# Patient Record
Sex: FEMALE | Race: WHITE | NOT HISPANIC OR LATINO | Employment: OTHER | URBAN - METROPOLITAN AREA
[De-identification: names, ages, dates, MRNs, and addresses within clinical notes are randomized per-mention and may not be internally consistent; named-entity substitution may affect disease eponyms.]

---

## 2017-07-11 ENCOUNTER — HOSPITAL ENCOUNTER (EMERGENCY)
Facility: HOSPITAL | Age: 32
Discharge: HOME | End: 2017-07-11
Attending: EMERGENCY MEDICINE
Payer: COMMERCIAL

## 2017-07-11 VITALS
BODY MASS INDEX: 25.33 KG/M2 | SYSTOLIC BLOOD PRESSURE: 142 MMHG | DIASTOLIC BLOOD PRESSURE: 78 MMHG | OXYGEN SATURATION: 99 % | HEIGHT: 65 IN | RESPIRATION RATE: 16 BRPM | HEART RATE: 85 BPM | WEIGHT: 152 LBS | TEMPERATURE: 97 F

## 2017-07-11 DIAGNOSIS — S61.218A LACERATION OF INDEX FINGER WITHOUT FOREIGN BODY WITHOUT DAMAGE TO NAIL, UNSPECIFIED LATERALITY, INITIAL ENCOUNTER: Primary | ICD-10-CM

## 2017-07-11 PROCEDURE — 12001 RPR S/N/AX/GEN/TRNK 2.5CM/<: CPT

## 2017-07-11 PROCEDURE — 99283 EMERGENCY DEPT VISIT LOW MDM: CPT | Mod: 25

## 2017-07-11 PROCEDURE — 63600175 PHARM REV CODE 636 W HCPCS: Performed by: EMERGENCY MEDICINE

## 2017-07-11 PROCEDURE — 12002 RPR S/N/AX/GEN/TRNK2.6-7.5CM: CPT

## 2017-07-11 PROCEDURE — 90715 TDAP VACCINE 7 YRS/> IM: CPT | Performed by: EMERGENCY MEDICINE

## 2017-07-11 PROCEDURE — 90471 IMMUNIZATION ADMIN: CPT | Performed by: EMERGENCY MEDICINE

## 2017-07-11 RX ADMIN — CLOSTRIDIUM TETANI TOXOID ANTIGEN (FORMALDEHYDE INACTIVATED), CORYNEBACTERIUM DIPHTHERIAE TOXOID ANTIGEN (FORMALDEHYDE INACTIVATED), BORDETELLA PERTUSSIS TOXOID ANTIGEN (GLUTARALDEHYDE INACTIVATED), BORDETELLA PERTUSSIS FILAMENTOUS HEMAGGLUTININ ANTIGEN (FORMALDEHYDE INACTIVATED), BORDETELLA PERTUSSIS PERTACTIN ANTIGEN, AND BORDETELLA PERTUSSIS FIMBRIAE 2/3 ANTIGEN 0.5 ML: 5; 2; 2.5; 5; 3; 5 INJECTION, SUSPENSION INTRAMUSCULAR at 08:07

## 2017-07-12 NOTE — ED PROVIDER NOTES
Encounter Date: 2017       History     Chief Complaint   Patient presents with    Laceration     right 2nd finger on a knife     Small laceration to right 2nd digit.      The history is provided by the patient.   Laceration    The incident occurred just prior to arrival. The laceration is located on the right hand. The laceration mechanism was a a clean knife. The pain is at a severity of 2/10. The pain has been fluctuating since onset. Her tetanus status is unknown.     Review of patient's allergies indicates:   Allergen Reactions    Sulfa (sulfonamide antibiotics) Hives     itching    Adhesive Rash    Percocet [oxycodone-acetaminophen] Other (See Comments)     Do not want to use     Past Medical History:   Diagnosis Date    Asthma     allergy induce    Fracture     small finger left hand     Past Surgical History:   Procedure Laterality Date    COSMETIC SURGERY      breast augmentation    FINGER SURGERY      PELVIC LAPAROSCOPY      Endometriosis ([place])    TONSILLECTOMY       Family History   Problem Relation Age of Onset    Diabetes Maternal Grandmother     Diabetes Mother     Hypertension Mother     Breast cancer Neg Hx     Eclampsia Neg Hx     Miscarriages / Stillbirths Neg Hx     Ovarian cancer Neg Hx      labor Neg Hx     Stroke Neg Hx     Cervical cancer Maternal Aunt     Colon cancer Maternal Aunt     Diabetes Maternal Aunt      Social History   Substance Use Topics    Smoking status: Former Smoker     Packs/day: 1.00     Years: 5.00     Start date: 2005    Smokeless tobacco: Not on file    Alcohol use Yes      Comment: rarely     Review of Systems   Constitutional: Negative for fever.   HENT: Negative for ear discharge, ear pain and facial swelling.    Eyes: Negative for pain, redness and itching.   Respiratory: Negative for cough, shortness of breath, wheezing and stridor.    Cardiovascular: Negative for chest pain, palpitations and leg swelling.    Gastrointestinal: Negative for abdominal pain, nausea and vomiting.   Genitourinary: Negative for enuresis and flank pain.   Musculoskeletal: Negative for back pain, gait problem, neck pain and neck stiffness.   Skin: Positive for wound. Negative for color change, pallor and rash.   Neurological: Negative for tremors, syncope and weakness.       Physical Exam     Initial Vitals [07/11/17 1956]   BP Pulse Resp Temp SpO2   (!) 142/78 85 16 96.9 °F (36.1 °C) 99 %      MAP       99.33         Physical Exam    Nursing note and vitals reviewed.  Constitutional: She appears well-developed and well-nourished. She is not diaphoretic. No distress.   HENT:   Head: Normocephalic and atraumatic.   Mouth/Throat: No oropharyngeal exudate.   Eyes: EOM are normal. Pupils are equal, round, and reactive to light. Right eye exhibits no discharge. Left eye exhibits no discharge.   Neck: Normal range of motion. Neck supple. No JVD present.   Pulmonary/Chest: Breath sounds normal. No stridor. She has no wheezes. She has no rhonchi. She has no rales. She exhibits no tenderness.   Abdominal: Soft. Bowel sounds are normal. She exhibits no distension. There is no tenderness. There is no rebound and no guarding.   Musculoskeletal: Normal range of motion. She exhibits no edema.   Neurological: She is alert and oriented to person, place, and time. No sensory deficit.   Skin:              ED Course   Lac Repair  Date/Time: 7/11/2017 8:41 PM  Performed by: NADIR CABAN  Authorized by: NADIR CABAN   Consent Done: Not Needed  Body area: upper extremity  Location details: right index finger  Laceration length: 0.5 cm  Foreign bodies: no foreign bodies  Tendon involvement: none  Nerve involvement: none  Vascular damage: no  Patient sedated: no  Irrigation solution: saline  Amount of cleaning: standard  Debridement: none  Degree of undermining: none  Skin closure: glue  Approximation: close  Approximation difficulty: simple  Dressing:  adhesive bandage  Patient tolerance: Patient tolerated the procedure well with no immediate complications        Labs Reviewed - No data to display                            ED Course     Clinical Impression:   The encounter diagnosis was Laceration of index finger without foreign body without damage to nail, unspecified laterality, initial encounter.    Disposition:   Disposition: Discharged  Condition: Stable                        Michel Peralta MD  07/11/17 2218

## 2017-11-19 ENCOUNTER — HOSPITAL ENCOUNTER (EMERGENCY)
Facility: HOSPITAL | Age: 32
Discharge: HOME | End: 2017-11-19
Attending: EMERGENCY MEDICINE
Payer: COMMERCIAL

## 2017-11-19 VITALS
OXYGEN SATURATION: 100 % | DIASTOLIC BLOOD PRESSURE: 74 MMHG | TEMPERATURE: 97 F | BODY MASS INDEX: 24.96 KG/M2 | WEIGHT: 150 LBS | HEART RATE: 75 BPM | SYSTOLIC BLOOD PRESSURE: 132 MMHG | RESPIRATION RATE: 16 BRPM

## 2017-11-19 DIAGNOSIS — S60.469A: Primary | ICD-10-CM
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
## 2017-11-19 LAB
ALBUMIN SERPL BCP-MCNC: 3.5 G/DL
ALP SERPL-CCNC: 76 U/L
ALT SERPL W/O P-5'-P-CCNC: 13 U/L
ANION GAP SERPL CALC-SCNC: 10 MMOL/L
AST SERPL-CCNC: 14 U/L
BASOPHILS # BLD AUTO: 0.02 K/UL
BASOPHILS NFR BLD: 0.2 %
BILIRUB SERPL-MCNC: 0.8 MG/DL
BUN SERPL-MCNC: 5 MG/DL
CALCIUM SERPL-MCNC: 8.8 MG/DL
CHLORIDE SERPL-SCNC: 106 MMOL/L
CO2 SERPL-SCNC: 23 MMOL/L
CREAT SERPL-MCNC: 0.7 MG/DL
DIFFERENTIAL METHOD: ABNORMAL
EOSINOPHIL # BLD AUTO: 0 K/UL
EOSINOPHIL NFR BLD: 0.2 %
ERYTHROCYTE [DISTWIDTH] IN BLOOD BY AUTOMATED COUNT: 12.5 %
EST. GFR  (AFRICAN AMERICAN): >60 ML/MIN/1.73 M^2
EST. GFR  (NON AFRICAN AMERICAN): >60 ML/MIN/1.73 M^2
GLUCOSE SERPL-MCNC: 93 MG/DL
HCT VFR BLD AUTO: 37.4 %
HGB BLD-MCNC: 12.1 G/DL
LYMPHOCYTES # BLD AUTO: 1.8 K/UL
LYMPHOCYTES NFR BLD: 14.2 %
MCH RBC QN AUTO: 32.5 PG
MCHC RBC AUTO-ENTMCNC: 32.4 G/DL
MCV RBC AUTO: 101 FL
MONOCYTES # BLD AUTO: 1.3 K/UL
MONOCYTES NFR BLD: 10 %
NEUTROPHILS # BLD AUTO: 9.8 K/UL
NEUTROPHILS NFR BLD: 75.4 %
PLATELET # BLD AUTO: 204 K/UL
PMV BLD AUTO: 10.4 FL
POTASSIUM SERPL-SCNC: 3.4 MMOL/L
PROT SERPL-MCNC: 6.7 G/DL
RBC # BLD AUTO: 3.72 M/UL
SODIUM SERPL-SCNC: 139 MMOL/L
WBC # BLD AUTO: 12.93 K/UL

## 2017-11-19 PROCEDURE — S0077 INJECTION, CLINDAMYCIN PHOSP: HCPCS | Performed by: EMERGENCY MEDICINE

## 2017-11-19 PROCEDURE — 96372 THER/PROPH/DIAG INJ SC/IM: CPT

## 2017-11-19 PROCEDURE — 80053 COMPREHEN METABOLIC PANEL: CPT

## 2017-11-19 PROCEDURE — 87040 BLOOD CULTURE FOR BACTERIA: CPT | Mod: 59

## 2017-11-19 PROCEDURE — 36415 COLL VENOUS BLD VENIPUNCTURE: CPT

## 2017-11-19 PROCEDURE — 85025 COMPLETE CBC W/AUTO DIFF WBC: CPT

## 2017-11-19 PROCEDURE — 25000003 PHARM REV CODE 250: Performed by: EMERGENCY MEDICINE

## 2017-11-19 PROCEDURE — 63600175 PHARM REV CODE 636 W HCPCS: Performed by: EMERGENCY MEDICINE

## 2017-11-19 PROCEDURE — 99284 EMERGENCY DEPT VISIT MOD MDM: CPT | Mod: 25

## 2017-11-19 RX ORDER — ONDANSETRON 4 MG/1
4 TABLET, ORALLY DISINTEGRATING ORAL
Status: COMPLETED | OUTPATIENT
Start: 2017-11-19 | End: 2017-11-19

## 2017-11-19 RX ORDER — MUPIROCIN 20 MG/G
OINTMENT TOPICAL 3 TIMES DAILY
Qty: 15 G | Refills: 0 | Status: SHIPPED | OUTPATIENT
Start: 2017-11-19 | End: 2017-11-29

## 2017-11-19 RX ORDER — HYDROCODONE BITARTRATE AND ACETAMINOPHEN 5; 325 MG/1; MG/1
2 TABLET ORAL
Status: COMPLETED | OUTPATIENT
Start: 2017-11-19 | End: 2017-11-19

## 2017-11-19 RX ORDER — HYDROMORPHONE HYDROCHLORIDE 1 MG/ML
1 INJECTION, SOLUTION INTRAMUSCULAR; INTRAVENOUS; SUBCUTANEOUS
Status: COMPLETED | OUTPATIENT
Start: 2017-11-19 | End: 2017-11-19

## 2017-11-19 RX ORDER — CLINDAMYCIN HYDROCHLORIDE 150 MG/1
300 CAPSULE ORAL 4 TIMES DAILY
Qty: 56 CAPSULE | Refills: 0 | Status: SHIPPED | OUTPATIENT
Start: 2017-11-19 | End: 2017-11-26

## 2017-11-19 RX ORDER — TRAMADOL HYDROCHLORIDE 50 MG/1
50 TABLET ORAL EVERY 6 HOURS PRN
Qty: 15 TABLET | Refills: 0 | Status: SHIPPED | OUTPATIENT
Start: 2017-11-19 | End: 2017-11-29

## 2017-11-19 RX ORDER — CLINDAMYCIN PHOSPHATE 150 MG/ML
300 INJECTION, SOLUTION INTRAVENOUS
Status: COMPLETED | OUTPATIENT
Start: 2017-11-19 | End: 2017-11-19

## 2017-11-19 RX ADMIN — ONDANSETRON 4 MG: 4 TABLET, ORALLY DISINTEGRATING ORAL at 06:11

## 2017-11-19 RX ADMIN — HYDROCODONE BITARTRATE AND ACETAMINOPHEN 2 TABLET: 5; 325 TABLET ORAL at 08:11

## 2017-11-19 RX ADMIN — CLINDAMYCIN PHOSPHATE 300 MG: 150 INJECTION, SOLUTION INTRAMUSCULAR; INTRAVENOUS at 07:11

## 2017-11-19 RX ADMIN — HYDROMORPHONE HYDROCHLORIDE 1 MG: 1 INJECTION, SOLUTION INTRAMUSCULAR; INTRAVENOUS; SUBCUTANEOUS at 06:11

## 2017-11-20 NOTE — ED PROVIDER NOTES
Encounter Date: 2017       History     Chief Complaint   Patient presents with    Animal Bite     Patient states dog bite to finger on 17.   States going to [facility] ED for care after event      The history is provided by the patient.   Animal Bite    The incident occurred several days ago. She came to the ER via by ambulance. There is an injury to the right index finger. The pain is at a severity of 4/10. It is unlikely that a foreign body is present. Pertinent negatives include no chest pain, no altered mental status, no numbness, no visual disturbance, no abdominal pain, no bowel incontinence, no nausea, no vomiting, no bladder incontinence, no headaches, no hearing loss, no inability to bear weight, no neck pain, no pain when bearing weight, no focal weakness, no decreased responsiveness, no light-headedness, no loss of consciousness, no seizures, no tingling, no weakness, no cough, no difficulty breathing and no memory loss. Her tetanus status is UTD. Recently, medical care has been given at another facility. Services received include medications given.     Review of patient's allergies indicates:   Allergen Reactions    Sulfa (sulfonamide antibiotics) Hives     itching    Adhesive Rash    Percocet [oxycodone-acetaminophen] Other (See Comments)     Do not want to use     Past Medical History:   Diagnosis Date    Asthma     allergy induce    Fracture     small finger left hand     Past Surgical History:   Procedure Laterality Date    COSMETIC SURGERY      breast augmentation    FINGER SURGERY      PELVIC LAPAROSCOPY      Endometriosis ([place])    TONSILLECTOMY       Family History   Problem Relation Age of Onset    Diabetes Maternal Grandmother     Diabetes Mother     Hypertension Mother     Breast cancer Neg Hx     Eclampsia Neg Hx     Miscarriages / Stillbirths Neg Hx     Ovarian cancer Neg Hx      labor Neg Hx     Stroke Neg Hx     Cervical cancer Maternal Aunt      Colon cancer Maternal Aunt     Diabetes Maternal Aunt      Social History   Substance Use Topics    Smoking status: Former Smoker     Packs/day: 1.00     Years: 5.00     Start date: 5/11/2005    Smokeless tobacco: Not on file    Alcohol use Yes      Comment: rarely     Review of Systems   Constitutional: Negative for decreased responsiveness and fever.   HENT: Negative for facial swelling and hearing loss.    Eyes: Negative for visual disturbance.   Respiratory: Negative for cough, shortness of breath, wheezing and stridor.    Cardiovascular: Negative for chest pain, palpitations and leg swelling.   Gastrointestinal: Negative for abdominal pain, bowel incontinence, nausea and vomiting.   Genitourinary: Negative for bladder incontinence, enuresis and flank pain.   Musculoskeletal: Negative for neck pain and neck stiffness.   Skin: Positive for wound.   Neurological: Negative for tingling, tremors, focal weakness, seizures, loss of consciousness, weakness, light-headedness, numbness and headaches.   Psychiatric/Behavioral: Negative for memory loss.       Physical Exam     Initial Vitals [11/19/17 1835]   BP Pulse Resp Temp SpO2   130/80 108 15 99.9 °F (37.7 °C) 100 %      MAP       96.67         Physical Exam    Nursing note and vitals reviewed.  Constitutional: She appears well-developed and well-nourished. She is not diaphoretic. No distress.   HENT:   Head: Normocephalic and atraumatic.   Mouth/Throat: No oropharyngeal exudate.   Eyes: EOM are normal. Pupils are equal, round, and reactive to light.   Neck: Normal range of motion. Neck supple. No JVD present.   Pulmonary/Chest: Breath sounds normal. No stridor. No respiratory distress. She has no wheezes. She has no rhonchi. She has no rales.   Abdominal: Soft. Bowel sounds are normal. She exhibits no distension. There is no tenderness. There is no rebound and no guarding.   Musculoskeletal: Normal range of motion. She exhibits edema and tenderness.    Neurological: She is alert and oriented to person, place, and time. No sensory deficit.   Skin: There is erythema.              ED Course   Procedures  Labs Reviewed   CULTURE, BLOOD   CULTURE, BLOOD   COMPREHENSIVE METABOLIC PANEL   CBC W/ AUTO DIFFERENTIAL                               ED Course      Clinical Impression:   The encounter diagnosis was Nonvenomous insect bite of finger, initial encounter.    Disposition:   Disposition: Discharged  Condition: Stable                        Michel Peralta MD  11/19/17 1951

## 2017-11-22 NOTE — PHYSICIAN QUERY
Dog bite from 11-16-17  PT Name: Marianela Shrestha  MR #: [id number]     Physician Query Form - Documentation Clarification      CDS/: Marlena Dior               Contact information: [phone]    This form is a permanent document in the medical record.     Query Date: November 22, 2017    By submitting this query, we are merely seeking further clarification of documentation. Please utilize your independent clinical judgment when addressing the question(s) below.    The Medical record reflects the following:    Supporting Clinical Findings Location in Medical Record         Animal Bite          Patient states dog bite to finger on 11-16-17.   States going to [facility] ED for care after event            Chief complaint       Nonvenomous insect bite of finger, initial encounter.              Clinical Impression                                                                            Dr. Peralta, There is conflicting information, please clarify below.    Provider Use Only        [ ]  Insect Bite      [ ]  Dog Bite      [ ] Undetermined

## 2017-11-25 LAB
BACTERIA BLD CULT: NORMAL
BACTERIA BLD CULT: NORMAL

## 2019-05-22 ENCOUNTER — OFFICE VISIT (OUTPATIENT)
Dept: INFECTIOUS DISEASES | Facility: CLINIC | Age: 34
End: 2019-05-22
Payer: MEDICAID

## 2019-05-22 VITALS
WEIGHT: 179.44 LBS | SYSTOLIC BLOOD PRESSURE: 124 MMHG | DIASTOLIC BLOOD PRESSURE: 89 MMHG | HEIGHT: 65 IN | TEMPERATURE: 98 F | BODY MASS INDEX: 29.9 KG/M2 | HEART RATE: 101 BPM

## 2019-05-22 DIAGNOSIS — Z72.0 TOBACCO ABUSE: ICD-10-CM

## 2019-05-22 DIAGNOSIS — M86.641 OTHER CHRONIC OSTEOMYELITIS OF RIGHT HAND: Primary | ICD-10-CM

## 2019-05-22 PROBLEM — M86.9 OSTEOMYELITIS OF RIGHT HAND: Status: ACTIVE | Noted: 2019-05-22

## 2019-05-22 PROCEDURE — 99999 PR PBB SHADOW E&M-EST. PATIENT-LVL IV: CPT | Mod: PBBFAC,,, | Performed by: INTERNAL MEDICINE

## 2019-05-22 PROCEDURE — 99204 OFFICE O/P NEW MOD 45 MIN: CPT | Mod: S$PBB,,, | Performed by: INTERNAL MEDICINE

## 2019-05-22 PROCEDURE — 99204 PR OFFICE/OUTPT VISIT, NEW, LEVL IV, 45-59 MIN: ICD-10-PCS | Mod: S$PBB,,, | Performed by: INTERNAL MEDICINE

## 2019-05-22 PROCEDURE — 99999 PR PBB SHADOW E&M-EST. PATIENT-LVL IV: ICD-10-PCS | Mod: PBBFAC,,, | Performed by: INTERNAL MEDICINE

## 2019-05-22 PROCEDURE — 99214 OFFICE O/P EST MOD 30 MIN: CPT | Mod: PBBFAC | Performed by: INTERNAL MEDICINE

## 2019-05-22 RX ORDER — GABAPENTIN 600 MG/1
600 TABLET ORAL 4 TIMES DAILY
COMMUNITY
End: 2020-08-05 | Stop reason: SDUPTHER

## 2019-05-22 RX ORDER — VENLAFAXINE HYDROCHLORIDE 150 MG/1
300 CAPSULE, EXTENDED RELEASE ORAL DAILY
COMMUNITY
End: 2020-08-05 | Stop reason: SDUPTHER

## 2019-05-22 RX ORDER — ONDANSETRON 4 MG/1
4 TABLET, FILM COATED ORAL EVERY 12 HOURS PRN
Qty: 60 TABLET | Refills: 1 | Status: SHIPPED | OUTPATIENT
Start: 2019-05-22 | End: 2020-05-21

## 2019-05-22 RX ORDER — DOXEPIN HYDROCHLORIDE 25 MG/1
50 CAPSULE ORAL NIGHTLY
COMMUNITY
End: 2020-08-07 | Stop reason: SDUPTHER

## 2019-05-22 RX ORDER — DAPTOMYCIN 50 MG/ML
800 INJECTION, POWDER, LYOPHILIZED, FOR SOLUTION INTRAVENOUS ONCE
Status: CANCELLED | OUTPATIENT
Start: 2019-05-23

## 2019-05-22 RX ORDER — HYDROCODONE BITARTRATE AND ACETAMINOPHEN 7.5; 325 MG/1; MG/1
1 TABLET ORAL EVERY 4 HOURS PRN
COMMUNITY
End: 2021-10-28

## 2019-05-22 RX ORDER — MUPIROCIN 20 MG/G
OINTMENT TOPICAL
Qty: 22 G | Refills: 1 | Status: SHIPPED | OUTPATIENT
Start: 2019-05-22 | End: 2021-07-23 | Stop reason: SDUPTHER

## 2019-05-22 RX ORDER — HEPARIN SODIUM (PORCINE) LOCK FLUSH IV SOLN 100 UNIT/ML 100 UNIT/ML
100 SOLUTION INTRAVENOUS
Status: CANCELLED | OUTPATIENT
Start: 2019-05-23

## 2019-05-22 RX ORDER — SODIUM CHLORIDE 0.9 % (FLUSH) 0.9 %
10 SYRINGE (ML) INJECTION
Status: CANCELLED | OUTPATIENT
Start: 2019-05-23

## 2019-05-22 RX ORDER — CLONIDINE HYDROCHLORIDE 0.1 MG/1
0.2 TABLET ORAL NIGHTLY
COMMUNITY
End: 2020-08-05

## 2019-05-22 RX ORDER — OXYCODONE AND ACETAMINOPHEN 10; 325 MG/1; MG/1
1 TABLET ORAL EVERY 4 HOURS PRN
COMMUNITY
End: 2020-08-05

## 2019-05-22 RX ORDER — ALPRAZOLAM 1 MG/1
1 TABLET ORAL 3 TIMES DAILY
COMMUNITY
End: 2020-08-05 | Stop reason: SDUPTHER

## 2019-05-22 NOTE — PROGRESS NOTES
Subjective:      Patient ID: Marianela Shrestha is a 34 y.o. female.    Chief Complaint:Wound Infection (infection/mrsa)      History of Present Illness  HPI     Wound Infection      Additional comments: infection/mrsa          Last edited by Aimee Coombs LPN on 5/22/2019 10:24 AM. (History)        Seen in consultation from Dr. Crisostomo.  Patient is being referred for management of osteomyelitis of the right index finger. The patient reports a dog bit her in Nov 2016. She has had multiple surgeries (9) since that time. 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 The most recent issue was following a fall in which she re injured the finger requiring pins to be placed about 8 weeks ago. The pins were removed. Cultures from 5/10/10 were positive for MSSA R to clinda. She reports she has been on multiple antibiotics. Most recently she has been on levaquin. No prolonged IV antibiotics. No boils or abscess. No fever or chills.  She does smoke 4 cigarettes per day. Did quit 5 or 6 years ago but resumed. Occasional alcohol. Denies IVDU.  H/o asthma as a child and occasional flares.   Has panic attacks and followed by Dr. Mikhail Wylie who manages her medications for that.      Is allergic to sulfa - rechallenged with same symptoms of hives, chest pain.    Review of Systems   Constitution: Positive for fever, malaise/fatigue, night sweats and weight gain. Negative for chills, decreased appetite and weight loss.   HENT: Negative for congestion, ear pain, hearing loss, hoarse voice, sore throat and tinnitus.    Eyes: Negative for blurred vision, redness and visual disturbance.   Cardiovascular: Negative for chest pain, leg swelling and palpitations.   Respiratory: Positive for cough. Negative for hemoptysis, shortness of breath and sputum production.    Hematologic/Lymphatic: Negative for adenopathy. Does not bruise/bleed easily.   Skin: Negative for dry skin, itching, rash and  suspicious lesions.   Musculoskeletal: Positive for back pain and joint pain. Negative for myalgias and neck pain.   Gastrointestinal: Positive for nausea. Negative for abdominal pain, constipation, diarrhea, heartburn and vomiting.   Genitourinary: Negative for dysuria, flank pain, frequency, hematuria, hesitancy and urgency.   Neurological: Positive for headaches. Negative for dizziness, numbness, paresthesias and weakness.   Psychiatric/Behavioral: Negative for depression and memory loss. The patient does not have insomnia and is not nervous/anxious.      Objective:   Physical Exam   Constitutional: She is oriented to person, place, and time. She appears well-developed and well-nourished. She is cooperative.  Non-toxic appearance. No distress.   HENT:   Head: Normocephalic and atraumatic.   Right Ear: Hearing and external ear normal.   Left Ear: Hearing and external ear normal.   Nose: Nose normal.   Mouth/Throat: Oropharynx is clear and moist. No oropharyngeal exudate.   Eyes: Pupils are equal, round, and reactive to light. Conjunctivae, EOM and lids are normal. Right eye exhibits no discharge. Left eye exhibits no discharge. Right conjunctiva is not injected. Left conjunctiva is not injected. No scleral icterus.   Neck: Normal range of motion. No tracheal deviation present.   Cardiovascular: Normal rate, regular rhythm and normal heart sounds.   No murmur heard.  Pulmonary/Chest: Effort normal and breath sounds normal. No accessory muscle usage or stridor. No respiratory distress. She has no wheezes.   Abdominal: Normal appearance. She exhibits no distension.   Musculoskeletal:   Right index finger with no mobility of pip, dip joints. Fixed in curved position. Mild edema, erythema. No warmth. Incision well healed.    Neurological: She is alert and oriented to person, place, and time. Coordination normal.   Skin: Skin is warm and dry. She is not diaphoretic.   Psychiatric: She has a normal mood and affect. Her  speech is normal and behavior is normal. Judgment and thought content normal. Cognition and memory are normal.   Nursing note and vitals reviewed.    Assessment:       1. Other chronic osteomyelitis of right hand    2. Tobacco abuse          Plan:       Marianela was seen today for wound infection.    Diagnoses and all orders for this visit:    Other chronic osteomyelitis of right hand  -     CBC auto differential; Standing  -     CK; Future  -     Sedimentation rate; Standing  -     C-reactive protein; Standing  -     Comprehensive metabolic panel; Future  -     Ambulatory consult to Interventional Radiology  -     Amb Consult to [facility] Interventional RAD  -     Cancel: DAPTOmycin injection 800 mg  -     IR PICC Line Placement w/o Port w/ Img > 4 Y/O; Future    Tobacco abuse    Other orders  -     mupirocin (BACTROBAN) 2 % ointment; Apply to nares two times daily for 5 days. Repeat monthly  -     ondansetron (ZOFRAN) 4 MG tablet; Take 1 tablet (4 mg total) by mouth every 12 (twelve) hours as needed for Nausea.  -     Cancel: sodium chloride 0.9% flush 10 mL  -     Cancel: heparin lock flush (porcine) injection 100 Units    Will have PICC placed and begin dapto 800mg iv daily. Will likely need 6 weeks IV therapy. Labs weekly.   She tells me the plan is to eventually undergo joint replacement. Have discussed with her that she is at increased risk for any surgical procedure due to prior infections.  Also discussed how smoking and nicotine negatively impact healing and fighting infections. She was able to quit before-encouraged her to try again.  Will see her back in 2-3 weeks.

## 2019-05-22 NOTE — LETTER
May 23, 2019      Jace Crisostomo III, MD  [address] [facility] [place] [address]           [facility]  [address]  [facility] [address]  Phone: [phone]  Fax: [phone]          Patient: Marianela Shrestha   MR Number: [id number]   YOB: 1985   Date of Visit: 5/22/2019       Dear Dr. Jace Crisostomo III:    Thank you for referring Marianela Shrestha to me for evaluation. Attached you will find relevant portions of my assessment and plan of care.    If you have questions, please do not hesitate to call me. I look forward to following Marianela Shrestha along with you.    Sincerely,    Katherine L. Baumgarten, MD    Enclosure  CC:  No Recipients    If you would like to receive this communication electronically, please contact [email or web address] or [phone] to request more information on [employer] access.    For providers and/or their staff who would like to refer a patient to [facility], please contact us through our one-stop-shop provider referral line, [facility], at [phone].    If you feel you have received this communication in error or would no longer like to receive these types of communications, please e-mail [email or web address]

## 2019-05-23 ENCOUNTER — TELEPHONE (OUTPATIENT)
Dept: INTERVENTIONAL RADIOLOGY/VASCULAR | Facility: HOSPITAL | Age: 34
End: 2019-05-23

## 2019-05-23 ENCOUNTER — INFUSION (OUTPATIENT)
Dept: INFECTIOUS DISEASES | Facility: HOSPITAL | Age: 34
End: 2019-05-23
Attending: INTERNAL MEDICINE
Payer: MEDICAID

## 2019-05-23 ENCOUNTER — TELEPHONE (OUTPATIENT)
Dept: INFECTIOUS DISEASES | Facility: HOSPITAL | Age: 34
End: 2019-05-23

## 2019-05-23 VITALS
HEART RATE: 86 BPM | RESPIRATION RATE: 16 BRPM | DIASTOLIC BLOOD PRESSURE: 72 MMHG | SYSTOLIC BLOOD PRESSURE: 126 MMHG | BODY MASS INDEX: 29.72 KG/M2 | WEIGHT: 178.56 LBS | TEMPERATURE: 98 F

## 2019-05-23 DIAGNOSIS — M86.641 OTHER CHRONIC OSTEOMYELITIS OF RIGHT HAND: Primary | ICD-10-CM

## 2019-05-23 PROBLEM — Z72.0 TOBACCO ABUSE: Status: ACTIVE | Noted: 2019-05-23

## 2019-05-23 PROCEDURE — 63600175 PHARM REV CODE 636 W HCPCS: Mod: JG | Performed by: INTERNAL MEDICINE

## 2019-05-23 PROCEDURE — 96365 THER/PROPH/DIAG IV INF INIT: CPT

## 2019-05-23 PROCEDURE — 25000003 PHARM REV CODE 250: Performed by: INTERNAL MEDICINE

## 2019-05-23 RX ORDER — DAPTOMYCIN 50 MG/ML
800 INJECTION, POWDER, LYOPHILIZED, FOR SOLUTION INTRAVENOUS ONCE
Status: CANCELLED | OUTPATIENT
Start: 2019-05-23

## 2019-05-23 RX ORDER — SODIUM CHLORIDE 0.9 % (FLUSH) 0.9 %
10 SYRINGE (ML) INJECTION
Status: CANCELLED | OUTPATIENT
Start: 2019-05-23

## 2019-05-23 RX ORDER — HEPARIN SODIUM (PORCINE) LOCK FLUSH IV SOLN 100 UNIT/ML 100 UNIT/ML
100 SOLUTION INTRAVENOUS
Status: CANCELLED | OUTPATIENT
Start: 2019-05-23

## 2019-05-23 RX ORDER — HEPARIN 100 UNIT/ML
500 SYRINGE INTRAVENOUS
Status: CANCELLED | OUTPATIENT
Start: 2019-05-23

## 2019-05-23 RX ORDER — SODIUM CHLORIDE 0.9 % (FLUSH) 0.9 %
10 SYRINGE (ML) INJECTION
Status: CANCELLED | OUTPATIENT
Start: 2019-05-30

## 2019-05-23 RX ORDER — HEPARIN 100 UNIT/ML
500 SYRINGE INTRAVENOUS
Status: CANCELLED | OUTPATIENT
Start: 2019-05-30

## 2019-05-23 RX ADMIN — DAPTOMYCIN: 500 INJECTION, POWDER, LYOPHILIZED, FOR SOLUTION INTRAVENOUS at 02:05

## 2019-05-23 NOTE — TELEPHONE ENCOUNTER
Patient not considered home bound need weekly lab draws and dressing changed please sign therapy plan and orders

## 2019-05-24 ENCOUNTER — HOSPITAL ENCOUNTER (OUTPATIENT)
Dept: INTERVENTIONAL RADIOLOGY/VASCULAR | Facility: HOSPITAL | Age: 34
Discharge: HOME | End: 2019-05-24
Attending: INTERNAL MEDICINE
Payer: MEDICAID

## 2019-05-24 ENCOUNTER — TELEPHONE (OUTPATIENT)
Dept: INFECTIOUS DISEASES | Facility: HOSPITAL | Age: 34
End: 2019-05-24

## 2019-05-24 VITALS
DIASTOLIC BLOOD PRESSURE: 90 MMHG | SYSTOLIC BLOOD PRESSURE: 149 MMHG | RESPIRATION RATE: 18 BRPM | HEART RATE: 75 BPM | OXYGEN SATURATION: 98 %

## 2019-05-24 DIAGNOSIS — M86.641 OTHER CHRONIC OSTEOMYELITIS OF RIGHT HAND: ICD-10-CM

## 2019-05-24 LAB
B-HCG UR QL: NEGATIVE
CTP QC/QA: YES

## 2019-05-24 PROCEDURE — C1751 CATH, INF, PER/CENT/MIDLINE: HCPCS

## 2019-05-24 PROCEDURE — 81025 URINE PREGNANCY TEST: CPT | Performed by: RADIOLOGY

## 2019-05-24 PROCEDURE — 36573 IR PICC LINE PLACEMENT W/O PORT, W/IMG > 5 Y/O: ICD-10-PCS | Mod: ,,, | Performed by: RADIOLOGY

## 2019-05-24 PROCEDURE — 36573 INSJ PICC RS&I 5 YR+: CPT | Mod: ,,, | Performed by: RADIOLOGY

## 2019-05-24 RX ORDER — DAPTOMYCIN 50 MG/ML
800 INJECTION, POWDER, LYOPHILIZED, FOR SOLUTION INTRAVENOUS ONCE
Status: CANCELLED | OUTPATIENT
Start: 2019-05-24

## 2019-05-24 RX ORDER — SODIUM CHLORIDE 0.9 % (FLUSH) 0.9 %
10 SYRINGE (ML) INJECTION
Status: CANCELLED | OUTPATIENT
Start: 2019-05-24

## 2019-05-24 RX ORDER — HEPARIN SODIUM (PORCINE) LOCK FLUSH IV SOLN 100 UNIT/ML 100 UNIT/ML
100 SOLUTION INTRAVENOUS
Status: CANCELLED | OUTPATIENT
Start: 2019-05-24

## 2019-05-24 NOTE — PROGRESS NOTES
Pt arrived to Interventional Radiology room 189  for PICC line placement.  Name verified using two identifiers.  Allergies verified.  Will continue to monitor.

## 2019-05-24 NOTE — DISCHARGE INSTRUCTIONS
PICC Line Care  PICC stands for peripherally inserted central catheter. This is a short-term (temporary) tube that is used instead of a regular IV (intravenous) line.   Reasons for using a PICC line  A PICC line may be used because:  · It reduces the discomfort of putting in a new IV every time one is needed.  · Medicine or nutrition needs to be given over a period of weeks or even months.  · A PICC can stay in place longer than an IV, so it reduces needle sticks.  · It reduces damage to small veins, where an IV is normally inserted.  · A PICC may have more than 1 channel, so different fluids or medicines can be given at the same time.  · A PICC line allows for home therapy.  Your PICC will need some care to keep it clean and working. This care includes:  · Changing the bandage (dressing)  · Flushing the catheter with fluids  · Changing the cap on the end of the catheter  A nurse or other healthcare provider will teach you how to do each of these things.    Home care  The following are general care guidelines that will help you care for your PICC line at home:  · You can use your arm. But avoid any activity that causes mild pain.  · Do not pick at it or pull on the tubing.  · Dont lift anything heavier than 10 pounds with the arm on the side of the PICC line.  · When you bathe or shower, tape plastic wrap over the site to keep it dry.  · Do not put the PICC site under water. No swimming or hot tubs. If the dressing gets wet, change it right away.  · Always wash your hands before and after touching any part of your PICC.  · Do not allow the tubing to hang freely. Make sure to keep the tubing covered and secured to your arm to prevent the PICC line from being pulled out by accident.  The following tips will help you with dressing changes:  · Change the dressing over the site as directed. This is usually once a week. Change it sooner if the dressing gets wet or soiled. Check the dressing daily.  · You or a family  member may be able to do the dressing change at home. Or you may be instructed to return to the office or clinic for dressing changes.  · Sterile technique must be used for PICC dressing change. If your dressing is changed at home, be sure you or your family member knows sterile dressing technique. Call your health care provider for instructions if you need them.  Follow-up care  Follow up as advised by your healthcare provider or our staff.  When to seek medical advice  Call your healthcare provider right away if any of these occur:  · Fever of 100.4°F (38°C) or higher  · Drainage from the PICC site  · Swelling or bulging around the PICC site  · Bleeding from the PICC site  · Skin pulls away from the PICC site  · Redness, warmth, or pus at the PICC site  · Tubing breaks, splits, or leaks  · More exposed tubing (tubing seems longer) or the tubing is pulled out completely  · Medicine or fluids do not drain from the bag into your PICC  Date Last Reviewed: 7/1/2016  © 0676-9600 [employer]. [address] [place] [address]. All rights reserved. This information is not intended as a substitute for professional medical care. Always follow your healthcare professional's instructions.

## 2019-05-27 ENCOUNTER — TELEPHONE (OUTPATIENT)
Dept: INFECTIOUS DISEASES | Facility: HOSPITAL | Age: 34
End: 2019-05-27

## 2019-05-27 RX ORDER — PROMETHAZINE HYDROCHLORIDE 12.5 MG/1
12.5 TABLET ORAL 4 TIMES DAILY
Qty: 30 TABLET | Refills: 1 | Status: SHIPPED | OUTPATIENT
Start: 2019-05-27 | End: 2019-06-17

## 2019-05-27 NOTE — TELEPHONE ENCOUNTER
----- Message from Marcia Max sent at 5/27/2019  2:11 PM CDT -----  Contact: self [phone]  .Needs Advice    Reason for call:        Communication Preference:phone     Additional Information:pt states she is experiencing  extremely nausea states she is taking Zofran  States she has a port line states she is on [place] iv please call back to discuss

## 2019-05-31 ENCOUNTER — INFUSION (OUTPATIENT)
Dept: INFECTIOUS DISEASES | Facility: HOSPITAL | Age: 34
End: 2019-05-31
Attending: INTERNAL MEDICINE
Payer: MEDICAID

## 2019-05-31 DIAGNOSIS — M86.641 OTHER CHRONIC OSTEOMYELITIS OF RIGHT HAND: ICD-10-CM

## 2019-05-31 LAB
ALBUMIN SERPL BCP-MCNC: 3.9 G/DL (ref 3.5–5.2)
ALP SERPL-CCNC: 81 U/L (ref 55–135)
ALT SERPL W/O P-5'-P-CCNC: 15 U/L (ref 10–44)
ANION GAP SERPL CALC-SCNC: 10 MMOL/L (ref 8–16)
AST SERPL-CCNC: 17 U/L (ref 10–40)
BASOPHILS # BLD AUTO: 0.05 K/UL (ref 0–0.2)
BASOPHILS NFR BLD: 0.6 % (ref 0–1.9)
BILIRUB SERPL-MCNC: 0.4 MG/DL (ref 0.1–1)
BUN SERPL-MCNC: 9 MG/DL (ref 6–20)
CALCIUM SERPL-MCNC: 9.2 MG/DL (ref 8.7–10.5)
CHLORIDE SERPL-SCNC: 105 MMOL/L (ref 95–110)
CO2 SERPL-SCNC: 24 MMOL/L (ref 23–29)
CREAT SERPL-MCNC: 0.8 MG/DL (ref 0.5–1.4)
CRP SERPL-MCNC: 4.8 MG/L (ref 0–8.2)
DIFFERENTIAL METHOD: ABNORMAL
EOSINOPHIL # BLD AUTO: 0.1 K/UL (ref 0–0.5)
EOSINOPHIL NFR BLD: 1.6 % (ref 0–8)
ERYTHROCYTE [DISTWIDTH] IN BLOOD BY AUTOMATED COUNT: 12.4 % (ref 11.5–14.5)
ERYTHROCYTE [SEDIMENTATION RATE] IN BLOOD BY WESTERGREN METHOD: 22 MM/HR (ref 0–36)
EST. GFR  (AFRICAN AMERICAN): >60 ML/MIN/1.73 M^2
EST. GFR  (NON AFRICAN AMERICAN): >60 ML/MIN/1.73 M^2
GLUCOSE SERPL-MCNC: 81 MG/DL (ref 70–110)
HCT VFR BLD AUTO: 40.2 % (ref 37–48.5)
HGB BLD-MCNC: 13.5 G/DL (ref 12–16)
IMM GRANULOCYTES # BLD AUTO: 0.02 K/UL (ref 0–0.04)
IMM GRANULOCYTES NFR BLD AUTO: 0.2 % (ref 0–0.5)
LYMPHOCYTES # BLD AUTO: 1.9 K/UL (ref 1–4.8)
LYMPHOCYTES NFR BLD: 22.2 % (ref 18–48)
MCH RBC QN AUTO: 33.9 PG (ref 27–31)
MCHC RBC AUTO-ENTMCNC: 33.6 G/DL (ref 32–36)
MCV RBC AUTO: 101 FL (ref 82–98)
MONOCYTES # BLD AUTO: 1 K/UL (ref 0.3–1)
MONOCYTES NFR BLD: 12.1 % (ref 4–15)
NEUTROPHILS # BLD AUTO: 5.4 K/UL (ref 1.8–7.7)
NEUTROPHILS NFR BLD: 63.3 % (ref 38–73)
NRBC BLD-RTO: 0 /100 WBC
PLATELET # BLD AUTO: 268 K/UL (ref 150–350)
PMV BLD AUTO: 11 FL (ref 9.2–12.9)
POTASSIUM SERPL-SCNC: 4.1 MMOL/L (ref 3.5–5.1)
PROT SERPL-MCNC: 7.3 G/DL (ref 6–8.4)
RBC # BLD AUTO: 3.98 M/UL (ref 4–5.4)
SODIUM SERPL-SCNC: 139 MMOL/L (ref 136–145)
WBC # BLD AUTO: 8.59 K/UL (ref 3.9–12.7)

## 2019-05-31 PROCEDURE — 86140 C-REACTIVE PROTEIN: CPT

## 2019-05-31 PROCEDURE — 85025 COMPLETE CBC W/AUTO DIFF WBC: CPT

## 2019-05-31 PROCEDURE — 27200042 HC BIOPATCH

## 2019-05-31 PROCEDURE — 36415 COLL VENOUS BLD VENIPUNCTURE: CPT

## 2019-05-31 PROCEDURE — 80053 COMPREHEN METABOLIC PANEL: CPT

## 2019-05-31 PROCEDURE — 85652 RBC SED RATE AUTOMATED: CPT

## 2019-05-31 NOTE — PROGRESS NOTES
Patient arrived for Picc line dressing change and labs. Labs drawn from red port without difficulty. Dressing changed biopatch applied. Left NAD

## 2019-06-03 ENCOUNTER — TELEPHONE (OUTPATIENT)
Dept: INFECTIOUS DISEASES | Facility: HOSPITAL | Age: 34
End: 2019-06-03

## 2019-06-03 DIAGNOSIS — M86.641 OTHER CHRONIC OSTEOMYELITIS OF RIGHT HAND: Primary | ICD-10-CM

## 2019-06-03 NOTE — TELEPHONE ENCOUNTER
Aubrey from [facility]  Called and he needs cpk to be drawn weekly while on Daptomycin. I added cpk to lab orders

## 2019-06-07 ENCOUNTER — INFUSION (OUTPATIENT)
Dept: INFECTIOUS DISEASES | Facility: HOSPITAL | Age: 34
End: 2019-06-07
Attending: INTERNAL MEDICINE
Payer: MEDICAID

## 2019-06-07 DIAGNOSIS — M86.641 OTHER CHRONIC OSTEOMYELITIS OF RIGHT HAND: ICD-10-CM

## 2019-06-07 LAB
ALBUMIN SERPL BCP-MCNC: 4 G/DL (ref 3.5–5.2)
ALP SERPL-CCNC: 79 U/L (ref 55–135)
ALT SERPL W/O P-5'-P-CCNC: 15 U/L (ref 10–44)
ANION GAP SERPL CALC-SCNC: 9 MMOL/L (ref 8–16)
AST SERPL-CCNC: 15 U/L (ref 10–40)
BASOPHILS # BLD AUTO: 0.05 K/UL (ref 0–0.2)
BASOPHILS NFR BLD: 0.6 % (ref 0–1.9)
BILIRUB SERPL-MCNC: 0.2 MG/DL (ref 0.1–1)
BUN SERPL-MCNC: 12 MG/DL (ref 6–20)
CALCIUM SERPL-MCNC: 9.3 MG/DL (ref 8.7–10.5)
CHLORIDE SERPL-SCNC: 106 MMOL/L (ref 95–110)
CK SERPL-CCNC: 100 U/L (ref 20–180)
CO2 SERPL-SCNC: 24 MMOL/L (ref 23–29)
CREAT SERPL-MCNC: 0.8 MG/DL (ref 0.5–1.4)
CRP SERPL-MCNC: 2.4 MG/L (ref 0–8.2)
DIFFERENTIAL METHOD: ABNORMAL
EOSINOPHIL # BLD AUTO: 0.3 K/UL (ref 0–0.5)
EOSINOPHIL NFR BLD: 3.3 % (ref 0–8)
ERYTHROCYTE [DISTWIDTH] IN BLOOD BY AUTOMATED COUNT: 12.2 % (ref 11.5–14.5)
ERYTHROCYTE [SEDIMENTATION RATE] IN BLOOD BY WESTERGREN METHOD: 21 MM/HR (ref 0–36)
EST. GFR  (AFRICAN AMERICAN): >60 ML/MIN/1.73 M^2
EST. GFR  (NON AFRICAN AMERICAN): >60 ML/MIN/1.73 M^2
GLUCOSE SERPL-MCNC: 86 MG/DL (ref 70–110)
HCT VFR BLD AUTO: 41.4 % (ref 37–48.5)
HGB BLD-MCNC: 14 G/DL (ref 12–16)
IMM GRANULOCYTES # BLD AUTO: 0.04 K/UL (ref 0–0.04)
IMM GRANULOCYTES NFR BLD AUTO: 0.5 % (ref 0–0.5)
LYMPHOCYTES # BLD AUTO: 1.5 K/UL (ref 1–4.8)
LYMPHOCYTES NFR BLD: 16.7 % (ref 18–48)
MCH RBC QN AUTO: 34 PG (ref 27–31)
MCHC RBC AUTO-ENTMCNC: 33.8 G/DL (ref 32–36)
MCV RBC AUTO: 101 FL (ref 82–98)
MONOCYTES # BLD AUTO: 0.8 K/UL (ref 0.3–1)
MONOCYTES NFR BLD: 9.2 % (ref 4–15)
NEUTROPHILS # BLD AUTO: 6.2 K/UL (ref 1.8–7.7)
NEUTROPHILS NFR BLD: 69.7 % (ref 38–73)
NRBC BLD-RTO: 0 /100 WBC
PLATELET # BLD AUTO: 267 K/UL (ref 150–350)
PMV BLD AUTO: 10.6 FL (ref 9.2–12.9)
POTASSIUM SERPL-SCNC: 4.2 MMOL/L (ref 3.5–5.1)
PROT SERPL-MCNC: 7.4 G/DL (ref 6–8.4)
RBC # BLD AUTO: 4.12 M/UL (ref 4–5.4)
SODIUM SERPL-SCNC: 139 MMOL/L (ref 136–145)
WBC # BLD AUTO: 8.87 K/UL (ref 3.9–12.7)

## 2019-06-07 PROCEDURE — 80053 COMPREHEN METABOLIC PANEL: CPT

## 2019-06-07 PROCEDURE — 86140 C-REACTIVE PROTEIN: CPT

## 2019-06-07 PROCEDURE — 36415 COLL VENOUS BLD VENIPUNCTURE: CPT

## 2019-06-07 PROCEDURE — 27200042 HC BIOPATCH

## 2019-06-07 PROCEDURE — 85025 COMPLETE CBC W/AUTO DIFF WBC: CPT

## 2019-06-07 PROCEDURE — 85652 RBC SED RATE AUTOMATED: CPT

## 2019-06-07 PROCEDURE — 82550 ASSAY OF CK (CPK): CPT

## 2019-06-07 PROCEDURE — 36592 COLLECT BLOOD FROM PICC: CPT

## 2019-06-14 ENCOUNTER — INFUSION (OUTPATIENT)
Dept: INFECTIOUS DISEASES | Facility: HOSPITAL | Age: 34
End: 2019-06-14
Attending: INTERNAL MEDICINE
Payer: MEDICAID

## 2019-06-14 DIAGNOSIS — M86.641 OTHER CHRONIC OSTEOMYELITIS OF RIGHT HAND: ICD-10-CM

## 2019-06-14 LAB
ALBUMIN SERPL BCP-MCNC: 3.8 G/DL (ref 3.5–5.2)
ALP SERPL-CCNC: 78 U/L (ref 55–135)
ALT SERPL W/O P-5'-P-CCNC: 30 U/L (ref 10–44)
ANION GAP SERPL CALC-SCNC: 10 MMOL/L (ref 8–16)
AST SERPL-CCNC: 20 U/L (ref 10–40)
BASOPHILS # BLD AUTO: 0.04 K/UL (ref 0–0.2)
BASOPHILS NFR BLD: 0.6 % (ref 0–1.9)
BILIRUB SERPL-MCNC: 0.5 MG/DL (ref 0.1–1)
BUN SERPL-MCNC: 10 MG/DL (ref 6–20)
CALCIUM SERPL-MCNC: 9.4 MG/DL (ref 8.7–10.5)
CHLORIDE SERPL-SCNC: 108 MMOL/L (ref 95–110)
CK SERPL-CCNC: 115 U/L (ref 20–180)
CO2 SERPL-SCNC: 21 MMOL/L (ref 23–29)
CREAT SERPL-MCNC: 0.8 MG/DL (ref 0.5–1.4)
CRP SERPL-MCNC: 2.1 MG/L (ref 0–8.2)
DIFFERENTIAL METHOD: ABNORMAL
EOSINOPHIL # BLD AUTO: 0.2 K/UL (ref 0–0.5)
EOSINOPHIL NFR BLD: 2.5 % (ref 0–8)
ERYTHROCYTE [DISTWIDTH] IN BLOOD BY AUTOMATED COUNT: 12.1 % (ref 11.5–14.5)
ERYTHROCYTE [SEDIMENTATION RATE] IN BLOOD BY WESTERGREN METHOD: 21 MM/HR (ref 0–36)
EST. GFR  (AFRICAN AMERICAN): >60 ML/MIN/1.73 M^2
EST. GFR  (NON AFRICAN AMERICAN): >60 ML/MIN/1.73 M^2
GLUCOSE SERPL-MCNC: 94 MG/DL (ref 70–110)
HCT VFR BLD AUTO: 42.4 % (ref 37–48.5)
HGB BLD-MCNC: 14.3 G/DL (ref 12–16)
IMM GRANULOCYTES # BLD AUTO: 0.01 K/UL (ref 0–0.04)
IMM GRANULOCYTES NFR BLD AUTO: 0.1 % (ref 0–0.5)
LYMPHOCYTES # BLD AUTO: 1.6 K/UL (ref 1–4.8)
LYMPHOCYTES NFR BLD: 23.9 % (ref 18–48)
MCH RBC QN AUTO: 33.6 PG (ref 27–31)
MCHC RBC AUTO-ENTMCNC: 33.7 G/DL (ref 32–36)
MCV RBC AUTO: 100 FL (ref 82–98)
MONOCYTES # BLD AUTO: 0.6 K/UL (ref 0.3–1)
MONOCYTES NFR BLD: 9.2 % (ref 4–15)
NEUTROPHILS # BLD AUTO: 4.3 K/UL (ref 1.8–7.7)
NEUTROPHILS NFR BLD: 63.7 % (ref 38–73)
NRBC BLD-RTO: 0 /100 WBC
PLATELET # BLD AUTO: 245 K/UL (ref 150–350)
PMV BLD AUTO: 10.3 FL (ref 9.2–12.9)
POTASSIUM SERPL-SCNC: 4.2 MMOL/L (ref 3.5–5.1)
PROT SERPL-MCNC: 7.3 G/DL (ref 6–8.4)
RBC # BLD AUTO: 4.25 M/UL (ref 4–5.4)
SODIUM SERPL-SCNC: 139 MMOL/L (ref 136–145)
WBC # BLD AUTO: 6.75 K/UL (ref 3.9–12.7)

## 2019-06-14 PROCEDURE — 36415 COLL VENOUS BLD VENIPUNCTURE: CPT

## 2019-06-14 PROCEDURE — 27200042 HC BIOPATCH

## 2019-06-14 PROCEDURE — 86140 C-REACTIVE PROTEIN: CPT

## 2019-06-14 PROCEDURE — 85652 RBC SED RATE AUTOMATED: CPT

## 2019-06-14 PROCEDURE — 85025 COMPLETE CBC W/AUTO DIFF WBC: CPT

## 2019-06-14 PROCEDURE — 80053 COMPREHEN METABOLIC PANEL: CPT

## 2019-06-14 PROCEDURE — 82550 ASSAY OF CK (CPK): CPT

## 2019-06-14 NOTE — PROGRESS NOTES
Patient arrived for Picc line dressing change and labs. Labs drawn from purple port without difficulty. Dressing changed biopatch applied. Left NAD

## 2019-06-17 ENCOUNTER — OFFICE VISIT (OUTPATIENT)
Dept: INFECTIOUS DISEASES | Facility: CLINIC | Age: 34
End: 2019-06-17
Payer: MEDICAID

## 2019-06-17 ENCOUNTER — INFUSION (OUTPATIENT)
Dept: INFECTIOUS DISEASES | Facility: HOSPITAL | Age: 34
End: 2019-06-17
Attending: INTERNAL MEDICINE
Payer: MEDICAID

## 2019-06-17 VITALS
HEART RATE: 92 BPM | HEIGHT: 65 IN | DIASTOLIC BLOOD PRESSURE: 85 MMHG | TEMPERATURE: 98 F | WEIGHT: 185.44 LBS | SYSTOLIC BLOOD PRESSURE: 136 MMHG | BODY MASS INDEX: 30.89 KG/M2

## 2019-06-17 DIAGNOSIS — Z72.0 TOBACCO ABUSE: ICD-10-CM

## 2019-06-17 DIAGNOSIS — M86.641 OTHER CHRONIC OSTEOMYELITIS OF RIGHT HAND: Primary | ICD-10-CM

## 2019-06-17 PROCEDURE — 99214 OFFICE O/P EST MOD 30 MIN: CPT | Mod: S$PBB,,, | Performed by: INTERNAL MEDICINE

## 2019-06-17 PROCEDURE — 99999 PR PBB SHADOW E&M-EST. PATIENT-LVL III: ICD-10-PCS | Mod: PBBFAC,,, | Performed by: INTERNAL MEDICINE

## 2019-06-17 PROCEDURE — 99214 PR OFFICE/OUTPT VISIT, EST, LEVL IV, 30-39 MIN: ICD-10-PCS | Mod: S$PBB,,, | Performed by: INTERNAL MEDICINE

## 2019-06-17 PROCEDURE — 99213 OFFICE O/P EST LOW 20 MIN: CPT | Mod: PBBFAC | Performed by: INTERNAL MEDICINE

## 2019-06-17 PROCEDURE — 99999 PR PBB SHADOW E&M-EST. PATIENT-LVL III: CPT | Mod: PBBFAC,,, | Performed by: INTERNAL MEDICINE

## 2019-06-17 RX ORDER — HEPARIN SODIUM (PORCINE) LOCK FLUSH IV SOLN 100 UNIT/ML 100 UNIT/ML
100 SOLUTION INTRAVENOUS
Status: CANCELLED | OUTPATIENT
Start: 2019-06-24

## 2019-06-17 RX ORDER — PROMETHAZINE HYDROCHLORIDE 25 MG/1
25 TABLET ORAL EVERY 12 HOURS PRN
Qty: 30 TABLET | Refills: 0 | Status: SHIPPED | OUTPATIENT
Start: 2019-06-17 | End: 2021-10-28

## 2019-06-17 RX ORDER — HEPARIN 100 UNIT/ML
500 SYRINGE INTRAVENOUS
Status: CANCELLED | OUTPATIENT
Start: 2019-06-24

## 2019-06-17 RX ORDER — SODIUM CHLORIDE 0.9 % (FLUSH) 0.9 %
10 SYRINGE (ML) INJECTION
Status: CANCELLED | OUTPATIENT
Start: 2019-06-24

## 2019-06-17 RX ORDER — TRAZODONE HYDROCHLORIDE 150 MG/1
300 TABLET ORAL NIGHTLY
COMMUNITY
End: 2020-08-05

## 2019-06-17 RX ORDER — DAPTOMYCIN 50 MG/ML
800 INJECTION, POWDER, LYOPHILIZED, FOR SOLUTION INTRAVENOUS ONCE
Status: CANCELLED | OUTPATIENT
Start: 2019-06-24

## 2019-06-17 NOTE — PROGRESS NOTES
Pt. came in to infectious disease clinic to see MD.  Pt mentioned to MD that she was having trouble with PICC line infusions since last dressing change.  MD stated it was ok for patient to have dressing change early and will come in Friday for labs only and to reassess dressing. Dressing changed and Biopatch applied. No redness, ecchymosis, edema, swelling, or drainage noted at site.  Pt tolerated procedure well and left in NAD.

## 2019-06-17 NOTE — PROGRESS NOTES
Subjective:      Patient ID: Marianela Shrestha is a 34 y.o. female.    Chief Complaint:Follow-up      History of Present Illness    Here today for follow up of osteomyelitis of right index finger.  She is accompanied by her fiance today.  Still c/o pain of the finger.  Also reports small bumps on the finger. No boils or abscesses. Also c/o occasional hive.   Nausea not relieved by zofran. SHe says she needs 25mg of phenergan to help. 12.5 not enough.  Has f/u with ortho coming up.   Cut back on smoking. Now 1-2 cigarettes per day. Not able to quit yet.  Has been using bactroban as I had asked.    From my initial consult 5/22/19:  Seen in consultation from Dr. Crisostomo.  Patient is being referred for management of osteomyelitis of the right index finger. The patient reports a dog bit her in Nov 2016. She has had multiple surgeries (9) since that time. 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 The most recent issue was following a fall in which she re injured the finger requiring pins to be placed about 8 weeks ago. The pins were removed. Cultures from 5/10/10 were positive for MSSA R to clinda. She reports she has been on multiple antibiotics. Most recently she has been on levaquin. No prolonged IV antibiotics. No boils or abscess. No fever or chills.  She does smoke 4 cigarettes per day. Did quit 5 or 6 years ago but resumed. Occasional alcohol. Denies IVDU.  H/o asthma as a child and occasional flares.   Has panic attacks and followed by Dr. Mikhail Wylie who manages her medications for that.      Is allergic to sulfa - rechallenged with same symptoms of hives, chest pain.    Review of Systems   Constitution: Positive for decreased appetite, malaise/fatigue, night sweats and weight gain. Negative for chills, fever and weight loss.   HENT: Negative for congestion, ear pain, hearing loss, hoarse voice, sore throat and tinnitus.    Eyes: Negative for blurred vision, redness and  visual disturbance.   Cardiovascular: Negative for chest pain, leg swelling and palpitations.   Respiratory: Negative for cough, hemoptysis, shortness of breath and sputum production.    Hematologic/Lymphatic: Negative for adenopathy. Does not bruise/bleed easily.   Skin: Negative for dry skin, itching, rash and suspicious lesions.   Musculoskeletal: Positive for back pain. Negative for joint pain, myalgias and neck pain.   Gastrointestinal: Positive for abdominal pain, constipation and nausea. Negative for diarrhea, heartburn and vomiting.   Genitourinary: Negative for dysuria, flank pain, frequency, hematuria, hesitancy and urgency.   Neurological: Positive for headaches. Negative for dizziness, numbness, paresthesias and weakness.   Psychiatric/Behavioral: Negative for depression and memory loss. The patient has insomnia and is nervous/anxious.      Objective:   Physical Exam   Constitutional: She is oriented to person, place, and time. She appears well-developed and well-nourished. She is cooperative.  Non-toxic appearance. No distress.   HENT:   Head: Normocephalic and atraumatic.   Right Ear: Hearing and external ear normal.   Left Ear: Hearing and external ear normal.   Nose: Nose normal.   Mouth/Throat: Oropharynx is clear and moist.   Eyes: Pupils are equal, round, and reactive to light. Conjunctivae, EOM and lids are normal. Right eye exhibits no discharge. Left eye exhibits no discharge. Right conjunctiva is not injected. Left conjunctiva is not injected. No scleral icterus.   Neck: Normal range of motion. No tracheal deviation present.   Cardiovascular: Normal rate.   Pulmonary/Chest: Effort normal. No accessory muscle usage or stridor. No respiratory distress. She has no wheezes.   Abdominal: Normal appearance. She exhibits no distension.   Musculoskeletal: Normal range of motion. She exhibits no edema.   Right index finger curved, fixed in position.  Some edema. No boil or abscess seen.  PICC in place  left arm. Bandaged with patch in place.   Neurological: She is alert and oriented to person, place, and time. Coordination normal.   Skin: Skin is warm and dry. No rash noted. She is not diaphoretic. No erythema.   Psychiatric: She has a normal mood and affect. Her speech is normal and behavior is normal. Judgment and thought content normal. Cognition and memory are normal.   Nursing note and vitals reviewed.    Assessment:       1. Other chronic osteomyelitis of right hand    2. Tobacco abuse          Plan:       Marianela was seen today for follow-up.    Diagnoses and all orders for this visit:    Other chronic osteomyelitis of right hand    Tobacco abuse    Other orders  -     promethazine (PHENERGAN) 25 MG tablet; Take 1 tablet (25 mg total) by mouth every 12 (twelve) hours as needed for Nausea.    Continue dapto 800mg iv daily. Will complete 6 weeks IV therapy on July 5th. Labs weekly. D/C antibiotics and PICC then if labs remain stable. Have reviewed and CRP is normal.  She tells me the plan is to eventually undergo joint replacement. Have discussed with her that she is at increased risk for any surgical procedure due to prior infections. Would hold off on any additional procedures as long as possible to make sure infection does not recur.  Also discussed how smoking and nicotine negatively impact healing and fighting infections. She was able to quit before-encouraged her to try again.  Will see her back as needed.

## 2019-06-21 ENCOUNTER — INFUSION (OUTPATIENT)
Dept: INFECTIOUS DISEASES | Facility: HOSPITAL | Age: 34
End: 2019-06-21
Attending: INTERNAL MEDICINE
Payer: MEDICAID

## 2019-06-21 DIAGNOSIS — M86.641 OTHER CHRONIC OSTEOMYELITIS OF RIGHT HAND: ICD-10-CM

## 2019-06-21 LAB
ALBUMIN SERPL BCP-MCNC: 4.2 G/DL (ref 3.5–5.2)
ALP SERPL-CCNC: 80 U/L (ref 55–135)
ALT SERPL W/O P-5'-P-CCNC: 19 U/L (ref 10–44)
ANION GAP SERPL CALC-SCNC: 10 MMOL/L (ref 8–16)
AST SERPL-CCNC: 18 U/L (ref 10–40)
BASOPHILS # BLD AUTO: 0.05 K/UL (ref 0–0.2)
BASOPHILS NFR BLD: 1 % (ref 0–1.9)
BILIRUB SERPL-MCNC: 0.7 MG/DL (ref 0.1–1)
BUN SERPL-MCNC: 10 MG/DL (ref 6–20)
CALCIUM SERPL-MCNC: 9.1 MG/DL (ref 8.7–10.5)
CHLORIDE SERPL-SCNC: 107 MMOL/L (ref 95–110)
CK SERPL-CCNC: 114 U/L (ref 20–180)
CO2 SERPL-SCNC: 23 MMOL/L (ref 23–29)
CREAT SERPL-MCNC: 0.9 MG/DL (ref 0.5–1.4)
CRP SERPL-MCNC: 2.6 MG/L (ref 0–8.2)
DIFFERENTIAL METHOD: ABNORMAL
EOSINOPHIL # BLD AUTO: 0.2 K/UL (ref 0–0.5)
EOSINOPHIL NFR BLD: 2.9 % (ref 0–8)
ERYTHROCYTE [DISTWIDTH] IN BLOOD BY AUTOMATED COUNT: 11.9 % (ref 11.5–14.5)
ERYTHROCYTE [SEDIMENTATION RATE] IN BLOOD BY WESTERGREN METHOD: 26 MM/HR (ref 0–36)
EST. GFR  (AFRICAN AMERICAN): >60 ML/MIN/1.73 M^2
EST. GFR  (NON AFRICAN AMERICAN): >60 ML/MIN/1.73 M^2
GLUCOSE SERPL-MCNC: 87 MG/DL (ref 70–110)
HCT VFR BLD AUTO: 41.8 % (ref 37–48.5)
HGB BLD-MCNC: 14 G/DL (ref 12–16)
IMM GRANULOCYTES # BLD AUTO: 0.01 K/UL (ref 0–0.04)
IMM GRANULOCYTES NFR BLD AUTO: 0.2 % (ref 0–0.5)
LYMPHOCYTES # BLD AUTO: 1.4 K/UL (ref 1–4.8)
LYMPHOCYTES NFR BLD: 27 % (ref 18–48)
MCH RBC QN AUTO: 33.4 PG (ref 27–31)
MCHC RBC AUTO-ENTMCNC: 33.5 G/DL (ref 32–36)
MCV RBC AUTO: 100 FL (ref 82–98)
MONOCYTES # BLD AUTO: 0.7 K/UL (ref 0.3–1)
MONOCYTES NFR BLD: 12.7 % (ref 4–15)
NEUTROPHILS # BLD AUTO: 2.9 K/UL (ref 1.8–7.7)
NEUTROPHILS NFR BLD: 56.2 % (ref 38–73)
NRBC BLD-RTO: 0 /100 WBC
PLATELET # BLD AUTO: 280 K/UL (ref 150–350)
PMV BLD AUTO: 10.8 FL (ref 9.2–12.9)
POTASSIUM SERPL-SCNC: 4 MMOL/L (ref 3.5–5.1)
PROT SERPL-MCNC: 7.6 G/DL (ref 6–8.4)
RBC # BLD AUTO: 4.19 M/UL (ref 4–5.4)
SODIUM SERPL-SCNC: 140 MMOL/L (ref 136–145)
WBC # BLD AUTO: 5.19 K/UL (ref 3.9–12.7)

## 2019-06-21 PROCEDURE — 80053 COMPREHEN METABOLIC PANEL: CPT

## 2019-06-21 PROCEDURE — 85652 RBC SED RATE AUTOMATED: CPT

## 2019-06-21 PROCEDURE — 86140 C-REACTIVE PROTEIN: CPT

## 2019-06-21 PROCEDURE — 85025 COMPLETE CBC W/AUTO DIFF WBC: CPT

## 2019-06-21 PROCEDURE — 36415 COLL VENOUS BLD VENIPUNCTURE: CPT

## 2019-06-21 PROCEDURE — 82550 ASSAY OF CK (CPK): CPT

## 2019-06-28 ENCOUNTER — INFUSION (OUTPATIENT)
Dept: INFECTIOUS DISEASES | Facility: HOSPITAL | Age: 34
End: 2019-06-28
Attending: INTERNAL MEDICINE
Payer: MEDICAID

## 2019-06-28 DIAGNOSIS — R19.7 BLOODY DIARRHEA: Primary | ICD-10-CM

## 2019-06-28 DIAGNOSIS — M86.641 OTHER CHRONIC OSTEOMYELITIS OF RIGHT HAND: ICD-10-CM

## 2019-06-28 LAB
ALBUMIN SERPL BCP-MCNC: 3.8 G/DL (ref 3.5–5.2)
ALP SERPL-CCNC: 90 U/L (ref 55–135)
ALT SERPL W/O P-5'-P-CCNC: 26 U/L (ref 10–44)
ANION GAP SERPL CALC-SCNC: 12 MMOL/L (ref 8–16)
AST SERPL-CCNC: 21 U/L (ref 10–40)
BASOPHILS # BLD AUTO: 0.05 K/UL (ref 0–0.2)
BASOPHILS NFR BLD: 0.6 % (ref 0–1.9)
BILIRUB SERPL-MCNC: 0.2 MG/DL (ref 0.1–1)
BUN SERPL-MCNC: 16 MG/DL (ref 6–20)
CALCIUM SERPL-MCNC: 8.5 MG/DL (ref 8.7–10.5)
CHLORIDE SERPL-SCNC: 107 MMOL/L (ref 95–110)
CK SERPL-CCNC: 88 U/L (ref 20–180)
CO2 SERPL-SCNC: 21 MMOL/L (ref 23–29)
CREAT SERPL-MCNC: 0.8 MG/DL (ref 0.5–1.4)
CRP SERPL-MCNC: 2 MG/L (ref 0–8.2)
DIFFERENTIAL METHOD: ABNORMAL
EOSINOPHIL # BLD AUTO: 0.4 K/UL (ref 0–0.5)
EOSINOPHIL NFR BLD: 4.7 % (ref 0–8)
ERYTHROCYTE [DISTWIDTH] IN BLOOD BY AUTOMATED COUNT: 12 % (ref 11.5–14.5)
ERYTHROCYTE [SEDIMENTATION RATE] IN BLOOD BY WESTERGREN METHOD: 13 MM/HR (ref 0–36)
EST. GFR  (AFRICAN AMERICAN): >60 ML/MIN/1.73 M^2
EST. GFR  (NON AFRICAN AMERICAN): >60 ML/MIN/1.73 M^2
GLUCOSE SERPL-MCNC: 89 MG/DL (ref 70–110)
HCT VFR BLD AUTO: 42.1 % (ref 37–48.5)
HGB BLD-MCNC: 13.7 G/DL (ref 12–16)
IMM GRANULOCYTES # BLD AUTO: 0.03 K/UL (ref 0–0.04)
IMM GRANULOCYTES NFR BLD AUTO: 0.4 % (ref 0–0.5)
LYMPHOCYTES # BLD AUTO: 2 K/UL (ref 1–4.8)
LYMPHOCYTES NFR BLD: 25.9 % (ref 18–48)
MCH RBC QN AUTO: 33.1 PG (ref 27–31)
MCHC RBC AUTO-ENTMCNC: 32.5 G/DL (ref 32–36)
MCV RBC AUTO: 102 FL (ref 82–98)
MONOCYTES # BLD AUTO: 1 K/UL (ref 0.3–1)
MONOCYTES NFR BLD: 12.3 % (ref 4–15)
NEUTROPHILS # BLD AUTO: 4.4 K/UL (ref 1.8–7.7)
NEUTROPHILS NFR BLD: 56.1 % (ref 38–73)
NRBC BLD-RTO: 0 /100 WBC
PLATELET # BLD AUTO: 267 K/UL (ref 150–350)
PMV BLD AUTO: 11 FL (ref 9.2–12.9)
POTASSIUM SERPL-SCNC: 4.3 MMOL/L (ref 3.5–5.1)
PROT SERPL-MCNC: 7.2 G/DL (ref 6–8.4)
RBC # BLD AUTO: 4.14 M/UL (ref 4–5.4)
SODIUM SERPL-SCNC: 140 MMOL/L (ref 136–145)
WBC # BLD AUTO: 7.8 K/UL (ref 3.9–12.7)

## 2019-06-28 PROCEDURE — 36415 COLL VENOUS BLD VENIPUNCTURE: CPT

## 2019-06-28 PROCEDURE — 25000003 PHARM REV CODE 250: Performed by: INTERNAL MEDICINE

## 2019-06-28 PROCEDURE — A4216 STERILE WATER/SALINE, 10 ML: HCPCS | Performed by: INTERNAL MEDICINE

## 2019-06-28 PROCEDURE — 85025 COMPLETE CBC W/AUTO DIFF WBC: CPT

## 2019-06-28 PROCEDURE — 27200042 HC BIOPATCH

## 2019-06-28 PROCEDURE — 63600175 PHARM REV CODE 636 W HCPCS: Performed by: INTERNAL MEDICINE

## 2019-06-28 PROCEDURE — 80053 COMPREHEN METABOLIC PANEL: CPT

## 2019-06-28 PROCEDURE — 85652 RBC SED RATE AUTOMATED: CPT

## 2019-06-28 PROCEDURE — 36591 DRAW BLOOD OFF VENOUS DEVICE: CPT

## 2019-06-28 PROCEDURE — 82550 ASSAY OF CK (CPK): CPT

## 2019-06-28 PROCEDURE — 86140 C-REACTIVE PROTEIN: CPT

## 2019-06-28 RX ORDER — SODIUM CHLORIDE 0.9 % (FLUSH) 0.9 %
10 SYRINGE (ML) INJECTION
Status: CANCELLED | OUTPATIENT
Start: 2019-07-01

## 2019-06-28 RX ORDER — SODIUM CHLORIDE 0.9 % (FLUSH) 0.9 %
10 SYRINGE (ML) INJECTION
Status: COMPLETED | OUTPATIENT
Start: 2019-06-28 | End: 2019-06-28

## 2019-06-28 RX ORDER — HEPARIN 100 UNIT/ML
500 SYRINGE INTRAVENOUS
Status: COMPLETED | OUTPATIENT
Start: 2019-06-28 | End: 2019-06-28

## 2019-06-28 RX ORDER — HEPARIN 100 UNIT/ML
500 SYRINGE INTRAVENOUS
Status: CANCELLED | OUTPATIENT
Start: 2019-07-01

## 2019-06-28 RX ORDER — DAPTOMYCIN 50 MG/ML
800 INJECTION, POWDER, LYOPHILIZED, FOR SOLUTION INTRAVENOUS ONCE
Status: CANCELLED | OUTPATIENT
Start: 2019-07-01

## 2019-06-28 RX ORDER — HEPARIN SODIUM (PORCINE) LOCK FLUSH IV SOLN 100 UNIT/ML 100 UNIT/ML
100 SOLUTION INTRAVENOUS
Status: CANCELLED | OUTPATIENT
Start: 2019-07-01

## 2019-06-28 RX ADMIN — Medication 10 ML: at 02:06

## 2019-06-28 RX ADMIN — HEPARIN 500 UNITS: 100 SYRINGE at 02:06

## 2019-07-01 ENCOUNTER — PATIENT MESSAGE (OUTPATIENT)
Dept: INFECTIOUS DISEASES | Facility: CLINIC | Age: 34
End: 2019-07-01

## 2019-07-05 ENCOUNTER — INFUSION (OUTPATIENT)
Dept: INFECTIOUS DISEASES | Facility: HOSPITAL | Age: 34
End: 2019-07-05
Attending: INTERNAL MEDICINE
Payer: MEDICAID

## 2019-07-05 DIAGNOSIS — M86.641 OTHER CHRONIC OSTEOMYELITIS OF RIGHT HAND: Primary | ICD-10-CM

## 2019-07-05 DIAGNOSIS — M86.641 OTHER CHRONIC OSTEOMYELITIS OF RIGHT HAND: ICD-10-CM

## 2019-07-05 LAB
BASOPHILS # BLD AUTO: 0.03 K/UL (ref 0–0.2)
BASOPHILS NFR BLD: 0.4 % (ref 0–1.9)
CRP SERPL-MCNC: 2.6 MG/L (ref 0–8.2)
DIFFERENTIAL METHOD: ABNORMAL
EOSINOPHIL # BLD AUTO: 0.2 K/UL (ref 0–0.5)
EOSINOPHIL NFR BLD: 3.4 % (ref 0–8)
ERYTHROCYTE [DISTWIDTH] IN BLOOD BY AUTOMATED COUNT: 11.9 % (ref 11.5–14.5)
ERYTHROCYTE [SEDIMENTATION RATE] IN BLOOD BY WESTERGREN METHOD: 13 MM/HR (ref 0–36)
HCT VFR BLD AUTO: 40.7 % (ref 37–48.5)
HGB BLD-MCNC: 13.5 G/DL (ref 12–16)
IMM GRANULOCYTES # BLD AUTO: 0.01 K/UL (ref 0–0.04)
IMM GRANULOCYTES NFR BLD AUTO: 0.1 % (ref 0–0.5)
LYMPHOCYTES # BLD AUTO: 1.9 K/UL (ref 1–4.8)
LYMPHOCYTES NFR BLD: 26.2 % (ref 18–48)
MCH RBC QN AUTO: 33.3 PG (ref 27–31)
MCHC RBC AUTO-ENTMCNC: 33.2 G/DL (ref 32–36)
MCV RBC AUTO: 100 FL (ref 82–98)
MONOCYTES # BLD AUTO: 0.8 K/UL (ref 0.3–1)
MONOCYTES NFR BLD: 10.6 % (ref 4–15)
NEUTROPHILS # BLD AUTO: 4.2 K/UL (ref 1.8–7.7)
NEUTROPHILS NFR BLD: 59.3 % (ref 38–73)
NRBC BLD-RTO: 0 /100 WBC
PLATELET # BLD AUTO: 261 K/UL (ref 150–350)
PMV BLD AUTO: 10.4 FL (ref 9.2–12.9)
RBC # BLD AUTO: 4.06 M/UL (ref 4–5.4)
WBC # BLD AUTO: 7.06 K/UL (ref 3.9–12.7)

## 2019-07-05 PROCEDURE — 86140 C-REACTIVE PROTEIN: CPT

## 2019-07-05 PROCEDURE — 85025 COMPLETE CBC W/AUTO DIFF WBC: CPT

## 2019-07-05 PROCEDURE — 85652 RBC SED RATE AUTOMATED: CPT

## 2019-07-05 NOTE — PROGRESS NOTES
Pt here for lab draw from PICC line. HENRY PICC line removed, pt tolerated well. Pt instructed to leave dressing on for 24hrs, verbalized understanding. Pt observed for 30 mins after pulling line. Pt left in NAD.

## 2020-08-05 ENCOUNTER — OFFICE VISIT (OUTPATIENT)
Dept: INTERNAL MEDICINE | Facility: CLINIC | Age: 35
End: 2020-08-05
Payer: MEDICARE

## 2020-08-05 VITALS
BODY MASS INDEX: 28.21 KG/M2 | SYSTOLIC BLOOD PRESSURE: 128 MMHG | HEIGHT: 65 IN | OXYGEN SATURATION: 99 % | WEIGHT: 169.31 LBS | DIASTOLIC BLOOD PRESSURE: 85 MMHG | HEART RATE: 101 BPM

## 2020-08-05 DIAGNOSIS — J45.909 EXTRINSIC ASTHMA WITHOUT COMPLICATION, UNSPECIFIED ASTHMA SEVERITY, UNSPECIFIED WHETHER PERSISTENT: ICD-10-CM

## 2020-08-05 DIAGNOSIS — F43.10 POST TRAUMATIC STRESS DISORDER: ICD-10-CM

## 2020-08-05 DIAGNOSIS — F90.9 ATTENTION DEFICIT HYPERACTIVITY DISORDER (ADHD), UNSPECIFIED ADHD TYPE: ICD-10-CM

## 2020-08-05 DIAGNOSIS — G89.4 CHRONIC PAIN SYNDROME: ICD-10-CM

## 2020-08-05 DIAGNOSIS — M54.31 BILATERAL SCIATICA: ICD-10-CM

## 2020-08-05 DIAGNOSIS — Z76.89 ENCOUNTER TO ESTABLISH CARE: Primary | ICD-10-CM

## 2020-08-05 DIAGNOSIS — F41.1 GENERALIZED ANXIETY DISORDER: ICD-10-CM

## 2020-08-05 DIAGNOSIS — Z13.6 ENCOUNTER FOR SCREENING FOR CARDIOVASCULAR DISORDERS: ICD-10-CM

## 2020-08-05 DIAGNOSIS — M54.32 BILATERAL SCIATICA: ICD-10-CM

## 2020-08-05 DIAGNOSIS — G47.00 INSOMNIA, UNSPECIFIED TYPE: ICD-10-CM

## 2020-08-05 DIAGNOSIS — S63.260S: ICD-10-CM

## 2020-08-05 PROCEDURE — 99999 PR PBB SHADOW E&M-EST. PATIENT-LVL V: CPT | Mod: PBBFAC,GC,, | Performed by: STUDENT IN AN ORGANIZED HEALTH CARE EDUCATION/TRAINING PROGRAM

## 2020-08-05 PROCEDURE — 99203 PR OFFICE/OUTPT VISIT, NEW, LEVL III, 30-44 MIN: ICD-10-PCS | Mod: S$PBB,GC,, | Performed by: STUDENT IN AN ORGANIZED HEALTH CARE EDUCATION/TRAINING PROGRAM

## 2020-08-05 PROCEDURE — 99215 OFFICE O/P EST HI 40 MIN: CPT | Mod: PBBFAC | Performed by: STUDENT IN AN ORGANIZED HEALTH CARE EDUCATION/TRAINING PROGRAM

## 2020-08-05 PROCEDURE — 99203 OFFICE O/P NEW LOW 30 MIN: CPT | Mod: S$PBB,GC,, | Performed by: STUDENT IN AN ORGANIZED HEALTH CARE EDUCATION/TRAINING PROGRAM

## 2020-08-05 PROCEDURE — 99999 PR PBB SHADOW E&M-EST. PATIENT-LVL V: ICD-10-PCS | Mod: PBBFAC,GC,, | Performed by: STUDENT IN AN ORGANIZED HEALTH CARE EDUCATION/TRAINING PROGRAM

## 2020-08-05 RX ORDER — DEXTROAMPHETAMINE SACCHARATE, AMPHETAMINE ASPARTATE MONOHYDRATE, DEXTROAMPHETAMINE SULFATE AND AMPHETAMINE SULFATE 7.5; 7.5; 7.5; 7.5 MG/1; MG/1; MG/1; MG/1
CAPSULE, EXTENDED RELEASE ORAL
COMMUNITY
Start: 2020-05-01 | End: 2021-10-28

## 2020-08-05 RX ORDER — BUDESONIDE AND FORMOTEROL FUMARATE DIHYDRATE 80; 4.5 UG/1; UG/1
AEROSOL RESPIRATORY (INHALATION)
COMMUNITY
Start: 2020-01-01 | End: 2021-10-28

## 2020-08-05 RX ORDER — GABAPENTIN 600 MG/1
600 TABLET ORAL 4 TIMES DAILY
Qty: 120 TABLET | Refills: 2 | Status: SHIPPED | OUTPATIENT
Start: 2020-08-05 | End: 2020-10-24 | Stop reason: SDUPTHER

## 2020-08-05 RX ORDER — ALPRAZOLAM 1 MG/1
1 TABLET ORAL 3 TIMES DAILY
Qty: 90 TABLET | Refills: 2 | Status: SHIPPED | OUTPATIENT
Start: 2020-08-05 | End: 2020-10-24 | Stop reason: SDUPTHER

## 2020-08-05 RX ORDER — ALBUTEROL SULFATE 90 UG/1
2 AEROSOL, METERED RESPIRATORY (INHALATION)
COMMUNITY
Start: 2017-03-30 | End: 2021-08-04 | Stop reason: SDUPTHER

## 2020-08-05 RX ORDER — VENLAFAXINE HYDROCHLORIDE 150 MG/1
300 CAPSULE, EXTENDED RELEASE ORAL DAILY
Qty: 60 CAPSULE | Refills: 11 | Status: SHIPPED | OUTPATIENT
Start: 2020-08-05 | End: 2021-07-23 | Stop reason: SDUPTHER
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
## 2020-08-06 ENCOUNTER — TELEPHONE (OUTPATIENT)
Dept: INTERNAL MEDICINE | Facility: CLINIC | Age: 35
End: 2020-08-06

## 2020-08-06 DIAGNOSIS — F41.1 GENERALIZED ANXIETY DISORDER: Primary | ICD-10-CM

## 2020-08-06 DIAGNOSIS — F43.10 POST TRAUMATIC STRESS DISORDER: ICD-10-CM

## 2020-08-06 DIAGNOSIS — F90.9 ATTENTION DEFICIT HYPERACTIVITY DISORDER (ADHD), UNSPECIFIED ADHD TYPE: ICD-10-CM

## 2020-08-06 NOTE — TELEPHONE ENCOUNTER
----- Message from Hansa Tenorio sent at 8/6/2020 12:56 PM CDT -----  Contact: self [phone]  Pt states she contacted the psyc dept at [facility] and was advised they are not able to schedule a new pt under a resident with Medicare. Please call and advise.

## 2020-08-07 ENCOUNTER — TELEPHONE (OUTPATIENT)
Dept: ADMINISTRATIVE | Facility: OTHER | Age: 35
End: 2020-08-07

## 2020-08-07 DIAGNOSIS — G47.00 INSOMNIA, UNSPECIFIED TYPE: Primary | ICD-10-CM

## 2020-08-07 NOTE — TELEPHONE ENCOUNTER
----- Message from Marilee Dawnjoaquín sent at 8/7/2020  2:16 PM CDT -----  Regarding: Rx  Contact: Patient @  Requesting an RX refill or new RX.  Is this a refill or new RX:  Refill  RX name and strength: doxepin (SINEQUAN) 25 MG capsule  Directions (copy/paste from chart):    Is this a 30 day or 90 day RX:    Local pharmacy or mail order pharmacy:  Local  Pharmacy name and phone #[facility]/pharmacy #[id number] [place], [place] - [address]   Comments:  Patient would like to have 100mg for 2 at bed time.. Taking 200 at night    Requesting an RX refill or new RX.  Is this a refill or new RX:  Refill  RX name and strength:ibuprofen 800 mg  Directions (copy/paste from chart):    Is this a 30 day or 90 day RX:    Local pharmacy or mail order pharmacy:  Local  Pharmacy name and phone # [facility]/pharmacy #[id number] [place], [place] - [address]     Comments:

## 2020-08-07 NOTE — TELEPHONE ENCOUNTER
Patient contacted via [facility] for assistance with scheduling external appointment with psychiatry.     External referral placed.

## 2020-08-10 RX ORDER — DOXEPIN HYDROCHLORIDE 25 MG/1
50 CAPSULE ORAL NIGHTLY
Qty: 180 CAPSULE | Refills: 0 | Status: SHIPPED | OUTPATIENT
Start: 2020-08-10 | End: 2020-10-24 | Stop reason: SDUPTHER

## 2020-08-10 RX ORDER — IBUPROFEN 800 MG/1
800 TABLET ORAL 3 TIMES DAILY
OUTPATIENT
Start: 2020-08-10

## 2020-10-24 DIAGNOSIS — G47.00 INSOMNIA, UNSPECIFIED TYPE: ICD-10-CM

## 2020-10-24 DIAGNOSIS — M54.31 BILATERAL SCIATICA: ICD-10-CM

## 2020-10-24 DIAGNOSIS — G89.4 CHRONIC PAIN SYNDROME: ICD-10-CM

## 2020-10-24 DIAGNOSIS — M54.32 BILATERAL SCIATICA: ICD-10-CM

## 2020-10-24 DIAGNOSIS — F41.1 GENERALIZED ANXIETY DISORDER: ICD-10-CM

## 2020-10-26 RX ORDER — GABAPENTIN 600 MG/1
600 TABLET ORAL 4 TIMES DAILY
Qty: 120 TABLET | Refills: 2 | Status: SHIPPED | OUTPATIENT
Start: 2020-10-26 | End: 2021-04-30 | Stop reason: SDUPTHER

## 2020-10-26 RX ORDER — ALPRAZOLAM 1 MG/1
1 TABLET ORAL 3 TIMES DAILY
Qty: 90 TABLET | Refills: 2 | Status: SHIPPED | OUTPATIENT
Start: 2020-10-26 | End: 2021-02-09 | Stop reason: SDUPTHER

## 2020-10-26 RX ORDER — DOXEPIN HYDROCHLORIDE 25 MG/1
50 CAPSULE ORAL NIGHTLY
Qty: 180 CAPSULE | Refills: 0 | Status: SHIPPED | OUTPATIENT
Start: 2020-10-26 | End: 2020-12-14 | Stop reason: SDUPTHER

## 2020-12-14 DIAGNOSIS — G47.00 INSOMNIA, UNSPECIFIED TYPE: ICD-10-CM

## 2020-12-15 RX ORDER — DOXEPIN HYDROCHLORIDE 25 MG/1
50 CAPSULE ORAL NIGHTLY
Qty: 180 CAPSULE | Refills: 0 | Status: SHIPPED | OUTPATIENT
Start: 2020-12-15 | End: 2021-12-05 | Stop reason: SDUPTHER

## 2021-02-07 DIAGNOSIS — F41.1 GENERALIZED ANXIETY DISORDER: ICD-10-CM

## 2021-02-08 RX ORDER — ALPRAZOLAM 1 MG/1
1 TABLET ORAL 3 TIMES DAILY
Qty: 90 TABLET | Refills: 2 | OUTPATIENT
Start: 2021-02-08 | End: 2021-05-09

## 2021-02-09 DIAGNOSIS — F41.1 GENERALIZED ANXIETY DISORDER: ICD-10-CM

## 2021-02-09 RX ORDER — ALPRAZOLAM 1 MG/1
1 TABLET ORAL 3 TIMES DAILY PRN
Qty: 90 TABLET | Refills: 2 | Status: SHIPPED | OUTPATIENT
Start: 2021-02-09 | End: 2021-04-30 | Stop reason: SDUPTHER

## 2021-02-09 RX ORDER — ALPRAZOLAM 1 MG/1
1 TABLET ORAL 3 TIMES DAILY PRN
Qty: 90 TABLET | Refills: 2 | Status: SHIPPED | OUTPATIENT
Start: 2021-02-09 | End: 2021-02-09

## 2021-02-09 RX ORDER — ALPRAZOLAM 1 MG/1
1 TABLET ORAL 3 TIMES DAILY
Qty: 90 TABLET | Refills: 2 | Status: CANCELLED | OUTPATIENT
Start: 2021-02-09 | End: 2021-05-10

## 2021-04-06 DIAGNOSIS — G89.4 CHRONIC PAIN SYNDROME: ICD-10-CM

## 2021-04-06 DIAGNOSIS — F41.1 GENERALIZED ANXIETY DISORDER: ICD-10-CM

## 2021-04-06 DIAGNOSIS — M54.31 BILATERAL SCIATICA: ICD-10-CM

## 2021-04-06 DIAGNOSIS — M54.32 BILATERAL SCIATICA: ICD-10-CM

## 2021-04-06 DIAGNOSIS — G47.00 INSOMNIA, UNSPECIFIED TYPE: ICD-10-CM

## 2021-04-06 RX ORDER — DOXEPIN HYDROCHLORIDE 25 MG/1
50 CAPSULE ORAL NIGHTLY
Qty: 180 CAPSULE | Refills: 0 | OUTPATIENT
Start: 2021-04-06 | End: 2021-07-05

## 2021-04-06 RX ORDER — GABAPENTIN 600 MG/1
600 TABLET ORAL 4 TIMES DAILY
Qty: 120 TABLET | Refills: 2 | OUTPATIENT
Start: 2021-04-06 | End: 2021-07-05

## 2021-04-06 RX ORDER — ALPRAZOLAM 1 MG/1
1 TABLET ORAL 3 TIMES DAILY PRN
Qty: 90 TABLET | Refills: 2 | OUTPATIENT
Start: 2021-04-06 | End: 2021-07-05

## 2021-04-16 ENCOUNTER — PATIENT MESSAGE (OUTPATIENT)
Dept: RESEARCH | Facility: HOSPITAL | Age: 36
End: 2021-04-16

## 2021-04-30 DIAGNOSIS — G89.4 CHRONIC PAIN SYNDROME: ICD-10-CM

## 2021-04-30 DIAGNOSIS — M54.32 BILATERAL SCIATICA: ICD-10-CM

## 2021-04-30 DIAGNOSIS — F41.1 GENERALIZED ANXIETY DISORDER: ICD-10-CM

## 2021-04-30 DIAGNOSIS — M54.31 BILATERAL SCIATICA: ICD-10-CM

## 2021-05-03 RX ORDER — GABAPENTIN 600 MG/1
600 TABLET ORAL 4 TIMES DAILY
Qty: 120 TABLET | Refills: 2 | Status: SHIPPED | OUTPATIENT
Start: 2021-05-03 | End: 2021-07-23 | Stop reason: SDUPTHER

## 2021-05-03 RX ORDER — ALPRAZOLAM 1 MG/1
1 TABLET ORAL 3 TIMES DAILY PRN
Qty: 90 TABLET | Refills: 2 | Status: SHIPPED | OUTPATIENT
Start: 2021-05-03 | End: 2021-08-11

## 2021-07-23 DIAGNOSIS — M54.32 BILATERAL SCIATICA: ICD-10-CM

## 2021-07-23 DIAGNOSIS — G89.4 CHRONIC PAIN SYNDROME: ICD-10-CM

## 2021-07-23 DIAGNOSIS — F41.1 GENERALIZED ANXIETY DISORDER: ICD-10-CM

## 2021-07-23 DIAGNOSIS — M54.31 BILATERAL SCIATICA: ICD-10-CM

## 2021-07-23 RX ORDER — ALPRAZOLAM 1 MG/1
1 TABLET ORAL 3 TIMES DAILY PRN
Qty: 90 TABLET | Refills: 2 | Status: CANCELLED | OUTPATIENT
Start: 2021-07-23 | End: 2021-10-21

## 2021-07-26 ENCOUNTER — TELEPHONE (OUTPATIENT)
Dept: INTERNAL MEDICINE | Facility: CLINIC | Age: 36
End: 2021-07-26

## 2021-07-26 RX ORDER — VENLAFAXINE HYDROCHLORIDE 150 MG/1
300 CAPSULE, EXTENDED RELEASE ORAL DAILY
Qty: 60 CAPSULE | Refills: 1 | Status: SHIPPED | OUTPATIENT
Start: 2021-07-26 | End: 2021-10-03

## 2021-07-26 RX ORDER — GABAPENTIN 600 MG/1
600 TABLET ORAL 4 TIMES DAILY
Qty: 120 TABLET | Refills: 1 | Status: SHIPPED | OUTPATIENT
Start: 2021-07-26 | End: 2021-09-22

## 2021-07-30 ENCOUNTER — HOSPITAL ENCOUNTER (EMERGENCY)
Facility: HOSPITAL | Age: 36
Discharge: HOME | End: 2021-07-30
Attending: EMERGENCY MEDICINE
Payer: MEDICARE

## 2021-07-30 VITALS
OXYGEN SATURATION: 100 % | SYSTOLIC BLOOD PRESSURE: 116 MMHG | WEIGHT: 185 LBS | HEART RATE: 74 BPM | DIASTOLIC BLOOD PRESSURE: 63 MMHG | BODY MASS INDEX: 30.82 KG/M2 | HEIGHT: 65 IN | RESPIRATION RATE: 24 BRPM

## 2021-07-30 DIAGNOSIS — J45.909 ASTHMA: ICD-10-CM

## 2021-07-30 LAB
ALBUMIN SERPL BCP-MCNC: 3.9 G/DL (ref 3.5–5.2)
ALP SERPL-CCNC: 108 U/L (ref 55–135)
ALT SERPL W/O P-5'-P-CCNC: 23 U/L (ref 10–44)
ANION GAP SERPL CALC-SCNC: 12 MMOL/L (ref 8–16)
AST SERPL-CCNC: 28 U/L (ref 10–40)
BASOPHILS # BLD AUTO: 0.08 K/UL (ref 0–0.2)
BASOPHILS NFR BLD: 0.9 % (ref 0–1.9)
BILIRUB SERPL-MCNC: 1.3 MG/DL (ref 0.1–1)
BNP SERPL-MCNC: 89 PG/ML (ref 0–99)
BUN SERPL-MCNC: 14 MG/DL (ref 6–20)
BUN SERPL-MCNC: 17 MG/DL (ref 6–30)
CALCIUM SERPL-MCNC: 9.2 MG/DL (ref 8.7–10.5)
CHLORIDE SERPL-SCNC: 108 MMOL/L (ref 95–110)
CHLORIDE SERPL-SCNC: 109 MMOL/L (ref 95–110)
CO2 SERPL-SCNC: 18 MMOL/L (ref 23–29)
CREAT SERPL-MCNC: 0.6 MG/DL (ref 0.5–1.4)
CREAT SERPL-MCNC: 0.8 MG/DL (ref 0.5–1.4)
CTP QC/QA: YES
DIFFERENTIAL METHOD: ABNORMAL
EOSINOPHIL # BLD AUTO: 0.7 K/UL (ref 0–0.5)
EOSINOPHIL NFR BLD: 7.4 % (ref 0–8)
ERYTHROCYTE [DISTWIDTH] IN BLOOD BY AUTOMATED COUNT: 13.5 % (ref 11.5–14.5)
EST. GFR  (AFRICAN AMERICAN): >60 ML/MIN/1.73 M^2
EST. GFR  (NON AFRICAN AMERICAN): >60 ML/MIN/1.73 M^2
GLUCOSE SERPL-MCNC: 104 MG/DL (ref 70–110)
GLUCOSE SERPL-MCNC: 109 MG/DL (ref 70–110)
HCT VFR BLD AUTO: 39.5 % (ref 37–48.5)
HCT VFR BLD CALC: 38 %PCV (ref 36–54)
HGB BLD-MCNC: 13.1 G/DL (ref 12–16)
IMM GRANULOCYTES # BLD AUTO: 0.02 K/UL (ref 0–0.04)
IMM GRANULOCYTES NFR BLD AUTO: 0.2 % (ref 0–0.5)
LYMPHOCYTES # BLD AUTO: 2.9 K/UL (ref 1–4.8)
LYMPHOCYTES NFR BLD: 31.2 % (ref 18–48)
MCH RBC QN AUTO: 31.2 PG (ref 27–31)
MCHC RBC AUTO-ENTMCNC: 33.2 G/DL (ref 32–36)
MCV RBC AUTO: 94 FL (ref 82–98)
MONOCYTES # BLD AUTO: 1.3 K/UL (ref 0.3–1)
MONOCYTES NFR BLD: 13.3 % (ref 4–15)
NEUTROPHILS # BLD AUTO: 4.4 K/UL (ref 1.8–7.7)
NEUTROPHILS NFR BLD: 47 % (ref 38–73)
NRBC BLD-RTO: 0 /100 WBC
PLATELET # BLD AUTO: 374 K/UL (ref 150–450)
PMV BLD AUTO: 10 FL (ref 9.2–12.9)
POC IONIZED CALCIUM: 1.06 MMOL/L (ref 1.06–1.42)
POC TCO2 (MEASURED): 21 MMOL/L (ref 23–29)
POTASSIUM BLD-SCNC: 4.5 MMOL/L (ref 3.5–5.1)
POTASSIUM SERPL-SCNC: 3.6 MMOL/L (ref 3.5–5.1)
PROT SERPL-MCNC: 7.6 G/DL (ref 6–8.4)
RBC # BLD AUTO: 4.2 M/UL (ref 4–5.4)
SAMPLE: ABNORMAL
SARS-COV-2 RDRP RESP QL NAA+PROBE: NEGATIVE
SODIUM BLD-SCNC: 143 MMOL/L (ref 136–145)
SODIUM SERPL-SCNC: 139 MMOL/L (ref 136–145)
TROPONIN I SERPL DL<=0.01 NG/ML-MCNC: <0.006 NG/ML (ref 0–0.03)
WBC # BLD AUTO: 9.41 K/UL (ref 3.9–12.7)

## 2021-07-30 PROCEDURE — 93010 ELECTROCARDIOGRAM REPORT: CPT | Mod: ,,, | Performed by: INTERNAL MEDICINE

## 2021-07-30 PROCEDURE — 63600175 PHARM REV CODE 636 W HCPCS: Performed by: EMERGENCY MEDICINE

## 2021-07-30 PROCEDURE — 99285 PR EMERGENCY DEPT VISIT,LEVEL V: ICD-10-PCS | Mod: CS,,, | Performed by: EMERGENCY MEDICINE

## 2021-07-30 PROCEDURE — 94761 N-INVAS EAR/PLS OXIMETRY MLT: CPT

## 2021-07-30 PROCEDURE — 99285 EMERGENCY DEPT VISIT HI MDM: CPT | Mod: CS,,, | Performed by: EMERGENCY MEDICINE

## 2021-07-30 PROCEDURE — 80053 COMPREHEN METABOLIC PANEL: CPT | Performed by: EMERGENCY MEDICINE

## 2021-07-30 PROCEDURE — 93005 ELECTROCARDIOGRAM TRACING: CPT

## 2021-07-30 PROCEDURE — 85025 COMPLETE CBC W/AUTO DIFF WBC: CPT | Performed by: EMERGENCY MEDICINE

## 2021-07-30 PROCEDURE — 25000242 PHARM REV CODE 250 ALT 637 W/ HCPCS: Performed by: EMERGENCY MEDICINE

## 2021-07-30 PROCEDURE — 80047 BASIC METABLC PNL IONIZED CA: CPT | Mod: 59

## 2021-07-30 PROCEDURE — 94640 AIRWAY INHALATION TREATMENT: CPT

## 2021-07-30 PROCEDURE — 96376 TX/PRO/DX INJ SAME DRUG ADON: CPT

## 2021-07-30 PROCEDURE — U0002 COVID-19 LAB TEST NON-CDC: HCPCS | Performed by: EMERGENCY MEDICINE

## 2021-07-30 PROCEDURE — 93010 EKG 12-LEAD: ICD-10-PCS | Mod: ,,, | Performed by: INTERNAL MEDICINE

## 2021-07-30 PROCEDURE — 84484 ASSAY OF TROPONIN QUANT: CPT | Performed by: EMERGENCY MEDICINE

## 2021-07-30 PROCEDURE — 99285 EMERGENCY DEPT VISIT HI MDM: CPT | Mod: 25

## 2021-07-30 PROCEDURE — 96374 THER/PROPH/DIAG INJ IV PUSH: CPT

## 2021-07-30 PROCEDURE — 83880 ASSAY OF NATRIURETIC PEPTIDE: CPT | Performed by: EMERGENCY MEDICINE

## 2021-07-30 PROCEDURE — 99900035 HC TECH TIME PER 15 MIN (STAT)

## 2021-07-30 RX ORDER — ALBUTEROL SULFATE 90 UG/1
2 AEROSOL, METERED RESPIRATORY (INHALATION)
Status: COMPLETED | OUTPATIENT
Start: 2021-07-30 | End: 2021-07-30

## 2021-07-30 RX ORDER — IPRATROPIUM BROMIDE AND ALBUTEROL SULFATE 2.5; .5 MG/3ML; MG/3ML
3 SOLUTION RESPIRATORY (INHALATION)
Status: COMPLETED | OUTPATIENT
Start: 2021-07-30 | End: 2021-07-30

## 2021-07-30 RX ORDER — LORAZEPAM 2 MG/ML
1 INJECTION INTRAMUSCULAR
Status: COMPLETED | OUTPATIENT
Start: 2021-07-30 | End: 2021-07-30

## 2021-07-30 RX ORDER — MAGNESIUM SULFATE HEPTAHYDRATE 40 MG/ML
2 INJECTION, SOLUTION INTRAVENOUS
Status: COMPLETED | OUTPATIENT
Start: 2021-07-30 | End: 2021-07-30

## 2021-07-30 RX ORDER — METHYLPREDNISOLONE SOD SUCC 125 MG
125 VIAL (EA) INJECTION
Status: COMPLETED | OUTPATIENT
Start: 2021-07-30 | End: 2021-07-30

## 2021-07-30 RX ORDER — IPRATROPIUM BROMIDE AND ALBUTEROL SULFATE 2.5; .5 MG/3ML; MG/3ML
3 SOLUTION RESPIRATORY (INHALATION)
Status: DISCONTINUED | OUTPATIENT
Start: 2021-07-30 | End: 2021-07-30

## 2021-07-30 RX ADMIN — METHYLPREDNISOLONE SODIUM SUCCINATE 125 MG: 125 INJECTION, POWDER, FOR SOLUTION INTRAMUSCULAR; INTRAVENOUS at 04:07

## 2021-07-30 RX ADMIN — LORAZEPAM 1 MG: 2 INJECTION INTRAMUSCULAR; INTRAVENOUS at 04:07

## 2021-07-30 RX ADMIN — IPRATROPIUM BROMIDE AND ALBUTEROL SULFATE 3 ML: .5; 2.5 SOLUTION RESPIRATORY (INHALATION) at 06:07

## 2021-07-30 RX ADMIN — IPRATROPIUM BROMIDE AND ALBUTEROL SULFATE 3 ML: .5; 2.5 SOLUTION RESPIRATORY (INHALATION) at 04:07

## 2021-07-30 RX ADMIN — MAGNESIUM SULFATE 2 G: 2 INJECTION INTRAVENOUS at 04:07

## 2021-07-30 RX ADMIN — ALBUTEROL SULFATE 2 PUFF: 108 INHALANT RESPIRATORY (INHALATION) at 04:07

## 2021-08-04 ENCOUNTER — OFFICE VISIT (OUTPATIENT)
Dept: INTERNAL MEDICINE | Facility: CLINIC | Age: 36
End: 2021-08-04
Payer: MEDICARE

## 2021-08-04 VITALS
BODY MASS INDEX: 32.99 KG/M2 | DIASTOLIC BLOOD PRESSURE: 78 MMHG | WEIGHT: 198 LBS | HEART RATE: 84 BPM | SYSTOLIC BLOOD PRESSURE: 120 MMHG | HEIGHT: 65 IN | OXYGEN SATURATION: 97 %

## 2021-08-04 DIAGNOSIS — J45.909 ASTHMA, UNSPECIFIED ASTHMA SEVERITY, UNSPECIFIED WHETHER COMPLICATED, UNSPECIFIED WHETHER PERSISTENT: ICD-10-CM

## 2021-08-04 DIAGNOSIS — J45.909 MILD ASTHMA, UNSPECIFIED WHETHER COMPLICATED, UNSPECIFIED WHETHER PERSISTENT: ICD-10-CM

## 2021-08-04 DIAGNOSIS — J01.00 SUBACUTE MAXILLARY SINUSITIS: ICD-10-CM

## 2021-08-04 DIAGNOSIS — J01.00 ACUTE MAXILLARY SINUSITIS, RECURRENCE NOT SPECIFIED: ICD-10-CM

## 2021-08-04 DIAGNOSIS — F41.9 ANXIETY: ICD-10-CM

## 2021-08-04 DIAGNOSIS — R11.0 NAUSEA: ICD-10-CM

## 2021-08-04 DIAGNOSIS — Z72.0 TOBACCO ABUSE: ICD-10-CM

## 2021-08-04 DIAGNOSIS — R09.81 SINUS CONGESTION: Primary | ICD-10-CM

## 2021-08-04 LAB
B-HCG UR QL: NEGATIVE
CTP QC/QA: YES

## 2021-08-04 PROCEDURE — 99214 PR OFFICE/OUTPT VISIT, EST, LEVL IV, 30-39 MIN: ICD-10-PCS | Mod: S$PBB,GC,, | Performed by: STUDENT IN AN ORGANIZED HEALTH CARE EDUCATION/TRAINING PROGRAM

## 2021-08-04 PROCEDURE — 81025 URINE PREGNANCY TEST: CPT | Mod: PBBFAC | Performed by: STUDENT IN AN ORGANIZED HEALTH CARE EDUCATION/TRAINING PROGRAM

## 2021-08-04 PROCEDURE — 99215 OFFICE O/P EST HI 40 MIN: CPT | Mod: PBBFAC | Performed by: STUDENT IN AN ORGANIZED HEALTH CARE EDUCATION/TRAINING PROGRAM

## 2021-08-04 PROCEDURE — 99214 OFFICE O/P EST MOD 30 MIN: CPT | Mod: S$PBB,GC,, | Performed by: STUDENT IN AN ORGANIZED HEALTH CARE EDUCATION/TRAINING PROGRAM

## 2021-08-04 PROCEDURE — 99999 PR PBB SHADOW E&M-EST. PATIENT-LVL V: ICD-10-PCS | Mod: PBBFAC,GC,, | Performed by: STUDENT IN AN ORGANIZED HEALTH CARE EDUCATION/TRAINING PROGRAM

## 2021-08-04 PROCEDURE — 99999 PR PBB SHADOW E&M-EST. PATIENT-LVL V: CPT | Mod: PBBFAC,GC,, | Performed by: STUDENT IN AN ORGANIZED HEALTH CARE EDUCATION/TRAINING PROGRAM

## 2021-08-04 RX ORDER — ALBUTEROL SULFATE 90 UG/1
2 AEROSOL, METERED RESPIRATORY (INHALATION) EVERY 6 HOURS PRN
Qty: 18 G | Refills: 0 | Status: SHIPPED | OUTPATIENT
Start: 2021-08-04 | End: 2021-12-03 | Stop reason: ALTCHOICE

## 2021-08-04 RX ORDER — AZITHROMYCIN 250 MG/1
TABLET, FILM COATED ORAL
Qty: 6 TABLET | Refills: 0 | Status: SHIPPED | OUTPATIENT
Start: 2021-08-04 | End: 2021-08-09

## 2021-08-04 RX ORDER — PROCHLORPERAZINE MALEATE 5 MG
5 TABLET ORAL 3 TIMES DAILY PRN
Qty: 9 TABLET | Refills: 0 | Status: SHIPPED | OUTPATIENT
Start: 2021-08-04 | End: 2021-08-07

## 2021-08-04 RX ORDER — LORATADINE 10 MG/1
10 TABLET ORAL DAILY
Qty: 30 TABLET | Refills: 0 | Status: SHIPPED | OUTPATIENT
Start: 2021-08-04 | End: 2021-09-13 | Stop reason: SDUPTHER

## 2021-08-04 RX ORDER — BENZONATATE 100 MG/1
100 CAPSULE ORAL 3 TIMES DAILY PRN
Qty: 30 CAPSULE | Refills: 0 | Status: SHIPPED | OUTPATIENT
Start: 2021-08-04 | End: 2021-08-14

## 2021-08-05 DIAGNOSIS — F41.1 GENERALIZED ANXIETY DISORDER: ICD-10-CM

## 2021-08-05 RX ORDER — ALPRAZOLAM 1 MG/1
1 TABLET ORAL 3 TIMES DAILY PRN
Qty: 90 TABLET | Refills: 2 | Status: CANCELLED | OUTPATIENT
Start: 2021-08-05 | End: 2021-11-03

## 2021-08-06 ENCOUNTER — HOSPITAL ENCOUNTER (EMERGENCY)
Facility: HOSPITAL | Age: 36
Discharge: HOME | End: 2021-08-07
Attending: EMERGENCY MEDICINE
Payer: MEDICARE

## 2021-08-06 ENCOUNTER — TELEPHONE (OUTPATIENT)
Dept: INTERNAL MEDICINE | Facility: CLINIC | Age: 36
End: 2021-08-06

## 2021-08-06 ENCOUNTER — PATIENT MESSAGE (OUTPATIENT)
Dept: INTERNAL MEDICINE | Facility: CLINIC | Age: 36
End: 2021-08-06

## 2021-08-06 VITALS
WEIGHT: 200 LBS | TEMPERATURE: 99 F | SYSTOLIC BLOOD PRESSURE: 158 MMHG | BODY MASS INDEX: 33.28 KG/M2 | RESPIRATION RATE: 20 BRPM | OXYGEN SATURATION: 100 % | HEART RATE: 106 BPM | DIASTOLIC BLOOD PRESSURE: 83 MMHG

## 2021-08-06 DIAGNOSIS — S93.402A LEFT ANKLE SPRAIN: Primary | ICD-10-CM

## 2021-08-06 DIAGNOSIS — F41.1 GENERALIZED ANXIETY DISORDER: ICD-10-CM

## 2021-08-06 PROCEDURE — 87389 HIV-1 AG W/HIV-1&-2 AB AG IA: CPT | Performed by: EMERGENCY MEDICINE

## 2021-08-06 PROCEDURE — 99284 EMERGENCY DEPT VISIT MOD MDM: CPT | Mod: ,,, | Performed by: EMERGENCY MEDICINE

## 2021-08-06 PROCEDURE — 99284 PR EMERGENCY DEPT VISIT,LEVEL IV: ICD-10-PCS | Mod: ,,, | Performed by: EMERGENCY MEDICINE

## 2021-08-06 PROCEDURE — 86803 HEPATITIS C AB TEST: CPT | Performed by: EMERGENCY MEDICINE

## 2021-08-06 PROCEDURE — 99283 EMERGENCY DEPT VISIT LOW MDM: CPT

## 2021-08-06 PROCEDURE — 25000003 PHARM REV CODE 250: Performed by: EMERGENCY MEDICINE

## 2021-08-06 RX ORDER — ALPRAZOLAM 1 MG/1
1 TABLET ORAL 3 TIMES DAILY PRN
Qty: 90 TABLET | Refills: 2 | Status: CANCELLED | OUTPATIENT
Start: 2021-08-06 | End: 2021-11-04

## 2021-08-06 RX ORDER — ACETAMINOPHEN 500 MG
1000 TABLET ORAL
Status: COMPLETED | OUTPATIENT
Start: 2021-08-06 | End: 2021-08-06

## 2021-08-06 RX ADMIN — ACETAMINOPHEN 1000 MG: 500 TABLET ORAL at 11:08

## 2021-08-07 PROCEDURE — 25000003 PHARM REV CODE 250: Performed by: EMERGENCY MEDICINE

## 2021-08-07 RX ORDER — LORAZEPAM 1 MG/1
1 TABLET ORAL
Status: COMPLETED | OUTPATIENT
Start: 2021-08-07 | End: 2021-08-07

## 2021-08-07 RX ORDER — NAPROXEN 500 MG/1
500 TABLET ORAL
Status: COMPLETED | OUTPATIENT
Start: 2021-08-07 | End: 2021-08-07

## 2021-08-07 RX ADMIN — LORAZEPAM 1 MG: 1 TABLET ORAL at 01:08

## 2021-08-07 RX ADMIN — NAPROXEN 500 MG: 500 TABLET ORAL at 02:08

## 2021-08-09 ENCOUNTER — PATIENT MESSAGE (OUTPATIENT)
Dept: INTERNAL MEDICINE | Facility: CLINIC | Age: 36
End: 2021-08-09

## 2021-08-09 ENCOUNTER — TELEPHONE (OUTPATIENT)
Dept: INTERNAL MEDICINE | Facility: CLINIC | Age: 36
End: 2021-08-09

## 2021-08-09 LAB
HCV AB SERPL QL IA: NEGATIVE
HIV 1+2 AB+HIV1 P24 AG SERPL QL IA: NEGATIVE

## 2021-08-11 ENCOUNTER — TELEPHONE (OUTPATIENT)
Dept: INTERNAL MEDICINE | Facility: CLINIC | Age: 36
End: 2021-08-11

## 2021-08-11 ENCOUNTER — PATIENT MESSAGE (OUTPATIENT)
Dept: INTERNAL MEDICINE | Facility: CLINIC | Age: 36
End: 2021-08-11

## 2021-08-11 DIAGNOSIS — Z01.818 PRE-OP TESTING: ICD-10-CM

## 2021-08-11 DIAGNOSIS — F41.1 GENERALIZED ANXIETY DISORDER: ICD-10-CM

## 2021-08-11 RX ORDER — ALPRAZOLAM 1 MG/1
1 TABLET ORAL 2 TIMES DAILY PRN
Qty: 60 TABLET | Refills: 0 | Status: SHIPPED | OUTPATIENT
Start: 2021-08-11 | End: 2021-09-13 | Stop reason: SDUPTHER

## 2021-08-12 ENCOUNTER — TELEPHONE (OUTPATIENT)
Dept: INTERNAL MEDICINE | Facility: CLINIC | Age: 36
End: 2021-08-12

## 2021-08-15 ENCOUNTER — LAB VISIT (OUTPATIENT)
Dept: SPORTS MEDICINE | Facility: CLINIC | Age: 36
End: 2021-08-15
Payer: MEDICARE

## 2021-08-15 DIAGNOSIS — Z01.818 PRE-OP TESTING: ICD-10-CM

## 2021-08-15 PROCEDURE — U0005 INFEC AGEN DETEC AMPLI PROBE: HCPCS | Performed by: OTOLARYNGOLOGY

## 2021-08-15 PROCEDURE — U0003 INFECTIOUS AGENT DETECTION BY NUCLEIC ACID (DNA OR RNA); SEVERE ACUTE RESPIRATORY SYNDROME CORONAVIRUS 2 (SARS-COV-2) (CORONAVIRUS DISEASE [COVID-19]), AMPLIFIED PROBE TECHNIQUE, MAKING USE OF HIGH THROUGHPUT TECHNOLOGIES AS DESCRIBED BY CMS-2020-01-R: HCPCS | Performed by: OTOLARYNGOLOGY

## 2021-08-16 LAB
SARS-COV-2 RNA RESP QL NAA+PROBE: NOT DETECTED
SARS-COV-2- CYCLE NUMBER: -1

## 2021-08-18 DIAGNOSIS — Z01.818 PRE-OP TESTING: ICD-10-CM

## 2021-09-09 ENCOUNTER — PATIENT MESSAGE (OUTPATIENT)
Dept: INTERNAL MEDICINE | Facility: CLINIC | Age: 36
End: 2021-09-09

## 2021-09-09 DIAGNOSIS — F41.1 GENERALIZED ANXIETY DISORDER: ICD-10-CM

## 2021-09-13 RX ORDER — LORATADINE 10 MG/1
10 TABLET ORAL DAILY
Qty: 30 TABLET | Refills: 0 | Status: SHIPPED | OUTPATIENT
Start: 2021-09-13 | End: 2021-12-03

## 2021-09-13 RX ORDER — ALPRAZOLAM 1 MG/1
1 TABLET ORAL 2 TIMES DAILY PRN
Qty: 60 TABLET | Refills: 0 | Status: SHIPPED | OUTPATIENT
Start: 2021-09-13 | End: 2021-09-14 | Stop reason: SDUPTHER

## 2021-09-14 DIAGNOSIS — F41.1 GENERALIZED ANXIETY DISORDER: ICD-10-CM

## 2021-09-14 RX ORDER — ALPRAZOLAM 1 MG/1
1 TABLET ORAL 2 TIMES DAILY PRN
Qty: 60 TABLET | Refills: 0 | Status: SHIPPED | OUTPATIENT
Start: 2021-09-14 | End: 2021-09-30 | Stop reason: SDUPTHER

## 2021-09-17 ENCOUNTER — OFFICE VISIT (OUTPATIENT)
Dept: OTOLARYNGOLOGY | Facility: CLINIC | Age: 36
End: 2021-09-17
Payer: MEDICARE

## 2021-09-17 VITALS
HEART RATE: 101 BPM | SYSTOLIC BLOOD PRESSURE: 149 MMHG | DIASTOLIC BLOOD PRESSURE: 97 MMHG | WEIGHT: 197.75 LBS | BODY MASS INDEX: 32.91 KG/M2

## 2021-09-17 DIAGNOSIS — J38.1 POLYP OF VOCAL CORD AND LARYNX: ICD-10-CM

## 2021-09-17 DIAGNOSIS — J45.909 ASTHMA, UNSPECIFIED ASTHMA SEVERITY, UNSPECIFIED WHETHER COMPLICATED, UNSPECIFIED WHETHER PERSISTENT: ICD-10-CM

## 2021-09-17 DIAGNOSIS — J01.00 SUBACUTE MAXILLARY SINUSITIS: Primary | ICD-10-CM

## 2021-09-17 DIAGNOSIS — R09.82 POST-NASAL DRIP: ICD-10-CM

## 2021-09-17 DIAGNOSIS — J34.89 OTHER SPECIFIED DISORDERS OF NOSE AND NASAL SINUSES: ICD-10-CM

## 2021-09-17 PROCEDURE — 31231 PR NASAL ENDOSCOPY, DX: ICD-10-PCS | Mod: S$GLB,,, | Performed by: STUDENT IN AN ORGANIZED HEALTH CARE EDUCATION/TRAINING PROGRAM

## 2021-09-17 PROCEDURE — 99214 OFFICE O/P EST MOD 30 MIN: CPT | Mod: PBBFAC,25 | Performed by: STUDENT IN AN ORGANIZED HEALTH CARE EDUCATION/TRAINING PROGRAM

## 2021-09-17 PROCEDURE — 99999 PR PBB SHADOW E&M-EST. PATIENT-LVL IV: ICD-10-PCS | Mod: PBBFAC,,, | Performed by: STUDENT IN AN ORGANIZED HEALTH CARE EDUCATION/TRAINING PROGRAM

## 2021-09-17 PROCEDURE — 99204 OFFICE O/P NEW MOD 45 MIN: CPT | Mod: 25,S$GLB,, | Performed by: STUDENT IN AN ORGANIZED HEALTH CARE EDUCATION/TRAINING PROGRAM

## 2021-09-17 PROCEDURE — 99204 PR OFFICE/OUTPT VISIT, NEW, LEVL IV, 45-59 MIN: ICD-10-PCS | Mod: 25,S$GLB,, | Performed by: STUDENT IN AN ORGANIZED HEALTH CARE EDUCATION/TRAINING PROGRAM

## 2021-09-17 PROCEDURE — 31231 NASAL ENDOSCOPY DX: CPT | Mod: PBBFAC | Performed by: STUDENT IN AN ORGANIZED HEALTH CARE EDUCATION/TRAINING PROGRAM

## 2021-09-17 PROCEDURE — 31231 NASAL ENDOSCOPY DX: CPT | Mod: S$GLB,,, | Performed by: STUDENT IN AN ORGANIZED HEALTH CARE EDUCATION/TRAINING PROGRAM

## 2021-09-17 PROCEDURE — 99999 PR PBB SHADOW E&M-EST. PATIENT-LVL IV: CPT | Mod: PBBFAC,,, | Performed by: STUDENT IN AN ORGANIZED HEALTH CARE EDUCATION/TRAINING PROGRAM

## 2021-09-17 RX ORDER — PANTOPRAZOLE SODIUM 20 MG/1
40 TABLET, DELAYED RELEASE ORAL DAILY
Qty: 60 TABLET | Refills: 11 | Status: SHIPPED | OUTPATIENT
Start: 2021-09-17 | End: 2021-10-28

## 2021-09-20 DIAGNOSIS — G47.00 INSOMNIA, UNSPECIFIED TYPE: ICD-10-CM

## 2021-09-20 RX ORDER — DOXEPIN HYDROCHLORIDE 25 MG/1
50 CAPSULE ORAL NIGHTLY
Qty: 180 CAPSULE | Refills: 0 | OUTPATIENT
Start: 2021-09-20 | End: 2021-12-19

## 2021-09-21 DIAGNOSIS — G89.4 CHRONIC PAIN SYNDROME: ICD-10-CM

## 2021-09-21 DIAGNOSIS — M54.31 BILATERAL SCIATICA: ICD-10-CM

## 2021-09-21 DIAGNOSIS — M54.32 BILATERAL SCIATICA: ICD-10-CM

## 2021-09-22 RX ORDER — GABAPENTIN 600 MG/1
600 TABLET ORAL 4 TIMES DAILY
Qty: 120 TABLET | Refills: 1 | Status: SHIPPED | OUTPATIENT
Start: 2021-09-22 | End: 2021-11-24

## 2021-09-30 ENCOUNTER — OFFICE VISIT (OUTPATIENT)
Dept: INTERNAL MEDICINE | Facility: CLINIC | Age: 36
End: 2021-09-30
Payer: MEDICARE

## 2021-09-30 VITALS
HEART RATE: 81 BPM | DIASTOLIC BLOOD PRESSURE: 89 MMHG | WEIGHT: 197.56 LBS | SYSTOLIC BLOOD PRESSURE: 135 MMHG | BODY MASS INDEX: 32.91 KG/M2 | OXYGEN SATURATION: 99 % | HEIGHT: 65 IN

## 2021-09-30 DIAGNOSIS — F41.1 GENERALIZED ANXIETY DISORDER: ICD-10-CM

## 2021-09-30 DIAGNOSIS — J39.8: ICD-10-CM

## 2021-09-30 DIAGNOSIS — F41.9 ANXIETY: Primary | ICD-10-CM

## 2021-09-30 DIAGNOSIS — Z00.00 LABORATORY EXAMINATION ORDERED AS PART OF A COMPLETE PHYSICAL EXAMINATION: ICD-10-CM

## 2021-09-30 DIAGNOSIS — J42 CHRONIC BRONCHITIS, UNSPECIFIED CHRONIC BRONCHITIS TYPE: ICD-10-CM

## 2021-09-30 DIAGNOSIS — J45.909 ASTHMA, UNSPECIFIED ASTHMA SEVERITY, UNSPECIFIED WHETHER COMPLICATED, UNSPECIFIED WHETHER PERSISTENT: ICD-10-CM

## 2021-09-30 PROCEDURE — 1159F MED LIST DOCD IN RCRD: CPT | Mod: HCNC,CPTII,S$GLB, | Performed by: INTERNAL MEDICINE

## 2021-09-30 PROCEDURE — 1159F PR MEDICATION LIST DOCUMENTED IN MEDICAL RECORD: ICD-10-PCS | Mod: HCNC,CPTII,S$GLB, | Performed by: INTERNAL MEDICINE

## 2021-09-30 PROCEDURE — 99499 UNLISTED E&M SERVICE: CPT | Mod: HCNC,S$GLB,, | Performed by: INTERNAL MEDICINE

## 2021-09-30 PROCEDURE — 1160F PR REVIEW ALL MEDS BY PRESCRIBER/CLIN PHARMACIST DOCUMENTED: ICD-10-PCS | Mod: HCNC,CPTII,S$GLB, | Performed by: INTERNAL MEDICINE

## 2021-09-30 PROCEDURE — 99999 PR PBB SHADOW E&M-EST. PATIENT-LVL V: CPT | Mod: PBBFAC,HCNC,, | Performed by: INTERNAL MEDICINE

## 2021-09-30 PROCEDURE — 3008F PR BODY MASS INDEX (BMI) DOCUMENTED: ICD-10-PCS | Mod: HCNC,CPTII,S$GLB, | Performed by: INTERNAL MEDICINE

## 2021-09-30 PROCEDURE — 1160F RVW MEDS BY RX/DR IN RCRD: CPT | Mod: HCNC,CPTII,S$GLB, | Performed by: INTERNAL MEDICINE

## 2021-09-30 PROCEDURE — 3008F BODY MASS INDEX DOCD: CPT | Mod: HCNC,CPTII,S$GLB, | Performed by: INTERNAL MEDICINE

## 2021-09-30 PROCEDURE — 3079F PR MOST RECENT DIASTOLIC BLOOD PRESSURE 80-89 MM HG: ICD-10-PCS | Mod: HCNC,CPTII,S$GLB, | Performed by: INTERNAL MEDICINE

## 2021-09-30 PROCEDURE — 3075F PR MOST RECENT SYSTOLIC BLOOD PRESS GE 130-139MM HG: ICD-10-PCS | Mod: HCNC,CPTII,S$GLB, | Performed by: INTERNAL MEDICINE

## 2021-09-30 PROCEDURE — 3079F DIAST BP 80-89 MM HG: CPT | Mod: HCNC,CPTII,S$GLB, | Performed by: INTERNAL MEDICINE

## 2021-09-30 PROCEDURE — 99214 OFFICE O/P EST MOD 30 MIN: CPT | Mod: HCNC,S$GLB,, | Performed by: INTERNAL MEDICINE

## 2021-09-30 PROCEDURE — 99499 RISK ADDL DX/OHS AUDIT: ICD-10-PCS | Mod: HCNC,S$GLB,, | Performed by: INTERNAL MEDICINE

## 2021-09-30 PROCEDURE — 99999 PR PBB SHADOW E&M-EST. PATIENT-LVL V: ICD-10-PCS | Mod: PBBFAC,HCNC,, | Performed by: INTERNAL MEDICINE

## 2021-09-30 PROCEDURE — 3075F SYST BP GE 130 - 139MM HG: CPT | Mod: HCNC,CPTII,S$GLB, | Performed by: INTERNAL MEDICINE

## 2021-09-30 PROCEDURE — 99214 PR OFFICE/OUTPT VISIT, EST, LEVL IV, 30-39 MIN: ICD-10-PCS | Mod: HCNC,S$GLB,, | Performed by: INTERNAL MEDICINE

## 2021-09-30 RX ORDER — ALPRAZOLAM 1 MG/1
1 TABLET ORAL 3 TIMES DAILY PRN
Qty: 90 TABLET | Refills: 0 | Status: SHIPPED | OUTPATIENT
Start: 2021-10-14 | End: 2021-11-21

## 2021-10-05 ENCOUNTER — TELEPHONE (OUTPATIENT)
Dept: INTERNAL MEDICINE | Facility: CLINIC | Age: 36
End: 2021-10-05

## 2021-10-07 ENCOUNTER — TELEPHONE (OUTPATIENT)
Dept: INTERNAL MEDICINE | Facility: CLINIC | Age: 36
End: 2021-10-07

## 2021-10-11 ENCOUNTER — CLINICAL SUPPORT (OUTPATIENT)
Dept: PSYCHIATRY | Facility: CLINIC | Age: 36
End: 2021-10-11
Payer: MEDICARE

## 2021-10-11 ENCOUNTER — HOSPITAL ENCOUNTER (OUTPATIENT)
Dept: RADIOLOGY | Facility: HOSPITAL | Age: 36
Discharge: HOME | End: 2021-10-11
Attending: INTERNAL MEDICINE
Payer: MEDICARE

## 2021-10-11 DIAGNOSIS — J42 CHRONIC BRONCHITIS, UNSPECIFIED CHRONIC BRONCHITIS TYPE: ICD-10-CM

## 2021-10-11 DIAGNOSIS — Z13.39 ADHD (ATTENTION DEFICIT HYPERACTIVITY DISORDER) EVALUATION: Primary | ICD-10-CM

## 2021-10-11 LAB
AMPHET+METHAMPHET UR QL: NEGATIVE
BARBITURATES UR QL SCN>200 NG/ML: NEGATIVE
BENZODIAZ UR QL SCN>200 NG/ML: ABNORMAL
BZE UR QL SCN: NEGATIVE
CANNABINOIDS UR QL SCN: NEGATIVE
CREAT UR-MCNC: 143 MG/DL (ref 15–325)
ETHANOL UR-MCNC: <10 MG/DL
METHADONE UR QL SCN>300 NG/ML: NEGATIVE
OPIATES UR QL SCN: NEGATIVE
PCP UR QL SCN>25 NG/ML: NEGATIVE
TOXICOLOGY INFORMATION: ABNORMAL

## 2021-10-11 PROCEDURE — 71046 XR CHEST PA AND LATERAL: ICD-10-PCS | Mod: 26,HCNC,, | Performed by: RADIOLOGY

## 2021-10-11 PROCEDURE — 71046 X-RAY EXAM CHEST 2 VIEWS: CPT | Mod: TC,HCNC,FY

## 2021-10-11 PROCEDURE — 71046 X-RAY EXAM CHEST 2 VIEWS: CPT | Mod: 26,HCNC,, | Performed by: RADIOLOGY

## 2021-10-11 PROCEDURE — 80307 DRUG TEST PRSMV CHEM ANLYZR: CPT | Mod: HCNC | Performed by: PSYCHIATRY & NEUROLOGY

## 2021-10-14 ENCOUNTER — HOSPITAL ENCOUNTER (OUTPATIENT)
Dept: PULMONOLOGY | Facility: CLINIC | Age: 36
Discharge: HOME | End: 2021-10-14
Payer: MEDICARE

## 2021-10-14 DIAGNOSIS — J45.909 ASTHMA, UNSPECIFIED ASTHMA SEVERITY, UNSPECIFIED WHETHER COMPLICATED, UNSPECIFIED WHETHER PERSISTENT: ICD-10-CM

## 2021-10-14 LAB — SARS-COV-2 RDRP RESP QL NAA+PROBE: NEGATIVE

## 2021-10-14 PROCEDURE — 94727 PR PULM FUNCTION TEST BY GAS: ICD-10-PCS | Mod: HCNC,S$GLB,, | Performed by: INTERNAL MEDICINE

## 2021-10-14 PROCEDURE — 94727 GAS DIL/WSHOT DETER LNG VOL: CPT | Mod: HCNC,S$GLB,, | Performed by: INTERNAL MEDICINE

## 2021-10-14 PROCEDURE — 94060 EVALUATION OF WHEEZING: CPT | Mod: HCNC,S$GLB,, | Performed by: INTERNAL MEDICINE

## 2021-10-14 PROCEDURE — 94729 DIFFUSING CAPACITY: CPT | Mod: HCNC,S$GLB,, | Performed by: INTERNAL MEDICINE

## 2021-10-14 PROCEDURE — 94729 PR C02/MEMBANE DIFFUSE CAPACITY: ICD-10-PCS | Mod: HCNC,S$GLB,, | Performed by: INTERNAL MEDICINE

## 2021-10-14 PROCEDURE — 94060 PR EVAL OF BRONCHOSPASM: ICD-10-PCS | Mod: HCNC,S$GLB,, | Performed by: INTERNAL MEDICINE

## 2021-10-14 PROCEDURE — U0002 COVID-19 LAB TEST NON-CDC: HCPCS | Mod: HCNC | Performed by: INTERNAL MEDICINE

## 2021-10-21 ENCOUNTER — OFFICE VISIT (OUTPATIENT)
Dept: PSYCHIATRY | Facility: CLINIC | Age: 36
End: 2021-10-21
Payer: MEDICARE

## 2021-10-21 DIAGNOSIS — F41.0 PANIC ATTACKS: ICD-10-CM

## 2021-10-21 DIAGNOSIS — Z91.148: Primary | ICD-10-CM

## 2021-10-21 DIAGNOSIS — G47.00 INSOMNIA, UNSPECIFIED TYPE: ICD-10-CM

## 2021-10-21 DIAGNOSIS — F41.1 GAD (GENERALIZED ANXIETY DISORDER): ICD-10-CM

## 2021-10-21 DIAGNOSIS — Z86.59 HISTORY OF POSTTRAUMATIC STRESS DISORDER (PTSD): ICD-10-CM

## 2021-10-21 DIAGNOSIS — F90.2 ADHD (ATTENTION DEFICIT HYPERACTIVITY DISORDER), COMBINED TYPE: ICD-10-CM

## 2021-10-21 PROCEDURE — 99205 PR OFFICE/OUTPT VISIT, NEW, LEVL V, 60-74 MIN: ICD-10-PCS | Mod: HCNC,95,, | Performed by: NURSE PRACTITIONER

## 2021-10-21 PROCEDURE — 99205 OFFICE O/P NEW HI 60 MIN: CPT | Mod: HCNC,95,, | Performed by: NURSE PRACTITIONER
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
## 2021-10-27 PROBLEM — R56.9 SEIZURE: Status: ACTIVE | Noted: 2021-10-27

## 2021-10-27 PROBLEM — J45.909 ASTHMA, CURRENTLY INACTIVE: Status: ACTIVE | Noted: 2021-10-27

## 2021-10-27 PROBLEM — F41.1 GENERALIZED ANXIETY DISORDER: Status: ACTIVE | Noted: 2021-10-27

## 2021-10-28 ENCOUNTER — OFFICE VISIT (OUTPATIENT)
Dept: CARDIOLOGY | Facility: CLINIC | Age: 36
End: 2021-10-28
Payer: MEDICARE

## 2021-10-28 ENCOUNTER — PATIENT OUTREACH (OUTPATIENT)
Dept: ADMINISTRATIVE | Facility: OTHER | Age: 36
End: 2021-10-28
Payer: MEDICAID

## 2021-10-28 ENCOUNTER — OFFICE VISIT (OUTPATIENT)
Dept: INTERNAL MEDICINE | Facility: CLINIC | Age: 36
End: 2021-10-28
Payer: MEDICARE

## 2021-10-28 VITALS
HEIGHT: 64 IN | HEART RATE: 91 BPM | DIASTOLIC BLOOD PRESSURE: 82 MMHG | WEIGHT: 201.25 LBS | SYSTOLIC BLOOD PRESSURE: 160 MMHG | BODY MASS INDEX: 34.36 KG/M2

## 2021-10-28 VITALS
SYSTOLIC BLOOD PRESSURE: 138 MMHG | DIASTOLIC BLOOD PRESSURE: 87 MMHG | WEIGHT: 200.38 LBS | BODY MASS INDEX: 34.21 KG/M2 | HEIGHT: 64 IN | HEART RATE: 93 BPM

## 2021-10-28 DIAGNOSIS — Z76.89 ENCOUNTER TO ESTABLISH CARE WITH NEW DOCTOR: ICD-10-CM

## 2021-10-28 DIAGNOSIS — E78.00 PURE HYPERCHOLESTEROLEMIA: ICD-10-CM

## 2021-10-28 DIAGNOSIS — I45.10 INCOMPLETE RIGHT BUNDLE BRANCH BLOCK (RBBB) DETERMINED BY ELECTROCARDIOGRAPHY: ICD-10-CM

## 2021-10-28 DIAGNOSIS — E66.9 OBESITY (BMI 30.0-34.9): ICD-10-CM

## 2021-10-28 DIAGNOSIS — I45.10 INCOMPLETE RIGHT BUNDLE BRANCH BLOCK (RBBB) DETERMINED BY ELECTROCARDIOGRAPHY: Primary | ICD-10-CM

## 2021-10-28 DIAGNOSIS — Z72.0 TOBACCO ABUSE: ICD-10-CM

## 2021-10-28 DIAGNOSIS — R03.0 BLOOD PRESSURE ELEVATED WITHOUT HISTORY OF HTN: ICD-10-CM

## 2021-10-28 DIAGNOSIS — Z87.898 HISTORY OF PROLONGED Q-T INTERVAL ON ECG: ICD-10-CM

## 2021-10-28 DIAGNOSIS — R21 RASH: ICD-10-CM

## 2021-10-28 DIAGNOSIS — F90.9 ATTENTION DEFICIT HYPERACTIVITY DISORDER (ADHD), UNSPECIFIED ADHD TYPE: Primary | ICD-10-CM

## 2021-10-28 DIAGNOSIS — E66.09 CLASS 1 OBESITY DUE TO EXCESS CALORIES WITHOUT SERIOUS COMORBIDITY WITH BODY MASS INDEX (BMI) OF 34.0 TO 34.9 IN ADULT: ICD-10-CM

## 2021-10-28 PROCEDURE — 99999 PR PBB SHADOW E&M-EST. PATIENT-LVL V: ICD-10-PCS | Mod: PBBFAC,HCNC,, | Performed by: NURSE PRACTITIONER

## 2021-10-28 PROCEDURE — 99214 PR OFFICE/OUTPT VISIT, EST, LEVL IV, 30-39 MIN: ICD-10-PCS | Mod: HCNC,S$GLB,, | Performed by: NURSE PRACTITIONER

## 2021-10-28 PROCEDURE — 3008F PR BODY MASS INDEX (BMI) DOCUMENTED: ICD-10-PCS | Mod: HCNC,CPTII,S$GLB, | Performed by: INTERNAL MEDICINE

## 2021-10-28 PROCEDURE — 3075F PR MOST RECENT SYSTOLIC BLOOD PRESS GE 130-139MM HG: ICD-10-PCS | Mod: HCNC,CPTII,S$GLB, | Performed by: INTERNAL MEDICINE

## 2021-10-28 PROCEDURE — 99204 PR OFFICE/OUTPT VISIT, NEW, LEVL IV, 45-59 MIN: ICD-10-PCS | Mod: HCNC,S$GLB,, | Performed by: INTERNAL MEDICINE

## 2021-10-28 PROCEDURE — 99999 PR PBB SHADOW E&M-EST. PATIENT-LVL V: CPT | Mod: PBBFAC,HCNC,, | Performed by: NURSE PRACTITIONER

## 2021-10-28 PROCEDURE — 1159F MED LIST DOCD IN RCRD: CPT | Mod: HCNC,CPTII,S$GLB, | Performed by: INTERNAL MEDICINE

## 2021-10-28 PROCEDURE — 3079F PR MOST RECENT DIASTOLIC BLOOD PRESSURE 80-89 MM HG: ICD-10-PCS | Mod: HCNC,CPTII,S$GLB, | Performed by: NURSE PRACTITIONER

## 2021-10-28 PROCEDURE — 3077F SYST BP >= 140 MM HG: CPT | Mod: HCNC,CPTII,S$GLB, | Performed by: NURSE PRACTITIONER

## 2021-10-28 PROCEDURE — 3008F BODY MASS INDEX DOCD: CPT | Mod: HCNC,CPTII,S$GLB, | Performed by: NURSE PRACTITIONER

## 2021-10-28 PROCEDURE — 1159F PR MEDICATION LIST DOCUMENTED IN MEDICAL RECORD: ICD-10-PCS | Mod: HCNC,CPTII,S$GLB, | Performed by: NURSE PRACTITIONER

## 2021-10-28 PROCEDURE — 99999 PR PBB SHADOW E&M-EST. PATIENT-LVL V: ICD-10-PCS | Mod: PBBFAC,HCNC,, | Performed by: INTERNAL MEDICINE

## 2021-10-28 PROCEDURE — 3008F BODY MASS INDEX DOCD: CPT | Mod: HCNC,CPTII,S$GLB, | Performed by: INTERNAL MEDICINE

## 2021-10-28 PROCEDURE — 99214 OFFICE O/P EST MOD 30 MIN: CPT | Mod: HCNC,S$GLB,, | Performed by: NURSE PRACTITIONER

## 2021-10-28 PROCEDURE — 93010 ELECTROCARDIOGRAM REPORT: CPT | Mod: HCNC,S$GLB,, | Performed by: INTERNAL MEDICINE

## 2021-10-28 PROCEDURE — 99204 OFFICE O/P NEW MOD 45 MIN: CPT | Mod: HCNC,S$GLB,, | Performed by: INTERNAL MEDICINE

## 2021-10-28 PROCEDURE — 3079F PR MOST RECENT DIASTOLIC BLOOD PRESSURE 80-89 MM HG: ICD-10-PCS | Mod: HCNC,CPTII,S$GLB, | Performed by: INTERNAL MEDICINE

## 2021-10-28 PROCEDURE — 93010 EKG 12-LEAD: ICD-10-PCS | Mod: HCNC,S$GLB,, | Performed by: INTERNAL MEDICINE

## 2021-10-28 PROCEDURE — 3079F DIAST BP 80-89 MM HG: CPT | Mod: HCNC,CPTII,S$GLB, | Performed by: NURSE PRACTITIONER

## 2021-10-28 PROCEDURE — 93005 ELECTROCARDIOGRAM TRACING: CPT | Mod: HCNC,S$GLB,, | Performed by: NURSE PRACTITIONER

## 2021-10-28 PROCEDURE — 1160F PR REVIEW ALL MEDS BY PRESCRIBER/CLIN PHARMACIST DOCUMENTED: ICD-10-PCS | Mod: HCNC,CPTII,S$GLB, | Performed by: NURSE PRACTITIONER

## 2021-10-28 PROCEDURE — 99999 PR PBB SHADOW E&M-EST. PATIENT-LVL V: CPT | Mod: PBBFAC,HCNC,, | Performed by: INTERNAL MEDICINE

## 2021-10-28 PROCEDURE — 3075F SYST BP GE 130 - 139MM HG: CPT | Mod: HCNC,CPTII,S$GLB, | Performed by: INTERNAL MEDICINE

## 2021-10-28 PROCEDURE — 1159F MED LIST DOCD IN RCRD: CPT | Mod: HCNC,CPTII,S$GLB, | Performed by: NURSE PRACTITIONER

## 2021-10-28 PROCEDURE — 3008F PR BODY MASS INDEX (BMI) DOCUMENTED: ICD-10-PCS | Mod: HCNC,CPTII,S$GLB, | Performed by: NURSE PRACTITIONER

## 2021-10-28 PROCEDURE — 1159F PR MEDICATION LIST DOCUMENTED IN MEDICAL RECORD: ICD-10-PCS | Mod: HCNC,CPTII,S$GLB, | Performed by: INTERNAL MEDICINE

## 2021-10-28 PROCEDURE — 1160F RVW MEDS BY RX/DR IN RCRD: CPT | Mod: HCNC,CPTII,S$GLB, | Performed by: NURSE PRACTITIONER

## 2021-10-28 PROCEDURE — 93005 EKG 12-LEAD: ICD-10-PCS | Mod: HCNC,S$GLB,, | Performed by: NURSE PRACTITIONER

## 2021-10-28 PROCEDURE — 3077F PR MOST RECENT SYSTOLIC BLOOD PRESSURE >= 140 MM HG: ICD-10-PCS | Mod: HCNC,CPTII,S$GLB, | Performed by: NURSE PRACTITIONER

## 2021-10-28 PROCEDURE — 3079F DIAST BP 80-89 MM HG: CPT | Mod: HCNC,CPTII,S$GLB, | Performed by: INTERNAL MEDICINE

## 2021-11-01 ENCOUNTER — TELEPHONE (OUTPATIENT)
Dept: GENETICS | Facility: CLINIC | Age: 36
End: 2021-11-01
Payer: MEDICAID

## 2021-11-02 ENCOUNTER — OFFICE VISIT (OUTPATIENT)
Dept: OTOLARYNGOLOGY | Facility: CLINIC | Age: 36
End: 2021-11-02
Payer: MEDICARE

## 2021-11-02 VITALS
BODY MASS INDEX: 33.81 KG/M2 | HEART RATE: 100 BPM | SYSTOLIC BLOOD PRESSURE: 123 MMHG | DIASTOLIC BLOOD PRESSURE: 86 MMHG | WEIGHT: 197 LBS

## 2021-11-02 DIAGNOSIS — J01.00 SUBACUTE MAXILLARY SINUSITIS: Primary | ICD-10-CM

## 2021-11-02 DIAGNOSIS — J34.3 HYPERTROPHY OF BOTH INFERIOR NASAL TURBINATES: ICD-10-CM

## 2021-11-02 DIAGNOSIS — J32.9 CHRONIC SINUSITIS, UNSPECIFIED LOCATION: ICD-10-CM

## 2021-11-02 DIAGNOSIS — J34.2 NASAL SEPTAL DEVIATION: ICD-10-CM

## 2021-11-02 PROCEDURE — 1159F PR MEDICATION LIST DOCUMENTED IN MEDICAL RECORD: ICD-10-PCS | Mod: HCNC,CPTII,S$GLB, | Performed by: STUDENT IN AN ORGANIZED HEALTH CARE EDUCATION/TRAINING PROGRAM

## 2021-11-02 PROCEDURE — 31231 PR NASAL ENDOSCOPY, DX: ICD-10-PCS | Mod: HCNC,S$GLB,, | Performed by: STUDENT IN AN ORGANIZED HEALTH CARE EDUCATION/TRAINING PROGRAM

## 2021-11-02 PROCEDURE — 3079F PR MOST RECENT DIASTOLIC BLOOD PRESSURE 80-89 MM HG: ICD-10-PCS | Mod: HCNC,CPTII,S$GLB, | Performed by: STUDENT IN AN ORGANIZED HEALTH CARE EDUCATION/TRAINING PROGRAM

## 2021-11-02 PROCEDURE — 87075 CULTR BACTERIA EXCEPT BLOOD: CPT | Mod: HCNC | Performed by: STUDENT IN AN ORGANIZED HEALTH CARE EDUCATION/TRAINING PROGRAM

## 2021-11-02 PROCEDURE — 1159F MED LIST DOCD IN RCRD: CPT | Mod: HCNC,CPTII,S$GLB, | Performed by: STUDENT IN AN ORGANIZED HEALTH CARE EDUCATION/TRAINING PROGRAM

## 2021-11-02 PROCEDURE — 99213 OFFICE O/P EST LOW 20 MIN: CPT | Mod: 25,HCNC,S$GLB, | Performed by: STUDENT IN AN ORGANIZED HEALTH CARE EDUCATION/TRAINING PROGRAM

## 2021-11-02 PROCEDURE — 87070 CULTURE OTHR SPECIMN AEROBIC: CPT | Mod: HCNC | Performed by: STUDENT IN AN ORGANIZED HEALTH CARE EDUCATION/TRAINING PROGRAM

## 2021-11-02 PROCEDURE — 3008F PR BODY MASS INDEX (BMI) DOCUMENTED: ICD-10-PCS | Mod: HCNC,CPTII,S$GLB, | Performed by: STUDENT IN AN ORGANIZED HEALTH CARE EDUCATION/TRAINING PROGRAM

## 2021-11-02 PROCEDURE — 3074F PR MOST RECENT SYSTOLIC BLOOD PRESSURE < 130 MM HG: ICD-10-PCS | Mod: HCNC,CPTII,S$GLB, | Performed by: STUDENT IN AN ORGANIZED HEALTH CARE EDUCATION/TRAINING PROGRAM

## 2021-11-02 PROCEDURE — 1160F PR REVIEW ALL MEDS BY PRESCRIBER/CLIN PHARMACIST DOCUMENTED: ICD-10-PCS | Mod: HCNC,CPTII,S$GLB, | Performed by: STUDENT IN AN ORGANIZED HEALTH CARE EDUCATION/TRAINING PROGRAM

## 2021-11-02 PROCEDURE — 3074F SYST BP LT 130 MM HG: CPT | Mod: HCNC,CPTII,S$GLB, | Performed by: STUDENT IN AN ORGANIZED HEALTH CARE EDUCATION/TRAINING PROGRAM

## 2021-11-02 PROCEDURE — 3079F DIAST BP 80-89 MM HG: CPT | Mod: HCNC,CPTII,S$GLB, | Performed by: STUDENT IN AN ORGANIZED HEALTH CARE EDUCATION/TRAINING PROGRAM

## 2021-11-02 PROCEDURE — 99999 PR PBB SHADOW E&M-EST. PATIENT-LVL III: ICD-10-PCS | Mod: PBBFAC,HCNC,, | Performed by: STUDENT IN AN ORGANIZED HEALTH CARE EDUCATION/TRAINING PROGRAM

## 2021-11-02 PROCEDURE — 99213 PR OFFICE/OUTPT VISIT, EST, LEVL III, 20-29 MIN: ICD-10-PCS | Mod: 25,HCNC,S$GLB, | Performed by: STUDENT IN AN ORGANIZED HEALTH CARE EDUCATION/TRAINING PROGRAM

## 2021-11-02 PROCEDURE — 1160F RVW MEDS BY RX/DR IN RCRD: CPT | Mod: HCNC,CPTII,S$GLB, | Performed by: STUDENT IN AN ORGANIZED HEALTH CARE EDUCATION/TRAINING PROGRAM

## 2021-11-02 PROCEDURE — 99999 PR PBB SHADOW E&M-EST. PATIENT-LVL III: CPT | Mod: PBBFAC,HCNC,, | Performed by: STUDENT IN AN ORGANIZED HEALTH CARE EDUCATION/TRAINING PROGRAM

## 2021-11-02 PROCEDURE — 31231 NASAL ENDOSCOPY DX: CPT | Mod: HCNC,S$GLB,, | Performed by: STUDENT IN AN ORGANIZED HEALTH CARE EDUCATION/TRAINING PROGRAM

## 2021-11-02 PROCEDURE — 3008F BODY MASS INDEX DOCD: CPT | Mod: HCNC,CPTII,S$GLB, | Performed by: STUDENT IN AN ORGANIZED HEALTH CARE EDUCATION/TRAINING PROGRAM

## 2021-11-06 LAB — BACTERIA SPEC AEROBE CULT: NORMAL

## 2021-11-08 PROBLEM — J01.00 SUBACUTE MAXILLARY SINUSITIS: Status: RESOLVED | Noted: 2021-08-04 | Resolved: 2021-11-08

## 2021-11-09 LAB — BACTERIA SPEC ANAEROBE CULT: NORMAL

## 2021-12-02 ENCOUNTER — PATIENT OUTREACH (OUTPATIENT)
Dept: ADMINISTRATIVE | Facility: OTHER | Age: 36
End: 2021-12-02
Payer: MEDICAID

## 2021-12-02 DIAGNOSIS — F41.1 GENERALIZED ANXIETY DISORDER: ICD-10-CM

## 2021-12-03 ENCOUNTER — OFFICE VISIT (OUTPATIENT)
Dept: PULMONOLOGY | Facility: CLINIC | Age: 36
End: 2021-12-03
Payer: MEDICARE

## 2021-12-03 VITALS
DIASTOLIC BLOOD PRESSURE: 109 MMHG | HEIGHT: 64 IN | WEIGHT: 196.75 LBS | SYSTOLIC BLOOD PRESSURE: 153 MMHG | TEMPERATURE: 98 F | BODY MASS INDEX: 33.59 KG/M2 | OXYGEN SATURATION: 99 % | HEART RATE: 108 BPM

## 2021-12-03 DIAGNOSIS — R06.02 SHORTNESS OF BREATH: ICD-10-CM

## 2021-12-03 DIAGNOSIS — Z72.0 TOBACCO ABUSE: ICD-10-CM

## 2021-12-03 DIAGNOSIS — J45.998 UNCONTROLLED PERSISTENT ASTHMA: Primary | ICD-10-CM

## 2021-12-03 PROBLEM — J42 CHRONIC BRONCHITIS: Status: ACTIVE | Noted: 2021-12-03

## 2021-12-03 PROCEDURE — 3080F PR MOST RECENT DIASTOLIC BLOOD PRESSURE >= 90 MM HG: ICD-10-PCS | Mod: HCNC,CPTII,S$GLB, | Performed by: NURSE PRACTITIONER

## 2021-12-03 PROCEDURE — 99999 PR PBB SHADOW E&M-EST. PATIENT-LVL V: ICD-10-PCS | Mod: PBBFAC,HCNC,, | Performed by: NURSE PRACTITIONER

## 2021-12-03 PROCEDURE — 3077F SYST BP >= 140 MM HG: CPT | Mod: HCNC,CPTII,S$GLB, | Performed by: NURSE PRACTITIONER

## 2021-12-03 PROCEDURE — 99203 OFFICE O/P NEW LOW 30 MIN: CPT | Mod: HCNC,S$GLB,, | Performed by: NURSE PRACTITIONER

## 2021-12-03 PROCEDURE — 3080F DIAST BP >= 90 MM HG: CPT | Mod: HCNC,CPTII,S$GLB, | Performed by: NURSE PRACTITIONER

## 2021-12-03 PROCEDURE — 99215 OFFICE O/P EST HI 40 MIN: CPT | Mod: PBBFAC | Performed by: NURSE PRACTITIONER

## 2021-12-03 PROCEDURE — 99203 PR OFFICE/OUTPT VISIT, NEW, LEVL III, 30-44 MIN: ICD-10-PCS | Mod: HCNC,S$GLB,, | Performed by: NURSE PRACTITIONER

## 2021-12-03 PROCEDURE — 3077F PR MOST RECENT SYSTOLIC BLOOD PRESSURE >= 140 MM HG: ICD-10-PCS | Mod: HCNC,CPTII,S$GLB, | Performed by: NURSE PRACTITIONER

## 2021-12-03 PROCEDURE — 3008F PR BODY MASS INDEX (BMI) DOCUMENTED: ICD-10-PCS | Mod: HCNC,CPTII,S$GLB, | Performed by: NURSE PRACTITIONER

## 2021-12-03 PROCEDURE — 99999 PR PBB SHADOW E&M-EST. PATIENT-LVL V: CPT | Mod: PBBFAC,HCNC,, | Performed by: NURSE PRACTITIONER

## 2021-12-03 PROCEDURE — 3008F BODY MASS INDEX DOCD: CPT | Mod: HCNC,CPTII,S$GLB, | Performed by: NURSE PRACTITIONER

## 2021-12-03 RX ORDER — OMEPRAZOLE 20 MG/1
20 CAPSULE, DELAYED RELEASE ORAL DAILY
Qty: 30 CAPSULE | Refills: 2 | Status: SHIPPED | OUTPATIENT
Start: 2021-12-03 | End: 2022-02-25

## 2021-12-03 RX ORDER — IPRATROPIUM BROMIDE AND ALBUTEROL SULFATE 2.5; .5 MG/3ML; MG/3ML
3 SOLUTION RESPIRATORY (INHALATION) EVERY 6 HOURS PRN
Qty: 270 ML | Refills: 3 | Status: SHIPPED | OUTPATIENT
Start: 2021-12-03 | End: 2023-03-15

## 2021-12-03 RX ORDER — BUDESONIDE AND FORMOTEROL FUMARATE DIHYDRATE 160; 4.5 UG/1; UG/1
2 AEROSOL RESPIRATORY (INHALATION) EVERY 12 HOURS
Qty: 10.2 G | Refills: 5 | Status: SHIPPED | OUTPATIENT
Start: 2021-12-03 | End: 2022-05-24

## 2021-12-03 RX ORDER — ALBUTEROL SULFATE 90 UG/1
2 AEROSOL, METERED RESPIRATORY (INHALATION) EVERY 6 HOURS PRN
Status: ON HOLD | COMMUNITY
End: 2022-12-15 | Stop reason: SDUPTHER

## 2021-12-05 RX ORDER — VENLAFAXINE HYDROCHLORIDE 150 MG/1
300 CAPSULE, EXTENDED RELEASE ORAL DAILY
Qty: 60 CAPSULE | Refills: 1 | Status: SHIPPED | OUTPATIENT
Start: 2021-12-05 | End: 2022-01-03

## 2021-12-07 ENCOUNTER — HOSPITAL ENCOUNTER (OUTPATIENT)
Dept: RADIOLOGY | Facility: HOSPITAL | Age: 36
Discharge: HOME | End: 2021-12-07
Attending: NURSE PRACTITIONER
Payer: MEDICARE

## 2021-12-07 ENCOUNTER — HOSPITAL ENCOUNTER (OUTPATIENT)
Dept: CARDIOLOGY | Facility: HOSPITAL | Age: 36
Discharge: HOME | End: 2021-12-07
Attending: NURSE PRACTITIONER
Payer: MEDICARE

## 2021-12-07 ENCOUNTER — HOSPITAL ENCOUNTER (OUTPATIENT)
Dept: RADIOLOGY | Facility: HOSPITAL | Age: 36
Discharge: HOME | End: 2021-12-07
Attending: STUDENT IN AN ORGANIZED HEALTH CARE EDUCATION/TRAINING PROGRAM
Payer: MEDICARE

## 2021-12-07 DIAGNOSIS — J42 CHRONIC BRONCHITIS, UNSPECIFIED CHRONIC BRONCHITIS TYPE: ICD-10-CM

## 2021-12-07 DIAGNOSIS — J34.89 OTHER SPECIFIED DISORDERS OF NOSE AND NASAL SINUSES: ICD-10-CM

## 2021-12-07 DIAGNOSIS — R06.02 SHORTNESS OF BREATH: ICD-10-CM

## 2021-12-07 DIAGNOSIS — J38.1 POLYP OF VOCAL CORD AND LARYNX: ICD-10-CM

## 2021-12-07 LAB
AORTIC ROOT ANNULUS: 2.79 CM
AORTIC VALVE CUSP SEPERATION: 1.89 CM
ASCENDING AORTA: 2.9 CM
AV INDEX (PROSTH): 0.93
AV MEAN GRADIENT: 5 MMHG
AV PEAK GRADIENT: 8 MMHG
AV VALVE AREA: 3.27 CM2
AV VELOCITY RATIO: 0.95
CV ECHO LV RWT: 0.47 CM
DOP CALC AO PEAK VEL: 1.39 M/S
DOP CALC AO VTI: 28.36 CM
DOP CALC LVOT AREA: 3.5 CM2
DOP CALC LVOT DIAMETER: 2.11 CM
DOP CALC LVOT PEAK VEL: 1.32 M/S
DOP CALC LVOT STROKE VOLUME: 92.61 CM3
DOP CALCLVOT PEAK VEL VTI: 26.5 CM
E WAVE DECELERATION TIME: 119.48 MSEC
E/A RATIO: 1.13
E/E' RATIO: 7.43 M/S
ECHO LV POSTERIOR WALL: 1.04 CM (ref 0.6–1.1)
EJECTION FRACTION: 65 %
FRACTIONAL SHORTENING: 30 % (ref 28–44)
INTERVENTRICULAR SEPTUM: 0.99 CM (ref 0.6–1.1)
IVRT: 103.81 MSEC
LA MAJOR: 4.21 CM
LA MINOR: 3.97 CM
LA WIDTH: 3.72 CM
LEFT ATRIUM SIZE: 3.69 CM
LEFT ATRIUM VOLUME: 47.68 CM3
LEFT INTERNAL DIMENSION IN SYSTOLE: 3.09 CM (ref 2.1–4)
LEFT VENTRICLE DIASTOLIC VOLUME: 89.06 ML
LEFT VENTRICLE SYSTOLIC VOLUME: 37.5 ML
LEFT VENTRICULAR INTERNAL DIMENSION IN DIASTOLE: 4.43 CM (ref 3.5–6)
LEFT VENTRICULAR MASS: 152.56 G
LV LATERAL E/E' RATIO: 5.57 M/S
LV SEPTAL E/E' RATIO: 11.14 M/S
MV PEAK A VEL: 0.69 M/S
MV PEAK E VEL: 0.78 M/S
MV STENOSIS PRESSURE HALF TIME: 34.65 MS
MV VALVE AREA P 1/2 METHOD: 6.35 CM2
PISA TR MAX VEL: 1.84 M/S
PULM VEIN S/D RATIO: 0.68
PV PEAK D VEL: 0.68 M/S
PV PEAK S VEL: 0.46 M/S
PV PEAK VELOCITY: 1.06 CM/S
RA MAJOR: 4.74 CM
RA PRESSURE: 3 MMHG
RA WIDTH: 3.8 CM
RIGHT VENTRICULAR END-DIASTOLIC DIMENSION: 3.97 CM
RV TISSUE DOPPLER FREE WALL SYSTOLIC VELOCITY 1 (APICAL 4 CHAMBER VIEW): 12.84 CM/S
SINUS: 2.83 CM
STJ: 2.63 CM
TDI LATERAL: 0.14 M/S
TDI SEPTAL: 0.07 M/S
TDI: 0.11 M/S
TR MAX PG: 14 MMHG
TRICUSPID ANNULAR PLANE SYSTOLIC EXCURSION: 2.62 CM
TV REST PULMONARY ARTERY PRESSURE: 17 MMHG

## 2021-12-07 PROCEDURE — 93306 ECHO (CUPID ONLY): ICD-10-PCS | Mod: 26,HCNC,, | Performed by: INTERNAL MEDICINE

## 2021-12-07 PROCEDURE — 71250 CT THORAX DX C-: CPT | Mod: TC,HCNC

## 2021-12-07 PROCEDURE — 70486 CT MAXILLOFACIAL W/O DYE: CPT | Mod: 26,HCNC,, | Performed by: RADIOLOGY

## 2021-12-07 PROCEDURE — 70486 CT MAXILLOFACIAL W/O DYE: CPT | Mod: TC,HCNC

## 2021-12-07 PROCEDURE — 93306 TTE W/DOPPLER COMPLETE: CPT | Mod: 26,HCNC,, | Performed by: INTERNAL MEDICINE

## 2021-12-07 PROCEDURE — 70490 CT SOFT TISSUE NECK W/O DYE: CPT | Mod: TC,HCNC

## 2021-12-07 PROCEDURE — 70490 CT SOFT TISSUE NECK W/O DYE: CPT | Mod: 26,HCNC,, | Performed by: RADIOLOGY

## 2021-12-07 PROCEDURE — 70490 CT SOFT TISSUE NECK WITHOUT CONTRAST: ICD-10-PCS | Mod: 26,HCNC,, | Performed by: RADIOLOGY

## 2021-12-07 PROCEDURE — 93306 TTE W/DOPPLER COMPLETE: CPT | Mod: HCNC

## 2021-12-07 PROCEDURE — 71250 CT CHEST WITHOUT CONTRAST: ICD-10-PCS | Mod: 26,HCNC,, | Performed by: RADIOLOGY

## 2021-12-07 PROCEDURE — 70486 CT MEDTRONIC SINUSES WITHOUT: ICD-10-PCS | Mod: 26,HCNC,, | Performed by: RADIOLOGY

## 2021-12-07 PROCEDURE — 71250 CT THORAX DX C-: CPT | Mod: 26,HCNC,, | Performed by: RADIOLOGY

## 2021-12-08 ENCOUNTER — TELEPHONE (OUTPATIENT)
Dept: GENETICS | Facility: CLINIC | Age: 36
End: 2021-12-08
Payer: MEDICAID

## 2021-12-08 ENCOUNTER — PATIENT MESSAGE (OUTPATIENT)
Dept: OTOLARYNGOLOGY | Facility: CLINIC | Age: 36
End: 2021-12-08
Payer: MEDICAID

## 2021-12-09 ENCOUNTER — OFFICE VISIT (OUTPATIENT)
Dept: GENETICS | Facility: CLINIC | Age: 36
End: 2021-12-09
Payer: MEDICARE

## 2021-12-09 ENCOUNTER — TELEPHONE (OUTPATIENT)
Dept: OTOLARYNGOLOGY | Facility: HOSPITAL | Age: 36
End: 2021-12-09
Payer: MEDICAID

## 2021-12-09 VITALS — HEART RATE: 85 BPM | RESPIRATION RATE: 14 BRPM | BODY MASS INDEX: 32.52 KG/M2 | WEIGHT: 195.19 LBS | HEIGHT: 65 IN

## 2021-12-09 DIAGNOSIS — Z87.898 HISTORY OF PROLONGED Q-T INTERVAL ON ECG: ICD-10-CM

## 2021-12-09 DIAGNOSIS — N96 HISTORY OF RECURRENT MISCARRIAGES: Primary | ICD-10-CM

## 2021-12-09 PROCEDURE — 96040 PR GENETIC COUNSELING, EACH 30 MIN: CPT | Mod: HCNC,S$GLB,, | Performed by: MEDICAL GENETICS

## 2021-12-09 PROCEDURE — 99499 NO LOS: ICD-10-PCS | Mod: HCNC,S$GLB,, | Performed by: MEDICAL GENETICS

## 2021-12-09 PROCEDURE — 99999 PR PBB SHADOW E&M-EST. PATIENT-LVL IV: ICD-10-PCS | Mod: PBBFAC,HCNC,,

## 2021-12-09 PROCEDURE — 99499 UNLISTED E&M SERVICE: CPT | Mod: HCNC,S$GLB,, | Performed by: MEDICAL GENETICS

## 2021-12-09 PROCEDURE — 96040 PR GENETIC COUNSELING, EACH 30 MIN: ICD-10-PCS | Mod: HCNC,S$GLB,, | Performed by: MEDICAL GENETICS

## 2021-12-09 PROCEDURE — 99214 OFFICE O/P EST MOD 30 MIN: CPT | Mod: PBBFAC | Performed by: GENETIC COUNSELOR, MS

## 2021-12-09 PROCEDURE — 99999 PR PBB SHADOW E&M-EST. PATIENT-LVL IV: CPT | Mod: PBBFAC,HCNC,,

## 2021-12-23 ENCOUNTER — LAB VISIT (OUTPATIENT)
Dept: LAB | Facility: HOSPITAL | Age: 36
End: 2021-12-23
Attending: MEDICAL GENETICS
Payer: MEDICAID

## 2021-12-23 DIAGNOSIS — N96 HISTORY OF RECURRENT MISCARRIAGES: ICD-10-CM

## 2021-12-23 DIAGNOSIS — Z87.898 HISTORY OF PROLONGED Q-T INTERVAL ON ECG: ICD-10-CM

## 2021-12-23 PROCEDURE — 88262 CHROMOSOME ANALYSIS 15-20: CPT | Mod: HCNC | Performed by: MEDICAL GENETICS

## 2021-12-23 PROCEDURE — 36415 COLL VENOUS BLD VENIPUNCTURE: CPT | Mod: HCNC | Performed by: MEDICAL GENETICS

## 2021-12-23 PROCEDURE — 88230 TISSUE CULTURE LYMPHOCYTE: CPT | Mod: HCNC | Performed by: MEDICAL GENETICS

## 2021-12-24 PROBLEM — R06.02 SHORTNESS OF BREATH: Status: ACTIVE | Noted: 2021-12-24

## 2021-12-24 PROBLEM — J45.998 UNCONTROLLED PERSISTENT ASTHMA: Status: ACTIVE | Noted: 2021-12-03

## 2022-01-04 LAB
CHROM BANDING METHOD: NORMAL
CHROMOSOME ANALYSIS CONGENITAL ADDITIONAL INFORMATION: NORMAL
CHROMOSOME ANALYSIS CONGENITAL RELEASED BY: NORMAL
CHROMOSOME ANALYSIS CONGENITAL RESULT SUMMARY: NORMAL
CLINICAL CYTOGENETICIST REVIEW: NORMAL
KARYOTYP SPEC: NORMAL
REASON FOR REFERRAL, CHROMOSOME ANALYSIS CONGENITAL: NORMAL
REF LAB TEST METHOD: NORMAL
SPECIMEN SOURCE: NORMAL
SPECIMEN, CHROMOSOME ANALYSIS CONGENITAL: NORMAL

## 2022-01-07 ENCOUNTER — PATIENT MESSAGE (OUTPATIENT)
Dept: GENETICS | Facility: CLINIC | Age: 37
End: 2022-01-07
Payer: MEDICAID

## 2022-01-18 LAB
GENETIC COUNSELING?: YES
GENSO SPECIMEN TYPE: NORMAL
MISCELLANEOUS GENETIC TEST NAME: NORMAL
PARTENTAL OR SIBLING TESTING?: NO
REFERENCE LAB: NORMAL
TEST RESULT: NORMAL

## 2022-01-20 ENCOUNTER — PATIENT MESSAGE (OUTPATIENT)
Dept: GENETICS | Facility: CLINIC | Age: 37
End: 2022-01-20
Payer: MEDICAID

## 2022-01-26 ENCOUNTER — PATIENT MESSAGE (OUTPATIENT)
Dept: ADMINISTRATIVE | Facility: HOSPITAL | Age: 37
End: 2022-01-26
Payer: MEDICAID

## 2022-02-02 DIAGNOSIS — F41.1 GENERALIZED ANXIETY DISORDER: ICD-10-CM

## 2022-02-02 RX ORDER — VENLAFAXINE HYDROCHLORIDE 150 MG/1
CAPSULE, EXTENDED RELEASE ORAL
Qty: 60 CAPSULE | Refills: 1 | Status: SHIPPED | OUTPATIENT
Start: 2022-02-02 | End: 2022-03-02

## 2022-02-02 NOTE — TELEPHONE ENCOUNTER
No new care gaps identified.  Powered by [employer]. Reference number: [id number].   2/02/2022 9:35:52 AM CST

## 2022-03-02 DIAGNOSIS — F41.1 GENERALIZED ANXIETY DISORDER: ICD-10-CM

## 2022-03-02 RX ORDER — VENLAFAXINE HYDROCHLORIDE 150 MG/1
CAPSULE, EXTENDED RELEASE ORAL
Qty: 60 CAPSULE | Refills: 1 | Status: SHIPPED | OUTPATIENT
Start: 2022-03-02 | End: 2023-01-16 | Stop reason: SDUPTHER

## 2022-03-02 NOTE — TELEPHONE ENCOUNTER
No new care gaps identified.  Powered by [employer]. Reference number: [id number].   3/02/2022 10:36:18 AM CST

## 2022-03-16 ENCOUNTER — PATIENT MESSAGE (OUTPATIENT)
Dept: ADMINISTRATIVE | Facility: HOSPITAL | Age: 37
End: 2022-03-16
Payer: MEDICAID

## 2022-04-02 DIAGNOSIS — F41.1 GENERALIZED ANXIETY DISORDER: ICD-10-CM

## 2022-04-02 NOTE — TELEPHONE ENCOUNTER
No new care gaps identified.  Powered by [employer]. Reference number: [id number].   4/02/2022 8:31:59 AM CDT

## 2022-04-04 RX ORDER — VENLAFAXINE HYDROCHLORIDE 150 MG/1
CAPSULE, EXTENDED RELEASE ORAL
Qty: 60 CAPSULE | Refills: 1 | OUTPATIENT
Start: 2022-04-04

## 2022-04-05 NOTE — TELEPHONE ENCOUNTER
Quick DC. Request already responded to by other means (e.g. phone or fax)   Refill Authorization Note   Marianela Shrestha  is requesting a refill authorization.  Brief Assessment and Rationale for Refill:  Quick Discontinue  Medication Therapy Plan:       Medication Reconciliation Completed:  No      Comments:   Pended Medication(s)       Requested Prescriptions     Pending Prescriptions Disp Refills    venlafaxine (EFFEXOR-XR) 150 MG Cp24 [Pharmacy Med Name: VENLAFAXINE HCL  MG CAP] 60 capsule 1     Sig: TAKE 2 CAPSULES BY MOUTH EVERY DAY        Duplicate Pended Encounter(s)/ Last Prescribed Details: (includes pharmacy & prescriber details)   4 weeks ago (3/2/2022)   venlafaxine (EFFEXOR-XR) 150 MG Cp24   TAKE 2 CAPSULES BY MOUTH EVERY DAY   Dispense: 60 capsule     Refills: 1     Start: 3/2/2022       By:  Arnaldo Espino MD      Encounter                Note composed:11:20 PM 04/04/2022

## 2022-04-06 ENCOUNTER — LAB VISIT (OUTPATIENT)
Dept: LAB | Facility: HOSPITAL | Age: 37
End: 2022-04-06
Payer: COMMERCIAL

## 2022-04-06 ENCOUNTER — OFFICE VISIT (OUTPATIENT)
Dept: INTERNAL MEDICINE | Facility: CLINIC | Age: 37
End: 2022-04-06
Payer: COMMERCIAL

## 2022-04-06 VITALS
HEART RATE: 100 BPM | RESPIRATION RATE: 14 BRPM | DIASTOLIC BLOOD PRESSURE: 70 MMHG | OXYGEN SATURATION: 100 % | SYSTOLIC BLOOD PRESSURE: 122 MMHG

## 2022-04-06 DIAGNOSIS — I95.9 HYPOTENSION, UNSPECIFIED HYPOTENSION TYPE: ICD-10-CM

## 2022-04-06 DIAGNOSIS — I95.9 HYPOTENSION, UNSPECIFIED HYPOTENSION TYPE: Primary | ICD-10-CM

## 2022-04-06 DIAGNOSIS — F41.1 GENERALIZED ANXIETY DISORDER: ICD-10-CM

## 2022-04-06 LAB
ALBUMIN SERPL BCP-MCNC: 3.8 G/DL (ref 3.5–5.2)
ALP SERPL-CCNC: 105 U/L (ref 55–135)
ALT SERPL W/O P-5'-P-CCNC: 19 U/L (ref 10–44)
ANION GAP SERPL CALC-SCNC: 10 MMOL/L (ref 8–16)
AST SERPL-CCNC: 15 U/L (ref 10–40)
BASOPHILS # BLD AUTO: 0.04 K/UL (ref 0–0.2)
BASOPHILS NFR BLD: 0.6 % (ref 0–1.9)
BILIRUB SERPL-MCNC: 0.5 MG/DL (ref 0.1–1)
BUN SERPL-MCNC: 12 MG/DL (ref 6–20)
CALCIUM SERPL-MCNC: 9.6 MG/DL (ref 8.7–10.5)
CHLORIDE SERPL-SCNC: 104 MMOL/L (ref 95–110)
CO2 SERPL-SCNC: 25 MMOL/L (ref 23–29)
CREAT SERPL-MCNC: 0.8 MG/DL (ref 0.5–1.4)
DIFFERENTIAL METHOD: ABNORMAL
EOSINOPHIL # BLD AUTO: 0.2 K/UL (ref 0–0.5)
EOSINOPHIL NFR BLD: 3.1 % (ref 0–8)
ERYTHROCYTE [DISTWIDTH] IN BLOOD BY AUTOMATED COUNT: 12.8 % (ref 11.5–14.5)
EST. GFR  (AFRICAN AMERICAN): >60 ML/MIN/1.73 M^2
EST. GFR  (NON AFRICAN AMERICAN): >60 ML/MIN/1.73 M^2
GLUCOSE SERPL-MCNC: 95 MG/DL (ref 70–110)
HCT VFR BLD AUTO: 40.5 % (ref 37–48.5)
HGB BLD-MCNC: 12.9 G/DL (ref 12–16)
IMM GRANULOCYTES # BLD AUTO: 0.02 K/UL (ref 0–0.04)
IMM GRANULOCYTES NFR BLD AUTO: 0.3 % (ref 0–0.5)
LYMPHOCYTES # BLD AUTO: 2.4 K/UL (ref 1–4.8)
LYMPHOCYTES NFR BLD: 37.6 % (ref 18–48)
MCH RBC QN AUTO: 31.8 PG (ref 27–31)
MCHC RBC AUTO-ENTMCNC: 31.9 G/DL (ref 32–36)
MCV RBC AUTO: 100 FL (ref 82–98)
MONOCYTES # BLD AUTO: 0.5 K/UL (ref 0.3–1)
MONOCYTES NFR BLD: 8.2 % (ref 4–15)
NEUTROPHILS # BLD AUTO: 3.2 K/UL (ref 1.8–7.7)
NEUTROPHILS NFR BLD: 50.2 % (ref 38–73)
NRBC BLD-RTO: 0 /100 WBC
PLATELET # BLD AUTO: 331 K/UL (ref 150–450)
PMV BLD AUTO: 10.4 FL (ref 9.2–12.9)
POTASSIUM SERPL-SCNC: 4.4 MMOL/L (ref 3.5–5.1)
PROT SERPL-MCNC: 7.7 G/DL (ref 6–8.4)
RBC # BLD AUTO: 4.06 M/UL (ref 4–5.4)
SODIUM SERPL-SCNC: 139 MMOL/L (ref 136–145)
WBC # BLD AUTO: 6.38 K/UL (ref 3.9–12.7)

## 2022-04-06 PROCEDURE — 85025 COMPLETE CBC W/AUTO DIFF WBC: CPT | Mod: HCNC | Performed by: STUDENT IN AN ORGANIZED HEALTH CARE EDUCATION/TRAINING PROGRAM

## 2022-04-06 PROCEDURE — 36415 COLL VENOUS BLD VENIPUNCTURE: CPT | Performed by: STUDENT IN AN ORGANIZED HEALTH CARE EDUCATION/TRAINING PROGRAM

## 2022-04-06 PROCEDURE — 99213 PR OFFICE/OUTPT VISIT, EST, LEVL III, 20-29 MIN: ICD-10-PCS | Mod: HCNC,S$GLB,, | Performed by: STUDENT IN AN ORGANIZED HEALTH CARE EDUCATION/TRAINING PROGRAM

## 2022-04-06 PROCEDURE — 99999 PR PBB SHADOW E&M-EST. PATIENT-LVL IV: CPT | Mod: PBBFAC,HCNC,, | Performed by: STUDENT IN AN ORGANIZED HEALTH CARE EDUCATION/TRAINING PROGRAM

## 2022-04-06 PROCEDURE — 99213 OFFICE O/P EST LOW 20 MIN: CPT | Mod: HCNC,S$GLB,, | Performed by: STUDENT IN AN ORGANIZED HEALTH CARE EDUCATION/TRAINING PROGRAM

## 2022-04-06 PROCEDURE — 80053 COMPREHEN METABOLIC PANEL: CPT | Mod: HCNC | Performed by: STUDENT IN AN ORGANIZED HEALTH CARE EDUCATION/TRAINING PROGRAM

## 2022-04-06 PROCEDURE — 99214 OFFICE O/P EST MOD 30 MIN: CPT | Mod: PBBFAC | Performed by: STUDENT IN AN ORGANIZED HEALTH CARE EDUCATION/TRAINING PROGRAM

## 2022-04-06 PROCEDURE — 99999 PR PBB SHADOW E&M-EST. PATIENT-LVL IV: ICD-10-PCS | Mod: PBBFAC,HCNC,, | Performed by: STUDENT IN AN ORGANIZED HEALTH CARE EDUCATION/TRAINING PROGRAM

## 2022-04-06 NOTE — PROGRESS NOTES
Internal Medicine Clinic Note  2022    Subjective   Patient Name: Marianela Shrestha  : 1985    Chief Complaint:   Chief Complaint   Patient presents with    Hypotension       History of Present Illness:  Ms. Marianela Shrestha is a 37 y.o. female with anxiety, chronic pain, prolonged QTC syndrome, PTSD, asthma, tobacco use who presents to clinic today with multiple complaints. She is accompanied by her boyfriend and service puppy. She reports episodes of hypotension that have been occurring over the past few days. She has been checking her blood pressure with her home cuff. She reports readings as low as 80s/50s but on repeat was greater than 100 systolic per boyfriend. She does take Xanax 1 mg TID and Effexor 150 mg daily, which she has been taking consistently. She did take two 1 mg of Xanax today prior to her appointment. Of note, she was also taking gabapentin and doxepin but stopped taking these a few months ago. She also reports chest tightness, localized to the left anterior chest that is reproducible and subjective dyspnea. She does have a history of prior drug use but denies anything recent. No recent medication changes. She reports a good appetite but decreased fluid intake. She requested a wheelchair upon arrival to the clinic, but denies any difficulty walking, was able to walk from her home to the car and from the car to the clinic without difficulty. She reports suffering with extreme anxiety and PTSD, closely follows with Psychiatry. Denies any fevers, chills, CP, coughing, vomiting, diarrhea, abdominal pain, urinary changes, focal weakness, altered sensation, syncope, seizures or visual changes.     Review of Systems   Constitutional: Negative for chills, diaphoresis, fever and weight loss.   HENT: Negative for congestion and sore throat.    Respiratory: Positive for shortness of breath. Negative for cough, hemoptysis and wheezing.    Cardiovascular: Negative for chest pain,  palpitations, leg swelling and PND.   Gastrointestinal: Negative for abdominal pain, diarrhea and vomiting.   Genitourinary: Negative for frequency.   Musculoskeletal: Negative for falls.   Skin: Negative for rash.   Neurological: Negative for dizziness, tingling, speech change, focal weakness, seizures, loss of consciousness, weakness and headaches.   Endo/Heme/Allergies: Does not bruise/bleed easily.   Psychiatric/Behavioral: The patient is nervous/anxious.      PAST HISTORY:     Past Medical History:   Diagnosis Date    Anxiety     Chronic osteomyelitis of right hand 2019    Chronic pain syndrome     Depression     Insomnia     Post traumatic stress disorder (PTSD)     Sciatica      Past Surgical History:   Procedure Laterality Date    ABLATION OF MEDIAL BRANCH NERVE OF LUMBAR SPINE FACET JOINT      L4-S1    COSMETIC SURGERY      breast augmentation    FINGER SURGERY      PELVIC LAPAROSCOPY      Endometriosis ([place])    TONSILLECTOMY         Family History   Problem Relation Age of Onset    Diabetes Maternal Grandmother     Diabetes Mother     Hypertension Mother     Cervical cancer Maternal Aunt     Colon cancer Maternal Aunt     Diabetes Maternal Aunt     Breast cancer Neg Hx     Eclampsia Neg Hx     Miscarriages / Stillbirths Neg Hx     Ovarian cancer Neg Hx      labor Neg Hx     Stroke Neg Hx        Social History     Socioeconomic History    Marital status: Single   Tobacco Use    Smoking status: Current Some Day Smoker     Packs/day: 1.00     Years: 15.00     Pack years: 15.00     Types: Cigarettes     Start date: 2017     Last attempt to quit: 2021     Years since quittin.3    Smokeless tobacco: Never Used    Tobacco comment: occasional 3 cig month, started 15 quit 18-19, started age 22    Substance and Sexual Activity    Alcohol use: Yes     Comment: rarely    Sexual activity: Yes     Partners: Male     Birth control/protection: None       MEDICATIONS  & ALLERGIES:     Current Outpatient Medications on File Prior to Visit   Medication Sig    albuterol (PROVENTIL/VENTOLIN HFA) 90 mcg/actuation inhaler Inhale 2 puffs into the lungs every 6 (six) hours as needed. Rescue    albuterol-ipratropium (DUO-NEB) 2.5 mg-0.5 mg/3 mL nebulizer solution Take 3 mLs by nebulization every 6 (six) hours as needed for Wheezing or Shortness of Breath. Rescue    ALPRAZolam (XANAX) 1 MG tablet Take 1 tablet (1 mg total) by mouth 3 (three) times daily as needed for Anxiety.    venlafaxine (EFFEXOR-XR) 150 MG Cp24 TAKE 2 CAPSULES BY MOUTH EVERY DAY    budesonide-formoterol 160-4.5 mcg (SYMBICORT) 160-4.5 mcg/actuation HFAA Inhale 2 puffs into the lungs every 12 (twelve) hours. Controller    doxepin (SINEQUAN) 25 MG capsule Take 2 capsules (50 mg total) by mouth every evening.    gabapentin (NEURONTIN) 600 MG tablet TAKE 1 TABLET (600 MG TOTAL) BY MOUTH 4 (FOUR) TIMES DAILY. (Patient not taking: Reported on 4/6/2022)    ibuprofen 200 mg Cap Take 800 mg by mouth every 6 (six) hours as needed.    mupirocin (BACTROBAN) 2 % ointment APPLY TO NARES TWO TIMES DAILY FOR 5 DAYS. REPEAT MONTHLY    omeprazole (PRILOSEC) 20 MG capsule TAKE 1 CAPSULE BY MOUTH EVERY DAY     No current facility-administered medications on file prior to visit.       Review of patient's allergies indicates:   Allergen Reactions    Coconut Hives and Itching    Sulfa (sulfonamide antibiotics) Hives     itching    Adhesive Rash    Latex, natural rubber Other (See Comments) and Rash       OBJECTIVE:     Vital Signs:  Vitals:    04/06/22 1513   BP: 122/70   Pulse: 100   Resp: 14   SpO2: 100%     There is no height or weight on file to calculate BMI.     Physical Exam  Vitals reviewed.   Constitutional:       Appearance: She is not ill-appearing or diaphoretic.   HENT:      Head: Normocephalic and atraumatic.      Nose: Nose normal.      Mouth/Throat:      Comments: Slightly dry mucous membranes.  Eyes:       General: No scleral icterus.     Extraocular Movements: Extraocular movements intact.      Conjunctiva/sclera: Conjunctivae normal.      Comments: Pupils dilated bilaterally.    Cardiovascular:      Rate and Rhythm: Regular rhythm. Tachycardia present.      Pulses: Normal pulses.      Heart sounds: No murmur heard.  Pulmonary:      Effort: Pulmonary effort is normal. No tachypnea, accessory muscle usage, prolonged expiration, respiratory distress or retractions.      Breath sounds: Normal breath sounds and air entry. No decreased air movement. No decreased breath sounds, wheezing, rhonchi or rales.   Chest:      Chest wall: Tenderness (reproducible, L anterior chest) present.   Abdominal:      General: There is no distension.   Musculoskeletal:         General: Normal range of motion.      Right lower leg: No edema.      Left lower leg: No edema.   Skin:     Findings: No rash.      Comments: Diffuse scars across the upper extremities with scattered excoriated lesions. No evidence of warmth, erythema or fluctuance.    Neurological:      Mental Status: She is alert and oriented to person, place, and time.   Psychiatric:         Attention and Perception: Attention normal. She does not perceive auditory or visual hallucinations.         Mood and Affect: Mood is anxious. Affect is tearful.         Speech: Speech is rapid and pressured.         Behavior: Behavior is not agitated, aggressive, withdrawn or combative. Behavior is cooperative.         Thought Content: Thought content normal. Thought content does not include homicidal or suicidal ideation. Thought content does not include homicidal or suicidal plan.         Judgment: Judgment normal.         Laboratory  Lab Results   Component Value Date    WBC 9.41 07/30/2021    HGB 13.1 07/30/2021    HCT 38 07/30/2021    MCV 94 07/30/2021     07/30/2021     BMP  Lab Results   Component Value Date     07/30/2021    K 3.6 07/30/2021     07/30/2021    CO2 18  (L) 07/30/2021    BUN 14 07/30/2021    CREATININE 0.8 07/30/2021    CALCIUM 9.2 07/30/2021    ANIONGAP 12 07/30/2021    ESTGFRAFRICA >60.0 07/30/2021    EGFRNONAA >60.0 07/30/2021     No results found for: INR, PROTIME  No results found for: HGBA1C  No results for input(s): POCTGLUCOSE in the last 72 hours.    Health Maintenance Due   Topic Date Due    COVID-19 Vaccine (1) Never done    Pneumococcal Vaccines (Age 0-64) (1 of 2 - PPSV23) Never done    Cervical Cancer Screening  07/15/2017    Influenza Vaccine (1) Never done     ASSESSMENT & PLAN:   Ms. Marianela Shrestha is a 37 y.o. female who was seen today in clinic with multiple complaints.     Hypotension  Upon my initial encounter, she was extremely anxious and appeared to be having a panic attack, which could explain her other sxs.With some re-assurance, her breathing and anxiousness improved. Discussed that brief episodes of hypotension are likely multifactorial in nature. Daily use of Xanax can cause hypotension, especially in the setting of decreased fluid intake. Pupils dilated on exam likely 2/2 to recent Xanax use. Vitals were stable and at no point did she have any respiratory distress during clinic visit. Encouraged po hydration, to continue monitoring symptoms and repeat episodes of hypotension. If recurrent, persistent and symptomatic, instructed patient to seek medical care at a local ER. Will need to discuss with Psychiatry about continued use of Xanax, in light of symptomatic hypotensive episodes. She denied active drug use but could be an underlying secondary component. No indication during clinic visit for her to seek emergent hospital care, further imaging or extensive work-up for symptoms. She will need close follow up with both PCP and Psychiatry. Of note, she was over 20 minutes late to her appointment, so not all issues could be addressed.       Discussed with Dr. Kenee  - staff attestation to follow      Christy Pringle  MD  Internal Medicine, PGY-II  [facility]      Marianela was seen today for hypotension and anxiety.    Diagnoses and all orders for this visit:    Hypotension, unspecified hypotension type  -     CBC Auto Differential; Future  -     Comprehensive Metabolic Panel; Future    Generalized anxiety disorder     We discussed suggesting that the pt bring in her BP monitor device to test for accuracy as our reading here are much better. We also discussed that the finding of low BP (possibly erroneously) may be worsening her anxiety.

## 2022-04-20 ENCOUNTER — TELEPHONE (OUTPATIENT)
Dept: OTOLARYNGOLOGY | Facility: CLINIC | Age: 37
End: 2022-04-20
Payer: COMMERCIAL

## 2022-04-20 DIAGNOSIS — H05.413: Primary | ICD-10-CM

## 2022-04-20 DIAGNOSIS — J01.00 SUBACUTE MAXILLARY SINUSITIS: ICD-10-CM

## 2022-04-21 ENCOUNTER — LAB VISIT (OUTPATIENT)
Dept: LAB | Facility: HOSPITAL | Age: 37
End: 2022-04-21
Attending: INTERNAL MEDICINE
Payer: COMMERCIAL

## 2022-04-21 ENCOUNTER — OFFICE VISIT (OUTPATIENT)
Dept: INTERNAL MEDICINE | Facility: CLINIC | Age: 37
End: 2022-04-21
Payer: COMMERCIAL

## 2022-04-21 VITALS
BODY MASS INDEX: 39.3 KG/M2 | HEIGHT: 65 IN | WEIGHT: 235.88 LBS | RESPIRATION RATE: 18 BRPM | SYSTOLIC BLOOD PRESSURE: 108 MMHG | OXYGEN SATURATION: 98 % | HEART RATE: 111 BPM | DIASTOLIC BLOOD PRESSURE: 80 MMHG

## 2022-04-21 DIAGNOSIS — N93.9 ABNORMAL UTERINE BLEEDING (AUB): Primary | ICD-10-CM

## 2022-04-21 DIAGNOSIS — H05.413: ICD-10-CM

## 2022-04-21 DIAGNOSIS — S63.260S: ICD-10-CM

## 2022-04-21 DIAGNOSIS — M54.32 BILATERAL SCIATICA: ICD-10-CM

## 2022-04-21 DIAGNOSIS — J01.00 SUBACUTE MAXILLARY SINUSITIS: ICD-10-CM

## 2022-04-21 DIAGNOSIS — G47.00 INSOMNIA, UNSPECIFIED TYPE: ICD-10-CM

## 2022-04-21 DIAGNOSIS — G89.4 CHRONIC PAIN SYNDROME: ICD-10-CM

## 2022-04-21 DIAGNOSIS — N93.9 ABNORMAL UTERINE BLEEDING (AUB): ICD-10-CM

## 2022-04-21 DIAGNOSIS — M54.31 BILATERAL SCIATICA: ICD-10-CM

## 2022-04-21 LAB
ANION GAP SERPL CALC-SCNC: 14 MMOL/L (ref 8–16)
BASOPHILS # BLD AUTO: 0.05 K/UL (ref 0–0.2)
BASOPHILS NFR BLD: 0.7 % (ref 0–1.9)
BUN SERPL-MCNC: 9 MG/DL (ref 6–20)
CALCIUM SERPL-MCNC: 8.3 MG/DL (ref 8.7–10.5)
CHLORIDE SERPL-SCNC: 103 MMOL/L (ref 95–110)
CO2 SERPL-SCNC: 23 MMOL/L (ref 23–29)
CREAT SERPL-MCNC: 0.7 MG/DL (ref 0.5–1.4)
DIFFERENTIAL METHOD: ABNORMAL
EOSINOPHIL # BLD AUTO: 0.4 K/UL (ref 0–0.5)
EOSINOPHIL NFR BLD: 4.9 % (ref 0–8)
ERYTHROCYTE [DISTWIDTH] IN BLOOD BY AUTOMATED COUNT: 12.8 % (ref 11.5–14.5)
EST. GFR  (AFRICAN AMERICAN): >60 ML/MIN/1.73 M^2
EST. GFR  (NON AFRICAN AMERICAN): >60 ML/MIN/1.73 M^2
GLUCOSE SERPL-MCNC: 103 MG/DL (ref 70–110)
HCG INTACT+B SERPL-ACNC: <2.4 MIU/ML
HCT VFR BLD AUTO: 38.8 % (ref 37–48.5)
HGB BLD-MCNC: 12.5 G/DL (ref 12–16)
IMM GRANULOCYTES # BLD AUTO: 0.04 K/UL (ref 0–0.04)
IMM GRANULOCYTES NFR BLD AUTO: 0.5 % (ref 0–0.5)
LYMPHOCYTES # BLD AUTO: 3 K/UL (ref 1–4.8)
LYMPHOCYTES NFR BLD: 40.9 % (ref 18–48)
MCH RBC QN AUTO: 32.4 PG (ref 27–31)
MCHC RBC AUTO-ENTMCNC: 32.2 G/DL (ref 32–36)
MCV RBC AUTO: 101 FL (ref 82–98)
MONOCYTES # BLD AUTO: 0.6 K/UL (ref 0.3–1)
MONOCYTES NFR BLD: 8.6 % (ref 4–15)
NEUTROPHILS # BLD AUTO: 3.3 K/UL (ref 1.8–7.7)
NEUTROPHILS NFR BLD: 44.4 % (ref 38–73)
NRBC BLD-RTO: 0 /100 WBC
PLATELET # BLD AUTO: 363 K/UL (ref 150–450)
PMV BLD AUTO: 9.9 FL (ref 9.2–12.9)
POTASSIUM SERPL-SCNC: 3.8 MMOL/L (ref 3.5–5.1)
RBC # BLD AUTO: 3.86 M/UL (ref 4–5.4)
SODIUM SERPL-SCNC: 140 MMOL/L (ref 136–145)
WBC # BLD AUTO: 7.36 K/UL (ref 3.9–12.7)

## 2022-04-21 PROCEDURE — 36415 COLL VENOUS BLD VENIPUNCTURE: CPT | Mod: HCNC | Performed by: INTERNAL MEDICINE

## 2022-04-21 PROCEDURE — 1159F MED LIST DOCD IN RCRD: CPT | Mod: HCNC,CPTII,S$GLB, | Performed by: INTERNAL MEDICINE

## 2022-04-21 PROCEDURE — 99214 OFFICE O/P EST MOD 30 MIN: CPT | Mod: HCNC,S$GLB,, | Performed by: INTERNAL MEDICINE

## 2022-04-21 PROCEDURE — 99999 PR PBB SHADOW E&M-EST. PATIENT-LVL V: CPT | Mod: PBBFAC,HCNC,, | Performed by: INTERNAL MEDICINE

## 2022-04-21 PROCEDURE — 84702 CHORIONIC GONADOTROPIN TEST: CPT | Mod: HCNC | Performed by: INTERNAL MEDICINE

## 2022-04-21 PROCEDURE — 80048 BASIC METABOLIC PNL TOTAL CA: CPT | Mod: HCNC | Performed by: STUDENT IN AN ORGANIZED HEALTH CARE EDUCATION/TRAINING PROGRAM

## 2022-04-21 PROCEDURE — 85025 COMPLETE CBC W/AUTO DIFF WBC: CPT | Mod: HCNC | Performed by: STUDENT IN AN ORGANIZED HEALTH CARE EDUCATION/TRAINING PROGRAM

## 2022-04-21 PROCEDURE — 99214 PR OFFICE/OUTPT VISIT, EST, LEVL IV, 30-39 MIN: ICD-10-PCS | Mod: HCNC,S$GLB,, | Performed by: INTERNAL MEDICINE

## 2022-04-21 PROCEDURE — 99999 PR PBB SHADOW E&M-EST. PATIENT-LVL V: ICD-10-PCS | Mod: PBBFAC,HCNC,, | Performed by: INTERNAL MEDICINE

## 2022-04-21 PROCEDURE — 1160F RVW MEDS BY RX/DR IN RCRD: CPT | Mod: HCNC,CPTII,S$GLB, | Performed by: INTERNAL MEDICINE

## 2022-04-21 PROCEDURE — 3079F PR MOST RECENT DIASTOLIC BLOOD PRESSURE 80-89 MM HG: ICD-10-PCS | Mod: HCNC,CPTII,S$GLB, | Performed by: INTERNAL MEDICINE

## 2022-04-21 PROCEDURE — 3074F SYST BP LT 130 MM HG: CPT | Mod: HCNC,CPTII,S$GLB, | Performed by: INTERNAL MEDICINE

## 2022-04-21 PROCEDURE — 3079F DIAST BP 80-89 MM HG: CPT | Mod: HCNC,CPTII,S$GLB, | Performed by: INTERNAL MEDICINE

## 2022-04-21 PROCEDURE — 3074F PR MOST RECENT SYSTOLIC BLOOD PRESSURE < 130 MM HG: ICD-10-PCS | Mod: HCNC,CPTII,S$GLB, | Performed by: INTERNAL MEDICINE

## 2022-04-21 PROCEDURE — 3008F BODY MASS INDEX DOCD: CPT | Mod: HCNC,CPTII,S$GLB, | Performed by: INTERNAL MEDICINE

## 2022-04-21 PROCEDURE — 3008F PR BODY MASS INDEX (BMI) DOCUMENTED: ICD-10-PCS | Mod: HCNC,CPTII,S$GLB, | Performed by: INTERNAL MEDICINE

## 2022-04-21 PROCEDURE — 1159F PR MEDICATION LIST DOCUMENTED IN MEDICAL RECORD: ICD-10-PCS | Mod: HCNC,CPTII,S$GLB, | Performed by: INTERNAL MEDICINE

## 2022-04-21 PROCEDURE — 1160F PR REVIEW ALL MEDS BY PRESCRIBER/CLIN PHARMACIST DOCUMENTED: ICD-10-PCS | Mod: HCNC,CPTII,S$GLB, | Performed by: INTERNAL MEDICINE

## 2022-04-21 RX ORDER — GABAPENTIN 600 MG/1
600 TABLET ORAL 3 TIMES DAILY
Qty: 120 TABLET | Refills: 1 | Status: SHIPPED | OUTPATIENT
Start: 2022-04-21 | End: 2022-07-07

## 2022-04-21 RX ORDER — GABAPENTIN 600 MG/1
600 TABLET ORAL 3 TIMES DAILY
Qty: 90 TABLET | Refills: 0 | Status: CANCELLED | OUTPATIENT
Start: 2022-04-21 | End: 2022-05-21

## 2022-04-21 RX ORDER — DOXEPIN HYDROCHLORIDE 25 MG/1
50 CAPSULE ORAL NIGHTLY
Qty: 180 CAPSULE | Refills: 0 | Status: CANCELLED | OUTPATIENT
Start: 2022-04-21 | End: 2022-07-20

## 2022-04-21 RX ORDER — DOXEPIN HYDROCHLORIDE 25 MG/1
50 CAPSULE ORAL NIGHTLY
Qty: 180 CAPSULE | Refills: 0 | Status: SHIPPED | OUTPATIENT
Start: 2022-04-21 | End: 2022-10-04

## 2022-04-21 NOTE — TELEPHONE ENCOUNTER
No new care gaps identified.  Powered by [employer]. Reference number: [id number].   4/21/2022 9:21:48 AM CDT

## 2022-04-21 NOTE — PROGRESS NOTES
Subjective:       Patient ID: Marianela Shrestha is a 37 y.o. female.    Chief Complaint: No chief complaint on file.    Here for urgent care --  She needs a gynecology referral, since she had a + home preg test. Said she followed up + test with a negative test. She has endometriosis and gets abnormal periods.    Has chronic fatigue, has gained much wt in 5 months.            Review of Systems   Constitutional: Positive for fatigue. Negative for activity change.   Respiratory: Negative for shortness of breath.    Cardiovascular: Negative for chest pain, palpitations and leg swelling.   Gastrointestinal: Positive for abdominal pain. Negative for abdominal distention.   Genitourinary: Positive for menstrual problem and pelvic pain. Negative for decreased urine volume and difficulty urinating.   Skin: Negative for rash and wound.   Neurological: Negative for weakness.   Hematological: Negative for adenopathy. Does not bruise/bleed easily.   Psychiatric/Behavioral: Positive for sleep disturbance. The patient is nervous/anxious.            Objective:      Physical Exam  Vitals reviewed.   Constitutional:       General: She is not in acute distress.     Appearance: Normal appearance. She is well-developed. She is obese. She is not ill-appearing, toxic-appearing or diaphoretic.      Comments: Well appearing, breathing comfortably, speaking full sentences, no coughing during encounter     HENT:      Head: Normocephalic and atraumatic.   Eyes:      General: No scleral icterus.     Pupils: Pupils are equal, round, and reactive to light.   Neck:      Thyroid: No thyromegaly.   Cardiovascular:      Rate and Rhythm: Normal rate and regular rhythm.      Heart sounds: Normal heart sounds. No murmur heard.    No friction rub. No gallop.   Pulmonary:      Effort: Pulmonary effort is normal. No respiratory distress.      Breath sounds: Normal breath sounds. No wheezing or rales.   Abdominal:      General: Bowel sounds are normal.  There is no distension.      Palpations: Abdomen is soft. There is no mass.      Tenderness: There is no abdominal tenderness. There is no guarding or rebound.   Musculoskeletal:         General: No tenderness. Normal range of motion.      Cervical back: Normal range of motion.   Lymphadenopathy:      Cervical: No cervical adenopathy.   Skin:     Findings: No rash.   Neurological:      General: No focal deficit present.      Mental Status: She is alert and oriented to person, place, and time.   Psychiatric:         Mood and Affect: Mood normal.         Speech: Speech normal.         Behavior: Behavior normal.         Thought Content: Thought content normal.         Judgment: Judgment normal.         Assessment:       1. Abnormal uterine bleeding (AUB)    2. Dislocation of metacarpophalangeal joint of right index finger, sequela        Plan:       Diagnoses and all orders for this visit:    Abnormal uterine bleeding (AUB)  -     Ambulatory referral/consult to Gynecology; Future  -     Cancel: Pregnancy Test Screening, Serum; Future  -     Cancel: Pregnancy Test Screening, Serum; Future    Dislocation of metacarpophalangeal joint of right index finger, sequela  -     Ambulatory referral/consult to Orthopedics; Future    She requested chronic psych med refills (doexpin and gabapentin) but I clearly explained that for an urgent care appt I cannot refill her medicines, she needs to get from her usual prescriber      Health Maintenance       Date Due Completion Date    COVID-19 Vaccine (1) Never done ---    Pneumococcal Vaccines (Age 0-64) (1 - PCV) Never done ---    Cervical Cancer Screening 07/15/2017 7/15/2014    Influenza Vaccine (1) Never done ---    TETANUS VACCINE 03/23/2030 3/23/2020          No follow-ups on file.    Future Appointments   Date Time Provider Department Center   4/22/2022 11:10 AM Glenroy Hansen MD [facility] OBGYN [facility]   4/27/2022 10:30 AM Shad Regan MD [facility]   5/5/2022  1:30  PM Beena Keith PA-C [facility]

## 2022-04-22 ENCOUNTER — PATIENT MESSAGE (OUTPATIENT)
Dept: INTERNAL MEDICINE | Facility: CLINIC | Age: 37
End: 2022-04-22
Payer: COMMERCIAL

## 2022-04-22 DIAGNOSIS — G89.29 CHRONIC NECK PAIN: ICD-10-CM

## 2022-04-22 DIAGNOSIS — M54.2 CHRONIC NECK PAIN: ICD-10-CM

## 2022-04-22 DIAGNOSIS — G89.29 CHRONIC LOW BACK PAIN, UNSPECIFIED BACK PAIN LATERALITY, UNSPECIFIED WHETHER SCIATICA PRESENT: Primary | ICD-10-CM

## 2022-04-22 DIAGNOSIS — M54.50 CHRONIC LOW BACK PAIN, UNSPECIFIED BACK PAIN LATERALITY, UNSPECIFIED WHETHER SCIATICA PRESENT: Primary | ICD-10-CM
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
## 2022-04-26 ENCOUNTER — PATIENT OUTREACH (OUTPATIENT)
Dept: ADMINISTRATIVE | Facility: OTHER | Age: 37
End: 2022-04-26
Payer: COMMERCIAL

## 2022-04-26 ENCOUNTER — TELEPHONE (OUTPATIENT)
Dept: ORTHOPEDICS | Facility: CLINIC | Age: 37
End: 2022-04-26
Payer: COMMERCIAL

## 2022-04-26 ENCOUNTER — PATIENT MESSAGE (OUTPATIENT)
Dept: ORTHOPEDICS | Facility: CLINIC | Age: 37
End: 2022-04-26
Payer: COMMERCIAL

## 2022-04-26 DIAGNOSIS — M51.36 DDD (DEGENERATIVE DISC DISEASE), LUMBAR: Primary | ICD-10-CM

## 2022-04-26 DIAGNOSIS — R52 PAIN: Primary | ICD-10-CM

## 2022-04-26 DIAGNOSIS — M50.30 DDD (DEGENERATIVE DISC DISEASE), CERVICAL: ICD-10-CM

## 2022-04-26 NOTE — PROGRESS NOTES
Requested updates within Care Everywhere.  Patient's chart was reviewed for overdue DUDLEY topics.  Health maintenance:updated  Immunizations:reconciled   Legacy:   Media:  Orders placed:  Tasked appts:  Labs Linked:  Upcoming appt:

## 2022-04-27 ENCOUNTER — HOSPITAL ENCOUNTER (OUTPATIENT)
Dept: RADIOLOGY | Facility: HOSPITAL | Age: 37
Discharge: HOME | End: 2022-04-27
Attending: PHYSICIAN ASSISTANT
Payer: COMMERCIAL

## 2022-04-27 ENCOUNTER — HOSPITAL ENCOUNTER (EMERGENCY)
Facility: OTHER | Age: 37
Discharge: HOME | End: 2022-04-27
Attending: EMERGENCY MEDICINE
Payer: COMMERCIAL

## 2022-04-27 ENCOUNTER — OFFICE VISIT (OUTPATIENT)
Dept: CARDIOLOGY | Facility: CLINIC | Age: 37
End: 2022-04-27
Payer: COMMERCIAL

## 2022-04-27 VITALS
BODY MASS INDEX: 38.16 KG/M2 | OXYGEN SATURATION: 96 % | DIASTOLIC BLOOD PRESSURE: 82 MMHG | HEART RATE: 114 BPM | SYSTOLIC BLOOD PRESSURE: 144 MMHG | WEIGHT: 237.44 LBS | HEIGHT: 66 IN

## 2022-04-27 VITALS
BODY MASS INDEX: 41.46 KG/M2 | RESPIRATION RATE: 17 BRPM | HEART RATE: 83 BPM | OXYGEN SATURATION: 96 % | DIASTOLIC BLOOD PRESSURE: 62 MMHG | SYSTOLIC BLOOD PRESSURE: 101 MMHG | WEIGHT: 253 LBS | TEMPERATURE: 98 F

## 2022-04-27 DIAGNOSIS — K59.00 CONSTIPATION, UNSPECIFIED CONSTIPATION TYPE: ICD-10-CM

## 2022-04-27 DIAGNOSIS — Z34.90 PREGNANCY, UNSPECIFIED GESTATIONAL AGE: ICD-10-CM

## 2022-04-27 DIAGNOSIS — M51.36 DDD (DEGENERATIVE DISC DISEASE), LUMBAR: ICD-10-CM

## 2022-04-27 DIAGNOSIS — M50.30 DDD (DEGENERATIVE DISC DISEASE), CERVICAL: ICD-10-CM

## 2022-04-27 DIAGNOSIS — N70.11 HYDROSALPINX: ICD-10-CM

## 2022-04-27 DIAGNOSIS — N80.9 ENDOMETRIOSIS: ICD-10-CM

## 2022-04-27 DIAGNOSIS — E78.2 MIXED HYPERLIPIDEMIA: ICD-10-CM

## 2022-04-27 DIAGNOSIS — E66.01 SEVERE OBESITY (BMI 35.0-39.9) WITH COMORBIDITY: ICD-10-CM

## 2022-04-27 DIAGNOSIS — R14.0 ABDOMINAL BLOATING: Primary | ICD-10-CM

## 2022-04-27 DIAGNOSIS — N83.201 CYST OF RIGHT OVARY: ICD-10-CM

## 2022-04-27 DIAGNOSIS — Z72.0 TOBACCO ABUSE: ICD-10-CM

## 2022-04-27 DIAGNOSIS — F90.9 ATTENTION DEFICIT HYPERACTIVITY DISORDER (ADHD), UNSPECIFIED ADHD TYPE: ICD-10-CM

## 2022-04-27 DIAGNOSIS — R94.31 PROLONGED QT INTERVAL: Primary | ICD-10-CM

## 2022-04-27 LAB
ALBUMIN SERPL BCP-MCNC: 4 G/DL (ref 3.5–5.2)
ALP SERPL-CCNC: 120 U/L (ref 55–135)
ALT SERPL W/O P-5'-P-CCNC: 38 U/L (ref 10–44)
ANION GAP SERPL CALC-SCNC: 14 MMOL/L (ref 8–16)
AST SERPL-CCNC: 27 U/L (ref 10–40)
B-HCG UR QL: NEGATIVE
BACTERIA GENITAL QL WET PREP: ABNORMAL
BASOPHILS # BLD AUTO: 0.06 K/UL (ref 0–0.2)
BASOPHILS NFR BLD: 0.5 % (ref 0–1.9)
BILIRUB SERPL-MCNC: 0.3 MG/DL (ref 0.1–1)
BILIRUB UR QL STRIP: NEGATIVE
BUN SERPL-MCNC: 14 MG/DL (ref 6–20)
CALCIUM SERPL-MCNC: 9.1 MG/DL (ref 8.7–10.5)
CHLORIDE SERPL-SCNC: 105 MMOL/L (ref 95–110)
CLARITY UR: CLEAR
CLUE CELLS VAG QL WET PREP: ABNORMAL
CO2 SERPL-SCNC: 20 MMOL/L (ref 23–29)
COLOR UR: YELLOW
CREAT SERPL-MCNC: 0.9 MG/DL (ref 0.5–1.4)
CTP QC/QA: YES
DIFFERENTIAL METHOD: ABNORMAL
EOSINOPHIL # BLD AUTO: 0.3 K/UL (ref 0–0.5)
EOSINOPHIL NFR BLD: 2.4 % (ref 0–8)
ERYTHROCYTE [DISTWIDTH] IN BLOOD BY AUTOMATED COUNT: 12.8 % (ref 11.5–14.5)
EST. GFR  (AFRICAN AMERICAN): >60 ML/MIN/1.73 M^2
EST. GFR  (NON AFRICAN AMERICAN): >60 ML/MIN/1.73 M^2
FILAMENT FUNGI VAG WET PREP-#/AREA: ABNORMAL
GLUCOSE SERPL-MCNC: 105 MG/DL (ref 70–110)
GLUCOSE UR QL STRIP: NEGATIVE
HCT VFR BLD AUTO: 40 % (ref 37–48.5)
HGB BLD-MCNC: 13.5 G/DL (ref 12–16)
HGB UR QL STRIP: ABNORMAL
IMM GRANULOCYTES # BLD AUTO: 0.08 K/UL (ref 0–0.04)
IMM GRANULOCYTES NFR BLD AUTO: 0.6 % (ref 0–0.5)
KETONES UR QL STRIP: NEGATIVE
LEUKOCYTE ESTERASE UR QL STRIP: NEGATIVE
LIPASE SERPL-CCNC: 54 U/L (ref 4–60)
LYMPHOCYTES # BLD AUTO: 2.7 K/UL (ref 1–4.8)
LYMPHOCYTES NFR BLD: 20.7 % (ref 18–48)
MCH RBC QN AUTO: 33.2 PG (ref 27–31)
MCHC RBC AUTO-ENTMCNC: 33.8 G/DL (ref 32–36)
MCV RBC AUTO: 98 FL (ref 82–98)
MONOCYTES # BLD AUTO: 1 K/UL (ref 0.3–1)
MONOCYTES NFR BLD: 7.9 % (ref 4–15)
NEUTROPHILS # BLD AUTO: 9 K/UL (ref 1.8–7.7)
NEUTROPHILS NFR BLD: 67.9 % (ref 38–73)
NITRITE UR QL STRIP: NEGATIVE
NRBC BLD-RTO: 0 /100 WBC
PH UR STRIP: 6 [PH] (ref 5–8)
PLATELET # BLD AUTO: 345 K/UL (ref 150–450)
PMV BLD AUTO: 9.4 FL (ref 9.2–12.9)
POTASSIUM SERPL-SCNC: 4.2 MMOL/L (ref 3.5–5.1)
PROT SERPL-MCNC: 8.2 G/DL (ref 6–8.4)
PROT UR QL STRIP: NEGATIVE
RBC # BLD AUTO: 4.07 M/UL (ref 4–5.4)
SODIUM SERPL-SCNC: 139 MMOL/L (ref 136–145)
SP GR UR STRIP: 1.01 (ref 1–1.03)
SPECIMEN SOURCE: ABNORMAL
T VAGINALIS GENITAL QL WET PREP: ABNORMAL
URN SPEC COLLECT METH UR: ABNORMAL
UROBILINOGEN UR STRIP-ACNC: NEGATIVE EU/DL
WBC # BLD AUTO: 13.2 K/UL (ref 3.9–12.7)
WBC #/AREA VAG WET PREP: ABNORMAL
YEAST GENITAL QL WET PREP: ABNORMAL

## 2022-04-27 PROCEDURE — 85025 COMPLETE CBC W/AUTO DIFF WBC: CPT | Mod: HCNC | Performed by: EMERGENCY MEDICINE

## 2022-04-27 PROCEDURE — 99285 EMERGENCY DEPT VISIT HI MDM: CPT | Mod: 25,HCNC

## 2022-04-27 PROCEDURE — 81025 URINE PREGNANCY TEST: CPT | Mod: HCNC | Performed by: EMERGENCY MEDICINE

## 2022-04-27 PROCEDURE — 80053 COMPREHEN METABOLIC PANEL: CPT | Mod: HCNC | Performed by: EMERGENCY MEDICINE

## 2022-04-27 PROCEDURE — 63600175 PHARM REV CODE 636 W HCPCS: Mod: HCNC | Performed by: EMERGENCY MEDICINE

## 2022-04-27 PROCEDURE — 3077F PR MOST RECENT SYSTOLIC BLOOD PRESSURE >= 140 MM HG: ICD-10-PCS | Mod: HCNC,CPTII,S$GLB, | Performed by: INTERNAL MEDICINE

## 2022-04-27 PROCEDURE — 87491 CHLMYD TRACH DNA AMP PROBE: CPT | Mod: HCNC | Performed by: EMERGENCY MEDICINE

## 2022-04-27 PROCEDURE — 99999 PR PBB SHADOW E&M-EST. PATIENT-LVL IV: ICD-10-PCS | Mod: PBBFAC,HCNC,, | Performed by: INTERNAL MEDICINE

## 2022-04-27 PROCEDURE — 3079F DIAST BP 80-89 MM HG: CPT | Mod: HCNC,CPTII,S$GLB, | Performed by: INTERNAL MEDICINE

## 2022-04-27 PROCEDURE — 81003 URINALYSIS AUTO W/O SCOPE: CPT | Mod: HCNC | Performed by: EMERGENCY MEDICINE

## 2022-04-27 PROCEDURE — 1160F PR REVIEW ALL MEDS BY PRESCRIBER/CLIN PHARMACIST DOCUMENTED: ICD-10-PCS | Mod: HCNC,CPTII,S$GLB, | Performed by: INTERNAL MEDICINE

## 2022-04-27 PROCEDURE — 1159F PR MEDICATION LIST DOCUMENTED IN MEDICAL RECORD: ICD-10-PCS | Mod: HCNC,CPTII,S$GLB, | Performed by: INTERNAL MEDICINE

## 2022-04-27 PROCEDURE — 99214 OFFICE O/P EST MOD 30 MIN: CPT | Mod: HCNC,S$GLB,, | Performed by: INTERNAL MEDICINE

## 2022-04-27 PROCEDURE — 25500020 PHARM REV CODE 255: Mod: HCNC | Performed by: EMERGENCY MEDICINE

## 2022-04-27 PROCEDURE — 96375 TX/PRO/DX INJ NEW DRUG ADDON: CPT | Mod: HCNC

## 2022-04-27 PROCEDURE — 87591 N.GONORRHOEAE DNA AMP PROB: CPT | Mod: HCNC | Performed by: EMERGENCY MEDICINE

## 2022-04-27 PROCEDURE — 96374 THER/PROPH/DIAG INJ IV PUSH: CPT | Mod: 59,HCNC

## 2022-04-27 PROCEDURE — 1160F RVW MEDS BY RX/DR IN RCRD: CPT | Mod: HCNC,CPTII,S$GLB, | Performed by: INTERNAL MEDICINE

## 2022-04-27 PROCEDURE — 87210 SMEAR WET MOUNT SALINE/INK: CPT | Mod: HCNC | Performed by: EMERGENCY MEDICINE

## 2022-04-27 PROCEDURE — 3079F PR MOST RECENT DIASTOLIC BLOOD PRESSURE 80-89 MM HG: ICD-10-PCS | Mod: HCNC,CPTII,S$GLB, | Performed by: INTERNAL MEDICINE

## 2022-04-27 PROCEDURE — 99999 PR PBB SHADOW E&M-EST. PATIENT-LVL IV: CPT | Mod: PBBFAC,HCNC,, | Performed by: INTERNAL MEDICINE

## 2022-04-27 PROCEDURE — 3008F PR BODY MASS INDEX (BMI) DOCUMENTED: ICD-10-PCS | Mod: HCNC,CPTII,S$GLB, | Performed by: INTERNAL MEDICINE

## 2022-04-27 PROCEDURE — 3008F BODY MASS INDEX DOCD: CPT | Mod: HCNC,CPTII,S$GLB, | Performed by: INTERNAL MEDICINE

## 2022-04-27 PROCEDURE — 83690 ASSAY OF LIPASE: CPT | Mod: HCNC | Performed by: EMERGENCY MEDICINE

## 2022-04-27 PROCEDURE — 99214 PR OFFICE/OUTPT VISIT, EST, LEVL IV, 30-39 MIN: ICD-10-PCS | Mod: HCNC,S$GLB,, | Performed by: INTERNAL MEDICINE

## 2022-04-27 PROCEDURE — 96361 HYDRATE IV INFUSION ADD-ON: CPT | Mod: HCNC

## 2022-04-27 PROCEDURE — 1159F MED LIST DOCD IN RCRD: CPT | Mod: HCNC,CPTII,S$GLB, | Performed by: INTERNAL MEDICINE

## 2022-04-27 PROCEDURE — 3077F SYST BP >= 140 MM HG: CPT | Mod: HCNC,CPTII,S$GLB, | Performed by: INTERNAL MEDICINE

## 2022-04-27 PROCEDURE — 25000003 PHARM REV CODE 250: Mod: HCNC | Performed by: EMERGENCY MEDICINE

## 2022-04-27 RX ORDER — MORPHINE SULFATE 4 MG/ML
4 INJECTION, SOLUTION INTRAMUSCULAR; INTRAVENOUS
Status: COMPLETED | OUTPATIENT
Start: 2022-04-27 | End: 2022-04-27

## 2022-04-27 RX ORDER — NAPROXEN 500 MG/1
500 TABLET ORAL 2 TIMES DAILY WITH MEALS
Qty: 60 TABLET | Refills: 0 | Status: SHIPPED | OUTPATIENT
Start: 2022-04-27 | End: 2022-05-09

## 2022-04-27 RX ORDER — ONDANSETRON 2 MG/ML
4 INJECTION INTRAMUSCULAR; INTRAVENOUS
Status: COMPLETED | OUTPATIENT
Start: 2022-04-27 | End: 2022-04-27

## 2022-04-27 RX ORDER — POLYETHYLENE GLYCOL 3350 17 G/17G
17 POWDER, FOR SOLUTION ORAL 2 TIMES DAILY
Qty: 28 EACH | Refills: 0 | Status: SHIPPED | OUTPATIENT
Start: 2022-04-27 | End: 2022-05-11

## 2022-04-27 RX ADMIN — SODIUM CHLORIDE 1000 ML: 0.9 INJECTION, SOLUTION INTRAVENOUS at 01:04

## 2022-04-27 RX ADMIN — MORPHINE SULFATE 4 MG: 4 INJECTION, SOLUTION INTRAMUSCULAR; INTRAVENOUS at 03:04

## 2022-04-27 RX ADMIN — IOHEXOL 100 ML: 350 INJECTION, SOLUTION INTRAVENOUS at 06:04

## 2022-04-27 RX ADMIN — ONDANSETRON 4 MG: 2 INJECTION INTRAMUSCULAR; INTRAVENOUS at 03:04

## 2022-04-27 RX ADMIN — SODIUM CHLORIDE 1000 ML: 0.9 INJECTION, SOLUTION INTRAVENOUS at 03:04

## 2022-04-27 NOTE — ED TRIAGE NOTES
Pt reports to ED with c/o bloating x 3 months. Pt states she began feeling bloated and nauseas in January, took at home UPT and it was positive, pt took repeat one which was negative. Pt states that she went to PCP who dina labs which were negative for pregnancy. Pt states that she has continued to gain weight and feel boated and nauseas. Pt abdomen distended. Pt denies any vaginal bleeding.

## 2022-04-27 NOTE — ED PROVIDER NOTES
CHIEF COMPLAINT  Chief Complaint   Patient presents with    Bloated     Brown spotting last month, abdominal bloating, nausea and back pain. Positive upt 2 months ago, repeat was negative. Pcp did lab work with negative results. Pt states her sx are persisting and getting worse.       HPI  Marianela Shrestha is a 37 y.o. female who presents with abdominal bloating nausea, back pain, and fatigue over the last few days.  Patient had a positive pregnancy test 2 months ago, started having some bleeding, and took another pregnancy test which was negative.  She went to her primary care doctor who ordered blood work, the had a negative beta quant.  Patient has continued to have abdominal bloating, to the point where she is almost unable to bend over and tie her shoes.  She denies chest pain shortness of breath.  She has early satiety.  No history of ovarian or uterine cancer in her family.    She has had 1 miscarriage previously, she has a history of depression and anxiety.  She is unemployed, on disability, and lives with her fiance    Severity:  moderate  Relieved by:  nothing  Worsened by:  nothing  Associated symptoms: nausea without vomiting     This is the extent of the patient's complaints at this time.       PAST MEDICAL HISTORY  Past Medical History:   Diagnosis Date    Anxiety     Chronic osteomyelitis of right hand 2019    Chronic pain syndrome     Depression     Insomnia     Post traumatic stress disorder (PTSD)     Sciatica        CURRENT MEDICATIONS    No current facility-administered medications for this encounter.    Current Outpatient Medications:     ALPRAZolam (XANAX) 1 MG tablet, Take 1 tablet (1 mg total) by mouth 3 (three) times daily as needed for Anxiety., Disp: 90 tablet, Rfl: 0    doxepin (SINEQUAN) 25 MG capsule, Take 2 capsules (50 mg total) by mouth every evening., Disp: 180 capsule, Rfl: 0    gabapentin (NEURONTIN) 600 MG tablet, Take 1 tablet (600 mg total) by mouth 3 (three)  times daily., Disp: 120 tablet, Rfl: 1    albuterol (PROVENTIL/VENTOLIN HFA) 90 mcg/actuation inhaler, Inhale 2 puffs into the lungs every 6 (six) hours as needed. Rescue, Disp: , Rfl:     albuterol-ipratropium (DUO-NEB) 2.5 mg-0.5 mg/3 mL nebulizer solution, Take 3 mLs by nebulization every 6 (six) hours as needed for Wheezing or Shortness of Breath. Rescue, Disp: 270 mL, Rfl: 3    budesonide-formoterol 160-4.5 mcg (SYMBICORT) 160-4.5 mcg/actuation HFAA, Inhale 2 puffs into the lungs every 12 (twelve) hours. Controller, Disp: 10.2 g, Rfl: 5    ibuprofen 200 mg Cap, Take 800 mg by mouth every 6 (six) hours as needed., Disp: , Rfl:     mupirocin (BACTROBAN) 2 % ointment, APPLY TO NARES TWO TIMES DAILY FOR 5 DAYS. REPEAT MONTHLY, Disp: 22 g, Rfl: 3    naproxen (NAPROSYN) 500 MG tablet, Take 1 tablet (500 mg total) by mouth 2 (two) times daily with meals., Disp: 60 tablet, Rfl: 0    omeprazole (PRILOSEC) 20 MG capsule, TAKE 1 CAPSULE BY MOUTH EVERY DAY, Disp: 90 capsule, Rfl: 0    polyethylene glycol (GLYCOLAX) 17 gram PwPk, Take 17 g by mouth 2 (two) times daily. for 14 days, Disp: 28 each, Rfl: 0    venlafaxine (EFFEXOR-XR) 150 MG Cp24, TAKE 2 CAPSULES BY MOUTH EVERY DAY, Disp: 60 capsule, Rfl: 1    ALLERGIES    Review of patient's allergies indicates:   Allergen Reactions    Coconut Hives and Itching    Sulfa (sulfonamide antibiotics) Hives     itching    Adhesive Rash    Latex, natural rubber Other (See Comments) and Rash       SURGICAL HISTORY    Past Surgical History:   Procedure Laterality Date    ABLATION OF MEDIAL BRANCH NERVE OF LUMBAR SPINE FACET JOINT      L4-S1    COSMETIC SURGERY      breast augmentation    FINGER SURGERY      PELVIC LAPAROSCOPY      Endometriosis ([place])    TONSILLECTOMY         SOCIAL HISTORY    Social History     Socioeconomic History    Marital status: Single   Tobacco Use    Smoking status: Current Some Day Smoker     Packs/day: 1.00     Years: 15.00     Pack  years: 15.00     Types: Cigarettes     Start date: 2017     Last attempt to quit: 2021     Years since quittin.4    Smokeless tobacco: Never Used    Tobacco comment: occasional 3 cig month, started 15 quit 18-19, started age 22    Substance and Sexual Activity    Alcohol use: Yes     Comment: rarely    Sexual activity: Yes     Partners: Male     Birth control/protection: None       FAMILY HISTORY    Family History   Problem Relation Age of Onset    Diabetes Maternal Grandmother     Diabetes Mother     Hypertension Mother     Cervical cancer Maternal Aunt     Colon cancer Maternal Aunt     Diabetes Maternal Aunt     Breast cancer Neg Hx     Eclampsia Neg Hx     Miscarriages / Stillbirths Neg Hx     Ovarian cancer Neg Hx      labor Neg Hx     Stroke Neg Hx        REVIEW OF SYSTEMS    Constitutional:  No fever or weakness.   Eyes:  No redness, pain, or discharge.   HENT:  No ear pain, no sudden onset headache, no throat pain.  Respiratory:  No wheezing, cough, or shortness of breath.   Cardiovascular:  No chest pain or palpitations.  GI:  Abdominal distension, nausea  : No dysuria or discharge.  Musculoskeletal:  No injury; full range of motion.   Skin:  No rash, abscess, or laceration.  Psychiatric: No suicidal ideations, homicidal ideations, auditory or visual hallucinations  Neurologic:  No focal weakness or sensory changes.   All Systems otherwise negative except as noted in the Review of Systems and History of Present Illness.    PHYSICAL EXAM    VITAL SIGNS: /62 (BP Location: Left arm, Patient Position: Lying)   Pulse 83   Temp 98.4 °F (36.9 °C)   Resp 17   Wt 114.8 kg (253 lb)   LMP 01/15/2022 (Approximate)   SpO2 96%   BMI 41.46 kg/m²    Constitutional:  No acute distress.  Well developed, well nourished, alert & oriented x 3, non-toxic appearance.   HENT:  Normocephalic, atraumatic.  Normal ears, nose, and throat.  Eyes:  PERRL, EOMI, conjunctiva  normal.  Neck: Normal range of motion, no tenderness, supple.  Respiratory:  Nonlabored breathing with normal breath sounds; no respiratory distress.  Cardiovascular:  RRR with no pulse deficit.  GI:  Soft, nontender, no rebound or guarding.  Musculoskeletal: Normal ROM, no tenderness, injury, edema.  Integument:  Warm, dry skin without infection or injury.  Neurologic:  Normal motor, sensation with no focal deficit.  Psychiatric:  Affect normal, Judgment normal, Mood normal. No SI, HI and not gravely disabled.    LABS  Pertinent labs reviewed. (See chart for details)   Labs Reviewed   VAGINAL SCREEN - Abnormal; Notable for the following components:       Result Value    Bacteria - Vaginal Screen Few (*)     All other components within normal limits    Narrative:     Release to patient->Immediate   URINALYSIS, REFLEX TO URINE CULTURE - Abnormal; Notable for the following components:    Occult Blood UA Trace (*)     All other components within normal limits    Narrative:     Specimen Source->Urine   COMPREHENSIVE METABOLIC PANEL - Abnormal; Notable for the following components:    CO2 20 (*)     All other components within normal limits   CBC W/ AUTO DIFFERENTIAL - Abnormal; Notable for the following components:    WBC 13.20 (*)     MCH 33.2 (*)     Immature Granulocytes 0.6 (*)     Gran # (ANC) 9.0 (*)     Immature Grans (Abs) 0.08 (*)     All other components within normal limits   C. TRACHOMATIS/N. GONORRHOEAE BY AMP DNA   LIPASE   POCT URINE PREGNANCY         EKG    Results for orders placed or performed in visit on 10/28/21   EKG 12-lead    Collection Time: 10/28/21  9:24 AM    Narrative    Test Reason : Z87.898,    Vent. Rate : 082 BPM     Atrial Rate : 082 BPM     P-R Int : 132 ms          QRS Dur : 102 ms      QT Int : 392 ms       P-R-T Axes : 062 007 059 degrees     QTc Int : 457 ms    Normal sinus rhythm  Possible Left atrial enlargement  Incomplete right bundle branch block  Borderline Abnormal ECG  When  compared with ECG of 30-JUL-2021 17:09,  Nonspecific T wave abnormality no longer evident in Inferior leads  T wave inversion less evident in Anterior leads  Confirmed by Mic CEVALLOS, Alec CARLIN ([id number]) on 10/28/2021 11:15:46 AM    Referred By: KARLEY SANTIAGO           Confirmed By:Alec Haile MD     EKG interpreted by ED MD       RADIOLOGY    CT Abdomen Pelvis With Contrast   Final Result      1. Findings suggesting involuting hemorrhagic follicle within the right ovary.  6 structures within the adnexa could reflect bilateral ovarian cysts although not confirmed.  Ultrasound could be performed for further evaluation as warranted.   2. Large amount of stool throughout the colon, could reflect developing constipation, correlation is advised.   3. Please see above for several additional findings.         Electronically signed by: Warner Pope MD   Date:    04/27/2022   Time:    19:04      US Pelvis Comp with Transvag NON-OB (xpd)   Final Result      Dilated tubular structure in the right adnexa favored to represent hydrosalpinx.      4.2 cm benign-appearing cyst in the left ovary.  If there is continued concern, 8-10 week follow up may be obtained to ensure resolution.      Trace free fluid in the pelvis is likely physiologic in a premenopausal patient.         Electronically signed by: Chris Mcwilliams MD   Date:    04/27/2022   Time:    15:27               PROCEDURES    Procedures    Medications   sodium chloride 0.9% bolus 1,000 mL (0 mLs Intravenous Stopped 4/27/22 1417)   sodium chloride 0.9% bolus 1,000 mL (0 mLs Intravenous Stopped 4/27/22 1800)   ondansetron injection 4 mg (4 mg Intravenous Given 4/27/22 1531)   morphine injection 4 mg (4 mg Intravenous Given 4/27/22 1535)   iohexoL (OMNIPAQUE 350) injection 100 mL (100 mLs Intravenous Given 4/27/22 1809)       ED COURSE & MEDICAL DECISION MAKING      Pertinent & Imaging studies reviewed. (See chart for details)    Differential Diagnosis:  Ovarian mass,  cancer, ovarian cyst, constipation, tubo-ovarian abscess      ED Course as of 04/27/22 1955 Wed Apr 27, 2022   1410 CO2(!): 20 [MG]   1515 Preg Test, Ur: Negative [MG]   1516 Awaiting US [MG]   1715 Bacteria - Vaginal Screen(!): Few [MG]   1720 Bacteria - Vaginal Screen(!): Few [MG]   1816 Awaiting CT [MG]      ED Course User Index  [MG] Pattie Rendon MD         Discharge Medication List as of 4/27/2022  7:26 PM          Discharge Medication List as of 4/27/2022  7:26 PM      START taking these medications    Details   naproxen (NAPROSYN) 500 MG tablet Take 1 tablet (500 mg total) by mouth 2 (two) times daily with meals., Starting Wed 4/27/2022, Print      polyethylene glycol (GLYCOLAX) 17 gram PwPk Take 17 g by mouth 2 (two) times daily. for 14 days, Starting Wed 4/27/2022, Until Wed 5/11/2022, Print               OVERALL IMPRESSION:   This is a 37-year-old female, with a history of endometriosis, ovarian cyst presents emergency department with quite impressive abdominal distension , cramping, as well as abdominal pain.  She had a recent positive pregnancy test, was complaining of a lot of pelvic pain.  Initially concern for possibility of ovarian mass, or cancer given patient's report of massive weight gain and abdominal distension.  Ultrasound shows a small hydrosalpinx without any evidence of cervical motion tenderness or tubo-ovarian abscess.  Pelvic exam was relatively unremarkable.  CT scan, given concern again for abdominal distention and possible ascites or liver failure, with impressive constipation.  And patient reports she is not taking any narcotics , but was taking a prenatal vitamin, which has iron in it.  Have placed patient on MiraLax b.i.d., I think this is the source of her abdominal distension.  I did discuss that she does have endometriosis and ovarian cyst, which might also be contributing to her abdominal pain.  Discussed close follow-up with OB Gyne, and have given her the OB providers  across the street to follow up with.     FINAL DIAGNOSIS  1. Abdominal bloating    2. Cyst of right ovary    3. Constipation, unspecified constipation type    4. Hydrosalpinx    5. Endometriosis        DISPOSITION  Patient discharged in stable condition.     I discussed with patient and/or family/caretaker that this evaluation in the ED does not suggest any emergent or life threatening condition medical condition requiring admission or immediate intervention beyond what was provided in the ED.  Regardless, an unremarkable evaluation in the ED does not preclude the development or presence of a serious or life threatening condition. As such, patient was instructed to return immediately for any worsening or change in current symptoms.       Critical care time spent with this patient (not including billable procedures) was 35 minutes.    Pattie Rendon MD  Emergency Medicine  [facility]  4/27/2022 1:38 PM      Please pardon typos or dictation errors, as this note was transcribed using M*Modal fluency direct dictation software.         Pattie Rendon MD  04/27/22 1955

## 2022-04-28 LAB
C TRACH DNA SPEC QL NAA+PROBE: NOT DETECTED
N GONORRHOEA DNA SPEC QL NAA+PROBE: NOT DETECTED

## 2022-04-30 ENCOUNTER — HOSPITAL ENCOUNTER (EMERGENCY)
Facility: HOSPITAL | Age: 37
Discharge: HOME | End: 2022-04-30
Attending: EMERGENCY MEDICINE
Payer: COMMERCIAL

## 2022-04-30 VITALS
RESPIRATION RATE: 18 BRPM | BODY MASS INDEX: 40.15 KG/M2 | SYSTOLIC BLOOD PRESSURE: 138 MMHG | DIASTOLIC BLOOD PRESSURE: 74 MMHG | TEMPERATURE: 98 F | WEIGHT: 245 LBS | OXYGEN SATURATION: 96 % | HEART RATE: 74 BPM

## 2022-04-30 DIAGNOSIS — M25.579 ANKLE PAIN: ICD-10-CM

## 2022-04-30 DIAGNOSIS — M79.673 FOOT PAIN: ICD-10-CM

## 2022-04-30 DIAGNOSIS — R11.2 NAUSEA AND VOMITING, INTRACTABILITY OF VOMITING NOT SPECIFIED, UNSPECIFIED VOMITING TYPE: ICD-10-CM

## 2022-04-30 DIAGNOSIS — R07.9 CHEST PAIN: ICD-10-CM

## 2022-04-30 DIAGNOSIS — M79.673 FOOT PAIN: Primary | ICD-10-CM

## 2022-04-30 LAB
ABO + RH BLD: NORMAL
ALBUMIN SERPL BCP-MCNC: 4.3 G/DL (ref 3.5–5.2)
ALP SERPL-CCNC: 130 U/L (ref 55–135)
ALT SERPL W/O P-5'-P-CCNC: 32 U/L (ref 10–44)
AMPHET+METHAMPHET UR QL: ABNORMAL
ANION GAP SERPL CALC-SCNC: 16 MMOL/L (ref 8–16)
AST SERPL-CCNC: 36 U/L (ref 10–40)
B-HCG UR QL: NEGATIVE
BACTERIA #/AREA URNS AUTO: ABNORMAL /HPF
BARBITURATES UR QL SCN>200 NG/ML: NEGATIVE
BASOPHILS # BLD AUTO: 0.08 K/UL (ref 0–0.2)
BASOPHILS NFR BLD: 0.5 % (ref 0–1.9)
BENZODIAZ UR QL SCN>200 NG/ML: ABNORMAL
BILIRUB SERPL-MCNC: 0.5 MG/DL (ref 0.1–1)
BILIRUB UR QL STRIP: NEGATIVE
BLD GP AB SCN CELLS X3 SERPL QL: NORMAL
BUN SERPL-MCNC: 11 MG/DL (ref 6–20)
BZE UR QL SCN: NEGATIVE
CALCIUM SERPL-MCNC: 9.4 MG/DL (ref 8.7–10.5)
CANNABINOIDS UR QL SCN: NEGATIVE
CHLORIDE SERPL-SCNC: 104 MMOL/L (ref 95–110)
CK SERPL-CCNC: 354 U/L (ref 20–180)
CLARITY UR REFRACT.AUTO: ABNORMAL
CO2 SERPL-SCNC: 21 MMOL/L (ref 23–29)
COLOR UR AUTO: YELLOW
CREAT SERPL-MCNC: 1 MG/DL (ref 0.5–1.4)
CREAT UR-MCNC: 258 MG/DL (ref 15–325)
CTP QC/QA: YES
CTP QC/QA: YES
DIFFERENTIAL METHOD: ABNORMAL
EOSINOPHIL # BLD AUTO: 0.1 K/UL (ref 0–0.5)
EOSINOPHIL NFR BLD: 0.9 % (ref 0–8)
ERYTHROCYTE [DISTWIDTH] IN BLOOD BY AUTOMATED COUNT: 13 % (ref 11.5–14.5)
EST. GFR  (AFRICAN AMERICAN): >60 ML/MIN/1.73 M^2
EST. GFR  (NON AFRICAN AMERICAN): >60 ML/MIN/1.73 M^2
GLUCOSE SERPL-MCNC: 127 MG/DL (ref 70–110)
GLUCOSE UR QL STRIP: NEGATIVE
HCT VFR BLD AUTO: 40.5 % (ref 37–48.5)
HGB BLD-MCNC: 13.3 G/DL (ref 12–16)
HGB UR QL STRIP: ABNORMAL
HYALINE CASTS UR QL AUTO: 20 /LPF
IMM GRANULOCYTES # BLD AUTO: 0.1 K/UL (ref 0–0.04)
IMM GRANULOCYTES NFR BLD AUTO: 0.6 % (ref 0–0.5)
KETONES UR QL STRIP: NEGATIVE
LEUKOCYTE ESTERASE UR QL STRIP: NEGATIVE
LIPASE SERPL-CCNC: 14 U/L (ref 4–60)
LYMPHOCYTES # BLD AUTO: 2.2 K/UL (ref 1–4.8)
LYMPHOCYTES NFR BLD: 14.2 % (ref 18–48)
MAGNESIUM SERPL-MCNC: 1.9 MG/DL (ref 1.6–2.6)
MCH RBC QN AUTO: 32.4 PG (ref 27–31)
MCHC RBC AUTO-ENTMCNC: 32.8 G/DL (ref 32–36)
MCV RBC AUTO: 99 FL (ref 82–98)
METHADONE UR QL SCN>300 NG/ML: NEGATIVE
MICROSCOPIC COMMENT: ABNORMAL
MONOCYTES # BLD AUTO: 1.2 K/UL (ref 0.3–1)
MONOCYTES NFR BLD: 7.8 % (ref 4–15)
NEUTROPHILS # BLD AUTO: 11.8 K/UL (ref 1.8–7.7)
NEUTROPHILS NFR BLD: 76 % (ref 38–73)
NITRITE UR QL STRIP: NEGATIVE
NRBC BLD-RTO: 0 /100 WBC
OPIATES UR QL SCN: ABNORMAL
PCP UR QL SCN>25 NG/ML: NEGATIVE
PH UR STRIP: 5 [PH] (ref 5–8)
PLATELET # BLD AUTO: 385 K/UL (ref 150–450)
PMV BLD AUTO: 9.8 FL (ref 9.2–12.9)
POCT GLUCOSE: 120 MG/DL (ref 70–110)
POTASSIUM SERPL-SCNC: 4.9 MMOL/L (ref 3.5–5.1)
PROT SERPL-MCNC: 9.2 G/DL (ref 6–8.4)
PROT UR QL STRIP: ABNORMAL
RBC # BLD AUTO: 4.11 M/UL (ref 4–5.4)
RBC #/AREA URNS AUTO: 12 /HPF (ref 0–4)
SARS-COV-2 RDRP RESP QL NAA+PROBE: NEGATIVE
SODIUM SERPL-SCNC: 141 MMOL/L (ref 136–145)
SP GR UR STRIP: 1.02 (ref 1–1.03)
SQUAMOUS #/AREA URNS AUTO: 4 /HPF
TOXICOLOGY INFORMATION: ABNORMAL
TROPONIN I SERPL DL<=0.01 NG/ML-MCNC: <0.006 NG/ML (ref 0–0.03)
TSH SERPL DL<=0.005 MIU/L-ACNC: 3.85 UIU/ML (ref 0.4–4)
URN SPEC COLLECT METH UR: ABNORMAL
WBC # BLD AUTO: 15.54 K/UL (ref 3.9–12.7)
WBC #/AREA URNS AUTO: 2 /HPF (ref 0–5)

## 2022-04-30 PROCEDURE — 99285 PR EMERGENCY DEPT VISIT,LEVEL V: ICD-10-PCS | Mod: HCNC,CS,, | Performed by: EMERGENCY MEDICINE

## 2022-04-30 PROCEDURE — 96376 TX/PRO/DX INJ SAME DRUG ADON: CPT | Mod: HCNC,59

## 2022-04-30 PROCEDURE — 25000003 PHARM REV CODE 250: Mod: HCNC | Performed by: INTERNAL MEDICINE

## 2022-04-30 PROCEDURE — 25500020 PHARM REV CODE 255: Mod: HCNC | Performed by: EMERGENCY MEDICINE

## 2022-04-30 PROCEDURE — 80053 COMPREHEN METABOLIC PANEL: CPT | Mod: HCNC | Performed by: STUDENT IN AN ORGANIZED HEALTH CARE EDUCATION/TRAINING PROGRAM

## 2022-04-30 PROCEDURE — 84443 ASSAY THYROID STIM HORMONE: CPT | Mod: HCNC | Performed by: STUDENT IN AN ORGANIZED HEALTH CARE EDUCATION/TRAINING PROGRAM

## 2022-04-30 PROCEDURE — 93010 ELECTROCARDIOGRAM REPORT: CPT | Mod: HCNC,,, | Performed by: INTERNAL MEDICINE

## 2022-04-30 PROCEDURE — 93010 EKG 12-LEAD: ICD-10-PCS | Mod: HCNC,,, | Performed by: INTERNAL MEDICINE

## 2022-04-30 PROCEDURE — 80307 DRUG TEST PRSMV CHEM ANLYZR: CPT | Mod: HCNC | Performed by: STUDENT IN AN ORGANIZED HEALTH CARE EDUCATION/TRAINING PROGRAM

## 2022-04-30 PROCEDURE — 82550 ASSAY OF CK (CPK): CPT | Mod: HCNC | Performed by: STUDENT IN AN ORGANIZED HEALTH CARE EDUCATION/TRAINING PROGRAM

## 2022-04-30 PROCEDURE — 96375 TX/PRO/DX INJ NEW DRUG ADDON: CPT | Mod: HCNC

## 2022-04-30 PROCEDURE — 25000003 PHARM REV CODE 250: Mod: HCNC | Performed by: STUDENT IN AN ORGANIZED HEALTH CARE EDUCATION/TRAINING PROGRAM

## 2022-04-30 PROCEDURE — 96361 HYDRATE IV INFUSION ADD-ON: CPT | Mod: HCNC

## 2022-04-30 PROCEDURE — 96374 THER/PROPH/DIAG INJ IV PUSH: CPT | Mod: HCNC

## 2022-04-30 PROCEDURE — 81025 URINE PREGNANCY TEST: CPT | Mod: HCNC | Performed by: STUDENT IN AN ORGANIZED HEALTH CARE EDUCATION/TRAINING PROGRAM

## 2022-04-30 PROCEDURE — 63600175 PHARM REV CODE 636 W HCPCS: Mod: HCNC | Performed by: EMERGENCY MEDICINE

## 2022-04-30 PROCEDURE — 94640 AIRWAY INHALATION TREATMENT: CPT | Mod: HCNC

## 2022-04-30 PROCEDURE — 84484 ASSAY OF TROPONIN QUANT: CPT | Mod: HCNC | Performed by: STUDENT IN AN ORGANIZED HEALTH CARE EDUCATION/TRAINING PROGRAM

## 2022-04-30 PROCEDURE — 86901 BLOOD TYPING SEROLOGIC RH(D): CPT | Mod: HCNC | Performed by: STUDENT IN AN ORGANIZED HEALTH CARE EDUCATION/TRAINING PROGRAM

## 2022-04-30 PROCEDURE — 94761 N-INVAS EAR/PLS OXIMETRY MLT: CPT | Mod: HCNC

## 2022-04-30 PROCEDURE — 82962 GLUCOSE BLOOD TEST: CPT | Mod: HCNC

## 2022-04-30 PROCEDURE — 25000242 PHARM REV CODE 250 ALT 637 W/ HCPCS: Mod: HCNC | Performed by: INTERNAL MEDICINE

## 2022-04-30 PROCEDURE — 85025 COMPLETE CBC W/AUTO DIFF WBC: CPT | Mod: HCNC | Performed by: STUDENT IN AN ORGANIZED HEALTH CARE EDUCATION/TRAINING PROGRAM

## 2022-04-30 PROCEDURE — 83690 ASSAY OF LIPASE: CPT | Mod: HCNC | Performed by: STUDENT IN AN ORGANIZED HEALTH CARE EDUCATION/TRAINING PROGRAM

## 2022-04-30 PROCEDURE — 99285 EMERGENCY DEPT VISIT HI MDM: CPT | Mod: HCNC,CS,, | Performed by: EMERGENCY MEDICINE

## 2022-04-30 PROCEDURE — 81001 URINALYSIS AUTO W/SCOPE: CPT | Mod: HCNC,59 | Performed by: STUDENT IN AN ORGANIZED HEALTH CARE EDUCATION/TRAINING PROGRAM

## 2022-04-30 PROCEDURE — 87389 HIV-1 AG W/HIV-1&-2 AB AG IA: CPT | Mod: HCNC | Performed by: EMERGENCY MEDICINE

## 2022-04-30 PROCEDURE — 99285 EMERGENCY DEPT VISIT HI MDM: CPT | Mod: 25,HCNC

## 2022-04-30 PROCEDURE — 93005 ELECTROCARDIOGRAM TRACING: CPT | Mod: HCNC

## 2022-04-30 PROCEDURE — 86803 HEPATITIS C AB TEST: CPT | Mod: HCNC | Performed by: EMERGENCY MEDICINE

## 2022-04-30 PROCEDURE — U0002 COVID-19 LAB TEST NON-CDC: HCPCS | Mod: HCNC | Performed by: INTERNAL MEDICINE

## 2022-04-30 PROCEDURE — 83735 ASSAY OF MAGNESIUM: CPT | Mod: HCNC | Performed by: STUDENT IN AN ORGANIZED HEALTH CARE EDUCATION/TRAINING PROGRAM

## 2022-04-30 RX ORDER — IPRATROPIUM BROMIDE AND ALBUTEROL SULFATE 2.5; .5 MG/3ML; MG/3ML
3 SOLUTION RESPIRATORY (INHALATION)
Status: COMPLETED | OUTPATIENT
Start: 2022-04-30 | End: 2022-04-30

## 2022-04-30 RX ORDER — ONDANSETRON 2 MG/ML
4 INJECTION INTRAMUSCULAR; INTRAVENOUS
Status: DISCONTINUED | OUTPATIENT
Start: 2022-04-30 | End: 2022-04-30

## 2022-04-30 RX ORDER — ONDANSETRON 2 MG/ML
4 INJECTION INTRAMUSCULAR; INTRAVENOUS
Status: COMPLETED | OUTPATIENT
Start: 2022-04-30 | End: 2022-04-30

## 2022-04-30 RX ORDER — ACETAMINOPHEN 500 MG
1000 TABLET ORAL
Status: COMPLETED | OUTPATIENT
Start: 2022-04-30 | End: 2022-04-30

## 2022-04-30 RX ORDER — DROPERIDOL 2.5 MG/ML
1.25 INJECTION, SOLUTION INTRAMUSCULAR; INTRAVENOUS
Status: COMPLETED | OUTPATIENT
Start: 2022-04-30 | End: 2022-04-30

## 2022-04-30 RX ORDER — CEPHALEXIN 500 MG/1
500 CAPSULE ORAL EVERY 12 HOURS
Qty: 10 CAPSULE | Refills: 0 | Status: SHIPPED | OUTPATIENT
Start: 2022-04-30 | End: 2022-04-30

## 2022-04-30 RX ADMIN — DROPERIDOL 1.25 MG: 2.5 INJECTION, SOLUTION INTRAMUSCULAR; INTRAVENOUS at 03:04

## 2022-04-30 RX ADMIN — IPRATROPIUM BROMIDE AND ALBUTEROL SULFATE 3 ML: 2.5; .5 SOLUTION RESPIRATORY (INHALATION) at 06:04

## 2022-04-30 RX ADMIN — IOHEXOL 100 ML: 350 INJECTION, SOLUTION INTRAVENOUS at 12:04

## 2022-04-30 RX ADMIN — ONDANSETRON 4 MG: 2 INJECTION INTRAMUSCULAR; INTRAVENOUS at 02:04

## 2022-04-30 RX ADMIN — ACETAMINOPHEN 1000 MG: 500 TABLET ORAL at 01:04

## 2022-04-30 RX ADMIN — DROPERIDOL 1.25 MG: 2.5 INJECTION, SOLUTION INTRAMUSCULAR; INTRAVENOUS at 06:04

## 2022-04-30 RX ADMIN — SODIUM CHLORIDE 1000 ML: 0.9 INJECTION, SOLUTION INTRAVENOUS at 01:04

## 2022-04-30 NOTE — ED NOTES
Assumed care for this patent, currently resting on stretcher able to voice all needs, no acute distress noted,will continue to monitor
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
## 2022-04-30 NOTE — ED NOTES
Pt has multiple complaints, is not a good historian. Pt is crying loudly, not able to articulate what is wrong with her.

## 2022-04-30 NOTE — DISCHARGE INSTRUCTIONS
Please follow-up with your PCP in 3-5 days for re-evaluation of your symptoms. Please return to the ED if you have worsening chest pain, trouble breathing, or inability to keep fluids down. We thank you for allowing us to assist in your care. You will need to have a repeat urine test done by your primary care doctor in the next 3-4 days.     Diagnosis:   1. Foot pain    2. Chest pain    3. Ankle pain    4. Foot pain    5. Nausea and vomiting, intractability of vomiting not specified, unspecified vomiting type        Tests you had showed:   Labs Reviewed   CBC W/ AUTO DIFFERENTIAL - Abnormal; Notable for the following components:       Result Value    WBC 15.54 (*)     MCV 99 (*)     MCH 32.4 (*)     Immature Granulocytes 0.6 (*)     Gran # (ANC) 11.8 (*)     Immature Grans (Abs) 0.10 (*)     Mono # 1.2 (*)     Gran % 76.0 (*)     Lymph % 14.2 (*)     All other components within normal limits    Narrative:     Release to patient->Immediate   COMPREHENSIVE METABOLIC PANEL - Abnormal; Notable for the following components:    CO2 21 (*)     Glucose 127 (*)     Total Protein 9.2 (*)     All other components within normal limits    Narrative:     add on cpk per dr leonila tee/order#[id number] @01:54 04/30/2022        Release to patient->Immediate   URINALYSIS, REFLEX TO URINE CULTURE - Abnormal; Notable for the following components:    Appearance, UA Hazy (*)     Protein, UA 1+ (*)     Occult Blood UA 2+ (*)     All other components within normal limits    Narrative:     Specimen Source->Urine   DRUG SCREEN PANEL, URINE EMERGENCY - Abnormal; Notable for the following components:    Benzodiazepines Presumptive Positive (*)     Opiate Scrn, Ur Presumptive Positive (*)     Amphetamine Screen, Ur Presumptive Positive (*)     All other components within normal limits    Narrative:     Specimen Source->Urine   CK - Abnormal; Notable for the following components:     (*)     All other components within normal limits     Narrative:     add on cpk per dr leonila tee/order#[id number] @01:54 04/30/2022        Release to patient->Immediate   URINALYSIS MICROSCOPIC - Abnormal; Notable for the following components:    RBC, UA 12 (*)     Bacteria Moderate (*)     Hyaline Casts, UA 20 (*)     All other components within normal limits    Narrative:     Specimen Source->Urine   POCT GLUCOSE - Abnormal; Notable for the following components:    POCT Glucose 120 (*)     All other components within normal limits   LIPASE    Narrative:     add on cpk per dr leonila tee/order#[id number] @01:54 04/30/2022        Release to patient->Immediate   MAGNESIUM    Narrative:     add on cpk per dr leonila tee/order#[id number] @01:54 04/30/2022        Release to patient->Immediate   TROPONIN I    Narrative:     add on cpk per dr leonila tee/order#[id number] @01:54 04/30/2022        Release to patient->Immediate   TSH    Narrative:     add on cpk per dr leonila tee/order#[id number] @01:54 04/30/2022        Release to patient->Immediate   CK   HIV 1 / 2 ANTIBODY   HEPATITIS C ANTIBODY   POCT URINE PREGNANCY   SARS-COV-2 RDRP GENE    Narrative:     This test utilizes isothermal nucleic acid amplification   technology to detect the SARS-CoV-2 RdRp nucleic acid segment.   The analytical sensitivity (limit of detection) is 125 genome   equivalents/mL.   A POSITIVE result implies infection with the SARS-CoV-2 virus;   the patient is presumed to be contagious.     A NEGATIVE result means that SARS-CoV-2 nucleic acids are not   present above the limit of detection. A NEGATIVE result should be   treated as presumptive. It does not rule out the possibility of   COVID-19 and should not be the sole basis for treatment decisions.   If COVID-19 is strongly suspected based on clinical and exposure   history, re-testing using an alternate molecular assay should be   considered.   This test is only for use under the Food and Drug   Administration s Emergency Use  Authorization (EUA).   Commercial kits are provided by [employer].   Performance characteristics of the EUA have been independently   verified by [facility] Department of   Pathology and Laboratory Medicine.   _________________________________________________________________   The authorized Fact Sheet for Healthcare Providers and the authorized Fact   Sheet for Patients of the ID NOW COVID-19 are available on the FDA   website:     https://www.fda.gov/media/654309/download  https://www.fda.gov/media/269726/download          TYPE & SCREEN   POCT GLUCOSE MONITORING CONTINUOUS      CTA Chest Non-Coronary - PE Study   Final Result      1. Bolus timing is suboptimal for evaluation of pulmonary thromboembolism.  Allowing for this, no convincing pulmonary thromboembolism to the level of the distal lobar arteries, distal embolism cannot be excluded on the basis of this exam.  Correlation with D-dimer and/or lower extremity ultrasound as warranted.   2. Bilateral basilar dependent atelectasis, no large focal consolidation.   3. Additional findings above.         Electronically signed by: Warner Pope MD   Date:    04/30/2022   Time:    13:13      X-Ray Ankle Complete Right   Final Result      Small bone spur or less likely age-indeterminate avulsion injury along the anterosuperior aspect of the talus.  Suggest correlation with point tenderness and mechanism of injury.      Otherwise, no acute bony abnormality identified in the ankle or foot.         Electronically signed by: Arsaln Smith MD   Date:    04/30/2022   Time:    03:36      X-Ray Foot Complete Right   Final Result      Small bone spur or less likely age-indeterminate avulsion injury along the anterosuperior aspect of the talus.  Suggest correlation with point tenderness and mechanism of injury.      Otherwise, no acute bony abnormality identified in the ankle or foot.         Electronically signed by: Arslan Smith MD    Date:    04/30/2022   Time:    03:36      CT Head Without Contrast   Final Result      No CT evidence of acute intracranial abnormality.         Electronically signed by: Arslan Smith MD   Date:    04/30/2022   Time:    03:10          Treatments you received were:   Medications   sodium chloride 0.9% bolus 1,000 mL (0 mLs Intravenous Stopped 4/30/22 0413)   ondansetron injection 4 mg (4 mg Intravenous Given 4/30/22 0217)   droperidoL injection 1.25 mg (1.25 mg Intravenous Given 4/30/22 0306)   droperidoL injection 1.25 mg (1.25 mg Intravenous Given 4/30/22 0628)   albuterol-ipratropium 2.5 mg-0.5 mg/3 mL nebulizer solution 3 mL (3 mLs Nebulization Given 4/30/22 0653)   iohexoL (OMNIPAQUE 350) injection 100 mL (100 mLs Intravenous Given 4/30/22 1213)   acetaminophen tablet 1,000 mg (1,000 mg Oral Given 4/30/22 1300)       Home Care Instructions:  - Medications: Continue taking your home medications as prescribed    Follow-Up Plan:  - Follow-up with: Primary care doctor within 1-2  days  - Additional testing and/or evaluation will be directed by your primary doctor    Return to the Emergency Department for symptoms including but not limited to: worsening symptoms, severe back pain, shortness of breath or chest pain, vomiting with inability to hold down fluids, blood in vomit or poop, fevers greater than 100.4°F, passing out/fainting/unconsciousness, or other concerning symptoms.     Future Appointments   Date Time Provider Department Center   5/2/2022 11:00 AM [facility] OIC-XRAY [facility] XRAY [facility]   5/2/2022 11:15 AM [facility] OIC-XRAY [facility] XRAY [facility]   5/3/2022  9:00 AM Afia Chandler PA-C [facility] SPINE [address]   5/5/2022 12:45 PM [facility]   5/5/2022  1:30 PM Beena Keith PA-C [facility] HAND [facility]   5/9/2022  1:45 PM Franck Storey MD [facility] OBGYN [facility]   8/9/2022 11:00 AM Shad Regan MD [facility] CARDIO [facility] [address]

## 2022-05-03 ENCOUNTER — TELEPHONE (OUTPATIENT)
Dept: ORTHOPEDICS | Facility: CLINIC | Age: 37
End: 2022-05-03
Payer: COMMERCIAL

## 2022-05-03 ENCOUNTER — PATIENT OUTREACH (OUTPATIENT)
Dept: EMERGENCY MEDICINE | Facility: HOSPITAL | Age: 37
End: 2022-05-03
Payer: COMMERCIAL

## 2022-05-04 ENCOUNTER — TELEPHONE (OUTPATIENT)
Dept: INTERNAL MEDICINE | Facility: CLINIC | Age: 37
End: 2022-05-04

## 2022-05-04 LAB — HCV AB SERPL QL IA: NEGATIVE

## 2022-05-04 NOTE — TELEPHONE ENCOUNTER
----- Message from Sonja Harley sent at 5/4/2022  3:31 PM CDT -----  Contact: [phone]  Pt needs assistance scheduling ! Please f/u with pt

## 2022-05-05 ENCOUNTER — TELEPHONE (OUTPATIENT)
Dept: ORTHOPEDICS | Facility: CLINIC | Age: 37
End: 2022-05-05
Payer: COMMERCIAL

## 2022-05-05 NOTE — TELEPHONE ENCOUNTER
Spoke to Beena and she states she cannot see the patient. She states the patient should have been scheduled as a second opinion. Beena ask me to call the patient ASAP before she comes to her appointment.. A phone call was made to the patient at 12:10 p.m. and she did not answer. I received her VM and left her a detailed message about her appointment which was scheduled at 1:30 pm.    Patient returned the call at 12:25 pm and left a message for the office. Her phone call was returned by me and she stated she received the message to not come to her appointment. An apology was given to the patient that we could not see her and that she should have been scheduled as a second opinion and that we will need all of her records to be released to us for review. I offered to mail her the release to sign and send back to us by mail and she was okay with that. Once we receive the records and review we will call and schedule her to see Dr. Shah. Patient verbalized understanding of the complete conversation.

## 2022-05-06 ENCOUNTER — TELEPHONE (OUTPATIENT)
Dept: INTERNAL MEDICINE | Facility: CLINIC | Age: 37
End: 2022-05-06
Payer: COMMERCIAL

## 2022-05-06 NOTE — TELEPHONE ENCOUNTER
----- Message from Fatoumata Chappell sent at 5/6/2022  2:40 PM CDT -----  Regarding: Patient requesting earlier PCP appt  Please note the following patient states she has attempted to set an appt with your office but was not able to get one before August or Sept. The patient states she would prefer not to see anyone besides Dr. Espino she states she had a unsatisfactory experience before with others when Dr. Espino was not available.   Patient states she is concerned about her lab work being off, she states her white cell count was in the 360's and her red cell count was really low. She states she is having a surgical procedure on 5/19/22 and is concerned if her medical issues are not addressed prior to her pre-surgical clearance she may not be able to have her procedure. Patient is concerned her lab work shows she is in liver  and kidney failure, etc.       Patient asks if someone from your office can contact her to assist with an earlier appt and to address some of her concerns.          Thank you    Fatoumata Chappell, ED Navigator, [facility]  [phone], ext. [phone]

## 2022-05-09 ENCOUNTER — OFFICE VISIT (OUTPATIENT)
Dept: OBSTETRICS AND GYNECOLOGY | Facility: CLINIC | Age: 37
End: 2022-05-09
Payer: COMMERCIAL

## 2022-05-09 VITALS
BODY MASS INDEX: 39.09 KG/M2 | SYSTOLIC BLOOD PRESSURE: 156 MMHG | DIASTOLIC BLOOD PRESSURE: 116 MMHG | WEIGHT: 238.56 LBS

## 2022-05-09 DIAGNOSIS — R10.2 PELVIC PAIN IN FEMALE: ICD-10-CM

## 2022-05-09 DIAGNOSIS — Z01.419 WELL WOMAN EXAM WITH ROUTINE GYNECOLOGICAL EXAM: Primary | ICD-10-CM

## 2022-05-09 DIAGNOSIS — Z12.4 SCREENING FOR CERVICAL CANCER: ICD-10-CM

## 2022-05-09 PROCEDURE — 1160F PR REVIEW ALL MEDS BY PRESCRIBER/CLIN PHARMACIST DOCUMENTED: ICD-10-PCS | Mod: HCNC,CPTII,S$GLB, | Performed by: OBSTETRICS & GYNECOLOGY

## 2022-05-09 PROCEDURE — 99999 PR PBB SHADOW E&M-EST. PATIENT-LVL III: CPT | Mod: PBBFAC,HCNC,, | Performed by: OBSTETRICS & GYNECOLOGY

## 2022-05-09 PROCEDURE — 99385 PREV VISIT NEW AGE 18-39: CPT | Mod: HCNC,S$GLB,, | Performed by: OBSTETRICS & GYNECOLOGY

## 2022-05-09 PROCEDURE — 99204 OFFICE O/P NEW MOD 45 MIN: CPT | Mod: 25,HCNC,S$GLB, | Performed by: OBSTETRICS & GYNECOLOGY

## 2022-05-09 PROCEDURE — 88175 CYTOPATH C/V AUTO FLUID REDO: CPT | Mod: HCNC | Performed by: OBSTETRICS & GYNECOLOGY

## 2022-05-09 PROCEDURE — 1160F RVW MEDS BY RX/DR IN RCRD: CPT | Mod: HCNC,CPTII,S$GLB, | Performed by: OBSTETRICS & GYNECOLOGY

## 2022-05-09 PROCEDURE — 3080F PR MOST RECENT DIASTOLIC BLOOD PRESSURE >= 90 MM HG: ICD-10-PCS | Mod: HCNC,CPTII,S$GLB, | Performed by: OBSTETRICS & GYNECOLOGY

## 2022-05-09 PROCEDURE — 99385 PR PREVENTIVE VISIT,NEW,18-39: ICD-10-PCS | Mod: HCNC,S$GLB,, | Performed by: OBSTETRICS & GYNECOLOGY

## 2022-05-09 PROCEDURE — 87624 HPV HI-RISK TYP POOLED RSLT: CPT | Mod: HCNC | Performed by: OBSTETRICS & GYNECOLOGY

## 2022-05-09 PROCEDURE — 99999 PR PBB SHADOW E&M-EST. PATIENT-LVL III: ICD-10-PCS | Mod: PBBFAC,HCNC,, | Performed by: OBSTETRICS & GYNECOLOGY

## 2022-05-09 PROCEDURE — 3077F SYST BP >= 140 MM HG: CPT | Mod: HCNC,CPTII,S$GLB, | Performed by: OBSTETRICS & GYNECOLOGY

## 2022-05-09 PROCEDURE — 3080F DIAST BP >= 90 MM HG: CPT | Mod: HCNC,CPTII,S$GLB, | Performed by: OBSTETRICS & GYNECOLOGY

## 2022-05-09 PROCEDURE — 3077F PR MOST RECENT SYSTOLIC BLOOD PRESSURE >= 140 MM HG: ICD-10-PCS | Mod: HCNC,CPTII,S$GLB, | Performed by: OBSTETRICS & GYNECOLOGY

## 2022-05-09 PROCEDURE — 3008F BODY MASS INDEX DOCD: CPT | Mod: HCNC,CPTII,S$GLB, | Performed by: OBSTETRICS & GYNECOLOGY

## 2022-05-09 PROCEDURE — 1159F MED LIST DOCD IN RCRD: CPT | Mod: HCNC,CPTII,S$GLB, | Performed by: OBSTETRICS & GYNECOLOGY

## 2022-05-09 PROCEDURE — 99204 PR OFFICE/OUTPT VISIT, NEW, LEVL IV, 45-59 MIN: ICD-10-PCS | Mod: 25,HCNC,S$GLB, | Performed by: OBSTETRICS & GYNECOLOGY

## 2022-05-09 PROCEDURE — 3008F PR BODY MASS INDEX (BMI) DOCUMENTED: ICD-10-PCS | Mod: HCNC,CPTII,S$GLB, | Performed by: OBSTETRICS & GYNECOLOGY

## 2022-05-09 PROCEDURE — 1159F PR MEDICATION LIST DOCUMENTED IN MEDICAL RECORD: ICD-10-PCS | Mod: HCNC,CPTII,S$GLB, | Performed by: OBSTETRICS & GYNECOLOGY

## 2022-05-09 RX ORDER — MELOXICAM 15 MG/1
15 TABLET ORAL DAILY
Qty: 30 TABLET | Refills: 3 | Status: SHIPPED | OUTPATIENT
Start: 2022-05-09 | End: 2022-08-08

## 2022-05-09 RX ORDER — NORETHINDRONE 5 MG/1
5 TABLET ORAL DAILY
Qty: 30 TABLET | Refills: 11 | Status: ON HOLD | OUTPATIENT
Start: 2022-05-09 | End: 2022-11-18 | Stop reason: HOSPADM

## 2022-05-11 NOTE — PROGRESS NOTES
Subjective:       Patient ID: Marianela Shrestha is a 37 y.o. female.    Chief Complaint:  problem (Pt has found out that she has 12 cyst on her ovaries . Pt is also having irregular cycles  for the last 6 months )      History of Present Illness  Pt is 37 y.o. with Patient's last menstrual period was 03/01/2022 (exact date). Here to discuss pelvic pain she has been experiencing.  History complicated.  But has been suffering with a long history of pain.  Seems to have other medical issues, including renal disease and CV disease.  She states that she was recently imaged with following findings:    FINDINGS:  Uterus:     Size: 7.8 x 3.6 x 5.4 cm     Masses: None     Endometrium: Normal in this pre menopausal patient, measuring 7 mm.     Right ovary:     Size: 3.5 x 3.0 x 3.0 cm     Appearance: Ovary appears grossly normal.  There is a 3.1 x 2.6 x 4.1 dilated tubular structure in the right adnexa.     Vascular flow: Normal.     Left ovary:     Size: 4.7 x 2.7 x 4.1 cm     Appearance: 4.2 cm anechoic cyst.     Vascular Flow: Normal.     Free Fluid:     Small volume     Impression:     Dilated tubular structure in the right adnexa favored to represent hydrosalpinx.     4.2 cm benign-appearing cyst in the left ovary.  If there is continued concern, 8-10 week follow up may be obtained to ensure resolution.     Trace free fluid in the pelvis is likely physiologic in a premenopausal patient.       Pelvic Pain  The patient's primary symptoms include pelvic pain. The patient's pertinent negatives include no vaginal discharge. This is a chronic problem. The current episode started 1 to 4 weeks ago. The problem occurs constantly. The problem has been waxing and waning. The pain is moderate. The problem affects both sides. She is not pregnant. Associated symptoms include abdominal pain, anorexia, back pain, constipation, discolored urine, a fever, flank pain, frequency, headaches, hematuria, nausea, painful intercourse and  vomiting. Pertinent negatives include no chills, diarrhea, dysuria, rash or urgency. The symptoms are aggravated by nothing, activity, bowel movements, heavy lifting, intercourse, tactile pressure, urinating and menstrual cycle. She has tried acetaminophen, heating pad, NSAIDs and warm bath for the symptoms. The treatment provided mild relief. She is sexually active. No, her partner does not have an STD. She uses nothing for contraception. Her menstrual history has been irregular. Her past medical history is significant for an abdominal surgery, endometriosis, menorrhagia, metrorrhagia, miscarriage, ovarian cysts and PID. There is no history of a  section, an ectopic pregnancy, a gynecological surgery, herpes simplex, perineal abscess, an STD, a terminated pregnancy or vaginosis.         GYN & OB History  Patient's last menstrual period was 2022 (exact date).   Date of Last Pap: No result found    OB History    Para Term  AB Living   1 0 0 0 1 0   SAB IAB Ectopic Multiple Live Births   1 0 0 0        # Outcome Date GA Lbr Benjie/2nd Weight Sex Delivery Anes PTL Lv   1 SAB                Review of Systems  Review of Systems   Constitutional: Positive for fever. Negative for activity change, appetite change, chills and fatigue.   HENT: Negative.  Negative for tinnitus.    Eyes: Negative.    Respiratory: Negative for cough and shortness of breath.    Cardiovascular: Negative for chest pain and palpitations.   Gastrointestinal: Positive for abdominal pain, anorexia, constipation, nausea and vomiting. Negative for blood in stool and diarrhea.   Endocrine: Negative.  Negative for hot flashes.   Genitourinary: Positive for flank pain, frequency, hematuria, menorrhagia and pelvic pain. Negative for dysmenorrhea, dyspareunia, dysuria, menstrual problem, urgency, vaginal discharge, urinary incontinence and postcoital bleeding.   Musculoskeletal: Positive for back pain. Negative for joint swelling.    Integumentary:  Negative for rash, breast mass, nipple discharge and breast skin changes.   Neurological: Positive for headaches.   Hematological: Negative.  Does not bruise/bleed easily.   Psychiatric/Behavioral: The patient is not nervous/anxious.    Breast: negative.  Negative for mass, nipple discharge and skin changes          Objective:    Physical Exam:   Constitutional: She is oriented to person, place, and time. She appears well-developed and well-nourished. No distress.    HENT:   Head: Normocephalic and atraumatic.    Eyes: Pupils are equal, round, and reactive to light. Conjunctivae and lids are normal.     Cardiovascular: Normal rate and regular rhythm.  Exam reveals no edema.     Pulmonary/Chest: Effort normal and breath sounds normal. She has no wheezes.        Abdominal: Soft. Bowel sounds are normal. She exhibits no distension. There is no abdominal tenderness. There is no rebound and no guarding.     Genitourinary:    Vagina, uterus, right adnexa and left adnexa normal.      Pelvic exam was performed with patient supine.   The external female genitalia was normal.   Labial bartholins normal.There is no tenderness or lesion on the right labia. There is no tenderness or lesion on the left labia. Cervix is normal. Right adnexum displays no mass and no tenderness. Left adnexum displays no mass and no tenderness. No  no vaginal discharge, rectocele, cystocele or unspecified prolapse of vaginal walls in the vagina. Cervix exhibits no motion tenderness and no discharge. Uterus is not deviated, not fixed and not hosting fibroids. Normal urethral meatus.Urethral Meatus exhibits: urethral lesionUrethra findings: no tendernessBladder findings: no bladder tenderness   Genitourinary Comments: No CMT  No pelvic floor spasm             Musculoskeletal: Normal range of motion and moves all extremeties.       Neurological: She is alert and oriented to person, place, and time. She has normal strength.    Skin:  Skin is warm and dry.    Psychiatric: She has a normal mood and affect. Her speech is normal and behavior is normal. Thought content normal. Her mood appears not anxious. She does not exhibit a depressed mood. She expresses no suicidal plans and no homicidal plans.          Assessment:                1. Pelvic pain in female                Plan:      Marianela was seen today for problem.    Diagnoses and all orders for this visit:    Pelvic pain in female  -     norethindrone (AYGESTIN) 5 mg Tab; Take 1 tablet (5 mg total) by mouth once daily.  -     meloxicam (MOBIC) 15 MG tablet; Take 1 tablet (15 mg total) by mouth once daily.    We had a long discussion today lasting approx 50 min.  We discussed the different etiologies of her pelvic pain.  Given the findings on the ultrasound and the timing of her pain, it is possible that she had a hemorrhagic cyst that ruptured.  We discussed how the multiple follicles on ultrasound are also consistent with polycystic ovarian syndrome.  This is consistent with her abnormal cycles.  We discussed how polycystic ovarian syndrome is best treated with weight loss and exercise.  We also discussed how it is treated with birth control pills primarily.  These are contraindicated given her labile blood pressure.    To help prevent new cyst formation and help treat her abnormal cycles, she would benefit from daily norethindrone.  We will also add daily meloxicam to help with pain.  Patient was encouraged to help get established with a primary care physician to get her other medical conditions under control.  All questions were answered

## 2022-05-11 NOTE — PROGRESS NOTES
SUBJECTIVE:   37 y.o. female   for annual routine Pap and checkup. Patient's last menstrual period was 2022 (exact date)..  She has no unusual complaints.        Past Medical History:   Diagnosis Date    Anxiety     Chronic osteomyelitis of right hand 2019    Chronic pain syndrome     Depression     Insomnia     Post traumatic stress disorder (PTSD)     Sciatica      Past Surgical History:   Procedure Laterality Date    ABLATION OF MEDIAL BRANCH NERVE OF LUMBAR SPINE FACET JOINT      L4-S1    COSMETIC SURGERY      breast augmentation    FINGER SURGERY      PELVIC LAPAROSCOPY      Endometriosis ([place])    TONSILLECTOMY       Social History     Socioeconomic History    Marital status: Single   Tobacco Use    Smoking status: Current Some Day Smoker     Packs/day: 1.00     Years: 15.00     Pack years: 15.00     Types: Cigarettes     Start date: 2017     Last attempt to quit: 2021     Years since quittin.4    Smokeless tobacco: Never Used    Tobacco comment: occasional 3 cig month, started 15 quit 18-19, started age 22    Substance and Sexual Activity    Alcohol use: Yes     Comment: rarely    Sexual activity: Yes     Partners: Male     Birth control/protection: None     Social Determinants of Health     Financial Resource Strain: Low Risk     Difficulty of Paying Living Expenses: Not very hard   Food Insecurity: Unknown    Worried About Running Out of Food in the Last Year: Never true   Transportation Needs: No Transportation Needs    Lack of Transportation (Medical): No    Lack of Transportation (Non-Medical): No   Physical Activity: Inactive    Days of Exercise per Week: 0 days    Minutes of Exercise per Session: 0 min   Stress: No Stress Concern Present    Feeling of Stress : Only a little   Social Connections: Unknown    Frequency of Communication with Friends and Family: More than three times a week    Frequency of Social Gatherings with Friends and  Family: More than three times a week    Attends Islam Services: Never    Active Member of Clubs or Organizations: No    Attends Club or Organization Meetings: Never   Housing Stability: Low Risk     Unable to Pay for Housing in the Last Year: No    Number of Places Lived in the Last Year: 1    Unstable Housing in the Last Year: No     Family History   Problem Relation Age of Onset    Diabetes Maternal Grandmother     Diabetes Mother     Hypertension Mother     Cervical cancer Maternal Aunt     Colon cancer Maternal Aunt     Diabetes Maternal Aunt     Breast cancer Neg Hx     Eclampsia Neg Hx     Miscarriages / Stillbirths Neg Hx     Ovarian cancer Neg Hx      labor Neg Hx     Stroke Neg Hx      OB History    Para Term  AB Living   1 0 0 0 1 0   SAB IAB Ectopic Multiple Live Births   1 0 0 0        # Outcome Date GA Lbr Benjie/2nd Weight Sex Delivery Anes PTL Lv   1 SAB                  Current Outpatient Medications   Medication Sig Dispense Refill    albuterol (PROVENTIL/VENTOLIN HFA) 90 mcg/actuation inhaler Inhale 2 puffs into the lungs every 6 (six) hours as needed. Rescue      albuterol-ipratropium (DUO-NEB) 2.5 mg-0.5 mg/3 mL nebulizer solution Take 3 mLs by nebulization every 6 (six) hours as needed for Wheezing or Shortness of Breath. Rescue 270 mL 3    ALPRAZolam (XANAX) 1 MG tablet Take 1 tablet (1 mg total) by mouth 3 (three) times daily as needed for Anxiety. 90 tablet 0    budesonide-formoterol 160-4.5 mcg (SYMBICORT) 160-4.5 mcg/actuation HFAA Inhale 2 puffs into the lungs every 12 (twelve) hours. Controller 10.2 g 5    doxepin (SINEQUAN) 25 MG capsule Take 2 capsules (50 mg total) by mouth every evening. 180 capsule 0    gabapentin (NEURONTIN) 600 MG tablet Take 1 tablet (600 mg total) by mouth 3 (three) times daily. 120 tablet 1    ibuprofen 200 mg Cap Take 800 mg by mouth every 6 (six) hours as needed.      meloxicam (MOBIC) 15 MG tablet Take 1 tablet  (15 mg total) by mouth once daily. 30 tablet 3    mupirocin (BACTROBAN) 2 % ointment APPLY TO NARES TWO TIMES DAILY FOR 5 DAYS. REPEAT MONTHLY 22 g 3    norethindrone (AYGESTIN) 5 mg Tab Take 1 tablet (5 mg total) by mouth once daily. 30 tablet 11    omeprazole (PRILOSEC) 20 MG capsule TAKE 1 CAPSULE BY MOUTH EVERY DAY 90 capsule 0    polyethylene glycol (GLYCOLAX) 17 gram PwPk Take 17 g by mouth 2 (two) times daily. for 14 days 28 each 0    venlafaxine (EFFEXOR-XR) 150 MG Cp24 TAKE 2 CAPSULES BY MOUTH EVERY DAY 60 capsule 1     No current facility-administered medications for this visit.     Allergies: Coconut; Sulfa (sulfonamide antibiotics); Adhesive; and Latex, natural rubber     ROS:  Constitutional: no weight loss, weight gain, fever, fatigue  Eyes:  No vision changes, glasses/contacts  ENT/Mouth: No ulcers, sinus problems, ears ringing, headache  Cardiovascular: No inability to lie flat, chest pain, exercise intolerance, swelling, heart palpitations  Respiratory: No wheezing, coughing blood, shortness of breath, or cough  Gastrointestinal: No diarrhea, bloody stool, nausea/vomiting, constipation, gas, hemorrhoids  Genitourinary: No blood in urine, painful urination, urgency of urination, frequency of urination, incomplete emptying, incontinence, abnormal bleeding, painful periods, heavy periods, vaginal discharge, vaginal odor, painful intercourse, sexual problems, bleeding after intercourse.  Musculoskeletal: No muscle weakness  Skin/Breast: No painful breasts, nipple discharge, masses, rash, ulcers  Neurological: No passing out, seizures, numbness, headache  Endocrine: No diabetes, hypothyroid, hyperthyroid, hot flashes, hair loss, abnormal hair growth, ance  Psychiatric: No depression, crying  Hematologic: No bruises, bleeding, swollen lymph nodes, anemia.      OBJECTIVE:   The patient appears well, alert, oriented x 3, in no distress.  BP (!) 156/116   Wt 108.2 kg (238 lb 8.6 oz)   LMP 03/01/2022  (Exact Date)   BMI 39.09 kg/m²   NECK: no thyromegaly, trachea midline  SKIN: no acne, striae, hirsutism  BREAST EXAM: breasts appear normal, no suspicious masses, no skin or nipple changes or axillary nodes  ABDOMEN: soft, non-tender; bowel sounds normal; no masses,  no organomegaly and no hernias, masses, or hepatosplenomegaly  GENITALIA: normal external genitalia, no erythema, no discharge  URETHRA: normal appearing urethra with no masses, tenderness or lesions and normal urethra, normal urethral meatus  VAGINA: Normal  CERVIX: no lesions or cervical motion tenderness  UTERUS: normal and normal size, contour, position, consistency, mobility, non-tender  ADNEXA: no mass, fullness, tenderness    \  ASSESSMENT:   Ron was seen today for problem.    Diagnoses and all orders for this visit:    Well woman exam with routine gynecological exam  MMG at 39    Screening for cervical cancer  -     Cancel: HPV High Risk Genotypes, PCR  -     Cancel: Liquid-Based Pap Smear, Screening  Other orders  -     HPV DNA, High Risk with Reflex to Genotype 16,18  -     Pap Smear, Thin Prep

## 2022-05-12 ENCOUNTER — LAB VISIT (OUTPATIENT)
Dept: LAB | Facility: HOSPITAL | Age: 37
End: 2022-05-12
Attending: INTERNAL MEDICINE
Payer: COMMERCIAL

## 2022-05-12 ENCOUNTER — OFFICE VISIT (OUTPATIENT)
Dept: INTERNAL MEDICINE | Facility: CLINIC | Age: 37
End: 2022-05-12
Payer: COMMERCIAL

## 2022-05-12 VITALS
OXYGEN SATURATION: 98 % | BODY MASS INDEX: 39.37 KG/M2 | WEIGHT: 236.31 LBS | DIASTOLIC BLOOD PRESSURE: 84 MMHG | RESPIRATION RATE: 18 BRPM | HEIGHT: 65 IN | SYSTOLIC BLOOD PRESSURE: 128 MMHG | HEART RATE: 100 BPM

## 2022-05-12 DIAGNOSIS — S63.260S: ICD-10-CM

## 2022-05-12 DIAGNOSIS — Z87.898 HISTORY OF PROLONGED Q-T INTERVAL ON ECG: ICD-10-CM

## 2022-05-12 DIAGNOSIS — I45.10 INCOMPLETE RIGHT BUNDLE BRANCH BLOCK (RBBB) DETERMINED BY ELECTROCARDIOGRAPHY: ICD-10-CM

## 2022-05-12 DIAGNOSIS — F90.9 ATTENTION DEFICIT HYPERACTIVITY DISORDER (ADHD), UNSPECIFIED ADHD TYPE: ICD-10-CM

## 2022-05-12 DIAGNOSIS — E66.01 SEVERE OBESITY (BMI 35.0-39.9) WITH COMORBIDITY: ICD-10-CM

## 2022-05-12 DIAGNOSIS — Z01.818 PRE-OP TESTING: Primary | ICD-10-CM

## 2022-05-12 DIAGNOSIS — N93.9 ABNORMAL UTERINE BLEEDING (AUB): ICD-10-CM

## 2022-05-12 DIAGNOSIS — G89.4 CHRONIC PAIN SYNDROME: ICD-10-CM

## 2022-05-12 DIAGNOSIS — M62.830 BACK SPASM: ICD-10-CM

## 2022-05-12 DIAGNOSIS — M86.641 OTHER CHRONIC OSTEOMYELITIS OF RIGHT HAND: ICD-10-CM

## 2022-05-12 DIAGNOSIS — Z01.818 PREOP EXAMINATION: ICD-10-CM

## 2022-05-12 DIAGNOSIS — Z01.818 PREOP EXAMINATION: Primary | ICD-10-CM

## 2022-05-12 DIAGNOSIS — J39.8: ICD-10-CM

## 2022-05-12 DIAGNOSIS — F41.1 GENERALIZED ANXIETY DISORDER: ICD-10-CM

## 2022-05-12 LAB
ALBUMIN SERPL BCP-MCNC: 3.8 G/DL (ref 3.5–5.2)
ALP SERPL-CCNC: 116 U/L (ref 55–135)
ALT SERPL W/O P-5'-P-CCNC: 30 U/L (ref 10–44)
AMORPH CRY UR QL COMP ASSIST: NORMAL
ANION GAP SERPL CALC-SCNC: 11 MMOL/L (ref 8–16)
AST SERPL-CCNC: 22 U/L (ref 10–40)
BASOPHILS # BLD AUTO: 0.06 K/UL (ref 0–0.2)
BASOPHILS NFR BLD: 0.4 % (ref 0–1.9)
BILIRUB SERPL-MCNC: 0.4 MG/DL (ref 0.1–1)
BILIRUB UR QL STRIP: NEGATIVE
BUN SERPL-MCNC: 14 MG/DL (ref 6–20)
CALCIUM SERPL-MCNC: 9.1 MG/DL (ref 8.7–10.5)
CHLORIDE SERPL-SCNC: 107 MMOL/L (ref 95–110)
CLARITY UR REFRACT.AUTO: ABNORMAL
CO2 SERPL-SCNC: 23 MMOL/L (ref 23–29)
COLOR UR AUTO: YELLOW
CREAT SERPL-MCNC: 0.8 MG/DL (ref 0.5–1.4)
DIFFERENTIAL METHOD: ABNORMAL
EOSINOPHIL # BLD AUTO: 0.2 K/UL (ref 0–0.5)
EOSINOPHIL NFR BLD: 1.7 % (ref 0–8)
ERYTHROCYTE [DISTWIDTH] IN BLOOD BY AUTOMATED COUNT: 12.9 % (ref 11.5–14.5)
EST. GFR  (AFRICAN AMERICAN): >60 ML/MIN/1.73 M^2
EST. GFR  (NON AFRICAN AMERICAN): >60 ML/MIN/1.73 M^2
GLUCOSE SERPL-MCNC: 115 MG/DL (ref 70–110)
GLUCOSE UR QL STRIP: NEGATIVE
HCT VFR BLD AUTO: 41.6 % (ref 37–48.5)
HGB BLD-MCNC: 13.4 G/DL (ref 12–16)
HGB UR QL STRIP: NEGATIVE
IMM GRANULOCYTES # BLD AUTO: 0.05 K/UL (ref 0–0.04)
IMM GRANULOCYTES NFR BLD AUTO: 0.4 % (ref 0–0.5)
KETONES UR QL STRIP: NEGATIVE
LEUKOCYTE ESTERASE UR QL STRIP: NEGATIVE
LYMPHOCYTES # BLD AUTO: 2.7 K/UL (ref 1–4.8)
LYMPHOCYTES NFR BLD: 19.7 % (ref 18–48)
MCH RBC QN AUTO: 31.7 PG (ref 27–31)
MCHC RBC AUTO-ENTMCNC: 32.2 G/DL (ref 32–36)
MCV RBC AUTO: 98 FL (ref 82–98)
MICROSCOPIC COMMENT: NORMAL
MONOCYTES # BLD AUTO: 1 K/UL (ref 0.3–1)
MONOCYTES NFR BLD: 7.3 % (ref 4–15)
NEUTROPHILS # BLD AUTO: 9.7 K/UL (ref 1.8–7.7)
NEUTROPHILS NFR BLD: 70.5 % (ref 38–73)
NITRITE UR QL STRIP: NEGATIVE
NRBC BLD-RTO: 0 /100 WBC
PH UR STRIP: 5 [PH] (ref 5–8)
PLATELET # BLD AUTO: 439 K/UL (ref 150–450)
PMV BLD AUTO: 9.9 FL (ref 9.2–12.9)
POTASSIUM SERPL-SCNC: 4.6 MMOL/L (ref 3.5–5.1)
PROT SERPL-MCNC: 8.2 G/DL (ref 6–8.4)
PROT UR QL STRIP: NEGATIVE
RBC # BLD AUTO: 4.23 M/UL (ref 4–5.4)
SODIUM SERPL-SCNC: 141 MMOL/L (ref 136–145)
SP GR UR STRIP: 1.02 (ref 1–1.03)
URN SPEC COLLECT METH UR: ABNORMAL
WBC # BLD AUTO: 13.75 K/UL (ref 3.9–12.7)

## 2022-05-12 PROCEDURE — 99499 RISK ADDL DX/OHS AUDIT: ICD-10-PCS | Mod: S$PBB,,, | Performed by: INTERNAL MEDICINE

## 2022-05-12 PROCEDURE — 1159F MED LIST DOCD IN RCRD: CPT | Mod: HCNC,CPTII,S$GLB, | Performed by: INTERNAL MEDICINE

## 2022-05-12 PROCEDURE — 99214 OFFICE O/P EST MOD 30 MIN: CPT | Mod: HCNC,S$GLB,, | Performed by: INTERNAL MEDICINE

## 2022-05-12 PROCEDURE — 3079F PR MOST RECENT DIASTOLIC BLOOD PRESSURE 80-89 MM HG: ICD-10-PCS | Mod: HCNC,CPTII,S$GLB, | Performed by: INTERNAL MEDICINE

## 2022-05-12 PROCEDURE — 99214 PR OFFICE/OUTPT VISIT, EST, LEVL IV, 30-39 MIN: ICD-10-PCS | Mod: HCNC,S$GLB,, | Performed by: INTERNAL MEDICINE

## 2022-05-12 PROCEDURE — 80053 COMPREHEN METABOLIC PANEL: CPT | Mod: HCNC | Performed by: INTERNAL MEDICINE

## 2022-05-12 PROCEDURE — 3008F BODY MASS INDEX DOCD: CPT | Mod: HCNC,CPTII,S$GLB, | Performed by: INTERNAL MEDICINE

## 2022-05-12 PROCEDURE — 99499 UNLISTED E&M SERVICE: CPT | Mod: S$PBB,,, | Performed by: INTERNAL MEDICINE

## 2022-05-12 PROCEDURE — 3074F PR MOST RECENT SYSTOLIC BLOOD PRESSURE < 130 MM HG: ICD-10-PCS | Mod: HCNC,CPTII,S$GLB, | Performed by: INTERNAL MEDICINE

## 2022-05-12 PROCEDURE — 3008F PR BODY MASS INDEX (BMI) DOCUMENTED: ICD-10-PCS | Mod: HCNC,CPTII,S$GLB, | Performed by: INTERNAL MEDICINE

## 2022-05-12 PROCEDURE — 85025 COMPLETE CBC W/AUTO DIFF WBC: CPT | Mod: HCNC | Performed by: INTERNAL MEDICINE

## 2022-05-12 PROCEDURE — 81001 URINALYSIS AUTO W/SCOPE: CPT | Mod: HCNC | Performed by: INTERNAL MEDICINE

## 2022-05-12 PROCEDURE — 3079F DIAST BP 80-89 MM HG: CPT | Mod: HCNC,CPTII,S$GLB, | Performed by: INTERNAL MEDICINE

## 2022-05-12 PROCEDURE — 99999 PR PBB SHADOW E&M-EST. PATIENT-LVL V: CPT | Mod: PBBFAC,HCNC,, | Performed by: INTERNAL MEDICINE

## 2022-05-12 PROCEDURE — 1159F PR MEDICATION LIST DOCUMENTED IN MEDICAL RECORD: ICD-10-PCS | Mod: HCNC,CPTII,S$GLB, | Performed by: INTERNAL MEDICINE

## 2022-05-12 PROCEDURE — 3074F SYST BP LT 130 MM HG: CPT | Mod: HCNC,CPTII,S$GLB, | Performed by: INTERNAL MEDICINE

## 2022-05-12 PROCEDURE — 36415 COLL VENOUS BLD VENIPUNCTURE: CPT | Mod: HCNC | Performed by: INTERNAL MEDICINE

## 2022-05-12 PROCEDURE — 99999 PR PBB SHADOW E&M-EST. PATIENT-LVL V: ICD-10-PCS | Mod: PBBFAC,HCNC,, | Performed by: INTERNAL MEDICINE
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
## 2022-05-12 NOTE — Clinical Note
Please arrange for patient to have repeat labwork (CBC, ESR, CRP) drawn on 5/13/2022 at [facility] after her X-ray

## 2022-05-12 NOTE — PATIENT INSTRUCTIONS
Post-Operative Progess Note


Surgeon (s)/Assistant (s)


Surgeon


CARLIN LEMOS DO


Assistant:  Sukhdev





Pre-Operative Diagnosis


cholelithiasis/cholecystitis





Post-Operative Diagnosis





same





Procedure & Operative Findings


Date of Procedure


9/4/19


Procedure Performed/Findings


Lap garth with IOC, using robot, firefly and Omnipaque


Anesthesia Type


GET





Estimated Blood Loss


Estimated blood loss (mL):  scant





Specimens/Packing


Specimens Removed


GB and contents











CARLIN LEMOS DO                Sep 4, 2019 12:09 Urine today  Labwork today  CXR today  Schedule EKG and treadmill stress echocardiogram next available    Once results are back, will be able to determine if you are cleared to proceed with your planned surgery. Will send copy of surgical clearance once cleared to your ENT.     Return to clinic in 3 months or sooner if needed.

## 2022-05-13 ENCOUNTER — PATIENT MESSAGE (OUTPATIENT)
Dept: INTERNAL MEDICINE | Facility: CLINIC | Age: 37
End: 2022-05-13
Payer: COMMERCIAL

## 2022-05-13 ENCOUNTER — TELEPHONE (OUTPATIENT)
Dept: INTERNAL MEDICINE | Facility: CLINIC | Age: 37
End: 2022-05-13

## 2022-05-13 NOTE — TELEPHONE ENCOUNTER
Arnaldo, her lab results came to me, but were ordered by you I believe.   I wanted to make sure you see them. CBC and Chem.

## 2022-05-13 NOTE — TELEPHONE ENCOUNTER
----- Message from Arnaldo Espino MD sent at 5/12/2022  6:37 PM CDT -----  Please arrange for patient to have repeat labwork (CBC, ESR, CRP) drawn on 5/13/2022 at [facility] after her X-ray

## 2022-05-16 ENCOUNTER — TELEPHONE (OUTPATIENT)
Dept: INTERNAL MEDICINE | Facility: CLINIC | Age: 37
End: 2022-05-16
Payer: COMMERCIAL

## 2022-05-16 NOTE — TELEPHONE ENCOUNTER
----- Message from Delphine Emery RN sent at 5/16/2022 12:47 PM CDT -----  Dr. Espino,    I am following your note on this patient for pre op clearance for her surgery with Dr. Fu on 5/19. For clarification, will she need to see infectious disease prior to surgery?    Thanks,  TAMMY Akers  [facility] - Anesthesia

## 2022-05-16 NOTE — TELEPHONE ENCOUNTER
Patient needs repeat labwork. If she has an on going chronic osteomyelitis, will need to start treatment before elective procedure.

## 2022-05-17 ENCOUNTER — TELEPHONE (OUTPATIENT)
Dept: OTOLARYNGOLOGY | Facility: CLINIC | Age: 37
End: 2022-05-17
Payer: COMMERCIAL

## 2022-05-17 ENCOUNTER — HOSPITAL ENCOUNTER (OUTPATIENT)
Dept: RADIOLOGY | Facility: HOSPITAL | Age: 37
Discharge: HOME | End: 2022-05-17
Attending: INTERNAL MEDICINE
Payer: COMMERCIAL

## 2022-05-17 ENCOUNTER — PATIENT MESSAGE (OUTPATIENT)
Dept: ORTHOPEDICS | Facility: CLINIC | Age: 37
End: 2022-05-17
Payer: COMMERCIAL

## 2022-05-17 ENCOUNTER — TELEPHONE (OUTPATIENT)
Dept: INFECTIOUS DISEASES | Facility: CLINIC | Age: 37
End: 2022-05-17
Payer: COMMERCIAL

## 2022-05-17 ENCOUNTER — PATIENT MESSAGE (OUTPATIENT)
Dept: INFECTIOUS DISEASES | Facility: CLINIC | Age: 37
End: 2022-05-17
Payer: COMMERCIAL

## 2022-05-17 ENCOUNTER — HOSPITAL ENCOUNTER (OUTPATIENT)
Dept: CARDIOLOGY | Facility: HOSPITAL | Age: 37
Discharge: HOME | End: 2022-05-17
Attending: INTERNAL MEDICINE
Payer: MEDICARE

## 2022-05-17 ENCOUNTER — HOSPITAL ENCOUNTER (OUTPATIENT)
Dept: CARDIOLOGY | Facility: CLINIC | Age: 37
Discharge: HOME | End: 2022-05-17
Payer: COMMERCIAL

## 2022-05-17 ENCOUNTER — TELEPHONE (OUTPATIENT)
Dept: INTERNAL MEDICINE | Facility: CLINIC | Age: 37
End: 2022-05-17

## 2022-05-17 VITALS — WEIGHT: 257 LBS | HEIGHT: 65 IN | BODY MASS INDEX: 42.82 KG/M2

## 2022-05-17 DIAGNOSIS — Z01.818 PREOP EXAMINATION: ICD-10-CM

## 2022-05-17 DIAGNOSIS — M86.641 OTHER CHRONIC OSTEOMYELITIS OF RIGHT HAND: Primary | ICD-10-CM

## 2022-05-17 LAB
ASCENDING AORTA: 2.8 CM
BSA FOR ECHO PROCEDURE: 2.31 M2
CV ECHO LV RWT: 0.29 CM
CV STRESS BASE HR: 92 BPM
DIASTOLIC BLOOD PRESSURE: 70 MMHG
DOP CALC LVOT AREA: 2.8 CM2
DOP CALC LVOT DIAMETER: 1.9 CM
DOP CALC LVOT PEAK VEL: 1.13 M/S
DOP CALC LVOT STROKE VOLUME: 62.85 CM3
DOP CALCLVOT PEAK VEL VTI: 22.18 CM
E WAVE DECELERATION TIME: 176.31 MSEC
E/A RATIO: 1.33
E/E' RATIO: 9.33 M/S
ECHO LV POSTERIOR WALL: 0.67 CM (ref 0.6–1.1)
EJECTION FRACTION: 60 %
FRACTIONAL SHORTENING: 28 % (ref 28–44)
INTERVENTRICULAR SEPTUM: 0.76 CM (ref 0.6–1.1)
IVRT: 88.49 MSEC
LA MAJOR: 4.92 CM
LA MINOR: 4.74 CM
LA WIDTH: 3.02 CM
LEFT ATRIUM SIZE: 3.47 CM
LEFT ATRIUM VOLUME INDEX: 19.5 ML/M2
LEFT ATRIUM VOLUME: 43.01 CM3
LEFT INTERNAL DIMENSION IN SYSTOLE: 3.34 CM (ref 2.1–4)
LEFT VENTRICLE DIASTOLIC VOLUME INDEX: 44.9 ML/M2
LEFT VENTRICLE DIASTOLIC VOLUME: 98.79 ML
LEFT VENTRICLE MASS INDEX: 47 G/M2
LEFT VENTRICLE SYSTOLIC VOLUME INDEX: 20.6 ML/M2
LEFT VENTRICLE SYSTOLIC VOLUME: 45.34 ML
LEFT VENTRICULAR INTERNAL DIMENSION IN DIASTOLE: 4.63 CM (ref 3.5–6)
LEFT VENTRICULAR MASS: 103.18 G
LV LATERAL E/E' RATIO: 8 M/S
LV SEPTAL E/E' RATIO: 11.2 M/S
MV A" WAVE DURATION": 7.42 MSEC
MV PEAK A VEL: 0.84 M/S
MV PEAK E VEL: 1.12 M/S
MV STENOSIS PRESSURE HALF TIME: 51.13 MS
MV VALVE AREA P 1/2 METHOD: 4.3 CM2
OHS CV CPX 1 MINUTE RECOVERY HEART RATE: 131 BPM
OHS CV CPX 85 PERCENT MAX PREDICTED HEART RATE MALE: 147
OHS CV CPX ESTIMATED METS: 9
OHS CV CPX MAX PREDICTED HEART RATE: 173
OHS CV CPX PATIENT IS FEMALE: 1
OHS CV CPX PATIENT IS MALE: 0
OHS CV CPX PEAK DIASTOLIC BLOOD PRESSURE: 87 MMHG
OHS CV CPX PEAK HEAR RATE: 150 BPM
OHS CV CPX PEAK RATE PRESSURE PRODUCT: NORMAL
OHS CV CPX PEAK SYSTOLIC BLOOD PRESSURE: 173 MMHG
OHS CV CPX PERCENT MAX PREDICTED HEART RATE ACHIEVED: 87
OHS CV CPX RATE PRESSURE PRODUCT PRESENTING: NORMAL
PISA TR MAX VEL: 2.45 M/S
PULM VEIN S/D RATIO: 0.82
PV PEAK D VEL: 0.72 M/S
PV PEAK S VEL: 0.59 M/S
RA MAJOR: 4.16 CM
RA PRESSURE: 3 MMHG
RA WIDTH: 3.11 CM
RIGHT VENTRICULAR END-DIASTOLIC DIMENSION: 3.57 CM
RV TISSUE DOPPLER FREE WALL SYSTOLIC VELOCITY 1 (APICAL 4 CHAMBER VIEW): 10.85 CM/S
SINUS: 2.96 CM
STJ: 2.58 CM
STRESS ECHO POST EXERCISE DUR MIN: 5 MINUTES
STRESS ECHO POST EXERCISE DUR SEC: 47 SECONDS
SYSTOLIC BLOOD PRESSURE: 133 MMHG
TDI LATERAL: 0.14 M/S
TDI SEPTAL: 0.1 M/S
TDI: 0.12 M/S
TR MAX PG: 24 MMHG
TRICUSPID ANNULAR PLANE SYSTOLIC EXCURSION: 1.65 CM
TV REST PULMONARY ARTERY PRESSURE: 27 MMHG

## 2022-05-17 PROCEDURE — 93010 ELECTROCARDIOGRAM REPORT: CPT | Mod: HCNC,S$GLB,, | Performed by: INTERNAL MEDICINE

## 2022-05-17 PROCEDURE — 93351 STRESS ECHO (CUPID ONLY): ICD-10-PCS | Mod: 26,HCNC,, | Performed by: INTERNAL MEDICINE

## 2022-05-17 PROCEDURE — 93005 EKG 12-LEAD: ICD-10-PCS | Mod: HCNC,S$GLB,, | Performed by: INTERNAL MEDICINE

## 2022-05-17 PROCEDURE — 93351 STRESS TTE COMPLETE: CPT | Mod: HCNC

## 2022-05-17 PROCEDURE — 71046 X-RAY EXAM CHEST 2 VIEWS: CPT | Mod: 26,HCNC,, | Performed by: STUDENT IN AN ORGANIZED HEALTH CARE EDUCATION/TRAINING PROGRAM

## 2022-05-17 PROCEDURE — 93351 STRESS TTE COMPLETE: CPT | Mod: 26,HCNC,, | Performed by: INTERNAL MEDICINE

## 2022-05-17 PROCEDURE — 71046 X-RAY EXAM CHEST 2 VIEWS: CPT | Mod: TC,HCNC,FY

## 2022-05-17 PROCEDURE — 93005 ELECTROCARDIOGRAM TRACING: CPT | Mod: HCNC,S$GLB,, | Performed by: INTERNAL MEDICINE

## 2022-05-17 PROCEDURE — 71046 XR CHEST PA AND LATERAL: ICD-10-PCS | Mod: 26,HCNC,, | Performed by: STUDENT IN AN ORGANIZED HEALTH CARE EDUCATION/TRAINING PROGRAM

## 2022-05-17 PROCEDURE — 93010 EKG 12-LEAD: ICD-10-PCS | Mod: HCNC,S$GLB,, | Performed by: INTERNAL MEDICINE

## 2022-05-17 NOTE — TELEPHONE ENCOUNTER
Patient had ECHO performed today. Has not yet seen ID per her pre-op workup and request.     Will have to delay surgery until patient has been evaluated by ID.

## 2022-05-17 NOTE — TELEPHONE ENCOUNTER
----- Message from Sussy Dior sent at 5/17/2022  2:56 PM CDT -----  Regarding: pt advice  Contact: pt @ [phone]  Please reach out to patient at your earliest convenience she needs to be cleared through ID for a surgery she needs to have.  States she needs appt as soon as available

## 2022-05-18 RX ORDER — TIZANIDINE 2 MG/1
2 TABLET ORAL EVERY 8 HOURS PRN
Qty: 15 TABLET | Refills: 0 | Status: SHIPPED | OUTPATIENT
Start: 2022-05-18 | End: 2022-07-07

## 2022-05-24 DIAGNOSIS — J45.998 UNCONTROLLED PERSISTENT ASTHMA: ICD-10-CM

## 2022-05-24 RX ORDER — BUDESONIDE AND FORMOTEROL FUMARATE DIHYDRATE 160; 4.5 UG/1; UG/1
2 AEROSOL RESPIRATORY (INHALATION) EVERY 12 HOURS
Qty: 10.2 G | Refills: 0 | Status: ON HOLD | OUTPATIENT
Start: 2022-05-24 | End: 2022-11-18 | Stop reason: HOSPADM

## 2022-05-26 ENCOUNTER — OFFICE VISIT (OUTPATIENT)
Dept: INFECTIOUS DISEASES | Facility: CLINIC | Age: 37
End: 2022-05-26
Payer: COMMERCIAL

## 2022-05-26 ENCOUNTER — LAB VISIT (OUTPATIENT)
Dept: LAB | Facility: HOSPITAL | Age: 37
End: 2022-05-26
Attending: STUDENT IN AN ORGANIZED HEALTH CARE EDUCATION/TRAINING PROGRAM
Payer: MEDICARE

## 2022-05-26 VITALS
SYSTOLIC BLOOD PRESSURE: 164 MMHG | TEMPERATURE: 99 F | WEIGHT: 238.13 LBS | DIASTOLIC BLOOD PRESSURE: 106 MMHG | HEART RATE: 123 BPM | BODY MASS INDEX: 39.67 KG/M2 | HEIGHT: 65 IN

## 2022-05-26 DIAGNOSIS — M86.641 OTHER CHRONIC OSTEOMYELITIS OF RIGHT HAND: Primary | ICD-10-CM

## 2022-05-26 DIAGNOSIS — R50.9 FEVER, UNSPECIFIED FEVER CAUSE: ICD-10-CM

## 2022-05-26 LAB
ALBUMIN SERPL BCP-MCNC: 4.2 G/DL (ref 3.5–5.2)
ALP SERPL-CCNC: 106 U/L (ref 55–135)
ALT SERPL W/O P-5'-P-CCNC: 22 U/L (ref 10–44)
ANION GAP SERPL CALC-SCNC: 11 MMOL/L (ref 8–16)
AST SERPL-CCNC: 18 U/L (ref 10–40)
BACTERIA #/AREA URNS AUTO: ABNORMAL /HPF
BASOPHILS # BLD AUTO: 0.07 K/UL (ref 0–0.2)
BASOPHILS NFR BLD: 0.5 % (ref 0–1.9)
BILIRUB SERPL-MCNC: 0.5 MG/DL (ref 0.1–1)
BILIRUB UR QL STRIP: NEGATIVE
BUN SERPL-MCNC: 15 MG/DL (ref 6–20)
CALCIUM SERPL-MCNC: 9.4 MG/DL (ref 8.7–10.5)
CHLORIDE SERPL-SCNC: 105 MMOL/L (ref 95–110)
CLARITY UR REFRACT.AUTO: ABNORMAL
CO2 SERPL-SCNC: 23 MMOL/L (ref 23–29)
COLOR UR AUTO: YELLOW
CREAT SERPL-MCNC: 0.8 MG/DL (ref 0.5–1.4)
CRP SERPL-MCNC: 15.9 MG/L (ref 0–8.2)
DIFFERENTIAL METHOD: ABNORMAL
EOSINOPHIL # BLD AUTO: 0.2 K/UL (ref 0–0.5)
EOSINOPHIL NFR BLD: 1.2 % (ref 0–8)
ERYTHROCYTE [DISTWIDTH] IN BLOOD BY AUTOMATED COUNT: 12.8 % (ref 11.5–14.5)
EST. GFR  (AFRICAN AMERICAN): >60 ML/MIN/1.73 M^2
EST. GFR  (NON AFRICAN AMERICAN): >60 ML/MIN/1.73 M^2
GLUCOSE SERPL-MCNC: 102 MG/DL (ref 70–110)
GLUCOSE UR QL STRIP: NEGATIVE
HCT VFR BLD AUTO: 43.1 % (ref 37–48.5)
HGB BLD-MCNC: 14.1 G/DL (ref 12–16)
HGB UR QL STRIP: NEGATIVE
HYALINE CASTS UR QL AUTO: 0 /LPF
IMM GRANULOCYTES # BLD AUTO: 0.09 K/UL (ref 0–0.04)
IMM GRANULOCYTES NFR BLD AUTO: 0.6 % (ref 0–0.5)
KETONES UR QL STRIP: NEGATIVE
LEUKOCYTE ESTERASE UR QL STRIP: NEGATIVE
LYMPHOCYTES # BLD AUTO: 2.3 K/UL (ref 1–4.8)
LYMPHOCYTES NFR BLD: 16.5 % (ref 18–48)
MCH RBC QN AUTO: 31.9 PG (ref 27–31)
MCHC RBC AUTO-ENTMCNC: 32.7 G/DL (ref 32–36)
MCV RBC AUTO: 98 FL (ref 82–98)
MICROSCOPIC COMMENT: ABNORMAL
MONOCYTES # BLD AUTO: 0.8 K/UL (ref 0.3–1)
MONOCYTES NFR BLD: 5.9 % (ref 4–15)
NEUTROPHILS # BLD AUTO: 10.5 K/UL (ref 1.8–7.7)
NEUTROPHILS NFR BLD: 75.3 % (ref 38–73)
NITRITE UR QL STRIP: NEGATIVE
NRBC BLD-RTO: 0 /100 WBC
PH UR STRIP: 5 [PH] (ref 5–8)
PLATELET # BLD AUTO: 429 K/UL (ref 150–450)
PMV BLD AUTO: 9.6 FL (ref 9.2–12.9)
POTASSIUM SERPL-SCNC: 3.7 MMOL/L (ref 3.5–5.1)
PROT SERPL-MCNC: 8.7 G/DL (ref 6–8.4)
PROT UR QL STRIP: ABNORMAL
RBC # BLD AUTO: 4.42 M/UL (ref 4–5.4)
RBC #/AREA URNS AUTO: 5 /HPF (ref 0–4)
SODIUM SERPL-SCNC: 139 MMOL/L (ref 136–145)
SP GR UR STRIP: 1.02 (ref 1–1.03)
SQUAMOUS #/AREA URNS AUTO: 7 /HPF
URN SPEC COLLECT METH UR: ABNORMAL
WBC # BLD AUTO: 14.02 K/UL (ref 3.9–12.7)
WBC #/AREA URNS AUTO: 4 /HPF (ref 0–5)

## 2022-05-26 PROCEDURE — 99213 PR OFFICE/OUTPT VISIT, EST, LEVL III, 20-29 MIN: ICD-10-PCS | Mod: HCNC,S$GLB,, | Performed by: STUDENT IN AN ORGANIZED HEALTH CARE EDUCATION/TRAINING PROGRAM

## 2022-05-26 PROCEDURE — 99999 PR PBB SHADOW E&M-EST. PATIENT-LVL IV: ICD-10-PCS | Mod: PBBFAC,HCNC,, | Performed by: STUDENT IN AN ORGANIZED HEALTH CARE EDUCATION/TRAINING PROGRAM

## 2022-05-26 PROCEDURE — 80053 COMPREHEN METABOLIC PANEL: CPT | Mod: HCNC | Performed by: STUDENT IN AN ORGANIZED HEALTH CARE EDUCATION/TRAINING PROGRAM

## 2022-05-26 PROCEDURE — 85025 COMPLETE CBC W/AUTO DIFF WBC: CPT | Mod: HCNC | Performed by: STUDENT IN AN ORGANIZED HEALTH CARE EDUCATION/TRAINING PROGRAM

## 2022-05-26 PROCEDURE — 36415 COLL VENOUS BLD VENIPUNCTURE: CPT | Mod: HCNC | Performed by: STUDENT IN AN ORGANIZED HEALTH CARE EDUCATION/TRAINING PROGRAM

## 2022-05-26 PROCEDURE — 86140 C-REACTIVE PROTEIN: CPT | Mod: HCNC | Performed by: STUDENT IN AN ORGANIZED HEALTH CARE EDUCATION/TRAINING PROGRAM

## 2022-05-26 PROCEDURE — 86592 SYPHILIS TEST NON-TREP QUAL: CPT | Mod: HCNC | Performed by: STUDENT IN AN ORGANIZED HEALTH CARE EDUCATION/TRAINING PROGRAM

## 2022-05-26 PROCEDURE — 81001 URINALYSIS AUTO W/SCOPE: CPT | Mod: HCNC | Performed by: STUDENT IN AN ORGANIZED HEALTH CARE EDUCATION/TRAINING PROGRAM

## 2022-05-26 PROCEDURE — 86780 TREPONEMA PALLIDUM: CPT | Mod: HCNC | Performed by: STUDENT IN AN ORGANIZED HEALTH CARE EDUCATION/TRAINING PROGRAM

## 2022-05-26 PROCEDURE — 87389 HIV-1 AG W/HIV-1&-2 AB AG IA: CPT | Mod: HCNC | Performed by: STUDENT IN AN ORGANIZED HEALTH CARE EDUCATION/TRAINING PROGRAM

## 2022-05-26 PROCEDURE — 99999 PR PBB SHADOW E&M-EST. PATIENT-LVL IV: CPT | Mod: PBBFAC,HCNC,, | Performed by: STUDENT IN AN ORGANIZED HEALTH CARE EDUCATION/TRAINING PROGRAM

## 2022-05-26 PROCEDURE — 99213 OFFICE O/P EST LOW 20 MIN: CPT | Mod: HCNC,S$GLB,, | Performed by: STUDENT IN AN ORGANIZED HEALTH CARE EDUCATION/TRAINING PROGRAM

## 2022-05-26 PROCEDURE — 87040 BLOOD CULTURE FOR BACTERIA: CPT | Mod: 59,HCNC | Performed by: STUDENT IN AN ORGANIZED HEALTH CARE EDUCATION/TRAINING PROGRAM

## 2022-05-26 RX ORDER — CHLORHEXIDINE GLUCONATE ORAL RINSE 1.2 MG/ML
15 SOLUTION DENTAL DAILY
Qty: 473 ML | Refills: 6 | Status: SHIPPED | OUTPATIENT
Start: 2022-05-26 | End: 2022-05-31

## 2022-05-26 RX ORDER — CHLORHEXIDINE GLUCONATE 40 MG/ML
SOLUTION TOPICAL DAILY
Qty: 473 ML | Refills: 6 | Status: SHIPPED | OUTPATIENT
Start: 2022-05-26 | End: 2022-05-31

## 2022-05-26 RX ORDER — MUPIROCIN 20 MG/G
OINTMENT TOPICAL
Qty: 22 G | Refills: 3 | Status: SHIPPED | OUTPATIENT
Start: 2022-05-26 | End: 2022-12-27 | Stop reason: SDUPTHER
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
## 2022-05-27 LAB
HIV 1+2 AB+HIV1 P24 AG SERPL QL IA: NEGATIVE
RPR SER QL: NORMAL
T PALLIDUM AB SER QL IF: NORMAL

## 2022-05-31 LAB
BACTERIA BLD CULT: NORMAL
BACTERIA BLD CULT: NORMAL

## 2022-05-31 NOTE — PROGRESS NOTES
I have reviewed the notes, assessments, and/or procedures performed by Dr. Solitario, I concur with her documentation of Marianela Shrestha.

## 2022-06-06 ENCOUNTER — TELEPHONE (OUTPATIENT)
Dept: INFECTIOUS DISEASES | Facility: CLINIC | Age: 37
End: 2022-06-06
Payer: MEDICAID

## 2022-06-06 NOTE — TELEPHONE ENCOUNTER
----- Message from Allison Solitario MD sent at 6/4/2022 11:31 AM CDT -----  Yes- thanks  ----- Message -----  From: Celina Robb MA  Sent: 5/30/2022   4:03 PM CDT  To: Allison Solitario MD    Ok to follow up with a PA?  ----- Message -----  From: Allison Solitario MD  Sent: 5/30/2022   3:47 PM CDT  To: Tresa Ryan LPN    Needs follow up in 2 weeks

## 2022-06-14 ENCOUNTER — OFFICE VISIT (OUTPATIENT)
Dept: INFECTIOUS DISEASES | Facility: CLINIC | Age: 37
End: 2022-06-14
Payer: COMMERCIAL

## 2022-06-14 VITALS
SYSTOLIC BLOOD PRESSURE: 143 MMHG | HEIGHT: 65 IN | DIASTOLIC BLOOD PRESSURE: 103 MMHG | HEART RATE: 94 BPM | BODY MASS INDEX: 38.86 KG/M2 | TEMPERATURE: 99 F | WEIGHT: 233.25 LBS
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
## 2022-06-14 DIAGNOSIS — L08.9 INFECTION OF CAT SCRATCH WOUND: Primary | ICD-10-CM

## 2022-06-14 DIAGNOSIS — R31.9 HEMATURIA, UNSPECIFIED TYPE: ICD-10-CM

## 2022-06-14 PROCEDURE — 99215 PR OFFICE/OUTPT VISIT, EST, LEVL V, 40-54 MIN: ICD-10-PCS | Mod: HCNC,S$GLB,, | Performed by: PHYSICIAN ASSISTANT

## 2022-06-14 PROCEDURE — 3077F SYST BP >= 140 MM HG: CPT | Mod: HCNC,CPTII,S$GLB, | Performed by: PHYSICIAN ASSISTANT

## 2022-06-14 PROCEDURE — 3080F DIAST BP >= 90 MM HG: CPT | Mod: HCNC,CPTII,S$GLB, | Performed by: PHYSICIAN ASSISTANT

## 2022-06-14 PROCEDURE — 3077F PR MOST RECENT SYSTOLIC BLOOD PRESSURE >= 140 MM HG: ICD-10-PCS | Mod: HCNC,CPTII,S$GLB, | Performed by: PHYSICIAN ASSISTANT

## 2022-06-14 PROCEDURE — 99215 OFFICE O/P EST HI 40 MIN: CPT | Mod: HCNC,S$GLB,, | Performed by: PHYSICIAN ASSISTANT

## 2022-06-14 PROCEDURE — 3008F BODY MASS INDEX DOCD: CPT | Mod: HCNC,CPTII,S$GLB, | Performed by: PHYSICIAN ASSISTANT

## 2022-06-14 PROCEDURE — 3008F PR BODY MASS INDEX (BMI) DOCUMENTED: ICD-10-PCS | Mod: HCNC,CPTII,S$GLB, | Performed by: PHYSICIAN ASSISTANT

## 2022-06-14 PROCEDURE — 99999 PR PBB SHADOW E&M-EST. PATIENT-LVL III: CPT | Mod: PBBFAC,HCNC,, | Performed by: PHYSICIAN ASSISTANT

## 2022-06-14 PROCEDURE — 3080F PR MOST RECENT DIASTOLIC BLOOD PRESSURE >= 90 MM HG: ICD-10-PCS | Mod: HCNC,CPTII,S$GLB, | Performed by: PHYSICIAN ASSISTANT

## 2022-06-14 PROCEDURE — 99999 PR PBB SHADOW E&M-EST. PATIENT-LVL III: ICD-10-PCS | Mod: PBBFAC,HCNC,, | Performed by: PHYSICIAN ASSISTANT

## 2022-06-14 RX ORDER — AMOXICILLIN AND CLAVULANATE POTASSIUM 875; 125 MG/1; MG/1
1 TABLET, FILM COATED ORAL EVERY 12 HOURS
Qty: 28 TABLET | Refills: 0 | Status: SHIPPED | OUTPATIENT
Start: 2022-06-14 | End: 2022-06-28

## 2022-06-16 ENCOUNTER — TELEPHONE (OUTPATIENT)
Dept: OTOLARYNGOLOGY | Facility: CLINIC | Age: 37
End: 2022-06-16
Payer: MEDICAID

## 2022-06-17 DIAGNOSIS — H05.413: Primary | ICD-10-CM

## 2022-06-22 NOTE — PROGRESS NOTES
Subjective:       Patient ID: Marianela Shrestha is a 37 y.o. female.    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 Pt admits to picking her skin. No fever chills or night sweats. No purulent drainage.        Review of Systems   Constitutional: Negative for activity change, appetite change, chills, diaphoresis, fatigue and fever.   Respiratory: Negative for cough and shortness of breath.    Gastrointestinal: Negative for abdominal pain, diarrhea, nausea and vomiting.   Genitourinary: Negative for dysuria.   Musculoskeletal: Negative for arthralgias, back pain and joint swelling.   Integumentary:  Negative for rash.   Neurological: Negative for dizziness and headaches.         Objective:      Physical Exam  Vitals and nursing note reviewed.   Constitutional:       General: She is not in acute distress.     Appearance: She is well-developed. She is not diaphoretic.   HENT:      Head: Normocephalic and atraumatic.   Eyes:      Pupils: Pupils are equal, round, and reactive to light.   Cardiovascular:      Rate and Rhythm: Normal rate and regular rhythm.      Heart sounds: Normal heart sounds. No murmur heard.    No friction rub. No gallop.   Pulmonary:      Effort: Pulmonary effort is normal. No respiratory distress.      Breath sounds: Normal breath sounds. No wheezing or rales.   Chest:      Chest wall: No tenderness.   Abdominal:      General: Bowel sounds are normal. There is no distension.      Palpations: Abdomen is soft. There is no mass.      Tenderness: There is no abdominal tenderness. There is no guarding or rebound.      Hernia: No hernia is present.   Musculoskeletal:         General: No tenderness or deformity. Normal range of motion.      Cervical  back: Normal range of motion and neck supple.   Skin:     General: Skin is warm and dry.      Coloration: Skin is not pale.      Findings: Erythema present.      Comments: Multiple wounds, skin break down, scars on extremities    Neurological:      Mental Status: She is alert and oriented to person, place, and time.      Cranial Nerves: No cranial nerve deficit.      Coordination: Coordination normal.   Psychiatric:         Behavior: Behavior normal.         Thought Content: Thought content normal.         Assessment:       Problem List Items Addressed This Visit    None     Visit Diagnoses     Infection of cat scratch wound    -  Primary    Relevant Medications    amoxicillin-clavulanate 875-125mg (AUGMENTIN) 875-125 mg per tablet    Hematuria, unspecified type        Relevant Orders    Ambulatory referral/consult to Urology          Plan:         1. Augmentin 875 mg po bid x 14 days. Advised pt to not pick scabs.   2. F/u with urology for hematuria  3. All questions answered.  4. Pt with dog at bedside. Encouraged and recommended dog to be service trained by a  before bringing in canine to hospital or community setting.     >65 minutes of total time spent on the encounter, which includes face to face time and non-face to face time preparing to see the patient (eg, review of tests), Obtaining and/or reviewing separately obtained history, Documenting clinical information in the electronic or other health record, Independently interpreting results (not separately reported) and communicating results to the patient/family/caregiver, or Care coordination (not separately reported).

## 2022-06-30 ENCOUNTER — TELEPHONE (OUTPATIENT)
Dept: OTOLARYNGOLOGY | Facility: CLINIC | Age: 37
End: 2022-06-30
Payer: MEDICAID

## 2022-06-30 ENCOUNTER — ANESTHESIA EVENT (OUTPATIENT)
Dept: SURGERY | Facility: HOSPITAL | Age: 37
End: 2022-06-30
Payer: COMMERCIAL

## 2022-06-30 ENCOUNTER — PATIENT MESSAGE (OUTPATIENT)
Dept: OTOLARYNGOLOGY | Facility: CLINIC | Age: 37
End: 2022-06-30
Payer: MEDICAID
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
## 2022-07-01 ENCOUNTER — HOSPITAL ENCOUNTER (OUTPATIENT)
Facility: HOSPITAL | Age: 37
Discharge: HOME | End: 2022-07-01
Attending: STUDENT IN AN ORGANIZED HEALTH CARE EDUCATION/TRAINING PROGRAM | Admitting: STUDENT IN AN ORGANIZED HEALTH CARE EDUCATION/TRAINING PROGRAM
Payer: COMMERCIAL

## 2022-07-01 ENCOUNTER — ANESTHESIA (OUTPATIENT)
Dept: SURGERY | Facility: HOSPITAL | Age: 37
End: 2022-07-01
Payer: COMMERCIAL

## 2022-07-01 VITALS
WEIGHT: 233 LBS | RESPIRATION RATE: 16 BRPM | HEART RATE: 85 BPM | SYSTOLIC BLOOD PRESSURE: 142 MMHG | HEIGHT: 65 IN | TEMPERATURE: 98 F | DIASTOLIC BLOOD PRESSURE: 85 MMHG | OXYGEN SATURATION: 97 % | BODY MASS INDEX: 38.82 KG/M2

## 2022-07-01 DIAGNOSIS — J32.9 SINUSITIS: Primary | ICD-10-CM

## 2022-07-01 DIAGNOSIS — H05.413: ICD-10-CM

## 2022-07-01 LAB
B-HCG UR QL: NEGATIVE
CTP QC/QA: YES
CTP QC/QA: YES
SARS-COV-2 AG RESP QL IA.RAPID: NEGATIVE

## 2022-07-01 PROCEDURE — D9220A PRA ANESTHESIA: Mod: HCNC,,, | Performed by: ANESTHESIOLOGY

## 2022-07-01 PROCEDURE — 31255 PR NASAL/SINUS ENDOSCOPY,REMV TOTL ETHMOID: ICD-10-PCS | Mod: 50,51,HCNC, | Performed by: STUDENT IN AN ORGANIZED HEALTH CARE EDUCATION/TRAINING PROGRAM

## 2022-07-01 PROCEDURE — 25000003 PHARM REV CODE 250: Mod: HCNC

## 2022-07-01 PROCEDURE — 88311 DECALCIFY TISSUE: CPT | Mod: 59,HCNC | Performed by: STUDENT IN AN ORGANIZED HEALTH CARE EDUCATION/TRAINING PROGRAM

## 2022-07-01 PROCEDURE — 61782 PR STEREOTACTIC COMP ASSIST PROC,CRANIAL,EXTRADURAL: ICD-10-PCS | Mod: HCNC,,, | Performed by: STUDENT IN AN ORGANIZED HEALTH CARE EDUCATION/TRAINING PROGRAM

## 2022-07-01 PROCEDURE — 63600175 PHARM REV CODE 636 W HCPCS: Mod: HCNC

## 2022-07-01 PROCEDURE — 31267 PR NASAL/SINUS ENDOSCOPY,RMV TISS MAXILL SINUS: ICD-10-PCS | Mod: 50,51,HCNC, | Performed by: STUDENT IN AN ORGANIZED HEALTH CARE EDUCATION/TRAINING PROGRAM

## 2022-07-01 PROCEDURE — 88305 TISSUE EXAM BY PATHOLOGIST: ICD-10-PCS | Mod: 26,HCNC,, | Performed by: STUDENT IN AN ORGANIZED HEALTH CARE EDUCATION/TRAINING PROGRAM

## 2022-07-01 PROCEDURE — 25000003 PHARM REV CODE 250: Mod: HCNC | Performed by: STUDENT IN AN ORGANIZED HEALTH CARE EDUCATION/TRAINING PROGRAM

## 2022-07-01 PROCEDURE — 71000015 HC POSTOP RECOV 1ST HR: Mod: HCNC | Performed by: STUDENT IN AN ORGANIZED HEALTH CARE EDUCATION/TRAINING PROGRAM

## 2022-07-01 PROCEDURE — 61782 SCAN PROC CRANIAL EXTRA: CPT | Mod: HCNC,,, | Performed by: STUDENT IN AN ORGANIZED HEALTH CARE EDUCATION/TRAINING PROGRAM

## 2022-07-01 PROCEDURE — 30930 PR THERAPUTIC FRACTURE INFER TURBINATE: ICD-10-PCS | Mod: 51,HCNC,, | Performed by: STUDENT IN AN ORGANIZED HEALTH CARE EDUCATION/TRAINING PROGRAM

## 2022-07-01 PROCEDURE — 71000044 HC DOSC ROUTINE RECOVERY FIRST HOUR: Mod: HCNC | Performed by: STUDENT IN AN ORGANIZED HEALTH CARE EDUCATION/TRAINING PROGRAM

## 2022-07-01 PROCEDURE — 27201423 OPTIME MED/SURG SUP & DEVICES STERILE SUPPLY: Mod: HCNC | Performed by: STUDENT IN AN ORGANIZED HEALTH CARE EDUCATION/TRAINING PROGRAM

## 2022-07-01 PROCEDURE — D9220A PRA ANESTHESIA: ICD-10-PCS | Mod: HCNC,,, | Performed by: ANESTHESIOLOGY

## 2022-07-01 PROCEDURE — C1894 INTRO/SHEATH, NON-LASER: HCPCS | Mod: HCNC | Performed by: STUDENT IN AN ORGANIZED HEALTH CARE EDUCATION/TRAINING PROGRAM

## 2022-07-01 PROCEDURE — 37000009 HC ANESTHESIA EA ADD 15 MINS: Mod: HCNC | Performed by: STUDENT IN AN ORGANIZED HEALTH CARE EDUCATION/TRAINING PROGRAM

## 2022-07-01 PROCEDURE — 30520 PR REPAIR, NASAL SEPTUM: ICD-10-PCS | Mod: HCNC,,, | Performed by: STUDENT IN AN ORGANIZED HEALTH CARE EDUCATION/TRAINING PROGRAM

## 2022-07-01 PROCEDURE — 88305 TISSUE EXAM BY PATHOLOGIST: CPT | Mod: 26,HCNC,, | Performed by: STUDENT IN AN ORGANIZED HEALTH CARE EDUCATION/TRAINING PROGRAM

## 2022-07-01 PROCEDURE — 31267 ENDOSCOPY MAXILLARY SINUS: CPT | Mod: 50,51,HCNC, | Performed by: STUDENT IN AN ORGANIZED HEALTH CARE EDUCATION/TRAINING PROGRAM

## 2022-07-01 PROCEDURE — 36000711: Mod: HCNC | Performed by: STUDENT IN AN ORGANIZED HEALTH CARE EDUCATION/TRAINING PROGRAM

## 2022-07-01 PROCEDURE — 88311 DECALCIFY TISSUE: CPT | Mod: 26,HCNC,, | Performed by: STUDENT IN AN ORGANIZED HEALTH CARE EDUCATION/TRAINING PROGRAM

## 2022-07-01 PROCEDURE — 88305 TISSUE EXAM BY PATHOLOGIST: CPT | Mod: 59,HCNC | Performed by: STUDENT IN AN ORGANIZED HEALTH CARE EDUCATION/TRAINING PROGRAM

## 2022-07-01 PROCEDURE — 71000045 HC DOSC ROUTINE RECOVERY EA ADD'L HR: Mod: HCNC | Performed by: STUDENT IN AN ORGANIZED HEALTH CARE EDUCATION/TRAINING PROGRAM

## 2022-07-01 PROCEDURE — 36000710: Mod: HCNC | Performed by: STUDENT IN AN ORGANIZED HEALTH CARE EDUCATION/TRAINING PROGRAM

## 2022-07-01 PROCEDURE — 31255 NSL/SINS NDSC W/TOT ETHMDCT: CPT | Mod: 50,51,HCNC, | Performed by: STUDENT IN AN ORGANIZED HEALTH CARE EDUCATION/TRAINING PROGRAM

## 2022-07-01 PROCEDURE — 30930 THER FX NASAL INF TURBINATE: CPT | Mod: 51,HCNC,, | Performed by: STUDENT IN AN ORGANIZED HEALTH CARE EDUCATION/TRAINING PROGRAM

## 2022-07-01 PROCEDURE — 37000008 HC ANESTHESIA 1ST 15 MINUTES: Mod: HCNC | Performed by: STUDENT IN AN ORGANIZED HEALTH CARE EDUCATION/TRAINING PROGRAM

## 2022-07-01 PROCEDURE — 63600175 PHARM REV CODE 636 W HCPCS: Mod: HCNC | Performed by: STUDENT IN AN ORGANIZED HEALTH CARE EDUCATION/TRAINING PROGRAM

## 2022-07-01 PROCEDURE — 30520 REPAIR OF NASAL SEPTUM: CPT | Mod: HCNC,,, | Performed by: STUDENT IN AN ORGANIZED HEALTH CARE EDUCATION/TRAINING PROGRAM

## 2022-07-01 PROCEDURE — 88311 PR  DECALCIFY TISSUE: ICD-10-PCS | Mod: 26,HCNC,, | Performed by: STUDENT IN AN ORGANIZED HEALTH CARE EDUCATION/TRAINING PROGRAM

## 2022-07-01 PROCEDURE — 81025 URINE PREGNANCY TEST: CPT | Mod: HCNC | Performed by: STUDENT IN AN ORGANIZED HEALTH CARE EDUCATION/TRAINING PROGRAM

## 2022-07-01 RX ORDER — FENTANYL CITRATE 50 UG/ML
INJECTION, SOLUTION INTRAMUSCULAR; INTRAVENOUS
Status: DISCONTINUED | OUTPATIENT
Start: 2022-07-01 | End: 2022-07-01

## 2022-07-01 RX ORDER — DEXAMETHASONE SODIUM PHOSPHATE 4 MG/ML
INJECTION, SOLUTION INTRA-ARTICULAR; INTRALESIONAL; INTRAMUSCULAR; INTRAVENOUS; SOFT TISSUE
Status: DISCONTINUED | OUTPATIENT
Start: 2022-07-01 | End: 2022-07-01

## 2022-07-01 RX ORDER — HYDROMORPHONE HYDROCHLORIDE 1 MG/ML
0.2 INJECTION, SOLUTION INTRAMUSCULAR; INTRAVENOUS; SUBCUTANEOUS EVERY 5 MIN PRN
Status: DISCONTINUED | OUTPATIENT
Start: 2022-07-01 | End: 2022-07-01 | Stop reason: HOSPADM

## 2022-07-01 RX ORDER — ROCURONIUM BROMIDE 10 MG/ML
INJECTION, SOLUTION INTRAVENOUS
Status: DISCONTINUED | OUTPATIENT
Start: 2022-07-01 | End: 2022-07-01

## 2022-07-01 RX ORDER — KETAMINE HCL IN 0.9 % NACL 50 MG/5 ML
SYRINGE (ML) INTRAVENOUS
Status: DISCONTINUED | OUTPATIENT
Start: 2022-07-01 | End: 2022-07-01

## 2022-07-01 RX ORDER — ACETAMINOPHEN 10 MG/ML
INJECTION, SOLUTION INTRAVENOUS
Status: DISCONTINUED | OUTPATIENT
Start: 2022-07-01 | End: 2022-07-01

## 2022-07-01 RX ORDER — EPINEPHRINE 1 MG/ML
INJECTION, SOLUTION INTRACARDIAC; INTRAMUSCULAR; INTRAVENOUS; SUBCUTANEOUS
Status: DISCONTINUED | OUTPATIENT
Start: 2022-07-01 | End: 2022-07-01 | Stop reason: HOSPADM

## 2022-07-01 RX ORDER — OXYMETAZOLINE HCL 0.05 %
SPRAY, NON-AEROSOL (ML) NASAL
Status: DISCONTINUED | OUTPATIENT
Start: 2022-07-01 | End: 2022-07-01 | Stop reason: HOSPADM

## 2022-07-01 RX ORDER — HYDROCODONE BITARTRATE AND ACETAMINOPHEN 5; 325 MG/1; MG/1
1 TABLET ORAL ONCE
Status: COMPLETED | OUTPATIENT
Start: 2022-07-01 | End: 2022-07-01

## 2022-07-01 RX ORDER — PHENYLEPHRINE HCL IN 0.9% NACL 1 MG/10 ML
SYRINGE (ML) INTRAVENOUS
Status: DISCONTINUED | OUTPATIENT
Start: 2022-07-01 | End: 2022-07-01

## 2022-07-01 RX ORDER — LIDOCAINE HYDROCHLORIDE 10 MG/ML
1 INJECTION, SOLUTION EPIDURAL; INFILTRATION; INTRACAUDAL; PERINEURAL ONCE AS NEEDED
Status: COMPLETED | OUTPATIENT
Start: 2022-07-01 | End: 2022-07-01

## 2022-07-01 RX ORDER — SODIUM CHLORIDE 9 MG/ML
INJECTION, SOLUTION INTRAVENOUS CONTINUOUS
Status: DISCONTINUED | OUTPATIENT
Start: 2022-07-01 | End: 2022-07-01 | Stop reason: HOSPADM

## 2022-07-01 RX ORDER — PROPOFOL 10 MG/ML
VIAL (ML) INTRAVENOUS CONTINUOUS PRN
Status: DISCONTINUED | OUTPATIENT
Start: 2022-07-01 | End: 2022-07-01

## 2022-07-01 RX ORDER — ONDANSETRON 4 MG/1
8 TABLET, ORALLY DISINTEGRATING ORAL EVERY 8 HOURS PRN
Qty: 10 TABLET | Refills: 0 | Status: ON HOLD | OUTPATIENT
Start: 2022-07-01 | End: 2022-12-15 | Stop reason: SDUPTHER

## 2022-07-01 RX ORDER — NEOSTIGMINE METHYLSULFATE 0.5 MG/ML
INJECTION, SOLUTION INTRAVENOUS
Status: DISCONTINUED | OUTPATIENT
Start: 2022-07-01 | End: 2022-07-01

## 2022-07-01 RX ORDER — MIDAZOLAM HYDROCHLORIDE 1 MG/ML
INJECTION, SOLUTION INTRAMUSCULAR; INTRAVENOUS
Status: DISCONTINUED | OUTPATIENT
Start: 2022-07-01 | End: 2022-07-01

## 2022-07-01 RX ORDER — HYDROCODONE BITARTRATE AND ACETAMINOPHEN 5; 325 MG/1; MG/1
1 TABLET ORAL EVERY 6 HOURS PRN
Qty: 5 TABLET | Refills: 0 | Status: SHIPPED | OUTPATIENT
Start: 2022-07-01 | End: 2022-07-01 | Stop reason: SDUPTHER

## 2022-07-01 RX ORDER — ONDANSETRON 2 MG/ML
INJECTION INTRAMUSCULAR; INTRAVENOUS
Status: DISCONTINUED | OUTPATIENT
Start: 2022-07-01 | End: 2022-07-01

## 2022-07-01 RX ORDER — PROPOFOL 10 MG/ML
VIAL (ML) INTRAVENOUS
Status: DISCONTINUED | OUTPATIENT
Start: 2022-07-01 | End: 2022-07-01

## 2022-07-01 RX ORDER — LIDOCAINE HYDROCHLORIDE AND EPINEPHRINE 10; 10 MG/ML; UG/ML
INJECTION, SOLUTION INFILTRATION; PERINEURAL
Status: DISCONTINUED | OUTPATIENT
Start: 2022-07-01 | End: 2022-07-01 | Stop reason: HOSPADM

## 2022-07-01 RX ORDER — SODIUM CHLORIDE 0.9 % (FLUSH) 0.9 %
10 SYRINGE (ML) INJECTION
Status: DISCONTINUED | OUTPATIENT
Start: 2022-07-01 | End: 2022-07-01 | Stop reason: HOSPADM

## 2022-07-01 RX ORDER — LIDOCAINE HYDROCHLORIDE 20 MG/ML
INJECTION, SOLUTION EPIDURAL; INFILTRATION; INTRACAUDAL; PERINEURAL
Status: DISCONTINUED | OUTPATIENT
Start: 2022-07-01 | End: 2022-07-01

## 2022-07-01 RX ORDER — HYDROCODONE BITARTRATE AND ACETAMINOPHEN 5; 325 MG/1; MG/1
1 TABLET ORAL EVERY 6 HOURS PRN
Qty: 5 TABLET | Refills: 0 | Status: ON HOLD | OUTPATIENT
Start: 2022-07-01 | End: 2022-11-12 | Stop reason: HOSPADM

## 2022-07-01 RX ORDER — HALOPERIDOL 5 MG/ML
0.5 INJECTION INTRAMUSCULAR EVERY 10 MIN PRN
Status: DISCONTINUED | OUTPATIENT
Start: 2022-07-01 | End: 2022-07-01 | Stop reason: HOSPADM

## 2022-07-01 RX ADMIN — NEOSTIGMINE METHYLSULFATE 3 MG: 0.5 INJECTION INTRAVENOUS at 10:07

## 2022-07-01 RX ADMIN — DEXTROSE 2 G: 50 INJECTION, SOLUTION INTRAVENOUS at 07:07

## 2022-07-01 RX ADMIN — ROCURONIUM BROMIDE 10 MG: 10 INJECTION INTRAVENOUS at 07:07

## 2022-07-01 RX ADMIN — ROCURONIUM BROMIDE 50 MG: 10 INJECTION INTRAVENOUS at 07:07

## 2022-07-01 RX ADMIN — Medication 100 MCG/KG/MIN: at 07:07

## 2022-07-01 RX ADMIN — SODIUM CHLORIDE: 0.9 INJECTION, SOLUTION INTRAVENOUS at 06:07

## 2022-07-01 RX ADMIN — GLYCOPYRROLATE 0.6 MG: 0.2 INJECTION, SOLUTION INTRAMUSCULAR; INTRAVITREAL at 10:07

## 2022-07-01 RX ADMIN — LIDOCAINE HYDROCHLORIDE 100 MG: 20 INJECTION, SOLUTION EPIDURAL; INFILTRATION; INTRACAUDAL at 07:07

## 2022-07-01 RX ADMIN — Medication 100 MCG: at 08:07

## 2022-07-01 RX ADMIN — HYDROMORPHONE HYDROCHLORIDE 0.2 MG: 1 INJECTION, SOLUTION INTRAMUSCULAR; INTRAVENOUS; SUBCUTANEOUS at 11:07

## 2022-07-01 RX ADMIN — SODIUM CHLORIDE, SODIUM GLUCONATE, SODIUM ACETATE, POTASSIUM CHLORIDE, MAGNESIUM CHLORIDE, SODIUM PHOSPHATE, DIBASIC, AND POTASSIUM PHOSPHATE: .53; .5; .37; .037; .03; .012; .00082 INJECTION, SOLUTION INTRAVENOUS at 07:07

## 2022-07-01 RX ADMIN — LIDOCAINE HYDROCHLORIDE 0.2 MG: 10 INJECTION, SOLUTION EPIDURAL; INFILTRATION; INTRACAUDAL at 06:07

## 2022-07-01 RX ADMIN — ROCURONIUM BROMIDE 10 MG: 10 INJECTION INTRAVENOUS at 08:07

## 2022-07-01 RX ADMIN — SODIUM CHLORIDE: 9 INJECTION, SOLUTION INTRAVENOUS at 06:07

## 2022-07-01 RX ADMIN — ONDANSETRON 4 MG: 2 INJECTION INTRAMUSCULAR; INTRAVENOUS at 10:07

## 2022-07-01 RX ADMIN — Medication 10 MG: at 09:07

## 2022-07-01 RX ADMIN — MIDAZOLAM HYDROCHLORIDE 2 MG: 1 INJECTION, SOLUTION INTRAMUSCULAR; INTRAVENOUS at 06:07

## 2022-07-01 RX ADMIN — HYDROCODONE BITARTRATE AND ACETAMINOPHEN 1 TABLET: 5; 325 TABLET ORAL at 12:07

## 2022-07-01 RX ADMIN — ROCURONIUM BROMIDE 10 MG: 10 INJECTION INTRAVENOUS at 09:07

## 2022-07-01 RX ADMIN — PROPOFOL 200 MG: 10 INJECTION, EMULSION INTRAVENOUS at 07:07

## 2022-07-01 RX ADMIN — DEXAMETHASONE SODIUM PHOSPHATE 8 MG: 4 INJECTION INTRA-ARTICULAR; INTRALESIONAL; INTRAMUSCULAR; INTRAVENOUS; SOFT TISSUE at 07:07

## 2022-07-01 RX ADMIN — Medication 20 MG: at 07:07

## 2022-07-01 RX ADMIN — ACETAMINOPHEN 1000 MG: 10 INJECTION INTRAVENOUS at 07:07

## 2022-07-01 RX ADMIN — FENTANYL CITRATE 100 MCG: 50 INJECTION INTRAMUSCULAR; INTRAVENOUS at 07:07

## 2022-07-01 NOTE — H&P
Agree with the H&P below. ID clearance obtained. Proceed with septoplasty/ITR    Subjective:      Marianela is a 36 y.o. female who comes for follow-up of sinusitis.  Her last visit with me was on 9/17/2021.       She has not had a sinus infection since our last visit. She has been using her budesonide rinses. Her main complaint today is inability to cough up her bronchial/pulmonology secretions. She has had PFTs since our last visit, but has not had an evaluation by a pulmonologist. Her PFTs showed some slight abnormalities from communication with the ordering MD (was not able to look at the results personally). She still denies any stridor or difficulty breathing.      His current sinus regime consists of:  Budesonide sinus rinses.     The assessment of quality and severity of symptoms as measured by the SNOT-22 score is 1 and the STOP-BANG score is 66.      The patient's medications, allergies, past medical, surgical, social and family histories were reviewed and updated as appropriate.     A detailed review of systems was obtained with pertinent positives as per the above HPI, and otherwise negative.     Answers for HPI/ROS submitted by the patient on 11/2/2021  Fatigue (Tiredness)?: Yes  fever: Yes  sinus pressure : Yes  Tooth/Dental Problems?: Yes  trouble swallowing: Yes  eye itching: Yes  Light sensitivity / Light hurts the eyes?: Yes  cough: Yes  Irregular heartbeat?: Yes  constipation: Yes  None of these: Yes  Muscle aches / pain?: Yes  back pain: Yes  neck pain: Yes  rash: Yes  Food Allergies?: Yes  Seasonal Allergies?: Yes  Cold all of the time? : Yes  headaches: Yes  Light-headedness: Yes  None of these: Yes  decreased concentration: Yes  Feeling depressed?: Yes  nervous/ anxious: Yes  sleep disturbance: Yes            Physical Examination  Vitals -  weight is 89.7 kg (197 lb 12 oz). Her blood pressure is 149/97 (abnormal) and her pulse is 101.   Constitutional - General appearance: Normal. Ability to  communicate: Normal.  Head & Face - Overall appearance, scars, masses: Normal. Palpation &/or percussion of face: Normal. Salivary glands: Normal. Facial strength: Normal  ENMT - Otoscopic exam: Normal. Assessment of hearing: Normal. External inspection: Normal. Nasal mucosa, septum, turbinates: Abnormal see exam details. Lips, teeth, gums: Normal. Oropharynx: Normal. Pharyngeal walls/pyriform sinus: Normal. Larynx: Normal. Nasopharynx: Normal  Neck - Neck: Normal. Thyroid: Normal  Lymphatic - Palpation of lymph nodes: Normal  Eyes - Ocular mobility: Normal  Respiratory - Inspection of Chest: Normal. Normal work of breathing, no stridor.   Cardiovascular - Peripheral vascular system: Normal  Neurological/Psychiatric - Orientation: Normal  Skin: Multiple healing scars from skin picking all over the patients body.         Objective:      /86 (Patient Position: Sitting)   Pulse 100   Wt 89.4 kg (197 lb)   LMP 10/07/2021   BMI 33.81 kg/m²       Physical Examination     Constitutional - General appearance: Normal. Ability to communicate: Normal.  Head & Face - Overall appearance, scars, masses: Normal. Palpation &/or percussion of face: Normal. Salivary glands: Normal. Facial strength: Normal  ENMT - Otoscopic exam: Normal. Assessment of hearing: Normal. External inspection: Normal. Nasal mucosa, septum, turbinates: Abnormal see exam details. Lips, teeth, gums: Normal. Oropharynx: Normal. Pharyngeal walls/pyriform sinus: Normal. Larynx: Normal. Nasopharynx: Normal  Neck - Neck: Normal. Thyroid: Normal  Lymphatic - Palpation of lymph nodes: Normal  Eyes - Ocular mobility: Normal  Respiratory - Inspection of Chest: Normal. Normal work of breathing, no stridor.   Cardiovascular - Peripheral vascular system: Normal  Neurological/Psychiatric - Orientation: Normal  Skin: Multiple healing scars from skin picking all over the patients body.     Procedure     Nasal endoscopy performed.  See procedure note.     Nasal  Endoscopy:  11/2/2021     The use of diagnostic nasal endoscopy was considered medically necessary for the evaluation and visualization of the nasal anatomy for symptoms suggestive of nasal or sinus origin. Physical examination (including a nasal speculum evaluation) did not provide sufficient clinical information to establish a diagnosis, or symptoms did not improve or worsened following treatment.      The nasal cavity was decongested with topical 1% phenylephrine and anesthetized with 4% lidocaine.  A rigid 0-degree endoscope was introduced into the nasal cavity.     The patient was seated in the examination chair. After discussion of risks and benefits, a nasal endoscope was inserted into the nose the endoscope was passed along the left nasal floor to the nasopharynx. It was then passed between the middle and superior meatus, nasal turbinates, nasal septum, nasopharynx and sphenoethmoid region. The nasal endoscope was withdrawn and there was no complications. An identical procedure was performed on the right side. I was present for the entire procedure.The patient tolerated the above procedure well. The findings of this procedure can be found in the dictated note from 11/2/2021 visit.       Nasal endoscopy reveals hypertropic inferior turbinates, thick clear secretions throughout the nasal cavity bilaterally.      Data Reviewed         WBC (K/uL)   Date Value   07/30/2021 9.41          Eosinophil % (%)   Date Value   07/30/2021 7.4          Eos # (K/uL)   Date Value   07/30/2021 0.7 (H)          Platelets (K/uL)   Date Value   07/30/2021 374          Glucose (mg/dL)   Date Value   07/30/2021 104      No results found for: IGE     No sinus imaging available.        Assessment:      1. Subacute maxillary sinusitis    2. Nasal septal deviation    3. Hypertrophy of both inferior nasal turbinates    4. Chronic sinusitis, unspecified location          Plan:      - Continue budesonide  - Schedule CT sinus/neck  -  Cultures obtained will call with results. Abx as dictated by cultures  - f/u pulm       Follow up in about 3 months (around 2/2/2022).

## 2022-07-01 NOTE — LETTER
July 1, 2022         [address]  [facility] [address]  Phone: [phone]  Fax: [phone]       Patient: Marianela Shrestha   YOB: 1985  Date of Visit: 07/01/2022    To Whom It May Concern:    Vernon Shrestha  was at [facility] on 07/01/2022. The patient may return to work/school on 7/8/22 with no restrictions. She may be unable to travel distances for one week due to her condition. If you have any questions or concerns, or if I can be of further assistance, please do not hesitate to contact me.     Sincerely,    Branden Beth MD

## 2022-07-01 NOTE — OP NOTE
7/1/2022     PREOPERATIVE DIAGNOSIS(ES):    1.   Bilateral silent sinus syndrome  2.   Deviated nasal septum     POSTOPERATIVE DIAGNOSIS(ES):    1.   Same     PROCEDURE:    1.   Bilateral maxillary antrostomy with removal of tissue.  2.   Bilateral ethmoidectomy, anterior.  3.   Septoplasty.  4.   Use of Medtronic neuronavigation.     ANESTHESIA:  General endotracheal.     SURGEON(S):  Segundo Fu MD     ASSISTANT:  Porfirio Beth MD     ESTIMATED BLOOD LOSS:  Minimal.     COMPLICATIONS:  None     FINDINGS:    1.   Bilateral atelectatic uncinate processes  2.  Thick mucoid secretions in the maxillary sinuses     INDICATIONS FOR PROCEDURE:  This is a 37-year-old female with a history of head aches and facial pressure. CT imaging showed hypoplastic maxillary sinuses bilaterally with atelectatic uncinate processes bilatearlly.     The risks, benefits, and alternatives of surgery were discussed at length with the patient and include, but are not limited to bleeding, infection, need for revision surgery, injury to the eye or brain, double vision, loss of vision, cerebrospinal fluid leakage, meningitis, chronic sinusitis, nasal obstruction, nasal septal perforation, numbness to teeth or cheek, or need for time and expense off work.  The patient demonstrates understanding and wishes to proceed.  All of her questions were answered to her satisfaction.     DESCRIPTION OF PROCEDURE:  The patient was taken back to the operating room and after successful induction of general anesthesia, was prepped and draped in a sterile fashion.  The Medtronic neuronavigation system was employed.  The images were uploaded, the instruments were calibrated, and accuracy was confirmed.  Afrin pledgets were used intranasally for decongestion, and approximately 10 cc of 1% lidocaine with epinephrine 1:100,000 was used intranasally.     A pre-operative timeout was taken to confirm the correct patient and procedure being performed.      Attention was  first turned to the left side.  There was a severe left septal deformity, which was preventing access into the paranasal sinuses.  As such, a septoplasty was performed.  A left-sided hemitransfixion incision was made and a mucoperichondrial/mucoperiosteal flap was elevated. The bony cartilaginous junction was divided and the bony deviated septum was removed leading to good straightening of the nasal septum.  Once the septum was straight and both nasal cavities could be accessed, the left-sided hemitransfixion incision was closed using a 4-0 chromic gut suture followed by a 4-0 plain gut suture in a mattress fashion.      Attention was then turned to the right side.  The uncinate process was then identified and taken down in a standard fashion, and the natural maxillary sinus ostium was identified and opened widely using straight and backbiting instruments.  Thick mucoid secretions were suctioned free from the sinus.  An inferior window was then placed in the inferior aspect of the maxillary sinus to assist with debridement.  The free edges of the inferior window were then cauterized using suction monopolar cautery. The inferior turbinate was outfractured.  Attention was then returned to the middle meatus.  Using a Xomed microdebrider, the ethmoid bulla was perforated and dissection was carried posteriorly until the basal lamella was reached.  Dissection then proceeded anteriorly along the skull base and lamina papyracea in order to skeletonize these structures.      Attention was then turned to the left side and a similar procedure was performed.  The uncinate process was identified and taken down in a standard fashion.  The natural maxillary sinus ostium was identified and opened widely using straight and backbiting instruments.  The sinus was filled with mucoid secretions.  An inferior window was then placed in the inferior aspect of the maxillary sinus to assist with debridement.  The inferior window were  cauterized using suction monopolar cautery to ensure hemostasis. Next, attention was returned to the ethmoid.  Using a Xomed microdebrider, the ethmoid bulla was perforated and dissection was carried posteriorly until the basal lamella was reached.  Dissection then was carried anteriorly along the skull base and lamina papyracea in order to skeletonize these structures.       At the conclusion of the case, the nose was copiously irrigated with saline solution to remove all debris and both inferior turbinates were outfractured.  Hemostasis was obtained throughout using suction monopolar cautery as well as dilute epinephrine/saline pledgets until hemostasis was achieved. NasoPore was placed in the middle meatus to ensure appropriate middle turbinate medialization.     The patient was then awakened, extubated, and taken to recovery room in stable condition.      I was scrubbed and personally present during the all portions of this procedure, and I was immediately available throughout the entire procedure.    Segundo Fu MD

## 2022-07-01 NOTE — ANESTHESIA POSTPROCEDURE EVALUATION
Anesthesia Post Evaluation    Patient: Marianela Shrestha    Procedure(s) Performed: Procedure(s) (LRB):  FESS, USING COMPUTER-ASSISTED NAVIGATION (Bilateral)  SEPTOPLASTY, NOSE, WITH NASAL TURBINATE REDUCTION    Final Anesthesia Type: general      Patient location during evaluation: [facility]  Patient participation: Yes- Able to Participate  Level of consciousness: awake and alert  Post-procedure vital signs: reviewed and stable  Pain management: adequate  Airway patency: patent  NEERU mitigation strategies: Extubation while patient is awake  PONV status at discharge: No PONV  Anesthetic complications: no      Cardiovascular status: stable  Respiratory status: unassisted and spontaneous ventilation  Hydration status: euvolemic  Follow-up not needed.          Vitals Value Taken Time   /85 07/01/22 1054   Temp 36.7 °C (98 °F) 07/01/22 1050   Pulse 96 07/01/22 1317   Resp 16 07/01/22 1251   SpO2 96 % 07/01/22 1317   Vitals shown include unvalidated device data.      No case tracking events are documented in the log.      Pain/Melanie Score: Pain Rating Prior to Med Admin: 10 (7/1/2022 12:51 PM)  Melanie Score: 10 (7/1/2022  1:30 PM)

## 2022-07-01 NOTE — TRANSFER OF CARE
"Anesthesia Transfer of Care Note    Patient: Marianela Shrestha    Procedure(s) Performed: Procedure(s) (LRB):  FESS, USING COMPUTER-ASSISTED NAVIGATION (Bilateral)  SEPTOPLASTY, NOSE, WITH NASAL TURBINATE REDUCTION    Patient location: PACU    Anesthesia Type: general    Transport from OR: Transported from OR on 6-10 L/min O2 by face mask with adequate spontaneous ventilation    Post pain: adequate analgesia    Post assessment: no apparent anesthetic complications    Post vital signs: stable    Level of consciousness: awake and alert    Nausea/Vomiting: no nausea/vomiting    Complications: none    Transfer of care protocol was followed      Last vitals:   Visit Vitals  BP (!) 130/94 (BP Location: Right arm, Patient Position: Lying)   Pulse 100   Temp 36.7 °C (98.1 °F) (Temporal)   Resp 18   Ht 5' 5" (1.651 m)   Wt 105.7 kg (233 lb)   LMP 01/07/2022   SpO2 97%   Breastfeeding No   BMI 38.77 kg/m²     "

## 2022-07-01 NOTE — PATIENT INSTRUCTIONS
INSTRUCTIONS TO FOLLOW AFTER SINUS AND NASAL SURGERY  DR. BARTON - [facility]    Office hours:  Weekdays 8:00 am to 5:00 pm.  Please call [phone] and ask to speak with his nurse, Jessie.    After-hours & weekends:  Please call [phone] and ask to speak with the ENT resident doctor.    Your first office visit with Dr. Fu after surgery should have been already scheduled.  If you dont know when it is, call Dr. Fu's nurse Jessie at [phone].    Please call IMMMEDIATELY if you have:  Temperature of 101° F or greater  Any unusual, painful swelling  Any active bleeding that saturates more than a 4x4 gauze  Any thick drainage green or yellow drainage  Changes in vision or swelling around the eye  Pain not relieved by your prescribed pain medication    ACTIVITY:    Sleep on your back with the head of the bead elevated, up on 2-3 pillows, or in a recliner for the first 3 to 5 days. This will help with swelling.     After surgery you may have a lot of nasal drainage. This is normal. You may breathe through your nose if youre able but avoid inhaling forcibly. Let all drainage fall on your mustache dressing and change it as needed.    You may wake up after surgery with thick white stockings on. Wear them until you are walking around more. It is important to walk around often while at home to keep your blood circulating and prevent blood clots.    If you use CPAP or BiPAP to sleep at night, you should wait at least 48 hours before resuming use.  Dr. Fu will advise you when it is safe to do this.    You may shower 24 hours after surgery.    RESTRICTED ACTIVITIES AFTER SURGERY:    Do NOT blow your nose until you see Dr. Fu in clinic. If you have to sneeze or cough, do so with your mouth open.     AVOID all heavy lifting, straining or bending for 2 weeks.     AVOID any sexual activity for 2 weeks after surgery.    AVOID semi-contact sports or vigorous exercising for 3-4 weeks. Dr. Fu will let you know  when you are cleared to resume exercise.    AVOID flying or swimming for 2 weeks.      Do NOT operate a motor vehicle or any type of heavy machinery within 24 hours of taking pain medication.    DO NOT smoke or be around smokers.    AVOID irritating substances that might make you sneeze, such as dust, chalk, harsh chemicals, and allergic triggers.  This might also include spicy foods.    DRESSINGS:    Change the gauze mustache dressing under your nose as needed. (If unsure what this dressing is or how to do this, ask your doctor or nurse before you leave the hospital.) You may have pinkish-red drainage for 2-3 days.    Usually there is no gauze packing placed inside the nose.  If packing is necessary, you will be informed by your surgeon.  Do not touch or pull at the packing. The packing will be removed by your doctor at your first visit after surgery.     You may also have a dissolvable stent or dissolvable sponge placed into the sinuses during surgery.  These usually do not need to be removed unless you are told otherwise by Dr. Fu.  You may notice small fragments of these items come out of your nose in the weeks following surgery.    MEDICATIONS:    After surgery, you will be sent home with prescriptions for pain medication and an anti-nausea medication.  Antibiotics are usually not necessary.    Most people need pain medication for the first few days after surgery, although a narcotic is rarely necessary.  The best pain control comes from a combination of acetaminophen (Tylenol) and ibuprofen (Motrin).  You will be given prescriptions for these at the recommended dose.  These can be alternated so that you are taking something every 2 or 3 hours.    Some people have problems with bowel movements after surgery. If you have NOT had a bowel movement 3-5 days after surgery, go to your local pharmacy and purchase an over the counter stool softener such as COLACE. You can also ask the pharmacist for his or her  recommendation. If you still do not have a bowel movement after starting the softener, please call the office.    You will need these over-the-counter medications after surgery:    SALINE SINUS RINSE (Ron[employer] Med brand):  You will use this to rinse out your nose and sinuses after surgery.  Begin doing this the day after surgery, unless instructed otherwise by Dr. Fu.  You should do this 2 times a day, following the instructions on the box.  AFRIN (regular strength): Only use if you have nasal congestion or bleeding. Use 2 times per day for 3 days, stop for 1 day, continue 2 times per day for 3 days, then stop completely.  NOTE:  You may not need to do this at all.    DIET:    Avoid hot and spicy foods for 1 week after surgery.    Begin with bland foods the evening after surgery and advance to your regular diet as tolerated.  It is not necessary to take only soft food unless you are recovering from tonsil surgery.    Drink plenty of fluids (water is best).     Avoid alcoholic and caffeinated beverages for 1 week after surgery because they can cause you to become dehydrated.

## 2022-07-01 NOTE — BRIEF OP NOTE
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 Bilateral maxillary antrostomy with removal of contents  2. Bilateral ethmoidectomy with removal of contents  3. Endoscopic Septoplasty  4. 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 Right Maxillary Sinus - Perm2. Right Ethmoid Sinus - Perm3. Left Ethmoid Sinus - Perm     References:    Click here for ordering Quick Tip   Question Answer Comment   Procedure Type: ENT    Specimen Class: Known or suspected malignancy    Which provider would you like to cc? SEGUNDO FU    Release to patient Immediate        07/01/22 1018                  Discharge Note    OUTCOME: Patient tolerated treatment/procedure well without complication and is now ready for discharge.    DISPOSITION: Home or Self Care    FINAL DIAGNOSIS: Hypoplastic maxillary sinus  FOLLOWUP: In clinic    DISCHARGE INSTRUCTIONS:    Discharge Procedure Orders   Diet Adult Regular     Other restrictions  (specify):   Order Comments: Light duty, no nose blowing     Notify your health care provider if you experience any of the following:   Order Comments: Nosebleeds > 1 tbsp hr, uncontrolled pain, change in vision     No dressing needed   Order Comments: Mustache dressing prn

## 2022-07-01 NOTE — ANESTHESIA PROCEDURE NOTES
Intubation    Date/Time: 7/1/2022 7:12 AM  Performed by: Belén Miller MD  Authorized by: Yan Pacheco MD     Intubation:     Induction:  Intravenous    Intubated:  Postinduction    Mask Ventilation:  Easy mask    Attempts:  1    Attempted By:  Resident anesthesiologist    Method of Intubation:  Direct    Blade:  Castañeda 2    Laryngeal View Grade: Grade IIA - cords partially seen      Difficult Airway Encountered?: No      Complications:  None    Airway Device:  Oral endotracheal tube    Airway Device Size:  7.0    Style/Cuff Inflation:  Cuffed (inflated to minimal occlusive pressure)    Tube secured:  21    Secured at:  The teeth    Placement Verified By:  Capnometry    Complicating Factors:  None    Findings Post-Intubation:  BS equal bilateral and atraumatic/condition of teeth unchanged

## 2022-07-05 ENCOUNTER — ANESTHESIA (OUTPATIENT)
Dept: ANESTHESIOLOGY | Facility: HOSPITAL | Age: 37
End: 2022-07-05

## 2022-07-05 ENCOUNTER — ANESTHESIA EVENT (OUTPATIENT)
Dept: ANESTHESIOLOGY | Facility: HOSPITAL | Age: 37
End: 2022-07-05

## 2022-07-08 ENCOUNTER — OFFICE VISIT (OUTPATIENT)
Dept: OTOLARYNGOLOGY | Facility: CLINIC | Age: 37
End: 2022-07-08
Payer: COMMERCIAL

## 2022-07-08 ENCOUNTER — TELEPHONE (OUTPATIENT)
Dept: OTOLARYNGOLOGY | Facility: CLINIC | Age: 37
End: 2022-07-08
Payer: MEDICARE

## 2022-07-08 VITALS
TEMPERATURE: 99 F | SYSTOLIC BLOOD PRESSURE: 130 MMHG | DIASTOLIC BLOOD PRESSURE: 88 MMHG | WEIGHT: 243.81 LBS | HEART RATE: 109 BPM | BODY MASS INDEX: 40.58 KG/M2

## 2022-07-08 DIAGNOSIS — H05.413: Primary | ICD-10-CM

## 2022-07-08 PROCEDURE — 3075F SYST BP GE 130 - 139MM HG: CPT | Mod: HCNC,CPTII,S$GLB, | Performed by: STUDENT IN AN ORGANIZED HEALTH CARE EDUCATION/TRAINING PROGRAM

## 2022-07-08 PROCEDURE — 99024 POSTOP FOLLOW-UP VISIT: CPT | Mod: HCNC,S$GLB,, | Performed by: STUDENT IN AN ORGANIZED HEALTH CARE EDUCATION/TRAINING PROGRAM

## 2022-07-08 PROCEDURE — 1159F MED LIST DOCD IN RCRD: CPT | Mod: HCNC,CPTII,S$GLB, | Performed by: STUDENT IN AN ORGANIZED HEALTH CARE EDUCATION/TRAINING PROGRAM

## 2022-07-08 PROCEDURE — 3075F PR MOST RECENT SYSTOLIC BLOOD PRESS GE 130-139MM HG: ICD-10-PCS | Mod: HCNC,CPTII,S$GLB, | Performed by: STUDENT IN AN ORGANIZED HEALTH CARE EDUCATION/TRAINING PROGRAM

## 2022-07-08 PROCEDURE — 99999 PR PBB SHADOW E&M-EST. PATIENT-LVL IV: CPT | Mod: PBBFAC,HCNC,, | Performed by: STUDENT IN AN ORGANIZED HEALTH CARE EDUCATION/TRAINING PROGRAM

## 2022-07-08 PROCEDURE — 99024 PR POST-OP FOLLOW-UP VISIT: ICD-10-PCS | Mod: HCNC,S$GLB,, | Performed by: STUDENT IN AN ORGANIZED HEALTH CARE EDUCATION/TRAINING PROGRAM

## 2022-07-08 PROCEDURE — 31237 PR NASAL/SINUS ENDOSCOPY,BX/RMV POLYP/DEBRID: ICD-10-PCS | Mod: 79,50,HCNC,S$GLB | Performed by: STUDENT IN AN ORGANIZED HEALTH CARE EDUCATION/TRAINING PROGRAM

## 2022-07-08 PROCEDURE — 1159F PR MEDICATION LIST DOCUMENTED IN MEDICAL RECORD: ICD-10-PCS | Mod: HCNC,CPTII,S$GLB, | Performed by: STUDENT IN AN ORGANIZED HEALTH CARE EDUCATION/TRAINING PROGRAM

## 2022-07-08 PROCEDURE — 99999 PR PBB SHADOW E&M-EST. PATIENT-LVL IV: ICD-10-PCS | Mod: PBBFAC,HCNC,, | Performed by: STUDENT IN AN ORGANIZED HEALTH CARE EDUCATION/TRAINING PROGRAM

## 2022-07-08 PROCEDURE — 31237 NSL/SINS NDSC SURG BX POLYPC: CPT | Mod: 79,50,HCNC,S$GLB | Performed by: STUDENT IN AN ORGANIZED HEALTH CARE EDUCATION/TRAINING PROGRAM

## 2022-07-08 PROCEDURE — 3008F BODY MASS INDEX DOCD: CPT | Mod: HCNC,CPTII,S$GLB, | Performed by: STUDENT IN AN ORGANIZED HEALTH CARE EDUCATION/TRAINING PROGRAM

## 2022-07-08 PROCEDURE — 3008F PR BODY MASS INDEX (BMI) DOCUMENTED: ICD-10-PCS | Mod: HCNC,CPTII,S$GLB, | Performed by: STUDENT IN AN ORGANIZED HEALTH CARE EDUCATION/TRAINING PROGRAM

## 2022-07-08 PROCEDURE — 1160F RVW MEDS BY RX/DR IN RCRD: CPT | Mod: HCNC,CPTII,S$GLB, | Performed by: STUDENT IN AN ORGANIZED HEALTH CARE EDUCATION/TRAINING PROGRAM

## 2022-07-08 PROCEDURE — 3079F PR MOST RECENT DIASTOLIC BLOOD PRESSURE 80-89 MM HG: ICD-10-PCS | Mod: HCNC,CPTII,S$GLB, | Performed by: STUDENT IN AN ORGANIZED HEALTH CARE EDUCATION/TRAINING PROGRAM

## 2022-07-08 PROCEDURE — 1160F PR REVIEW ALL MEDS BY PRESCRIBER/CLIN PHARMACIST DOCUMENTED: ICD-10-PCS | Mod: HCNC,CPTII,S$GLB, | Performed by: STUDENT IN AN ORGANIZED HEALTH CARE EDUCATION/TRAINING PROGRAM

## 2022-07-08 PROCEDURE — 3079F DIAST BP 80-89 MM HG: CPT | Mod: HCNC,CPTII,S$GLB, | Performed by: STUDENT IN AN ORGANIZED HEALTH CARE EDUCATION/TRAINING PROGRAM

## 2022-07-08 NOTE — PROGRESS NOTES
7/8/2022     POSTOP STESS 1    7/8/2022     I had the pleasure of seeing Ms. Higgins in follow up today.  She recently underwent STESS for silent sinus syndrome on 7/1/22.  The patient complains of left sided nasal pain.  The patient's SNOT-22 score was 73.    On physical exam, nasal endoscopy was performed and debris was removed from the bilateral sinus cavities.  The underlying sinuses appeared to be healing well, bilateral patient ethmoid cavities.     Impression today is consistent with doing well status post STESS.  I have recommended that she begin on Sinus Rinse twice daily and return to see us in 3 weeks time.    Sincerely,     Segundo Fu MD    Nasal Endoscopy with Debridement    Nasal endoscopy and debridement were performed in accordance with the 0 day global period for endoscopic sinus surgery and are unrelated to any other recently performed procedures.     Topical Agents: Topical 0.25% phenylephrine and 4% lidocaine    A 0 degree rigid nasal endoscope was inserted into the nose to visualize the nasal passageway and paranasal sinuses. Under endoscopic visualization, a combination of instruments including suction and grasping forceps were used to debride crust, debris, inflammatory tissue and nasal polyps from the nasal cavity, paranasal sinuses and sinus drainage pathways. This was performed to aid in healing and optimize the patency and function of the sinus cavities and nasal passageways. This is necessary to avoid scar formation, infection, and mucocele formation. In addition, this facilitates in the optimal instillation of topical therapies, saline irrigations, long-term disease surveillance, and endoscopically-derived cultures. The endoscope was withdrawn without sequelae. The procedure was well tolerated by the patient.      Answers for HPI/ROS submitted by the patient on 7/7/2022  Fatigue (Tiredness)?: Yes  ear discharge: Yes  ear pain: Yes  facial swelling: Yes  sinus pressure : Yes  sore  throat: Yes  trouble swallowing: Yes  Voice Change?: Yes  Light sensitivity / Light hurts the eyes?: Yes  cough: Yes  chest pain: Yes  Irregular heartbeat?: Yes  abdominal pain: Yes  Vomiting?: Yes  None of these: Yes  Muscle aches / pain?: Yes  back pain: Yes  neck pain: Yes  rash: Yes  Food Allergies?: Yes  Seasonal Allergies?: Yes  None of these : Yes  dizziness: Yes  headaches: Yes  Light-headedness: Yes  Bruises or bleeds easily: Yes  decreased concentration: Yes  Feeling depressed?: Yes  nervous/ anxious: Yes  sleep disturbance: Yes

## 2022-07-08 NOTE — TELEPHONE ENCOUNTER
----- Message from Dexter Mayo sent at 7/8/2022 11:43 AM CDT -----  Regarding: Fever of 101.5  Contact: @ [phone]  Patient requesting a return call regarding today's appt she has a fever of 101.5, she has concerns with being seen today or can something to be called into the Pharmacy, pls call or send to:     [facility]/pharmacy #[id number] [place], [place] - [address] [address]  Phone: [phone] Fax: [phone]

## 2022-07-11 ENCOUNTER — PATIENT MESSAGE (OUTPATIENT)
Dept: OTOLARYNGOLOGY | Facility: CLINIC | Age: 37
End: 2022-07-11
Payer: MEDICARE

## 2022-07-12 LAB
FINAL PATHOLOGIC DIAGNOSIS: NORMAL
Lab: NORMAL

## 2022-07-14 ENCOUNTER — PATIENT MESSAGE (OUTPATIENT)
Dept: OTOLARYNGOLOGY | Facility: CLINIC | Age: 37
End: 2022-07-14
Payer: MEDICARE

## 2022-07-14 DIAGNOSIS — J01.00 SUBACUTE MAXILLARY SINUSITIS: Primary | ICD-10-CM

## 2022-07-15 ENCOUNTER — TELEPHONE (OUTPATIENT)
Dept: OTOLARYNGOLOGY | Facility: CLINIC | Age: 37
End: 2022-07-15
Payer: MEDICARE

## 2022-07-15 ENCOUNTER — PATIENT MESSAGE (OUTPATIENT)
Dept: OTOLARYNGOLOGY | Facility: CLINIC | Age: 37
End: 2022-07-15
Payer: MEDICARE

## 2022-07-15 RX ORDER — AMOXICILLIN AND CLAVULANATE POTASSIUM 875; 125 MG/1; MG/1
1 TABLET, FILM COATED ORAL EVERY 12 HOURS
Qty: 28 TABLET | Refills: 0 | Status: SHIPPED | OUTPATIENT
Start: 2022-07-15 | End: 2022-07-29

## 2022-07-29 ENCOUNTER — OFFICE VISIT (OUTPATIENT)
Dept: OTOLARYNGOLOGY | Facility: CLINIC | Age: 37
End: 2022-07-29
Payer: COMMERCIAL

## 2022-07-29 VITALS
SYSTOLIC BLOOD PRESSURE: 148 MMHG | HEART RATE: 84 BPM | DIASTOLIC BLOOD PRESSURE: 86 MMHG | WEIGHT: 232.13 LBS | BODY MASS INDEX: 38.63 KG/M2

## 2022-07-29 DIAGNOSIS — Z98.890 STATUS POST FUNCTIONAL ENDOSCOPIC SINUS SURGERY (FESS): ICD-10-CM

## 2022-07-29 DIAGNOSIS — H05.413: ICD-10-CM

## 2022-07-29 DIAGNOSIS — J32.9 CHRONIC SINUSITIS, UNSPECIFIED LOCATION: Primary | ICD-10-CM

## 2022-07-29 PROCEDURE — 3077F SYST BP >= 140 MM HG: CPT | Mod: HCNC,CPTII,S$GLB, | Performed by: STUDENT IN AN ORGANIZED HEALTH CARE EDUCATION/TRAINING PROGRAM

## 2022-07-29 PROCEDURE — 1159F PR MEDICATION LIST DOCUMENTED IN MEDICAL RECORD: ICD-10-PCS | Mod: HCNC,CPTII,S$GLB, | Performed by: STUDENT IN AN ORGANIZED HEALTH CARE EDUCATION/TRAINING PROGRAM

## 2022-07-29 PROCEDURE — 3008F BODY MASS INDEX DOCD: CPT | Mod: HCNC,CPTII,S$GLB, | Performed by: STUDENT IN AN ORGANIZED HEALTH CARE EDUCATION/TRAINING PROGRAM

## 2022-07-29 PROCEDURE — 31237 NSL/SINS NDSC SURG BX POLYPC: CPT | Mod: 50,79,HCNC,S$GLB | Performed by: STUDENT IN AN ORGANIZED HEALTH CARE EDUCATION/TRAINING PROGRAM

## 2022-07-29 PROCEDURE — 99999 PR PBB SHADOW E&M-EST. PATIENT-LVL IV: ICD-10-PCS | Mod: PBBFAC,HCNC,, | Performed by: STUDENT IN AN ORGANIZED HEALTH CARE EDUCATION/TRAINING PROGRAM

## 2022-07-29 PROCEDURE — 99999 PR PBB SHADOW E&M-EST. PATIENT-LVL IV: CPT | Mod: PBBFAC,HCNC,, | Performed by: STUDENT IN AN ORGANIZED HEALTH CARE EDUCATION/TRAINING PROGRAM

## 2022-07-29 PROCEDURE — 99024 POSTOP FOLLOW-UP VISIT: CPT | Mod: HCNC,S$GLB,, | Performed by: STUDENT IN AN ORGANIZED HEALTH CARE EDUCATION/TRAINING PROGRAM

## 2022-07-29 PROCEDURE — 1160F RVW MEDS BY RX/DR IN RCRD: CPT | Mod: HCNC,CPTII,S$GLB, | Performed by: STUDENT IN AN ORGANIZED HEALTH CARE EDUCATION/TRAINING PROGRAM

## 2022-07-29 PROCEDURE — 3079F PR MOST RECENT DIASTOLIC BLOOD PRESSURE 80-89 MM HG: ICD-10-PCS | Mod: HCNC,CPTII,S$GLB, | Performed by: STUDENT IN AN ORGANIZED HEALTH CARE EDUCATION/TRAINING PROGRAM

## 2022-07-29 PROCEDURE — 3079F DIAST BP 80-89 MM HG: CPT | Mod: HCNC,CPTII,S$GLB, | Performed by: STUDENT IN AN ORGANIZED HEALTH CARE EDUCATION/TRAINING PROGRAM

## 2022-07-29 PROCEDURE — 99024 PR POST-OP FOLLOW-UP VISIT: ICD-10-PCS | Mod: HCNC,S$GLB,, | Performed by: STUDENT IN AN ORGANIZED HEALTH CARE EDUCATION/TRAINING PROGRAM

## 2022-07-29 PROCEDURE — 31237 PR NASAL/SINUS ENDOSCOPY,BX/RMV POLYP/DEBRID: ICD-10-PCS | Mod: 50,79,HCNC,S$GLB | Performed by: STUDENT IN AN ORGANIZED HEALTH CARE EDUCATION/TRAINING PROGRAM

## 2022-07-29 PROCEDURE — 1160F PR REVIEW ALL MEDS BY PRESCRIBER/CLIN PHARMACIST DOCUMENTED: ICD-10-PCS | Mod: HCNC,CPTII,S$GLB, | Performed by: STUDENT IN AN ORGANIZED HEALTH CARE EDUCATION/TRAINING PROGRAM

## 2022-07-29 PROCEDURE — 1159F MED LIST DOCD IN RCRD: CPT | Mod: HCNC,CPTII,S$GLB, | Performed by: STUDENT IN AN ORGANIZED HEALTH CARE EDUCATION/TRAINING PROGRAM

## 2022-07-29 PROCEDURE — 3077F PR MOST RECENT SYSTOLIC BLOOD PRESSURE >= 140 MM HG: ICD-10-PCS | Mod: HCNC,CPTII,S$GLB, | Performed by: STUDENT IN AN ORGANIZED HEALTH CARE EDUCATION/TRAINING PROGRAM

## 2022-07-29 PROCEDURE — 3008F PR BODY MASS INDEX (BMI) DOCUMENTED: ICD-10-PCS | Mod: HCNC,CPTII,S$GLB, | Performed by: STUDENT IN AN ORGANIZED HEALTH CARE EDUCATION/TRAINING PROGRAM

## 2022-07-29 NOTE — PROGRESS NOTES
POSTOP STESS 2    7/29/2022     I had the pleasure of seeing Ms. Higgins in follow up today.  She recently underwent STESS for silent sinus syndrome on 7/1/22.  The patient reports that she had an episode of a swollen and painful uvula about 10 days after the surgery. Reports improvement in her symptoms on today's visit.      On physical exam, nasal endoscopy was performed and debris was removed from the bilateral sinus cavities.  The underlying sinuses appeared to be healing well, bilateral patient ethmoid cavities. Maxillary antrostomies visualized bilaterally. No purulence or scarring. Bilateral maxillary antrosomies patient.     Impression today is consistent with doing well status post STESS.  I have recommended that she continue using her Sinus Rinses daily and return to see us in 3 months time.    Sincerely,     Segundo Fu MD           Nasal Endoscopy with Debridement    Nasal endoscopy and debridement were performed in accordance with the 0 day global period for endoscopic sinus surgery and are unrelated to any other recently performed procedures.     Topical Agents: Topical 0.25% phenylephrine and 4% lidocaine    A 0 degree rigid nasal endoscope was inserted into the nose to visualize the nasal passageway and paranasal sinuses. Under endoscopic visualization, a combination of instruments including suction and grasping forceps were used to debride crust, debris, inflammatory tissue and nasal polyps from the nasal cavity, paranasal sinuses and sinus drainage pathways. This was performed to aid in healing and optimize the patency and function of the sinus cavities and nasal passageways. This is necessary to avoid scar formation, infection, and mucocele formation. In addition, this facilitates in the optimal instillation of topical therapies, saline irrigations, long-term disease surveillance, and endoscopically-derived cultures. The endoscope was withdrawn without sequelae. The procedure was well  tolerated by the patient.    Answers for HPI/ROS submitted by the patient on 7/29/2022  Fatigue (Tiredness)?: Yes  fever: Yes  sinus pressure : Yes  sore throat: Yes  trouble swallowing: Yes  Voice Change?: Yes  Light sensitivity / Light hurts the eyes?: Yes  None of these: Yes  chest pain: Yes  Irregular heartbeat?: Yes  None of these: Yes  Difficulty urinating?: Yes  Muscle aches / pain?: Yes  back pain: Yes  neck pain: Yes  None of these : Yes  Seasonal Allergies?: Yes  Cold all of the time? : Yes  dizziness: Yes  headaches: Yes  Light-headedness: Yes  swollen glands: Yes  decreased concentration: Yes  Feeling depressed?: Yes  nervous/ anxious: Yes  sleep disturbance: Yes

## 2022-07-31 ENCOUNTER — HOSPITAL ENCOUNTER (EMERGENCY)
Facility: HOSPITAL | Age: 37
Discharge: HOME | End: 2022-07-31
Attending: EMERGENCY MEDICINE
Payer: COMMERCIAL

## 2022-07-31 VITALS
TEMPERATURE: 100 F | WEIGHT: 230 LBS | BODY MASS INDEX: 38.32 KG/M2 | SYSTOLIC BLOOD PRESSURE: 139 MMHG | RESPIRATION RATE: 16 BRPM | OXYGEN SATURATION: 99 % | HEIGHT: 65 IN | DIASTOLIC BLOOD PRESSURE: 78 MMHG | HEART RATE: 70 BPM

## 2022-07-31 DIAGNOSIS — R07.9 CHEST PAIN: Primary | ICD-10-CM

## 2022-07-31 DIAGNOSIS — R11.0 NAUSEA: ICD-10-CM

## 2022-07-31 DIAGNOSIS — R51.9 NONINTRACTABLE HEADACHE, UNSPECIFIED CHRONICITY PATTERN, UNSPECIFIED HEADACHE TYPE: ICD-10-CM

## 2022-07-31 LAB
ALBUMIN SERPL BCP-MCNC: 3.8 G/DL (ref 3.5–5.2)
ALP SERPL-CCNC: 96 U/L (ref 55–135)
ALT SERPL W/O P-5'-P-CCNC: 13 U/L (ref 10–44)
AMPHET+METHAMPHET UR QL: NEGATIVE
ANION GAP SERPL CALC-SCNC: 9 MMOL/L (ref 8–16)
AST SERPL-CCNC: 13 U/L (ref 10–40)
B-HCG UR QL: NEGATIVE
BARBITURATES UR QL SCN>200 NG/ML: NEGATIVE
BASOPHILS # BLD AUTO: 0.03 K/UL (ref 0–0.2)
BASOPHILS NFR BLD: 0.5 % (ref 0–1.9)
BENZODIAZ UR QL SCN>200 NG/ML: ABNORMAL
BILIRUB SERPL-MCNC: 0.6 MG/DL (ref 0.1–1)
BNP SERPL-MCNC: 12 PG/ML (ref 0–99)
BUN SERPL-MCNC: 14 MG/DL (ref 6–20)
BZE UR QL SCN: NEGATIVE
CALCIUM SERPL-MCNC: 9.4 MG/DL (ref 8.7–10.5)
CANNABINOIDS UR QL SCN: NEGATIVE
CHLORIDE SERPL-SCNC: 108 MMOL/L (ref 95–110)
CO2 SERPL-SCNC: 24 MMOL/L (ref 23–29)
CREAT SERPL-MCNC: 0.8 MG/DL (ref 0.5–1.4)
CREAT UR-MCNC: 233 MG/DL (ref 15–325)
CTP QC/QA: YES
D DIMER PPP IA.FEU-MCNC: 0.51 MG/L FEU
DIFFERENTIAL METHOD: ABNORMAL
EOSINOPHIL # BLD AUTO: 0.1 K/UL (ref 0–0.5)
EOSINOPHIL NFR BLD: 0.8 % (ref 0–8)
ERYTHROCYTE [DISTWIDTH] IN BLOOD BY AUTOMATED COUNT: 12.7 % (ref 11.5–14.5)
EST. GFR  (AFRICAN AMERICAN): >60 ML/MIN/1.73 M^2
EST. GFR  (NON AFRICAN AMERICAN): >60 ML/MIN/1.73 M^2
GLUCOSE SERPL-MCNC: 98 MG/DL (ref 70–110)
HCT VFR BLD AUTO: 40.1 % (ref 37–48.5)
HGB BLD-MCNC: 13.1 G/DL (ref 12–16)
IMM GRANULOCYTES # BLD AUTO: 0.01 K/UL (ref 0–0.04)
IMM GRANULOCYTES NFR BLD AUTO: 0.2 % (ref 0–0.5)
LIPASE SERPL-CCNC: 23 U/L (ref 4–60)
LYMPHOCYTES # BLD AUTO: 1.7 K/UL (ref 1–4.8)
LYMPHOCYTES NFR BLD: 26.9 % (ref 18–48)
MCH RBC QN AUTO: 31.6 PG (ref 27–31)
MCHC RBC AUTO-ENTMCNC: 32.7 G/DL (ref 32–36)
MCV RBC AUTO: 97 FL (ref 82–98)
METHADONE UR QL SCN>300 NG/ML: NEGATIVE
MONOCYTES # BLD AUTO: 0.4 K/UL (ref 0.3–1)
MONOCYTES NFR BLD: 5.8 % (ref 4–15)
NEUTROPHILS # BLD AUTO: 4.2 K/UL (ref 1.8–7.7)
NEUTROPHILS NFR BLD: 65.8 % (ref 38–73)
NRBC BLD-RTO: 0 /100 WBC
OPIATES UR QL SCN: NEGATIVE
PCP UR QL SCN>25 NG/ML: NEGATIVE
PLATELET # BLD AUTO: 395 K/UL (ref 150–450)
PMV BLD AUTO: 10.2 FL (ref 9.2–12.9)
POTASSIUM SERPL-SCNC: 4.2 MMOL/L (ref 3.5–5.1)
PROT SERPL-MCNC: 7.8 G/DL (ref 6–8.4)
RBC # BLD AUTO: 4.14 M/UL (ref 4–5.4)
SARS-COV-2 RDRP RESP QL NAA+PROBE: NEGATIVE
SODIUM SERPL-SCNC: 141 MMOL/L (ref 136–145)
TOXICOLOGY INFORMATION: ABNORMAL
TROPONIN I SERPL DL<=0.01 NG/ML-MCNC: <0.006 NG/ML (ref 0–0.03)
TROPONIN I SERPL DL<=0.01 NG/ML-MCNC: <0.006 NG/ML (ref 0–0.03)
WBC # BLD AUTO: 6.43 K/UL (ref 3.9–12.7)

## 2022-07-31 PROCEDURE — 96375 TX/PRO/DX INJ NEW DRUG ADDON: CPT | Mod: HCNC

## 2022-07-31 PROCEDURE — 25500020 PHARM REV CODE 255: Mod: HCNC | Performed by: EMERGENCY MEDICINE

## 2022-07-31 PROCEDURE — 85025 COMPLETE CBC W/AUTO DIFF WBC: CPT | Mod: HCNC | Performed by: PHYSICIAN ASSISTANT

## 2022-07-31 PROCEDURE — 80307 DRUG TEST PRSMV CHEM ANLYZR: CPT | Mod: HCNC | Performed by: PHYSICIAN ASSISTANT

## 2022-07-31 PROCEDURE — 63600175 PHARM REV CODE 636 W HCPCS: Mod: HCNC | Performed by: PHYSICIAN ASSISTANT

## 2022-07-31 PROCEDURE — 85379 FIBRIN DEGRADATION QUANT: CPT | Mod: HCNC | Performed by: PHYSICIAN ASSISTANT

## 2022-07-31 PROCEDURE — 80053 COMPREHEN METABOLIC PANEL: CPT | Mod: HCNC | Performed by: PHYSICIAN ASSISTANT

## 2022-07-31 PROCEDURE — 93010 ELECTROCARDIOGRAM REPORT: CPT | Mod: HCNC,,, | Performed by: INTERNAL MEDICINE

## 2022-07-31 PROCEDURE — 93010 EKG 12-LEAD: ICD-10-PCS | Mod: HCNC,,, | Performed by: INTERNAL MEDICINE

## 2022-07-31 PROCEDURE — 96374 THER/PROPH/DIAG INJ IV PUSH: CPT | Mod: HCNC

## 2022-07-31 PROCEDURE — 84484 ASSAY OF TROPONIN QUANT: CPT | Mod: 91,HCNC | Performed by: PHYSICIAN ASSISTANT

## 2022-07-31 PROCEDURE — 25000003 PHARM REV CODE 250: Mod: HCNC | Performed by: PHYSICIAN ASSISTANT

## 2022-07-31 PROCEDURE — 93005 ELECTROCARDIOGRAM TRACING: CPT | Mod: HCNC

## 2022-07-31 PROCEDURE — U0002 COVID-19 LAB TEST NON-CDC: HCPCS | Mod: HCNC | Performed by: PHYSICIAN ASSISTANT

## 2022-07-31 PROCEDURE — 99285 PR EMERGENCY DEPT VISIT,LEVEL V: ICD-10-PCS | Mod: HCNC,CS,, | Performed by: PHYSICIAN ASSISTANT

## 2022-07-31 PROCEDURE — 99285 EMERGENCY DEPT VISIT HI MDM: CPT | Mod: 25,HCNC

## 2022-07-31 PROCEDURE — 83880 ASSAY OF NATRIURETIC PEPTIDE: CPT | Mod: HCNC | Performed by: PHYSICIAN ASSISTANT

## 2022-07-31 PROCEDURE — 81025 URINE PREGNANCY TEST: CPT | Mod: HCNC | Performed by: PHYSICIAN ASSISTANT

## 2022-07-31 PROCEDURE — 83690 ASSAY OF LIPASE: CPT | Mod: HCNC | Performed by: PHYSICIAN ASSISTANT

## 2022-07-31 PROCEDURE — 99285 EMERGENCY DEPT VISIT HI MDM: CPT | Mod: HCNC,CS,, | Performed by: PHYSICIAN ASSISTANT

## 2022-07-31 RX ORDER — PROCHLORPERAZINE EDISYLATE 5 MG/ML
5 INJECTION INTRAMUSCULAR; INTRAVENOUS
Status: COMPLETED | OUTPATIENT
Start: 2022-07-31 | End: 2022-07-31

## 2022-07-31 RX ORDER — ASPIRIN 325 MG
325 TABLET, DELAYED RELEASE (ENTERIC COATED) ORAL
Status: COMPLETED | OUTPATIENT
Start: 2022-07-31 | End: 2022-07-31

## 2022-07-31 RX ORDER — METOCLOPRAMIDE HYDROCHLORIDE 5 MG/ML
5 INJECTION INTRAMUSCULAR; INTRAVENOUS
Status: COMPLETED | OUTPATIENT
Start: 2022-07-31 | End: 2022-07-31

## 2022-07-31 RX ADMIN — IOHEXOL 100 ML: 350 INJECTION, SOLUTION INTRAVENOUS at 07:07

## 2022-07-31 RX ADMIN — ASPIRIN 325 MG: 325 TABLET, COATED ORAL at 05:07

## 2022-07-31 RX ADMIN — PROCHLORPERAZINE EDISYLATE 5 MG: 5 INJECTION INTRAMUSCULAR; INTRAVENOUS at 08:07

## 2022-07-31 RX ADMIN — METOCLOPRAMIDE 5 MG: 5 INJECTION, SOLUTION INTRAMUSCULAR; INTRAVENOUS at 06:07

## 2022-07-31 NOTE — ED NOTES
Pt aware of urine sample needing to be collected. Pt verbalized understanding and provided w/ urine cup for sample collection.

## 2022-07-31 NOTE — ED NOTES
"Pt is AAOx4. Pt presents to ED w/ c/o chest pain. Pt reports chest pain that started approximately one hour ago. Pt reports pain is left mid-clavicular and describes the pain as intermittent tightening. Pt rates the pain 6 out of 10. Pt states lying flat makes it worse. Pt reports taking an 81 mg ASA before arrival. Pt states "she took a xanax because thought it was anxiety." Pt reports shortness of breath at baseline. Pt endorses nausea.   "

## 2022-07-31 NOTE — ED PROVIDER NOTES
"Encounter Date: 7/31/2022       History     Chief Complaint   Patient presents with    Chest Pain     Tearful in triage. Radiating to R arm. Pt states she has a hx of NSTEMI.     The patient is a 37 year old female, who has a past medical history of ovarian cyst, anxiety, depression, PTSD, insomnia, DDD, chronic neck/back pain, sciatica, endometriosis, MRSA, and osteomyelitis. She presents to the ER for an emergent evaluation due to having intermittent left sided chest pain for 3 days. She states that while laying in bed 2 nights ago, she developed a sudden severe left sided chest pain that lasted about 45 seconds, then went away. She denies any radiation of the pain to her neck, jaw, shoulder, back, or abdomen. She did not seek any medical attention. Yesterday she reports feeling well over all without any episodes or discomfort. Today she states that she has had "cold sweats" since she woke up. She states that around 3 pm this afternoon, she developed left sided chest pain that she describes as "squeezing". She states that the pain seems to radiate to her right arm. She denies any palpitations, dizziness, nausea, or SOB. She states that the degree is mild to moderate. She states that the discomfort waxes and wanes. She states that the pain is worse when she lays down and worse when she takes a deep breath. She states that she took a baby aspirin and a Xanax but symptoms are not improved. She is concerned about possible PE because she takes birth control pills and she had sinus surgery earlier this month. She also states that she was told that she had "an old heart attack" showing on her EKG. She had a stress echo done 2 months ago for chest pain complaints that she was told was negative. She states "It might just be anxiety. I have been pretty anxious lately". She denies any additional symptoms or concerns presently.         Review of patient's allergies indicates:   Allergen Reactions    Coconut Hives and " Itching    Sulfa (sulfonamide antibiotics) Hives     itching    Adhesive Rash    Latex, natural rubber Other (See Comments) and Rash     Past Medical History:   Diagnosis Date    Anxiety     Chronic osteomyelitis of right hand 2019    Chronic pain syndrome     Depression     Insomnia     Post traumatic stress disorder (PTSD)     Sciatica      Past Surgical History:   Procedure Laterality Date    ABLATION OF MEDIAL BRANCH NERVE OF LUMBAR SPINE FACET JOINT      L4-S1    COSMETIC SURGERY      breast augmentation    ETHMOIDECTOMY  2022    Procedure: ETHMOIDECTOMY;  Surgeon: Segundo Fu MD;  Location: [facility] OR [address] [place] [address];  Service: ENT;;    FINGER SURGERY      FUNCTIONAL ENDOSCOPIC SINUS SURGERY (FESS) USING COMPUTER-ASSISTED NAVIGATION Bilateral 2022    Procedure: FESS, USING COMPUTER-ASSISTED NAVIGATION;  Surgeon: Segundo Fu MD;  Location: [facility] OR [address] [place] [address];  Service: ENT;  Laterality: Bilateral;    MAXILLARY ANTROSTOMY  2022    Procedure: MAXILLARY ANTROSTOMY;  Surgeon: Segundo uF MD;  [facility];  Service: ENT;;    PELVIC LAPAROSCOPY      Endometriosis ([place])    TONSILLECTOMY       Family History   Problem Relation Age of Onset    Diabetes Maternal Grandmother     Diabetes Mother     Hypertension Mother     Cervical cancer Maternal Aunt     Colon cancer Maternal Aunt     Diabetes Maternal Aunt     Breast cancer Neg Hx     Eclampsia Neg Hx     Miscarriages / Stillbirths Neg Hx     Ovarian cancer Neg Hx      labor Neg Hx     Stroke Neg Hx      Social History     Tobacco Use    Smoking status: Current Some Day Smoker     Packs/day: 0.25     Years: 15.00     Pack years: 3.75     Types: Cigarettes     Start date: 2017     Last attempt to quit: 2021     Years since quittin.6    Smokeless tobacco: Never Used    Tobacco comment: occasional 3 cig month, started 15 quit 18-19, started age 22    Substance Use Topics    Alcohol use:  Not Currently    Drug use: Never     Review of Systems   Constitutional: Positive for chills. Negative for activity change, appetite change and fever.   HENT: Negative for congestion, rhinorrhea and sore throat.    Eyes: Negative for visual disturbance.   Respiratory: Negative for cough, shortness of breath and wheezing.    Cardiovascular: Positive for chest pain. Negative for palpitations and leg swelling.   Gastrointestinal: Negative for abdominal pain, diarrhea, nausea and vomiting.   Genitourinary: Negative for decreased urine volume, difficulty urinating, dysuria, flank pain, menstrual problem and pelvic pain.   Musculoskeletal: Negative for back pain, gait problem, neck pain and neck stiffness.   Skin: Negative for color change and rash.   Allergic/Immunologic: Negative for immunocompromised state.   Neurological: Negative for dizziness, seizures, syncope, speech difficulty, light-headedness, numbness and headaches.   Psychiatric/Behavioral: Negative for confusion. The patient is nervous/anxious.        Physical Exam     Initial Vitals [07/31/22 1547]   BP Pulse Resp Temp SpO2   133/77 80 19 99.5 °F (37.5 °C) 99 %      MAP       --         Physical Exam    Nursing note and vitals reviewed.  Constitutional: She appears well-developed and well-nourished. She is not diaphoretic.   She is alert and interactive. She appears nervous/anxious. She is accompanied by an emotional support dog and her .    HENT:   Head: Normocephalic.   Moist mucous membranes. Unremarkable ENT exam.    Eyes: Conjunctivae are normal.   Neck: Neck supple. No thyromegaly present. No JVD present.   Normal range of motion.  Cardiovascular: Normal rate and intact distal pulses.   Pulmonary/Chest: No respiratory distress. She exhibits no tenderness.   Not coughing or wheezing. No tachypnea or hypoxia. No conversational dyspnea. No increased work of breathing.     Abdominal: Abdomen is soft. She exhibits no distension. There is no  abdominal tenderness. There is no rebound and no guarding.   Musculoskeletal:         General: No tenderness or edema. Normal range of motion.      Cervical back: Normal range of motion and neck supple.      Comments: No lower leg edema. No calf tenderness.      Neurological: She is alert and oriented to person, place, and time. She has normal strength. No sensory deficit.   Skin: Skin is warm and dry. No rash noted.   No rash on visible skin.    Psychiatric:   Appears nervous/anxious.         ED Course   Procedures  Labs Reviewed   CBC W/ AUTO DIFFERENTIAL - Abnormal; Notable for the following components:       Result Value    MCH 31.6 (*)     All other components within normal limits   D DIMER, QUANTITATIVE - Abnormal; Notable for the following components:    D-Dimer 0.51 (*)     All other components within normal limits   DRUG SCREEN PANEL, URINE EMERGENCY - Abnormal; Notable for the following components:    Benzodiazepines Presumptive Positive (*)     All other components within normal limits    Narrative:     Specimen Source->Urine   COMPREHENSIVE METABOLIC PANEL   LIPASE   TROPONIN I   B-TYPE NATRIURETIC PEPTIDE   SARS-COV-2 RNA AMPLIFICATION, QUAL    Narrative:     Is the patient symptomatic?->Yes  Is this needed for pre-procedure or pre-op testing?->No   TROPONIN I   POCT URINE PREGNANCY     Results for orders placed or performed during the hospital encounter of 07/31/22   CBC auto differential   Result Value Ref Range    WBC 6.43 3.90 - 12.70 K/uL    RBC 4.14 4.00 - 5.40 M/uL    Hemoglobin 13.1 12.0 - 16.0 g/dL    Hematocrit 40.1 37.0 - 48.5 %    MCV 97 82 - 98 fL    MCH 31.6 (H) 27.0 - 31.0 pg    MCHC 32.7 32.0 - 36.0 g/dL    RDW 12.7 11.5 - 14.5 %    Platelets 395 150 - 450 K/uL    MPV 10.2 9.2 - 12.9 fL    Immature Granulocytes 0.2 0.0 - 0.5 %    Gran # (ANC) 4.2 1.8 - 7.7 K/uL    Immature Grans (Abs) 0.01 0.00 - 0.04 K/uL    Lymph # 1.7 1.0 - 4.8 K/uL    Mono # 0.4 0.3 - 1.0 K/uL    Eos # 0.1 0.0 - 0.5  K/uL    Baso # 0.03 0.00 - 0.20 K/uL    nRBC 0 0 /100 WBC    Gran % 65.8 38.0 - 73.0 %    Lymph % 26.9 18.0 - 48.0 %    Mono % 5.8 4.0 - 15.0 %    Eosinophil % 0.8 0.0 - 8.0 %    Basophil % 0.5 0.0 - 1.9 %    Differential Method Automated    Comprehensive metabolic panel   Result Value Ref Range    Sodium 141 136 - 145 mmol/L    Potassium 4.2 3.5 - 5.1 mmol/L    Chloride 108 95 - 110 mmol/L    CO2 24 23 - 29 mmol/L    Glucose 98 70 - 110 mg/dL    BUN 14 6 - 20 mg/dL    Creatinine 0.8 0.5 - 1.4 mg/dL    Calcium 9.4 8.7 - 10.5 mg/dL    Total Protein 7.8 6.0 - 8.4 g/dL    Albumin 3.8 3.5 - 5.2 g/dL    Total Bilirubin 0.6 0.1 - 1.0 mg/dL    Alkaline Phosphatase 96 55 - 135 U/L    AST 13 10 - 40 U/L    ALT 13 10 - 44 U/L    Anion Gap 9 8 - 16 mmol/L    eGFR if African American >60.0 >60 mL/min/1.73 m^2    eGFR if non African American >60.0 >60 mL/min/1.73 m^2   Lipase   Result Value Ref Range    Lipase 23 4 - 60 U/L   Troponin I   Result Value Ref Range    Troponin I <0.006 0.000 - 0.026 ng/mL   Brain natriuretic peptide   Result Value Ref Range    BNP 12 0 - 99 pg/mL   D dimer, quantitative   Result Value Ref Range    D-Dimer 0.51 (H) <0.50 mg/L FEU   Drug screen panel, emergency   Result Value Ref Range    Benzodiazepines Presumptive Positive (A) Negative    Methadone metabolites Negative Negative    Cocaine (Metab.) Negative Negative    Opiate Scrn, Ur Negative Negative    Barbiturate Screen, Ur Negative Negative    Amphetamine Screen, Ur Negative Negative    THC Negative Negative    Phencyclidine Negative Negative    Creatinine, Urine 233.0 15.0 - 325.0 mg/dL    Toxicology Information SEE COMMENT    COVID-19 Rapid Screening   Result Value Ref Range    SARS-CoV-2 RNA, Amplification, Qual Negative Negative   Troponin I   Result Value Ref Range    Troponin I <0.006 0.000 - 0.026 ng/mL   POCT urine pregnancy   Result Value Ref Range    POC Preg Test, Ur Negative Negative     Acceptable Yes           ECG  Results          EKG 12-lead (Final result)  Result time 08/01/22 11:11:44    Final result by Interface, Lab In [facility] (08/01/22 11:11:44)                 Narrative:    Test Reason : R07.9,    Vent. Rate : 091 BPM     Atrial Rate : 091 BPM     P-R Int : 134 ms          QRS Dur : 092 ms      QT Int : 366 ms       P-R-T Axes : 063 000 059 degrees     QTc Int : 450 ms    Normal sinus rhythm with sinus arrhythmia  Baseline Artifact  Normal ECG  When compared with ECG of 17-MAY-2022 12:06,  No significant change was found  Confirmed by Patrick Clay MD ([id number]) on 8/1/2022 11:11:33 AM    Referred By: [email or web address]   SELF           Confirmed By:Patrick Clay MD                            Imaging Results          CTA Chest Non-Coronary (PE Study) (Final result)  Result time 07/31/22 21:30:18    Final result by Gte Dowling MD (07/31/22 21:30:18)                 Impression:      No pulmonary embolism to the segmental level.    Electronically signed by resident: Veronica Murdock  Date:    07/31/2022  Time:    20:53    Electronically signed by: Get Dowling MD  Date:    07/31/2022  Time:    21:30             Narrative:    EXAMINATION:  CTA CHEST NON CORONARY    CLINICAL HISTORY:  Pulmonary embolism (PE) suspected, positive D-dimer;c/o acute left sided CP. Positive D-dimer. Recent surgery, smoker, and on BCP.;    TECHNIQUE:  Low dose axial images, sagittal and coronal reformations were obtained from the thoracic inlet to the lung bases following the IV administration of 100 mL of Omnipaque-350.  Scan technique was optimized to evaluate the pulmonary arteries.  MIP images were performed.    COMPARISON:  CTA chest 04/30/2022    FINDINGS:  No pulmonary artery filling defects to the segmental level.  Calcified granuloma in the right upper lobe.  Minimal bandlike opacification in the lingula, consistent with subsegmental atelectasis or scarring.  No suspicious pulmonary nodules.  No focal consolidation.  No pleural effusion or  "pneumothorax.    Heart size is normal.  No pericardial effusion.  The aorta is normal in course and caliber.  The main pulmonary artery is normal in diameter.  No mediastinal, hilar or axillary lymphadenopathy.  The visualized thyroid gland is normal.    Limited views of the upper abdomen are normal.  There are bilateral breast implants.    Visualized bones and soft tissues are normal.                               X-Ray Chest AP Portable (Final result)  Result time 07/31/22 17:49:26    Final result by Andrew Velez MD (07/31/22 17:49:26)                 Impression:      No acute abnormality.      Electronically signed by: Andrew Velez  Date:    07/31/2022  Time:    17:49             Narrative:    EXAMINATION:  XR CHEST AP PORTABLE    CLINICAL HISTORY:  Chest pain, unspecified    TECHNIQUE:  Single frontal view of the chest was performed.    COMPARISON:  05/17/2022    FINDINGS:  The lungs are clear, with normal appearance of pulmonary vasculature and no pleural effusion or pneumothorax.    The cardiac silhouette is normal in size. The hilar and mediastinal contours are unremarkable.    Bones are intact.                              Vitals:    07/31/22 1547 07/31/22 1825   BP: 133/77 139/78   BP Location: Right arm Left arm   Patient Position: Sitting Sitting   Pulse: 80 70   Resp: 19 16   Temp: 99.5 °F (37.5 °C)    TempSrc: Oral    SpO2: 99% 99%   Weight: 104.3 kg (230 lb)    Height: 5' 5" (1.651 m)            Medications   aspirin EC tablet 325 mg (325 mg Oral Given 7/31/22 1750)   metoclopramide HCl injection 5 mg (5 mg Intravenous Given 7/31/22 1800)   iohexoL (OMNIPAQUE 350) injection 100 mL (100 mLs Intravenous Given 7/31/22 1917)   prochlorperazine injection Soln 5 mg (5 mg Intravenous Given 7/31/22 2056)     Medical Decision Making:   History:   Old Medical Records: I decided to obtain old medical records.  Initial Assessment:   36 yo female, hx of anxiety, smoking, oral contraceptive use, and smoking, " and recent sinus surgery presents to the ER for an emergent evaluation due to having intermittent left sided chest pain x 3 days   Differential Diagnosis:   ACS, PE, pericarditis, anxiety, pleurisy, costochondritis, pneumonia, pneumothorax, musculoskeletal pain, dysrhythmia, infection, Covid, etc   Clinical Tests:   Lab Tests: Ordered and Reviewed  Radiological Study: Ordered and Reviewed  Medical Tests: Ordered and Reviewed  ED Management:  Vital signs reviewed - benign   Records reviewed - normal stress echo 5/2022   I discussed the case with the ER attending physician  EKG negative for any acute ischemia   Chest x ray unremarkable    D-dimer is slightly elevated at 0.51, will get CTA for PE evaluation   While in [facility] awaiting results, pt now reporting onset of dull headache and nausea - Compazine ordered  CTA chest completed- negative per radiologist report   Pt reports feeling better after treatment in the ER and is eager for discharge   Pt advised to follow up with her PCP in the next 1-2 days for re-evaluation and further management   Pt advised to return to the ER promptly if unimproved or if worse in any way        Additional MDM:   EKG: I have independently interpreted EKG(s) - see notes.   Smoking Cessation: The patient is a smoker. The patient was counseled on smoking cessation for: 3 minutes. The patient was counseled on tobacco related  health complications. Appropriate patient literature was given to the patient concerning tobacco cessation.   X-Rays: I have independently interpreted X-Ray(s) - see notes.   PERC Rule:   Age is greater than or equal to 50 = 0.0  Heart Rate is greater than or equal to 100 = 0.0  SaO2 on room air < 95% = 0.0  Unilateral leg swelling = 0.0  Hemoptysis = 0.0  Recent surgery or trauma = 1.0  Prior PE or DVT =  0.0  Hormone use = 1.0  PERC Score = 2  Heart Score:    History:          Slightly suspicious.  ECG:             Normal  Age:               Less than 45 years  Risk  factors: 1-2 risk factors  Troponin:       Less than or equal to normal limit  Final Score: 1                       Clinical Impression:   Final diagnoses:  [R07.9] Chest pain (Primary)  [R51.9] Nonintractable headache, unspecified chronicity pattern, unspecified headache type  [R11.0] Nausea          ED Disposition Condition    Discharge Stable        ED Prescriptions     None        Follow-up Information     Follow up With Specialties Details Why Contact Info    Arnaldo Espino MD Internal Medicine Schedule an appointment as soon as possible for a visit in 2 days  [address]  [place] [address]  [phone]      [facility] - Emergency Dept Emergency Medicine  If symptoms worsen [address] [facility] [id number] [phone]           Arian Tang PA-C  08/01/22 1819

## 2022-08-02 ENCOUNTER — PATIENT MESSAGE (OUTPATIENT)
Dept: OBSTETRICS AND GYNECOLOGY | Facility: CLINIC | Age: 37
End: 2022-08-02
Payer: MEDICARE

## 2022-08-02 ENCOUNTER — PATIENT MESSAGE (OUTPATIENT)
Dept: INTERNAL MEDICINE | Facility: CLINIC | Age: 37
End: 2022-08-02
Payer: MEDICARE

## 2022-08-02 DIAGNOSIS — R10.2 PELVIC PAIN IN FEMALE: Primary | ICD-10-CM

## 2022-08-02 RX ORDER — TRAMADOL HYDROCHLORIDE 50 MG/1
50 TABLET ORAL EVERY 6 HOURS PRN
Qty: 20 TABLET | Refills: 0 | Status: SHIPPED | OUTPATIENT
Start: 2022-08-02 | End: 2022-08-05

## 2022-08-05 ENCOUNTER — OFFICE VISIT (OUTPATIENT)
Dept: PAIN MEDICINE | Facility: CLINIC | Age: 37
End: 2022-08-05
Payer: COMMERCIAL

## 2022-08-05 DIAGNOSIS — G89.4 CHRONIC PAIN SYNDROME: ICD-10-CM

## 2022-08-05 DIAGNOSIS — M54.9 DORSALGIA, UNSPECIFIED: ICD-10-CM

## 2022-08-05 DIAGNOSIS — M54.16 LUMBAR RADICULOPATHY: ICD-10-CM

## 2022-08-05 DIAGNOSIS — M54.40 CHRONIC BILATERAL LOW BACK PAIN WITH SCIATICA, SCIATICA LATERALITY UNSPECIFIED: Primary | ICD-10-CM

## 2022-08-05 DIAGNOSIS — G89.29 CHRONIC BILATERAL LOW BACK PAIN WITH SCIATICA, SCIATICA LATERALITY UNSPECIFIED: Primary | ICD-10-CM

## 2022-08-05 DIAGNOSIS — M54.41 ACUTE MIDLINE LOW BACK PAIN WITH RIGHT-SIDED SCIATICA: ICD-10-CM

## 2022-08-05 PROCEDURE — 1160F PR REVIEW ALL MEDS BY PRESCRIBER/CLIN PHARMACIST DOCUMENTED: ICD-10-PCS | Mod: HCNC,CPTII,95, | Performed by: STUDENT IN AN ORGANIZED HEALTH CARE EDUCATION/TRAINING PROGRAM

## 2022-08-05 PROCEDURE — 99205 OFFICE O/P NEW HI 60 MIN: CPT | Mod: HCNC,95,, | Performed by: STUDENT IN AN ORGANIZED HEALTH CARE EDUCATION/TRAINING PROGRAM

## 2022-08-05 PROCEDURE — 1159F MED LIST DOCD IN RCRD: CPT | Mod: HCNC,CPTII,95, | Performed by: STUDENT IN AN ORGANIZED HEALTH CARE EDUCATION/TRAINING PROGRAM

## 2022-08-05 PROCEDURE — 1160F RVW MEDS BY RX/DR IN RCRD: CPT | Mod: HCNC,CPTII,95, | Performed by: STUDENT IN AN ORGANIZED HEALTH CARE EDUCATION/TRAINING PROGRAM

## 2022-08-05 PROCEDURE — 99205 PR OFFICE/OUTPT VISIT, NEW, LEVL V, 60-74 MIN: ICD-10-PCS | Mod: HCNC,95,, | Performed by: STUDENT IN AN ORGANIZED HEALTH CARE EDUCATION/TRAINING PROGRAM

## 2022-08-05 PROCEDURE — 1159F PR MEDICATION LIST DOCUMENTED IN MEDICAL RECORD: ICD-10-PCS | Mod: HCNC,CPTII,95, | Performed by: STUDENT IN AN ORGANIZED HEALTH CARE EDUCATION/TRAINING PROGRAM

## 2022-08-05 RX ORDER — CYCLOBENZAPRINE HCL 10 MG
10 TABLET ORAL 3 TIMES DAILY PRN
Qty: 90 TABLET | Refills: 0 | Status: SHIPPED | OUTPATIENT
Start: 2022-08-05 | End: 2022-09-30

## 2022-08-05 RX ORDER — TRAMADOL HYDROCHLORIDE 50 MG/1
50 TABLET ORAL EVERY 6 HOURS
Qty: 28 TABLET | Refills: 0 | Status: SHIPPED | OUTPATIENT
Start: 2022-08-05 | End: 2022-08-12

## 2022-08-05 RX ORDER — METHYLPREDNISOLONE 4 MG/1
TABLET ORAL
Qty: 21 EACH | Refills: 0 | Status: SHIPPED | OUTPATIENT
Start: 2022-08-05 | End: 2022-08-26
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
## 2022-08-06 DIAGNOSIS — R10.2 PELVIC PAIN IN FEMALE: ICD-10-CM

## 2022-08-08 RX ORDER — MELOXICAM 15 MG/1
TABLET ORAL
Qty: 30 TABLET | Refills: 3 | Status: ON HOLD | OUTPATIENT
Start: 2022-08-08 | End: 2022-11-12 | Stop reason: HOSPADM

## 2022-08-10 ENCOUNTER — PATIENT MESSAGE (OUTPATIENT)
Dept: INTERNAL MEDICINE | Facility: CLINIC | Age: 37
End: 2022-08-10
Payer: MEDICARE

## 2022-08-10 DIAGNOSIS — G89.4 CHRONIC PAIN SYNDROME: ICD-10-CM

## 2022-08-10 DIAGNOSIS — F41.1 GENERALIZED ANXIETY DISORDER: Primary | ICD-10-CM

## 2022-08-10 DIAGNOSIS — M25.60 RANGE OF MOTION DEFICIT: ICD-10-CM

## 2022-08-19 ENCOUNTER — HOSPITAL ENCOUNTER (OUTPATIENT)
Dept: RADIOLOGY | Facility: HOSPITAL | Age: 37
Discharge: HOME | End: 2022-08-19
Attending: STUDENT IN AN ORGANIZED HEALTH CARE EDUCATION/TRAINING PROGRAM
Payer: COMMERCIAL

## 2022-08-19 DIAGNOSIS — G89.29 CHRONIC BILATERAL LOW BACK PAIN WITH SCIATICA, SCIATICA LATERALITY UNSPECIFIED: ICD-10-CM

## 2022-08-19 DIAGNOSIS — M54.40 CHRONIC BILATERAL LOW BACK PAIN WITH SCIATICA, SCIATICA LATERALITY UNSPECIFIED: ICD-10-CM

## 2022-08-19 DIAGNOSIS — M54.16 LUMBAR RADICULOPATHY: ICD-10-CM

## 2022-08-19 PROCEDURE — 72114 XR LUMBAR SPINE 5 VIEW WITH FLEX AND EXT: ICD-10-PCS | Mod: 26,HCNC,, | Performed by: RADIOLOGY

## 2022-08-19 PROCEDURE — 72114 X-RAY EXAM L-S SPINE BENDING: CPT | Mod: 26,HCNC,, | Performed by: RADIOLOGY

## 2022-08-19 PROCEDURE — 72148 MRI LUMBAR SPINE W/O DYE: CPT | Mod: TC,HCNC

## 2022-08-19 PROCEDURE — 72114 X-RAY EXAM L-S SPINE BENDING: CPT | Mod: TC,HCNC,FY

## 2022-08-19 PROCEDURE — 72148 MRI LUMBAR SPINE W/O DYE: CPT | Mod: 26,HCNC,, | Performed by: RADIOLOGY

## 2022-08-19 PROCEDURE — 72148 MRI LUMBAR SPINE WITHOUT CONTRAST: ICD-10-PCS | Mod: 26,HCNC,, | Performed by: RADIOLOGY

## 2022-09-29 ENCOUNTER — PATIENT MESSAGE (OUTPATIENT)
Dept: OBSTETRICS AND GYNECOLOGY | Facility: CLINIC | Age: 37
End: 2022-09-29
Payer: MEDICARE

## 2022-09-29 DIAGNOSIS — R10.2 PELVIC PAIN IN FEMALE: Primary | ICD-10-CM

## 2022-09-30 ENCOUNTER — PATIENT MESSAGE (OUTPATIENT)
Dept: OBSTETRICS AND GYNECOLOGY | Facility: CLINIC | Age: 37
End: 2022-09-30
Payer: MEDICARE

## 2022-09-30 RX ORDER — TRAMADOL HYDROCHLORIDE 50 MG/1
50 TABLET ORAL EVERY 6 HOURS PRN
Qty: 15 TABLET | Refills: 0 | Status: ON HOLD | OUTPATIENT
Start: 2022-09-30 | End: 2022-11-12 | Stop reason: HOSPADM

## 2022-10-20 ENCOUNTER — TELEPHONE (OUTPATIENT)
Dept: OTOLARYNGOLOGY | Facility: CLINIC | Age: 37
End: 2022-10-20
Payer: MEDICARE

## 2022-10-25 ENCOUNTER — TELEPHONE (OUTPATIENT)
Dept: OTOLARYNGOLOGY | Facility: CLINIC | Age: 37
End: 2022-10-25
Payer: MEDICARE

## 2022-11-11 ENCOUNTER — HOSPITAL ENCOUNTER (INPATIENT)
Facility: HOSPITAL | Age: 37
LOS: 7 days | Discharge: NURSING, REHAB OR LONG-TERM CARE | DRG: 519 | End: 2022-11-18
Attending: EMERGENCY MEDICINE | Admitting: ORTHOPAEDIC SURGERY
Payer: MEDICARE

## 2022-11-11 ENCOUNTER — ANESTHESIA (OUTPATIENT)
Dept: SURGERY | Facility: HOSPITAL | Age: 37
DRG: 519 | End: 2022-11-11
Payer: MEDICARE

## 2022-11-11 ENCOUNTER — ANESTHESIA EVENT (OUTPATIENT)
Dept: SURGERY | Facility: HOSPITAL | Age: 37
DRG: 519 | End: 2022-11-11
Payer: MEDICARE

## 2022-11-11 DIAGNOSIS — M25.572 ACUTE LEFT ANKLE PAIN: ICD-10-CM

## 2022-11-11 DIAGNOSIS — G83.4 CAUDA EQUINA SYNDROME: Primary | ICD-10-CM

## 2022-11-11 DIAGNOSIS — G83.4 CAUDA EQUINA COMPRESSION: ICD-10-CM

## 2022-11-11 DIAGNOSIS — G83.4: ICD-10-CM

## 2022-11-11 DIAGNOSIS — Z01.811 PRE-OP CHEST EXAM: ICD-10-CM

## 2022-11-11 DIAGNOSIS — Z74.09 IMPAIRED MOBILITY: ICD-10-CM

## 2022-11-11 LAB
ABO + RH BLD: NORMAL
ALBUMIN SERPL BCP-MCNC: 3 G/DL (ref 3.5–5.2)
ALP SERPL-CCNC: 98 U/L (ref 55–135)
ALT SERPL W/O P-5'-P-CCNC: 23 U/L (ref 10–44)
AMPHET+METHAMPHET UR QL: ABNORMAL
ANION GAP SERPL CALC-SCNC: 9 MMOL/L (ref 8–16)
AST SERPL-CCNC: 18 U/L (ref 10–40)
B-HCG UR QL: NEGATIVE
BARBITURATES UR QL SCN>200 NG/ML: NEGATIVE
BASOPHILS # BLD AUTO: 0.04 K/UL (ref 0–0.2)
BASOPHILS NFR BLD: 0.7 % (ref 0–1.9)
BENZODIAZ UR QL SCN>200 NG/ML: ABNORMAL
BILIRUB SERPL-MCNC: 0.2 MG/DL (ref 0.1–1)
BILIRUB UR QL STRIP: NEGATIVE
BLD GP AB SCN CELLS X3 SERPL QL: NORMAL
BUN SERPL-MCNC: 9 MG/DL (ref 6–20)
BZE UR QL SCN: NEGATIVE
CALCIUM SERPL-MCNC: 8.7 MG/DL (ref 8.7–10.5)
CANNABINOIDS UR QL SCN: NEGATIVE
CHLORIDE SERPL-SCNC: 107 MMOL/L (ref 95–110)
CLARITY UR REFRACT.AUTO: CLEAR
CO2 SERPL-SCNC: 22 MMOL/L (ref 23–29)
COLOR UR AUTO: YELLOW
CREAT SERPL-MCNC: 0.7 MG/DL (ref 0.5–1.4)
CREAT UR-MCNC: 78 MG/DL (ref 15–325)
CRP SERPL-MCNC: 40 MG/L (ref 0–8.2)
CTP QC/QA: YES
DIFFERENTIAL METHOD: ABNORMAL
EOSINOPHIL # BLD AUTO: 0.1 K/UL (ref 0–0.5)
EOSINOPHIL NFR BLD: 1.6 % (ref 0–8)
ERYTHROCYTE [DISTWIDTH] IN BLOOD BY AUTOMATED COUNT: 13 % (ref 11.5–14.5)
ERYTHROCYTE [SEDIMENTATION RATE] IN BLOOD BY PHOTOMETRIC METHOD: 85 MM/HR (ref 0–36)
EST. GFR  (NO RACE VARIABLE): >60 ML/MIN/1.73 M^2
GLUCOSE SERPL-MCNC: 81 MG/DL (ref 70–110)
GLUCOSE UR QL STRIP: NEGATIVE
HCT VFR BLD AUTO: 37.4 % (ref 37–48.5)
HCV AB SERPL QL IA: NORMAL
HGB BLD-MCNC: 12.5 G/DL (ref 12–16)
HGB UR QL STRIP: NEGATIVE
IMM GRANULOCYTES # BLD AUTO: 0.01 K/UL (ref 0–0.04)
IMM GRANULOCYTES NFR BLD AUTO: 0.2 % (ref 0–0.5)
INR PPP: 1 (ref 0.8–1.2)
KETONES UR QL STRIP: NEGATIVE
LEUKOCYTE ESTERASE UR QL STRIP: NEGATIVE
LYMPHOCYTES # BLD AUTO: 1.8 K/UL (ref 1–4.8)
LYMPHOCYTES NFR BLD: 28.6 % (ref 18–48)
MAGNESIUM SERPL-MCNC: 1.7 MG/DL (ref 1.6–2.6)
MCH RBC QN AUTO: 31.4 PG (ref 27–31)
MCHC RBC AUTO-ENTMCNC: 33.4 G/DL (ref 32–36)
MCV RBC AUTO: 94 FL (ref 82–98)
METHADONE UR QL SCN>300 NG/ML: NEGATIVE
MONOCYTES # BLD AUTO: 0.3 K/UL (ref 0.3–1)
MONOCYTES NFR BLD: 5.4 % (ref 4–15)
NEUTROPHILS # BLD AUTO: 3.9 K/UL (ref 1.8–7.7)
NEUTROPHILS NFR BLD: 63.5 % (ref 38–73)
NITRITE UR QL STRIP: NEGATIVE
NRBC BLD-RTO: 0 /100 WBC
OPIATES UR QL SCN: NEGATIVE
PCP UR QL SCN>25 NG/ML: NEGATIVE
PH UR STRIP: 6 [PH] (ref 5–8)
PHOSPHATE SERPL-MCNC: 2.5 MG/DL (ref 2.7–4.5)
PLATELET # BLD AUTO: 395 K/UL (ref 150–450)
PMV BLD AUTO: 9.8 FL (ref 9.2–12.9)
POTASSIUM SERPL-SCNC: 4.5 MMOL/L (ref 3.5–5.1)
PREALB SERPL-MCNC: 22 MG/DL (ref 20–43)
PROT SERPL-MCNC: 7.3 G/DL (ref 6–8.4)
PROT UR QL STRIP: NEGATIVE
PROTHROMBIN TIME: 10.5 SEC (ref 9–12.5)
RBC # BLD AUTO: 3.98 M/UL (ref 4–5.4)
SODIUM SERPL-SCNC: 138 MMOL/L (ref 136–145)
SP GR UR STRIP: 1.01 (ref 1–1.03)
TOXICOLOGY INFORMATION: ABNORMAL
TRANSFERRIN SERPL-MCNC: 194 MG/DL (ref 200–375)
URN SPEC COLLECT METH UR: NORMAL
WBC # BLD AUTO: 6.11 K/UL (ref 3.9–12.7)

## 2022-11-11 PROCEDURE — 63600175 PHARM REV CODE 636 W HCPCS: Mod: HCNC | Performed by: PHYSICIAN ASSISTANT

## 2022-11-11 PROCEDURE — 85025 COMPLETE CBC W/AUTO DIFF WBC: CPT | Mod: HCNC | Performed by: PHYSICIAN ASSISTANT

## 2022-11-11 PROCEDURE — 84100 ASSAY OF PHOSPHORUS: CPT | Mod: HCNC | Performed by: STUDENT IN AN ORGANIZED HEALTH CARE EDUCATION/TRAINING PROGRAM

## 2022-11-11 PROCEDURE — 63030 PR LAMINOTOMY,LUMBAR DISK,1 INTRSP: ICD-10-PCS | Mod: 50,,, | Performed by: ORTHOPAEDIC SURGERY

## 2022-11-11 PROCEDURE — 81025 URINE PREGNANCY TEST: CPT | Mod: HCNC | Performed by: PHYSICIAN ASSISTANT

## 2022-11-11 PROCEDURE — 94761 N-INVAS EAR/PLS OXIMETRY MLT: CPT | Mod: HCNC

## 2022-11-11 PROCEDURE — 99285 PR EMERGENCY DEPT VISIT,LEVEL V: ICD-10-PCS | Mod: HCNC,,, | Performed by: PHYSICIAN ASSISTANT

## 2022-11-11 PROCEDURE — 81003 URINALYSIS AUTO W/O SCOPE: CPT | Mod: HCNC,59 | Performed by: PHYSICIAN ASSISTANT

## 2022-11-11 PROCEDURE — 63035 LAMOT DCMPRN NRV RT EA ADDL: CPT | Mod: LT,,, | Performed by: ORTHOPAEDIC SURGERY

## 2022-11-11 PROCEDURE — 99223 1ST HOSP IP/OBS HIGH 75: CPT | Mod: HCNC,57,AI,ICN | Performed by: ORTHOPAEDIC SURGERY

## 2022-11-11 PROCEDURE — 96361 HYDRATE IV INFUSION ADD-ON: CPT | Mod: HCNC

## 2022-11-11 PROCEDURE — 86850 RBC ANTIBODY SCREEN: CPT | Mod: HCNC | Performed by: STUDENT IN AN ORGANIZED HEALTH CARE EDUCATION/TRAINING PROGRAM

## 2022-11-11 PROCEDURE — D9220A PRA ANESTHESIA: ICD-10-PCS | Mod: HCNC,ANES,, | Performed by: ANESTHESIOLOGY

## 2022-11-11 PROCEDURE — 71000016 HC POSTOP RECOV ADDL HR: Mod: HCNC | Performed by: ORTHOPAEDIC SURGERY

## 2022-11-11 PROCEDURE — 96374 THER/PROPH/DIAG INJ IV PUSH: CPT | Mod: HCNC

## 2022-11-11 PROCEDURE — 36000710: Mod: HCNC | Performed by: ORTHOPAEDIC SURGERY

## 2022-11-11 PROCEDURE — 85652 RBC SED RATE AUTOMATED: CPT | Mod: HCNC | Performed by: PHYSICIAN ASSISTANT

## 2022-11-11 PROCEDURE — 86140 C-REACTIVE PROTEIN: CPT | Mod: HCNC | Performed by: PHYSICIAN ASSISTANT

## 2022-11-11 PROCEDURE — 25000003 PHARM REV CODE 250: Mod: HCNC | Performed by: ORTHOPAEDIC SURGERY

## 2022-11-11 PROCEDURE — 71000015 HC POSTOP RECOV 1ST HR: Mod: HCNC | Performed by: ORTHOPAEDIC SURGERY

## 2022-11-11 PROCEDURE — 84466 ASSAY OF TRANSFERRIN: CPT | Mod: HCNC | Performed by: STUDENT IN AN ORGANIZED HEALTH CARE EDUCATION/TRAINING PROGRAM

## 2022-11-11 PROCEDURE — 63600175 PHARM REV CODE 636 W HCPCS: Mod: HCNC | Performed by: NURSE ANESTHETIST, CERTIFIED REGISTERED

## 2022-11-11 PROCEDURE — 93010 ELECTROCARDIOGRAM REPORT: CPT | Mod: HCNC,,, | Performed by: INTERNAL MEDICINE

## 2022-11-11 PROCEDURE — 63030 LAMOT DCMPRN NRV RT 1 LMBR: CPT | Mod: 50,,, | Performed by: ORTHOPAEDIC SURGERY

## 2022-11-11 PROCEDURE — 37000008 HC ANESTHESIA 1ST 15 MINUTES: Mod: HCNC | Performed by: ORTHOPAEDIC SURGERY

## 2022-11-11 PROCEDURE — 37000009 HC ANESTHESIA EA ADD 15 MINS: Mod: HCNC | Performed by: ORTHOPAEDIC SURGERY

## 2022-11-11 PROCEDURE — 25000003 PHARM REV CODE 250: Mod: HCNC | Performed by: PHYSICIAN ASSISTANT

## 2022-11-11 PROCEDURE — 63600175 PHARM REV CODE 636 W HCPCS: Mod: HCNC | Performed by: ORTHOPAEDIC SURGERY

## 2022-11-11 PROCEDURE — 80307 DRUG TEST PRSMV CHEM ANLYZR: CPT | Mod: HCNC | Performed by: STUDENT IN AN ORGANIZED HEALTH CARE EDUCATION/TRAINING PROGRAM

## 2022-11-11 PROCEDURE — 25000003 PHARM REV CODE 250: Mod: HCNC | Performed by: NURSE ANESTHETIST, CERTIFIED REGISTERED

## 2022-11-11 PROCEDURE — D9220A PRA ANESTHESIA: ICD-10-PCS | Mod: HCNC,CRNA,, | Performed by: NURSE ANESTHETIST, CERTIFIED REGISTERED

## 2022-11-11 PROCEDURE — 36000711: Mod: HCNC | Performed by: ORTHOPAEDIC SURGERY

## 2022-11-11 PROCEDURE — 84134 ASSAY OF PREALBUMIN: CPT | Mod: HCNC | Performed by: STUDENT IN AN ORGANIZED HEALTH CARE EDUCATION/TRAINING PROGRAM

## 2022-11-11 PROCEDURE — 85610 PROTHROMBIN TIME: CPT | Mod: HCNC | Performed by: STUDENT IN AN ORGANIZED HEALTH CARE EDUCATION/TRAINING PROGRAM

## 2022-11-11 PROCEDURE — 93005 ELECTROCARDIOGRAM TRACING: CPT | Mod: HCNC

## 2022-11-11 PROCEDURE — 80053 COMPREHEN METABOLIC PANEL: CPT | Mod: HCNC | Performed by: PHYSICIAN ASSISTANT

## 2022-11-11 PROCEDURE — 99223 PR INITIAL HOSPITAL CARE,LEVL III: ICD-10-PCS | Mod: HCNC,57,AI,ICN | Performed by: ORTHOPAEDIC SURGERY

## 2022-11-11 PROCEDURE — 71000033 HC RECOVERY, INTIAL HOUR: Mod: HCNC | Performed by: ORTHOPAEDIC SURGERY

## 2022-11-11 PROCEDURE — 83735 ASSAY OF MAGNESIUM: CPT | Mod: HCNC | Performed by: STUDENT IN AN ORGANIZED HEALTH CARE EDUCATION/TRAINING PROGRAM

## 2022-11-11 PROCEDURE — 27201423 OPTIME MED/SURG SUP & DEVICES STERILE SUPPLY: Mod: HCNC | Performed by: ORTHOPAEDIC SURGERY

## 2022-11-11 PROCEDURE — D9220A PRA ANESTHESIA: Mod: HCNC,CRNA,, | Performed by: NURSE ANESTHETIST, CERTIFIED REGISTERED

## 2022-11-11 PROCEDURE — 99285 EMERGENCY DEPT VISIT HI MDM: CPT | Mod: 25,HCNC

## 2022-11-11 PROCEDURE — 12000002 HC ACUTE/MED SURGE SEMI-PRIVATE ROOM: Mod: HCNC

## 2022-11-11 PROCEDURE — 99285 EMERGENCY DEPT VISIT HI MDM: CPT | Mod: HCNC,,, | Performed by: PHYSICIAN ASSISTANT

## 2022-11-11 PROCEDURE — 25000003 PHARM REV CODE 250: Mod: HCNC | Performed by: STUDENT IN AN ORGANIZED HEALTH CARE EDUCATION/TRAINING PROGRAM

## 2022-11-11 PROCEDURE — D9220A PRA ANESTHESIA: Mod: HCNC,ANES,, | Performed by: ANESTHESIOLOGY

## 2022-11-11 PROCEDURE — 63035 PR EXCIS EACH ADDNL SPINAL DISK: ICD-10-PCS | Mod: LT,,, | Performed by: ORTHOPAEDIC SURGERY

## 2022-11-11 PROCEDURE — C1729 CATH, DRAINAGE: HCPCS | Mod: HCNC | Performed by: ORTHOPAEDIC SURGERY

## 2022-11-11 PROCEDURE — 86803 HEPATITIS C AB TEST: CPT | Mod: HCNC | Performed by: PHYSICIAN ASSISTANT

## 2022-11-11 PROCEDURE — 93010 EKG 12-LEAD: ICD-10-PCS | Mod: HCNC,,, | Performed by: INTERNAL MEDICINE

## 2022-11-11 PROCEDURE — 51798 US URINE CAPACITY MEASURE: CPT | Mod: HCNC

## 2022-11-11 PROCEDURE — 96375 TX/PRO/DX INJ NEW DRUG ADDON: CPT | Mod: HCNC

## 2022-11-11 RX ORDER — PANTOPRAZOLE SODIUM 40 MG/1
40 TABLET, DELAYED RELEASE ORAL DAILY
Status: DISCONTINUED | OUTPATIENT
Start: 2022-11-12 | End: 2022-11-18 | Stop reason: HOSPADM

## 2022-11-11 RX ORDER — OXYCODONE HYDROCHLORIDE 5 MG/1
5 TABLET ORAL EVERY 4 HOURS PRN
Status: DISCONTINUED | OUTPATIENT
Start: 2022-11-11 | End: 2022-11-18 | Stop reason: HOSPADM

## 2022-11-11 RX ORDER — KETAMINE HCL IN 0.9 % NACL 50 MG/5 ML
SYRINGE (ML) INTRAVENOUS
Status: DISCONTINUED | OUTPATIENT
Start: 2022-11-11 | End: 2022-11-12

## 2022-11-11 RX ORDER — IPRATROPIUM BROMIDE AND ALBUTEROL SULFATE 2.5; .5 MG/3ML; MG/3ML
3 SOLUTION RESPIRATORY (INHALATION) EVERY 6 HOURS PRN
Status: DISCONTINUED | OUTPATIENT
Start: 2022-11-11 | End: 2022-11-18 | Stop reason: HOSPADM

## 2022-11-11 RX ORDER — DOXEPIN HYDROCHLORIDE 50 MG/1
50 CAPSULE ORAL NIGHTLY
Status: DISCONTINUED | OUTPATIENT
Start: 2022-11-11 | End: 2022-11-18 | Stop reason: HOSPADM

## 2022-11-11 RX ORDER — OXYCODONE HYDROCHLORIDE 10 MG/1
10 TABLET ORAL EVERY 4 HOURS PRN
Status: DISCONTINUED | OUTPATIENT
Start: 2022-11-11 | End: 2022-11-18 | Stop reason: HOSPADM

## 2022-11-11 RX ORDER — SUCCINYLCHOLINE CHLORIDE 20 MG/ML
INJECTION INTRAMUSCULAR; INTRAVENOUS
Status: DISCONTINUED | OUTPATIENT
Start: 2022-11-11 | End: 2022-11-12

## 2022-11-11 RX ORDER — KETOROLAC TROMETHAMINE 30 MG/ML
10 INJECTION, SOLUTION INTRAMUSCULAR; INTRAVENOUS
Status: COMPLETED | OUTPATIENT
Start: 2022-11-11 | End: 2022-11-11

## 2022-11-11 RX ORDER — ORPHENADRINE CITRATE 30 MG/ML
30 INJECTION INTRAMUSCULAR; INTRAVENOUS
Status: COMPLETED | OUTPATIENT
Start: 2022-11-11 | End: 2022-11-11

## 2022-11-11 RX ORDER — HYDROMORPHONE HYDROCHLORIDE 1 MG/ML
1 INJECTION, SOLUTION INTRAMUSCULAR; INTRAVENOUS; SUBCUTANEOUS
Status: DISCONTINUED | OUTPATIENT
Start: 2022-11-11 | End: 2022-11-14

## 2022-11-11 RX ORDER — GABAPENTIN 300 MG/1
600 CAPSULE ORAL 3 TIMES DAILY
Status: DISCONTINUED | OUTPATIENT
Start: 2022-11-11 | End: 2022-11-18 | Stop reason: HOSPADM

## 2022-11-11 RX ORDER — ALPRAZOLAM 0.5 MG/1
1 TABLET ORAL 3 TIMES DAILY PRN
Status: DISCONTINUED | OUTPATIENT
Start: 2022-11-11 | End: 2022-11-18 | Stop reason: HOSPADM

## 2022-11-11 RX ORDER — SODIUM CHLORIDE 0.9 % (FLUSH) 0.9 %
10 SYRINGE (ML) INJECTION
Status: DISCONTINUED | OUTPATIENT
Start: 2022-11-11 | End: 2022-11-18 | Stop reason: HOSPADM

## 2022-11-11 RX ORDER — FENTANYL CITRATE 50 UG/ML
INJECTION, SOLUTION INTRAMUSCULAR; INTRAVENOUS
Status: DISCONTINUED | OUTPATIENT
Start: 2022-11-11 | End: 2022-11-12

## 2022-11-11 RX ORDER — VENLAFAXINE HYDROCHLORIDE 75 MG/1
300 CAPSULE, EXTENDED RELEASE ORAL DAILY
Status: DISCONTINUED | OUTPATIENT
Start: 2022-11-12 | End: 2022-11-18 | Stop reason: HOSPADM

## 2022-11-11 RX ORDER — VANCOMYCIN/0.9 % SOD CHLORIDE 1 G/100 ML
PLASTIC BAG, INJECTION (ML) INTRAVENOUS
Status: COMPLETED | OUTPATIENT
Start: 2022-11-11 | End: 2022-11-11

## 2022-11-11 RX ORDER — TALC
6 POWDER (GRAM) TOPICAL NIGHTLY PRN
Status: DISCONTINUED | OUTPATIENT
Start: 2022-11-11 | End: 2022-11-18 | Stop reason: HOSPADM

## 2022-11-11 RX ORDER — LIDOCAINE HYDROCHLORIDE 5 MG/ML
INJECTION, SOLUTION INFILTRATION; INTRAVENOUS
Status: DISCONTINUED | OUTPATIENT
Start: 2022-11-11 | End: 2022-11-12 | Stop reason: HOSPADM

## 2022-11-11 RX ORDER — MAG HYDROX/ALUMINUM HYD/SIMETH 200-200-20
30 SUSPENSION, ORAL (FINAL DOSE FORM) ORAL
Status: DISCONTINUED | OUTPATIENT
Start: 2022-11-11 | End: 2022-11-18 | Stop reason: HOSPADM

## 2022-11-11 RX ORDER — PROPOFOL 10 MG/ML
VIAL (ML) INTRAVENOUS CONTINUOUS PRN
Status: DISCONTINUED | OUTPATIENT
Start: 2022-11-11 | End: 2022-11-12

## 2022-11-11 RX ORDER — LIDOCAINE HYDROCHLORIDE 10 MG/ML
1 INJECTION, SOLUTION EPIDURAL; INFILTRATION; INTRACAUDAL; PERINEURAL ONCE AS NEEDED
Status: DISCONTINUED | OUTPATIENT
Start: 2022-11-11 | End: 2022-11-18 | Stop reason: HOSPADM

## 2022-11-11 RX ORDER — BUDESONIDE AND FORMOTEROL FUMARATE DIHYDRATE 160; 4.5 UG/1; UG/1
2 AEROSOL RESPIRATORY (INHALATION) EVERY 12 HOURS
Status: DISCONTINUED | OUTPATIENT
Start: 2022-11-11 | End: 2022-11-11 | Stop reason: SDUPTHER

## 2022-11-11 RX ORDER — ONDANSETRON 8 MG/1
8 TABLET, ORALLY DISINTEGRATING ORAL EVERY 8 HOURS PRN
Status: DISCONTINUED | OUTPATIENT
Start: 2022-11-11 | End: 2022-11-18 | Stop reason: HOSPADM

## 2022-11-11 RX ORDER — CEFAZOLIN SODIUM/WATER 2 G/20 ML
2 SYRINGE (ML) INTRAVENOUS
Status: CANCELLED | OUTPATIENT
Start: 2022-11-11

## 2022-11-11 RX ORDER — ACETAMINOPHEN 325 MG/1
650 TABLET ORAL
Status: COMPLETED | OUTPATIENT
Start: 2022-11-11 | End: 2022-11-11

## 2022-11-11 RX ORDER — ONDANSETRON 8 MG/1
8 TABLET, ORALLY DISINTEGRATING ORAL EVERY 8 HOURS PRN
Status: DISCONTINUED | OUTPATIENT
Start: 2022-11-11 | End: 2022-11-11 | Stop reason: SDUPTHER

## 2022-11-11 RX ORDER — SUCRALFATE 1 G/10ML
1 SUSPENSION ORAL EVERY 6 HOURS
Status: DISCONTINUED | OUTPATIENT
Start: 2022-11-12 | End: 2022-11-18 | Stop reason: HOSPADM

## 2022-11-11 RX ORDER — MIDAZOLAM HYDROCHLORIDE 1 MG/ML
INJECTION INTRAMUSCULAR; INTRAVENOUS
Status: DISCONTINUED | OUTPATIENT
Start: 2022-11-11 | End: 2022-11-12

## 2022-11-11 RX ORDER — FLUTICASONE FUROATE AND VILANTEROL 100; 25 UG/1; UG/1
1 POWDER RESPIRATORY (INHALATION) DAILY
Status: DISCONTINUED | OUTPATIENT
Start: 2022-11-12 | End: 2022-11-18 | Stop reason: HOSPADM

## 2022-11-11 RX ORDER — LIDOCAINE HCL/PF 100 MG/5ML
SYRINGE (ML) INTRAVENOUS
Status: DISCONTINUED | OUTPATIENT
Start: 2022-11-11 | End: 2022-11-12

## 2022-11-11 RX ORDER — ACETAMINOPHEN 325 MG/1
650 TABLET ORAL EVERY 8 HOURS PRN
Status: DISCONTINUED | OUTPATIENT
Start: 2022-11-11 | End: 2022-11-18 | Stop reason: HOSPADM

## 2022-11-11 RX ORDER — SODIUM CHLORIDE 9 MG/ML
INJECTION, SOLUTION INTRAVENOUS CONTINUOUS
Status: DISCONTINUED | OUTPATIENT
Start: 2022-11-11 | End: 2022-11-18 | Stop reason: HOSPADM

## 2022-11-11 RX ORDER — ROCURONIUM BROMIDE 10 MG/ML
INJECTION, SOLUTION INTRAVENOUS
Status: DISCONTINUED | OUTPATIENT
Start: 2022-11-11 | End: 2022-11-12

## 2022-11-11 RX ORDER — ACETAMINOPHEN 325 MG/1
650 TABLET ORAL EVERY 6 HOURS
Status: DISCONTINUED | OUTPATIENT
Start: 2022-11-12 | End: 2022-11-18 | Stop reason: HOSPADM

## 2022-11-11 RX ORDER — MUPIROCIN 20 MG/G
OINTMENT TOPICAL
Status: CANCELLED | OUTPATIENT
Start: 2022-11-11

## 2022-11-11 RX ORDER — ALBUTEROL SULFATE 90 UG/1
2 AEROSOL, METERED RESPIRATORY (INHALATION) EVERY 6 HOURS PRN
Status: DISCONTINUED | OUTPATIENT
Start: 2022-11-11 | End: 2022-11-18 | Stop reason: HOSPADM

## 2022-11-11 RX ORDER — PROPOFOL 10 MG/ML
VIAL (ML) INTRAVENOUS
Status: DISCONTINUED | OUTPATIENT
Start: 2022-11-11 | End: 2022-11-12

## 2022-11-11 RX ADMIN — SUCCINYLCHOLINE CHLORIDE 80 MG: 20 INJECTION, SOLUTION INTRAMUSCULAR; INTRAVENOUS at 10:11

## 2022-11-11 RX ADMIN — KETOROLAC TROMETHAMINE 10 MG: 30 INJECTION, SOLUTION INTRAMUSCULAR; INTRAVENOUS at 01:11

## 2022-11-11 RX ADMIN — SODIUM CHLORIDE: 0.9 INJECTION, SOLUTION INTRAVENOUS at 10:11

## 2022-11-11 RX ADMIN — ORPHENADRINE CITRATE 30 MG: 30 INJECTION INTRAMUSCULAR; INTRAVENOUS at 01:11

## 2022-11-11 RX ADMIN — PROPOFOL 175 MCG/KG/MIN: 10 INJECTION, EMULSION INTRAVENOUS at 10:11

## 2022-11-11 RX ADMIN — REMIFENTANIL HYDROCHLORIDE 0.2 MCG/KG/MIN: 1 INJECTION, POWDER, LYOPHILIZED, FOR SOLUTION INTRAVENOUS at 10:11

## 2022-11-11 RX ADMIN — SODIUM CHLORIDE: 0.9 INJECTION, SOLUTION INTRAVENOUS at 07:11

## 2022-11-11 RX ADMIN — Medication 20 MG: at 11:11

## 2022-11-11 RX ADMIN — MIDAZOLAM HYDROCHLORIDE 2 MG: 1 INJECTION, SOLUTION INTRAMUSCULAR; INTRAVENOUS at 10:11

## 2022-11-11 RX ADMIN — ROCURONIUM BROMIDE 20 MG: 10 INJECTION INTRAVENOUS at 11:11

## 2022-11-11 RX ADMIN — ACETAMINOPHEN 650 MG: 325 TABLET ORAL at 01:11

## 2022-11-11 RX ADMIN — LIDOCAINE HYDROCHLORIDE 100 MG: 20 INJECTION, SOLUTION INTRAVENOUS at 10:11

## 2022-11-11 RX ADMIN — Medication 2 G: at 11:11

## 2022-11-11 RX ADMIN — FENTANYL CITRATE 100 MCG: 50 INJECTION, SOLUTION INTRAMUSCULAR; INTRAVENOUS at 10:11

## 2022-11-11 RX ADMIN — ROCURONIUM BROMIDE 10 MG: 10 INJECTION INTRAVENOUS at 10:11

## 2022-11-11 RX ADMIN — SODIUM CHLORIDE 1000 ML: 0.9 INJECTION, SOLUTION INTRAVENOUS at 07:11

## 2022-11-11 RX ADMIN — PROPOFOL 200 MG: 10 INJECTION, EMULSION INTRAVENOUS at 10:11

## 2022-11-11 RX ADMIN — SODIUM CHLORIDE, SODIUM GLUCONATE, SODIUM ACETATE, POTASSIUM CHLORIDE, MAGNESIUM CHLORIDE, SODIUM PHOSPHATE, DIBASIC, AND POTASSIUM PHOSPHATE: .53; .5; .37; .037; .03; .012; .00082 INJECTION, SOLUTION INTRAVENOUS at 10:11

## 2022-11-11 RX ADMIN — SUCCINYLCHOLINE CHLORIDE 120 MG: 20 INJECTION, SOLUTION INTRAMUSCULAR; INTRAVENOUS at 10:11

## 2022-11-11 NOTE — ED TRIAGE NOTES
Pt present to ED via EMS for L leg pain, numbness and tingling. Pt reports mechanical fall approx 3 days ago, fell on L side. Pt denies hitting head during fall. States pain 10/10.

## 2022-11-11 NOTE — Clinical Note
Diagnosis: Acute left ankle pain [[id number]]   Future Attending Provider: PAOLO ELLIS [[id number]]   Admitting Provider:: PAOLO ELLIS [[id number]]

## 2022-11-11 NOTE — ED PROVIDER NOTES
Encounter Date: 11/11/2022       History     Chief Complaint   Patient presents with    Fall     Mechanical trip and fall a few days ago. No head trauma. Reporting left ankle pain.     37-year-old female with past medical history of anxiety, osteomyelitis of the right hand, chronic pain syndrome, depression presenting with chief complaint of left leg and left ankle pain. She had a mechanical trip and fall 3 days ago. She reports tripping over a toy, causing her to fall forward, but was able to catch herself on the bed. Denies head or back trauma. She was initially able to ambulate, but the pain has progressively worsened since then. She reports pain that is radiating down the left leg to the foot with associated numbness and tingling. She felt the need to urinate this morning, but was unable to get to the toilet. A friend was able to help her to the bathroom. She had a bowel movement at the time but did not feel the need to defecate beforehand. She endorses saddle anesthesia and was unable to feel the toilet paper while wiping. She denies fever or IV drug use. Denies cp, sob, abd pain, NVD, dysuria. Denies symptoms of this nature in the past. Denies worsening or alleviating factors.     Review of patient's allergies indicates:   Allergen Reactions    Coconut Hives and Itching    Sulfa (sulfonamide antibiotics) Hives     itching    Adhesive Rash    Latex, natural rubber Other (See Comments) and Rash     Past Medical History:   Diagnosis Date    Anxiety     Chronic osteomyelitis of right hand 2019    Chronic pain syndrome     Depression     Insomnia     Post traumatic stress disorder (PTSD)     Sciatica      Past Surgical History:   Procedure Laterality Date    ABLATION OF MEDIAL BRANCH NERVE OF LUMBAR SPINE FACET JOINT      L4-S1    COSMETIC SURGERY      breast augmentation    ETHMOIDECTOMY  7/1/2022    Procedure: ETHMOIDECTOMY;  Surgeon: Segundo Fu MD;  Location: [facility] OR [address] [place] [address];  Service: ENT;;    FINGER  SURGERY      FUNCTIONAL ENDOSCOPIC SINUS SURGERY (FESS) USING COMPUTER-ASSISTED NAVIGATION Bilateral 2022    Procedure: FESS, USING COMPUTER-ASSISTED NAVIGATION;  Surgeon: Segundo Fu MD;  Location: [facility] OR [address] [place] [address];  Service: ENT;  Laterality: Bilateral;    MAXILLARY ANTROSTOMY  2022    Procedure: MAXILLARY ANTROSTOMY;  Surgeon: Segundo Fu MD;  Location: [facility] OR [address] [place] [address];  Service: ENT;;    PELVIC LAPAROSCOPY      Endometriosis ([place])    TONSILLECTOMY       Family History   Problem Relation Age of Onset    Diabetes Maternal Grandmother     Diabetes Mother     Hypertension Mother     Cervical cancer Maternal Aunt     Colon cancer Maternal Aunt     Diabetes Maternal Aunt     Breast cancer Neg Hx     Eclampsia Neg Hx     Miscarriages / Stillbirths Neg Hx     Ovarian cancer Neg Hx      labor Neg Hx     Stroke Neg Hx      Social History     Tobacco Use    Smoking status: Some Days     Packs/day: 0.25     Years: 15.00     Pack years: 3.75     Types: Cigarettes     Start date: 2017     Last attempt to quit: 2021     Years since quittin.9    Smokeless tobacco: Never    Tobacco comments:     occasional 3 cig month, started 15 quit 18-19, started age 22    Substance Use Topics    Alcohol use: Not Currently    Drug use: Never     Review of Systems   Constitutional:  Negative for fever.   HENT:  Negative for sore throat.    Respiratory:  Negative for shortness of breath.    Cardiovascular:  Negative for chest pain.   Gastrointestinal:  Negative for nausea.   Genitourinary:  Negative for dysuria.   Musculoskeletal:  Positive for arthralgias, back pain and myalgias.   Skin:  Negative for rash.   Neurological:  Positive for numbness. Negative for weakness.        Saddle anesthesia    Hematological:  Does not bruise/bleed easily.     Physical Exam     Initial Vitals [22 1153]   BP Pulse Resp Temp SpO2   (!) 146/98 90 16 98.5 °F (36.9 °C) 98 %      MAP       --         Physical  Exam    Nursing note and vitals reviewed.  Constitutional: She appears well-developed and well-nourished. She is not diaphoretic. No distress.   HENT:   Head: Normocephalic and atraumatic.   Mouth/Throat: Oropharynx is clear and moist.   Eyes: Conjunctivae and EOM are normal. Pupils are equal, round, and reactive to light.   Neck: Neck supple.   Normal range of motion.  Cardiovascular:  Normal rate, regular rhythm, normal heart sounds and intact distal pulses.     Exam reveals no gallop and no friction rub.       No murmur heard.  Pulmonary/Chest: Breath sounds normal. She has no wheezes. She has no rhonchi. She has no rales.   Abdominal: Abdomen is soft. Bowel sounds are normal. There is no abdominal tenderness.   Musculoskeletal:         General: Normal range of motion.      Cervical back: Normal range of motion and neck supple.     Neurological: She is alert and oriented to person, place, and time. No cranial nerve deficit or sensory deficit. GCS score is 15. GCS eye subscore is 4. GCS verbal subscore is 5. GCS motor subscore is 6.   Strength 3/5 left hip flexion, knee extension   Strength 5/5 RLE   Decreased sensation to light touch to the lateral aspect of the left leg   2+ distal pulses   TTP along the lateral lateral malleolus   No significant swelling or abnormality    Skin: Skin is warm and dry. Capillary refill takes less than 2 seconds.   Psychiatric: She has a normal mood and affect. Her behavior is normal. Judgment and thought content normal.       ED Course   Procedures  Labs Reviewed   CBC W/ AUTO DIFFERENTIAL - Abnormal; Notable for the following components:       Result Value    RBC 3.98 (*)     MCH 31.4 (*)     All other components within normal limits   COMPREHENSIVE METABOLIC PANEL - Abnormal; Notable for the following components:    CO2 22 (*)     Albumin 3.0 (*)     All other components within normal limits   SEDIMENTATION RATE - Abnormal; Notable for the following components:    Sed Rate 85  (*)     All other components within normal limits   C-REACTIVE PROTEIN - Abnormal; Notable for the following components:    CRP 40.0 (*)     All other components within normal limits   TRANSFERRIN - Abnormal; Notable for the following components:    Transferrin 194 (*)     All other components within normal limits   PHOSPHORUS - Abnormal; Notable for the following components:    Phosphorus 2.5 (*)     All other components within normal limits   HEPATITIS C ANTIBODY    Narrative:     Release to patient->Immediate   URINALYSIS, REFLEX TO URINE CULTURE    Narrative:     Specimen Source->Urine   PREALBUMIN   MAGNESIUM   PROTIME-INR   DRUG SCREEN PANEL, URINE EMERGENCY   POCT URINE PREGNANCY   TYPE & SCREEN          Imaging Results              X-Ray Chest AP Portable (Final result)  Result time 11/11/22 18:43:38      Final result by Warner Pope MD (11/11/22 18:43:38)                   Impression:      As above      Electronically signed by: Warner Pope MD  Date:    11/11/2022  Time:    18:43               Narrative:    EXAMINATION:  XR CHEST AP PORTABLE    CLINICAL HISTORY:  Encounter for preprocedural respiratory examination    TECHNIQUE:  Single frontal view of the chest was performed.    COMPARISON:  07/31/2022    FINDINGS:  The cardiomediastinal silhouette is not enlarged.  There is no pleural effusion.  The trachea is midline.  The lungs are symmetrically expanded bilaterally without evidence of acute parenchymal process. No large focal consolidation seen.  There is no pneumothorax.  The osseous structures are remarkable for degenerative changes..                                        MRI Lumbar Spine Without Contrast (Final result)  Result time 11/11/22 19:19:36      Final result by Francisco Calvo MD (11/11/22 19:19:36)                   Impression:      Significant progression of lumbar spondylosis, most severe at the L5-S1 level with large disc extrusion and suspected sequestered disc material  seen contributing to severe spinal canal stenosis.    This report was flagged in Epic as abnormal.    Electronically signed by resident: Margie Patel  Date:    11/11/2022  Time:    18:22    Electronically signed by: Francisco Calvo MD  Date:    11/11/2022  Time:    19:19               Narrative:    EXAMINATION:  MRI LUMBAR SPINE WITHOUT CONTRAST    CLINICAL HISTORY:  Spinal stenosis, lumbar;    TECHNIQUE:  Multiplanar, multisequence MR images were acquired from the thoracolumbar junction to the sacrum without contrast.    COMPARISON:  MRI lumbar spine 08/19/2022    CT lumbar spine 11/11/2022    FINDINGS:  Motion degraded study.    Alignment: Normal.    Vertebrae: Vertebral body heights are relatively maintained.  Normal marrow signal. No fracture.    Discs: L4 through S1 disc height loss and desiccation.    Cord: Normal.  Conus terminates at L1.    Degenerative findings:    T12-L1 through L3-4: No significant spinal canal stenosis or neural foraminal narrowing.    L4-L5: Diffuse disc bulge with superimposed left paracentral disc protrusion results in moderate spinal canal stenosis and mild bilateral neuroforaminal narrowing.    L5-S1: Diffuse disc bulge with large central/left paracentral disc extrusion and suspected sequestered disc material seen extending approximately 2.5 cm above the L5-S1 disc space.  This results in severe spinal canal stenosis.  There is mild bilateral neuroforaminal narrowing.    Paraspinal muscles & soft tissues: Unremarkable.                                       X-Ray Lumbar Spine Ap Lateral w/Flex Ext (Final result)  Result time 11/11/22 17:36:52      Final result by Warner Pope MD (11/11/22 17:36:52)                   Impression:      As above      Electronically signed by: Warner Pope MD  Date:    11/11/2022  Time:    17:36               Narrative:    EXAMINATION:  XR LUMBAR SPINE AP AND LAT WITH FLEX/EXT    CLINICAL HISTORY:  SHAZIA;    TECHNIQUE:  AP and lateral views as  well as lateral flexion and extension images are performed through the lumbar spine.    COMPARISON:  Lumbar spine radiograph 08/19/2022, CT 11/11/2022, MRI 08/19/2022    FINDINGS:  Five views lumbar spine.    Neutral imaging demonstrates adequate alignment of the lumbar spine noting disc space height loss at L5-S1.  The sacral segments are aligned.  On flexion and extension views, no significant listhesis or facet malalignment.  AP spinal alignment is remarkable for mild levo scoliotic curvature.  No fracture.                                       CT Lumbar Spine Without Contrast (Final result)  Result time 11/11/22 15:07:42   Procedure changed from CT Lumbar Spine W Wo Contrast     Final result by Warner Pope MD (11/11/22 15:07:42)                   Impression:      1. Multilevel degenerative changes most significantly involving L4-L5 and L5-S1. Given modality difference, this may have progressed since previous MRI however correlation is advised. MRI could be performed for further evaluation as warranted.  2. No acute displaced fracture or dislocation of the lumbar spine.  Please see above for additional findings..      Electronically signed by: Warner Pope MD  Date:    11/11/2022  Time:    15:07               Narrative:    EXAMINATION:  CT LUMBAR SPINE WITHOUT CONTRAST    CLINICAL HISTORY:  Low back pain, cauda equina syndrome suspected;    TECHNIQUE:  Low-dose axial, sagittal and coronal reformations are obtained through the lumbar spine.  Contrast was not administered.    COMPARISON:  MRI lumbar spine 08/19/2022    FINDINGS:  Sagittal reformatted imaging demonstrates adequate alignment of the lumbar spine without significant vertebral body height loss.  There is disc space height loss primarily involving L5-S1.  The facet joints are aligned noting multilevel facet arthropathy.  No acute displaced fracture or dislocation of the lumbar spine.    L1-L2: There is a broad-based disc bulge without  significant spinal canal stenosis or neuroforaminal narrowing.    L2-L3: There is a broad-based disc bulge without significant spinal canal stenosis or neuroforaminal narrowing.    L3-L4: There is a broad-based disc bulge, slightly asymmetric to the left without significant spinal canal stenosis.  There is mild left neuroforaminal narrowing.    L4-L5: There is a broad-based disc bulge with associated central/paracentral protrusion.  This in combination with ligamentum flavum hypertrophy results in moderate to severe spinal canal stenosis and severe left neuroforaminal narrowing.    L5-S1: There is a broad-based disc bulge with superimposed central/left paracentral protrusion resulting in moderate spinal canal stenosis and severe left neuroforaminal narrowing.    The visualized intra-abdominal and intrapelvic content is grossly unremarkable noting moderate to large amount of stool throughout the colon.                                       X-Ray Foot Complete Left (Final result)  Result time 11/11/22 14:03:54      Final result by Vickey Olson MD (11/11/22 14:03:54)                   Impression:      See above      Electronically signed by: Vickey Olson MD  Date:    11/11/2022  Time:    14:03               Narrative:    EXAMINATION:  XR FOOT COMPLETE 3 VIEW LEFT    CLINICAL HISTORY:  .  Pain in left ankle and joints of left foot    TECHNIQUE:  AP, lateral and oblique views of the left foot were performed.    COMPARISON:  No 08/07/2021 ne    FINDINGS:  No fracture or dislocation.  No bone destruction identified.  Mild hallux valgus.                                       X-Ray Ankle Complete Left (Final result)  Result time 11/11/22 14:08:39      Final result by Vickey Olson MD (11/11/22 14:08:39)                   Impression:      See above      Electronically signed by: Vickey Olson MD  Date:    11/11/2022  Time:    14:08               Narrative:    EXAMINATION:  XR ANKLE COMPLETE 3 VIEW LEFT    CLINICAL  HISTORY:  Pain in left ankle and joints of left foot    TECHNIQUE:  AP, lateral and oblique views of the left ankle were performed.    COMPARISON:  08/07/2021    FINDINGS:  No fracture or dislocation.  No bone destruction identified                                       Medications   0.9%  NaCl infusion ( Intravenous New Bag 11/11/22 1916)   LIDOcaine (PF) 10 mg/ml (1%) injection 10 mg (has no administration in time range)   sodium chloride 0.9% flush 10 mL (has no administration in time range)   ondansetron disintegrating tablet 8 mg (has no administration in time range)   melatonin tablet 6 mg (has no administration in time range)   acetaminophen tablet 650 mg (has no administration in time range)   acetaminophen tablet 650 mg (has no administration in time range)   oxyCODONE immediate release tablet 5 mg (has no administration in time range)   oxyCODONE immediate release tablet 10 mg (has no administration in time range)   acetaminophen tablet 650 mg (650 mg Oral Given 11/11/22 1321)   ketorolac injection 9.999 mg (9.999 mg Intravenous Given 11/11/22 1321)   orphenadrine injection 30 mg (30 mg Intravenous Given 11/11/22 1339)   sodium chloride 0.9% bolus 1,000 mL (0 mLs Intravenous Stopped 11/11/22 2047)     Medical Decision Making:   History:   Old Medical Records: I decided to obtain old medical records.  Initial Assessment:   Emergent evaluation of a 37 y.o. female presenting to the emergency department complaining of left ankle pain after a mechanical trip and fall that occurred three days ago. Since then, she endorses pain, weakness, n/t to the left leg with associated saddle anesthesia. Patient is afebrile, hemodynamically stable, and non toxic appearing. Will order labs, imaging, analgesia.   Differential Diagnosis:   Ddx includes but is not limited to cauda equina syndrome, epidural abscess, epidural hematoma, lumbar radiculopathy, ankle sprain.   Clinical Tests:   Lab Tests: Ordered and  Reviewed  Radiological Study: Ordered and Reviewed  ED Management:  UA without infection.  No severe metabolic or hematologic derangements.  Inflammatory markers elevated.  Will proceed with x-ray of the left ankle and left foot.  I would like to obtain an MRI to evaluate for cauda equina syndrome.  However, patient is currently on house arrest and has an ankle bracelet on that contains metal.  We will attempt to contact her  in order to remove it prior to obtaining MRI.  Will start with CT lumbar spine with and without contrast.  Unfortunately, the radiology resident changed my CT order without discussing it with me. It was changed to CT lumbar spine without contrast, despite my explanation for wanting to obtain with and without.   CT reveals multilevel degenerative changes.  Will discussed the case with Orthopedic surgery.    Orthopedic surgery was successful in contacting [employer] and removed the ankle bracelet. Patient was able to receive MRI.   MRI:    Significant progression of lumbar spondylosis, most severe at the L5-S1 level with large disc extrusion and suspected sequestered disc material seen contributing to severe spinal canal stenosis.     Orthopedic surgery will admit the patient for cauda equina syndrome.  She will be taken to the OR.  The patient's history, physical exam, and plan of care was discussed with and agreed upon with my supervising physician.           Attending Attestation:     Physician Attestation Statement for NP/PA:   I discussed this assessment and plan of this patient with the NP/PA, but I did not personally examine the patient. The face to face encounter was performed by the NP/PA.            ED Course as of 11/11/22 2054 Fri Nov 11, 2022   1315 WBC: 6.11 [JM]   1315 Hemoglobin: 12.5 [JM]   1315 Hematocrit: 37.4 [JM]   1315 Platelets: 395 [JM]   1349 BUN: 9 [JM]   1349 Creatinine: 0.7 [JM]   1349 Sed Rate(!): 85 [JM]   1349 CRP(!): 40.0 [JM]      ED Course User  Index  [JM] Vannesa Drake PA-C                 Clinical Impression:   Final diagnoses:  [M25.572] Acute left ankle pain  [G83.4] Cauda equina syndrome (Primary)        ED Disposition Condition    Admit                 Vannesa Drake PA-C  11/11/22 2054       Veronica Garsia MD  11/12/22 3442

## 2022-11-12 LAB
BASOPHILS # BLD AUTO: 0.02 K/UL (ref 0–0.2)
BASOPHILS NFR BLD: 0.3 % (ref 0–1.9)
DIFFERENTIAL METHOD: ABNORMAL
EOSINOPHIL # BLD AUTO: 0 K/UL (ref 0–0.5)
EOSINOPHIL NFR BLD: 0 % (ref 0–8)
ERYTHROCYTE [DISTWIDTH] IN BLOOD BY AUTOMATED COUNT: 12.9 % (ref 11.5–14.5)
HCT VFR BLD AUTO: 38.1 % (ref 37–48.5)
HGB BLD-MCNC: 12.4 G/DL (ref 12–16)
IMM GRANULOCYTES # BLD AUTO: 0.03 K/UL (ref 0–0.04)
IMM GRANULOCYTES NFR BLD AUTO: 0.4 % (ref 0–0.5)
LYMPHOCYTES # BLD AUTO: 0.8 K/UL (ref 1–4.8)
LYMPHOCYTES NFR BLD: 10.8 % (ref 18–48)
MCH RBC QN AUTO: 30.5 PG (ref 27–31)
MCHC RBC AUTO-ENTMCNC: 32.5 G/DL (ref 32–36)
MCV RBC AUTO: 94 FL (ref 82–98)
MONOCYTES # BLD AUTO: 0.1 K/UL (ref 0.3–1)
MONOCYTES NFR BLD: 1.2 % (ref 4–15)
NEUTROPHILS # BLD AUTO: 6.7 K/UL (ref 1.8–7.7)
NEUTROPHILS NFR BLD: 87.3 % (ref 38–73)
NRBC BLD-RTO: 0 /100 WBC
PLATELET # BLD AUTO: 418 K/UL (ref 150–450)
PMV BLD AUTO: 10.2 FL (ref 9.2–12.9)
RBC # BLD AUTO: 4.07 M/UL (ref 4–5.4)
WBC # BLD AUTO: 7.69 K/UL (ref 3.9–12.7)

## 2022-11-12 PROCEDURE — 97166 OT EVAL MOD COMPLEX 45 MIN: CPT | Mod: HCNC

## 2022-11-12 PROCEDURE — 63600175 PHARM REV CODE 636 W HCPCS: Mod: HCNC | Performed by: NURSE ANESTHETIST, CERTIFIED REGISTERED

## 2022-11-12 PROCEDURE — 25000003 PHARM REV CODE 250: Mod: HCNC | Performed by: STUDENT IN AN ORGANIZED HEALTH CARE EDUCATION/TRAINING PROGRAM

## 2022-11-12 PROCEDURE — 97116 GAIT TRAINING THERAPY: CPT | Mod: HCNC

## 2022-11-12 PROCEDURE — 63600175 PHARM REV CODE 636 W HCPCS: Mod: HCNC | Performed by: ANESTHESIOLOGY

## 2022-11-12 PROCEDURE — 97112 NEUROMUSCULAR REEDUCATION: CPT | Mod: HCNC

## 2022-11-12 PROCEDURE — 97535 SELF CARE MNGMENT TRAINING: CPT | Mod: HCNC

## 2022-11-12 PROCEDURE — 97161 PT EVAL LOW COMPLEX 20 MIN: CPT | Mod: HCNC

## 2022-11-12 PROCEDURE — 11000001 HC ACUTE MED/SURG PRIVATE ROOM: Mod: HCNC

## 2022-11-12 PROCEDURE — 63600175 PHARM REV CODE 636 W HCPCS: Mod: HCNC | Performed by: STUDENT IN AN ORGANIZED HEALTH CARE EDUCATION/TRAINING PROGRAM

## 2022-11-12 PROCEDURE — 36415 COLL VENOUS BLD VENIPUNCTURE: CPT | Mod: HCNC | Performed by: STUDENT IN AN ORGANIZED HEALTH CARE EDUCATION/TRAINING PROGRAM

## 2022-11-12 PROCEDURE — 25000003 PHARM REV CODE 250: Mod: HCNC | Performed by: NURSE ANESTHETIST, CERTIFIED REGISTERED

## 2022-11-12 PROCEDURE — 25000003 PHARM REV CODE 250: Mod: HCNC | Performed by: ORTHOPAEDIC SURGERY

## 2022-11-12 PROCEDURE — 85025 COMPLETE CBC W/AUTO DIFF WBC: CPT | Mod: HCNC | Performed by: STUDENT IN AN ORGANIZED HEALTH CARE EDUCATION/TRAINING PROGRAM

## 2022-11-12 RX ORDER — HEPARIN SODIUM 5000 [USP'U]/ML
5000 INJECTION, SOLUTION INTRAVENOUS; SUBCUTANEOUS EVERY 8 HOURS
Status: DISCONTINUED | OUTPATIENT
Start: 2022-11-12 | End: 2022-11-18 | Stop reason: HOSPADM

## 2022-11-12 RX ORDER — HYDROMORPHONE HYDROCHLORIDE 1 MG/ML
0.2 INJECTION, SOLUTION INTRAMUSCULAR; INTRAVENOUS; SUBCUTANEOUS EVERY 5 MIN PRN
Status: DISCONTINUED | OUTPATIENT
Start: 2022-11-12 | End: 2022-11-12 | Stop reason: HOSPADM

## 2022-11-12 RX ORDER — DEXTROMETHORPHAN HYDROBROMIDE, GUAIFENESIN 5; 100 MG/5ML; MG/5ML
650 LIQUID ORAL EVERY 6 HOURS
Qty: 56 TABLET | Refills: 0 | Status: SHIPPED | OUTPATIENT
Start: 2022-11-12 | End: 2023-04-10 | Stop reason: HOSPADM

## 2022-11-12 RX ORDER — CEFAZOLIN SODIUM/D5W 2 G/100 ML
2 PLASTIC BAG, INJECTION (ML) INTRAVENOUS
Status: COMPLETED | OUTPATIENT
Start: 2022-11-12 | End: 2022-11-12

## 2022-11-12 RX ORDER — METHOCARBAMOL 750 MG/1
750 TABLET, FILM COATED ORAL 4 TIMES DAILY
Status: DISCONTINUED | OUTPATIENT
Start: 2022-11-12 | End: 2022-11-18 | Stop reason: HOSPADM

## 2022-11-12 RX ORDER — OXYCODONE HYDROCHLORIDE 5 MG/1
5 TABLET ORAL EVERY 4 HOURS PRN
Qty: 20 TABLET | Refills: 0 | Status: ON HOLD | OUTPATIENT
Start: 2022-11-12 | End: 2022-12-15 | Stop reason: SDUPTHER

## 2022-11-12 RX ORDER — CELECOXIB 200 MG/1
200 CAPSULE ORAL DAILY
Qty: 12 CAPSULE | Refills: 0 | Status: ON HOLD | OUTPATIENT
Start: 2022-11-12 | End: 2022-12-15 | Stop reason: SDUPTHER

## 2022-11-12 RX ORDER — ONDANSETRON 2 MG/ML
4 INJECTION INTRAMUSCULAR; INTRAVENOUS ONCE AS NEEDED
Status: DISCONTINUED | OUTPATIENT
Start: 2022-11-12 | End: 2022-11-12 | Stop reason: HOSPADM

## 2022-11-12 RX ORDER — ONDANSETRON 2 MG/ML
INJECTION INTRAMUSCULAR; INTRAVENOUS
Status: DISCONTINUED | OUTPATIENT
Start: 2022-11-12 | End: 2022-11-12

## 2022-11-12 RX ORDER — ACETAMINOPHEN 10 MG/ML
INJECTION, SOLUTION INTRAVENOUS
Status: DISCONTINUED | OUTPATIENT
Start: 2022-11-12 | End: 2022-11-12

## 2022-11-12 RX ORDER — HYDROMORPHONE HYDROCHLORIDE 2 MG/ML
INJECTION, SOLUTION INTRAMUSCULAR; INTRAVENOUS; SUBCUTANEOUS
Status: DISCONTINUED | OUTPATIENT
Start: 2022-11-12 | End: 2022-11-12

## 2022-11-12 RX ORDER — METHOCARBAMOL 500 MG/1
1000 TABLET, FILM COATED ORAL 3 TIMES DAILY
Qty: 84 TABLET | Refills: 0 | Status: ON HOLD | OUTPATIENT
Start: 2022-11-12 | End: 2022-12-15

## 2022-11-12 RX ORDER — CELECOXIB 200 MG/1
200 CAPSULE ORAL DAILY
Status: DISCONTINUED | OUTPATIENT
Start: 2022-11-12 | End: 2022-11-18 | Stop reason: HOSPADM

## 2022-11-12 RX ORDER — DROPERIDOL 2.5 MG/ML
0.62 INJECTION, SOLUTION INTRAMUSCULAR; INTRAVENOUS ONCE AS NEEDED
Status: DISCONTINUED | OUTPATIENT
Start: 2022-11-12 | End: 2022-11-12 | Stop reason: HOSPADM

## 2022-11-12 RX ORDER — FENTANYL CITRATE 50 UG/ML
INJECTION, SOLUTION INTRAMUSCULAR; INTRAVENOUS
Status: COMPLETED
Start: 2022-11-12 | End: 2022-11-12

## 2022-11-12 RX ADMIN — Medication 2 G: at 06:11

## 2022-11-12 RX ADMIN — METHOCARBAMOL 750 MG: 750 TABLET ORAL at 04:11

## 2022-11-12 RX ADMIN — OXYCODONE HYDROCHLORIDE 10 MG: 10 TABLET ORAL at 06:11

## 2022-11-12 RX ADMIN — METHOCARBAMOL 750 MG: 750 TABLET ORAL at 12:11

## 2022-11-12 RX ADMIN — Medication 10 MG: at 12:11

## 2022-11-12 RX ADMIN — ALUMINUM HYDROXIDE, MAGNESIUM HYDROXIDE, AND DIMETHICONE 30 ML: 200; 20; 200 SUSPENSION ORAL at 06:11

## 2022-11-12 RX ADMIN — ALPRAZOLAM 1 MG: 0.5 TABLET ORAL at 06:11

## 2022-11-12 RX ADMIN — ALPRAZOLAM 1 MG: 0.5 TABLET ORAL at 01:11

## 2022-11-12 RX ADMIN — ONDANSETRON 8 MG: 8 TABLET, ORALLY DISINTEGRATING ORAL at 12:11

## 2022-11-12 RX ADMIN — CELECOXIB 200 MG: 200 CAPSULE ORAL at 08:11

## 2022-11-12 RX ADMIN — OXYCODONE HYDROCHLORIDE 10 MG: 10 TABLET ORAL at 03:11

## 2022-11-12 RX ADMIN — OXYCODONE HYDROCHLORIDE 10 MG: 10 TABLET ORAL at 10:11

## 2022-11-12 RX ADMIN — HEPARIN SODIUM 5000 UNITS: 5000 INJECTION INTRAVENOUS; SUBCUTANEOUS at 04:11

## 2022-11-12 RX ADMIN — ALUMINUM HYDROXIDE, MAGNESIUM HYDROXIDE, AND DIMETHICONE 30 ML: 200; 20; 200 SUSPENSION ORAL at 04:11

## 2022-11-12 RX ADMIN — HYDROMORPHONE HYDROCHLORIDE 1 MG: 1 INJECTION, SOLUTION INTRAMUSCULAR; INTRAVENOUS; SUBCUTANEOUS at 04:11

## 2022-11-12 RX ADMIN — GABAPENTIN 600 MG: 300 CAPSULE ORAL at 08:11

## 2022-11-12 RX ADMIN — PANTOPRAZOLE SODIUM 40 MG: 40 TABLET, DELAYED RELEASE ORAL at 08:11

## 2022-11-12 RX ADMIN — ACETAMINOPHEN 1000 MG: 10 INJECTION INTRAVENOUS at 12:11

## 2022-11-12 RX ADMIN — HYDROMORPHONE HYDROCHLORIDE 0.2 MG: 1 INJECTION, SOLUTION INTRAMUSCULAR; INTRAVENOUS; SUBCUTANEOUS at 01:11

## 2022-11-12 RX ADMIN — HYDROMORPHONE HYDROCHLORIDE 1 MG: 1 INJECTION, SOLUTION INTRAMUSCULAR; INTRAVENOUS; SUBCUTANEOUS at 08:11

## 2022-11-12 RX ADMIN — FENTANYL CITRATE 50 MCG: 50 INJECTION, SOLUTION INTRAMUSCULAR; INTRAVENOUS at 01:11

## 2022-11-12 RX ADMIN — DOXEPIN HYDROCHLORIDE 50 MG: 50 CAPSULE ORAL at 08:11

## 2022-11-12 RX ADMIN — HYDROMORPHONE HYDROCHLORIDE 0.5 MG: 2 INJECTION INTRAMUSCULAR; INTRAVENOUS; SUBCUTANEOUS at 12:11

## 2022-11-12 RX ADMIN — SUCRALFATE 1 G: 1 SUSPENSION ORAL at 12:11

## 2022-11-12 RX ADMIN — OXYCODONE HYDROCHLORIDE 10 MG: 10 TABLET ORAL at 01:11

## 2022-11-12 RX ADMIN — HEPARIN SODIUM 5000 UNITS: 5000 INJECTION INTRAVENOUS; SUBCUTANEOUS at 10:11

## 2022-11-12 RX ADMIN — ONDANSETRON 4 MG: 2 INJECTION INTRAMUSCULAR; INTRAVENOUS at 12:11

## 2022-11-12 RX ADMIN — ALPRAZOLAM 1 MG: 0.5 TABLET ORAL at 04:11

## 2022-11-12 RX ADMIN — Medication 2 G: at 03:11

## 2022-11-12 RX ADMIN — HYDROMORPHONE HYDROCHLORIDE 0.2 MG: 1 INJECTION, SOLUTION INTRAMUSCULAR; INTRAVENOUS; SUBCUTANEOUS at 02:11

## 2022-11-12 RX ADMIN — HYDROMORPHONE HYDROCHLORIDE 1 MG: 1 INJECTION, SOLUTION INTRAMUSCULAR; INTRAVENOUS; SUBCUTANEOUS at 03:11

## 2022-11-12 RX ADMIN — METHOCARBAMOL 750 MG: 750 TABLET ORAL at 08:11

## 2022-11-12 RX ADMIN — SUCRALFATE 1 G: 1 SUSPENSION ORAL at 07:11

## 2022-11-12 RX ADMIN — SODIUM CHLORIDE: 0.9 INJECTION, SOLUTION INTRAVENOUS at 03:11

## 2022-11-12 RX ADMIN — ALUMINUM HYDROXIDE, MAGNESIUM HYDROXIDE, AND DIMETHICONE 30 ML: 200; 20; 200 SUSPENSION ORAL at 10:11

## 2022-11-12 RX ADMIN — VENLAFAXINE HYDROCHLORIDE 300 MG: 75 CAPSULE, EXTENDED RELEASE ORAL at 08:11

## 2022-11-12 RX ADMIN — ACETAMINOPHEN 650 MG: 325 TABLET ORAL at 05:11

## 2022-11-12 RX ADMIN — Medication 2 G: at 10:11

## 2022-11-12 RX ADMIN — HYDROMORPHONE HYDROCHLORIDE 1 MG: 1 INJECTION, SOLUTION INTRAMUSCULAR; INTRAVENOUS; SUBCUTANEOUS at 12:11

## 2022-11-12 RX ADMIN — OXYCODONE 5 MG: 5 TABLET ORAL at 08:11

## 2022-11-12 RX ADMIN — GABAPENTIN 600 MG: 300 CAPSULE ORAL at 03:11

## 2022-11-12 RX ADMIN — HYDROMORPHONE HYDROCHLORIDE 1 MG: 1 INJECTION, SOLUTION INTRAMUSCULAR; INTRAVENOUS; SUBCUTANEOUS at 09:11

## 2022-11-12 RX ADMIN — ACETAMINOPHEN 650 MG: 325 TABLET ORAL at 06:11

## 2022-11-12 RX ADMIN — ACETAMINOPHEN 650 MG: 325 TABLET ORAL at 12:11

## 2022-11-12 RX ADMIN — HYDROMORPHONE HYDROCHLORIDE 0.5 MG: 2 INJECTION INTRAMUSCULAR; INTRAVENOUS; SUBCUTANEOUS at 01:11

## 2022-11-12 NOTE — PLAN OF CARE
Plan of care reviewed with pt on arrival to the unit. Pt medicated for pain and repositioned for comfort. Pt has multiple open or scabbed areas to face and arms; pt states that she has hyper sensitive reaction to bug bites and with her anxiety, she picks at areas. VSS and afebrile. Pt able to do gross movement with left foot. [place] places as pt states she does not always know when she needs to void. Will continue to monitor pt

## 2022-11-12 NOTE — PROGRESS NOTES
[facility] - Surgery  Orthopedics  Progress Note    Patient Name: Marianela Shrestha  MRN: [id number]  Admission Date: 11/11/2022  Hospital Length of Stay: 1 days  Attending Provider: Sundar Harry MD  Primary Care Provider: Arnaldo Espino MD  Follow-up For: Procedure(s) (LRB):  LAMINECTOMY, SPINE, LUMBAR (N/A)    Post-Operative Day: 1 Day Post-Op  Subjective:     Principal Problem:SHAZIA (cauda equina syndrome)    Principal Orthopedic Problem: same    Interval History: Patient seen and examined at bedside.  No acute events overnight.  Pain controlled.  Patient reports improvement in LLE sensation and improvement in hip strength. Drain with 20 cc out.       Review of patient's allergies indicates:   Allergen Reactions    Coconut Hives and Itching    Sulfa (sulfonamide antibiotics) Hives     itching    Adhesive Rash    Latex, natural rubber Other (See Comments) and Rash       Current Facility-Administered Medications   Medication    (Magic mouthwash) 1:1:1 diphenhydrAMINE(Benadryl) 12.5mg/5ml liq, aluminum & magnesium hydroxide-simethicone (Maalox), LIDOcaine viscous 2%    0.9%  NaCl infusion    acetaminophen tablet 650 mg    acetaminophen tablet 650 mg    albuterol inhaler 2 puff    albuterol-ipratropium 2.5 mg-0.5 mg/3 mL nebulizer solution 3 mL    ALPRAZolam tablet 1 mg    aluminum-magnesium hydroxide-simethicone 200-200-20 mg/5 mL suspension 30 mL    ceFAZolin 2 gram in dextrose 5% 100 mL IVPB (premix)    celecoxib capsule 200 mg    doxepin capsule 50 mg    fluticasone furoate-vilanteroL 100-25 mcg/dose diskus inhaler 1 puff    gabapentin capsule 600 mg    heparin (porcine) injection 5,000 Units    HYDROmorphone injection 1 mg    LIDOcaine (PF) 10 mg/ml (1%) injection 10 mg    melatonin tablet 6 mg    methocarbamoL tablet 750 mg    ondansetron disintegrating tablet 8 mg    oxyCODONE immediate release tablet 5 mg    oxyCODONE immediate release tablet Tab 10 mg    pantoprazole EC tablet 40 mg     "sodium chloride 0.9% flush 10 mL    sucralfate 100 mg/mL suspension 1 g    venlafaxine 24 hr capsule 300 mg     Objective:     Vital Signs (Most Recent):  Temp: 97.9 °F (36.6 °C) (11/12/22 0552)  Pulse: 68 (11/12/22 0552)  Resp: 18 (11/12/22 0610)  BP: 125/80 (11/12/22 0552)  SpO2: (!) 92 % (11/12/22 0552)   Vital Signs (24h Range):  Temp:  [97.3 °F (36.3 °C)-98.5 °F (36.9 °C)] 97.9 °F (36.6 °C)  Pulse:  [67-90] 68  Resp:  [10-24] 18  SpO2:  [92 %-99 %] 92 %  BP: (123-146)/(73-98) 125/80     Weight: 104.3 kg (229 lb 15 oz)  Height: 5' 5" (165.1 cm)  Body mass index is 38.26 kg/m².      Intake/Output Summary (Last 24 hours) at 11/12/2022 0645  Last data filed at 11/12/2022 0615  Gross per 24 hour   Intake 2401.32 ml   Output 20 ml   Net 2381.32 ml       Ortho/SPM Exam  AAOx4  NAD  Reg rate  No increased WOB  Dressing c/d/i  Drains x 2 in place    Iliopsoas (L2,3)                           3/5  Quadriceps  (L3,4)                                         4/5   Tibialis Anterior (L4.5)                2/5                  Extensor Halucis Longus (L5)                                                                       0/5       Gastrocnemius/Soleus (S1)                                            2/5                  Flexor Hallucis Longus(S2)                                      3/5                       Sensation (a=absent, i=impaired, n=normal)  L2-S2 remains diminished, but improved from prior to OR       Significant Labs: CBC:   Recent Labs   Lab 11/11/22  1259   WBC 6.11   HGB 12.5   HCT 37.4        CMP:   Recent Labs   Lab 11/11/22  1259      K 4.5      CO2 22*   GLU 81   BUN 9   CREATININE 0.7   CALCIUM 8.7   PROT 7.3   ALBUMIN 3.0*   BILITOT 0.2   ALKPHOS 98   AST 18   ALT 23   ANIONGAP 9     All pertinent labs within the past 24 hours have been reviewed.    Significant Imaging: None    Assessment/Plan:     * SHAZIA (cauda equina syndrome)  Marianela Shrestha is a 37 y.o. female with SHAZIA SP " L4-S1 Laminectomy 11.11.22    -Admitted to orthopedic service  -Aggressive therapy  -will order PODAS boot and AFO for LLE  -Marked, booked, and consented for surgery  -PT/OT: WBAT spine precautions  -DVT PPx: Heparin TID  -Abx: Ancef  -Labs: None  -[place]ison: None                  Paul Hernandez MD  Orthopedics  [facility] - Surgery

## 2022-11-12 NOTE — DISCHARGE INSTRUCTIONS
DR. PAOLO ELLIS'S POSTOPERATIVE INSTRUCTIONS -   LUMBAR DISKECTOMY       Antibiotics: You do not need additional antibiotics at home.    Wound Care: You may remove your dressing and shower 5 days after surgery. Until then please keep your wound clean and dry. Sponge baths are acceptable. Do not go in a pool or hot tub until seen in clinic. Please leave the small steri-strips covering your wound in place until they fall off naturally (2 weeks). You may notice clear suture ends hanging from the sides of your incision after the steri-strips are removed, it is ok to clip these with scissors.    Brace: You do not need a brace.    Pain: We will use a multimodal approach for pain management after your surgery.  You will be given a prescription for pain medicine when you are discharged from the hospital.  You will also be given prescriptions for Robaxin (a muscle relaxer), Gabapentin, Celebrex and Tylenol.  Please note: you will only be given ONE prescription for narcotics when you are discharged from the hospital.  This medication is for breakthrough pain only. This medication will not be refilled.  The other medications given to you may be refilled if needed.      Infection: Signs of infection include increasing wound drainage and redness around the wound, as well as a temperature over 101.5 degrees. It is unnecessary to take your temperature on a routine basis. Please call the above number if you are concerned about an infection.    Driving and Work: It is ok to return to driving and work as long as you are not taking narcotic pain medications. Please do not lift over 10 pounds or participate in exercise or sports until cleared by Dr. Ellis.    Deep Venous Thrombosis (Blood Clots): Symptoms include swelling in the legs and shortness of breath. Please call the office or proceed to the nearest emergency room if you have any of these symptoms.    Physical Therapy: The best physical therapy after surgery is walking. Please try  to walk as much as possible.    Follow-up: You will be scheduled for a follow-up appointment in 4 weeks with either Dr. Harry or his physician assistant, Antonia Clifton PA-C.    Questions: During business hours please call [phone] for routine questions. For after hours questions please call [phone] and ask to speak with the Orthopaedic resident on call.    Disability: If you submitted short term disability paperwork for us to complete and would like to check the status, please call the Disability Department at [phone].  You may fax any necessary paperwork to [phone].

## 2022-11-12 NOTE — SUBJECTIVE & OBJECTIVE
Past Medical History:   Diagnosis Date    Anxiety     Chronic osteomyelitis of right hand 2019    Chronic pain syndrome     Depression     Insomnia     Post traumatic stress disorder (PTSD)     Sciatica        Past Surgical History:   Procedure Laterality Date    ABLATION OF MEDIAL BRANCH NERVE OF LUMBAR SPINE FACET JOINT      L4-S1    COSMETIC SURGERY      breast augmentation    ETHMOIDECTOMY  7/1/2022    Procedure: ETHMOIDECTOMY;  Surgeon: Segundo Fu MD;  Location: [facility] OR [address] [place] [address];  Service: ENT;;    FINGER SURGERY      FUNCTIONAL ENDOSCOPIC SINUS SURGERY (FESS) USING COMPUTER-ASSISTED NAVIGATION Bilateral 7/1/2022    Procedure: FESS, USING COMPUTER-ASSISTED NAVIGATION;  Surgeon: Segundo Fu MD;  Location: [facility] OR [address] [place] [address];  Service: ENT;  Laterality: Bilateral;    MAXILLARY ANTROSTOMY  7/1/2022    Procedure: MAXILLARY ANTROSTOMY;  Surgeon: Segundo Fu MD;  Location: [facility] OR [address] [place] [address];  Service: ENT;;    PELVIC LAPAROSCOPY      Endometriosis ([place])    TONSILLECTOMY         Review of patient's allergies indicates:   Allergen Reactions    Coconut Hives and Itching    Sulfa (sulfonamide antibiotics) Hives     itching    Adhesive Rash    Latex, natural rubber Other (See Comments) and Rash       Current Facility-Administered Medications   Medication    0.9%  NaCl infusion    acetaminophen tablet 650 mg    [START ON 11/12/2022] acetaminophen tablet 650 mg    LIDOcaine (PF) 10 mg/ml (1%) injection 10 mg    melatonin tablet 6 mg    ondansetron disintegrating tablet 8 mg    oxyCODONE immediate release tablet 10 mg    oxyCODONE immediate release tablet 5 mg    sodium chloride 0.9% flush 10 mL     Current Outpatient Medications   Medication Sig    (Magic mouthwash) 1:1:1 diphenhydramine(Benadryl) 12.5mg/5ml liq, aluminum & magnesium hydroxide-simethicone (Maalox), LIDOcaine viscous 2% Swish and spit 15 mLs every 4 (four) hours as needed (pain). for mouth sores    albuterol (PROVENTIL/VENTOLIN HFA) 90  mcg/actuation inhaler Inhale 2 puffs into the lungs every 6 (six) hours as needed. Rescue    albuterol-ipratropium (DUO-NEB) 2.5 mg-0.5 mg/3 mL nebulizer solution Take 3 mLs by nebulization every 6 (six) hours as needed for Wheezing or Shortness of Breath. Rescue    ALPRAZolam (XANAX) 1 MG tablet Take 1 tablet (1 mg total) by mouth 3 (three) times daily as needed for Anxiety.    budesonide-formoterol 160-4.5 mcg (SYMBICORT) 160-4.5 mcg/actuation HFAA INHALE 2 PUFFS INTO THE LUNGS EVERY 12 (TWELVE) HOURS. CONTROLLER    cyclobenzaprine (FLEXERIL) 10 MG tablet TAKE 1 TABLET BY MOUTH THREE TIMES A DAY AS NEEDED FOR MUSCLE SPASMS    doxepin (SINEQUAN) 25 MG capsule TAKE 2 CAPSULES (50 MG TOTAL) BY MOUTH EVERY EVENING.    gabapentin (NEURONTIN) 600 MG tablet TAKE 1 TABLET BY MOUTH THREE TIMES A DAY    HYDROcodone-acetaminophen (NORCO) 5-325 mg per tablet Take 1 tablet by mouth every 6 (six) hours as needed for Pain.    ibuprofen 200 mg Cap Take 800 mg by mouth every 6 (six) hours as needed.    meloxicam (MOBIC) 15 MG tablet TAKE 1 TABLET BY MOUTH EVERY DAY    mupirocin (BACTROBAN) 2 % ointment Twice daily for five days twice per month for six months    norethindrone (AYGESTIN) 5 mg Tab Take 1 tablet (5 mg total) by mouth once daily. (Patient taking differently: Take 5 mg by mouth every morning.)    omeprazole (PRILOSEC) 20 MG capsule TAKE 1 CAPSULE BY MOUTH EVERY DAY (Patient taking differently: Take by mouth every morning.)    ondansetron (ZOFRAN-ODT) 4 MG TbDL Dissolve 2 tablets (8 mg total) by mouth every 8 (eight) hours as needed (nausea).    traMADoL (ULTRAM) 50 mg tablet Take 1 tablet (50 mg total) by mouth every 6 (six) hours as needed for Pain.    venlafaxine (EFFEXOR-XR) 150 MG Cp24 TAKE 2 CAPSULES BY MOUTH EVERY DAY (Patient taking differently: Take by mouth every morning.)     Family History       Problem Relation (Age of Onset)    Cervical cancer Maternal Aunt    Colon cancer Maternal Aunt    Diabetes Maternal  Grandmother, Mother, Maternal Aunt    Hypertension Mother          Tobacco Use    Smoking status: Some Days     Packs/day: 0.25     Years: 15.00     Pack years: 3.75     Types: Cigarettes     Start date: 2017     Last attempt to quit: 2021     Years since quittin.9    Smokeless tobacco: Never    Tobacco comments:     occasional 3 cig month, started 15 quit 18-19, started age 22    Substance and Sexual Activity    Alcohol use: Not Currently    Drug use: Never    Sexual activity: Yes     Partners: Male     Birth control/protection: None     ROS  Constitutional: negative for fevers  Eyes: negative visual changes  ENT: negative for hearing loss  Respiratory: negative for dyspnea  Cardiovascular: negative for chest pain  Gastrointestinal: negative for abdominal pain  Genitourinary: positive for urinary changes    Objective:     Vital Signs (Most Recent):  Temp: 98.5 °F (36.9 °C) (22)  Pulse: 67 (22)  Resp: 16 (22)  BP: 135/84 (22)  SpO2: 99 % (22) Vital Signs (24h Range):  Temp:  [97.9 °F (36.6 °C)-98.5 °F (36.9 °C)] 98.5 °F (36.9 °C)  Pulse:  [67-90] 67  Resp:  [16] 16  SpO2:  [98 %-99 %] 99 %  BP: (135-146)/(84-98) 135/84     Weight: 104.3 kg (230 lb)     Body mass index is 38.27 kg/m².    No intake or output data in the 24 hours ending 22    Ortho/SPM Exam  Vitals: Afebrile.  Vital signs stable.  General: No acute distress.  Cardio: Regular rate.  Chest: No increased work of breathing.      Motor        LEFT      Iliopsoas (L2,3)       3/5  Quadriceps (L3,4)        4/5   Tibialis Anterior (L4.5)     2/5       Extensor Halucis Longus (L5)    0/5       Gastrocnemius/Soleus (S1)     2/5    Flexor Hallucis Longus(S2)     3/5        Sensation (a=absent, i=impaired, n=normal)     LEFT  L2 dermatome       Diminished L3 dermatome       Diminished       L4 dermatome       Diminished L5 dermatome       Diminished       S1 dermatome        Diminished       S2 dermatome       Diminished   Rectal exam with intact tone.         Reflexes          LEFT  Patellar       2+     2+  Achilles       2+     2+  Babinski      neg      Clonus         neg            Significant Labs: CBC:   Recent Labs   Lab 11/11/22  1259   WBC 6.11   HGB 12.5   HCT 37.4        CMP:   Recent Labs   Lab 11/11/22  1259      K 4.5      CO2 22*   GLU 81   BUN 9   CREATININE 0.7   CALCIUM 8.7   PROT 7.3   ALBUMIN 3.0*   BILITOT 0.2   ALKPHOS 98   AST 18   ALT 23   ANIONGAP 9     All pertinent labs within the past 24 hours have been reviewed.    Significant Imaging: I have reviewed and interpreted all pertinent imaging results/findings.  MRI lumbar spine: Severe paracentral stenosis with sequestered disc L4-S1  Xray lumbar spine: Overall maintained alinement of lumbar spine

## 2022-11-12 NOTE — PT/OT/SLP EVAL
"Occupational Therapy   Co-Evaluation  Co-treat with PT to accommodate pt activity tolerance and need for skilled hands for safe intervention.    Name: Marianela Shrestha  MRN: [id number]  Admitting Diagnosis:  SHAZIA (cauda equina syndrome)  Recent Surgery: Procedure(s) (LRB):  LAMINECTOMY, SPINE, LUMBAR (N/A) 1 Day Post-Op    Recommendations:     Discharge Recommendations: rehabilitation facility  Discharge Equipment Recommendations:  walker, rolling  Barriers to discharge:  Other (Comment) (increased A required)    Assessment:     Marianela Shrestha is a 37 y.o. female with a medical diagnosis of SHAZIA (cauda equina syndrome).  She presents with SHAZIA. Performance deficits affecting function: weakness, impaired endurance, impaired sensation, impaired self care skills, impaired functional mobility, gait instability, impaired balance, decreased coordination, decreased upper extremity function, decreased lower extremity function, pain, decreased ROM, orthopedic precautions. Pt reported decreased sensation around anus during pericare, L posterior heel, L lateral foot, and L lateral dorsum of foot this date.    Rehab Prognosis: Good; patient would benefit from acute skilled OT services to address these deficits and reach maximum level of function.       Plan:     Patient to be seen 4 x/week to address the above listed problems via self-care/home management, therapeutic activities, therapeutic exercises, neuromuscular re-education  Plan of Care Expires: 12/12/22  Plan of Care Reviewed with: patient    Subjective     Chief Complaint: SHAZIA  Patient/Family Comments/goals: "I can feel that I need to urinate now!"    Occupational Profile:  Living Environment: Pt lives with mother, brother, and fiancee. SSH, 1 CODI, tub shower  Previous level of function: Pt was independent prior to admission, driving, not working  Roles and Routines: Pt enjoys spending time with her family and dog  Equipment Used at Home:  grab bar, raised toilet, " bath bench  Assistance upon Discharge: IPR recommended upon d/c. Pt is a good candidate for IP rehab because of independent PLOF, level of motivation, ability to follow commands, ability to tolerate three hours of therapy per day, and of need for multidisciplinary approach to address complex pt needs.       Pain/Comfort:  Pain Rating 1: 6/10  Location - Side 1: Bilateral  Location - Orientation 1: lower  Location 1: back  Pain Addressed 1: Reposition, Distraction  Pain Rating Post-Intervention 1: 6/10    Patients cultural, spiritual, L[employer]-day conflicts given the current situation: no    Objective:     Communicated with: RN prior to session.  Patient found HOB elevated with peripheral IV, wound vac, PureWick, SCD upon OT entry to room.    General Precautions: Standard, fall   Orthopedic Precautions:spinal precautions (WBAT w/ RW)   Braces: N/A  Respiratory Status: Room air    Occupational Performance:    Bed Mobility:    Patient completed Rolling/Turning to Left with  moderate assistance  Patient completed Rolling/Turning to Right with moderate assistance  Patient completed Supine to Sit with minimum assistance  Patient completed Sit to Supine with minimum assistance    Functional Mobility/Transfers:  Patient completed Sit <> Stand Transfer with minimum assistance  with  rolling walker   Patient completed Bed <> Chair Transfer using Step Transfer technique with minimum assistance with rolling walker  Patient completed Toilet Transfer Step Transfer technique with moderate assistance with  rolling walker  Functional Mobility: Pt completed functional mobility in preparation for ADLs with CGA for balance and safety, 1 episode of LOB required Min A to recover    Activities of Daily Living:  Grooming: stand by assistance and contact guard assistance standing at sink  Lower Body Dressing: maximal assistance for donning brief  Toileting: moderate assistance 2* decreased sensation in david area    Cognitive/Visual  Perceptual:  Cognitive/Psychosocial Skills:     -       Oriented to: Person, Place, Time, and Situation     Physical Exam:  Upper Extremity Range of Motion:     -       Right Upper Extremity: WFL  -       Left Upper Extremity: WFL  Upper Extremity Strength:    -       Right Upper Extremity: WFL  -       Left Upper Extremity: WFL   Strength:    -       Right Upper Extremity: WFL  -       Left Upper Extremity: WFL  Fine Motor Coordination:    -       Intact    AMPAC 6 Click ADL:  AMPAC Total Score: 18    Treatment & Education:  Pt educated on spinal pxns, required Min v/c throughout session for adherence to spinal pxns  Pt educated on OT POC  Pt educated on using call bell to request assistance for fxnl ambulation  All questions within OT scope of practice answered to satisfaction  Pt left with all lines intact and all needs met         Patient left up in chair with all lines intact, call button in reach, and RN notified    GOALS:   Multidisciplinary Problems       Occupational Therapy Goals          Problem: Occupational Therapy    Goal Priority Disciplines Outcome Interventions   Occupational Therapy Goal     OT, PT/OT Ongoing, Progressing    Description: Goals to be met by: 11/26/22     Patient will increase functional independence with ADLs by performing:    UE Dressing with [place].  LE Dressing with Modified [place].  Grooming while standing at sink with Modified [place].  Toileting from toilet with Modified [place] for hygiene and clothing management.   Supine to sit with Modified [place].  Toilet transfer to toilet with Modified [place].                         History:     Past Medical History:   Diagnosis Date    Anxiety     Chronic osteomyelitis of right hand 2019    Chronic pain syndrome     Depression     Insomnia     Post traumatic stress disorder (PTSD)     Sciatica          Past Surgical History:   Procedure Laterality Date    ABLATION OF MEDIAL BRANCH NERVE OF LUMBAR  SPINE FACET JOINT      L4-S1    COSMETIC SURGERY      breast augmentation    ETHMOIDECTOMY  7/1/2022    Procedure: ETHMOIDECTOMY;  Surgeon: Segundo Fu MD;  Location: [address];  Service: ENT;;    FINGER SURGERY      FUNCTIONAL ENDOSCOPIC SINUS SURGERY (FESS) USING COMPUTER-ASSISTED NAVIGATION Bilateral 7/1/2022    Procedure: FESS, USING COMPUTER-ASSISTED NAVIGATION;  Surgeon: Segundo Fu MD;  Location: [address];  Service: ENT;  Laterality: Bilateral;    MAXILLARY ANTROSTOMY  7/1/2022    Procedure: MAXILLARY ANTROSTOMY;  Surgeon: Segundo Fu MD;  Location: [address];  Service: ENT;;    PELVIC LAPAROSCOPY      Endometriosis ([place])    TONSILLECTOMY         Time Tracking:     OT Date of Treatment: 11/12/22  OT Start Time: 1049  OT Stop Time: 1135  OT Total Time (min): 46 min  PT present throughout session    Billable Minutes:Evaluation 8  Self Care/Home Management 30  Neuromuscular Re-education 8    11/12/2022

## 2022-11-12 NOTE — OP NOTE
DATE OF PROCEDURE: 11/11/2022.     SURGEON: Sundar Harry M.D.     FIRST ASSISTANT: Vasquez Hernandez MD, PGY-5 Orthopedics     PREOPERATIVE DIAGNOSIS:   L4-5 and L5-S1 Lumbar Disc Herniation  Cauda equina syndrome     POSTOPERATIVE DIAGNOSIS: Same     PROCEDURES PERFORMED:  1. Lumbar laminotomy, foraminotomy and disectomy L4-5 and L5-S1     ANESTHESIA: General endotracheal anesthesia.     ESTIMATED BLOOD LOSS: 50 mL.     SPECIMENS: None.     FINDINGS: Large HNP fragments that have migrated cranially from L5-S1 level.     DRAINS: 1 deep and 1 superficial HV drains     COMPLICATIONS: None.     SPONGE AND NEEDLE COUNT: Correct x2.     NEUROLOGICAL MONITORING: Somatosensory evoked  potential, free-running EMG.  There were no changes to SSEP baselines (she did not have LLE SSEP at baseline).  There no significant EMG activity.     REASON FOR OPERATION AND BRIEF HISTORY AND PHYSICAL: Marianela Shrestha is a 37 y.o. female w/ acute worsening of LBP and LLE radiculopathy. She had 3 days history of progressive LLE weakness and inability to feel her bladder getting full. 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     DESCRIPTION OF INFORMED CONSENT: Please see my last inpatientnote for a full description of the informed consent I had with the patient.     DESCRIPTION OF PROCEDURE: The patient was met in the preoperative area where her low back was marked in the midline by me personally. Subsequently, she was brought to the Operating Room where sequential compression devices were placed on the patient's bilateral calves and run throughout the case. The patient was then intubated via general endotracheal anesthesia. They were then flipped prone onto a [place] table with Junito frame. The head was secured on a facial pillow. The eyes were personally checked by rajni found to be acceptable. The arms were held in a 90-90 position. All bony prominences were padded paying  special attention to the axilla, elbows, hands, knees, and feet. Being satisfied with positioning and confirming this to be adequate with Anesthesia, the patient's lower back was prepped and draped in normal sterile fashion.     A full timeout was then called identifying the patient, the procedural site and level, the availability of all instruments and no specific nursing, surgical, anesthetic or neurological monitoring concerns. Finding that it was safe to proceed with surgery, the patient was given a weight-appropriate dose of antibiotics by the Anesthesia Service.     I made a midline lumbar incision and carried dissection down through the skin and subcutaneous tissues using combination of sharp dissection and electrocautery where necessary. The dorsal fascia of the lumbar spine was identified and incised in the midline and I performed a preliminary subperiosteal exposure of what I took to be the L4 lamina as well as what I took to be the L4-5 facet joint. At the conclusion of my preliminary dissection, I placed a Kocher clamp on L4 spinous process and a [place] 4 elevator under what I took to be the trailing edge of the L4 lamina, took lateral radiograph and thus confirmed my level.     At this point, I finalized my exposure. I then used a high-speed drill to thin the trailing one-third of the bilateral L5 lamina to reveal the origin of the ligamentum flavum. I then released the ligamentum flavum from the trailing edge of the bilateral L5 lamina using a forward-angle curette. I gently worked the ligamentum flavum distally. This allowed me to visualize the underlying epidural fat and subsequently blue-white dura. The dura was pushed posteriorly by the HNP fragments.    I then used a high-speed drill to thin the trailing one-third of the bilateral L4 lamina to reveal the origin of the ligamentum flavum. I then released the ligamentum flavum from the trailing edge of the bilateral L4 lamina using a forward-angle  curette. I gently worked the ligamentum flavum distally. This allowed me to visualize the underlying epidural fat and subsequently blue-white dura. I then completed the laminectomy of L5.    At this point, I was able to mobiIize the dura to reveal the large extruded fragments from L5-S1 disc space that have migrated cranially. Multiple soft fragments were removed with a pituitary. At the conclusion of my diskectomy, I could easily pass a [place] elevator along the S1 nerve root ventrally as well as across the thecal sac.     For the L4-5 level, I used the Penfield 4 elevator to move the thecal sac towards the midline and brought in a nerve root retractor. An obvious disk bulge was then seen in this area and I entered it with a disc knife via a vertical annulotomy. Multiple soft fragments were removed with a pituitary. At the conclusion of my diskectomy, I could easily pass a [place] elevator along the L5 nerve root ventrally as well as across the thecal sac.     The wound was then thoroughly irrigated, including irrigation of the disk space, which revealed no residual free fragments. I then finalized hemostasis with FloSeal and patties. Valsalva maneuver to 40 mmHg demonstrated no cerebrospinal fluid leak. Next, 500 mg of vancomycin powder was placed in the deep space and deep fascia was closed with #1 Vicryl in a figure-of-eight fashion. The superficial layer was then irrigated and closed with 2-0 Vicryl, 3-0 Stratafix, Dermabond and Steri-Strips. A soft dressing was placed. The patient was then flipped supine, extubated and transferred to the Recovery Room in stable condition.    Sundar Harry MD  Orthopaedic Spine Surgeon  Department of Orthopaedic Surgery  [phone]
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
## 2022-11-12 NOTE — PLAN OF CARE
PT evaluation complete - see note for details. POC and goals established.    Problem: Physical Therapy  Goal: Physical Therapy Goal  Description: Goals to be met by: 22     Patient will increase functional independence with mobility by performin. Sit to stand transfer with Contact Guard Assistance  2. Bed to chair transfer with Supervision using Rolling Walker  3. Gait  x 150 feet with Supervision using Rolling Walker.   4. Ascend/Descend 6 inch curb step with Contact Guard Assistance using Rolling Walker.    Outcome: Ongoing, Progressing     2022

## 2022-11-12 NOTE — SUBJECTIVE & OBJECTIVE
Principal Problem:SHAZIA (cauda equina syndrome)    Principal Orthopedic Problem: same    Interval History: Patient seen and examined at bedside.  No acute events overnight.  Pain controlled.  Patient reports improvement in LLE sensation and improvement in hip strength. Drain with 20 cc out.       Review of patient's allergies indicates:   Allergen Reactions    Coconut Hives and Itching    Sulfa (sulfonamide antibiotics) Hives     itching    Adhesive Rash    Latex, natural rubber Other (See Comments) and Rash       Current Facility-Administered Medications   Medication    (Magic mouthwash) 1:1:1 diphenhydrAMINE(Benadryl) 12.5mg/5ml liq, aluminum & magnesium hydroxide-simethicone (Maalox), LIDOcaine viscous 2%    0.9%  NaCl infusion    acetaminophen tablet 650 mg    acetaminophen tablet 650 mg    albuterol inhaler 2 puff    albuterol-ipratropium 2.5 mg-0.5 mg/3 mL nebulizer solution 3 mL    ALPRAZolam tablet 1 mg    aluminum-magnesium hydroxide-simethicone 200-200-20 mg/5 mL suspension 30 mL    ceFAZolin 2 gram in dextrose 5% 100 mL IVPB (premix)    celecoxib capsule 200 mg    doxepin capsule 50 mg    fluticasone furoate-vilanteroL 100-25 mcg/dose diskus inhaler 1 puff    gabapentin capsule 600 mg    heparin (porcine) injection 5,000 Units    HYDROmorphone injection 1 mg    LIDOcaine (PF) 10 mg/ml (1%) injection 10 mg    melatonin tablet 6 mg    methocarbamoL tablet 750 mg    ondansetron disintegrating tablet 8 mg    oxyCODONE immediate release tablet 5 mg    oxyCODONE immediate release tablet Tab 10 mg    pantoprazole EC tablet 40 mg    sodium chloride 0.9% flush 10 mL    sucralfate 100 mg/mL suspension 1 g    venlafaxine 24 hr capsule 300 mg     Objective:     Vital Signs (Most Recent):  Temp: 97.9 °F (36.6 °C) (11/12/22 0552)  Pulse: 68 (11/12/22 0552)  Resp: 18 (11/12/22 0610)  BP: 125/80 (11/12/22 0552)  SpO2: (!) 92 % (11/12/22 0552)   Vital Signs (24h Range):  Temp:  [97.3 °F (36.3 °C)-98.5 °F (36.9 °C)] 97.9 °F  "(36.6 °C)  Pulse:  [67-90] 68  Resp:  [10-24] 18  SpO2:  [92 %-99 %] 92 %  BP: (123-146)/(73-98) 125/80     Weight: 104.3 kg (229 lb 15 oz)  Height: 5' 5" (165.1 cm)  Body mass index is 38.26 kg/m².      Intake/Output Summary (Last 24 hours) at 11/12/2022 0645  Last data filed at 11/12/2022 0615  Gross per 24 hour   Intake 2401.32 ml   Output 20 ml   Net 2381.32 ml       Ortho/SPM Exam  AAOx4  NAD  Reg rate  No increased WOB  Dressing c/d/i  Drains x 2 in place    Iliopsoas (L2,3)                           3/5  Quadriceps  (L3,4)                                         4/5   Tibialis Anterior (L4.5)                2/5                  Extensor Halucis Longus (L5)                                                                       0/5       Gastrocnemius/Soleus (S1)                                            2/5                  Flexor Hallucis Longus(S2)                                      3/5                       Sensation (a=absent, i=impaired, n=normal)  L2-S2 remains diminished, but improved from prior to OR       Significant Labs: CBC:   Recent Labs   Lab 11/11/22  1259   WBC 6.11   HGB 12.5   HCT 37.4        CMP:   Recent Labs   Lab 11/11/22  1259      K 4.5      CO2 22*   GLU 81   BUN 9   CREATININE 0.7   CALCIUM 8.7   PROT 7.3   ALBUMIN 3.0*   BILITOT 0.2   ALKPHOS 98   AST 18   ALT 23   ANIONGAP 9     All pertinent labs within the past 24 hours have been reviewed.    Significant Imaging: None  "

## 2022-11-12 NOTE — TRANSFER OF CARE
Anesthesia Transfer of Care Note    Patient: Marianela hSrestha    Procedure(s) Performed: Procedure(s) (LRB):  LAMINECTOMY, SPINE, LUMBAR (N/A)    Patient location: PACU    Anesthesia Type: general    Transport from OR: Transported from OR on 6-10 L/min O2 by face mask with adequate spontaneous ventilation    Post pain: adequate analgesia    Post assessment: no apparent anesthetic complications    Post vital signs: stable    Level of consciousness: awake and alert    Nausea/Vomiting: no nausea/vomiting    Complications: none    Transfer of care protocol was followed      Last vitals:   Visit Vitals  BP (!) 142/88 (BP Location: Left arm, Patient Position: Lying)   Pulse 82   Temp 36.8 °C (98.2 °F) (Temporal)   Resp 10   Wt 104.3 kg (230 lb)   SpO2 99%   BMI 38.27 kg/m²

## 2022-11-12 NOTE — PLAN OF CARE
Problem: Occupational Therapy  Goal: Occupational Therapy Goal  Description: Goals to be met by: 11/26/22     Patient will increase functional independence with ADLs by performing:    UE Dressing with [place].  LE Dressing with Modified [place].  Grooming while standing at sink with Modified [place].  Toileting from toilet with Modified [place] for hygiene and clothing management.   Supine to sit with Modified [place].  Toilet transfer to toilet with Modified [place].    Outcome: Ongoing, Progressing

## 2022-11-12 NOTE — H&P
[facility] - Emergency Dept  Orthopedics  H&P    Patient Name: Marianela Shrestha  MRN: [id number]  Admission Date: 11/11/2022  Primary Care Provider: Arnaldo Espino MD      Subjective:     Principal Problem:SHAZIA (cauda equina syndrome)    Chief Complaint:   Chief Complaint   Patient presents with    Fall     Mechanical trip and fall a few days ago. No head trauma. Reporting left ankle pain.        HPI: Marianela Shrestha is a 37 y.o. female with a history of drug abuse, low back pain and LLE radiculopathy presents for evaluation of 3 day history LLE weakness and changes in bowel and bladder acutely worse in last 24 hours. The patient reports chronic back pain. However, she fell 3 days ago, twisting her ankle and low back. Since then, she thought she'd sustained a sprained ankle, however, she had progressivley worsening low back pain and numbness and weakness LLE. She also began noticing worsening of her baseline bladder issues, as well as one bowel incontinence. She says she has diminished urge to urinate, however has not voided on herself unknowinglny. Patient is on parole right now, wears a LLE bracelet. She denies tobacco use or alcohol. Reports previous drug use. Also has history of right hand osteomyelitis treated with surgery. Currently denies drug use of any variety. Had positive uda in July for amphetamines, opioids, and barbiturates. Denies prior surgeries. PVR 10 cc at 2015      Past Medical History:   Diagnosis Date    Anxiety     Chronic osteomyelitis of right hand 2019    Chronic pain syndrome     Depression     Insomnia     Post traumatic stress disorder (PTSD)     Sciatica        Past Surgical History:   Procedure Laterality Date    ABLATION OF MEDIAL BRANCH NERVE OF LUMBAR SPINE FACET JOINT      L4-S1    COSMETIC SURGERY      breast augmentation    ETHMOIDECTOMY  7/1/2022    Procedure: ETHMOIDECTOMY;  Surgeon: Segundo Fu MD;  Location: [facility] OR [address] [place] [address];  Service: ENT;;    FINGER  SURGERY      FUNCTIONAL ENDOSCOPIC SINUS SURGERY (FESS) USING COMPUTER-ASSISTED NAVIGATION Bilateral 7/1/2022    Procedure: FESS, USING COMPUTER-ASSISTED NAVIGATION;  Surgeon: Segundo Fu MD;  [facility];  Service: ENT;  Laterality: Bilateral;    MAXILLARY ANTROSTOMY  7/1/2022    Procedure: MAXILLARY ANTROSTOMY;  Surgeon: Segundo Fu MD;  Location: [facility] OR 2ND FLR;  Service: ENT;;    PELVIC LAPAROSCOPY      Endometriosis ([place])    TONSILLECTOMY         Review of patient's allergies indicates:   Allergen Reactions    Coconut Hives and Itching    Sulfa (sulfonamide antibiotics) Hives     itching    Adhesive Rash    Latex, natural rubber Other (See Comments) and Rash       Current Facility-Administered Medications   Medication    0.9%  NaCl infusion    acetaminophen tablet 650 mg    [START ON 11/12/2022] acetaminophen tablet 650 mg    LIDOcaine (PF) 10 mg/ml (1%) injection 10 mg    melatonin tablet 6 mg    ondansetron disintegrating tablet 8 mg    oxyCODONE immediate release tablet 10 mg    oxyCODONE immediate release tablet 5 mg    sodium chloride 0.9% flush 10 mL     Current Outpatient Medications   Medication Sig    (Magic mouthwash) 1:1:1 diphenhydramine(Benadryl) 12.5mg/5ml liq, aluminum & magnesium hydroxide-simethicone (Maalox), LIDOcaine viscous 2% Swish and spit 15 mLs every 4 (four) hours as needed (pain). for mouth sores    albuterol (PROVENTIL/VENTOLIN HFA) 90 mcg/actuation inhaler Inhale 2 puffs into the lungs every 6 (six) hours as needed. Rescue    albuterol-ipratropium (DUO-NEB) 2.5 mg-0.5 mg/3 mL nebulizer solution Take 3 mLs by nebulization every 6 (six) hours as needed for Wheezing or Shortness of Breath. Rescue    ALPRAZolam (XANAX) 1 MG tablet Take 1 tablet (1 mg total) by mouth 3 (three) times daily as needed for Anxiety.    budesonide-formoterol 160-4.5 mcg (SYMBICORT) 160-4.5 mcg/actuation HFAA INHALE 2 PUFFS INTO THE LUNGS EVERY 12 (TWELVE)  HOURS. CONTROLLER    cyclobenzaprine (FLEXERIL) 10 MG tablet TAKE 1 TABLET BY MOUTH THREE TIMES A DAY AS NEEDED FOR MUSCLE SPASMS    doxepin (SINEQUAN) 25 MG capsule TAKE 2 CAPSULES (50 MG TOTAL) BY MOUTH EVERY EVENING.    gabapentin (NEURONTIN) 600 MG tablet TAKE 1 TABLET BY MOUTH THREE TIMES A DAY    HYDROcodone-acetaminophen (NORCO) 5-325 mg per tablet Take 1 tablet by mouth every 6 (six) hours as needed for Pain.    ibuprofen 200 mg Cap Take 800 mg by mouth every 6 (six) hours as needed.    meloxicam (MOBIC) 15 MG tablet TAKE 1 TABLET BY MOUTH EVERY DAY    mupirocin (BACTROBAN) 2 % ointment Twice daily for five days twice per month for six months    norethindrone (AYGESTIN) 5 mg Tab Take 1 tablet (5 mg total) by mouth once daily. (Patient taking differently: Take 5 mg by mouth every morning.)    omeprazole (PRILOSEC) 20 MG capsule TAKE 1 CAPSULE BY MOUTH EVERY DAY (Patient taking differently: Take by mouth every morning.)    ondansetron (ZOFRAN-ODT) 4 MG TbDL Dissolve 2 tablets (8 mg total) by mouth every 8 (eight) hours as needed (nausea).    traMADoL (ULTRAM) 50 mg tablet Take 1 tablet (50 mg total) by mouth every 6 (six) hours as needed for Pain.    venlafaxine (EFFEXOR-XR) 150 MG Cp24 TAKE 2 CAPSULES BY MOUTH EVERY DAY (Patient taking differently: Take by mouth every morning.)     Family History       Problem Relation (Age of Onset)    Cervical cancer Maternal Aunt    Colon cancer Maternal Aunt    Diabetes Maternal Grandmother, Mother, Maternal Aunt    Hypertension Mother          Tobacco Use    Smoking status: Some Days     Packs/day: 0.25     Years: 15.00     Pack years: 3.75     Types: Cigarettes     Start date: 2017     Last attempt to quit: 2021     Years since quittin.9    Smokeless tobacco: Never    Tobacco comments:     occasional 3 cig month, started 15 quit 18-19, started age 22    Substance and Sexual Activity    Alcohol use: Not Currently    Drug use: Never     Sexual activity: Yes     Partners: Male     Birth control/protection: None     ROS  Constitutional: negative for fevers  Eyes: negative visual changes  ENT: negative for hearing loss  Respiratory: negative for dyspnea  Cardiovascular: negative for chest pain  Gastrointestinal: negative for abdominal pain  Genitourinary: positive for urinary changes    Objective:     Vital Signs (Most Recent):  Temp: 98.5 °F (36.9 °C) (11/11/22 1530)  Pulse: 67 (11/11/22 1530)  Resp: 16 (11/11/22 1530)  BP: 135/84 (11/11/22 1530)  SpO2: 99 % (11/11/22 1530) Vital Signs (24h Range):  Temp:  [97.9 °F (36.6 °C)-98.5 °F (36.9 °C)] 98.5 °F (36.9 °C)  Pulse:  [67-90] 67  Resp:  [16] 16  SpO2:  [98 %-99 %] 99 %  BP: (135-146)/(84-98) 135/84     Weight: 104.3 kg (230 lb)     Body mass index is 38.27 kg/m².    No intake or output data in the 24 hours ending 11/11/22 2042    Ortho/SPM Exam  Vitals: Afebrile.  Vital signs stable.  General: No acute distress.  Cardio: Regular rate.  Chest: No increased work of breathing.      Motor        LEFT      Iliopsoas (L2,3)       3/5  Quadriceps (L3,4)        4/5   Tibialis Anterior (L4.5)     2/5       Extensor Halucis Longus (L5)    0/5       Gastrocnemius/Soleus (S1)     2/5    Flexor Hallucis Longus(S2)     3/5        Sensation (a=absent, i=impaired, n=normal)     LEFT  L2 dermatome       Diminished L3 dermatome       Diminished       L4 dermatome       Diminished L5 dermatome       Diminished       S1 dermatome       Diminished       S2 dermatome       Diminished   Rectal exam with intact tone.         Reflexes          LEFT  Patellar       2+     2+  Achilles       2+     2+  Babinski      neg      Clonus         neg            Significant Labs: CBC:   Recent Labs   Lab 11/11/22  1259   WBC 6.11   HGB 12.5   HCT 37.4        CMP:   Recent Labs   Lab 11/11/22  1259      K 4.5      CO2 22*   GLU 81   BUN 9   CREATININE 0.7   CALCIUM 8.7   PROT 7.3   ALBUMIN 3.0*   BILITOT 0.2    ALKPHOS 98   AST 18   ALT 23   ANIONGAP 9     All pertinent labs within the past 24 hours have been reviewed.    Significant Imaging: I have reviewed and interpreted all pertinent imaging results/findings.  MRI lumbar spine: Severe paracentral stenosis with sequestered disc L4-S1  Xray lumbar spine: Overall maintained alinement of lumbar spine    Assessment/Plan:     * SHAZIA (cauda equina syndrome)  Marianela Shrestha is a 37 y.o. female with developing SHAZIA with sypmtoms ongoing x 72 hours.     -Admitted to orthopedic service for L4-S1 laminectomy 11.11.22  -NPO since noon  -Pain control   -Marked, booked, and consented for surgery  -PT/OT: Bed rest  -DVT PPx: Hold anticoagulation  -Abx: Preop abx ordered  -Labs: Hgb 12, platelets 395  -Addison: None                Paul Hernandez MD  Orthopedics  [facility] - Emergency Dept

## 2022-11-12 NOTE — ASSESSMENT & PLAN NOTE
Marianela Shrestha is a 37 y.o. female with SHAZIA SP L4-S1 Laminectomy 11.11.22    -Admitted to orthopedic service  -Aggressive therapy  -will order PODAS boot and AFO for LLE  -Marked, booked, and consented for surgery  -PT/OT: WBAT spine precautions  -DVT PPx: Heparin TID  -Abx: Ancef  -Labs: None  -Addison: None

## 2022-11-12 NOTE — HPI
Marianela Shrestha is a 37 y.o. female with a history of drug abuse, low back pain and LLE radiculopathy presents for evaluation of 3 day history LLE weakness and changes in bowel and bladder acutely worse in last 24 hours. The patient reports chronic back pain. However, she fell 3 days ago, twisting her ankle and low back. Since then, she thought she'd sustained a sprained ankle, however, she had progressivley worsening low back pain and numbness and weakness LLE. She also began noticing worsening of her baseline bladder issues, as well as one bowel incontinence. She says she has diminished urge to urinate, however has not voided on herself unknowinglny. Patient is on parole right now, wears a LLE bracelet. She denies tobacco use or alcohol. Reports previous drug use. Also has history of right hand osteomyelitis treated with surgery. Currently denies drug use of any variety. Had positive uda in July for amphetamines, opioids, and barbiturates. Denies prior surgeries.

## 2022-11-12 NOTE — NURSING TRANSFER
Nursing Transfer Note      11/12/2022     Reason patient is being transferred: post surgery    Transfer To: 506 from PACU    Transfer via bed    Transfer with [employer] [facility] from ED with personal belongings    Transported by Transport    Medicines sent: na    Any special needs or follow-up needed: per MD orders    Chart send with patient: Yes    Notified: Nurse    Patient reassessed at:11/12/22 02:20

## 2022-11-12 NOTE — NURSING
38 yo female admitted to room 506 post surgery to decompress L4 to S1. Pt is drowsy but alert and orientated. Dressing to back is dry and intact. Bloody draining to bilateral hemovacs.

## 2022-11-12 NOTE — PT/OT/SLP EVAL
Physical Therapy Co-Evaluation with OT and Treatment     Patient Name:  Marianela Shrestha  MRN: [id number]    Admit Date: 11/11/2022  Admitting Diagnosis:  SHAZIA (cauda equina syndrome)  Length of Stay: 1 days  Recent Surgery: Procedure(s) (LRB):  LAMINECTOMY, SPINE, LUMBAR (N/A) 1 Day Post-Op    Recommendations:     Discharge Recommendations: Inpatient Rehabilitation Facility (may progress)  Equipment recommendations: rolling walker  Barriers to discharge: Increased level of skilled assistance required and Fall risk     Assessment:     Marianela Shrestha is a 37 y.o. female admitted to [facility] on 11/11/2022 with medical diagnosis of SHAZIA (cauda equina syndrome). Pt presents with weakness, impaired endurance, impaired sensation, impaired self care skills, impaired functional mobility, gait instability, impaired balance, decreased coordination, decreased lower extremity function, pain, orthopedic precautions, impaired skin. These deficits effect their roles and responsibilities in which they were able to complete prior to admit.   PTA, pt states she was (I) with ADLs and ambulation. During session when therapist was (A) with pericare, pt declined being able to feel being wiped. No sensation on L posterior heel, L lateral foot, and L lateral dorsum of foot. This contributes to foot drop observed during gait. Pt is highly motivated to return to OF and would benefit from skilled therapy at d/c.  Marianela Shrestha would benefit from acute PT intervention to improve quality of life, focus on recovery of impairments, provide patient/caregiver education, reduce fall risk, and maximize (I) and safety with functional mobility. Once medically stable, recommending pt discharge to Inpatient Rehabilitation Facility (may progress)    Rehab Prognosis: Good    Plan:     During this hospitalization, patient to be seen 4 x/week to address the identified rehab impairments via gait training, therapeutic activities, therapeutic  "exercises, neuromuscular re-education and progress towards stated goals.     Plan of Care Expires:  12/12/22  Plan of Care reviewed with: patient    This plan of care has been discussed with the patient/caregiver, who was included in its development and is in agreement with the identified goals and treatment plan.     Subjective     Communicated with RN prior to session.  Patient found supine upon PT entry to room, agreeable to evaluation. Pt alone during session.    Chief Complaint: low back pain    Patient/Family Comments/goals: "I just want to get back to doing what I did before surgery"    Pain/Comfort:  Pain Rating 1: 6/10  Location - Side 1: Bilateral  Location - Orientation 1: lower  Location 1: back  Pain Addressed 1: Cessation of Activity, Nurse notified, Distraction, Reposition  Pain Rating Post-Intervention 1:  (pt did not rate)    Patients cultural, spiritual, Adventist conflicts given the current situation: None identified     Patient History: information obtained from pt     Living Environment: Pt lives with mom, brother, and fiance in single level home  with 1 CODI. Bathroom set-up: tub/shower combo with grab bar  Prior Level of Function: independent with mobility and ADLs; drives; on disability/not working  DME owned: transfer tub bench, grab bar, raised toilet  Support Available/Caregiver Assistance:  mom is home during day; fiance works nights    Objective:   OT present for coeval due to pt's multiple medical comorbidities and functional/cognition deficits requiring two skilled therapists to appropriately progress pt's musculoskeletal strength, neuromuscular control, and endurance while taking into consideration medical acuity and pt safety.    Patient found with: peripheral IV, SCD, hemovac    Recent Surgery: Procedure(s) (LRB):  LAMINECTOMY, SPINE, LUMBAR (N/A) 1 Day Post-Op  General Precautions: Standard, fall   Orthopedic Precautions:spinal precautions   Braces: N/A   Oxygen Device: room " air      Exams:    Cognition:  Alert  Command following: Follows multistep verbal commands  Communication: clear/fluent    Sensation:   Light touch sensation: reports lack of sensation around anus; unable to determine light touch on L foot in follow areas: lateral border, posterior heel, lateral dorsum    Gross Motor Coordination: No deficits noted during functional mobility tasks     Edema/Skin Integrity: None noted;  open wounds on camelia LE, abdomen, face (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)    Postural examination/scapula alignment:  no observable deviations    Lower Extremity Range of Motion:  Right Lower Extremity: WFL  Left Lower Extremity: Deficits: AROM DF/PF    Lower Extremity Strength:  Right Lower Extremity:  grossly 4/5  Left Lower Extremity: Deficits: DF: 1/5, PF:1/5    Functional Mobility:    Bed Mobility:  Rolling to right & left with moderate assistance  Supine > Sit with minimum assistance with legs    Transfers:   Sit <> Stand Transfer:   Minimal Assistance x 1 trials from eob with RW AD   Moderate Assistance x 1 trials from toilet with RW AD              Gait:  Distance: 70 ft  Assistance level: Contact Guard Assistance  Assistive Device: rolling walker  Gait Assessment: decreased gait speed; initially ambulating with step-to pattern but able to progress to step-through; decreased L foot clearance due to muscle weakness (pt is supposed to be given AFO to address foot drop)    Balance:  Dynamic Sitting: FAIR+: Maintains balance through MINIMAL excursions of active trunk motion  Standing:  Static: FAIR+: Takes MINIMAL challenges from all directions   Dynamic: FAIR: Needs CONTACT GUARD during gait  Able to perform standing ADLs with OT    Outcome Measure: AM-PAC 6 CLICK MOBILITY  Total Score:16     Patient/Caregiver Education:     Therapist educated pt/caregiver regarding:   PT POC and goals for therapy   Safety with mobility and fall risk   Instruction on use of call button and importance  of calling nursing staff for assistance with mobility   Time provided for therapeutic counseling and discussion of current health disposition. All questions answered to satisfaction, within scope of PT practice     Patient/caregiver able to verbalize understanding and expressed no further questions this visit; will follow-up with pt/caregiver during current admit for additional questions/concerns within scope of practice.     White board updated.     Patient left up in chair with all lines intact, call button in reach, and RN notified.    GOALS:   Multidisciplinary Problems       Physical Therapy Goals          Problem: Physical Therapy    Goal Priority Disciplines Outcome Goal Variances Interventions   Physical Therapy Goal     PT, PT/OT Ongoing, Progressing     Description: Goals to be met by: 22     Patient will increase functional independence with mobility by performin. Sit to stand transfer with Contact Guard Assistance  2. Bed to chair transfer with Supervision using Rolling Walker  3. Gait  x 150 feet with Supervision using Rolling Walker.   4. Ascend/Descend 6 inch curb step with Contact Guard Assistance using Rolling Walker.                           History:     Past Medical History:   Diagnosis Date    Anxiety     Chronic osteomyelitis of right hand 2019    Chronic pain syndrome     Depression     Insomnia     Post traumatic stress disorder (PTSD)     Sciatica        Past Surgical History:   Procedure Laterality Date    ABLATION OF MEDIAL BRANCH NERVE OF LUMBAR SPINE FACET JOINT      L4-S1    COSMETIC SURGERY      breast augmentation    ETHMOIDECTOMY  2022    Procedure: ETHMOIDECTOMY;  Surgeon: Segundo Fu MD;  Location: [address];  Service: ENT;;    FINGER SURGERY      FUNCTIONAL ENDOSCOPIC SINUS SURGERY (FESS) USING COMPUTER-ASSISTED NAVIGATION Bilateral 2022    Procedure: FESS, USING COMPUTER-ASSISTED NAVIGATION;  Surgeon: Segundo Fu MD;  Location: [address]  FLR;  Service: ENT;  Laterality: Bilateral;    MAXILLARY ANTROSTOMY  7/1/2022    Procedure: MAXILLARY ANTROSTOMY;  Surgeon: Segundo Fu MD;  Location: [facility] OR [address] [place] [address];  Service: ENT;;    PELVIC LAPAROSCOPY      Endometriosis ([place])    TONSILLECTOMY         Time Tracking:     PT Received On: 11/12/22  PT Start Time: 1048     PT Stop Time: 1135  PT Total Time (min): 47 min     Billable Minutes: Evaluation 8, Gait Training 23, and Neuromuscular Re-education 16    11/12/2022

## 2022-11-12 NOTE — ASSESSMENT & PLAN NOTE
Marianela Shrestha is a 37 y.o. female with development of SHAZIA with sypmtoms ongoing x 72 hours.     -Admitted to orthopedic service  -NPO since noon  -Pain control   -Marked, booked, and consented for surgery  -PT/OT: Bed rest  -DVT PPx: Hold anticoagulation  -Abx: Preop abx ordered  -Labs: Hgb  -Addison: None

## 2022-11-13 PROCEDURE — 99900031 HC PATIENT EDUCATION (STAT): Mod: HCNC

## 2022-11-13 PROCEDURE — 25000242 PHARM REV CODE 250 ALT 637 W/ HCPCS: Mod: HCNC | Performed by: STUDENT IN AN ORGANIZED HEALTH CARE EDUCATION/TRAINING PROGRAM

## 2022-11-13 PROCEDURE — 11000001 HC ACUTE MED/SURG PRIVATE ROOM: Mod: HCNC

## 2022-11-13 PROCEDURE — 25000003 PHARM REV CODE 250: Mod: HCNC | Performed by: STUDENT IN AN ORGANIZED HEALTH CARE EDUCATION/TRAINING PROGRAM

## 2022-11-13 PROCEDURE — 94640 AIRWAY INHALATION TREATMENT: CPT | Mod: HCNC

## 2022-11-13 PROCEDURE — 63600175 PHARM REV CODE 636 W HCPCS: Mod: HCNC | Performed by: STUDENT IN AN ORGANIZED HEALTH CARE EDUCATION/TRAINING PROGRAM

## 2022-11-13 PROCEDURE — 94761 N-INVAS EAR/PLS OXIMETRY MLT: CPT | Mod: HCNC

## 2022-11-13 RX ADMIN — METHOCARBAMOL 750 MG: 750 TABLET ORAL at 02:11

## 2022-11-13 RX ADMIN — ALPRAZOLAM 1 MG: 0.5 TABLET ORAL at 02:11

## 2022-11-13 RX ADMIN — ALUMINUM HYDROXIDE, MAGNESIUM HYDROXIDE, AND DIMETHICONE 30 ML: 200; 20; 200 SUSPENSION ORAL at 11:11

## 2022-11-13 RX ADMIN — METHOCARBAMOL 750 MG: 750 TABLET ORAL at 09:11

## 2022-11-13 RX ADMIN — HEPARIN SODIUM 5000 UNITS: 5000 INJECTION INTRAVENOUS; SUBCUTANEOUS at 02:11

## 2022-11-13 RX ADMIN — GABAPENTIN 600 MG: 300 CAPSULE ORAL at 07:11

## 2022-11-13 RX ADMIN — HYDROMORPHONE HYDROCHLORIDE 1 MG: 1 INJECTION, SOLUTION INTRAMUSCULAR; INTRAVENOUS; SUBCUTANEOUS at 07:11

## 2022-11-13 RX ADMIN — CELECOXIB 200 MG: 200 CAPSULE ORAL at 07:11

## 2022-11-13 RX ADMIN — METHOCARBAMOL 750 MG: 750 TABLET ORAL at 07:11

## 2022-11-13 RX ADMIN — HYDROMORPHONE HYDROCHLORIDE 1 MG: 1 INJECTION, SOLUTION INTRAMUSCULAR; INTRAVENOUS; SUBCUTANEOUS at 05:11

## 2022-11-13 RX ADMIN — METHOCARBAMOL 750 MG: 750 TABLET ORAL at 04:11

## 2022-11-13 RX ADMIN — OXYCODONE HYDROCHLORIDE 10 MG: 10 TABLET ORAL at 05:11

## 2022-11-13 RX ADMIN — OXYCODONE HYDROCHLORIDE 10 MG: 10 TABLET ORAL at 11:11

## 2022-11-13 RX ADMIN — ACETAMINOPHEN 650 MG: 325 TABLET ORAL at 05:11

## 2022-11-13 RX ADMIN — OXYCODONE HYDROCHLORIDE 10 MG: 10 TABLET ORAL at 03:11

## 2022-11-13 RX ADMIN — HEPARIN SODIUM 5000 UNITS: 5000 INJECTION INTRAVENOUS; SUBCUTANEOUS at 05:11

## 2022-11-13 RX ADMIN — GABAPENTIN 600 MG: 300 CAPSULE ORAL at 02:11

## 2022-11-13 RX ADMIN — SUCRALFATE 1 G: 1 SUSPENSION ORAL at 05:11

## 2022-11-13 RX ADMIN — FLUTICASONE FUROATE AND VILANTEROL TRIFENATATE 1 PUFF: 100; 25 POWDER RESPIRATORY (INHALATION) at 08:11

## 2022-11-13 RX ADMIN — ALUMINUM HYDROXIDE, MAGNESIUM HYDROXIDE, AND DIMETHICONE 30 ML: 200; 20; 200 SUSPENSION ORAL at 04:11

## 2022-11-13 RX ADMIN — PANTOPRAZOLE SODIUM 40 MG: 40 TABLET, DELAYED RELEASE ORAL at 07:11

## 2022-11-13 RX ADMIN — OXYCODONE HYDROCHLORIDE 10 MG: 10 TABLET ORAL at 09:11

## 2022-11-13 RX ADMIN — ALUMINUM HYDROXIDE, MAGNESIUM HYDROXIDE, AND DIMETHICONE 30 ML: 200; 20; 200 SUSPENSION ORAL at 07:11

## 2022-11-13 RX ADMIN — HEPARIN SODIUM 5000 UNITS: 5000 INJECTION INTRAVENOUS; SUBCUTANEOUS at 11:11

## 2022-11-13 RX ADMIN — OXYCODONE HYDROCHLORIDE 10 MG: 10 TABLET ORAL at 12:11

## 2022-11-13 RX ADMIN — SUCRALFATE 1 G: 1 SUSPENSION ORAL at 11:11

## 2022-11-13 RX ADMIN — ALPRAZOLAM 1 MG: 0.5 TABLET ORAL at 05:11

## 2022-11-13 RX ADMIN — GABAPENTIN 600 MG: 300 CAPSULE ORAL at 09:11

## 2022-11-13 RX ADMIN — SODIUM CHLORIDE: 0.9 INJECTION, SOLUTION INTRAVENOUS at 12:11

## 2022-11-13 RX ADMIN — DOXEPIN HYDROCHLORIDE 50 MG: 50 CAPSULE ORAL at 09:11

## 2022-11-13 RX ADMIN — SODIUM CHLORIDE: 0.9 INJECTION, SOLUTION INTRAVENOUS at 10:11

## 2022-11-13 RX ADMIN — VENLAFAXINE HYDROCHLORIDE 300 MG: 75 CAPSULE, EXTENDED RELEASE ORAL at 07:11

## 2022-11-13 RX ADMIN — ACETAMINOPHEN 650 MG: 325 TABLET ORAL at 11:11

## 2022-11-13 RX ADMIN — HYDROMORPHONE HYDROCHLORIDE 1 MG: 1 INJECTION, SOLUTION INTRAMUSCULAR; INTRAVENOUS; SUBCUTANEOUS at 11:11

## 2022-11-13 RX ADMIN — ALPRAZOLAM 1 MG: 0.5 TABLET ORAL at 08:11

## 2022-11-13 RX ADMIN — ALUMINUM HYDROXIDE, MAGNESIUM HYDROXIDE, AND DIMETHICONE 30 ML: 200; 20; 200 SUSPENSION ORAL at 09:11

## 2022-11-13 RX ADMIN — HYDROMORPHONE HYDROCHLORIDE 1 MG: 1 INJECTION, SOLUTION INTRAMUSCULAR; INTRAVENOUS; SUBCUTANEOUS at 02:11

## 2022-11-13 NOTE — PLAN OF CARE
POC reviewed patient. Patient was treated with PRN pain medication as needed. Bed in low lock position with call light in  reach. Vitals stable will continue to monitor.

## 2022-11-13 NOTE — ANESTHESIA POSTPROCEDURE EVALUATION
Anesthesia Post Evaluation    Patient: Marianela Shrestha    Procedure(s) Performed: Procedure(s) (LRB):  LAMINECTOMY, SPINE, LUMBAR (N/A)    Final Anesthesia Type: general      Patient location during evaluation: PACU  Patient participation: Yes- Able to Participate  Level of consciousness: awake  Post-procedure vital signs: reviewed and stable  Pain management: adequate  Airway patency: patent    PONV status at discharge: No PONV  Anesthetic complications: no      Cardiovascular status: blood pressure returned to baseline  Respiratory status: unassisted  Hydration status: euvolemic  Follow-up not needed.          Vitals Value Taken Time   /73 11/12/22 0231   Temp 36.8 °C (98.2 °F) 11/12/22 0217   Pulse 76 11/12/22 0247   Resp 13 11/12/22 0247   SpO2 92 % 11/12/22 0247         Event Time   Out of Recovery 11/12/2022 01:15:00         Pain/Melanie Score: Pain Rating Prior to Med Admin: 8 (11/13/2022 11:24 AM)  Pain Rating Post Med Admin: 0 (11/12/2022  9:34 PM)  Melanie Score: 9 (11/12/2022  1:12 AM)

## 2022-11-13 NOTE — ASSESSMENT & PLAN NOTE
Marianela Shrestha is a 37 y.o. female with SHAZIA SP L4-S1 Laminectomy 11.11.22    -Admitted to orthopedic service  -Aggressive therapy  -PODAS boot in place  -PT/OT: WBAT spine precautions  -DVT PPx: Heparin TID  -Abx: None  -Labs: None  -Addison: None    DIspo: Therapy recs Inpatient rehab. Will continue to follow her progress.

## 2022-11-13 NOTE — SUBJECTIVE & OBJECTIVE
Principal Problem:SHAZIA (cauda equina syndrome)    Principal Orthopedic Problem: same    Interval History: Patient seen and examined at bedside.  No acute events overnight.  Pain controlled.  Patient reports continued improvement in LLE sensation and in the perineal region. Drain with 85 cc out. 70 ft with therapy        Review of patient's allergies indicates:   Allergen Reactions    Coconut Hives and Itching    Sulfa (sulfonamide antibiotics) Hives     itching    Adhesive Rash    Latex, natural rubber Other (See Comments) and Rash       Current Facility-Administered Medications   Medication    (Magic mouthwash) 1:1:1 diphenhydrAMINE(Benadryl) 12.5mg/5ml liq, aluminum & magnesium hydroxide-simethicone (Maalox), LIDOcaine viscous 2%    0.9%  NaCl infusion    acetaminophen tablet 650 mg    acetaminophen tablet 650 mg    albuterol inhaler 2 puff    albuterol-ipratropium 2.5 mg-0.5 mg/3 mL nebulizer solution 3 mL    ALPRAZolam tablet 1 mg    aluminum-magnesium hydroxide-simethicone 200-200-20 mg/5 mL suspension 30 mL    celecoxib capsule 200 mg    doxepin capsule 50 mg    fluticasone furoate-vilanteroL 100-25 mcg/dose diskus inhaler 1 puff    gabapentin capsule 600 mg    heparin (porcine) injection 5,000 Units    HYDROmorphone injection 1 mg    LIDOcaine (PF) 10 mg/ml (1%) injection 10 mg    melatonin tablet 6 mg    methocarbamoL tablet 750 mg    ondansetron disintegrating tablet 8 mg    oxyCODONE immediate release tablet 5 mg    oxyCODONE immediate release tablet Tab 10 mg    pantoprazole EC tablet 40 mg    sodium chloride 0.9% flush 10 mL    sucralfate 100 mg/mL suspension 1 g    venlafaxine 24 hr capsule 300 mg     Objective:     Vital Signs (Most Recent):  Temp: 97.6 °F (36.4 °C) (11/13/22 0504)  Pulse: 80 (11/13/22 0504)  Resp: 16 (11/13/22 0526)  BP: (!) 94/53 (11/13/22 0504)  SpO2: 95 % (11/13/22 0504)   Vital Signs (24h Range):  Temp:  [96.3 °F (35.7 °C)-97.6 °F (36.4 °C)] 97.6 °F (36.4 °C)  Pulse:  [68-83]  "80  Resp:  [16-20] 16  SpO2:  [94 %-99 %] 95 %  BP: ()/(53-86) 94/53     Weight: 104.3 kg (229 lb 15 oz)  Height: 5' 5" (165.1 cm)  Body mass index is 38.26 kg/m².      Intake/Output Summary (Last 24 hours) at 11/13/2022 0636  Last data filed at 11/12/2022 1738  Gross per 24 hour   Intake 480 ml   Output 585 ml   Net -105 ml         Ortho/SPM Exam  AAOx4  NAD  Reg rate  No increased WOB  Dressing c/d/i  Drains x 2 in place    Iliopsoas (L2,3)                           3/5  Quadriceps  (L3,4)                                         4/5   Tibialis Anterior (L4.5)                2/5                  Extensor Halucis Longus (L5)                                                                       0/5       Gastrocnemius/Soleus (S1)                                            2/5                  Flexor Hallucis Longus(S2)                                      3/5                       Sensation (a=absent, i=impaired, n=normal)  L2-S2 remains diminished, but improved from prior to OR    PODAS boot in place     Significant Labs: CBC:   Recent Labs   Lab 11/11/22  1259 11/12/22  0644   WBC 6.11 7.69   HGB 12.5 12.4   HCT 37.4 38.1    418       CMP:   Recent Labs   Lab 11/11/22  1259      K 4.5      CO2 22*   GLU 81   BUN 9   CREATININE 0.7   CALCIUM 8.7   PROT 7.3   ALBUMIN 3.0*   BILITOT 0.2   ALKPHOS 98   AST 18   ALT 23   ANIONGAP 9       All pertinent labs within the past 24 hours have been reviewed.    Significant Imaging: None  "

## 2022-11-13 NOTE — PROGRESS NOTES
[facility] - [facility]  Orthopedics  Progress Note    Patient Name: Marianela Shrestha  MRN: [id number]  Admission Date: 11/11/2022  Hospital Length of Stay: 2 days  Attending Provider: Sundar Harry MD  Primary Care Provider: Arnaldo Espino MD  Follow-up For: Procedure(s) (LRB):  LAMINECTOMY, SPINE, LUMBAR (N/A)    Post-Operative Day: 2 Days Post-Op  Subjective:     Principal Problem:SHAZIA (cauda equina syndrome)    Principal Orthopedic Problem: same    Interval History: Patient seen and examined at bedside.  No acute events overnight.  Pain controlled.  Patient reports continued improvement in LLE sensation and in the perineal region. Drain with 85 cc out. 70 ft with therapy        Review of patient's allergies indicates:   Allergen Reactions    Coconut Hives and Itching    Sulfa (sulfonamide antibiotics) Hives     itching    Adhesive Rash    Latex, natural rubber Other (See Comments) and Rash       Current Facility-Administered Medications   Medication    (Magic mouthwash) 1:1:1 diphenhydrAMINE(Benadryl) 12.5mg/5ml liq, aluminum & magnesium hydroxide-simethicone (Maalox), LIDOcaine viscous 2%    0.9%  NaCl infusion    acetaminophen tablet 650 mg    acetaminophen tablet 650 mg    albuterol inhaler 2 puff    albuterol-ipratropium 2.5 mg-0.5 mg/3 mL nebulizer solution 3 mL    ALPRAZolam tablet 1 mg    aluminum-magnesium hydroxide-simethicone 200-200-20 mg/5 mL suspension 30 mL    celecoxib capsule 200 mg    doxepin capsule 50 mg    fluticasone furoate-vilanteroL 100-25 mcg/dose diskus inhaler 1 puff    gabapentin capsule 600 mg    heparin (porcine) injection 5,000 Units    HYDROmorphone injection 1 mg    LIDOcaine (PF) 10 mg/ml (1%) injection 10 mg    melatonin tablet 6 mg    methocarbamoL tablet 750 mg    ondansetron disintegrating tablet 8 mg    oxyCODONE immediate release tablet 5 mg    oxyCODONE immediate release tablet Tab 10 mg    pantoprazole EC tablet 40 mg    sodium chloride 0.9% flush 10  "mL    sucralfate 100 mg/mL suspension 1 g    venlafaxine 24 hr capsule 300 mg     Objective:     Vital Signs (Most Recent):  Temp: 97.6 °F (36.4 °C) (11/13/22 0504)  Pulse: 80 (11/13/22 0504)  Resp: 16 (11/13/22 0526)  BP: (!) 94/53 (11/13/22 0504)  SpO2: 95 % (11/13/22 0504)   Vital Signs (24h Range):  Temp:  [96.3 °F (35.7 °C)-97.6 °F (36.4 °C)] 97.6 °F (36.4 °C)  Pulse:  [68-83] 80  Resp:  [16-20] 16  SpO2:  [94 %-99 %] 95 %  BP: ()/(53-86) 94/53     Weight: 104.3 kg (229 lb 15 oz)  Height: 5' 5" (165.1 cm)  Body mass index is 38.26 kg/m².      Intake/Output Summary (Last 24 hours) at 11/13/2022 0636  Last data filed at 11/12/2022 1738  Gross per 24 hour   Intake 480 ml   Output 585 ml   Net -105 ml         Ortho/SPM Exam  AAOx4  NAD  Reg rate  No increased WOB  Dressing c/d/i  Drains x 2 in place    Iliopsoas (L2,3)                           3/5  Quadriceps  (L3,4)                                         4/5   Tibialis Anterior (L4.5)                2/5                  Extensor Halucis Longus (L5)                                                                       0/5       Gastrocnemius/Soleus (S1)                                            2/5                  Flexor Hallucis Longus(S2)                                      3/5                       Sensation (a=absent, i=impaired, n=normal)  L2-S2 remains diminished, but improved from prior to OR    PODAS boot in place     Significant Labs: CBC:   Recent Labs   Lab 11/11/22  1259 11/12/22  0644   WBC 6.11 7.69   HGB 12.5 12.4   HCT 37.4 38.1    418       CMP:   Recent Labs   Lab 11/11/22  1259      K 4.5      CO2 22*   GLU 81   BUN 9   CREATININE 0.7   CALCIUM 8.7   PROT 7.3   ALBUMIN 3.0*   BILITOT 0.2   ALKPHOS 98   AST 18   ALT 23   ANIONGAP 9       All pertinent labs within the past 24 hours have been reviewed.    Significant Imaging: None    Assessment/Plan:     * SHAZIA (cauda equina syndrome)  Marianela Shrestha is a 37 " y.o. female with SHAZIA SP L4-S1 Laminectomy 11.11.22    -Admitted to orthopedic service  -Aggressive therapy  -PODAS boot in place  -PT/OT: WBAT spine precautions  -DVT PPx: Heparin TID  -Abx: None  -Labs: None  -Addison: None    DIspo: Therapy recs Inpatient rehab. Will continue to follow her progress.                   Paul Hernandez MD  Orthopedics  [facility] - Surgery   Peritoneal carcinomatosis

## 2022-11-14 ENCOUNTER — TELEPHONE (OUTPATIENT)
Dept: ORTHOPEDICS | Facility: CLINIC | Age: 37
End: 2022-11-14
Payer: MEDICARE

## 2022-11-14 PROBLEM — Z74.09 IMPAIRED MOBILITY: Status: ACTIVE | Noted: 2022-11-14

## 2022-11-14 PROCEDURE — 63600175 PHARM REV CODE 636 W HCPCS: Mod: HCNC | Performed by: STUDENT IN AN ORGANIZED HEALTH CARE EDUCATION/TRAINING PROGRAM

## 2022-11-14 PROCEDURE — 97535 SELF CARE MNGMENT TRAINING: CPT | Mod: HCNC,CO

## 2022-11-14 PROCEDURE — 97116 GAIT TRAINING THERAPY: CPT | Mod: HCNC,CQ

## 2022-11-14 PROCEDURE — 99222 1ST HOSP IP/OBS MODERATE 55: CPT | Mod: HCNC,,, | Performed by: NURSE PRACTITIONER

## 2022-11-14 PROCEDURE — 99222 PR INITIAL HOSPITAL CARE,LEVL II: ICD-10-PCS | Mod: HCNC,,, | Performed by: NURSE PRACTITIONER

## 2022-11-14 PROCEDURE — 11000001 HC ACUTE MED/SURG PRIVATE ROOM: Mod: HCNC

## 2022-11-14 PROCEDURE — 25000003 PHARM REV CODE 250: Mod: HCNC | Performed by: STUDENT IN AN ORGANIZED HEALTH CARE EDUCATION/TRAINING PROGRAM

## 2022-11-14 PROCEDURE — 97530 THERAPEUTIC ACTIVITIES: CPT | Mod: HCNC,CO

## 2022-11-14 PROCEDURE — 97530 THERAPEUTIC ACTIVITIES: CPT | Mod: HCNC,CQ

## 2022-11-14 RX ADMIN — HEPARIN SODIUM 5000 UNITS: 5000 INJECTION INTRAVENOUS; SUBCUTANEOUS at 05:11

## 2022-11-14 RX ADMIN — ALUMINUM HYDROXIDE, MAGNESIUM HYDROXIDE, AND DIMETHICONE 30 ML: 200; 20; 200 SUSPENSION ORAL at 11:11

## 2022-11-14 RX ADMIN — ALPRAZOLAM 1 MG: 0.5 TABLET ORAL at 03:11

## 2022-11-14 RX ADMIN — VENLAFAXINE HYDROCHLORIDE 300 MG: 75 CAPSULE, EXTENDED RELEASE ORAL at 08:11

## 2022-11-14 RX ADMIN — HEPARIN SODIUM 5000 UNITS: 5000 INJECTION INTRAVENOUS; SUBCUTANEOUS at 02:11

## 2022-11-14 RX ADMIN — METHOCARBAMOL 750 MG: 750 TABLET ORAL at 11:11

## 2022-11-14 RX ADMIN — CELECOXIB 200 MG: 200 CAPSULE ORAL at 08:11

## 2022-11-14 RX ADMIN — HYDROMORPHONE HYDROCHLORIDE 1 MG: 1 INJECTION, SOLUTION INTRAMUSCULAR; INTRAVENOUS; SUBCUTANEOUS at 03:11

## 2022-11-14 RX ADMIN — OXYCODONE HYDROCHLORIDE 10 MG: 10 TABLET ORAL at 11:11

## 2022-11-14 RX ADMIN — GABAPENTIN 600 MG: 300 CAPSULE ORAL at 02:11

## 2022-11-14 RX ADMIN — DOXEPIN HYDROCHLORIDE 50 MG: 50 CAPSULE ORAL at 11:11

## 2022-11-14 RX ADMIN — METHOCARBAMOL 750 MG: 750 TABLET ORAL at 08:11

## 2022-11-14 RX ADMIN — HYDROMORPHONE HYDROCHLORIDE 1 MG: 1 INJECTION, SOLUTION INTRAMUSCULAR; INTRAVENOUS; SUBCUTANEOUS at 08:11

## 2022-11-14 RX ADMIN — SUCRALFATE 1 G: 1 SUSPENSION ORAL at 05:11

## 2022-11-14 RX ADMIN — OXYCODONE HYDROCHLORIDE 10 MG: 10 TABLET ORAL at 05:11

## 2022-11-14 RX ADMIN — PANTOPRAZOLE SODIUM 40 MG: 40 TABLET, DELAYED RELEASE ORAL at 08:11

## 2022-11-14 RX ADMIN — METHOCARBAMOL 750 MG: 750 TABLET ORAL at 02:11

## 2022-11-14 RX ADMIN — ALPRAZOLAM 1 MG: 0.5 TABLET ORAL at 05:11

## 2022-11-14 RX ADMIN — METHOCARBAMOL 750 MG: 750 TABLET ORAL at 05:11

## 2022-11-14 RX ADMIN — FLUTICASONE FUROATE AND VILANTEROL TRIFENATATE 1 PUFF: 100; 25 POWDER RESPIRATORY (INHALATION) at 10:11

## 2022-11-14 RX ADMIN — GABAPENTIN 600 MG: 300 CAPSULE ORAL at 08:11

## 2022-11-14 RX ADMIN — ACETAMINOPHEN 650 MG: 325 TABLET ORAL at 08:11

## 2022-11-14 RX ADMIN — GABAPENTIN 600 MG: 300 CAPSULE ORAL at 11:11

## 2022-11-14 RX ADMIN — ALUMINUM HYDROXIDE, MAGNESIUM HYDROXIDE, AND DIMETHICONE 30 ML: 200; 20; 200 SUSPENSION ORAL at 06:11

## 2022-11-14 RX ADMIN — ALUMINUM HYDROXIDE, MAGNESIUM HYDROXIDE, AND DIMETHICONE 30 ML: 200; 20; 200 SUSPENSION ORAL at 03:11

## 2022-11-14 RX ADMIN — ACETAMINOPHEN 650 MG: 325 TABLET ORAL at 05:11

## 2022-11-14 RX ADMIN — HEPARIN SODIUM 5000 UNITS: 5000 INJECTION INTRAVENOUS; SUBCUTANEOUS at 11:11

## 2022-11-14 RX ADMIN — SUCRALFATE 1 G: 1 SUSPENSION ORAL at 11:11

## 2022-11-14 NOTE — ASSESSMENT & PLAN NOTE
Marianela Shrestha is a 37 y.o. female with SHAZIA SP L4-S1 Laminectomy 11.11.22    -Admitted to orthopedic service  -PODAS boot for LLE foot drop, AFO LLE when ambulating  -PT/OT: WBAT spine precautions  -DVT PPx: Heparin TID  -Abx: None  -Labs: None  -Addison: None  -Drains: x2 dc'ed this AM    DIspo: FU XR L spine. Will FU PT recs today- recced [facility] when they last saw her two days ago.

## 2022-11-14 NOTE — PLAN OF CARE
[facility]  Initial Discharge Assessment       Primary Care Provider: Arnaldo Espino MD    Admission Diagnosis: Cauda equina syndrome [G83.4]  Pre-op chest exam [Z01.811]  Cauda equina compression [G83.4]  Acute left ankle pain [M25.572]    Admission Date: 11/11/2022  Expected Discharge Date: 11/14/2022         Payor: HUMANA / Plan: HUMANA GENERIC / Product Type: Commercial /     Extended Emergency Contact Information  Primary Emergency Contact: Paul Rod  Address: [address] [place], [place] [address] [facility]  Home Phone: [phone]  Work Phone: [phone]  Mobile Phone: [phone]  Relation: Significant other  Secondary Emergency Contact: Lidia Thomas  Address: [address] [place] [address]  Home Phone: [phone]  Mobile Phone: [phone]  Relation: Mother    Discharge Plan A: Rehab  Discharge Plan B: Skilled Nursing Facility      [id number] [place] [address] [address] [place] [address]  Phone: [phone] Fax: [phone]    [facility] [address] [address]  Phone: [phone] Fax: [phone]      Initial Assessment (most recent)       Adult Discharge Assessment - 11/14/22 1114          Discharge Assessment    Assessment Type Discharge Planning Assessment     Confirmed/corrected address, phone number and insurance Yes     Confirmed Demographics Correct on Facesheet     Source of Information patient;family     Does patient/caregiver understand observation status Yes     Communicated CARLITA with patient/caregiver Yes     Lives With sibling(s);parent(s);significant other     Do you expect to return to your current living situation? Yes     Do you have help at home or someone to help you manage your care at home? Yes     Who are your caregiver(s) and their phone number(s)? Paul Rod (Significant other) [phone]     Prior to hospitilization  cognitive status: Alert/Oriented     Current cognitive status: Alert/Oriented     Walking or Climbing Stairs Difficulty none     Dressing/Bathing Difficulty none     Home Layout Able to live on 1st floor     Equipment Currently Used at Home none     Readmission within 30 days? No     Patient currently being followed by outpatient case management? No     Do you currently have service(s) that help you manage your care at home? No     Do you take prescription medications? Yes     Do you have prescription coverage? Yes     Do you have any problems affording any of your prescribed medications? No     Is the patient taking medications as prescribed? yes     Who is going to help you get home at discharge? Inpatient rehab recommended     How do you get to doctors appointments? family or friend will provide     Are you on dialysis? No     Do you take coumadin? No     Discharge Plan A Rehab     Discharge Plan B Skilled Nursing Facility                   Spoke with patient and significant other at bedside to complete d/c planning assessment. Patient lives with her mother, brother and Fiance' in a single story home with no steps to enter. Patient's service dog in room. Independent prior to admission. NO DME in home. Verified PCP, Pharmacy and health insurance. Inpatient Rehab recommended per PT/OT. Referrals sent to [facility], [facility], [facility] and [facility]. Patient medically stable for d/c today per team. First choice is [facility]. Will continue to follow for needs.

## 2022-11-14 NOTE — PLAN OF CARE
11/14/22 1105   Post-Acute Status   Post-Acute Authorization Placement   Post-Acute Placement Status Referrals Sent   Discharge Plan   Discharge Plan A Rehab   Discharge Plan B Rehab     Dr. Villanueva reports patient is medically stable for d/c today. PT/OT recommending Inpatient rehab. Referrals sent to [facility], [facility], [facility] and [facility]. Will continue to follow.        1115AM- Patient accepted to [facility] and team to submit for auth at this time. Will continue to follow.

## 2022-11-14 NOTE — PROGRESS NOTES
[facility]  Orthopedics  Progress Note    Patient Name: Marianela Shrestha  MRN: [id number]  Admission Date: 11/11/2022  Hospital Length of Stay: 3 days  Attending Provider: Sundar Harry MD  Primary Care Provider: Arnaldo Espino MD  Follow-up For: Procedure(s) (LRB):  LAMINECTOMY, SPINE, LUMBAR (N/A)    Post-Operative Day: 3 Days Post-Op  Subjective:     Principal Problem:SHAZIA (cauda equina syndrome)    Principal Orthopedic Problem: sp L4, L5, S1 laminectomy 11/11/22    Interval History: Patient seen and examined at bedside.  NAEON. VS WNL. No new labs. Drains 30 and 10cc light ss outpu overnight. Pain well controllled. Sensation improving LLE. Anxious about still having left foot drop. No PT/OT yesterday      Review of patient's allergies indicates:   Allergen Reactions    Coconut Hives and Itching    Sulfa (sulfonamide antibiotics) Hives     itching    Adhesive Rash    Latex, natural rubber Other (See Comments) and Rash       Current Facility-Administered Medications   Medication    (Magic mouthwash) 1:1:1 diphenhydrAMINE(Benadryl) 12.5mg/5ml liq, aluminum & magnesium hydroxide-simethicone (Maalox), LIDOcaine viscous 2%    0.9%  NaCl infusion    acetaminophen tablet 650 mg    acetaminophen tablet 650 mg    albuterol inhaler 2 puff    albuterol-ipratropium 2.5 mg-0.5 mg/3 mL nebulizer solution 3 mL    ALPRAZolam tablet 1 mg    aluminum-magnesium hydroxide-simethicone 200-200-20 mg/5 mL suspension 30 mL    celecoxib capsule 200 mg    doxepin capsule 50 mg    fluticasone furoate-vilanteroL 100-25 mcg/dose diskus inhaler 1 puff    gabapentin capsule 600 mg    heparin (porcine) injection 5,000 Units    HYDROmorphone injection 1 mg    LIDOcaine (PF) 10 mg/ml (1%) injection 10 mg    melatonin tablet 6 mg    methocarbamoL tablet 750 mg    ondansetron disintegrating tablet 8 mg    oxyCODONE immediate release tablet 5 mg    oxyCODONE immediate release tablet Tab 10 mg    pantoprazole EC tablet 40 mg    sodium chloride  "0.9% flush 10 mL    sucralfate 100 mg/mL suspension 1 g    venlafaxine 24 hr capsule 300 mg     Objective:     Vital Signs (Most Recent):  Temp: 96.7 °F (35.9 °C) (11/13/22 2345)  Pulse: 90 (11/13/22 2345)  Resp: 18 (11/14/22 0523)  BP: 136/84 (11/13/22 2345)  SpO2: 95 % (11/13/22 2345)   Vital Signs (24h Range):  Temp:  [96.7 °F (35.9 °C)-98.9 °F (37.2 °C)] 96.7 °F (35.9 °C)  Pulse:  [70-90] 90  Resp:  [16-20] 18  SpO2:  [95 %-99 %] 95 %  BP: (120-138)/(70-99) 136/84     Weight: 104.3 kg (229 lb 15 oz)  Height: 5' 5" (165.1 cm)  Body mass index is 38.26 kg/m².      Intake/Output Summary (Last 24 hours) at 11/14/2022 0712  Last data filed at 11/14/2022 0600  Gross per 24 hour   Intake --   Output 1315 ml   Net -1315 ml         Ortho/SPM Exam  AAOx4  NAD  Reg rate  No increased WOB  Dressing c/d/i  Drains x 2 in place    Iliopsoas (L2,3)                           5/5  Quadriceps  (L3,4)                  5/5   Tibialis Anterior (L4.5)                3/5                  Extensor Halucis Longus (L5)    0/5       Gastrocnemius/Soleus (S1)       2/5                  Flexor Hallucis Longus(S2)        0/5                       Sensation (a=absent, i=impaired, n=normal)  L2-S2 remains diminished, but improved from prior to OR    PODAS boot is bedside but not in place     Significant Labs: CBC:   No results for input(s): WBC, HGB, HCT, PLT in the last 48 hours.    CMP:   No results for input(s): NA, K, CL, CO2, GLU, BUN, CREATININE, CALCIUM, PROT, ALBUMIN, BILITOT, ALKPHOS, AST, ALT, ANIONGAP, EGFRNONAA in the last 48 hours.    Invalid input(s): ESTGFAFRICA    All pertinent labs within the past 24 hours have been reviewed.    Significant Imaging: None    Assessment/Plan:     * SHAZIA (cauda equina syndrome)  Marianela Shrestha is a 37 y.o. female with SHAZIA SP L4-S1 Laminectomy 11.11.22    -Admitted to orthopedic service  -PODAS [facility] for LLE foot drop, AFO LLE when ambulating  -PT/OT: WBAT spine precautions  -DVT PPx: Heparin " TID  -Abx: None  -Labs: None  -Addison: None  -Drains: x2 dc'ed this AM    DIspo: Will FU PT recs today- recced [facility] when they last saw her two days ago.                   Radha Villanueva MD  Orthopedics  [facility] - Surgery

## 2022-11-14 NOTE — TELEPHONE ENCOUNTER
----- Message from Paul Hernandez MD sent at 11/12/2022  1:19 AM CST -----  Please schedule follow up in 3 weeks sp L4-S1 Laminecotmy with Harry  Thanks!

## 2022-11-14 NOTE — PT/OT/SLP PROGRESS
"Physical Therapy Treatment    Patient Name:  Marianela Shrestha   MRN:  [id number]    Recommendations:     Discharge Recommendations:  rehabilitation facility   Discharge Equipment Recommendations: walker, rolling   Barriers to discharge:  Increased level of assistance required and fall risk    Assessment:     Marianela Shrestha is a 37 y.o. female admitted with a medical diagnosis of SHAZIA (cauda equina syndrome).  She presents with the following impairments/functional limitations:  weakness, impaired endurance, impaired self care skills, decreased lower extremity function, decreased ROM, orthopedic precautions, gait instability, impaired functional mobility, impaired balance, pain, decreased safety awareness. Pt tolerated session fairly well despite reporting increased back pain secondary to having drains removed this morning. Pt reported increased sensation in groin and improved movement in LLE during bed mobility and ambulation.      Rehab Prognosis: Good; patient would benefit from acute skilled PT services to address these deficits and reach maximum level of function.    Recent Surgery: Procedure(s) (LRB):  LAMINECTOMY, SPINE, LUMBAR (N/A) 3 Days Post-Op    Plan:     During this hospitalization, patient to be seen 4 x/week to address the identified rehab impairments via gait training, therapeutic activities, therapeutic exercises, neuromuscular re-education and progress toward the following goals:    Plan of Care Expires:  12/12/22    Subjective     Chief Complaint: Low back pain and decreased sensation in LLE  Patient/Family Comments/goals: "I don't think I am ready to go home yet."  Pain/Comfort:  Pain Rating 1: 7/10  Location - Side 1: Bilateral  Location - Orientation 1: generalized  Location 1: lumbar spine  Pain Addressed 1: Pre-medicate for activity, Reposition, Distraction  Pain Rating Post-Intervention 1: 7/10      Objective:     Communicated with RN prior to session.  Patient found HOB elevated with " John upon PTA entry to room.     General Precautions: Standard, fall   Orthopedic Precautions:spinal precautions   Braces:  (PRAFO in room, not donned for session)  Respiratory Status: Room air     Functional Mobility:  Bed Mobility:     Scooting: stand by assistance  Supine to Sit: minimum assistance for LE management  Sit to Supine: minimum assistance for LE management  Transfers:     Sit to Stand: contact guard assistance from elevated bed and minimum assistance from low toilet both with rolling walker  Toilet Transfer: minimum assistance with rolling walker and grab bars using Step Transfer during bout of ambulation  Gait: pt ambulated ~100 feet in RW with CGA  Pt demonstrated reduced step length and gait velocity, but no instability or LOB noted; She continues to have drop foot on LLE, but is able to achieve foot clearance during swing phase with compensation at hip  Balance: pt maintained standing balance in RW with BUE assist while donning new brief ~ 2 minutes with no instability      AM-PAC 6 CLICK MOBILITY  Turning over in bed (including adjusting bedclothes, sheets and blankets)?: 3  Sitting down on and standing up from a chair with arms (e.g., wheelchair, bedside commode, etc.): 3  Moving to and from a bed to a chair (including a wheelchair)?: 3  Need to walk in hospital room?: 3  Climbing 3-5 steps with a railing?: 2       Treatment & Education:  Pt able to recall and maintain spinal precautions during session with minimal verbal cuing    Demonstrated supine therex to be performed 2-3 times per day x 10 reps each including: SLR, heel slide, glute sets, APs    Doffed soiled gown and brief and donned fresh, assisted with self care, and performed repositioning of bed and belongings for pt comfort    Patient left HOB elevated with all lines intact, call button in reach, and fiance present..    GOALS:   Multidisciplinary Problems       Physical Therapy Goals          Problem: Physical Therapy    Goal  Priority Disciplines Outcome Goal Variances Interventions   Physical Therapy Goal     PT, PT/OT Ongoing, Progressing     Description: Goals to be met by: 22     Patient will increase functional independence with mobility by performin. Sit to stand transfer with Contact Guard Assistance  2. Bed to chair transfer with Supervision using Rolling Walker  3. Gait  x 150 feet with Supervision using Rolling Walker.   4. Ascend/Descend 6 inch curb step with Contact Guard Assistance using Rolling Walker.                         Time Tracking:     PT Received On: 22  PT Start Time: 845     PT Stop Time: 914  PT Total Time (min): 29 min     Billable Minutes: Gait Training 15 and Therapeutic Activity 14    Treatment Type: Treatment  PT/PTA: PTA     PTA Visit Number: 1     2022

## 2022-11-14 NOTE — CONSULTS
Inpatient consult to Physical Medicine Rehab  Consult performed by: Serena Nelson NP  Consult ordered by: Sundar Harry MD  Reason for consult: Assess rehab needs    Reviewed patient history and current admission.  Rehab team following.  Full consult to follow.    KAROLYN Anguiano, FNP-C  Physical Medicine & Rehabilitation   11/14/2022

## 2022-11-14 NOTE — PT/OT/SLP PROGRESS
"Occupational Therapy   Treatment    Name: Marianela Shrestha  MRN: [id number]  Admitting Diagnosis:  SHAZIA (cauda equina syndrome)  3 Days Post-Op  Procedure(s):  LAMINECTOMY, SPINE, LUMBAR   Recommendations:     Discharge Recommendations: rehabilitation facility  Discharge Equipment Recommendations:  walker, rolling  Barriers to discharge:   (increased A required)    Assessment:     Marianela Shrestha is a 37 y.o. female with a medical diagnosis of SHAZIA (cauda equina syndrome).  She presents with the following performance deficits affecting function are weakness, impaired endurance, impaired self care skills, decreased lower extremity function, decreased ROM, orthopedic precautions, gait instability, impaired functional mobility, impaired balance, pain, decreased safety awareness. Patient agreeable to OT session and tolerated well. Patient participated in ADL re-training as well as functional transfer training with focus on maintaining spinal precautions for pain management. Patient is motivated to reach OT goals and increase occupational performance. Patient would benefit from continued OT services to address deficits and progress towards goals. Continue OT POC.    Rehab Prognosis:  Good; patient would benefit from acute skilled OT services to address these deficits and reach maximum level of function.       Plan:     Patient to be seen 4 x/week to address the above listed problems via self-care/home management, therapeutic activities, therapeutic exercises, neuromuscular re-education  Plan of Care Expires: 12/12/22  Plan of Care Reviewed with: patient    Subjective   "This is almost as good as a shower!"    Pain/Comfort:  Pain Rating 1: 6/10  Location - Orientation 1: medial  Location 1: lumbar spine  Pain Addressed 1: Pre-medicate for activity, Reposition, Distraction  Pain Rating Post-Intervention 1: 6/10    Objective:     Communicated with: RN and OTR prior to session.  Patient found HOB elevated with PureWick " upon OT entry to room.  A client care conference was completed by the OTR and the BAEZA prior to treatment by the BAEZA to discuss the patient's POC and current status.    General Precautions: Standard, fall   Orthopedic Precautions:spinal precautions   Braces: AFO ((L))  Respiratory Status: Room air     Occupational Performance:     Bed Mobility:    Patient completed Supine to Sit with contact guard assistance, with side rail, and HOB elevated with log roll technique      Functional Mobility/Transfers:  Patient completed Sit <> Stand Transfer with stand by assistance  with  rolling walker   Patient completed Bed > Bedside couch Transfer using Step Transfer technique with contact guard assistance with rolling walker  Patient completed Toilet Transfer Step Transfer technique with stand by assistance with  rolling walker and grab bars  Functional Mobility: to/from bathroom (~12' x2) using RW with SBA intermittent CGA for transitional movements; x1 LOB noted requiring CGA to self right    Activities of Daily Living:  Grooming: stand by assistance for safety and decreased endurance and weakness to complete oral, hand, and facial hygiene standing sink side   Bathing: supervision for safety and technique for decreased trunk ROM to complete abbreviated UB/LB sponge bath seated  Upper Body Dressing: stand by assistance for decreased dynamic standing balance to don clean gown x2 standing  Toileting: stand by assistance for technique and weight shifting to maximize functional reach to perform perirectal hygiene seated      AMPAC 6 Click ADL: 21    Treatment & Education:  Education on OT POC, goals, and current progress  Patient recalled 3/3 spinal precautions on 1st attempt, but did require verbal instruction and demonstration for application during ADLs. Patient demo'd good carry over.   ADL re-training as indicated above  Functional transfer training as indicated above  Addressed all patient questions/concerns within MIGUEL A  scope of practice.     Patient left  seated on bedside couch with feet elevated  with call button in reach and patient's significant other present    GOALS:   Multidisciplinary Problems       Occupational Therapy Goals          Problem: Occupational Therapy    Goal Priority Disciplines Outcome Interventions   Occupational Therapy Goal     OT, PT/OT Ongoing, Progressing    Description: Goals to be met by: 11/26/22     Patient will increase functional independence with ADLs by performing:    UE Dressing with [place].  LE Dressing with Modified [place].  Grooming while standing at sink with Modified [place].  Toileting from toilet with Modified [place] for hygiene and clothing management.   Supine to sit with Modified [place].  Toilet transfer to toilet with Modified [place].                         Time Tracking:     OT Date of Treatment: 11/14/22  OT Start Time: 1119  OT Stop Time: 1200  OT Total Time (min): 41 min    Billable Minutes:Self Care/Home Management 25  Therapeutic Activity 16    OT/MIGUEL A: MIGUEL A GRADY Visit Number: 1    11/14/2022

## 2022-11-14 NOTE — SUBJECTIVE & OBJECTIVE
Principal Problem:SHAZIA (cauda equina syndrome)    Principal Orthopedic Problem: sp L4, L5, S1 laminectomy 11/11/22    Interval History: Patient seen and examined at bedside.  NAEON. VS WNL. No new labs. Drains 30 and 10cc light ss outpu overnight. Pain well controllled. Sensation improving LLE. Anxious about still having left foot drop. No PT/OT yesterday      Review of patient's allergies indicates:   Allergen Reactions    Coconut Hives and Itching    Sulfa (sulfonamide antibiotics) Hives     itching    Adhesive Rash    Latex, natural rubber Other (See Comments) and Rash       Current Facility-Administered Medications   Medication    (Magic mouthwash) 1:1:1 diphenhydrAMINE(Benadryl) 12.5mg/5ml liq, aluminum & magnesium hydroxide-simethicone (Maalox), LIDOcaine viscous 2%    0.9%  NaCl infusion    acetaminophen tablet 650 mg    acetaminophen tablet 650 mg    albuterol inhaler 2 puff    albuterol-ipratropium 2.5 mg-0.5 mg/3 mL nebulizer solution 3 mL    ALPRAZolam tablet 1 mg    aluminum-magnesium hydroxide-simethicone 200-200-20 mg/5 mL suspension 30 mL    celecoxib capsule 200 mg    doxepin capsule 50 mg    fluticasone furoate-vilanteroL 100-25 mcg/dose diskus inhaler 1 puff    gabapentin capsule 600 mg    heparin (porcine) injection 5,000 Units    HYDROmorphone injection 1 mg    LIDOcaine (PF) 10 mg/ml (1%) injection 10 mg    melatonin tablet 6 mg    methocarbamoL tablet 750 mg    ondansetron disintegrating tablet 8 mg    oxyCODONE immediate release tablet 5 mg    oxyCODONE immediate release tablet Tab 10 mg    pantoprazole EC tablet 40 mg    sodium chloride 0.9% flush 10 mL    sucralfate 100 mg/mL suspension 1 g    venlafaxine 24 hr capsule 300 mg     Objective:     Vital Signs (Most Recent):  Temp: 96.7 °F (35.9 °C) (11/13/22 2345)  Pulse: 90 (11/13/22 2345)  Resp: 18 (11/14/22 0523)  BP: 136/84 (11/13/22 2345)  SpO2: 95 % (11/13/22 2345)   Vital Signs (24h Range):  Temp:  [96.7 °F (35.9 °C)-98.9 °F (37.2 °C)]  "96.7 °F (35.9 °C)  Pulse:  [70-90] 90  Resp:  [16-20] 18  SpO2:  [95 %-99 %] 95 %  BP: (120-138)/(70-99) 136/84     Weight: 104.3 kg (229 lb 15 oz)  Height: 5' 5" (165.1 cm)  Body mass index is 38.26 kg/m².      Intake/Output Summary (Last 24 hours) at 11/14/2022 0712  Last data filed at 11/14/2022 0600  Gross per 24 hour   Intake --   Output 1315 ml   Net -1315 ml         Ortho/SPM Exam  AAOx4  NAD  Reg rate  No increased WOB  Dressing c/d/i  Drains x 2 in place    Iliopsoas (L2,3)                           5/5  Quadriceps  (L3,4)                  5/5   Tibialis Anterior (L4.5)                3/5                  Extensor Halucis Longus (L5)    0/5       Gastrocnemius/Soleus (S1)       2/5                  Flexor Hallucis Longus(S2)        0/5                       Sensation (a=absent, i=impaired, n=normal)  L2-S2 remains diminished, but improved from prior to OR    PODAS boot is bedside but not in place     Significant Labs: CBC:   No results for input(s): WBC, HGB, HCT, PLT in the last 48 hours.    CMP:   No results for input(s): NA, K, CL, CO2, GLU, BUN, CREATININE, CALCIUM, PROT, ALBUMIN, BILITOT, ALKPHOS, AST, ALT, ANIONGAP, EGFRNONAA in the last 48 hours.    Invalid input(s): ESTGF[place]    All pertinent labs within the past 24 hours have been reviewed.    Significant Imaging: None  "

## 2022-11-15 PROCEDURE — 94761 N-INVAS EAR/PLS OXIMETRY MLT: CPT | Mod: HCNC

## 2022-11-15 PROCEDURE — 97530 THERAPEUTIC ACTIVITIES: CPT | Mod: HCNC,CQ

## 2022-11-15 PROCEDURE — 25000003 PHARM REV CODE 250: Mod: HCNC | Performed by: STUDENT IN AN ORGANIZED HEALTH CARE EDUCATION/TRAINING PROGRAM

## 2022-11-15 PROCEDURE — 25000242 PHARM REV CODE 250 ALT 637 W/ HCPCS: Mod: HCNC | Performed by: STUDENT IN AN ORGANIZED HEALTH CARE EDUCATION/TRAINING PROGRAM

## 2022-11-15 PROCEDURE — 94640 AIRWAY INHALATION TREATMENT: CPT | Mod: HCNC

## 2022-11-15 PROCEDURE — 63600175 PHARM REV CODE 636 W HCPCS: Mod: HCNC | Performed by: STUDENT IN AN ORGANIZED HEALTH CARE EDUCATION/TRAINING PROGRAM

## 2022-11-15 PROCEDURE — 97116 GAIT TRAINING THERAPY: CPT | Mod: HCNC,CQ

## 2022-11-15 PROCEDURE — 11000001 HC ACUTE MED/SURG PRIVATE ROOM

## 2022-11-15 RX ADMIN — METHOCARBAMOL 750 MG: 750 TABLET ORAL at 12:11

## 2022-11-15 RX ADMIN — GABAPENTIN 600 MG: 300 CAPSULE ORAL at 09:11

## 2022-11-15 RX ADMIN — OXYCODONE HYDROCHLORIDE 10 MG: 10 TABLET ORAL at 05:11

## 2022-11-15 RX ADMIN — METHOCARBAMOL 750 MG: 750 TABLET ORAL at 09:11

## 2022-11-15 RX ADMIN — HEPARIN SODIUM 5000 UNITS: 5000 INJECTION INTRAVENOUS; SUBCUTANEOUS at 06:11

## 2022-11-15 RX ADMIN — OXYCODONE HYDROCHLORIDE 10 MG: 10 TABLET ORAL at 09:11

## 2022-11-15 RX ADMIN — DOXEPIN HYDROCHLORIDE 50 MG: 50 CAPSULE ORAL at 09:11

## 2022-11-15 RX ADMIN — METHOCARBAMOL 750 MG: 750 TABLET ORAL at 05:11

## 2022-11-15 RX ADMIN — ACETAMINOPHEN 650 MG: 325 TABLET ORAL at 12:11

## 2022-11-15 RX ADMIN — PANTOPRAZOLE SODIUM 40 MG: 40 TABLET, DELAYED RELEASE ORAL at 09:11

## 2022-11-15 RX ADMIN — ALUMINUM HYDROXIDE, MAGNESIUM HYDROXIDE, AND DIMETHICONE 30 ML: 200; 20; 200 SUSPENSION ORAL at 09:11

## 2022-11-15 RX ADMIN — ACETAMINOPHEN 650 MG: 325 TABLET ORAL at 05:11

## 2022-11-15 RX ADMIN — HEPARIN SODIUM 5000 UNITS: 5000 INJECTION INTRAVENOUS; SUBCUTANEOUS at 03:11

## 2022-11-15 RX ADMIN — ALPRAZOLAM 1 MG: 0.5 TABLET ORAL at 09:11

## 2022-11-15 RX ADMIN — VENLAFAXINE HYDROCHLORIDE 300 MG: 75 CAPSULE, EXTENDED RELEASE ORAL at 09:11

## 2022-11-15 RX ADMIN — HEPARIN SODIUM 5000 UNITS: 5000 INJECTION INTRAVENOUS; SUBCUTANEOUS at 09:11

## 2022-11-15 RX ADMIN — CELECOXIB 200 MG: 200 CAPSULE ORAL at 09:11

## 2022-11-15 RX ADMIN — GABAPENTIN 600 MG: 300 CAPSULE ORAL at 03:11

## 2022-11-15 RX ADMIN — FLUTICASONE FUROATE AND VILANTEROL TRIFENATATE 1 PUFF: 100; 25 POWDER RESPIRATORY (INHALATION) at 08:11

## 2022-11-15 RX ADMIN — ALPRAZOLAM 1 MG: 0.5 TABLET ORAL at 01:11

## 2022-11-15 RX ADMIN — OXYCODONE HYDROCHLORIDE 10 MG: 10 TABLET ORAL at 01:11

## 2022-11-15 RX ADMIN — ALUMINUM HYDROXIDE, MAGNESIUM HYDROXIDE, AND DIMETHICONE 30 ML: 200; 20; 200 SUSPENSION ORAL at 05:11

## 2022-11-15 NOTE — PLAN OF CARE
[facility] accepted to [facility] for [facility]. [employer] auth submitted on 11/14/22 @ noon. Auth escalated to  leadership at this time. Will continue to follow for needs.

## 2022-11-15 NOTE — PLAN OF CARE
[facility] - Surgery  Facility Transfer Orders        Admit to: inpatient rehab    Diagnoses:   Active Hospital Problems    Diagnosis  POA    *SAHZIA (cauda equina syndrome) [G83.4]  Yes    Impaired mobility [Z74.09]  Unknown      Resolved Hospital Problems   No resolved problems to display.     Allergies:   Review of patient's allergies indicates:   Allergen Reactions    Coconut Hives and Itching    Sulfa (sulfonamide antibiotics) Hives     itching    Adhesive Rash    Latex, natural rubber Other (See Comments) and Rash       Code Status: full code    Vitals: Routine       Diet: regular diet    Activity: WBAT. Use PODUS boot left foot/ankle while in bed due to foot drop. Use left foot/ankle AFO when ambulating due to foot drop    Nursing Precautions: Fall    Bed/Surface: normal    Consults: PT to evaluate and treat- 7 times a week, OT to evaluate and treat- 7 times a week, and ST to evaluate and treat- 7 times a week    Oxygen: room air    Dialysis: Patient is not on dialysis.     Labs: none  Pending Diagnostic Studies:       None          Imaging: none    Miscellaneous Care:   none    IV Access: Peripheral     Medications: Discontinue all previous medication orders, if any. See new list below.  Current Discharge Medication List        START taking these medications    Details   acetaminophen (TYLENOL) 650 MG TbSR Take 1 tablet (650 mg total) by mouth every 6 (six) hours.  Qty: 56 tablet, Refills: 0      celecoxib (CELEBREX) 200 MG capsule Take 1 capsule (200 mg total) by mouth once daily.  Qty: 12 capsule, Refills: 0      methocarbamoL (ROBAXIN) 500 MG Tab Take 2 tablets (1,000 mg total) by mouth 3 (three) times daily. for 14 days  Qty: 84 tablet, Refills: 0      oxyCODONE (ROXICODONE) 5 MG immediate release tablet Take 1 tablet (5 mg total) by mouth every 4 (four) hours as needed for Pain.  Qty: 20 tablet, Refills: 0           CONTINUE these medications which have NOT CHANGED    Details   albuterol (PROVENTIL/VENTOLIN  HFA) 90 mcg/actuation inhaler Inhale 2 puffs into the lungs every 6 (six) hours as needed. Rescue      albuterol-ipratropium (DUO-NEB) 2.5 mg-0.5 mg/3 mL nebulizer solution Take 3 mLs by nebulization every 6 (six) hours as needed for Wheezing or Shortness of Breath. Rescue  Qty: 270 mL, Refills: 3    Associated Diagnoses: Uncontrolled persistent asthma      ALPRAZolam (XANAX) 1 MG tablet Take 1 tablet (1 mg total) by mouth 3 (three) times daily as needed for Anxiety.  Qty: 90 tablet, Refills: 0    Comments: Not to exceed 5 additional fills before 03/29/2022  Associated Diagnoses: Generalized anxiety disorder      doxepin (SINEQUAN) 25 MG capsule TAKE 2 CAPSULES (50 MG TOTAL) BY MOUTH EVERY EVENING.  Qty: 180 capsule, Refills: 0    Associated Diagnoses: Insomnia, unspecified type      gabapentin (NEURONTIN) 600 MG tablet TAKE 1 TABLET BY MOUTH THREE TIMES A DAY  Qty: 120 tablet, Refills: 1    Associated Diagnoses: Bilateral sciatica; Chronic pain syndrome      mupirocin (BACTROBAN) 2 % ointment Twice daily for five days twice per month for six months  Qty: 22 g, Refills: 3    Comments: Daily for five days twice per month for six months      venlafaxine (EFFEXOR-XR) 150 MG Cp24 TAKE 2 CAPSULES BY MOUTH EVERY DAY  Qty: 60 capsule, Refills: 1    Associated Diagnoses: Generalized anxiety disorder      (Magic mouthwash) 1:1:1 diphenhydramine(Benadryl) 12.5mg/5ml liq, aluminum & magnesium hydroxide-simethicone (Maalox), LIDOcaine viscous 2% Swish and spit 15 mLs every 4 (four) hours as needed (pain). for mouth sores  Qty: 450 mL, Refills: 0    Associated Diagnoses: Chronic sinusitis, unspecified location      budesonide-formoterol 160-4.5 mcg (SYMBICORT) 160-4.5 mcg/actuation HFAA INHALE 2 PUFFS INTO THE LUNGS EVERY 12 (TWELVE) HOURS. CONTROLLER  Qty: 10.2 g, Refills: 0    Associated Diagnoses: Uncontrolled persistent asthma      norethindrone (AYGESTIN) 5 mg Tab Take 1 tablet (5 mg total) by mouth once daily.  Qty: 30  tablet, Refills: 11    Associated Diagnoses: Pelvic pain in female      omeprazole (PRILOSEC) 20 MG capsule TAKE 1 CAPSULE BY MOUTH EVERY DAY  Qty: 90 capsule, Refills: 0    Associated Diagnoses: Uncontrolled persistent asthma      ondansetron (ZOFRAN-ODT) 4 MG TbDL Dissolve 2 tablets (8 mg total) by mouth every 8 (eight) hours as needed (nausea).  Qty: 10 tablet, Refills: 0           STOP taking these medications       cyclobenzaprine (FLEXERIL) 10 MG tablet Comments:   Reason for Stopping:         HYDROcodone-acetaminophen (NORCO) 5-325 mg per tablet Comments:   Reason for Stopping:         ibuprofen 200 mg Cap Comments:   Reason for Stopping:         meloxicam (MOBIC) 15 MG tablet Comments:   Reason for Stopping:         traMADoL (ULTRAM) 50 mg tablet Comments:   Reason for Stopping:             Follow up:       Immunizations Administered as of 11/15/2022       No immunizations on file.            Unknown covid vaccination status    Some patients may experience side effects after vaccination.  These may include fever, headache, muscle or joint aches.  Most symptoms resolve with 24-48 hours and do not require urgent medical evaluation unless they persist for more than 72 hours or symptoms are concerning for an unrelated medical condition.          Radha Villanueva MD

## 2022-11-15 NOTE — SUBJECTIVE & OBJECTIVE
Principal Problem:SHAZIA (cauda equina syndrome)    Principal Orthopedic Problem: sp L4, L5, S1 laminectomy 11/11/22    Interval History: Patient seen and examined at bedside.  NAEON. VS wnl. No new labs. Pain control improving. Intermittently wearing PODUS boot. Pending IPR placement    Review of patient's allergies indicates:   Allergen Reactions    Coconut Hives and Itching    Sulfa (sulfonamide antibiotics) Hives     itching    Adhesive Rash    Latex, natural rubber Other (See Comments) and Rash       Current Facility-Administered Medications   Medication    (Magic mouthwash) 1:1:1 diphenhydrAMINE(Benadryl) 12.5mg/5ml liq, aluminum & magnesium hydroxide-simethicone (Maalox), LIDOcaine viscous 2%    0.9%  NaCl infusion    acetaminophen tablet 650 mg    acetaminophen tablet 650 mg    albuterol inhaler 2 puff    albuterol-ipratropium 2.5 mg-0.5 mg/3 mL nebulizer solution 3 mL    ALPRAZolam tablet 1 mg    aluminum-magnesium hydroxide-simethicone 200-200-20 mg/5 mL suspension 30 mL    celecoxib capsule 200 mg    doxepin capsule 50 mg    fluticasone furoate-vilanteroL 100-25 mcg/dose diskus inhaler 1 puff    gabapentin capsule 600 mg    heparin (porcine) injection 5,000 Units    LIDOcaine (PF) 10 mg/ml (1%) injection 10 mg    melatonin tablet 6 mg    methocarbamoL tablet 750 mg    ondansetron disintegrating tablet 8 mg    oxyCODONE immediate release tablet 5 mg    oxyCODONE immediate release tablet Tab 10 mg    pantoprazole EC tablet 40 mg    sodium chloride 0.9% flush 10 mL    sucralfate 100 mg/mL suspension 1 g    venlafaxine 24 hr capsule 300 mg     Objective:     Vital Signs (Most Recent):  Temp: 98.8 °F (37.1 °C) (11/15/22 0016)  Pulse: 81 (11/15/22 0016)  Resp: 16 (11/15/22 0500)  BP: 134/72 (11/15/22 0016)  SpO2: 95 % (11/15/22 0016)   Vital Signs (24h Range):  Temp:  [96.6 °F (35.9 °C)-98.8 °F (37.1 °C)] 98.8 °F (37.1 °C)  Pulse:  [78-85] 81  Resp:  [12-18] 16  SpO2:  [95 %-100 %] 95 %  BP: (113-135)/(58-80)  "134/72     Weight: 104.3 kg (229 lb 15 oz)  Height: 5' 5" (165.1 cm)  Body mass index is 38.26 kg/m².    No intake or output data in the 24 hours ending 11/15/22 0716      Ortho/SPM Exam  AAOx4  NAD  Reg rate  No increased WOB    SPINE  Dressing c/d/i    Iliopsoas (L2,3)                           5/5  Quadriceps  (L3,4)                  5/5   Tibialis Anterior (L4.5)                3/5                  Extensor Halucis Longus (L5)    0/5       Gastrocnemius/Soleus (S1)       2/5                  Flexor Hallucis Longus(S2)        0/5                       Sensation (a=absent, i=impaired, n=normal)  L2-S2 remains diminished, but improved from prior to OR    PODAS boot is bedside but not in place     Significant Labs: CBC:   No results for input(s): WBC, HGB, HCT, PLT in the last 48 hours.    CMP:   No results for input(s): NA, K, CL, CO2, GLU, BUN, CREATININE, CALCIUM, PROT, ALBUMIN, BILITOT, ALKPHOS, AST, ALT, ANIONGAP, EGFRNONAA in the last 48 hours.    Invalid input(s): ESTGF[place]    All pertinent labs within the past 24 hours have been reviewed.    Significant Imaging: None  "

## 2022-11-15 NOTE — SUBJECTIVE & OBJECTIVE
Past Medical History:   Diagnosis Date    Anxiety     Chronic osteomyelitis of right hand 2019    Chronic pain syndrome     Depression     Insomnia     Post traumatic stress disorder (PTSD)     Sciatica      Past Surgical History:   Procedure Laterality Date    ABLATION OF MEDIAL BRANCH NERVE OF LUMBAR SPINE FACET JOINT      L4-S1    COSMETIC SURGERY      breast augmentation    ETHMOIDECTOMY  7/1/2022    Procedure: ETHMOIDECTOMY;  Surgeon: Segundo Fu MD;  Location: [facility] OR [address] [place] [address];  Service: ENT;;    FINGER SURGERY      FUNCTIONAL ENDOSCOPIC SINUS SURGERY (FESS) USING COMPUTER-ASSISTED NAVIGATION Bilateral 7/1/2022    Procedure: FESS, USING COMPUTER-ASSISTED NAVIGATION;  Surgeon: Segundo Fu MD;  Location: [facility] OR [address] [place] [address];  Service: ENT;  Laterality: Bilateral;    LUMBAR LAMINECTOMY N/A 11/11/2022    Procedure: LAMINECTOMY, SPINE, LUMBAR;  Surgeon: Sundar Harry MD;  Location: [facility] OR [address] [place] [address];  Service: Orthopedics;  Laterality: N/A;  L4-S1 laminectomy, SNS: SSEP/MOtors    MAXILLARY ANTROSTOMY  7/1/2022    Procedure: MAXILLARY ANTROSTOMY;  Surgeon: Segundo Fu MD;  Location: [facility] OR [address] [place] [address];  Service: ENT;;    PELVIC LAPAROSCOPY      Endometriosis ([place])    TONSILLECTOMY       Review of patient's allergies indicates:   Allergen Reactions    Coconut Hives and Itching    Sulfa (sulfonamide antibiotics) Hives     itching    Adhesive Rash    Latex, natural rubber Other (See Comments) and Rash       Scheduled Medications:    acetaminophen  650 mg Oral Q6H    aluminum-magnesium hydroxide-simethicone  30 mL Oral QID (AC & HS)    celecoxib  200 mg Oral Daily    doxepin  50 mg Oral QHS    fluticasone furoate-vilanteroL  1 puff Inhalation Daily    gabapentin  600 mg Oral TID    heparin (porcine)  5,000 Units Subcutaneous Q8H    methocarbamoL  750 mg Oral QID    pantoprazole  40 mg Oral Daily    sucralfate  1 g Oral Q6H    venlafaxine  300 mg Oral Daily       PRN Medications: (Magic mouthwash) 1:1:1  diphenhydrAMINE(Benadryl) 12.5mg/5ml liq, aluminum & magnesium hydroxide-simethicone (Maalox), LIDOcaine viscous 2%, acetaminophen, albuterol, albuterol-ipratropium, ALPRAZolam, HYDROmorphone, LIDOcaine (PF) 10 mg/ml (1%), melatonin, ondansetron, oxyCODONE, oxyCODONE, sodium chloride 0.9%    Family History       Problem Relation (Age of Onset)    Cervical cancer Maternal Aunt    Colon cancer Maternal Aunt    Diabetes Maternal Grandmother, Mother, Maternal Aunt    Hypertension Mother          Tobacco Use    Smoking status: Some Days     Packs/day: 0.25     Years: 15.00     Pack years: 3.75     Types: Cigarettes     Start date: 2017     Last attempt to quit: 2021     Years since quittin.9    Smokeless tobacco: Never    Tobacco comments:     occasional 3 cig month, started 15 quit 17-19, started age 22    Substance and Sexual Activity    Alcohol use: Not Currently    Drug use: Never    Sexual activity: Yes     Partners: Male     Birth control/protection: None     Review of Systems   Constitutional:  Positive for activity change. Negative for fatigue and fever.   HENT:  Negative for sore throat and trouble swallowing.    Eyes:  Negative for visual disturbance.   Respiratory:  Negative for cough and shortness of breath.    Cardiovascular:  Negative for chest pain and leg swelling.   Gastrointestinal:  Negative for abdominal distention and abdominal pain.   Genitourinary:  Negative for difficulty urinating.   Musculoskeletal:  Positive for gait problem. Negative for back pain.   Skin:  Negative for color change.   Neurological:  Positive for weakness. Negative for dizziness, light-headedness and headaches.   Psychiatric/Behavioral:  Negative for agitation and confusion.    Objective:     Vital Signs (Most Recent):  Temp: 98.5 °F (36.9 °C) (22 1113)  Pulse: 82 (22 1113)  Resp: 12 (22 1744)  BP: 135/80 (22 1113)  SpO2: 98 % (22 1113)    Vital Signs (24h Range):  Temp:  [96.7 °F  (35.9 °C)-98.5 °F (36.9 °C)] 98.5 °F (36.9 °C)  Pulse:  [78-90] 82  Resp:  [12-18] 12  SpO2:  [95 %-100 %] 98 %  BP: (129-136)/(73-86) 135/80     Body mass index is 38.26 kg/m².    Physical Exam  Vitals and nursing note reviewed.   Constitutional:       Appearance: Normal appearance. She is well-developed.   HENT:      Head: Normocephalic.      Nose: Nose normal.   Eyes:      Pupils: Pupils are equal, round, and reactive to light.   Pulmonary:      Effort: Pulmonary effort is normal. No respiratory distress.   Abdominal:      General: Bowel sounds are normal.      Palpations: Abdomen is soft.   Musculoskeletal:      Cervical back: Normal range of motion.      Comments: Foot drop present to LLE   Skin:     General: Skin is warm and dry.   Neurological:      Mental Status: She is alert and oriented to person, place, and time. Mental status is at baseline.      Motor: Weakness present.      Gait: Gait abnormal.   Psychiatric:         Mood and Affect: Mood normal.     NEUROLOGICAL EXAMINATION:     MENTAL STATUS   Oriented to person, place, and time.     CRANIAL NERVES     CN III, IV, VI   Pupils are equal, round, and reactive to light.    Diagnostic Results: Labs: Reviewed  ECG: Reviewed  CT: Reviewed

## 2022-11-15 NOTE — PROGRESS NOTES
[facility]  Orthopedics  Progress Note    Patient Name: Marianela Shrestha  MRN: [id number]  Admission Date: 11/11/2022  Hospital Length of Stay: 4 days  Attending Provider: Sundar Harry MD  Primary Care Provider: Arnaldo Espino MD  Follow-up For: Procedure(s) (LRB):  LAMINECTOMY, SPINE, LUMBAR (N/A)    Post-Operative Day: 4 Days Post-Op  Subjective:     Principal Problem:SHAZIA (cauda equina syndrome)    Principal Orthopedic Problem: sp L4, L5, S1 laminectomy 11/11/22    Interval History: Patient seen and examined at bedside.  NAEON. VS wnl. No new labs. Pain control improving. Intermittently wearing PODUS boot. Pending IPR placement    Review of patient's allergies indicates:   Allergen Reactions    Coconut Hives and Itching    Sulfa (sulfonamide antibiotics) Hives     itching    Adhesive Rash    Latex, natural rubber Other (See Comments) and Rash       Current Facility-Administered Medications   Medication    (Magic mouthwash) 1:1:1 diphenhydrAMINE(Benadryl) 12.5mg/5ml liq, aluminum & magnesium hydroxide-simethicone (Maalox), LIDOcaine viscous 2%    0.9%  NaCl infusion    acetaminophen tablet 650 mg    acetaminophen tablet 650 mg    albuterol inhaler 2 puff    albuterol-ipratropium 2.5 mg-0.5 mg/3 mL nebulizer solution 3 mL    ALPRAZolam tablet 1 mg    aluminum-magnesium hydroxide-simethicone 200-200-20 mg/5 mL suspension 30 mL    celecoxib capsule 200 mg    doxepin capsule 50 mg    fluticasone furoate-vilanteroL 100-25 mcg/dose diskus inhaler 1 puff    gabapentin capsule 600 mg    heparin (porcine) injection 5,000 Units    LIDOcaine (PF) 10 mg/ml (1%) injection 10 mg    melatonin tablet 6 mg    methocarbamoL tablet 750 mg    ondansetron disintegrating tablet 8 mg    oxyCODONE immediate release tablet 5 mg    oxyCODONE immediate release tablet Tab 10 mg    pantoprazole EC tablet 40 mg    sodium chloride 0.9% flush 10 mL    sucralfate 100 mg/mL suspension 1 g    venlafaxine 24 hr  "capsule 300 mg     Objective:     Vital Signs (Most Recent):  Temp: 98.8 °F (37.1 °C) (11/15/22 0016)  Pulse: 81 (11/15/22 0016)  Resp: 16 (11/15/22 0500)  BP: 134/72 (11/15/22 0016)  SpO2: 95 % (11/15/22 0016)   Vital Signs (24h Range):  Temp:  [96.6 °F (35.9 °C)-98.8 °F (37.1 °C)] 98.8 °F (37.1 °C)  Pulse:  [78-85] 81  Resp:  [12-18] 16  SpO2:  [95 %-100 %] 95 %  BP: (113-135)/(58-80) 134/72     Weight: 104.3 kg (229 lb 15 oz)  Height: 5' 5" (165.1 cm)  Body mass index is 38.26 kg/m².    No intake or output data in the 24 hours ending 11/15/22 0716      Ortho/SPM Exam  AAOx4  NAD  Reg rate  No increased WOB    SPINE  Dressing c/d/i    Iliopsoas (L2,3)                           5/5  Quadriceps  (L3,4)                  5/5   Tibialis Anterior (L4.5)                3/5                  Extensor Halucis Longus (L5)    0/5       Gastrocnemius/Soleus (S1)       2/5                  Flexor Hallucis Longus(S2)        0/5                       Sensation (a=absent, i=impaired, n=normal)  L2-S2 remains diminished, but improved from prior to OR    PODAS boot is bedside but not in place     Significant Labs: CBC:   No results for input(s): WBC, HGB, HCT, PLT in the last 48 hours.    CMP:   No results for input(s): NA, K, CL, CO2, GLU, BUN, CREATININE, CALCIUM, PROT, ALBUMIN, BILITOT, ALKPHOS, AST, ALT, ANIONGAP, EGFRNONAA in the last 48 hours.    Invalid input(s): ESTGFAFRICA    All pertinent labs within the past 24 hours have been reviewed.    Significant Imaging: None    Assessment/Plan:     * SHAZIA (cauda equina syndrome)  Marianela Shrestha is a 37 y.o. female with SHAZIA SP L4-S1 Laminectomy 11.11.22    -Admitted to orthopedic service  -PODAS boot for LLE foot drop, AFO LLE when ambulating  -PT/OT: WBAT spine precautions  -DVT PPx: Heparin TID  -Abx: None  -Labs: None  -Addison: None  -Drains: dc'ed POD3    DIspo: pending IPR placement, medically ready                  Radha Villanueva MD  Orthopedics  [facility] - Surgery  "

## 2022-11-15 NOTE — ASSESSMENT & PLAN NOTE
- S/p L4-S1 Lami  11/11/22. Ortho recommending PODAS boot for LLE foot drop and AFO LLE when ambulating. WBAT spine precautions.

## 2022-11-15 NOTE — HOSPITAL COURSE
11/12/22: Participated w/ PT. Ambulated 70 ft CGA w/ RW. decreased gait speed; initially ambulating with step-to pattern but able to progress to step-through; decreased L foot clearance due to muscle weakness (pt is supposed to be given AFO to address foot drop)  [facility] 18 w/ OT

## 2022-11-15 NOTE — PT/OT/SLP PROGRESS
Physical Therapy Treatment    Patient Name:  Marianela Shrestha   MRN:  [id number]    Recommendations:     Discharge Recommendations:  rehabilitation facility   Discharge Equipment Recommendations: walker, rolling   Barriers to discharge:    Increased level of assistance required and fall risk       Assessment:     Marianela Shrestha is a 37 y.o. female admitted with a medical diagnosis of SHAZIA (cauda equina syndrome).  She presents with the following impairments/functional limitations:   (weakness; impaired endurance; impaired self care skills; decreased lower extremity function; decreased ROM; orthopedic precautions; gait instability; impaired functional mobility; impaired balance; pain; decreased safety awareness) .Pt Progressing with PT Intervention. Pt Progressing with improving gait distance. Pt would continue to benefit from skilled PT to address overall functional mobility, goals , and to return to functional baseline.  Goals remain appropriate    Rehab Prognosis: Good; patient would benefit from acute skilled PT services to address these deficits and reach maximum level of function.    Recent Surgery: Procedure(s) (LRB):  LAMINECTOMY, SPINE, LUMBAR (N/A) 4 Days Post-Op    Plan:     During this hospitalization, patient to be seen 4 x/week to address the identified rehab impairments via gait training, therapeutic activities, therapeutic exercises, neuromuscular re-education and progress toward the following goals:    Plan of Care Expires:  12/12/22    Subjective     Patient/Family Comments/goals: I need to use the bathroom  Pain/Comfort:  Pain Rating 1: 5/10  Location - Side 1: Bilateral  Location - Orientation 1: generalized  Location 1: lumbar spine  Pain Addressed 1: Pre-medicate for activity, Reposition, Distraction      Objective:     Communicated with RN prior to session.  Patient found HOB elevated with PureWick upon PT entry to room.     General Precautions: Standard, fall   Orthopedic  Precautions:spinal precautions   Braces:  (PRAFO. AFO in room, pt without shoes present)  Respiratory Status: Room air     Functional Mobility:  Bed Mobility:     Scooting: stand by assistance  Supine to Sit: CGA  Transfers:     Sit to Stand: contact guard assistance from elevated bed and minimum assistance from low toilet both with rolling walker  Toilet Transfer: minimum assistance with rolling walker and grab bars using Step Transfer during bout of ambulation  Gait: pt ambulated 120 feet in RW with CGA  Pt demonstrated reduced step length and gait velocity, but no instability or LOB noted; She continues to have drop foot on LLE, but is able to achieve foot clearance during swing phase with compensation at hip      AM-PAC 6 CLICK MOBILITY  Turning over in bed (including adjusting bedclothes, sheets and blankets)?: 3  Sitting down on and standing up from a chair with arms (e.g., wheelchair, bedside commode, etc.): 3  Moving from lying on back to sitting on the side of the bed?: 3  Moving to and from a bed to a chair (including a wheelchair)?: 3  Need to walk in hospital room?: 3  Climbing 3-5 steps with a railing?: 2  Basic Mobility Total Score: 17       Treatment & Education:  Therapist provided instruction and educated of  patient on progress, safety,d/c,PT POC,   proper body mechanics, energy conservation, and fall prevention strategies during tasks listed above, on the effects of prolonged immobility and the importance of performing OOB activity and exercises to promote healing and reduce recovery time      Patient  facilitated therex  seated in bedside chair  AP, LAQ, Hip Flexion, Hip Abd/Add. Patient required skilled PTA for instruction of exercises and appropriate cues to perform exercises safely, sequencing and appropriately.   Exercises performed to develop and maintain pt's strength, endurance and flexibility.  Updated white board with appropriate PT mobility information for medical team notification      Donned an extra gown     Call nursing/pct to transfer to chair/use bathroom. Pt stated understanding      Bedside table in front of patient and area set up for function, convenience, and safety. RN aware of patient's mobility needs and status. Questions/concerns addressed within PTA scope of practice; patient  with no further questions. Time was provided for active listening, discussion of health disposition, and discussion of safe discharge. Pt?verbalized?agreement .    Patient left up in chair with all lines intact, call button in reach, and nsg notified..    GOALS:   Multidisciplinary Problems       Physical Therapy Goals          Problem: Physical Therapy    Goal Priority Disciplines Outcome Goal Variances Interventions   Physical Therapy Goal     PT, PT/OT Ongoing, Progressing     Description: Goals to be met by: 22     Patient will increase functional independence with mobility by performin. Sit to stand transfer with Contact Guard Assistance  2. Bed to chair transfer with Supervision using Rolling Walker  3. Gait  x 150 feet with Supervision using Rolling Walker.   4. Ascend/Descend 6 inch curb step with Contact Guard Assistance using Rolling Walker.                         Time Tracking:     PT Received On: 11/15/22  PT Start Time: 1351     PT Stop Time: 1415  PT Total Time (min): 24 min     Billable Minutes: Gait Training 14 and Therapeutic Activity 10    Treatment Type: Treatment  PT/PTA: PTA     PTA Visit Number: 2     11/15/2022

## 2022-11-15 NOTE — HPI
Marianela Shrestha is a 37-year-old female with PMHx of Drug abuse (no further use), PTSD, Anxiety. Patient presented to [facility] on 11/11/22 for SHAZIA. days history of progressive LLE weakness and inability to feel her bladder getting full. 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 S/p L4-S1 Lami  11/11/22. Ortho recommending PODAS boot for LLE foot drop and AFO LLE when ambulating. WBAT spine precautions.    Functional History: Patient lives with mom, brother, and fiance in a single story home with 1 step to enter.  Prior to admission, I. DME: none.

## 2022-11-15 NOTE — ASSESSMENT & PLAN NOTE
Marianela Shrestha is a 37 y.o. female with SHAZIA SP L4-S1 Laminectomy 11.11.22    -Admitted to orthopedic service  -PODAS boot for LLE foot drop, AFO LLE when ambulating  -PT/OT: WBAT spine precautions  -DVT PPx: Heparin TID  -Abx: None  -Labs: None  -Addison: None  -Drains: dc'ed POD3    DIspo: pending IPR placement, medically ready

## 2022-11-15 NOTE — CONSULTS
[facility]  Physical Medicine & Rehab  Consult Note    Patient Name: Marianela Shrestha  MRN: [id number]  Admission Date: 11/11/2022  Hospital Length of Stay: 3 days  Attending Physician: Sundar Harry MD    Inpatient consult to Physical Medicine & Rehabilitation  Consult performed by: Serena Nelson NP  Consult requested by:  Sundar Harry MD    Collaborating Physician: Janny Ellis MD  Reason for Consult:  Assess rehabilitation needs     Consults  Subjective:     Principal Problem: SHAZIA (cauda equina syndrome)    HPI: Marianela Shrestha is a 37-year-old female with PMHx of Drug abuse (no further use), PTSD, Anxiety. Patient presented to [facility] on 11/11/22 for SHAZIA. days history of progressive LLE weakness and inability to feel her bladder getting full. 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 S/p L4-S1 Lami  11/11/22. Ortho recommending PODAS boot for LLE foot drop and AFO LLE when ambulating. WBAT spine precautions.    Functional History: Patient lives with mom, brother, and fiance in a single story home with 1 step to enter.  Prior to admission, I. DME: none.       Hospital Course: 11/12/22: Participated w/ PT. Ambulated 70 ft CGA w/ RW. decreased gait speed; initially ambulating with step-to pattern but able to progress to step-through; decreased L foot clearance due to muscle weakness (pt is supposed to be given AFO to address foot drop)         Past Medical History:   Diagnosis Date    Anxiety     Chronic osteomyelitis of right hand 2019    Chronic pain syndrome     Depression     Insomnia     Post traumatic stress disorder (PTSD)     Sciatica      Past Surgical History:   Procedure Laterality Date    ABLATION OF MEDIAL BRANCH NERVE OF LUMBAR SPINE FACET JOINT      L4-S1    COSMETIC SURGERY      breast augmentation    ETHMOIDECTOMY  7/1/2022    Procedure: ETHMOIDECTOMY;  Surgeon: Segundo Fu MD;  Location: [facility] OR [address] [place] [address];   Service: ENT;;    FINGER SURGERY      FUNCTIONAL ENDOSCOPIC SINUS SURGERY (FESS) USING COMPUTER-ASSISTED NAVIGATION Bilateral 7/1/2022    Procedure: FESS, USING COMPUTER-ASSISTED NAVIGATION;  Surgeon: Segundo Fu MD;  [facility];  Service: ENT;  Laterality: Bilateral;    LUMBAR LAMINECTOMY N/A 11/11/2022    Procedure: LAMINECTOMY, SPINE, LUMBAR;  Surgeon: Sundar Harry MD;  Location: [facility] OR [facility] FLR;  Service: Orthopedics;  Laterality: N/A;  L4-S1 laminectomy, SNS: SSEP/MOtors    MAXILLARY ANTROSTOMY  7/1/2022    Procedure: MAXILLARY ANTROSTOMY;  Surgeon: Segundo Fu MD;  Location: [facility] OR [facility] FLR;  Service: ENT;;    PELVIC LAPAROSCOPY      Endometriosis ([place])    TONSILLECTOMY       Review of patient's allergies indicates:   Allergen Reactions    Coconut Hives and Itching    Sulfa (sulfonamide antibiotics) Hives     itching    Adhesive Rash    Latex, natural rubber Other (See Comments) and Rash       Scheduled Medications:    acetaminophen  650 mg Oral Q6H    aluminum-magnesium hydroxide-simethicone  30 mL Oral QID (AC & HS)    celecoxib  200 mg Oral Daily    doxepin  50 mg Oral QHS    fluticasone furoate-vilanteroL  1 puff Inhalation Daily    gabapentin  600 mg Oral TID    heparin (porcine)  5,000 Units Subcutaneous Q8H    methocarbamoL  750 mg Oral QID    pantoprazole  40 mg Oral Daily    sucralfate  1 g Oral Q6H    venlafaxine  300 mg Oral Daily       PRN Medications: (Magic mouthwash) 1:1:1 diphenhydrAMINE(Benadryl) 12.5mg/5ml liq, aluminum & magnesium hydroxide-simethicone (Maalox), LIDOcaine viscous 2%, acetaminophen, albuterol, albuterol-ipratropium, ALPRAZolam, HYDROmorphone, LIDOcaine (PF) 10 mg/ml (1%), melatonin, ondansetron, oxyCODONE, oxyCODONE, sodium chloride 0.9%    Family History       Problem Relation (Age of Onset)    Cervical cancer Maternal Aunt    Colon cancer Maternal Aunt    Diabetes Maternal Grandmother, Mother, Maternal Aunt     Hypertension Mother          Tobacco Use    Smoking status: Some Days     Packs/day: 0.25     Years: 15.00     Pack years: 3.75     Types: Cigarettes     Start date: 2017     Last attempt to quit: 2021     Years since quittin.9    Smokeless tobacco: Never    Tobacco comments:     occasional 3 cig month, started 15 quit 18-19, started age 22    Substance and Sexual Activity    Alcohol use: Not Currently    Drug use: Never    Sexual activity: Yes     Partners: Male     Birth control/protection: None     Review of Systems   Constitutional:  Positive for activity change. Negative for fatigue and fever.   HENT:  Negative for sore throat and trouble swallowing.    Eyes:  Negative for visual disturbance.   Respiratory:  Negative for cough and shortness of breath.    Cardiovascular:  Negative for chest pain and leg swelling.   Gastrointestinal:  Negative for abdominal distention and abdominal pain.   Genitourinary:  Negative for difficulty urinating.   Musculoskeletal:  Positive for gait problem. Negative for back pain.   Skin:  Negative for color change.   Neurological:  Positive for weakness. Negative for dizziness, light-headedness and headaches.   Psychiatric/Behavioral:  Negative for agitation and confusion.    Objective:     Vital Signs (Most Recent):  Temp: 98.5 °F (36.9 °C) (22 1113)  Pulse: 82 (22 1113)  Resp: 12 (22 1744)  BP: 135/80 (22 1113)  SpO2: 98 % (22 1113)    Vital Signs (24h Range):  Temp:  [96.7 °F (35.9 °C)-98.5 °F (36.9 °C)] 98.5 °F (36.9 °C)  Pulse:  [78-90] 82  Resp:  [12-18] 12  SpO2:  [95 %-100 %] 98 %  BP: (129-136)/(73-86) 135/80     Body mass index is 38.26 kg/m².    Physical Exam  Vitals and nursing note reviewed.   Constitutional:       Appearance: Normal appearance. She is well-developed.   HENT:      Head: Normocephalic.      Nose: Nose normal.   Eyes:      Pupils: Pupils are equal, round, and reactive to light.   Pulmonary:      Effort:  Pulmonary effort is normal. No respiratory distress.   Abdominal:      General: Bowel sounds are normal.      Palpations: Abdomen is soft.   Musculoskeletal:      Cervical back: Normal range of motion.      Comments: Foot drop present to LLE   Skin:     General: Skin is warm and dry.   Neurological:      Mental Status: She is alert and oriented to person, place, and time. Mental status is at baseline.      Motor: Weakness present.      Gait: Gait abnormal.   Psychiatric:         Mood and Affect: Mood normal.     Diagnostic Results:   Labs: Reviewed  ECG: Reviewed  CT: Reviewed    Assessment/Plan:     * SHAZIA (cauda equina syndrome)  - S/p L4-S1 Lami  11/11/22. Ortho recommending PODAS boot for LLE foot drop and AFO LLE when ambulating. WBAT spine precautions.    Impaired mobility  - Related to prolonged/acute hospital course.     Recommendations  -  Encourage mobility, OOB in chair at least 3 hours per day, and early ambulation as appropriate  -  PT/OT evaluate and treat  -  Pain management  -  Monitor for and prevent skin breakdown and pressure ulcers  · Early mobility, repositioning/weight shifting every 20-30 minutes when sitting, turn patient every 2 hours, proper mattress/overlay and chair cushioning, pressure relief/heel protector boots  -  DVT prophylaxis    -  Reviewed discharge options (IP rehab, SNF, HH therapy, and OP therapy)    PM&R Recommendation:     At this time, the PM&R team has reviewed this patient's ongoing medical case including inpatient diagnosis, medical history, clinical examination, labs, vitals, current social and functional history to provide the post-acute recommendation as follows:     RECOMMENDATIONS: inpatient rehabilitation due to good motivation/participation with therapies, has been determined to tolerate 3 hours of therapy and good potential for recovery.     The patient will be admitted for comprehensive interdisciplinary inpatient rehabilitation to address the impairments due to  medical diagnosis of SHAZIA. S/p L4-S1 Lami 11/11/22. The patient will benefit from an inpatient rehabilitation program to promote functional recovery, implement compensatory strategies and will undergo assessment for needs for durable medical equipment for safe discharge to the community. This patient will benefit from a coordinated interdisciplinary rehabilitation program services that require close monitoring and treatment with 24-hour rehabilitative nursing and physical/occupational therapies for 3 hours/day for 5 days/week.This interdisciplinary program will be performed under the direction of a physiatrist.    MEDICAL STABILITY:     At this time, barriers for post-acute rehab admission include pain control on PO pain medication.    We will continue to follow.    Thank you for your consult.     Serena Nelson NP  Department of Physical Medicine & Rehab  [facility] - Surgery

## 2022-11-16 ENCOUNTER — TELEPHONE (OUTPATIENT)
Dept: ORTHOPEDICS | Facility: CLINIC | Age: 37
End: 2022-11-16
Payer: MEDICARE

## 2022-11-16 PROCEDURE — 97116 GAIT TRAINING THERAPY: CPT | Mod: CQ

## 2022-11-16 PROCEDURE — 11000001 HC ACUTE MED/SURG PRIVATE ROOM

## 2022-11-16 PROCEDURE — 63600175 PHARM REV CODE 636 W HCPCS: Performed by: STUDENT IN AN ORGANIZED HEALTH CARE EDUCATION/TRAINING PROGRAM

## 2022-11-16 PROCEDURE — 94761 N-INVAS EAR/PLS OXIMETRY MLT: CPT

## 2022-11-16 PROCEDURE — 25000003 PHARM REV CODE 250: Performed by: STUDENT IN AN ORGANIZED HEALTH CARE EDUCATION/TRAINING PROGRAM

## 2022-11-16 PROCEDURE — 94640 AIRWAY INHALATION TREATMENT: CPT

## 2022-11-16 PROCEDURE — 97530 THERAPEUTIC ACTIVITIES: CPT | Mod: CQ

## 2022-11-16 PROCEDURE — 25000003 PHARM REV CODE 250

## 2022-11-16 RX ORDER — POLYETHYLENE GLYCOL 3350 17 G/17G
17 POWDER, FOR SOLUTION ORAL 2 TIMES DAILY PRN
Status: DISCONTINUED | OUTPATIENT
Start: 2022-11-16 | End: 2022-11-18 | Stop reason: HOSPADM

## 2022-11-16 RX ORDER — DOCUSATE SODIUM 100 MG/1
100 CAPSULE, LIQUID FILLED ORAL 2 TIMES DAILY
Status: DISCONTINUED | OUTPATIENT
Start: 2022-11-16 | End: 2022-11-18 | Stop reason: HOSPADM

## 2022-11-16 RX ADMIN — SUCRALFATE 1 G: 1 SUSPENSION ORAL at 05:11

## 2022-11-16 RX ADMIN — FLUTICASONE FUROATE AND VILANTEROL TRIFENATATE 1 PUFF: 100; 25 POWDER RESPIRATORY (INHALATION) at 10:11

## 2022-11-16 RX ADMIN — SUCRALFATE 1 G: 1 SUSPENSION ORAL at 11:11

## 2022-11-16 RX ADMIN — ALUMINUM HYDROXIDE, MAGNESIUM HYDROXIDE, AND DIMETHICONE 30 ML: 200; 20; 200 SUSPENSION ORAL at 05:11

## 2022-11-16 RX ADMIN — GABAPENTIN 600 MG: 300 CAPSULE ORAL at 03:11

## 2022-11-16 RX ADMIN — ALPRAZOLAM 1 MG: 0.5 TABLET ORAL at 09:11

## 2022-11-16 RX ADMIN — HEPARIN SODIUM 5000 UNITS: 5000 INJECTION INTRAVENOUS; SUBCUTANEOUS at 01:11

## 2022-11-16 RX ADMIN — HEPARIN SODIUM 5000 UNITS: 5000 INJECTION INTRAVENOUS; SUBCUTANEOUS at 09:11

## 2022-11-16 RX ADMIN — PANTOPRAZOLE SODIUM 40 MG: 40 TABLET, DELAYED RELEASE ORAL at 09:11

## 2022-11-16 RX ADMIN — SUCRALFATE 1 G: 1 SUSPENSION ORAL at 12:11

## 2022-11-16 RX ADMIN — ACETAMINOPHEN 650 MG: 325 TABLET ORAL at 06:11

## 2022-11-16 RX ADMIN — ACETAMINOPHEN 650 MG: 325 TABLET ORAL at 05:11

## 2022-11-16 RX ADMIN — METHOCARBAMOL 750 MG: 750 TABLET ORAL at 01:11

## 2022-11-16 RX ADMIN — DOXEPIN HYDROCHLORIDE 50 MG: 50 CAPSULE ORAL at 09:11

## 2022-11-16 RX ADMIN — GABAPENTIN 600 MG: 300 CAPSULE ORAL at 09:11

## 2022-11-16 RX ADMIN — METHOCARBAMOL 750 MG: 750 TABLET ORAL at 05:11

## 2022-11-16 RX ADMIN — ACETAMINOPHEN 650 MG: 325 TABLET ORAL at 12:11

## 2022-11-16 RX ADMIN — SUCRALFATE 1 G: 1 SUSPENSION ORAL at 06:11

## 2022-11-16 RX ADMIN — METHOCARBAMOL 750 MG: 750 TABLET ORAL at 09:11

## 2022-11-16 RX ADMIN — ALUMINUM HYDROXIDE, MAGNESIUM HYDROXIDE, AND DIMETHICONE 30 ML: 200; 20; 200 SUSPENSION ORAL at 11:11

## 2022-11-16 RX ADMIN — ALUMINUM HYDROXIDE, MAGNESIUM HYDROXIDE, AND DIMETHICONE 30 ML: 200; 20; 200 SUSPENSION ORAL at 06:11

## 2022-11-16 RX ADMIN — CELECOXIB 200 MG: 200 CAPSULE ORAL at 09:11

## 2022-11-16 RX ADMIN — ACETAMINOPHEN 650 MG: 325 TABLET ORAL at 01:11

## 2022-11-16 RX ADMIN — OXYCODONE HYDROCHLORIDE 10 MG: 10 TABLET ORAL at 06:11

## 2022-11-16 RX ADMIN — DOCUSATE SODIUM 100 MG: 100 CAPSULE ORAL at 09:11

## 2022-11-16 RX ADMIN — HEPARIN SODIUM 5000 UNITS: 5000 INJECTION INTRAVENOUS; SUBCUTANEOUS at 06:11

## 2022-11-16 RX ADMIN — OXYCODONE HYDROCHLORIDE 10 MG: 10 TABLET ORAL at 09:11

## 2022-11-16 RX ADMIN — VENLAFAXINE HYDROCHLORIDE 300 MG: 75 CAPSULE, EXTENDED RELEASE ORAL at 09:11

## 2022-11-16 RX ADMIN — OXYCODONE HYDROCHLORIDE 10 MG: 10 TABLET ORAL at 11:11

## 2022-11-16 RX ADMIN — ALUMINUM HYDROXIDE, MAGNESIUM HYDROXIDE, AND DIMETHICONE 30 ML: 200; 20; 200 SUSPENSION ORAL at 09:11

## 2022-11-16 RX ADMIN — OXYCODONE HYDROCHLORIDE 10 MG: 10 TABLET ORAL at 03:11

## 2022-11-16 RX ADMIN — OXYCODONE HYDROCHLORIDE 10 MG: 10 TABLET ORAL at 01:11

## 2022-11-16 NOTE — ASSESSMENT & PLAN NOTE
Marianela Shrestha is a 37 y.o. female with SHAZIA SP L4-S1 Laminectomy 11.11.22    -Admitted to orthopedic service  -PODAS boot for LLE foot drop, AFO LLE when ambulating  -PT/OT: WBAT spine precautions  -DVT PPx: Heparin TID  -Abx: None  -Labs: None  -Addison: None  -Drains: dc'ed POD3    DIspo: pending IPR (pending authorization), medically ready

## 2022-11-16 NOTE — SUBJECTIVE & OBJECTIVE
Principal Problem:SHAZIA (cauda equina syndrome)    Principal Orthopedic Problem: sp L4, L5, S1 laminectomy 11/11/22    Interval History: Patient seen and examined at bedside.  NAEON. VS wnl. No new labs. Pain control improving. Intermittently wearing PODUS boot. 120ft with PT yesterday. Pending DC to IPR, pending auth from insurance    Review of patient's allergies indicates:   Allergen Reactions    Coconut Hives and Itching    Sulfa (sulfonamide antibiotics) Hives     itching    Adhesive Rash    Latex, natural rubber Other (See Comments) and Rash       Current Facility-Administered Medications   Medication    (Magic mouthwash) 1:1:1 diphenhydrAMINE(Benadryl) 12.5mg/5ml liq, aluminum & magnesium hydroxide-simethicone (Maalox), LIDOcaine viscous 2%    0.9%  NaCl infusion    acetaminophen tablet 650 mg    acetaminophen tablet 650 mg    albuterol inhaler 2 puff    albuterol-ipratropium 2.5 mg-0.5 mg/3 mL nebulizer solution 3 mL    ALPRAZolam tablet 1 mg    aluminum-magnesium hydroxide-simethicone 200-200-20 mg/5 mL suspension 30 mL    celecoxib capsule 200 mg    doxepin capsule 50 mg    fluticasone furoate-vilanteroL 100-25 mcg/dose diskus inhaler 1 puff    gabapentin capsule 600 mg    heparin (porcine) injection 5,000 Units    LIDOcaine (PF) 10 mg/ml (1%) injection 10 mg    melatonin tablet 6 mg    methocarbamoL tablet 750 mg    ondansetron disintegrating tablet 8 mg    oxyCODONE immediate release tablet 5 mg    oxyCODONE immediate release tablet Tab 10 mg    pantoprazole EC tablet 40 mg    sodium chloride 0.9% flush 10 mL    sucralfate 100 mg/mL suspension 1 g    venlafaxine 24 hr capsule 300 mg     Objective:     Vital Signs (Most Recent):  Temp: 98.2 °F (36.8 °C) (11/16/22 0830)  Pulse: 89 (11/16/22 0830)  Resp: 18 (11/16/22 0830)  BP: 120/73 (11/16/22 0830)  SpO2: 98 % (11/16/22 0830)   Vital Signs (24h Range):  Temp:  [96.2 °F (35.7 °C)-98.2 °F (36.8 °C)] 98.2 °F (36.8 °C)  Pulse:  [79-96] 89  Resp:  [14-18]  "18  SpO2:  [93 %-100 %] 98 %  BP: (107-136)/(56-87) 120/73     Weight: 104.3 kg (229 lb 15 oz)  Height: 5' 5" (165.1 cm)  Body mass index is 38.26 kg/m².      Intake/Output Summary (Last 24 hours) at 11/16/2022 0858  Last data filed at 11/15/2022 1612  Gross per 24 hour   Intake 960 ml   Output 800 ml   Net 160 ml         Ortho/SPM Exam  AAOx4  NAD  Reg rate  No increased WOB    SPINE  Dressing c/d/i    Iliopsoas (L2,3)                           5/5  Quadriceps  (L3,4)                  5/5   Tibialis Anterior (L4.5)                3/5                  Extensor Halucis Longus (L5)    0/5       Gastrocnemius/Soleus (S1)       2/5                  Flexor Hallucis Longus(S2)        0/5                       Sensation (a=absent, i=impaired, n=normal)  L2-S2 remains diminished, but improved from prior to OR    PODAS boot is bedside but not in place     Significant Labs: CBC:   No results for input(s): WBC, HGB, HCT, PLT in the last 48 hours.    CMP:   No results for input(s): NA, K, CL, CO2, GLU, BUN, CREATININE, CALCIUM, PROT, ALBUMIN, BILITOT, ALKPHOS, AST, ALT, ANIONGAP, EGFRNONAA in the last 48 hours.    Invalid input(s): ESTGFAFR[place]    All pertinent labs within the past 24 hours have been reviewed.    Significant Imaging: None  "

## 2022-11-16 NOTE — TELEPHONE ENCOUNTER
----- Message from Sundar Harry MD sent at 11/15/2022  4:04 PM CST -----  Regardinwk PO w/o XR  4 wk (instead of 3wk) PO with buck w/o XR

## 2022-11-16 NOTE — PT/OT/SLP PROGRESS
Physical Therapy Treatment    Patient Name:  Marianela Shrestha   MRN:  [id number]    Recommendations:     Discharge Recommendations:  rehabilitation facility   Discharge Equipment Recommendations: walker, rolling   Barriers to discharge:  Increased level of assistance required and fall risk    Assessment:     Marianela Shrestha is a 37 y.o. female admitted with a medical diagnosis of SHAZIA (cauda equina syndrome).  She presents with the following impairments/functional limitations:   (weakness; impaired endurance; impaired self care skills; decreased lower extremity function; decreased ROM; orthopedic precautions; gait instability; impaired functional mobility; impaired balance; pain; decreased safety awareness) Pt Progressing with PT Intervention. Pt motivated and cooperative with treatment session. Pt would continue to benefit from skilled PT to address overall functional mobility, goals , and to return to functional baseline.  Goals remain appropriate    Rehab Prognosis: Good; patient would benefit from acute skilled PT services to address these deficits and reach maximum level of function.    Recent Surgery: Procedure(s) (LRB):  LAMINECTOMY, SPINE, LUMBAR (N/A) 5 Days Post-Op    Plan:     During this hospitalization, patient to be seen 4 x/week to address the identified rehab impairments via gait training, therapeutic activities, therapeutic exercises, neuromuscular re-education and progress toward the following goals:    Plan of Care Expires:  12/12/22    Subjective       Patient/Family Comments/goals: I have my shoes, I can use the brace for my foot now  Pain/Comfort:  Pain Rating 1: 5/10  Location - Side 1: Bilateral  Location - Orientation 1: generalized  Location 1: lumbar spine  Pain Addressed 1: Pre-medicate for activity, Reposition, Distraction      Objective:     Communicated with RN prior to session.  Patient found HOB elevated with PureWick upon PT entry to room.     General Precautions: Standard,  fall   Orthopedic Precautions:spinal precautions   Braces: AFO  Respiratory Status: Room air     Functional Mobility:  Bed Mobility:     Scooting: stand by assistance  Supine to Sit: CGA  Transfers:     Sit to Stand: contact guard assistance with rolling walker  Toilet Transfer: CGA with rolling walker and grab bars using Step Transfer during bout of ambulation  Gait: pt ambulated 120 feet in RW with CGA  Pt demonstrated reduced step length and gait velocity, but no instability or LOB noted.    AM-PAC 6 CLICK MOBILITY  Turning over in bed (including adjusting bedclothes, sheets and blankets)?: 3  Sitting down on and standing up from a chair with arms (e.g., wheelchair, bedside commode, etc.): 3  Moving from lying on back to sitting on the side of the bed?: 3  Moving to and from a bed to a chair (including a wheelchair)?: 3  Need to walk in hospital room?: 3  Climbing 3-5 steps with a railing?: 2  Basic Mobility Total Score: 17       Treatment & Education:  Therapist provided instruction and educated of  patient on progress, safety,d/c,PT POC,   proper body mechanics, energy conservation, and fall prevention strategies during tasks listed above, on the effects of prolonged immobility and the importance of performing OOB activity and exercises to promote healing and reduce recovery time        Patient  facilitated therex  seated in bedside chair  AP, LAQ, Hip Flexion, Hip Abd/Add. Patient required skilled PTA for instruction of exercises and appropriate cues to perform exercises safely, sequencing and appropriately.   Exercises performed to develop and maintain pt's strength, endurance and flexibility.  Updated white board with appropriate PT mobility information for medical team notification      Donned an extra gown , shoes and AFO     Call nursing/pct to transfer to chair/use bathroom. Pt stated understanding        Bedside table in front of patient and area set up for function, convenience, and safety. RN aware of  patient's mobility needs and status. Questions/concerns addressed within PTA scope of practice; patient  with no further questions. Time was provided for active listening, discussion of health disposition, and discussion of safe discharge. Pt?verbalized?agreement .     Patient left up in chair with all lines intact, call button in reach, and nsg notified    GOALS:   Multidisciplinary Problems       Physical Therapy Goals          Problem: Physical Therapy    Goal Priority Disciplines Outcome Goal Variances Interventions   Physical Therapy Goal     PT, PT/OT Ongoing, Progressing     Description: Goals to be met by: 22     Patient will increase functional independence with mobility by performin. Sit to stand transfer with Contact Guard Assistance  2. Bed to chair transfer with Supervision using Rolling Walker  3. Gait  x 150 feet with Supervision using Rolling Walker.   4. Ascend/Descend 6 inch curb step with Contact Guard Assistance using Rolling Walker.                         Time Tracking:     PT Received On: 22  PT Start Time: 831     PT Stop Time: 858  PT Total Time (min): 27 min     Billable Minutes: Gait Training 15 and Therapeutic Activity 12    Treatment Type: Treatment  PT/PTA: PTA     PTA Visit Number: 3     2022

## 2022-11-16 NOTE — PLAN OF CARE
Spoke with Niki from -SNF. Liaison reports facility no longer able to accept patient as they are unable to meet her needs. Blast referrals sent to area SNF's for acceptance. Will continue to follow.

## 2022-11-16 NOTE — PLAN OF CARE
SSC met with patient/family at bedside. Patient experience rounding completed and reviewed the following.     Do you know your discharge plan? Yes     If yes, what is the plan? SNF    Have you discussed your needs and preferences with your SW/CM? Yes     Assigned SW/CM notified of any patient/family needs or concerns.

## 2022-11-16 NOTE — PLAN OF CARE
11/16/22 0931   Post-Acute Status   Discharge Delays (!) Payor Issues   Discharge Plan   Discharge Plan A Skilled Nursing Facility   Discharge Plan B Skilled Nursing Facility         [facility] auth submitted on 11/14 @ noon. Jose denial received this am. [facility] to submit for auth this am to facilitate d/c today. Will continue to follow.

## 2022-11-16 NOTE — PLAN OF CARE
Received a call from Ana María at [facility] offering a Peer-Peer @ [phone] till Monday at 9am. Medical director does not believe patient meets criteria for Inpatient rehab. Spoke with Dr. Villanueva who reports team will not complete a peer to peer and CM to pursue SNF placement for patient. [facility] has accepted patient and has an available bed for d/c today. Liaison submitting for auth at this time. Call placed to [facility] Nurse reviewer January @ [phone] Message left to discuss d/c plan. Will continue to follow for needs.

## 2022-11-16 NOTE — PROGRESS NOTES
[facility] - [facility]  Orthopedics  Progress Note    Patient Name: Marianela Shrestha  MRN: [id number]  Admission Date: 11/11/2022  Hospital Length of Stay: 5 days  Attending Provider: Sundar Harry MD  Primary Care Provider: Arnaldo Espino MD  Follow-up For: Procedure(s) (LRB):  LAMINECTOMY, SPINE, LUMBAR (N/A)    Post-Operative Day: 5 Days Post-Op  Subjective:     Principal Problem:SHAZIA (cauda equina syndrome)    Principal Orthopedic Problem: sp L4, L5, S1 laminectomy 11/11/22    Interval History: Patient seen and examined at bedside.  NAEON. VS wnl. No new labs. Pain control improving. Intermittently wearing PODUS boot. 120ft with PT yesterday. Pending DC to IPR, pending auth from insurance    Review of patient's allergies indicates:   Allergen Reactions    Coconut Hives and Itching    Sulfa (sulfonamide antibiotics) Hives     itching    Adhesive Rash    Latex, natural rubber Other (See Comments) and Rash       Current Facility-Administered Medications   Medication    (Magic mouthwash) 1:1:1 diphenhydrAMINE(Benadryl) 12.5mg/5ml liq, aluminum & magnesium hydroxide-simethicone (Maalox), LIDOcaine viscous 2%    0.9%  NaCl infusion    acetaminophen tablet 650 mg    acetaminophen tablet 650 mg    albuterol inhaler 2 puff    albuterol-ipratropium 2.5 mg-0.5 mg/3 mL nebulizer solution 3 mL    ALPRAZolam tablet 1 mg    aluminum-magnesium hydroxide-simethicone 200-200-20 mg/5 mL suspension 30 mL    celecoxib capsule 200 mg    doxepin capsule 50 mg    fluticasone furoate-vilanteroL 100-25 mcg/dose diskus inhaler 1 puff    gabapentin capsule 600 mg    heparin (porcine) injection 5,000 Units    LIDOcaine (PF) 10 mg/ml (1%) injection 10 mg    melatonin tablet 6 mg    methocarbamoL tablet 750 mg    ondansetron disintegrating tablet 8 mg    oxyCODONE immediate release tablet 5 mg    oxyCODONE immediate release tablet Tab 10 mg    pantoprazole EC tablet 40 mg    sodium chloride 0.9% flush 10 mL    sucralfate  "100 mg/mL suspension 1 g    venlafaxine 24 hr capsule 300 mg     Objective:     Vital Signs (Most Recent):  Temp: 98.2 °F (36.8 °C) (11/16/22 0830)  Pulse: 89 (11/16/22 0830)  Resp: 18 (11/16/22 0830)  BP: 120/73 (11/16/22 0830)  SpO2: 98 % (11/16/22 0830)   Vital Signs (24h Range):  Temp:  [96.2 °F (35.7 °C)-98.2 °F (36.8 °C)] 98.2 °F (36.8 °C)  Pulse:  [79-96] 89  Resp:  [14-18] 18  SpO2:  [93 %-100 %] 98 %  BP: (107-136)/(56-87) 120/73     Weight: 104.3 kg (229 lb 15 oz)  Height: 5' 5" (165.1 cm)  Body mass index is 38.26 kg/m².      Intake/Output Summary (Last 24 hours) at 11/16/2022 0858  Last data filed at 11/15/2022 1612  Gross per 24 hour   Intake 960 ml   Output 800 ml   Net 160 ml         Ortho/SPM Exam  AAOx4  NAD  Reg rate  No increased WOB    SPINE  Dressing c/d/i    Iliopsoas (L2,3)                           5/5  Quadriceps  (L3,4)                  5/5   Tibialis Anterior (L4.5)                3/5                  Extensor Halucis Longus (L5)    0/5       Gastrocnemius/Soleus (S1)       2/5                  Flexor Hallucis Longus(S2)        0/5                       Sensation (a=absent, i=impaired, n=normal)  L2-S2 remains diminished, but improved from prior to OR    PODAS boot is bedside but not in place     Significant Labs: CBC:   No results for input(s): WBC, HGB, HCT, PLT in the last 48 hours.    CMP:   No results for input(s): NA, K, CL, CO2, GLU, BUN, CREATININE, CALCIUM, PROT, ALBUMIN, BILITOT, ALKPHOS, AST, ALT, ANIONGAP, EGFRNONAA in the last 48 hours.    Invalid input(s): ESTGF[place]FR[place]    All pertinent labs within the past 24 hours have been reviewed.    Significant Imaging: None    Assessment/Plan:     * SHAZIA (cauda equina syndrome)  Marianela Shrestha is a 37 y.o. female with SHAZIA SP L4-S1 Laminectomy 11.11.22    -Admitted to orthopedic service  -PODAS boot for LLE foot drop, AFO LLE when ambulating  -PT/OT: WBAT spine precautions  -DVT PPx: Heparin TID  -Abx: None  -Labs: None  -Addison: " None  -Drains: dc'ed POD3    DIspo: pending IPR (pending authorization), medically ready                  Radha Villanueva MD  Orthopedics  [facility] - Surgery

## 2022-11-17 PROCEDURE — 63600175 PHARM REV CODE 636 W HCPCS: Performed by: STUDENT IN AN ORGANIZED HEALTH CARE EDUCATION/TRAINING PROGRAM

## 2022-11-17 PROCEDURE — 25000003 PHARM REV CODE 250: Performed by: STUDENT IN AN ORGANIZED HEALTH CARE EDUCATION/TRAINING PROGRAM

## 2022-11-17 PROCEDURE — 97535 SELF CARE MNGMENT TRAINING: CPT | Mod: CO

## 2022-11-17 PROCEDURE — 11000001 HC ACUTE MED/SURG PRIVATE ROOM

## 2022-11-17 PROCEDURE — 97530 THERAPEUTIC ACTIVITIES: CPT | Mod: CO

## 2022-11-17 PROCEDURE — 25000003 PHARM REV CODE 250

## 2022-11-17 RX ADMIN — OXYCODONE HYDROCHLORIDE 10 MG: 10 TABLET ORAL at 02:11

## 2022-11-17 RX ADMIN — ALUMINUM HYDROXIDE, MAGNESIUM HYDROXIDE, AND DIMETHICONE 30 ML: 200; 20; 200 SUSPENSION ORAL at 10:11

## 2022-11-17 RX ADMIN — HEPARIN SODIUM 5000 UNITS: 5000 INJECTION INTRAVENOUS; SUBCUTANEOUS at 03:11

## 2022-11-17 RX ADMIN — METHOCARBAMOL 750 MG: 750 TABLET ORAL at 08:11

## 2022-11-17 RX ADMIN — ACETAMINOPHEN 650 MG: 325 TABLET ORAL at 11:11

## 2022-11-17 RX ADMIN — ALUMINUM HYDROXIDE, MAGNESIUM HYDROXIDE, AND DIMETHICONE 30 ML: 200; 20; 200 SUSPENSION ORAL at 04:11

## 2022-11-17 RX ADMIN — METHOCARBAMOL 750 MG: 750 TABLET ORAL at 05:11

## 2022-11-17 RX ADMIN — SUCRALFATE 1 G: 1 SUSPENSION ORAL at 12:11

## 2022-11-17 RX ADMIN — DOCUSATE SODIUM 100 MG: 100 CAPSULE ORAL at 08:11

## 2022-11-17 RX ADMIN — METHOCARBAMOL 750 MG: 750 TABLET ORAL at 01:11

## 2022-11-17 RX ADMIN — ALPRAZOLAM 1 MG: 0.5 TABLET ORAL at 11:11

## 2022-11-17 RX ADMIN — ONDANSETRON 8 MG: 8 TABLET, ORALLY DISINTEGRATING ORAL at 02:11

## 2022-11-17 RX ADMIN — VENLAFAXINE HYDROCHLORIDE 300 MG: 75 CAPSULE, EXTENDED RELEASE ORAL at 08:11

## 2022-11-17 RX ADMIN — ALPRAZOLAM 1 MG: 0.5 TABLET ORAL at 10:11

## 2022-11-17 RX ADMIN — GABAPENTIN 600 MG: 300 CAPSULE ORAL at 08:11

## 2022-11-17 RX ADMIN — FLUTICASONE FUROATE AND VILANTEROL TRIFENATATE 1 PUFF: 100; 25 POWDER RESPIRATORY (INHALATION) at 10:11

## 2022-11-17 RX ADMIN — OXYCODONE HYDROCHLORIDE 10 MG: 10 TABLET ORAL at 03:11

## 2022-11-17 RX ADMIN — ALUMINUM HYDROXIDE, MAGNESIUM HYDROXIDE, AND DIMETHICONE 30 ML: 200; 20; 200 SUSPENSION ORAL at 05:11

## 2022-11-17 RX ADMIN — ALUMINUM HYDROXIDE, MAGNESIUM HYDROXIDE, AND DIMETHICONE 30 ML: 200; 20; 200 SUSPENSION ORAL at 08:11

## 2022-11-17 RX ADMIN — SUCRALFATE 1 G: 1 SUSPENSION ORAL at 05:11

## 2022-11-17 RX ADMIN — SUCRALFATE 1 G: 1 SUSPENSION ORAL at 11:11

## 2022-11-17 RX ADMIN — ACETAMINOPHEN 650 MG: 325 TABLET ORAL at 10:11

## 2022-11-17 RX ADMIN — GABAPENTIN 600 MG: 300 CAPSULE ORAL at 03:11

## 2022-11-17 RX ADMIN — DOXEPIN HYDROCHLORIDE 50 MG: 50 CAPSULE ORAL at 08:11

## 2022-11-17 RX ADMIN — HEPARIN SODIUM 5000 UNITS: 5000 INJECTION INTRAVENOUS; SUBCUTANEOUS at 08:11

## 2022-11-17 RX ADMIN — CELECOXIB 200 MG: 200 CAPSULE ORAL at 08:11

## 2022-11-17 RX ADMIN — PANTOPRAZOLE SODIUM 40 MG: 40 TABLET, DELAYED RELEASE ORAL at 08:11

## 2022-11-17 RX ADMIN — ACETAMINOPHEN 650 MG: 325 TABLET ORAL at 05:11

## 2022-11-17 RX ADMIN — OXYCODONE HYDROCHLORIDE 10 MG: 10 TABLET ORAL at 08:11

## 2022-11-17 RX ADMIN — ALPRAZOLAM 1 MG: 0.5 TABLET ORAL at 05:11

## 2022-11-17 RX ADMIN — OXYCODONE HYDROCHLORIDE 10 MG: 10 TABLET ORAL at 06:11

## 2022-11-17 RX ADMIN — HEPARIN SODIUM 5000 UNITS: 5000 INJECTION INTRAVENOUS; SUBCUTANEOUS at 05:11

## 2022-11-17 RX ADMIN — ACETAMINOPHEN 650 MG: 325 TABLET ORAL at 12:11

## 2022-11-17 RX ADMIN — OXYCODONE HYDROCHLORIDE 10 MG: 10 TABLET ORAL at 10:11

## 2022-11-17 NOTE — PROGRESS NOTES
[facility] - [facility]  Orthopedics  Progress Note    Patient Name: Marianela Shrestha  MRN: [id number]  Admission Date: 11/11/2022  Hospital Length of Stay: 6 days  Attending Provider: Sundar Harry MD  Primary Care Provider: Arnaldo Espino MD  Follow-up For: Procedure(s) (LRB):  LAMINECTOMY, SPINE, LUMBAR (N/A)    Post-Operative Day: 6 Days Post-Op  Subjective:     Principal Problem:SHAZIA (cauda equina syndrome)    Principal Orthopedic Problem: sp L4, L5, S1 laminectomy 11/11/22    Interval History: Patient seen and examined at bedside.  NAEON. VS wnl. No new labs. Pain well controlled. Says sensation in left foot improving. Continues to ambulate w/PT with AFO L ankle.     Review of patient's allergies indicates:   Allergen Reactions    Coconut Hives and Itching    Sulfa (sulfonamide antibiotics) Hives     itching    Adhesive Rash    Latex, natural rubber Other (See Comments) and Rash       Current Facility-Administered Medications   Medication    (Magic mouthwash) 1:1:1 diphenhydrAMINE(Benadryl) 12.5mg/5ml liq, aluminum & magnesium hydroxide-simethicone (Maalox), LIDOcaine viscous 2%    0.9%  NaCl infusion    acetaminophen tablet 650 mg    acetaminophen tablet 650 mg    albuterol inhaler 2 puff    albuterol-ipratropium 2.5 mg-0.5 mg/3 mL nebulizer solution 3 mL    ALPRAZolam tablet 1 mg    aluminum-magnesium hydroxide-simethicone 200-200-20 mg/5 mL suspension 30 mL    celecoxib capsule 200 mg    docusate sodium capsule 100 mg    doxepin capsule 50 mg    fluticasone furoate-vilanteroL 100-25 mcg/dose diskus inhaler 1 puff    gabapentin capsule 600 mg    heparin (porcine) injection 5,000 Units    LIDOcaine (PF) 10 mg/ml (1%) injection 10 mg    melatonin tablet 6 mg    methocarbamoL tablet 750 mg    ondansetron disintegrating tablet 8 mg    oxyCODONE immediate release tablet 5 mg    oxyCODONE immediate release tablet Tab 10 mg    pantoprazole EC tablet 40 mg    polyethylene glycol packet 17 g     "sodium chloride 0.9% flush 10 mL    sucralfate 100 mg/mL suspension 1 g    venlafaxine 24 hr capsule 300 mg     Objective:     Vital Signs (Most Recent):  Temp: 97.3 °F (36.3 °C) (11/17/22 0804)  Pulse: 76 (11/17/22 0804)  Resp: 18 (11/17/22 0804)  BP: 117/73 (11/17/22 0804)  SpO2: 96 % (11/17/22 0804)   Vital Signs (24h Range):  Temp:  [97.2 °F (36.2 °C)-98.3 °F (36.8 °C)] 97.3 °F (36.3 °C)  Pulse:  [76-93] 76  Resp:  [16-18] 18  SpO2:  [95 %-99 %] 96 %  BP: (104-117)/(62-73) 117/73     Weight: 104.3 kg (229 lb 15 oz)  Height: 5' 5" (165.1 cm)  Body mass index is 38.26 kg/m².    No intake or output data in the 24 hours ending 11/17/22 0955      Ortho/SPM Exam  AAOx4  NAD  Reg rate  No increased WOB    SPINE  Dressing c/d/i    Iliopsoas (L2,3)                           5/5  Quadriceps  (L3,4)                  5/5   Tibialis Anterior (L4.5)                4/5                  Extensor Halucis Longus (L5)    0/5       Gastrocnemius/Soleus (S1)       3/5                  Flexor Hallucis Longus(S2)        0/5                       Sensation (a=absent, i=impaired, n=normal)  L2-S2 sensation improving    PODAS boot is bedside but not in place     Significant Labs: CBC:   No results for input(s): WBC, HGB, HCT, PLT in the last 48 hours.    CMP:   No results for input(s): NA, K, CL, CO2, GLU, BUN, CREATININE, CALCIUM, PROT, ALBUMIN, BILITOT, ALKPHOS, AST, ALT, ANIONGAP, EGFRNONAA in the last 48 hours.    Invalid input(s): ESTGFAFR[place]    All pertinent labs within the past 24 hours have been reviewed.    Significant Imaging: None    Assessment/Plan:     * SHAZIA (cauda equina syndrome)  Marianela Shrestha is a 37 y.o. female with SHAZIA SP L4-S1 Laminectomy 11.11.22    -Admitted to orthopedic service  -PODAS boot for LLE foot drop, AFO LLE when ambulating  -PT/OT: WBAT spine precautions  -DVT PPx: Heparin TID  -Abx: None  -Labs: None  -Addison: None  -Drains: dc'ed POD3    DIspo: pending placement, medically " ready                  Radha Villanueva MD  Orthopedics  [facility] - Surgery

## 2022-11-17 NOTE — ASSESSMENT & PLAN NOTE
Marianela Shrestha is a 37 y.o. female with SHAZIA SP L4-S1 Laminectomy 11.11.22    -Admitted to orthopedic service  -PODAS boot for LLE foot drop, AFO LLE when ambulating  -PT/OT: WBAT spine precautions  -DVT PPx: Heparin TID  -Abx: None  -Labs: None  -Addison: None  -Drains: dc'ed POD3    DIspo: pending placement, medically ready

## 2022-11-17 NOTE — SUBJECTIVE & OBJECTIVE
Principal Problem:SHAZIA (cauda equina syndrome)    Principal Orthopedic Problem: sp L4, L5, S1 laminectomy 11/11/22    Interval History: Patient seen and examined at bedside.  NAEON. VS wnl. No new labs. Pain well controlled. Says sensation in left foot improving. Continues to ambulate w/PT with AFO L ankle.     Review of patient's allergies indicates:   Allergen Reactions    Coconut Hives and Itching    Sulfa (sulfonamide antibiotics) Hives     itching    Adhesive Rash    Latex, natural rubber Other (See Comments) and Rash       Current Facility-Administered Medications   Medication    (Magic mouthwash) 1:1:1 diphenhydrAMINE(Benadryl) 12.5mg/5ml liq, aluminum & magnesium hydroxide-simethicone (Maalox), LIDOcaine viscous 2%    0.9%  NaCl infusion    acetaminophen tablet 650 mg    acetaminophen tablet 650 mg    albuterol inhaler 2 puff    albuterol-ipratropium 2.5 mg-0.5 mg/3 mL nebulizer solution 3 mL    ALPRAZolam tablet 1 mg    aluminum-magnesium hydroxide-simethicone 200-200-20 mg/5 mL suspension 30 mL    celecoxib capsule 200 mg    docusate sodium capsule 100 mg    doxepin capsule 50 mg    fluticasone furoate-vilanteroL 100-25 mcg/dose diskus inhaler 1 puff    gabapentin capsule 600 mg    heparin (porcine) injection 5,000 Units    LIDOcaine (PF) 10 mg/ml (1%) injection 10 mg    melatonin tablet 6 mg    methocarbamoL tablet 750 mg    ondansetron disintegrating tablet 8 mg    oxyCODONE immediate release tablet 5 mg    oxyCODONE immediate release tablet Tab 10 mg    pantoprazole EC tablet 40 mg    polyethylene glycol packet 17 g    sodium chloride 0.9% flush 10 mL    sucralfate 100 mg/mL suspension 1 g    venlafaxine 24 hr capsule 300 mg     Objective:     Vital Signs (Most Recent):  Temp: 97.3 °F (36.3 °C) (11/17/22 0804)  Pulse: 76 (11/17/22 0804)  Resp: 18 (11/17/22 0804)  BP: 117/73 (11/17/22 0804)  SpO2: 96 % (11/17/22 0804)   Vital Signs (24h Range):  Temp:  [97.2 °F (36.2 °C)-98.3 °F (36.8 °C)] 97.3 °F (36.3  "°C)  Pulse:  [76-93] 76  Resp:  [16-18] 18  SpO2:  [95 %-99 %] 96 %  BP: (104-117)/(62-73) 117/73     Weight: 104.3 kg (229 lb 15 oz)  Height: 5' 5" (165.1 cm)  Body mass index is 38.26 kg/m².    No intake or output data in the 24 hours ending 11/17/22 0955      Ortho/SPM Exam  AAOx4  NAD  Reg rate  No increased WOB    SPINE  Dressing c/d/i    Iliopsoas (L2,3)                           5/5  Quadriceps  (L3,4)                  5/5   Tibialis Anterior (L4.5)                4/5                  Extensor Halucis Longus (L5)    0/5       Gastrocnemius/Soleus (S1)       3/5                  Flexor Hallucis Longus(S2)        0/5                       Sensation (a=absent, i=impaired, n=normal)  L2-S2 sensation improving    PODAS boot is bedside but not in place     Significant Labs: CBC:   No results for input(s): WBC, HGB, HCT, PLT in the last 48 hours.    CMP:   No results for input(s): NA, K, CL, CO2, GLU, BUN, CREATININE, CALCIUM, PROT, ALBUMIN, BILITOT, ALKPHOS, AST, ALT, ANIONGAP, EGFRNONAA in the last 48 hours.    Invalid input(s): ESTGFAFR[place]    All pertinent labs within the past 24 hours have been reviewed.    Significant Imaging: None  "

## 2022-11-17 NOTE — PT/OT/SLP PROGRESS
"Occupational Therapy   Treatment    Name: Marianela Shrestha  MRN: [id number]  Admitting Diagnosis:  SHAZIA (cauda equina syndrome)  6 Days Post-Op  Procedure(s):  LAMINECTOMY, SPINE, LUMBAR   Recommendations:     Discharge Recommendations: rehabilitation facility  Discharge Equipment Recommendations:  walker, rolling  Barriers to discharge:   (increased A required)    Assessment:     Marianela Shrestha is a 37 y.o. female with a medical diagnosis of SHAZIA (cauda equina syndrome).  She presents with the following performance deficits affecting function are weakness, impaired endurance, impaired self care skills, impaired functional mobility, gait instability, impaired balance, decreased lower extremity function, pain, decreased safety awareness, decreased ROM, orthopedic precautions. Patient agreeable to OT session and tolerated well. Patient progressing with OT intervention. Patient would benefit from continued OT services to address deficits and progress towards goals. Continue OT POC.    Rehab Prognosis:  Good; patient would benefit from acute skilled OT services to address these deficits and reach maximum level of function.       Plan:     Patient to be seen 4 x/week to address the above listed problems via self-care/home management, therapeutic activities, therapeutic exercises, neuromuscular re-education  Plan of Care Expires: 12/12/22  Plan of Care Reviewed with: patient    Subjective   "This feels so good." (Re: grooming/hygiene tasks)  Pain/Comfort:  Pain Rating 1: 5/10  Location - Orientation 1: generalized  Location 1: lumbar spine  Pain Addressed 1: Nurse notified  Pain Rating Post-Intervention 1: 5/10    Objective:     Communicated with: RN prior to session.  Patient found HOB elevated with  (no active lines) upon OT entry to room.    General Precautions: Standard, fall   Orthopedic Precautions:spinal precautions   Braces: AFO ((L)LE)  Respiratory Status: Room air     Occupational Performance:     Bed " Mobility:    Patient completed Supine to Sit with modified independence     Functional Mobility/Transfers:  Patient completed Sit <> Stand Transfer with stand by assistance  with  rolling walker   Patient completed Bed > Chair Transfer using Step Transfer technique with contact guard assistance with rolling walker  Patient completed Toilet Transfer Step Transfer technique with contact guard assistance with  rolling walker  Functional Mobility: to/from bathroom (~12' x2) and community distances in hallways (~50' x2) using RW with CGA/Min(A)    Activities of Daily Living:  Grooming: contact guard assistance standing sink side  Lower Body Dressing: stand by assistance for donning AFO, B shoes, and maternity brief  Toileting: contact guard assistance for clothing management      [facility] 6 Click ADL: 21    Treatment & Education:  Education on OT POC, goals, and current progress  ADL re-training as indicated above  Functional transfer training as indicated above  Addressed all patient questions/concerns within MIGUEL A scope of practice.     Patient left up in chair with all lines intact, call button in reach, and RN present    GOALS:   Multidisciplinary Problems       Occupational Therapy Goals          Problem: Occupational Therapy    Goal Priority Disciplines Outcome Interventions   Occupational Therapy Goal     OT, PT/OT Ongoing, Progressing    Description: Goals to be met by: 11/26/22     Patient will increase functional independence with ADLs by performing:    UE Dressing with [place].  LE Dressing with Modified [place].  Grooming while standing at sink with Modified [place].  Toileting from toilet with Modified [place] for hygiene and clothing management.   Supine to sit with Modified [place].  Toilet transfer to toilet with Modified [place].                         Time Tracking:     OT Date of Treatment: 11/17/22  OT Start Time: 1007  OT Stop Time: 1047  OT Total Time (min): 40  min    Billable Minutes:Self Care/Home Management 25  Therapeutic Activity 15    OT/MIGUEL A: MIGUEL A     MIGUEL A Visit Number: 2    11/17/2022

## 2022-11-17 NOTE — PLAN OF CARE
Patient remains medically stable for d/c with no available SNF. [facility] has approved SNF auth once patient has an accepting facility. Will continue to follow for needs.

## 2022-11-18 ENCOUNTER — HOSPITAL ENCOUNTER (INPATIENT)
Facility: HOSPITAL | Age: 37
LOS: 28 days | Discharge: HOME HEALTH | DRG: 074 | End: 2022-12-16
Attending: HOSPITALIST | Admitting: HOSPITALIST
Payer: MEDICARE

## 2022-11-18 VITALS
BODY MASS INDEX: 38.31 KG/M2 | SYSTOLIC BLOOD PRESSURE: 101 MMHG | OXYGEN SATURATION: 98 % | TEMPERATURE: 98 F | RESPIRATION RATE: 18 BRPM | WEIGHT: 229.94 LBS | HEART RATE: 76 BPM | DIASTOLIC BLOOD PRESSURE: 57 MMHG | HEIGHT: 65 IN

## 2022-11-18 DIAGNOSIS — M48.061 LUMBAR STENOSIS: ICD-10-CM

## 2022-11-18 DIAGNOSIS — F41.1 GENERALIZED ANXIETY DISORDER: ICD-10-CM

## 2022-11-18 DIAGNOSIS — M54.32 BILATERAL SCIATICA: ICD-10-CM

## 2022-11-18 DIAGNOSIS — G89.4 CHRONIC PAIN SYNDROME: ICD-10-CM

## 2022-11-18 DIAGNOSIS — M54.31 BILATERAL SCIATICA: ICD-10-CM

## 2022-11-18 DIAGNOSIS — G83.4: Primary | ICD-10-CM

## 2022-11-18 PROCEDURE — 97530 THERAPEUTIC ACTIVITIES: CPT | Mod: CQ

## 2022-11-18 PROCEDURE — 11000004 HC SNF PRIVATE

## 2022-11-18 PROCEDURE — 25000003 PHARM REV CODE 250

## 2022-11-18 PROCEDURE — 94761 N-INVAS EAR/PLS OXIMETRY MLT: CPT

## 2022-11-18 PROCEDURE — 25000003 PHARM REV CODE 250: Performed by: STUDENT IN AN ORGANIZED HEALTH CARE EDUCATION/TRAINING PROGRAM

## 2022-11-18 PROCEDURE — 63600175 PHARM REV CODE 636 W HCPCS: Performed by: STUDENT IN AN ORGANIZED HEALTH CARE EDUCATION/TRAINING PROGRAM

## 2022-11-18 PROCEDURE — 25000003 PHARM REV CODE 250: Performed by: HOSPITALIST

## 2022-11-18 PROCEDURE — 97116 GAIT TRAINING THERAPY: CPT | Mod: CQ

## 2022-11-18 RX ORDER — ACETAMINOPHEN 325 MG/1
650 TABLET ORAL EVERY 6 HOURS PRN
Status: DISCONTINUED | OUTPATIENT
Start: 2022-11-18 | End: 2022-11-23

## 2022-11-18 RX ORDER — DOXEPIN HYDROCHLORIDE 50 MG/1
50 CAPSULE ORAL NIGHTLY
Status: DISCONTINUED | OUTPATIENT
Start: 2022-11-18 | End: 2022-12-16 | Stop reason: HOSPADM

## 2022-11-18 RX ORDER — CALCIUM CARBONATE 200(500)MG
500 TABLET,CHEWABLE ORAL 2 TIMES DAILY PRN
Status: DISCONTINUED | OUTPATIENT
Start: 2022-11-18 | End: 2022-12-16 | Stop reason: HOSPADM

## 2022-11-18 RX ORDER — OXYCODONE HYDROCHLORIDE 5 MG/1
5 TABLET ORAL EVERY 4 HOURS PRN
Status: DISCONTINUED | OUTPATIENT
Start: 2022-11-18 | End: 2022-12-16 | Stop reason: HOSPADM

## 2022-11-18 RX ORDER — AMOXICILLIN 250 MG
1 CAPSULE ORAL 2 TIMES DAILY
Status: DISCONTINUED | OUTPATIENT
Start: 2022-11-18 | End: 2022-12-03

## 2022-11-18 RX ORDER — TALC
6 POWDER (GRAM) TOPICAL NIGHTLY PRN
Status: DISCONTINUED | OUTPATIENT
Start: 2022-11-18 | End: 2022-12-16 | Stop reason: HOSPADM

## 2022-11-18 RX ADMIN — ACETAMINOPHEN 650 MG: 325 TABLET ORAL at 05:11

## 2022-11-18 RX ADMIN — ALUMINUM HYDROXIDE, MAGNESIUM HYDROXIDE, AND DIMETHICONE 30 ML: 200; 20; 200 SUSPENSION ORAL at 10:11

## 2022-11-18 RX ADMIN — DOCUSATE SODIUM 100 MG: 100 CAPSULE ORAL at 08:11

## 2022-11-18 RX ADMIN — ACETAMINOPHEN 650 MG: 325 TABLET ORAL at 09:11

## 2022-11-18 RX ADMIN — DOXEPIN HYDROCHLORIDE 50 MG: 50 CAPSULE ORAL at 10:11

## 2022-11-18 RX ADMIN — OXYCODONE HYDROCHLORIDE 10 MG: 10 TABLET ORAL at 02:11

## 2022-11-18 RX ADMIN — SUCRALFATE 1 G: 1 SUSPENSION ORAL at 06:11

## 2022-11-18 RX ADMIN — VENLAFAXINE HYDROCHLORIDE 300 MG: 75 CAPSULE, EXTENDED RELEASE ORAL at 08:11

## 2022-11-18 RX ADMIN — ALUMINUM HYDROXIDE, MAGNESIUM HYDROXIDE, AND DIMETHICONE 30 ML: 200; 20; 200 SUSPENSION ORAL at 05:11

## 2022-11-18 RX ADMIN — FLUTICASONE FUROATE AND VILANTEROL TRIFENATATE 1 PUFF: 100; 25 POWDER RESPIRATORY (INHALATION) at 08:11

## 2022-11-18 RX ADMIN — HEPARIN SODIUM 5000 UNITS: 5000 INJECTION INTRAVENOUS; SUBCUTANEOUS at 06:11

## 2022-11-18 RX ADMIN — CELECOXIB 200 MG: 200 CAPSULE ORAL at 08:11

## 2022-11-18 RX ADMIN — GABAPENTIN 600 MG: 300 CAPSULE ORAL at 08:11

## 2022-11-18 RX ADMIN — ACETAMINOPHEN 650 MG: 325 TABLET ORAL at 06:11

## 2022-11-18 RX ADMIN — ALUMINUM HYDROXIDE, MAGNESIUM HYDROXIDE, AND DIMETHICONE 30 ML: 200; 20; 200 SUSPENSION ORAL at 06:11

## 2022-11-18 RX ADMIN — ALPRAZOLAM 1 MG: 0.5 TABLET ORAL at 08:11

## 2022-11-18 RX ADMIN — OXYCODONE 5 MG: 5 TABLET ORAL at 10:11

## 2022-11-18 RX ADMIN — SUCRALFATE 1 G: 1 SUSPENSION ORAL at 11:11

## 2022-11-18 RX ADMIN — HEPARIN SODIUM 5000 UNITS: 5000 INJECTION INTRAVENOUS; SUBCUTANEOUS at 02:11

## 2022-11-18 RX ADMIN — OXYCODONE HYDROCHLORIDE 10 MG: 10 TABLET ORAL at 06:11

## 2022-11-18 RX ADMIN — SUCRALFATE 1 G: 1 SUSPENSION ORAL at 05:11

## 2022-11-18 RX ADMIN — OXYCODONE HYDROCHLORIDE 10 MG: 10 TABLET ORAL at 01:11

## 2022-11-18 RX ADMIN — OXYCODONE HYDROCHLORIDE 10 MG: 10 TABLET ORAL at 10:11

## 2022-11-18 RX ADMIN — SENNOSIDES AND DOCUSATE SODIUM 1 TABLET: 50; 8.6 TABLET ORAL at 09:11

## 2022-11-18 RX ADMIN — METHOCARBAMOL 750 MG: 750 TABLET ORAL at 05:11

## 2022-11-18 RX ADMIN — PANTOPRAZOLE SODIUM 40 MG: 40 TABLET, DELAYED RELEASE ORAL at 08:11

## 2022-11-18 RX ADMIN — METHOCARBAMOL 750 MG: 750 TABLET ORAL at 02:11

## 2022-11-18 RX ADMIN — ALPRAZOLAM 1 MG: 0.5 TABLET ORAL at 03:11

## 2022-11-18 RX ADMIN — ACETAMINOPHEN 650 MG: 325 TABLET ORAL at 11:11

## 2022-11-18 RX ADMIN — METHOCARBAMOL 750 MG: 750 TABLET ORAL at 08:11

## 2022-11-18 RX ADMIN — Medication 6 MG: at 01:11

## 2022-11-18 RX ADMIN — OXYCODONE HYDROCHLORIDE 10 MG: 10 TABLET ORAL at 05:11

## 2022-11-18 RX ADMIN — GABAPENTIN 600 MG: 300 CAPSULE ORAL at 03:11

## 2022-11-18 NOTE — PROGRESS NOTES
[facility]  Orthopedics  Progress Note    Patient Name: Marianela Shrestha  MRN: [id number]  Admission Date: 11/11/2022  Hospital Length of Stay: 7 days  Attending Provider: Sundar Harry MD  Primary Care Provider: Arnaldo Espino MD  Follow-up For: Procedure(s) (LRB):  LAMINECTOMY, SPINE, LUMBAR (N/A)    Post-Operative Day: 7 Days Post-Op  Subjective:     Principal Problem:SHAZIA (cauda equina syndrome)    Principal Orthopedic Problem: sp L4, L5, S1 laminectomy 11/11/22    Interval History: Patient seen and examined at bedside.  NAEON. VS wnl. No new labs. Pain well controlled. Says sensation in left foot improving, still cant PF/DF great toe. Notes stable altered sensation sensing bladder/bowels but does have control of bowel/bladder. Continues to ambulate w/ AFO L ankle.     Review of patient's allergies indicates:   Allergen Reactions    Coconut Hives and Itching    Sulfa (sulfonamide antibiotics) Hives     itching    Adhesive Rash    Latex, natural rubber Other (See Comments) and Rash       Current Facility-Administered Medications   Medication    (Magic mouthwash) 1:1:1 diphenhydrAMINE(Benadryl) 12.5mg/5ml liq, aluminum & magnesium hydroxide-simethicone (Maalox), LIDOcaine viscous 2%    0.9%  NaCl infusion    acetaminophen tablet 650 mg    acetaminophen tablet 650 mg    albuterol inhaler 2 puff    albuterol-ipratropium 2.5 mg-0.5 mg/3 mL nebulizer solution 3 mL    ALPRAZolam tablet 1 mg    aluminum-magnesium hydroxide-simethicone 200-200-20 mg/5 mL suspension 30 mL    celecoxib capsule 200 mg    docusate sodium capsule 100 mg    doxepin capsule 50 mg    fluticasone furoate-vilanteroL 100-25 mcg/dose diskus inhaler 1 puff    gabapentin capsule 600 mg    heparin (porcine) injection 5,000 Units    LIDOcaine (PF) 10 mg/ml (1%) injection 10 mg    melatonin tablet 6 mg    methocarbamoL tablet 750 mg    ondansetron disintegrating tablet 8 mg    oxyCODONE immediate release tablet 5 mg     "oxyCODONE immediate release tablet Tab 10 mg    pantoprazole EC tablet 40 mg    polyethylene glycol packet 17 g    sodium chloride 0.9% flush 10 mL    sucralfate 100 mg/mL suspension 1 g    venlafaxine 24 hr capsule 300 mg     Objective:     Vital Signs (Most Recent):  Temp: 96.7 °F (35.9 °C) (11/18/22 1204)  Pulse: 82 (11/18/22 1204)  Resp: 16 (11/18/22 1417)  BP: 125/62 (11/18/22 1204)  SpO2: 100 % (11/18/22 1204)   Vital Signs (24h Range):  Temp:  [96.7 °F (35.9 °C)-98.6 °F (37 °C)] 96.7 °F (35.9 °C)  Pulse:  [76-88] 82  Resp:  [16-18] 16  SpO2:  [96 %-100 %] 100 %  BP: (106-125)/(57-69) 125/62     Weight: 104.3 kg (229 lb 15 oz)  Height: 5' 5" (165.1 cm)  Body mass index is 38.26 kg/m².      Intake/Output Summary (Last 24 hours) at 11/18/2022 1625  Last data filed at 11/17/2022 1840  Gross per 24 hour   Intake 250 ml   Output --   Net 250 ml         Ortho/SPM Exam  AAOx4  NAD  Reg rate  No increased WOB    SPINE  Dressing c/d/i    Iliopsoas (L2,3)                           5/5  Quadriceps  (L3,4)                  5/5   Tibialis Anterior (L4.5)                4/5                  Extensor Halucis Longus (L5)    0/5       Gastrocnemius/Soleus (S1)       3/5                  Flexor Hallucis Longus(S2)        0/5                       Sensation (a=absent, i=impaired, n=normal)  L2-S2 sensation improving    PODAS boot is bedside but not in place     Significant Labs: CBC:   No results for input(s): WBC, HGB, HCT, PLT in the last 48 hours.    CMP:   No results for input(s): NA, K, CL, CO2, GLU, BUN, CREATININE, CALCIUM, PROT, ALBUMIN, BILITOT, ALKPHOS, AST, ALT, ANIONGAP, EGFRNONAA in the last 48 hours.    Invalid input(s): ESTGF[place]FR[place]    All pertinent labs within the past 24 hours have been reviewed.    Significant Imaging: None    Assessment/Plan:     * SHAZIA (cauda equina syndrome)  Marianela Shrestha is a 37 y.o. female with SHAZIA SP L4-S1 Laminectomy 11.11.22    -Admitted to orthopedic service  -PODAS boot " for LLE foot drop, AFO LLE when ambulating  -PT/OT: WBAT spine precautions  -DVT PPx: Heparin TID  -Abx: None  -Labs: None  -Addison: None  -Drains: dc'ed POD3    DIspo: pending placement, medically ready                  Radha Villanueva MD  Orthopedics  [facility] - Surgery

## 2022-11-18 NOTE — PLAN OF CARE
[facility]  Facility Transfer Orders        Admit to: SNF    Diagnoses:   Active Hospital Problems    Diagnosis  POA    *SHAZIA (cauda equina syndrome) [G83.4]  Yes    Impaired mobility [Z74.09]  Unknown      Resolved Hospital Problems   No resolved problems to display.     Allergies:   Review of patient's allergies indicates:   Allergen Reactions    Coconut Hives and Itching    Sulfa (sulfonamide antibiotics) Hives     itching    Adhesive Rash    Latex, natural rubber Other (See Comments) and Rash       Code Status: full code    Vitals: Routine       Diet: regular diet    Activity: WBAT. Use PODUS boot left foot/ankle while in bed due to foot drop. Use left foot/ankle AFO when ambulating due to foot drop    Nursing Precautions: Fall    Bed/Surface: normal    Consults: PT to evaluate and treat- 7 times a week, OT to evaluate and treat- 7 times a week, and ST to evaluate and treat- 7 times a week    Oxygen: room air    Dialysis: Patient is not on dialysis.     Labs: none  Pending Diagnostic Studies:       None          Imaging: none    Miscellaneous Care:   none    IV Access: Peripheral     Medications: Discontinue all previous medication orders, if any. See new list below.  Current Discharge Medication List        START taking these medications    Details   acetaminophen (TYLENOL) 650 MG TbSR Take 1 tablet (650 mg total) by mouth every 6 (six) hours.  Qty: 56 tablet, Refills: 0      celecoxib (CELEBREX) 200 MG capsule Take 1 capsule (200 mg total) by mouth once daily.  Qty: 12 capsule, Refills: 0      methocarbamoL (ROBAXIN) 500 MG Tab Take 2 tablets (1,000 mg total) by mouth 3 (three) times daily. for 14 days  Qty: 84 tablet, Refills: 0      oxyCODONE (ROXICODONE) 5 MG immediate release tablet Take 1 tablet (5 mg total) by mouth every 4 (four) hours as needed for Pain.  Qty: 20 tablet, Refills: 0           CONTINUE these medications which have NOT CHANGED    Details   albuterol (PROVENTIL/VENTOLIN HFA) 90  mcg/actuation inhaler Inhale 2 puffs into the lungs every 6 (six) hours as needed. Rescue      albuterol-ipratropium (DUO-NEB) 2.5 mg-0.5 mg/3 mL nebulizer solution Take 3 mLs by nebulization every 6 (six) hours as needed for Wheezing or Shortness of Breath. Rescue  Qty: 270 mL, Refills: 3    Associated Diagnoses: Uncontrolled persistent asthma      ALPRAZolam (XANAX) 1 MG tablet Take 1 tablet (1 mg total) by mouth 3 (three) times daily as needed for Anxiety.  Qty: 90 tablet, Refills: 0    Comments: Not to exceed 5 additional fills before 03/29/2022  Associated Diagnoses: Generalized anxiety disorder      doxepin (SINEQUAN) 25 MG capsule TAKE 2 CAPSULES (50 MG TOTAL) BY MOUTH EVERY EVENING.  Qty: 180 capsule, Refills: 0    Associated Diagnoses: Insomnia, unspecified type      gabapentin (NEURONTIN) 600 MG tablet TAKE 1 TABLET BY MOUTH THREE TIMES A DAY  Qty: 120 tablet, Refills: 1    Associated Diagnoses: Bilateral sciatica; Chronic pain syndrome      mupirocin (BACTROBAN) 2 % ointment Twice daily for five days twice per month for six months  Qty: 22 g, Refills: 3    Comments: Daily for five days twice per month for six months      venlafaxine (EFFEXOR-XR) 150 MG Cp24 TAKE 2 CAPSULES BY MOUTH EVERY DAY  Qty: 60 capsule, Refills: 1    Associated Diagnoses: Generalized anxiety disorder      (Magic mouthwash) 1:1:1 diphenhydramine(Benadryl) 12.5mg/5ml liq, aluminum & magnesium hydroxide-simethicone (Maalox), LIDOcaine viscous 2% Swish and spit 15 mLs every 4 (four) hours as needed (pain). for mouth sores  Qty: 450 mL, Refills: 0    Associated Diagnoses: Chronic sinusitis, unspecified location      ondansetron (ZOFRAN-ODT) 4 MG TbDL Dissolve 2 tablets (8 mg total) by mouth every 8 (eight) hours as needed (nausea).  Qty: 10 tablet, Refills: 0           STOP taking these medications       budesonide-formoterol 160-4.5 mcg (SYMBICORT) 160-4.5 mcg/actuation HFAA Comments:   Reason for Stopping:         cyclobenzaprine  (FLEXERIL) 10 MG tablet Comments:   Reason for Stopping:         HYDROcodone-acetaminophen (NORCO) 5-325 mg per tablet Comments:   Reason for Stopping:         ibuprofen 200 mg Cap Comments:   Reason for Stopping:         meloxicam (MOBIC) 15 MG tablet Comments:   Reason for Stopping:         norethindrone (AYGESTIN) 5 mg Tab Comments:   Reason for Stopping:         omeprazole (PRILOSEC) 20 MG capsule Comments:   Reason for Stopping:         traMADoL (ULTRAM) 50 mg tablet Comments:   Reason for Stopping:               Current Facility-Administered Medications:     (Magic mouthwash) 1:1:1 diphenhydrAMINE(Benadryl) 12.5mg/5ml liq, aluminum & magnesium hydroxide-simethicone (Maalox), LIDOcaine viscous 2%, 15 mL, Swish & Spit, Q4H PRN, Paul Hernandez MD    0.9%  NaCl infusion, , Intravenous, Continuous, Paul Hernandez MD, Last Rate: 100 mL/hr at 11/13/22 2231, New Bag at 11/13/22 2231    acetaminophen tablet 650 mg, 650 mg, Oral, Q8H PRN, Paul Hernandez MD, 650 mg at 11/18/22 0615    acetaminophen tablet 650 mg, 650 mg, Oral, Q6H, Paul Hernandez MD, 650 mg at 11/18/22 1140    albuterol inhaler 2 puff, 2 puff, Inhalation, Q6H PRN, Paul Hernandez MD    albuterol-ipratropium 2.5 mg-0.5 mg/3 mL nebulizer solution 3 mL, 3 mL, Nebulization, Q6H PRN, Paul Hernandez MD    ALPRAZolam tablet 1 mg, 1 mg, Oral, TID PRN, Paul Hernandez MD, 1 mg at 11/18/22 1516    aluminum-magnesium hydroxide-simethicone 200-200-20 mg/5 mL suspension 30 mL, 30 mL, Oral, QID (AC & HS), Paul Hernandez MD, 30 mL at 11/18/22 1020    celecoxib capsule 200 mg, 200 mg, Oral, Daily, Paul Hernandez MD, 200 mg at 11/18/22 0837    docusate sodium capsule 100 mg, 100 mg, Oral, BID, Wil Mcdowell MD, 100 mg at 11/18/22 0837    doxepin capsule 50 mg, 50 mg, Oral, QHS, Paul Hernandez MD, 50 mg at 11/17/22 2031    fluticasone furoate-vilanteroL 100-25 mcg/dose diskus inhaler 1 puff, 1 puff,  Inhalation, Daily, Paul Hernandez MD, 1 puff at 11/18/22 0844    gabapentin capsule 600 mg, 600 mg, Oral, TID, Paul Hernandez MD, 600 mg at 11/18/22 1517    heparin (porcine) injection 5,000 Units, 5,000 Units, Subcutaneous, Q8H, Paul Hernandez MD, 5,000 Units at 11/18/22 1417    LIDOcaine (PF) 10 mg/ml (1%) injection 10 mg, 1 mL, Intradermal, Once PRN, Paul Hernandez MD    melatonin tablet 6 mg, 6 mg, Oral, Nightly PRN, Paul Hernandez MD, 6 mg at 11/18/22 0135    methocarbamoL tablet 750 mg, 750 mg, Oral, QID, Paul Hernandez MD, 750 mg at 11/18/22 1417    ondansetron disintegrating tablet 8 mg, 8 mg, Oral, Q8H PRN, Paul Hernandez MD, 8 mg at 11/17/22 0243    oxyCODONE immediate release tablet 5 mg, 5 mg, Oral, Q4H PRN, Paul Hernandez MD, 5 mg at 11/12/22 2034    oxyCODONE immediate release tablet Tab 10 mg, 10 mg, Oral, Q4H PRN, Paul Hernandez MD, 10 mg at 11/18/22 1417    pantoprazole EC tablet 40 mg, 40 mg, Oral, Daily, Paul Hernandez MD, 40 mg at 11/18/22 0837    polyethylene glycol packet 17 g, 17 g, Oral, BID PRN, Wil Mcdowell MD    sodium chloride 0.9% flush 10 mL, 10 mL, Intravenous, PRN, Paul Hernandez MD    sucralfate 100 mg/mL suspension 1 g, 1 g, Oral, Q6H, Paul Hernandez MD, 1 g at 11/18/22 1140    venlafaxine 24 hr capsule 300 mg, 300 mg, Oral, Daily, Paul Hernandez MD, 300 mg at 11/18/22 0837       Follow up:       Immunizations Administered as of 11/18/2022       No immunizations on file.            Unknown covid vaccination status    Some patients may experience side effects after vaccination.  These may include fever, headache, muscle or joint aches.  Most symptoms resolve with 24-48 hours and do not require urgent medical evaluation unless they persist for more than 72 hours or symptoms are concerning for an unrelated medical condition.          Radha Villanueva MD

## 2022-11-18 NOTE — SUBJECTIVE & OBJECTIVE
Principal Problem:SHAZIA (cauda equina syndrome)    Principal Orthopedic Problem: sp L4, L5, S1 laminectomy 11/11/22    Interval History: Patient seen and examined at bedside.  NAEON. VS wnl. No new labs. Pain well controlled. Says sensation in left foot improving, still cant PF/DF great toe. Notes stable altered sensation sensing bladder/bowels but does have control of bowel/bladder. Continues to ambulate w/ AFO L ankle.     Review of patient's allergies indicates:   Allergen Reactions    Coconut Hives and Itching    Sulfa (sulfonamide antibiotics) Hives     itching    Adhesive Rash    Latex, natural rubber Other (See Comments) and Rash       Current Facility-Administered Medications   Medication    (Magic mouthwash) 1:1:1 diphenhydrAMINE(Benadryl) 12.5mg/5ml liq, aluminum & magnesium hydroxide-simethicone (Maalox), LIDOcaine viscous 2%    0.9%  NaCl infusion    acetaminophen tablet 650 mg    acetaminophen tablet 650 mg    albuterol inhaler 2 puff    albuterol-ipratropium 2.5 mg-0.5 mg/3 mL nebulizer solution 3 mL    ALPRAZolam tablet 1 mg    aluminum-magnesium hydroxide-simethicone 200-200-20 mg/5 mL suspension 30 mL    celecoxib capsule 200 mg    docusate sodium capsule 100 mg    doxepin capsule 50 mg    fluticasone furoate-vilanteroL 100-25 mcg/dose diskus inhaler 1 puff    gabapentin capsule 600 mg    heparin (porcine) injection 5,000 Units    LIDOcaine (PF) 10 mg/ml (1%) injection 10 mg    melatonin tablet 6 mg    methocarbamoL tablet 750 mg    ondansetron disintegrating tablet 8 mg    oxyCODONE immediate release tablet 5 mg    oxyCODONE immediate release tablet Tab 10 mg    pantoprazole EC tablet 40 mg    polyethylene glycol packet 17 g    sodium chloride 0.9% flush 10 mL    sucralfate 100 mg/mL suspension 1 g    venlafaxine 24 hr capsule 300 mg     Objective:     Vital Signs (Most Recent):  Temp: 96.7 °F (35.9 °C) (11/18/22 1204)  Pulse: 82 (11/18/22 1204)  Resp: 16 (11/18/22 1417)  BP: 125/62 (11/18/22  "1204)  SpO2: 100 % (11/18/22 1204)   Vital Signs (24h Range):  Temp:  [96.7 °F (35.9 °C)-98.6 °F (37 °C)] 96.7 °F (35.9 °C)  Pulse:  [76-88] 82  Resp:  [16-18] 16  SpO2:  [96 %-100 %] 100 %  BP: (106-125)/(57-69) 125/62     Weight: 104.3 kg (229 lb 15 oz)  Height: 5' 5" (165.1 cm)  Body mass index is 38.26 kg/m².      Intake/Output Summary (Last 24 hours) at 11/18/2022 1625  Last data filed at 11/17/2022 1840  Gross per 24 hour   Intake 250 ml   Output --   Net 250 ml         Ortho/SPM Exam  AAOx4  NAD  Reg rate  No increased WOB    SPINE  Dressing c/d/i    Iliopsoas (L2,3)                           5/5  Quadriceps  (L3,4)                  5/5   Tibialis Anterior (L4.5)                4/5                  Extensor Halucis Longus (L5)    0/5       Gastrocnemius/Soleus (S1)       3/5                  Flexor Hallucis Longus(S2)        0/5                       Sensation (a=absent, i=impaired, n=normal)  L2-S2 sensation improving    PODAS boot is bedside but not in place     Significant Labs: CBC:   No results for input(s): WBC, HGB, HCT, PLT in the last 48 hours.    CMP:   No results for input(s): NA, K, CL, CO2, GLU, BUN, CREATININE, CALCIUM, PROT, ALBUMIN, BILITOT, ALKPHOS, AST, ALT, ANIONGAP, EGFRNONAA in the last 48 hours.    Invalid input(s): ESTGFAFRICA    All pertinent labs within the past 24 hours have been reviewed.    Significant Imaging: None  "

## 2022-11-18 NOTE — PLAN OF CARE
11/18/22 0703   Post-Acute Status   Discharge Delays (!) Post-Acute Set-up   Discharge Plan   Discharge Plan A Skilled Nursing Facility   Discharge Plan B Skilled Nursing Facility       [employer] denied Inpatient rehab level of care on 11/16/22. [facility] and auth approved per [employer]. Then [facility] reported they were unable to accept patient. [facility] referrals sent with multiple denials. Patient has no accepting facility at this time, will continue to follow.

## 2022-11-18 NOTE — PLAN OF CARE
SW completed PASRR and called in [employer].  [facility] faxed document and awaiting 142 to be sent.  Will continue to follow.     Alisha Del Castillo LMSW  [facility]  [phone]

## 2022-11-18 NOTE — PT/OT/SLP PROGRESS
Physical Therapy Treatment    Patient Name:  Marianela Shrestha   MRN:  [id number]    Recommendations:     Discharge Recommendations:  rehabilitation facility   Discharge Equipment Recommendations: walker, rolling   Barriers to discharge: Increased level of assistance required and fall risk    Assessment:     Marianela Shrestha is a 37 y.o. female admitted with a medical diagnosis of SHAZIA (cauda equina syndrome).  She presents with the following impairments/functional limitations:   (weakness; impaired endurance; impaired self care skills; impaired functional mobility; gait instability; impaired balance; decreased lower extremity function; pain; decreased safety awareness; decreased ROM; orthopedic precautions) patient still presenting with decreased strength  in extremities, limiting ability to stand, ambulate, scoot, or perform bed mobility. Pt is an excellent candidate for inpatient rehabilitation, as pt  displays good activity tolerance, has experienced decline from PLOF, has good family support, and is motivated to participate in therapy. . Inpatient rehab will provide to total interdisciplinary treatment approach needed. Pt is at high risk of unplanned readmission due to fall risk. The lower level of care cannot provide total interdisciplinary approach needed.  Pt would continue to benefit from further therapy to maximize gains in functional mobility. Goals remain appropriate.?      Rehab Prognosis: Good; patient would benefit from acute skilled PT services to address these deficits and reach maximum level of function.    Recent Surgery: Procedure(s) (LRB):  LAMINECTOMY, SPINE, LUMBAR (N/A) 7 Days Post-Op    Plan:     During this hospitalization, patient to be seen 4 x/week to address the identified rehab impairments via gait training, therapeutic activities, therapeutic exercises, neuromuscular re-education and progress toward the following goals:    Plan of Care Expires:  12/12/22    Subjective   I want to  try another pair of shoes for the brace    Pain/Comfort:  Pain Rating 1: 5/10  Location - Side 1: Bilateral  Location - Orientation 1: generalized  Location 1: lumbar spine  Pain Addressed 1: Nurse notified      Objective:     Communicated with RN prior to session.  Patient found HOB elevated with PureWick upon PT entry to room.     General Precautions: Standard, fall   Orthopedic Precautions:spinal precautions   Braces:  (AFO   (L)LE)  Respiratory Status: Room air     Functional Mobility:  Bed Mobility:     Supine to Sit: (S)  Transfers:     Sit to Stand: SBA/contact guard assistance with rolling walker  Toilet Transfer: CGA with rolling walker and grab bars using Step Transfer during bout of ambulation  Gait: pt ambulated 65 ft x 2 with RW with CGA/Edgar with 1 slight episode of post LOB.  Pt demonstrated reduced step length and gait velocity  AM-PAC 6 CLICK MOBILITY  Turning over in bed (including adjusting bedclothes, sheets and blankets)?: 3  Sitting down on and standing up from a chair with arms (e.g., wheelchair, bedside commode, etc.): 3  Moving from lying on back to sitting on the side of the bed?: 3  Moving to and from a bed to a chair (including a wheelchair)?: 3  Need to walk in hospital room?: 3  Climbing 3-5 steps with a railing?: 2  Basic Mobility Total Score: 17       Treatment & Education:    Therapist provided instruction and educated of  patient on progress, safety,d/c,PT POC,   proper body mechanics, energy conservation, and fall prevention strategies during tasks listed above, on the effects of prolonged immobility and the importance of performing OOB activity and exercises to promote healing and reduce recovery time        Patient  facilitated therex  seated in bedside chair  AP, LAQ, Hip Flexion, Hip Abd/Add. Patient required skilled PTA for instruction of exercises and appropriate cues to perform exercises safely, sequencing and appropriately.   Exercises performed to develop and maintain pt's  strength, endurance and flexibility.  Updated white board with appropriate PT mobility information for medical team notification      Donned an extra gown , shoes and AFO     Call nursing/pct to transfer to chair/use bathroom. Pt stated understanding        Bedside table in front of patient and area set up for function, convenience, and safety. RN aware of patient's mobility needs and status. Questions/concerns addressed within PTA scope of practice; patient  with no further questions. Time was provided for active listening, discussion of health disposition, and discussion of safe discharge. Pt?verbalized?agreement .     Patient left up in chair with all lines intact, call button in reach, and nsg notified    GOALS:   Multidisciplinary Problems       Physical Therapy Goals          Problem: Physical Therapy    Goal Priority Disciplines Outcome Goal Variances Interventions   Physical Therapy Goal     PT, PT/OT Ongoing, Progressing     Description: Goals to be met by: 22     Patient will increase functional independence with mobility by performin. Sit to stand transfer with Contact Guard Assistance  2. Bed to chair transfer with Supervision using Rolling Walker  3. Gait  x 150 feet with Supervision using Rolling Walker.   4. Ascend/Descend 6 inch curb step with Contact Guard Assistance using Rolling Walker.                         Time Tracking:     PT Received On: 22  PT Start Time: 853     PT Stop Time: 920  PT Total Time (min): 27 min     Billable Minutes: Gait Training 15 and Therapeutic Activity 12    Treatment Type: Treatment  PT/PTA: PTA     PTA Visit Number: 3     2022

## 2022-11-19 PROCEDURE — 11000004 HC SNF PRIVATE

## 2022-11-19 PROCEDURE — 97110 THERAPEUTIC EXERCISES: CPT

## 2022-11-19 PROCEDURE — 25000003 PHARM REV CODE 250: Performed by: HOSPITALIST

## 2022-11-19 PROCEDURE — 97163 PT EVAL HIGH COMPLEX 45 MIN: CPT

## 2022-11-19 PROCEDURE — 97165 OT EVAL LOW COMPLEX 30 MIN: CPT

## 2022-11-19 PROCEDURE — 97535 SELF CARE MNGMENT TRAINING: CPT

## 2022-11-19 RX ORDER — MUPIROCIN 20 MG/G
OINTMENT TOPICAL 2 TIMES DAILY
Status: DISPENSED | OUTPATIENT
Start: 2022-11-19 | End: 2022-11-24

## 2022-11-19 RX ORDER — CELECOXIB 200 MG/1
200 CAPSULE ORAL DAILY
Status: DISCONTINUED | OUTPATIENT
Start: 2022-11-19 | End: 2022-12-16 | Stop reason: HOSPADM

## 2022-11-19 RX ORDER — ALBUTEROL SULFATE 90 UG/1
2 AEROSOL, METERED RESPIRATORY (INHALATION) EVERY 6 HOURS PRN
Status: DISCONTINUED | OUTPATIENT
Start: 2022-11-19 | End: 2022-12-16 | Stop reason: HOSPADM

## 2022-11-19 RX ORDER — OXYCODONE HYDROCHLORIDE 10 MG/1
10 TABLET ORAL EVERY 4 HOURS PRN
Status: DISCONTINUED | OUTPATIENT
Start: 2022-11-19 | End: 2022-12-06

## 2022-11-19 RX ORDER — ALPRAZOLAM 0.5 MG/1
1 TABLET ORAL 3 TIMES DAILY PRN
Status: DISCONTINUED | OUTPATIENT
Start: 2022-11-19 | End: 2022-12-16 | Stop reason: HOSPADM

## 2022-11-19 RX ORDER — IPRATROPIUM BROMIDE AND ALBUTEROL SULFATE 2.5; .5 MG/3ML; MG/3ML
3 SOLUTION RESPIRATORY (INHALATION) EVERY 6 HOURS PRN
Status: DISCONTINUED | OUTPATIENT
Start: 2022-11-19 | End: 2022-12-16 | Stop reason: HOSPADM

## 2022-11-19 RX ORDER — METHOCARBAMOL 500 MG/1
1000 TABLET, FILM COATED ORAL 3 TIMES DAILY
Status: DISPENSED | OUTPATIENT
Start: 2022-11-19 | End: 2022-12-03

## 2022-11-19 RX ORDER — OXYCODONE HYDROCHLORIDE 5 MG/1
5 TABLET ORAL EVERY 4 HOURS PRN
Status: DISCONTINUED | OUTPATIENT
Start: 2022-11-19 | End: 2022-11-23

## 2022-11-19 RX ORDER — ACETAMINOPHEN 325 MG/1
650 TABLET ORAL EVERY 6 HOURS PRN
Status: DISCONTINUED | OUTPATIENT
Start: 2022-11-19 | End: 2022-11-23

## 2022-11-19 RX ORDER — VENLAFAXINE HYDROCHLORIDE 75 MG/1
300 CAPSULE, EXTENDED RELEASE ORAL DAILY
Status: DISCONTINUED | OUTPATIENT
Start: 2022-11-19 | End: 2022-12-16 | Stop reason: HOSPADM

## 2022-11-19 RX ORDER — ONDANSETRON 8 MG/1
8 TABLET, ORALLY DISINTEGRATING ORAL EVERY 8 HOURS PRN
Status: DISCONTINUED | OUTPATIENT
Start: 2022-11-19 | End: 2022-12-16 | Stop reason: HOSPADM

## 2022-11-19 RX ORDER — MUPIROCIN 20 MG/G
OINTMENT TOPICAL
Status: DISCONTINUED | OUTPATIENT
Start: 2022-11-19 | End: 2022-12-16 | Stop reason: HOSPADM

## 2022-11-19 RX ORDER — GABAPENTIN 300 MG/1
600 CAPSULE ORAL 3 TIMES DAILY
Status: DISCONTINUED | OUTPATIENT
Start: 2022-11-19 | End: 2022-12-16 | Stop reason: HOSPADM

## 2022-11-19 RX ADMIN — GABAPENTIN 600 MG: 300 CAPSULE ORAL at 03:11

## 2022-11-19 RX ADMIN — DOXEPIN HYDROCHLORIDE 50 MG: 50 CAPSULE ORAL at 08:11

## 2022-11-19 RX ADMIN — OXYCODONE HYDROCHLORIDE 10 MG: 10 TABLET ORAL at 08:11

## 2022-11-19 RX ADMIN — GABAPENTIN 600 MG: 300 CAPSULE ORAL at 08:11

## 2022-11-19 RX ADMIN — OXYCODONE HYDROCHLORIDE 10 MG: 10 TABLET ORAL at 01:11

## 2022-11-19 RX ADMIN — MUPIROCIN: 20 OINTMENT TOPICAL at 08:11

## 2022-11-19 RX ADMIN — OXYCODONE 5 MG: 5 TABLET ORAL at 03:11

## 2022-11-19 RX ADMIN — ALPRAZOLAM 1 MG: 0.5 TABLET ORAL at 08:11

## 2022-11-19 RX ADMIN — METHOCARBAMOL 1000 MG: 500 TABLET ORAL at 08:11

## 2022-11-19 RX ADMIN — VENLAFAXINE HYDROCHLORIDE 300 MG: 75 CAPSULE, EXTENDED RELEASE ORAL at 12:11

## 2022-11-19 RX ADMIN — SENNOSIDES AND DOCUSATE SODIUM 1 TABLET: 50; 8.6 TABLET ORAL at 08:11

## 2022-11-19 RX ADMIN — METHOCARBAMOL 1000 MG: 500 TABLET ORAL at 03:11

## 2022-11-19 RX ADMIN — OXYCODONE 5 MG: 5 TABLET ORAL at 08:11

## 2022-11-19 RX ADMIN — CELECOXIB 200 MG: 200 CAPSULE ORAL at 12:11

## 2022-11-19 RX ADMIN — MUPIROCIN: 20 OINTMENT TOPICAL at 12:11

## 2022-11-19 NOTE — DISCHARGE SUMMARY
[facility] - [facility]  Orthopedics  Discharge Summary      Patient Name: Marianela Shrestha  MRN: [id number]  Admission Date: 11/11/2022  Hospital Length of Stay: 7 days  Discharge Date and Time:  11/18/2022 8:10 PM  Attending Physician: Sylwia att. providers found   Discharging Provider: Radha Villanueva MD  Primary Care Provider: Arnaldo Espino MD    HPI:   Marianela Shrestha is a 37 y.o. female with a history of drug abuse, low back pain and LLE radiculopathy presents for evaluation of 3 day history LLE weakness and changes in bowel and bladder acutely worse in last 24 hours. The patient reports chronic back pain. However, she fell 3 days ago, twisting her ankle and low back. Since then, she thought she'd sustained a sprained ankle, however, she had progressivley worsening low back pain and numbness and weakness LLE. She also began noticing worsening of her baseline bladder issues, as well as one bowel incontinence. She says she has diminished urge to urinate, however has not voided on herself unknowinglny. Patient is on parole right now, wears a LLE bracelet. She denies tobacco use or alcohol. Reports previous drug use. Also has history of right hand osteomyelitis treated with surgery. Currently denies drug use of any variety. Had positive uda in July for amphetamines, opioids, and barbiturates. Denies prior surgeries.       Procedure(s) (LRB):  LAMINECTOMY, SPINE, LUMBAR (N/A)      Hospital Course:  37 y.o. female w/ acute worsening of LBP and LLE radiculopathy. She had 3 days history of progressive LLE weakness/decreased sensation and inability to feel her bladder getting full. 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 Underwent lumbar laminotomy, foraminotomy and disectomy L4-5 and L5-S1 on 11/11/22 by Dr. Harry. Post operatively admitted to hospital. Drain OH'ed when output decreased. Her LLE sensation improved as did her weakness but does still have foot drop  for which she is now using PODUS boot at rest to prevent equinus contracture and AFO to ambulate. Post operatively she maintains decreased but able to feel bladder getting full and control her voiding. She maintains decreased rectal sensation but able to control her defecation. Dc'ed to inpatient rehab.            Goals of Care Treatment Preferences:  Code Status: Full Code      Consults (From admission, onward)          Status Ordering Provider     Inpatient consult to Physical Medicine Rehab  Once        Provider:  (Not yet assigned)    Completed PAOLO ELLIS     Inpatient consult to Orthopedic Surgery  Once        Provider:  (Not yet assigned)    Completed PATRICK RITTER            Significant Diagnostic Studies:     Pending Diagnostic Studies:       None          Final Active Diagnoses:    Diagnosis Date Noted POA    PRINCIPAL PROBLEM:  SHAZIA (cauda equina syndrome) [G83.4] 11/11/2022 Yes    Impaired mobility [Z74.09] 11/14/2022 Unknown      Problems Resolved During this Admission:      Discharged Condition: good    Disposition: inpatient rehab    Follow Up: 4 weeks post op for wound check    Medications:  Reconciled Home Medications:      Medication List        START taking these medications      acetaminophen 650 MG Tbsr  Commonly known as: TYLENOL  Take 1 tablet (650 mg total) by mouth every 6 (six) hours.     celecoxib 200 MG capsule  Commonly known as: CeleBREX  Take 1 capsule (200 mg total) by mouth once daily.     methocarbamoL 500 MG Tab  Commonly known as: ROBAXIN  Take 2 tablets (1,000 mg total) by mouth 3 (three) times daily. for 14 days     oxyCODONE 5 MG immediate release tablet  Commonly known as: ROXICODONE  Take 1 tablet (5 mg total) by mouth every 4 (four) hours as needed for Pain.            CHANGE how you take these medications      venlafaxine 150 MG Cp24  Commonly known as: EFFEXOR-XR  TAKE 2 CAPSULES BY MOUTH EVERY DAY  What changed:   how much to take  when to take this             CONTINUE taking these medications      (MAGIC MOUTHWASH) 1:1:1 BENADRYL 12.5MG/5ML LIQ, ALUMINUM & MAGNESIUM  Swish and spit 15 mLs every 4 (four) hours as needed (pain). for mouth sores     albuterol 90 mcg/actuation inhaler  Commonly known as: PROVENTIL/VENTOLIN HFA  Inhale 2 puffs into the lungs every 6 (six) hours as needed. Rescue     albuterol-ipratropium 2.5 mg-0.5 mg/3 mL nebulizer solution  Commonly known as: DUO-NEB  Take 3 mLs by nebulization every 6 (six) hours as needed for Wheezing or Shortness of Breath. Rescue     ALPRAZolam 1 MG tablet  Commonly known as: XANAX  Take 1 tablet (1 mg total) by mouth 3 (three) times daily as needed for Anxiety.     doxepin 25 MG capsule  Commonly known as: SINEQUAN  TAKE 2 CAPSULES (50 MG TOTAL) BY MOUTH EVERY EVENING.     gabapentin 600 MG tablet  Commonly known as: NEURONTIN  TAKE 1 TABLET BY MOUTH THREE TIMES A DAY     mupirocin 2 % ointment  Commonly known as: BACTROBAN  Twice daily for five days twice per month for six months     ondansetron 4 MG Tbdl  Commonly known as: ZOFRAN-ODT  Dissolve 2 tablets (8 mg total) by mouth every 8 (eight) hours as needed (nausea).            STOP taking these medications      budesonide-formoterol 160-4.5 mcg 160-4.5 mcg/actuation Hfaa  Commonly known as: SYMBICORT     cyclobenzaprine 10 MG tablet  Commonly known as: FLEXERIL     HYDROcodone-acetaminophen 5-325 mg per tablet  Commonly known as: NORCO     ibuprofen 200 mg Cap     meloxicam 15 MG tablet  Commonly known as: MOBIC     norethindrone 5 mg Tab  Commonly known as: AYGESTIN     omeprazole 20 MG capsule  Commonly known as: PRILOSEC     traMADoL 50 mg tablet  Commonly known as: GIAN Villanueva MD  Orthopedics  [facility] - Surgery

## 2022-11-19 NOTE — NURSING
Wheel chair picked patient up,with all belongs to depart to [facility]. Patient A&OX 4, dressing intact. IV removed. DC papers given.

## 2022-11-19 NOTE — PT/OT/SLP EVAL
Physical Therapy Evaluation    Patient Name:  Marianela Shrestha   MRN:  [id number]  Admit Date: 11/18/2022  Recent Surgeries:LUMBAR LAMINECTOMY   Admitting Diagnosis: Lumbar Stenosis  General Precautions: Standard, fall   Orthopedic Precautions:spinal precautions   Braces:  (WBAT. Use PODUS boot left foot/ankle while in bed due to foot drop. Use left foot/ankle AFO when ambulating due to foot drop)     Recommendations:     Discharge Recommendations:  home health PT   Level of Assistance Recommended: Intermittent assistance   Discharge Equipment Recommendations: bedside commode, walker, rolling, wheelchair, bath bench   Barriers to discharge: Decreased caregiver support    Assessment:     Marianela Shrestha is a 37 y.o. female admitted with a medical diagnosis of <principal problem not specified> Lumbar Stenosis, s/p Lumbar Laminectomy.  Performance deficits affecting function  weakness, impaired endurance, impaired self care skills, impaired functional mobility, gait instability, impaired balance, decreased lower extremity function, impaired sensation, impaired skin, impaired cardiopulmonary response to activity, orthopedic precautions .pt was (I) with all functional activity PTA and now requires Jeevan with the use of a L AFO d.t weakness and decrease sensation.pt was also taking care of her mom PTA and she demonstrated emotional outbursts of crying today in the gym when her fiance' came to visit her. Pt will benefit from continued SNF rehab to help improve her overall functional mobility, safety, endurance and general strength.    Rehab Potential is good     Activity Tolerance: Good    Plan:     Patient to be seen 6 x/week to address the above listed problems via gait training, therapeutic activities, therapeutic exercises, neuromuscular re-education     Plan of Care Expires: 12/19/22  Plan of Care Reviewed with: patient    Subjective     Chief Complaint: weakness and pain  Patient/Family Comments/goals: be more  (I)  Pain/Comfort:  Pain Rating 1: 6/10  Location - Side 1: Left  Location - Orientation 1: generalized  Location 1: leg  Pain Addressed 1: Pre-medicate for activity, Reposition, Distraction, Nurse notified  Pain Rating Post-Intervention 1: 6/10    Living Environment:(pt. Confirmed information on OT eval to be correct)  Pt lives with mother, brother, and geovanna. Single story home, 1 CODI, tub shower and Comfort akash toilet.   Previous level of function: Pt was independent prior to admission, driving but not working. Pt was assisting with caring for her disabled mother   Roles and Routines: Pt enjoys spending time with her family and dog  Equipment Used at Home:  grab bar, raised toilet, bath bench  Assistance upon Discharge: Family to assist as much as possible.  Pt reporting that her geovanna works during the day.     Patients cultural, spiritual, Tenriism conflicts given the current situation: no    Objective:     Communicated with pt's nurse prior to session.  Patient found up in chair with    upon PT entry to room.    Exams:  Cognitive Exam:  Patient is oriented to Person, Place, Time, and Situation  Gross Motor Coordination:  WFL  Sensation:    -       Impaired  light/touch B L/E's from groin/david-area all down to her feet  Skin Integrity/Edema:      -       Skin integrity: Visible skin intact  RLE ROM: WFL  RLE Strength: WFL  LLE ROM: WFL  LLE Strength: WFL except L ankle grossly 2-/5    Functional Mobility:     11/19/22 1003   Prior Functioning: Everyday Activities   Self Care Independent   Indoor Mobility (Ambulation) Independent   Stairs Unknown   Functional Cognition Independent   Prior Device Use None of the given options   Roll Left and Right   Assistance Needed Independent   Comment per OT eval   CARE Score - Roll Left and Right 6   Sit to Lying   Assistance Needed Independent   Comment per OT eval   CARE Score - Sit to Lying 6   Lying to Sitting on Side of Bed   Assistance Needed Independent    Comment per OT eval   CARE Score - Lying to Sitting on Side of Bed 6   Sit to Stand   Physical Assistance Level 25% or less   Comment with RW   CARE Score - Sit to Stand 3   Chair/Bed-to-Chair Transfer   Physical Assistance Level 25% or less   Comment with RW, SPT   CARE Score - Chair/Bed-to-Chair Transfer 3   Chair/Bed-to-Chair Transfer Discharge Goal   Discharge Goal 4   Car Transfer   Reason if not Attempted Environmental limitations   CARE Score - Car Transfer 10   Walk 10 Feet   Physical Assistance Level 25% or less   Comment with RW, pt with FFT, decrease step length and decrease xavier. pt needed frequent reminders to keep walker closer to her to decrease her risk of falls.pt became fatigued at 69ft.   CARE Score - Walk 10 Feet 3   Walk 50 Feet with Two Turns   Physical Assistance Level 25% or less   Comment with RW, pt with FFT, decrease step length and decrease xavier. pt needed frequent reminders to keep walker closer to her to decrease her risk of falls.pt became fatigued at 69ft.   CARE Score - Walk 50 Feet with Two Turns 3   Walk 150 Feet   Reason if not Attempted Safety concerns   CARE Score - Walk 150 Feet 88   Walking 10 Feet on Uneven Surfaces   Physical Assistance Level 25% or less   Comment with RW, using blue foam mat   CARE Score - Walking 10 Feet on Uneven Surfaces 3   1 Step (Curb)   Physical Assistance Level 25% or less   Comment with RW   CARE Score - 1 Step (Curb) 3   4 Steps   Physical Assistance Level 25% or less   Comment with B handrails   CARE Score - 4 Steps 3   12 Steps   Reason if not Attempted Safety concerns   CARE Score - 12 Steps 88   Picking Up Object   Reason if not Attempted Safety concerns   CARE Score - Picking Up Object 88   Uses a Wheelchair/Scooter?   Uses a Wheelchair/Scooter? Yes   Wheel 50 Feet with Two Turns   Assistance Needed Independent   CARE Score - Wheel 50 Feet with Two Turns 6   Type of Wheelchair/Scooter Manual   Wheel 150 Feet   Assistance Needed  Independent   CARE Score - Wheel 150 Feet 6   Type of Wheelchair/Scooter Manual       Therapeutic Activities and Exercises: Nustep x 10mins with resistance load @ 5, to help improve pt's overall MMT and endurance.  Education:  Patient provided with daily orientation and goals of this PT session.  Patient educated to transfer to bedside chair/bedside commode/bathroom with RN/PCT present.   Patient educated on Fall risk and plan of care by explanation.   Patient Verbalized Understanding.  Time provided for therapeutic counseling and discussion of current health disposition. All questions answered to satisfaction, within scope of PT practice; voiced no other concerns. White board updated in patient's room, RN notified of session.     AM-PAC 6 CLICK MOBILITY  Total Score:20     Patient left up in chair with call button in reach.    GOALS:   Multidisciplinary Problems       Physical Therapy Goals          Problem: Physical Therapy    Goal Priority Disciplines Outcome Goal Variances Interventions   Physical Therapy Goal     PT, PT/OT Ongoing, Progressing     Description: Goals to be met by:12/19/22    Patient will increase functional independence with mobility by performing:    . Sit to stand transfer with Supervision  . Bed to chair transfer with Supervision using Rolling Walker  . Gait  x 150 feet with Stand-by Assistance using Rolling Walker.   . Ascend/descend 4 stair with bilateral Handrails Stand-by Assistance using No Assistive Device.   . Ascend/Descend 4 inch curb step with Stand-by Assistance using Rolling Walker.                         History:     Past Medical History:   Diagnosis Date    Anxiety     Chronic osteomyelitis of right hand 2019    Chronic pain syndrome     Depression     Insomnia     Post traumatic stress disorder (PTSD)     Sciatica        Past Surgical History:   Procedure Laterality Date    ABLATION OF MEDIAL BRANCH NERVE OF LUMBAR SPINE FACET JOINT      L4-S1    COSMETIC SURGERY      breast  augmentation    ETHMOIDECTOMY  7/1/2022    Procedure: ETHMOIDECTOMY;  Surgeon: Segundo Fu MD;  Location: [facility] OR [address] [place] [address];  Service: ENT;;    FINGER SURGERY      FUNCTIONAL ENDOSCOPIC SINUS SURGERY (FESS) USING COMPUTER-ASSISTED NAVIGATION Bilateral 7/1/2022    Procedure: FESS, USING COMPUTER-ASSISTED NAVIGATION;  Surgeon: Segundo Fu MD;  Location: [facility] OR [address] [place] [address];  Service: ENT;  Laterality: Bilateral;    LUMBAR LAMINECTOMY N/A 11/11/2022    Procedure: LAMINECTOMY, SPINE, LUMBAR;  Surgeon: Sundar Harry MD;  Location: [facility] OR [address] [place] [address];  Service: Orthopedics;  Laterality: N/A;  L4-S1 laminectomy, SNS: SSEP/MOtors    MAXILLARY ANTROSTOMY  7/1/2022    Procedure: MAXILLARY ANTROSTOMY;  Surgeon: Segundo Fu MD;  Location: [facility] OR [address] [place] [address];  Service: ENT;;    PELVIC LAPAROSCOPY      Endometriosis ([place])    TONSILLECTOMY         Time Tracking:     PT Received On: 11/19/22  PT Start Time: 0900     PT Stop Time: 0940  PT Total Time (min): 40 min     Billable Minutes: Evaluation 25mins and Therapeutic Exercise 15mins      11/19/2022

## 2022-11-19 NOTE — PLAN OF CARE
Problem: Occupational Therapy  Goal: Occupational Therapy Goal  Description: Goals to be met by: 3 weeks     Patient will increase functional independence with ADLs by performing:    UE Dressing with Modified [place].  LE Dressing with Stand-by Assistance.  Grooming while seated with Modified [place].  Toileting from toilet or BSC with SBA for hygiene and clothing management. With A/D to stand.  Bathing from sitting at sink with Stand-by Assistance.  Supine to sit with Modified [place].  Step transfer with Stand-by Assistance with A/D as needed.  Upper extremity exercise with assistance as needed.  Caregiver will be educated on level of assist required to safely perform self care tasks and functional transfers..     Outcome: Ongoing, Progressing

## 2022-11-19 NOTE — PT/OT/SLP EVAL
Occupational Therapy   Evaluation / Treatment    Name: Marianela Shrestha  MRN: [id number]  Admit Date: 11/18/2022  Recent Surgeries: LUMBAR LAMINECTOMY  Admitting Diagnosis:  Lumbar Stenosis  General Precautions: Standard, fall   Orthopedic Precautions:spinal precautions   Braces: AFO ((L) foot.)     Recommendations:     Discharge Recommendations: home health OT  Level of Assistance Recommended: Intermittent assistance   Discharge Equipment Recommendations:   (TBD)  Barriers to discharge:  Decreased caregiver support    Assessment:     Marianela Shrestha is a 37 y.o. female with a medical diagnosis of Lumbar Stenosis .  She remains limited in performance of self-care , functional mobility and ADLs and currently not performing tasks at PLOF . Currently presenting with performance deficits including   weakness, impaired endurance, impaired self care skills, impaired functional mobility, impaired sensation, gait instability, impaired balance, decreased coordination, decreased upper extremity function, decreased lower extremity function, decreased safety awareness, pain, decreased ROM, orthopedic precautions.    Pt tolerated Tx without incident but requires assist to perform self care tasks, functional mobility and functional transfers .  She would benefit from OT intervention to further her functional (I)ce and safety.     Rehab Potential is good    Activity Tolerance: Good    Plan:     Patient to be seen 6 x/week to address the above listed problems via self-care/home management, therapeutic activities, therapeutic exercises  Plan of Care Expires: 12/19/22  Plan of Care Reviewed with: patient    Subjective     Chief Complaint: Pt reporting pain in lower back and (L) leg  Patient/Family Comments/goals: Pt wants to return to PLOF    Occupational Profile:  Living Environment: Pt lives with mother, brother, and fiancee. Single story home, 1 CDOI, tub shower and Comfort akash toilet.   Previous level of function: Pt was  independent prior to admission, driving but not working. Pt was assisting with caring for her disabled mother   Roles and Routines: Pt enjoys spending time with her family and dog  Equipment Used at Home:  grab bar, raised toilet, bath bench  Assistance upon Discharge: Family to assist as much as possible.  Pt reporting that her fiancee works during the day.       Pain/Comfort:  Pain Rating 1: 5/10  Location - Side 1: Bilateral  Location - Orientation 1: generalized  Location 1: lumbar spine (and (L) leg)  Pain Addressed 1: Nurse notified  Pain Rating Post-Intervention 1: 5/10    Patients cultural, spiritual, Religion conflicts given the current situation: no    Objective:     Communicated with: nurse prior to session.  Patient found supine with  (no active lines.) upon OT entry to room.    Occupational Performance:     Eating   Assistance Needed Set-up / clean-up   Physical Assistance Level No physical assistance   CARE Score - Eating 5          Grooming   Assistance Needed Set-up / clean-up   Physical Assistance Level No physical assistance  (seated sinkside.)   CARE Score - Oral Hygiene 5          Toileting Hygiene   Assistance Needed Physical assistance   Physical Assistance Level 26%-50%  (with (A) to steady when standing and to pull clothing up in back.)   CARE Score - Toileting Hygiene 3          Shower/Bathe Self   Assistance Needed Physical assistance   Physical Assistance Level 25% or less  (with Pt sponge bathing and needing (A) to steady when standing to perform david care.)   CARE Score - Shower/Bathe Self 3          Upper Body Dressing   Assistance Needed Verbal cues   Physical Assistance Level No physical assistance  (with Pt doffing/donning pullover shirt.)   CARE Score - Upper Body Dressing 4      Discharge Goal    Lower Body Dressing   Assistance Needed Physical assistance   Physical Assistance Level 26%-50%  (with (A) to steady when standing , to pull clothing up in back and to don (L) shoe with  AFO in place.)   CARE Score - Lower Body Dressing 3          Putting On/Taking Off Footwear   Assistance Needed Physical assistance   Physical Assistance Level 25% or less  (with (A) to don (L) shoe..)   CARE Score - Putting On/Taking Off Footwear 3          Roll Left and Right   Assistance Needed Independent   Physical Assistance Level No physical assistance  (with HOB flat and Pt using bed rails.)   CARE Score - Roll Left and Right 6   Sit to Lying   Assistance Needed Independent   Physical Assistance Level No physical assistance  (with HOB flat and Pt using bed rails.)   CARE Score - Sit to Lying 6   Lying to Sitting on Side of Bed   Assistance Needed Independent   Physical Assistance Level No physical assistance   CARE Score - Lying to Sitting on Side of Bed 6   Sit to Stand   Assistance Needed Incidental touching   Physical Assistance Level 25% or less  (Min (A) to transition from sitting to standing with A/D required.)   CARE Score - Sit to Stand 3   Chair/Bed-to-Chair Transfer   Assistance Needed Incidental touching   Physical Assistance Level 25% or less  (Min (A) to transition from sitting to standing with A/D required.)   CARE Score - Chair/Bed-to-Chair Transfer 3   Toilet Transfer   Assistance Needed Incidental touching   Physical Assistance Level 25% or less  (Min (A) to transition from sitting to standing with A/D required.)   CARE Score - Toilet Transfer 3     Cognitive/Visual Perceptual:  Cognitive/Psychosocial Skills:     -       Oriented to: Person, Place, Time, and Situation   -       Follows Commands/attention:Follows multistep  commands  -       Communication: clear/fluent  -       Memory: No Deficits noted  -       Safety awareness/insight to disability: intact   -       Mood/Affect/Coping skills/emotional control: Tearful  Visual/Perceptual:      -Intact      Physical Exam:  Balance: -  Pt demonstrated  good functional sitting balance as noted when performing self care tasks.  Fair [place]  functional standing with RW and  Min to CGA required for safety.        Postural examination/scapula alignment:    -       Rounded shoulders  Skin integrity: Visible skin intact  Edema:  None noted  Sensation:    -       Impaired  with numbness reported   Motor Planning: -       WFLS  Dominant hand: -       Right  Upper Extremity Range of Motion:     -       Right Upper Extremity: WFL  -       Left Upper Extremity: WFL  Upper Extremity Strength:    -       Right Upper Extremity: Grossly 4/5 throughout  -       Left Upper Extremity: Grossly 4/5 throughout   Strength:    -       Right Upper Extremity: WFL  -       Left Upper Extremity: WFL  Fine Motor Coordination:    -       Intact    AMPAC 6 Click ADL:  AMPAC Total Score: 20    Treatment & Education:  Pt edu on role of OT, POC, safety when performing self care tasks , benefit of performing OOB activity, and safety when performing functional transfers and mobility.  - White board updated  - Self care tasks completed-- as noted above      Patient left up in chair with call button in reach    GOALS:   Multidisciplinary Problems       Occupational Therapy Goals          Problem: Occupational Therapy    Goal Priority Disciplines Outcome Interventions   Occupational Therapy Goal     OT, PT/OT Ongoing, Progressing    Description: Goals to be met by: 3 weeks     Patient will increase functional independence with ADLs by performing:    UE Dressing with Modified [place].  LE Dressing with Stand-by Assistance.  Grooming while seated with Modified [place].  Toileting from toilet or BSC with SBA for hygiene and clothing management. With A/D to stand.  Bathing from sitting at sink with Stand-by Assistance.  Supine to sit with Modified [place].  Step transfer with Stand-by Assistance with A/D as needed.  Upper extremity exercise with assistance as needed.  Caregiver will be educated on level of assist required to safely perform self care tasks and functional  transfers..                          History:     Past Medical History:   Diagnosis Date    Anxiety     Chronic osteomyelitis of right hand 2019    Chronic pain syndrome     Depression     Insomnia     Post traumatic stress disorder (PTSD)     Sciatica          Past Surgical History:   Procedure Laterality Date    ABLATION OF MEDIAL BRANCH NERVE OF LUMBAR SPINE FACET JOINT      L4-S1    COSMETIC SURGERY      breast augmentation    ETHMOIDECTOMY  7/1/2022    Procedure: ETHMOIDECTOMY;  Surgeon: Segundo Fu MD;  Location: [facility] OR [address] [place] [address];  Service: ENT;;    FINGER SURGERY      FUNCTIONAL ENDOSCOPIC SINUS SURGERY (FESS) USING COMPUTER-ASSISTED NAVIGATION Bilateral 7/1/2022    Procedure: FESS, USING COMPUTER-ASSISTED NAVIGATION;  Surgeon: Segundo Fu MD;  Location: [facility] OR [address] [place] [address];  Service: ENT;  Laterality: Bilateral;    LUMBAR LAMINECTOMY N/A 11/11/2022    Procedure: LAMINECTOMY, SPINE, LUMBAR;  Surgeon: Sundar Harry MD;  Location: [facility] OR [address] [place] [address];  Service: Orthopedics;  Laterality: N/A;  L4-S1 laminectomy, SNS: SSEP/MOtors    MAXILLARY ANTROSTOMY  7/1/2022    Procedure: MAXILLARY ANTROSTOMY;  Surgeon: Segundo Fu MD;  Location: [facility] OR [address] [place] [address];  Service: ENT;;    PELVIC LAPAROSCOPY      Endometriosis ([place])    TONSILLECTOMY         Time Tracking:     OT Date of Treatment: 11/19/22  OT Start Time: 0845  OT Stop Time: 0945  OT Total Time (min): 60 min    Billable Minutes:Evaluation 20  Self Care/Home Management 40    11/19/2022

## 2022-11-19 NOTE — PLAN OF CARE
[facility] wheelchair van requested with [facility] (ext [phone]) for 6:30 pm pickup time to [facility].   Report may be called to [phone].

## 2022-11-19 NOTE — PLAN OF CARE
Problem: Physical Therapy  Goal: Physical Therapy Goal  Description: Goals to be met by:12/19/22    Patient will increase functional independence with mobility by performing:    . Sit to stand transfer with Supervision  . Bed to chair transfer with Supervision using Rolling Walker  . Gait  x 150 feet with Stand-by Assistance using Rolling Walker.   . Ascend/descend 4 stair with bilateral Handrails Stand-by Assistance using No Assistive Device.   . Ascend/Descend 4 inch curb step with Stand-by Assistance using Rolling Walker.    Outcome: Ongoing, Progressing

## 2022-11-19 NOTE — HOSPITAL COURSE
37 y.o. female w/ acute worsening of LBP and LLE radiculopathy. She had 3 days history of progressive LLE weakness/decreased sensation and inability to feel her bladder getting full. 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 Underwent lumbar laminotomy, foraminotomy and disectomy L4-5 and L5-S1 on 11/11/22 by Dr. Harry. Post operatively admitted to hospital. Drain dc'ed when output decreased. Her LLE sensation improved as did her weakness but does still have foot drop for which she is now using PODUS boot at rest to prevent equinus contracture and AFO to ambulate. Post operatively she maintains decreased but able to feel bladder getting full and control her voiding. She maintains decreased rectal sensation but able to control her defecation. Dc'ed to inpatient rehab.

## 2022-11-19 NOTE — NURSING
Patient arrived to [facility] unit via wheelchair. Patient assisted in the bed with belongings placed at bedside. Patient AAOX's 4, able to make needs known. Vital signs stable. [place] dressing and 2 gauze dressings clean dry and intact noted to back.  Dry scabs noted to right anterior lower leg. Bed is in lowest position, wheels locked, call bell within reach, no complaints at this time. Will continue to monitor.

## 2022-11-20 PROCEDURE — 97530 THERAPEUTIC ACTIVITIES: CPT | Mod: CO

## 2022-11-20 PROCEDURE — 25000003 PHARM REV CODE 250: Performed by: HOSPITALIST

## 2022-11-20 PROCEDURE — 11000004 HC SNF PRIVATE

## 2022-11-20 RX ADMIN — ALPRAZOLAM 1 MG: 0.5 TABLET ORAL at 07:11

## 2022-11-20 RX ADMIN — GABAPENTIN 600 MG: 300 CAPSULE ORAL at 08:11

## 2022-11-20 RX ADMIN — VENLAFAXINE HYDROCHLORIDE 300 MG: 75 CAPSULE, EXTENDED RELEASE ORAL at 09:11

## 2022-11-20 RX ADMIN — METHOCARBAMOL 1000 MG: 500 TABLET ORAL at 09:11

## 2022-11-20 RX ADMIN — OXYCODONE HYDROCHLORIDE 10 MG: 10 TABLET ORAL at 07:11

## 2022-11-20 RX ADMIN — OXYCODONE HYDROCHLORIDE 10 MG: 10 TABLET ORAL at 12:11

## 2022-11-20 RX ADMIN — DOXEPIN HYDROCHLORIDE 50 MG: 50 CAPSULE ORAL at 08:11

## 2022-11-20 RX ADMIN — GABAPENTIN 600 MG: 300 CAPSULE ORAL at 02:11

## 2022-11-20 RX ADMIN — OXYCODONE HYDROCHLORIDE 10 MG: 10 TABLET ORAL at 04:11

## 2022-11-20 RX ADMIN — GABAPENTIN 600 MG: 300 CAPSULE ORAL at 09:11

## 2022-11-20 RX ADMIN — CELECOXIB 200 MG: 200 CAPSULE ORAL at 09:11

## 2022-11-20 RX ADMIN — MUPIROCIN: 20 OINTMENT TOPICAL at 09:11

## 2022-11-20 RX ADMIN — MUPIROCIN: 20 OINTMENT TOPICAL at 08:11

## 2022-11-20 RX ADMIN — METHOCARBAMOL 1000 MG: 500 TABLET ORAL at 02:11

## 2022-11-20 RX ADMIN — OXYCODONE HYDROCHLORIDE 10 MG: 10 TABLET ORAL at 06:11

## 2022-11-20 RX ADMIN — METHOCARBAMOL 1000 MG: 500 TABLET ORAL at 08:11

## 2022-11-20 RX ADMIN — ALPRAZOLAM 1 MG: 0.5 TABLET ORAL at 09:11

## 2022-11-20 NOTE — PT/OT/SLP PROGRESS
Occupational Therapy   Treatment    Name: Marianela Shrestha  MRN: [id number]  Admit Date: 11/18/2022  Admitting Diagnosis:  LUMBAR LAMINECTOMY    General Precautions: Standard, fall   Orthopedic Precautions:spinal precautions   Braces:       Recommendations:     Discharge Recommendations: home health OT  Level of Assistance Recommended at Discharge: Intermittent assistance for ADL's and homemaking tasks  Discharge Equipment Recommendations:   (TBD)  Barriers to discharge:  Decreased caregiver support    Assessment:     Marianela Shrestha is a 37 y.o. female with a medical diagnosis of LUMBAR LAMINECTOMY  She presents with Performance deficits affecting function are   weakness, impaired endurance, impaired self care skills, impaired functional mobility, impaired sensation, gait instability, impaired balance, decreased coordination, decreased upper extremity function, decreased lower extremity function, decreased safety awareness, pain, decreased ROM, orthopedic precautions    Pt. Was cooperative and participated well with session on this day. Pt. With bouts of tearfulness at time and emotional though our session with talking about general parts of her life and with  recent surgeries  Pt  continues to demonstrate levels of physical deficits with  functional indep with daily management activities tasks, selfcare skills with balance,  functional mobility, UB strength and endurance. Pt. Will continue to benefit from continued OT to progress towards goals    Rehab Potential is fair    Activity tolerance:  Fair    Plan:     Patient to be seen 6 x/week to address the above listed problems via self-care/home management, therapeutic activities, therapeutic exercises    Plan of Care Expires: 12/19/22  Plan of Care Reviewed with: patient    Subjective     Communicated with: nsg and Pt. prior to session My dog helps me out a lot through the good and bad    Pain/Comfort:  Pain Rating 1: 6/10  Location - Side 1:  Bilateral  Location - Orientation 1: generalized  Location 1: lumbar spine  Pain Addressed 1: Pre-medicate for activity, Reposition, Distraction  Pain Rating Post-Intervention 1: 6/10    Patient's cultural, spiritual, Anabaptism conflicts given the current situation:  no    Objective:     Patient found HOB elevated    upon OT entry to room.    Bed Mobility:    Patient completed Scooting/Bridging with supervision  Patient completed Supine to Sit with supervision  Patient completed Sit to Supine with supervision     Functional Mobility/Transfers:  Patient completed Sit <> Stand Transfer with stand by assistance  with  no assistive device   Patient completed Bed <> Chair Transfer using Stand Pivot technique with stand by assistance with no assistive device      Activities of Daily Living:  Upper Body Dressing: stand by assistance to doff gown and snow top  Lower Body Dressing: stand by assistance to snow pants bed level and mange over hips instance.  Pt. Able to adjust sock with fig 4 tech EOB level Pt. Reminded and educated on spinal precautions  with LB dressing     AMPA 6 Click ADL: 20    OT Exercises: UE Ergometer 10 min    Treatment & Education:  Pt. With standing act on this day with task. Pt. With CGA/SBA for balance aspects with task with  AD at raised counter Pt with visual perception task with discrimination of various shapes and sizes x 5:35 min/sec with standing bal and min cues through out with weight shifting and use of BUE's incorporated and crossing mid line and facilitation with posture in prep for home management .     Pt. With therex performed to increase ROM, endurance selfcare task and fxl mobility for independence     Pt edu on role of OT, POC, safety when performing self care tasks , benefit of performing OOB activity, and safety when performing functional transfers and mobility management for preparation with goals to progress towards next level of care     Patient left supine with all lines  intact, call button in reach, and spouse and service dog  present    GOALS:   Multidisciplinary Problems       Occupational Therapy Goals          Problem: Occupational Therapy    Goal Priority Disciplines Outcome Interventions   Occupational Therapy Goal     OT, PT/OT Ongoing, Progressing    Description: Goals to be met by: 3 weeks     Patient will increase functional independence with ADLs by performing:    UE Dressing with Modified [place].  LE Dressing with Stand-by Assistance.  Grooming while seated with Modified [place].  Toileting from toilet or BSC with SBA for hygiene and clothing management. With A/D to stand.  Bathing from sitting at sink with Stand-by Assistance.  Supine to sit with Modified [place].  Step transfer with Stand-by Assistance with A/D as needed.  Upper extremity exercise with assistance as needed.  Caregiver will be educated on level of assist required to safely perform self care tasks and functional transfers..                          Time Tracking:     OT Date of Treatment: 11/20/22  OT Start Time: 0915    OT Stop Time: 1009  OT Total Time (min): 54 min    Billable Minutes:Therapeutic Activity 54    11/20/2022  A client care conference was performed between the CASSANDRA and ARYAN, prior to treatment to discuss the patient's status, treatment plan and established goals.

## 2022-11-20 NOTE — PLAN OF CARE
Problem: Occupational Therapy  Goal: Occupational Therapy Goal  Description: Goals to be met by: 3 weeks     Patient will increase functional independence with ADLs by performing:    UE Dressing with Modified [place].  LE Dressing with Stand-by Assistance.  Grooming while seated with Modified [place].  Toileting from toilet or BSC with SBA for hygiene and clothing management. With A/D to stand.  Bathing from sitting at sink with Stand-by Assistance.  Supine to sit with Modified [place].  Step transfer with Stand-by Assistance with A/D as needed.  Upper extremity exercise with assistance as needed.  Caregiver will be educated on level of assist required to safely perform self care tasks and functional transfers..     Outcome: Ongoing, Progressing

## 2022-11-21 LAB
ANION GAP SERPL CALC-SCNC: 10 MMOL/L (ref 8–16)
BASOPHILS # BLD AUTO: 0.04 K/UL (ref 0–0.2)
BASOPHILS NFR BLD: 0.5 % (ref 0–1.9)
BUN SERPL-MCNC: 18 MG/DL (ref 6–20)
CALCIUM SERPL-MCNC: 9.2 MG/DL (ref 8.7–10.5)
CHLORIDE SERPL-SCNC: 104 MMOL/L (ref 95–110)
CO2 SERPL-SCNC: 23 MMOL/L (ref 23–29)
CREAT SERPL-MCNC: 0.8 MG/DL (ref 0.5–1.4)
DIFFERENTIAL METHOD: ABNORMAL
EOSINOPHIL # BLD AUTO: 0.3 K/UL (ref 0–0.5)
EOSINOPHIL NFR BLD: 3.1 % (ref 0–8)
ERYTHROCYTE [DISTWIDTH] IN BLOOD BY AUTOMATED COUNT: 14.1 % (ref 11.5–14.5)
EST. GFR  (NO RACE VARIABLE): >60 ML/MIN/1.73 M^2
GLUCOSE SERPL-MCNC: 91 MG/DL (ref 70–110)
HCT VFR BLD AUTO: 37.3 % (ref 37–48.5)
HGB BLD-MCNC: 12 G/DL (ref 12–16)
IMM GRANULOCYTES # BLD AUTO: 0.06 K/UL (ref 0–0.04)
IMM GRANULOCYTES NFR BLD AUTO: 0.7 % (ref 0–0.5)
LYMPHOCYTES # BLD AUTO: 2.5 K/UL (ref 1–4.8)
LYMPHOCYTES NFR BLD: 28.8 % (ref 18–48)
MAGNESIUM SERPL-MCNC: 2.1 MG/DL (ref 1.6–2.6)
MCH RBC QN AUTO: 31.2 PG (ref 27–31)
MCHC RBC AUTO-ENTMCNC: 32.2 G/DL (ref 32–36)
MCV RBC AUTO: 97 FL (ref 82–98)
MONOCYTES # BLD AUTO: 0.7 K/UL (ref 0.3–1)
MONOCYTES NFR BLD: 7.8 % (ref 4–15)
NEUTROPHILS # BLD AUTO: 5.2 K/UL (ref 1.8–7.7)
NEUTROPHILS NFR BLD: 59.1 % (ref 38–73)
NRBC BLD-RTO: 0 /100 WBC
PHOSPHATE SERPL-MCNC: 3.6 MG/DL (ref 2.7–4.5)
PLATELET # BLD AUTO: 440 K/UL (ref 150–450)
PMV BLD AUTO: 9.4 FL (ref 9.2–12.9)
POTASSIUM SERPL-SCNC: 4.3 MMOL/L (ref 3.5–5.1)
RBC # BLD AUTO: 3.85 M/UL (ref 4–5.4)
SODIUM SERPL-SCNC: 137 MMOL/L (ref 136–145)
WBC # BLD AUTO: 8.79 K/UL (ref 3.9–12.7)

## 2022-11-21 PROCEDURE — 97110 THERAPEUTIC EXERCISES: CPT | Mod: CO

## 2022-11-21 PROCEDURE — 84100 ASSAY OF PHOSPHORUS: CPT | Performed by: HOSPITALIST

## 2022-11-21 PROCEDURE — 80048 BASIC METABOLIC PNL TOTAL CA: CPT | Performed by: HOSPITALIST

## 2022-11-21 PROCEDURE — 97110 THERAPEUTIC EXERCISES: CPT | Mod: CQ

## 2022-11-21 PROCEDURE — 25000242 PHARM REV CODE 250 ALT 637 W/ HCPCS: Performed by: NURSE PRACTITIONER

## 2022-11-21 PROCEDURE — 83735 ASSAY OF MAGNESIUM: CPT | Performed by: HOSPITALIST

## 2022-11-21 PROCEDURE — 97116 GAIT TRAINING THERAPY: CPT | Mod: CQ

## 2022-11-21 PROCEDURE — 25000003 PHARM REV CODE 250: Performed by: NURSE PRACTITIONER

## 2022-11-21 PROCEDURE — 97530 THERAPEUTIC ACTIVITIES: CPT | Mod: CO

## 2022-11-21 PROCEDURE — 85025 COMPLETE CBC W/AUTO DIFF WBC: CPT | Performed by: HOSPITALIST

## 2022-11-21 PROCEDURE — 25000003 PHARM REV CODE 250: Performed by: HOSPITALIST

## 2022-11-21 PROCEDURE — 11000004 HC SNF PRIVATE

## 2022-11-21 PROCEDURE — 36415 COLL VENOUS BLD VENIPUNCTURE: CPT | Performed by: HOSPITALIST

## 2022-11-21 PROCEDURE — 63600175 PHARM REV CODE 636 W HCPCS: Performed by: NURSE PRACTITIONER

## 2022-11-21 RX ORDER — NORETHINDRONE 5 MG/1
5 TABLET ORAL DAILY
Status: DISCONTINUED | OUTPATIENT
Start: 2022-11-21 | End: 2022-12-16 | Stop reason: HOSPADM

## 2022-11-21 RX ORDER — HEPARIN SODIUM 5000 [USP'U]/ML
5000 INJECTION, SOLUTION INTRAVENOUS; SUBCUTANEOUS EVERY 8 HOURS
Status: DISCONTINUED | OUTPATIENT
Start: 2022-11-21 | End: 2022-12-16 | Stop reason: HOSPADM

## 2022-11-21 RX ORDER — PANTOPRAZOLE SODIUM 40 MG/1
40 TABLET, DELAYED RELEASE ORAL DAILY
Status: DISCONTINUED | OUTPATIENT
Start: 2022-11-21 | End: 2022-12-16 | Stop reason: HOSPADM

## 2022-11-21 RX ORDER — FLUTICASONE FUROATE AND VILANTEROL 100; 25 UG/1; UG/1
1 POWDER RESPIRATORY (INHALATION) DAILY
Status: DISCONTINUED | OUTPATIENT
Start: 2022-11-21 | End: 2022-12-16 | Stop reason: HOSPADM

## 2022-11-21 RX ADMIN — METHOCARBAMOL 1000 MG: 500 TABLET ORAL at 09:11

## 2022-11-21 RX ADMIN — GABAPENTIN 600 MG: 300 CAPSULE ORAL at 03:11

## 2022-11-21 RX ADMIN — ACETAMINOPHEN 650 MG: 325 TABLET ORAL at 02:11

## 2022-11-21 RX ADMIN — METHOCARBAMOL 1000 MG: 500 TABLET ORAL at 03:11

## 2022-11-21 RX ADMIN — OXYCODONE HYDROCHLORIDE 10 MG: 10 TABLET ORAL at 12:11

## 2022-11-21 RX ADMIN — DOXEPIN HYDROCHLORIDE 50 MG: 50 CAPSULE ORAL at 09:11

## 2022-11-21 RX ADMIN — OXYCODONE HYDROCHLORIDE 10 MG: 10 TABLET ORAL at 09:11

## 2022-11-21 RX ADMIN — HEPARIN SODIUM 5000 UNITS: 5000 INJECTION INTRAVENOUS; SUBCUTANEOUS at 09:11

## 2022-11-21 RX ADMIN — GABAPENTIN 600 MG: 300 CAPSULE ORAL at 09:11

## 2022-11-21 RX ADMIN — OXYCODONE HYDROCHLORIDE 10 MG: 10 TABLET ORAL at 05:11

## 2022-11-21 RX ADMIN — VENLAFAXINE HYDROCHLORIDE 300 MG: 75 CAPSULE, EXTENDED RELEASE ORAL at 09:11

## 2022-11-21 RX ADMIN — MUPIROCIN: 20 OINTMENT TOPICAL at 09:11

## 2022-11-21 RX ADMIN — CELECOXIB 200 MG: 200 CAPSULE ORAL at 09:11

## 2022-11-21 RX ADMIN — HEPARIN SODIUM 5000 UNITS: 5000 INJECTION INTRAVENOUS; SUBCUTANEOUS at 01:11

## 2022-11-21 RX ADMIN — FLUTICASONE FUROATE AND VILANTEROL TRIFENATATE 1 PUFF: 100; 25 POWDER RESPIRATORY (INHALATION) at 12:11

## 2022-11-21 RX ADMIN — ALPRAZOLAM 1 MG: 0.5 TABLET ORAL at 05:11

## 2022-11-21 RX ADMIN — OXYCODONE HYDROCHLORIDE 10 MG: 10 TABLET ORAL at 01:11

## 2022-11-21 RX ADMIN — ALPRAZOLAM 1 MG: 0.5 TABLET ORAL at 10:11

## 2022-11-21 RX ADMIN — ALPRAZOLAM 1 MG: 0.5 TABLET ORAL at 09:11

## 2022-11-21 RX ADMIN — NORETHINDRONE ACETATE 5 MG: 5 TABLET ORAL at 11:11

## 2022-11-21 RX ADMIN — PANTOPRAZOLE SODIUM 40 MG: 40 TABLET, DELAYED RELEASE ORAL at 12:11

## 2022-11-21 NOTE — PT/OT/SLP PROGRESS
"Physical Therapy Treatment    Patient Name:  Marianela Shrestha   MRN:  [id number]  Admit Date: 11/18/2022  Admitting Diagnosis: Lumbar Stenosis    General Precautions: Standard, fall   Orthopedic Precautions:spinal precautions   Braces: AFO     Recommendations:     Discharge Recommendations:  home health PT   Level of Assistance Recommended at Discharge: Intermittent assistance   Discharge Equipment Recommendations: bedside commode, walker, rolling, wheelchair, bath bench   Barriers to discharge: Decreased caregiver support    Assessment:     Marianela Shrestha is a 37 y.o. female admitted with a medical diagnosis of Lumbar Stenosis. Pt tolerated session well with no adverse effects noted. She improved on distance ambulated and TAMEKA necessary on this date, and became emotional multiple times during session due to the progress. Pt will continue to benefit from skilled therapy services to improve LE strength and endurance to continue to improve functional mobility and reach highest levels of independence.      Performance deficits affecting function:  weakness, impaired endurance, impaired self care skills, impaired functional mobility, gait instability, impaired balance, decreased lower extremity function, impaired sensation, impaired skin, impaired cardiopulmonary response to activity, orthopedic precautions .    Rehab Potential is good    Activity Tolerance: Good    Plan:     Patient to be seen 6 x/week to address the above listed problems via gait training, therapeutic activities, therapeutic exercises, neuromuscular re-education    Plan of Care Expires: 12/19/22  Plan of Care Reviewed with: patient    Subjective     "I am ready to do it".     Pain/Comfort:  Pain Rating 1:  (not rated)  Location - Side 1: Bilateral  Location - Orientation 1: generalized  Location 1: lumbar spine  Pain Addressed 1: Pre-medicate for activity, Reposition, Distraction  Pain Rating Post-Intervention 1:  (not rated)    Patient's " cultural, spiritual, Judaism conflicts given the current situation:  no    Objective:     Patient found up in chair with  (no active lines) in PT gym following OT session.    Therapeutic Activities and Exercises:   Recumbent cross  lvl 1 x 10 minutes     Functional Mobility:  Transfers:     Sit to Stand: contact guard assistance with rolling walker  Chair to mat: contact guard assistance with  no AD using Stand Pivot  Gait: pt ambulated 106' in RW with CGA  Pt demonstrated slowed gait velocity, reduced foot clearance with LLE, and narrowed LIZET; no LOB or instability noted  Stairs: Pt ascended/descended 4 stair(s) with No Assistive Device with bilateral handrails with Contact Guard Assistance.   Wheelchair Propulsion: Pt propelled Standard wheelchair x ~200 feet on Level tile with  Bilateral upper extremity with Supervision or Set-up Assistance.     AM-PAC 6 CLICK MOBILITY  20    Patient left HOB elevated with all lines intact and call button in reach.    GOALS:   Multidisciplinary Problems       Physical Therapy Goals          Problem: Physical Therapy    Goal Priority Disciplines Outcome Goal Variances Interventions   Physical Therapy Goal     PT, PT/OT Ongoing, Progressing     Description: Goals to be met by:12/19/22    Patient will increase functional independence with mobility by performing:    . Sit to stand transfer with Supervision  . Bed to chair transfer with Supervision using Rolling Walker  . Gait  x 150 feet with Stand-by Assistance using Rolling Walker.   . Ascend/descend 4 stair with bilateral Handrails Stand-by Assistance using No Assistive Device.   . Ascend/Descend 4 inch curb step with Stand-by Assistance using Rolling Walker.                         Time Tracking:     PT Received On: 11/21/22  PT Start Time: 1049  PT Stop Time: 1123  PT Total Time (min): 34 min    Billable Minutes: Gait Training 15 and Therapeutic Exercise 19    Treatment Type: Treatment  PT/PTA: PTA     PTA Visit Number:  4     11/21/2022

## 2022-11-21 NOTE — PROGRESS NOTES
[facility]                                  Skilled Nursing Facility                   Progress Note     Patient Name: Marianela Shrestha  YOB: 1985  MRN: [id number]  Room: [place] [address] [address] A     Admit Date: 11/18/2022   CARLITA:      Principal Problem: SHAZIA (cauda equina syndrome)    HPI obtained from patient interview and chart review     Chief Complaint:   Establish Care/ Initial Visit        HPI:   Marianela Shrestha is a 37 y.o. female with PMH of drug abuse, low back pain,  LLE radiculopathy and right hand osteomyelitis treated with surgery presents for evaluation of 3 day history LLE weakness and changes in bowel and bladder acutely worse in last 24 hours prior to arrival to ER. The patient reports chronic back pain. However, she fell , twisting her ankle and low back.  S/P L4, L5, S1 laminectomy 11/11/22 by Dr. Harry. Post operatively she maintains decreased but able to feel bladder getting full and control her voiding. She maintains decreased rectal sensation but able to control her defecation.    Patient will be treated at [facility] with PT and OT to improve functional status and ability to perform ADLs.     Interval History  All of today's labs reviewed and are listed below.  24 hr vital sign ranges listed below.  Patient denies shortness of breath, abdominal discomfort, nausea, or vomiting.  Patient reports an adequate appetite.  Patient denies dysuria.  Patient reports having regular bowel movements.  Patient progessing with PT/OT. Continuing to follow and treat all acute and chronic conditions.    Past Medical History: Patient has a past medical history of Anxiety, Chronic osteomyelitis of right hand (2019), Chronic pain syndrome, Depression, Insomnia, Post traumatic stress disorder (PTSD), and Sciatica.    Past Surgical History: Patient has a past surgical history that includes Tonsillectomy; Cosmetic surgery;  Pelvic laparoscopy; Finger surgery; Ablation of medial branch nerve of lumbar spine facet joint; Functional endoscopic sinus surgery (FESS) using computer-assisted navigation (Bilateral, 7/1/2022); Maxillary antrostomy (7/1/2022); Ethmoidectomy (7/1/2022); and Lumbar laminectomy (N/A, 11/11/2022).    Social History: Patient reports that she has been smoking cigarettes. She started smoking about 5 years ago. She has a 3.75 pack-year smoking history. She has never used smokeless tobacco. She reports that she does not currently use alcohol. She reports that she does not use drugs.    Family History:  family history includes Cervical cancer in her maternal aunt; Colon cancer in her maternal aunt; Diabetes in her maternal aunt, maternal grandmother, and mother; Hypertension in her mother.    Allergies: Patient is allergic to coconut; sulfa (sulfonamide antibiotics); adhesive; and latex, natural rubber.        Review of Systems   Constitutional:  Positive for malaise/fatigue. Negative for chills and fever.   HENT:  Negative for congestion, hearing loss and sore throat.    Eyes:  Negative for blurred vision and double vision.   Respiratory:  Negative for cough, sputum production, shortness of breath and wheezing.    Cardiovascular:  Negative for chest pain, palpitations and leg swelling.   Gastrointestinal:  Negative for abdominal pain, constipation, diarrhea, heartburn, nausea and vomiting.   Genitourinary:  Negative for dysuria and urgency.   Musculoskeletal:  Negative for back pain.   Skin:  Negative for rash.        + wound   Neurological:  Positive for weakness. Negative for dizziness and headaches.   Endo/Heme/Allergies:  Negative for polydipsia. Does not bruise/bleed easily.   Psychiatric/Behavioral:  Negative for depression and hallucinations. The patient is not nervous/anxious.        24 hour Vital Sign Range   Temp:  [97.6 °F (36.4 °C)-98.6 °F (37 °C)]   Pulse:  [88-96]   Resp:  [16-18]   BP: (111-133)/(70-91)    SpO2:  [96 %-100 %]       Physical Exam  Vitals and nursing note reviewed.   Constitutional:       General: She is not in acute distress.     Appearance: Normal appearance. She is not ill-appearing.   HENT:      Head: Normocephalic and atraumatic.      Nose: Nose normal. No congestion.      Mouth/Throat:      Mouth: Mucous membranes are moist.      Pharynx: Oropharynx is clear.   Eyes:      Extraocular Movements: Extraocular movements intact.      Conjunctiva/sclera: Conjunctivae normal.      Pupils: Pupils are equal, round, and reactive to light.   Cardiovascular:      Rate and Rhythm: Normal rate and regular rhythm.      Pulses: Normal pulses.      Heart sounds: Normal heart sounds. No murmur heard.  Pulmonary:      Effort: Pulmonary effort is normal. No respiratory distress.      Breath sounds: Normal breath sounds. No wheezing or rhonchi.   Abdominal:      General: Bowel sounds are normal. There is no distension.      Palpations: Abdomen is soft. There is no mass.      Tenderness: There is no abdominal tenderness.   Musculoskeletal:         General: No swelling or tenderness.      Cervical back: Normal range of motion and neck supple. No tenderness.      Right lower leg: No edema.      Left lower leg: No edema.   Skin:     General: Skin is warm and dry.      Capillary Refill: Capillary refill takes less than 2 seconds.      Findings: No erythema or rash.   Neurological:      Mental Status: She is alert and oriented to person, place, and time. Mental status is at baseline.      Motor: Weakness present.   Psychiatric:         Mood and Affect: Mood normal.         Behavior: Behavior normal.         Thought Content: Thought content normal.         Judgment: Judgment normal.     Full skin assessment completed by this NP           Incision/Site 11/11/22 2342 Back (Active)   11/11/22 2342   Present Prior to Hospital Arrival?:    Side:    Location: Back   Orientation:    Incision Type:    Closure Method:    Additional  Comments:    Removal Indication and Assessment:    Wound Outcome:    Removal Indications:    Incision WDL ex 11/20/22 0800   Dressing Appearance Dry;Intact 11/20/22 2053   Drainage Amount None 11/20/22 2053   Drainage Characteristics/Odor No odor 11/20/22 2053   Appearance Sutures intact 11/20/22 2053   Periwound Area Dry 11/20/22 0800   Wound Edges Approximated 11/20/22 0800   Dressing Removed;Changed;Applied;Island/border 11/20/22 0800   Dressing Change Due 11/21/22 11/20/22 0800   Number of days: 10          Labs:  Recent Labs   Lab 11/21/22  0513   WBC 8.79   HGB 12.0   HCT 37.3        Recent Labs   Lab 11/21/22  0513      K 4.3      CO2 23   BUN 18   CREATININE 0.8   GLU 91   CALCIUM 9.2   MG 2.1   PHOS 3.6     No results for input(s): ALKPHOS, ALT, AST, ALBUMIN, PROT, BILITOT, INR in the last 168 hours.  No results for input(s): POCTGLUCOSE in the last 72 hours.    Meds Scheduled:   celecoxib  200 mg Oral Daily    doxepin  50 mg Oral QHS    gabapentin  600 mg Oral TID    methocarbamoL  1,000 mg Oral TID    mupirocin   Topical (Top) BID    mupirocin   Topical (Top) Q14 Days    senna-docusate 8.6-50 mg  1 tablet Oral BID    venlafaxine  300 mg Oral Daily       PRN:   (Magic mouthwash) 1:1:1 diphenhydrAMINE(Benadryl) 12.5mg/5ml liq, aluminum & magnesium hydroxide-simethicone (Maalox), LIDOcaine viscous 2%, acetaminophen, acetaminophen, albuterol, albuterol-ipratropium, ALPRAZolam, calcium carbonate, melatonin, ondansetron, oxyCODONE, oxyCODONE, oxyCODONE      Assessment and Plan:    SHAZIA (cauda equina syndrome)  SP L4-S1 Laminectomy 11.11.22  -PODAS boot for LLE foot drop, AFO LLE when ambulating  -PT/OT: WBAT spine precautions  -DVT PPx: Heparin TID      -Pain management, Celebrex, Robaxin, Oxycodone, Gabapentin    Debility   - Continue with PT/OT for gait training and strengthening and restoration of ADL's   - Encourage mobility, OOB in chair, and early ambulation as appropriate  - Fall  precautions   - Monitor for bowel and bladder dysfunction  - Monitor for and prevent skin breakdown and pressure ulcers  - Continue DVT prophylaxis with heparin     Anticipate disposition:    Home with home health      Follow-up needed during SNF admission:       Follow-up needed after discharge from SNF:   - PCP within 1-2 weeks  - See appt scheduled below     Future Appointments   Date Time Provider Department Center   12/12/2022 11:30 AM Antonia Clifton PA-C [facility]         I certify that SNF services are required to be given on an inpatient basis because Marianela Shrestha needs for skilled nursing care and/or skilled rehabilitation are required on a daily basis and such services can only practically be provided in a skilled nursing facility setting and are for an ongoing condition for which she received inpatient care in the hospital.       Extended Visit:   Total time spent: 118 minutes [id number]  Non Face to Face Time: 80 minutes   Description of Time: counseling provided on clinical condition, therapies provided, plan of care, emotional support, coordinating patient care with other care team members, reviewing and interpreting labs and imaging, collaboration with physician, initiating new orders, chart review, and documentation. See interval hx.         Wanda Jacobs NP  Department of Hospital Medicine   [facility]     DOS: 11/21/2022       Patient note was created using MModal Dictation.  Any errors in syntax or even information may not have been identified and edited on initial review prior to signing this note.
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
## 2022-11-21 NOTE — PT/OT/SLP PROGRESS
"Occupational Therapy   Treatment    Name: Marianela Shrestha  MRN: [id number]  Admit Date: 11/18/2022  Admitting Diagnosis:  LUMBAR LAMINECTOMY    General Precautions: Standard, fall   Orthopedic Precautions:spinal precautions   Braces: AFO     Recommendations:     Discharge Recommendations: home health OT  Level of Assistance Recommended at Discharge: Intermittent assistance for ADL's and homemaking tasks  Discharge Equipment Recommendations:   (TBD)  Barriers to discharge:  Decreased caregiver support    Assessment:     Marianela Shrestha is a 37 y.o. female with a medical diagnosis of LUMBAR LAMINECTOMY.  She presents with . Performance deficits affecting function are  weakness, impaired endurance, impaired self care skills, impaired functional mobility, impaired sensation, gait instability, impaired balance, decreased coordination, decreased upper extremity function, decreased lower extremity function, decreased safety awareness, pain, decreased ROM, orthopedic precautions.   Pt participated well during today's session. Pt emotional during session due to circumstances, encouragement provided. Pt continues to demonstrate deficits with self care skills, balance, functional mobility, UB strength and endurance. Pt will benefit from continued OT services to progress towards goals.     Rehab Potential is good    Activity tolerance:  Good    Plan:     Patient to be seen 6 x/week to address the above listed problems via self-care/home management, therapeutic activities, therapeutic exercises    Plan of Care Expires: 12/19/22  Plan of Care Reviewed with: patient    Subjective     Communicated with: Lydia prior to session. "I'm ready to go to the gym" .    Pain/Comfort:  Pain Rating 1: 9/10  Location - Side 1: Left  Location - Orientation 1: generalized  Location 1: ankle  Pain Addressed 1: Pre-medicate for activity, Reposition, Distraction  Pain Rating Post-Intervention 1: 8/10  Pain Rating 2: 7/10  Location - Side 2: " Bilateral  Location - Orientation 2: generalized  Location 2: lumbar spine  Pain Addressed 2: Pre-medicate for activity, Distraction, Reposition  Pain Rating Post-Intervention 2: 7/10    Patient's cultural, spiritual, Taoist conflicts given the current situation:  no    Objective:     Patient found supine upon OT entry to room.    Bed Mobility:    Patient completed Rolling/Turning to Right with supervision  Patient completed Scooting/Bridging with supervision  Patient completed Supine to Sit with supervision     Functional Mobility/Transfers:  Patient completed Sit <> Stand Transfer with stand by assistance  with  rolling walker   Patient completed Bed <> Chair Transfer using Stand Pivot technique with stand by assistance with no assistive device    Activities of Daily Living:  Lower Body Dressing: stand by assistance to don AFO and B shoes seated EOB.     [facility] 6 Click ADL: 20    OT Exercises: AROM x 2 sets of 20 with #3 dowel perform bicep curls and forward flexion to improve UB strengthening. .   UE Ergometer 10 min with moderate resistance to improve endurance and ROM.     Treatment & Education:  Pt with standing activity today with SBA and RW. Pt at raised counter perform fine motor and visual discrimination task with focus on standing tolerance, functional reaching, dynamic standing bal, crossing midline, and to promote independence with homemaking and self care tasks. Pt able to maintain stance for ~ 6:39 min before becoming fatigued.   Pt educated on role of OT, safety while performing functional transfers, safety while performing self care tasks, spinal precautions, importance to adhering to precautions and progress towards OT goals   Pt able to recall 1/3 spinal precautions.     Patient left up in chair with  PTA present in rehab gym.     GOALS:   Multidisciplinary Problems       Occupational Therapy Goals          Problem: Occupational Therapy    Goal Priority Disciplines Outcome Interventions    Occupational Therapy Goal     OT, PT/OT Ongoing, Progressing    Description: Goals to be met by: 3 weeks     Patient will increase functional independence with ADLs by performing:    UE Dressing with Modified [place].  LE Dressing with Stand-by Assistance.  Grooming while seated with Modified [place].  Toileting from toilet or BSC with SBA for hygiene and clothing management. With A/D to stand.  Bathing from sitting at sink with Stand-by Assistance.  Supine to sit with Modified [place].  Step transfer with Stand-by Assistance with A/D as needed.  Upper extremity exercise with assistance as needed.  Caregiver will be educated on level of assist required to safely perform self care tasks and functional transfers..                          Time Tracking:     OT Date of Treatment: 11/21/22  OT Start Time: 1000    OT Stop Time: 1042  OT Total Time (min): 42 min    Billable Minutes:Therapeutic Activity 25  Therapeutic Exercise 17    11/21/2022  A client care conference was performed between the CASSANDRA and ARYAN, prior to treatment by BAEZA, to discuss the patient's status, treatment plan and established goals.

## 2022-11-21 NOTE — PLAN OF CARE
Problem: Occupational Therapy  Goal: Occupational Therapy Goal  Description: Goals to be met by: 3 weeks     Patient will increase functional independence with ADLs by performing:    UE Dressing with Modified [place].  LE Dressing with Stand-by Assistance.  Grooming while seated with Modified [place].  Toileting from toilet or BSC with SBA for hygiene and clothing management. With A/D to stand.  Bathing from sitting at sink with Stand-by Assistance.  Supine to sit with Modified [place].  Step transfer with Stand-by Assistance with A/D as needed.  Upper extremity exercise with assistance as needed.  Caregiver will be educated on level of assist required to safely perform self care tasks and functional transfers..     Outcome: Ongoing, Progressing

## 2022-11-21 NOTE — PLAN OF CARE
[facility]  Discharge Final Note    Primary Care Provider: Arnaldo Espino MD    Expected Discharge Date: 11/18/2022    Final Discharge Note (most recent)       Final Note - 11/21/22 0722          Final Note    Assessment Type Final Discharge Note     Anticipated Discharge Disposition Skilled Nursing Facility     What phone number can be called within the next 1-3 days to see how you are doing after discharge? --   [phone]    Hospital Resources/Appts/Education Provided Appointments scheduled and added to AVS        Post-Acute Status    Post-Acute Authorization Placement     Post-Acute Placement Status Set-up Complete/Auth obtained                     Important Message from Medicare  Important Message from Medicare regarding Discharge Appeal Rights: Given to patient/caregiver, Explained to patient/caregiver, Signed/date by patient/caregiver     Date IMM was signed: 11/17/22  Time IMM was signed: 0945      Future Appointments   Date Time Provider Department Center   12/12/2022 11:30 AM Antonia Clifton PA-C [facility]     Patient discharged to [facility] on 11/18/22.

## 2022-11-21 NOTE — PLAN OF CARE
Problem: Adult Inpatient Plan of Care  Goal: Plan of Care Review  11/21/2022 1536 by Mayi Hall, LPN  Outcome: Ongoing, Progressing  11/21/2022 1536 by Mayi Hall, LPN  Outcome: Ongoing, Progressing  Goal: Patient-Specific Goal (Individualized)  11/21/2022 1536 by Mayi Hall, LPN  Outcome: Ongoing, Progressing  11/21/2022 1536 by Mayi Hall, LPN  Outcome: Ongoing, Progressing  Goal: Absence of Hospital-Acquired Illness or Injury  11/21/2022 1536 by Mayi Hall, LPN  Outcome: Ongoing, Progressing  11/21/2022 1536 by Mayi Hall, LPN  Outcome: Ongoing, Progressing  Goal: Optimal Comfort and Wellbeing  11/21/2022 1536 by aMyi Hall, LPN  Outcome: Ongoing, Progressing  11/21/2022 1536 by Mayi Hall, LPN  Outcome: Ongoing, Progressing  Goal: Readiness for Transition of Care  11/21/2022 1536 by Mayi Hall, LPN  Outcome: Ongoing, Progressing  11/21/2022 1536 by Mayi Hall, LPN  Outcome: Ongoing, Progressing     Problem: Adult Inpatient Plan of Care  Goal: Plan of Care Review  11/21/2022 1536 by Mayi Hall, LPN  Outcome: Ongoing, Progressing  11/21/2022 1536 by Mayi Hall, LPN  Outcome: Ongoing, Progressing  Goal: Patient-Specific Goal (Individualized)  11/21/2022 1536 by Mayi Hall, LPN  Outcome: Ongoing, Progressing  11/21/2022 1536 by Mayi Hall, LPN  Outcome: Ongoing, Progressing  Goal: Absence of Hospital-Acquired Illness or Injury  11/21/2022 1536 by Mayi Hall, LPN  Outcome: Ongoing, Progressing  11/21/2022 1536 by Mayi Hall, LPN  Outcome: Ongoing, Progressing  Goal: Optimal Comfort and Wellbeing  11/21/2022 1536 by Mayi Hall, LPN  Outcome: Ongoing, Progressing  11/21/2022 1536 by Mayi Hall, LPN  Outcome: Ongoing, Progressing  Goal: Readiness for Transition of Care  11/21/2022 1536 by Mayi Hall, LPN  Outcome: Ongoing, Progressing  11/21/2022 1536 by Mayi Hall, LPN  Outcome: Ongoing, Progressing     Problem: Adult  Inpatient Plan of Care  Goal: Plan of Care Review  11/21/2022 1536 by Mayi Hall LPN  Outcome: Ongoing, Progressing  11/21/2022 1536 by Mayi Hall, LPN  Outcome: Ongoing, Progressing  Goal: Patient-Specific Goal (Individualized)  11/21/2022 1536 by Mayi Hall, LPN  Outcome: Ongoing, Progressing  11/21/2022 1536 by Mayi Hall, LPN  Outcome: Ongoing, Progressing  Goal: Absence of Hospital-Acquired Illness or Injury  11/21/2022 1536 by Mayi Hall LPN  Outcome: Ongoing, Progressing  11/21/2022 1536 by Mayi Hall, LPN  Outcome: Ongoing, Progressing  Goal: Optimal Comfort and Wellbeing  11/21/2022 1536 by Mayi Hall LPN  Outcome: Ongoing, Progressing  11/21/2022 1536 by Mayi Hall, LPN  Outcome: Ongoing, Progressing  Goal: Readiness for Transition of Care  11/21/2022 1536 by Mayi Hall, LPN  Outcome: Ongoing, Progressing  11/21/2022 1536 by Mayi Hall, LPN  Outcome: Ongoing, Progressing

## 2022-11-22 PROCEDURE — 97530 THERAPEUTIC ACTIVITIES: CPT | Mod: CQ

## 2022-11-22 PROCEDURE — 97110 THERAPEUTIC EXERCISES: CPT | Mod: CQ

## 2022-11-22 PROCEDURE — 11000004 HC SNF PRIVATE

## 2022-11-22 PROCEDURE — 97530 THERAPEUTIC ACTIVITIES: CPT | Mod: CO

## 2022-11-22 PROCEDURE — 25000003 PHARM REV CODE 250: Performed by: NURSE PRACTITIONER

## 2022-11-22 PROCEDURE — 63600175 PHARM REV CODE 636 W HCPCS: Performed by: NURSE PRACTITIONER

## 2022-11-22 PROCEDURE — 25000003 PHARM REV CODE 250: Performed by: HOSPITALIST

## 2022-11-22 PROCEDURE — 97110 THERAPEUTIC EXERCISES: CPT | Mod: CO

## 2022-11-22 PROCEDURE — 97116 GAIT TRAINING THERAPY: CPT | Mod: CQ

## 2022-11-22 RX ADMIN — CELECOXIB 200 MG: 200 CAPSULE ORAL at 09:11

## 2022-11-22 RX ADMIN — OXYCODONE HYDROCHLORIDE 10 MG: 10 TABLET ORAL at 09:11

## 2022-11-22 RX ADMIN — NORETHINDRONE ACETATE 5 MG: 5 TABLET ORAL at 09:11

## 2022-11-22 RX ADMIN — DOXEPIN HYDROCHLORIDE 50 MG: 50 CAPSULE ORAL at 09:11

## 2022-11-22 RX ADMIN — GABAPENTIN 600 MG: 300 CAPSULE ORAL at 03:11

## 2022-11-22 RX ADMIN — OXYCODONE HYDROCHLORIDE 10 MG: 10 TABLET ORAL at 05:11

## 2022-11-22 RX ADMIN — FLUTICASONE FUROATE AND VILANTEROL TRIFENATATE 1 PUFF: 100; 25 POWDER RESPIRATORY (INHALATION) at 09:11

## 2022-11-22 RX ADMIN — MUPIROCIN: 20 OINTMENT TOPICAL at 09:11

## 2022-11-22 RX ADMIN — VENLAFAXINE HYDROCHLORIDE 300 MG: 75 CAPSULE, EXTENDED RELEASE ORAL at 09:11

## 2022-11-22 RX ADMIN — HEPARIN SODIUM 5000 UNITS: 5000 INJECTION INTRAVENOUS; SUBCUTANEOUS at 01:11

## 2022-11-22 RX ADMIN — OXYCODONE HYDROCHLORIDE 10 MG: 10 TABLET ORAL at 02:11

## 2022-11-22 RX ADMIN — ALPRAZOLAM 1 MG: 0.5 TABLET ORAL at 09:11

## 2022-11-22 RX ADMIN — GABAPENTIN 600 MG: 300 CAPSULE ORAL at 09:11

## 2022-11-22 RX ADMIN — METHOCARBAMOL 1000 MG: 500 TABLET ORAL at 09:11

## 2022-11-22 RX ADMIN — HEPARIN SODIUM 5000 UNITS: 5000 INJECTION INTRAVENOUS; SUBCUTANEOUS at 05:11

## 2022-11-22 RX ADMIN — ALPRAZOLAM 1 MG: 0.5 TABLET ORAL at 04:11

## 2022-11-22 RX ADMIN — METHOCARBAMOL 1000 MG: 500 TABLET ORAL at 03:11

## 2022-11-22 RX ADMIN — PANTOPRAZOLE SODIUM 40 MG: 40 TABLET, DELAYED RELEASE ORAL at 09:11

## 2022-11-22 RX ADMIN — HEPARIN SODIUM 5000 UNITS: 5000 INJECTION INTRAVENOUS; SUBCUTANEOUS at 09:11

## 2022-11-22 NOTE — PT/OT/SLP PROGRESS
"Occupational Therapy   Treatment    Name: Marianela Shrestha  MRN: [id number]  Admit Date: 11/18/2022  Admitting Diagnosis:  LUMBAR LAMINECTOMY    General Precautions: Standard, fall   Orthopedic Precautions:spinal precautions   Braces: AFO     Recommendations:     Discharge Recommendations: home health OT  Level of Assistance Recommended at Discharge: Intermittent assistance for ADL's and homemaking tasks  Discharge Equipment Recommendations:   (TBD)  Barriers to discharge:  Decreased caregiver support    Assessment:     Marianela Shrestha is a 37 y.o. female with a medical diagnosis of LUMBAR LAMINECTOMY She presents with performance deficits affecting function are weakness, impaired endurance, impaired self care skills, impaired functional mobility, impaired sensation, gait instability, impaired balance, decreased coordination, decreased upper extremity function, decreased lower extremity function, decreased safety awareness, pain, decreased ROM, orthopedic precautions.   Pt participated well during today's session. Pt continues to demonstrate deficits with self care skills, balance, functional mobility, UB strength and endurance. Pt will benefit from continued OT services to progress towards goals.     Rehab Potential is good    Activity tolerance:  Good    Plan:     Patient to be seen 6 x/week to address the above listed problems via self-care/home management, therapeutic activities, therapeutic exercises    Plan of Care Expires: 12/19/22  Plan of Care Reviewed with: patient    Subjective     Communicated with: Lydia prior to session. "I'm finally ready for you" .    Pain/Comfort:  Pain Rating 1: 7/10  Location - Side 1: Bilateral  Location - Orientation 1: generalized  Location 1: lumbar spine  Pain Addressed 1: Pre-medicate for activity, Reposition, Distraction  Pain Rating Post-Intervention 1: 7/10  Pain Rating 2: 7/10  Location - Side 2: Left  Location - Orientation 2: generalized  Location 2: hip  Pain " Addressed 2: Pre-medicate for activity, Reposition, Distraction  Pain Rating Post-Intervention 2: 7/10    Patient's cultural, spiritual, Jewish conflicts given the current situation:  no    Objective:     Patient found sitting edge of bed with  present upon OT entry to room.    Bed Mobility:    Not performed.     Functional Mobility/Transfers:  Patient completed Sit <> Stand Transfer with stand by assistance  with  rolling walker   Patient completed Bed <> Chair Transfer using Stand Pivot technique with stand by assistance with hand-held assist    Activities of Daily Living:  Upper Body Dressing: modified independence to doff/don pullover shirt.   Lower Body Dressing: contact guard assistance to doff/ don B shoes and AFO using figure 4 technique seated in wheelchair. Pt in standing position to manage pants over hips, with (A) provided and using raised table for stabilization and safety.     [facility] 6 Click ADL: 20    OT Exercises: AROM x 2 sets of 20 with #3 dowel perform bicep curls and forward flexion to improve UB strengthening, endurance and ROM required for w/c propulsion, ADL's, functional transfers and homemaking tasks.     Treatment & Education:  Pt with standing activity today with SBA and RW. Pt at raised counter to retrieve grocery items from overhead cabinet with focus on standing tolerance, functional reaching, dynamic standing bal, crossing midline, and to promote independence with homemaking and self care tasks. Pt able to maintain stance for ~ 5 min min before becoming fatigued.   Pt educated on role of OT, safety while performing functional transfers, safety while performing self care tasks, adaptive equipment, spinal precautions, importance to adhering to precautions and progress towards OT goals   Pt able to recall 3/3 spinal precautions    Patient left up in chair with  PTA present in rehab gym.     GOALS:   Multidisciplinary Problems       Occupational Therapy Goals           Problem: Occupational Therapy    Goal Priority Disciplines Outcome Interventions   Occupational Therapy Goal     OT, PT/OT Ongoing, Progressing    Description: Goals to be met by: 3 weeks     Patient will increase functional independence with ADLs by performing:    UE Dressing with Modified [place].  LE Dressing with Stand-by Assistance.  Grooming while seated with Modified [place].  Toileting from toilet or BSC with SBA for hygiene and clothing management. With A/D to stand.  Bathing from sitting at sink with Stand-by Assistance.  Supine to sit with Modified [place].  Step transfer with Stand-by Assistance with A/D as needed.  Upper extremity exercise with assistance as needed.  Caregiver will be educated on level of assist required to safely perform self care tasks and functional transfers..                          Time Tracking:     OT Date of Treatment: 11/22/22  OT Start Time: 1035    OT Stop Time: 1108  OT Total Time (min): 33 min    Billable Minutes:Therapeutic Activity 17  Therapeutic Exercise 16    11/22/2022

## 2022-11-22 NOTE — CONSULTS
"  [facility] - Skilled Nursing  Adult Nutrition  Consult Note    SUMMARY   Recommendations  Recommendation/Intervention: Continue regular diet, RD following  Goals: PO to meet 85% of EEN/EPN by next RD fu  Nutrition Goal Status: new    Assessment and Plan  No nutrition diagnosis at this time.    Malnutrition Assessment   22     Skin (Micronutrient): wounds unhealed           Orbital Region (Subcutaneous Fat Loss): well nourished  Upper Arm Region (Subcutaneous Fat Loss): well nourished  Thoracic and Lumbar Region: well nourished   Clavicle Bone Region (Muscle Loss): well nourished  Clavicle and Acromion Bone Region (Muscle Loss): well nourished  Dorsal Hand (Muscle Loss): well nourished  Patellar Region (Muscle Loss): well nourished  Anterior Thigh Region (Muscle Loss): well nourished  Posterior Calf Region (Muscle Loss): well nourished                 Reason for Assessment    Reason For Assessment: consult  Diagnosis:  (Lumbar stenosis, s/p laminectomy)  Relevant Medical History: PTSD, Osteomyelitis, chronic pain, SHAZIA, obesity  Interdisciplinary Rounds: attended  General Information Comments: Patient is in good spirits, %, 3% weight change not significant,no chewing or swallowing problems, allergy to coconut.NFPE WNL  Nutrition Discharge Planning: DC on regular diet    Nutrition/Diet History    Patient Reported Diet/Restrictions/Preferences: general  Spiritual, Cultural Beliefs, Sikh Practices, Values that Affect Care: no  Food Allergies: other (see comments) (coconut)  Factors Affecting Nutritional Intake: None identified at this time    Anthropometrics    Temp: 97.6 °F (36.4 °C)  Height: 5' 5" (165.1 cm)  Height (inches): 65 in  Weight: 104.3 kg (229 lb 15 oz)  Weight (lb): 229.94 lb  Ideal Body Weight (IBW), Female: 125 lb  % Ideal Body Weight, Female (lb): 183.95 %  BMI (Calculated): 38.3  BMI Grade: 35 - 39.9 - obesity - grade II  Usual Body Weight (UBW), k kg  % Usual Body Weight: " 96.78  % Weight Change From Usual Weight: -3.43 %       Lab/Procedures/Meds    Pertinent Labs Reviewed: reviewed  Pertinent Labs Comments: WNL  Pertinent Medications Reviewed: reviewed  Pertinent Medications Comments: Abx, senna-docusate, pantoprazole, gabapentin  Estimated/Assessed Needs    Weight Used For Calorie Calculations: 104.3 kg (229 lb 15 oz)  Energy Calorie Requirements (kcal): 1723  Energy Need Method: [place]-St Jeor (x 1.0(PAL) 2/2 obesity)  Protein Requirements: 68g  Weight Used For Protein Calculations: 56.8 kg (125 lb 3.5 oz) (IBW kg 2/2 obesity, x 1.2g /kg)  Fluid Requirements (mL): 1732 or per MD  Estimated Fluid Requirement Method: RDA Method  RDA Method (mL): 1723  CHO Requirement: -      Nutrition Prescription Ordered    Current Diet Order: Regular  Oral Nutrition Supplement: -    Evaluation of Received Nutrient/Fluid Intake    I/O: no data  Energy Calories Required: meeting needs  Protein Required: meeting needs  Fluid Required: meeting needs  Comments: LBM 11/20  Tolerance: tolerating  % Intake of Estimated Energy Needs: 75 - 100 %  % Meal Intake: 75 - 100 %    Nutrition Risk    Level of Risk/Frequency of Follow-up: low (one time per week)       Monitor and Evaluation    Food and Nutrient Intake: food and beverage intake  Food and Nutrient Adminstration: diet order  Anthropometric Measurements: weight change  Biochemical Data, Medical Tests and Procedures: electrolyte and renal panel  Nutrition-Focused Physical Findings: skin       Nutrition Follow-Up    RD Follow-up?: Yes

## 2022-11-22 NOTE — PT/OT/SLP PROGRESS
Physical Therapy Treatment    Patient Name:  Marianela Shrestha   MRN:  [id number]  Admit Date: 11/18/2022  Admitting Diagnosis: Lumbar Stenosis    General Precautions: Standard, fall   Orthopedic Precautions:spinal precautions   Braces: AFO     Recommendations:     Discharge Recommendations:  home health PT   Level of Assistance Recommended at Discharge: Intermittent assistance   Discharge Equipment Recommendations: bedside commode, walker, rolling, wheelchair, bath bench   Barriers to discharge: Decreased caregiver support    Assessment:     Marianela Shrestha is a 37 y.o. female admitted with a medical diagnosis of Lumbar Stenosis. Pt tolerated session well with consistent pain level throughout and continued progression of functional mobility. Pt will continue to benefit from skilled therapy services to improve LE strength and endurance to continue to improve functional mobility and reach highest levels of independence.     Performance deficits affecting function:  weakness, impaired endurance, impaired self care skills, impaired functional mobility, gait instability, impaired balance, decreased lower extremity function, impaired sensation, impaired skin, impaired cardiopulmonary response to activity, orthopedic precautions .    Rehab Potential is good    Activity Tolerance: Good    Plan:     Patient to be seen 6 x/week to address the above listed problems via gait training, therapeutic activities, therapeutic exercises, neuromuscular re-education    Plan of Care Expires: 12/19/22  Plan of Care Reviewed with: patient    Subjective     Pt agreeable to therapy.     Pain/Comfort:  Pain Rating 1: 7/10  Location - Side 1: Bilateral  Location - Orientation 1: generalized  Location 1: lumbar spine  Pain Addressed 1: Pre-medicate for activity, Reposition, Distraction  Pain Rating Post-Intervention 1: 7/10    Patient's cultural, spiritual, Restorationist conflicts given the current situation:  no    Objective:     Patient  found up in chair with  (no active lines) upon PTA initial encounter in PT gym.    Therapeutic Activities and Exercises:   Recumbent cross  lvl 2 x 10 minutes    Functional Mobility:  Transfers:     Sit to Stand:  contact guard assistance with rolling walker  Chair to bed: stand by assistance with no AD  using  Squat Pivot vc for precaution adherence  Chair <> Nustep: contact guard assistance with  no AD  using  Stand Pivot vc for precation adherence  Gait: Pt ambulated 92' x 2 trials in RW with CGA  Pt continues to demonstrate reduced foot clearance with LLE and narrow LIZET; not LOB or instability requiring correction this session  Stairs:  Pt ascended/descended 4 stair(s) with No Assistive Device with bilateral handrails with Contact Guard Assistance.     AM-PAC 6 CLICK MOBILITY  20    Patient left sitting edge of bed with all lines intact and call button in reach.    GOALS:   Multidisciplinary Problems       Physical Therapy Goals          Problem: Physical Therapy    Goal Priority Disciplines Outcome Goal Variances Interventions   Physical Therapy Goal     PT, PT/OT Ongoing, Progressing     Description: Goals to be met by:12/19/22    Patient will increase functional independence with mobility by performing:    . Sit to stand transfer with Supervision  . Bed to chair transfer with Supervision using Rolling Walker  . Gait  x 150 feet with Stand-by Assistance using Rolling Walker.   . Ascend/descend 4 stair with bilateral Handrails Stand-by Assistance using No Assistive Device.   . Ascend/Descend 4 inch curb step with Stand-by Assistance using Rolling Walker.                         Time Tracking:     PT Received On: 11/22/22  PT Start Time: 1109  PT Stop Time: 1149  PT Total Time (min): 40 min    Billable Minutes: Gait Training 15, Therapeutic Activity 13, and Therapeutic Exercise 12    Treatment Type: Treatment  PT/PTA: PTA     PTA Visit Number: 2     11/22/2022

## 2022-11-22 NOTE — PLAN OF CARE
Problem: Adult Inpatient Plan of Care  Goal: Plan of Care Review  Outcome: Ongoing, Progressing  Goal: Patient-Specific Goal (Individualized)  Outcome: Ongoing, Progressing  Goal: Absence of Hospital-Acquired Illness or Injury  Outcome: Ongoing, Progressing  Goal: Optimal Comfort and Wellbeing  Outcome: Ongoing, Progressing  Goal: Readiness for Transition of Care  Outcome: Ongoing, Progressing     Problem: Adult Inpatient Plan of Care  Goal: Plan of Care Review  Outcome: Ongoing, Progressing  Goal: Patient-Specific Goal (Individualized)  Outcome: Ongoing, Progressing  Goal: Absence of Hospital-Acquired Illness or Injury  Outcome: Ongoing, Progressing  Goal: Optimal Comfort and Wellbeing  Outcome: Ongoing, Progressing  Goal: Readiness for Transition of Care  Outcome: Ongoing, Progressing     Problem: Adult Inpatient Plan of Care  Goal: Plan of Care Review  Outcome: Ongoing, Progressing  Goal: Patient-Specific Goal (Individualized)  Outcome: Ongoing, Progressing  Goal: Absence of Hospital-Acquired Illness or Injury  Outcome: Ongoing, Progressing  Goal: Optimal Comfort and Wellbeing  Outcome: Ongoing, Progressing  Goal: Readiness for Transition of Care  Outcome: Ongoing, Progressing

## 2022-11-23 PROCEDURE — 97116 GAIT TRAINING THERAPY: CPT | Mod: CQ

## 2022-11-23 PROCEDURE — 25000003 PHARM REV CODE 250: Performed by: NURSE PRACTITIONER

## 2022-11-23 PROCEDURE — 25000003 PHARM REV CODE 250: Performed by: HOSPITALIST

## 2022-11-23 PROCEDURE — 11000004 HC SNF PRIVATE

## 2022-11-23 PROCEDURE — 99306 1ST NF CARE HIGH MDM 50: CPT | Mod: AI,,, | Performed by: HOSPITALIST

## 2022-11-23 PROCEDURE — 97530 THERAPEUTIC ACTIVITIES: CPT | Mod: CQ

## 2022-11-23 PROCEDURE — 97530 THERAPEUTIC ACTIVITIES: CPT | Mod: CO

## 2022-11-23 PROCEDURE — 63600175 PHARM REV CODE 636 W HCPCS: Performed by: NURSE PRACTITIONER

## 2022-11-23 PROCEDURE — 99306 PR NURSING FACILITY CARE, INIT, HIGH SEVERITY: ICD-10-PCS | Mod: AI,,, | Performed by: HOSPITALIST

## 2022-11-23 RX ORDER — ACETAMINOPHEN 325 MG/1
650 TABLET ORAL EVERY 6 HOURS PRN
Status: DISCONTINUED | OUTPATIENT
Start: 2022-11-23 | End: 2022-12-16 | Stop reason: HOSPADM

## 2022-11-23 RX ADMIN — OXYCODONE HYDROCHLORIDE 10 MG: 10 TABLET ORAL at 09:11

## 2022-11-23 RX ADMIN — METHOCARBAMOL 1000 MG: 500 TABLET ORAL at 08:11

## 2022-11-23 RX ADMIN — NORETHINDRONE ACETATE 5 MG: 5 TABLET ORAL at 08:11

## 2022-11-23 RX ADMIN — DOXEPIN HYDROCHLORIDE 50 MG: 50 CAPSULE ORAL at 09:11

## 2022-11-23 RX ADMIN — OXYCODONE HYDROCHLORIDE 10 MG: 10 TABLET ORAL at 08:11

## 2022-11-23 RX ADMIN — CELECOXIB 200 MG: 200 CAPSULE ORAL at 08:11

## 2022-11-23 RX ADMIN — METHOCARBAMOL 1000 MG: 500 TABLET ORAL at 09:11

## 2022-11-23 RX ADMIN — MUPIROCIN: 20 OINTMENT TOPICAL at 08:11

## 2022-11-23 RX ADMIN — HEPARIN SODIUM 5000 UNITS: 5000 INJECTION INTRAVENOUS; SUBCUTANEOUS at 09:11

## 2022-11-23 RX ADMIN — FLUTICASONE FUROATE AND VILANTEROL TRIFENATATE 1 PUFF: 100; 25 POWDER RESPIRATORY (INHALATION) at 08:11

## 2022-11-23 RX ADMIN — OXYCODONE HYDROCHLORIDE 10 MG: 10 TABLET ORAL at 05:11

## 2022-11-23 RX ADMIN — GABAPENTIN 600 MG: 300 CAPSULE ORAL at 08:11

## 2022-11-23 RX ADMIN — GABAPENTIN 600 MG: 300 CAPSULE ORAL at 03:11

## 2022-11-23 RX ADMIN — HEPARIN SODIUM 5000 UNITS: 5000 INJECTION INTRAVENOUS; SUBCUTANEOUS at 01:11

## 2022-11-23 RX ADMIN — GABAPENTIN 600 MG: 300 CAPSULE ORAL at 09:11

## 2022-11-23 RX ADMIN — VENLAFAXINE HYDROCHLORIDE 300 MG: 75 CAPSULE, EXTENDED RELEASE ORAL at 08:11

## 2022-11-23 RX ADMIN — ALPRAZOLAM 1 MG: 0.5 TABLET ORAL at 09:11

## 2022-11-23 RX ADMIN — MUPIROCIN: 20 OINTMENT TOPICAL at 09:11

## 2022-11-23 RX ADMIN — METHOCARBAMOL 1000 MG: 500 TABLET ORAL at 03:11

## 2022-11-23 RX ADMIN — PANTOPRAZOLE SODIUM 40 MG: 40 TABLET, DELAYED RELEASE ORAL at 08:11

## 2022-11-23 RX ADMIN — ALPRAZOLAM 1 MG: 0.5 TABLET ORAL at 01:11

## 2022-11-23 RX ADMIN — HEPARIN SODIUM 5000 UNITS: 5000 INJECTION INTRAVENOUS; SUBCUTANEOUS at 05:11

## 2022-11-23 NOTE — PLAN OF CARE
Problem: Adult Inpatient Plan of Care  Goal: Plan of Care Review  Outcome: Ongoing, Progressing  Goal: Patient-Specific Goal (Individualized)  Outcome: Ongoing, Progressing  Goal: Absence of Hospital-Acquired Illness or Injury  Outcome: Ongoing, Progressing  Goal: Optimal Comfort and Wellbeing  Outcome: Ongoing, Progressing  Goal: Readiness for Transition of Care  Outcome: Ongoing, Progressing     Problem: Adult Inpatient Plan of Care  Goal: Plan of Care Review  Outcome: Ongoing, Progressing  Goal: Patient-Specific Goal (Individualized)  Outcome: Ongoing, Progressing  Goal: Absence of Hospital-Acquired Illness or Injury  Outcome: Ongoing, Progressing  Goal: Optimal Comfort and Wellbeing  Outcome: Ongoing, Progressing  Goal: Readiness for Transition of Care  Outcome: Ongoing, Progressing     Problem: Adult Inpatient Plan of Care  Goal: Plan of Care Review  Outcome: Ongoing, Progressing  Goal: Patient-Specific Goal (Individualized)  Outcome: Ongoing, Progressing  Goal: Absence of Hospital-Acquired Illness or Injury  Outcome: Ongoing, Progressing  Goal: Optimal Comfort and Wellbeing  Outcome: Ongoing, Progressing  Goal: Readiness for Transition of Care  Outcome: Ongoing, Progressing     Problem: Adult Inpatient Plan of Care  Goal: Plan of Care Review  Outcome: Ongoing, Progressing  Goal: Patient-Specific Goal (Individualized)  Outcome: Ongoing, Progressing  Goal: Absence of Hospital-Acquired Illness or Injury  Outcome: Ongoing, Progressing  Goal: Optimal Comfort and Wellbeing  Outcome: Ongoing, Progressing  Goal: Readiness for Transition of Care  Outcome: Ongoing, Progressing     Problem: Adult Inpatient Plan of Care  Goal: Plan of Care Review  Outcome: Ongoing, Progressing  Goal: Patient-Specific Goal (Individualized)  Outcome: Ongoing, Progressing  Goal: Absence of Hospital-Acquired Illness or Injury  Outcome: Ongoing, Progressing  Goal: Optimal Comfort and Wellbeing  Outcome: Ongoing, Progressing  Goal: Readiness  for Transition of Care  Outcome: Ongoing, Progressing     Problem: Adult Inpatient Plan of Care  Goal: Plan of Care Review  Outcome: Ongoing, Progressing  Goal: Patient-Specific Goal (Individualized)  Outcome: Ongoing, Progressing  Goal: Absence of Hospital-Acquired Illness or Injury  Outcome: Ongoing, Progressing  Goal: Optimal Comfort and Wellbeing  Outcome: Ongoing, Progressing  Goal: Readiness for Transition of Care  Outcome: Ongoing, Progressing     Problem: Adult Inpatient Plan of Care  Goal: Plan of Care Review  Outcome: Ongoing, Progressing  Goal: Patient-Specific Goal (Individualized)  Outcome: Ongoing, Progressing  Goal: Absence of Hospital-Acquired Illness or Injury  Outcome: Ongoing, Progressing  Goal: Optimal Comfort and Wellbeing  Outcome: Ongoing, Progressing  Goal: Readiness for Transition of Care  Outcome: Ongoing, Progressing     Problem: Adult Inpatient Plan of Care  Goal: Plan of Care Review  Outcome: Ongoing, Progressing  Goal: Patient-Specific Goal (Individualized)  Outcome: Ongoing, Progressing  Goal: Absence of Hospital-Acquired Illness or Injury  Outcome: Ongoing, Progressing  Goal: Optimal Comfort and Wellbeing  Outcome: Ongoing, Progressing  Goal: Readiness for Transition of Care  Outcome: Ongoing, Progressing     Problem: Adult Inpatient Plan of Care  Goal: Plan of Care Review  Outcome: Ongoing, Progressing  Goal: Patient-Specific Goal (Individualized)  Outcome: Ongoing, Progressing  Goal: Absence of Hospital-Acquired Illness or Injury  Outcome: Ongoing, Progressing  Goal: Optimal Comfort and Wellbeing  Outcome: Ongoing, Progressing  Goal: Readiness for Transition of Care  Outcome: Ongoing, Progressing     Problem: Adult Inpatient Plan of Care  Goal: Plan of Care Review  Outcome: Ongoing, Progressing  Goal: Patient-Specific Goal (Individualized)  Outcome: Ongoing, Progressing  Goal: Absence of Hospital-Acquired Illness or Injury  Outcome: Ongoing, Progressing  Goal: Optimal Comfort and  Wellbeing  Outcome: Ongoing, Progressing  Goal: Readiness for Transition of Care  Outcome: Ongoing, Progressing     Problem: Adult Inpatient Plan of Care  Goal: Plan of Care Review  Outcome: Ongoing, Progressing  Goal: Patient-Specific Goal (Individualized)  Outcome: Ongoing, Progressing  Goal: Absence of Hospital-Acquired Illness or Injury  Outcome: Ongoing, Progressing  Goal: Optimal Comfort and Wellbeing  Outcome: Ongoing, Progressing  Goal: Readiness for Transition of Care  Outcome: Ongoing, Progressing     Problem: Adult Inpatient Plan of Care  Goal: Plan of Care Review  Outcome: Ongoing, Progressing  Goal: Patient-Specific Goal (Individualized)  Outcome: Ongoing, Progressing  Goal: Absence of Hospital-Acquired Illness or Injury  Outcome: Ongoing, Progressing  Goal: Optimal Comfort and Wellbeing  Outcome: Ongoing, Progressing  Goal: Readiness for Transition of Care  Outcome: Ongoing, Progressing     Problem: Adult Inpatient Plan of Care  Goal: Plan of Care Review  Outcome: Ongoing, Progressing  Goal: Patient-Specific Goal (Individualized)  Outcome: Ongoing, Progressing  Goal: Absence of Hospital-Acquired Illness or Injury  Outcome: Ongoing, Progressing  Goal: Optimal Comfort and Wellbeing  Outcome: Ongoing, Progressing  Goal: Readiness for Transition of Care  Outcome: Ongoing, Progressing     Problem: Adult Inpatient Plan of Care  Goal: Plan of Care Review  Outcome: Ongoing, Progressing  Goal: Patient-Specific Goal (Individualized)  Outcome: Ongoing, Progressing  Goal: Absence of Hospital-Acquired Illness or Injury  Outcome: Ongoing, Progressing  Goal: Optimal Comfort and Wellbeing  Outcome: Ongoing, Progressing  Goal: Readiness for Transition of Care  Outcome: Ongoing, Progressing     Problem: Adult Inpatient Plan of Care  Goal: Plan of Care Review  Outcome: Ongoing, Progressing  Goal: Patient-Specific Goal (Individualized)  Outcome: Ongoing, Progressing  Goal: Absence of Hospital-Acquired Illness or  Injury  Outcome: Ongoing, Progressing  Goal: Optimal Comfort and Wellbeing  Outcome: Ongoing, Progressing  Goal: Readiness for Transition of Care  Outcome: Ongoing, Progressing     Problem: Adult Inpatient Plan of Care  Goal: Plan of Care Review  Outcome: Ongoing, Progressing  Goal: Patient-Specific Goal (Individualized)  Outcome: Ongoing, Progressing  Goal: Absence of Hospital-Acquired Illness or Injury  Outcome: Ongoing, Progressing  Goal: Optimal Comfort and Wellbeing  Outcome: Ongoing, Progressing  Goal: Readiness for Transition of Care  Outcome: Ongoing, Progressing

## 2022-11-23 NOTE — PT/OT/SLP PROGRESS
Physical Therapy Treatment    Patient Name:  Marianela Shrestha   MRN:  [id number]  Admit Date: 11/18/2022  Admitting Diagnosis: Lumbar Stenosis      General Precautions: Standard, fall   Orthopedic Precautions:spinal precautions   Braces: AFO (LLE)     Recommendations:     Discharge Recommendations:  home health PT   Level of Assistance Recommended at Discharge: Intermittent assistance   Discharge Equipment Recommendations: bedside commode, walker, rolling, wheelchair, bath bench   Barriers to discharge: Decreased caregiver support    Assessment:     Marianela Shrestha is a 37 y.o. female admitted with a medical diagnosis of Lumbar Stenosis. Pt tolerated session well with no adverse effects noted. Pt performance slightly decreased on this date compared to previous two sessions with pt noting reduced sensation in LLE as well as noted reduced motor control of LLE during functional mobility. Encouraged pt to avoid creating expectations of linear progress and attempted to redirect focus to long term progress. Educated pt about symptom peritonealization and encouraged her to keep therapy and nursing informed if it continues. Pt will continue to benefit from skilled therapy services to improve LE strength and endurance to continue to improve functional mobility and reach highest levels of independence. Pt will continue to benefit from skilled therapy services to improve LE strength and endurance to continue to improve functional mobility and reach highest levels of independence.     Performance deficits affecting function:  weakness, impaired endurance, impaired self care skills, impaired functional mobility, gait instability, impaired balance, decreased lower extremity function, impaired sensation, impaired skin, impaired cardiopulmonary response to activity, orthopedic precautions .    Rehab Potential is good    Activity Tolerance: Good    Plan:     Patient to be seen 6 x/week to address the above listed problems  "via gait training, therapeutic activities, therapeutic exercises, neuromuscular re-education    Plan of Care Expires: 12/19/22  Plan of Care Reviewed with: patient    Subjective     "Let's do it".     Pain/Comfort:  Pain Rating 1: 6/10  Location - Side 1: Bilateral  Location - Orientation 1: generalized  Location 1: lumbar spine  Pain Addressed 1: Pre-medicate for activity, Reposition, Distraction  Pain Rating Post-Intervention 1: 7/10    Patient's cultural, spiritual, Taoist conflicts given the current situation:  no    Objective:     Patient found HOB elevated with  (no active lines) upon PT entry to room.     Functional Mobility:  Bed Mobility:     Rolling Left:  supervision  Rolling Right: supervision  Supine to Sit: supervision  Sit to Supine: supervision  Transfers:     Sit to Stand:  stand by assistance with rolling walker  Bed to Chair: contact guard assistance with  no AD  using  Stand Pivot  Gait: pt ambulated 76' in RW with CGA  Pt continues to demonstrate flat foot touch down and reduced stance time on LLE secondary to reduced sensation in L foot and LBP; slight increase in instability noted on this date, but no LOB requiring correction  Stairs: Pt ascended/descended 4 stair(s) with bilateral handrails and 4" curb step with Rolling Walker with Minimal Assistance for both   Wheelchair Propulsion:  Pt propelled Standard wheelchair x 150 feet on Level tile with  Bilateral upper extremity with Independent.     AM-PAC 6 CLICK MOBILITY  20    Patient left up in chair with all lines intact and call button in reach.    GOALS:   Multidisciplinary Problems       Physical Therapy Goals          Problem: Physical Therapy    Goal Priority Disciplines Outcome Goal Variances Interventions   Physical Therapy Goal     PT, PT/OT Ongoing, Progressing     Description: Goals to be met by:12/19/22    Patient will increase functional independence with mobility by performing:    . Sit to stand transfer with Supervision  . " Bed to chair transfer with Supervision using Rolling Walker  . Gait  x 150 feet with Stand-by Assistance using Rolling Walker.   . Ascend/descend 4 stair with bilateral Handrails Stand-by Assistance using No Assistive Device.   . Ascend/Descend 4 inch curb step with Stand-by Assistance using Rolling Walker.                         Time Tracking:     PT Received On: 11/23/22  PT Start Time: 0926  PT Stop Time: 1004  PT Total Time (min): 38 min    Billable Minutes: Gait Training 12 and Therapeutic Activity 26    Treatment Type: Treatment  PT/PTA: PTA     PTA Visit Number: 3     11/23/2022

## 2022-11-23 NOTE — H&P
"Hospital Medicine  Skilled Nursing Facility   History and Physical Exam      Date of Service: 11/23/2022      Patient Name: Marianela Shrestha  MRN: [id number]  Admission Date: 11/18/2022  Attending Physician: Obdulio Parkinson MD  Primary Care Provider: Arnaldo Espino MD  Code Status: Full Code      Principal problem:  SHAZIA (cauda equina syndrome)      Chief Complaint:   Chief Complaint   Patient presents with    Back Pain     Admitted to [facility] for rehabilitation following hospital stay for lumbar laminotomy/foraminotomy/diskectomy from L4-S1 due to cauda equina syndrome.           HPI:   "Marianela Shrestha is a 37 y.o. female with PMH of drug abuse, low back pain,  LLE radiculopathy and right hand osteomyelitis treated with surgery presents for evaluation of 3 day history LLE weakness and changes in bowel and bladder acutely worse in last 24 hours prior to arrival to ER. The patient reports chronic back pain. However, she fell , twisting her ankle and low back.  S/P L4, L5, S1 laminectomy 11/11/22 by Dr. Harry. Post operatively she maintains decreased but able to feel bladder getting full and control her voiding. She maintains decreased rectal sensation but able to control her defecation.     Patient will be treated at [facility] with PT and OT to improve functional status and ability to perform ADLs.      Interval History  All of today's labs reviewed and are listed below.  24 hr vital sign ranges listed below.  Patient denies shortness of breath, abdominal discomfort, nausea, or vomiting.  Patient reports an adequate appetite.  Patient denies dysuria.  Patient reports having regular bowel movements.  Patient progessing with PT/OT. Continuing to follow and treat all acute and chronic conditions." Per Wanda Gonzalez on 11/21/22.    She is doing okay today. She reports left foot numbness today that is similar to before surgery, but had improved slightly after surgery. She also reports rolling onto her dog's ball " in bed today with it pushing into her left buttock and causing increased pain. She says that her back pain is not well controlled, but she has only taken her PRN oxycodone 1 time today thus far. She walked 76 feet with RW and CGA and ascended/descended 4 stairs with bilateral handrails with PT today.     Patient admitted with skilled services with PT and OT to improve functional status and ability to perform ADLs.       Past Medical History:   Past Medical History:   Diagnosis Date    Anxiety     Chronic osteomyelitis of right hand 2019    Chronic pain syndrome     Depression     Insomnia     Post traumatic stress disorder (PTSD)     Sciatica        Past Surgical History:   Past Surgical History:   Procedure Laterality Date    ABLATION OF MEDIAL BRANCH NERVE OF LUMBAR SPINE FACET JOINT      L4-S1    COSMETIC SURGERY      breast augmentation    ETHMOIDECTOMY  2022    Procedure: ETHMOIDECTOMY;  Surgeon: Segundo Fu MD;  Location: [facility] OR [address] [place] [address];  Service: ENT;;    FINGER SURGERY      FUNCTIONAL ENDOSCOPIC SINUS SURGERY (FESS) USING COMPUTER-ASSISTED NAVIGATION Bilateral 2022    Procedure: FESS, USING COMPUTER-ASSISTED NAVIGATION;  Surgeon: Segundo Fu MD;  Location: [facility] OR [address] [place] [address];  Service: ENT;  Laterality: Bilateral;    LUMBAR LAMINECTOMY N/A 2022    Procedure: LAMINECTOMY, SPINE, LUMBAR;  Surgeon: Sundar Harry MD;  Location: [facility] OR [address] [place] [address];  Service: Orthopedics;  Laterality: N/A;  L4-S1 laminectomy, SNS: SSEP/MOtors    MAXILLARY ANTROSTOMY  2022    Procedure: MAXILLARY ANTROSTOMY;  Surgeon: Segundo Fu MD;  Location: [facility] OR [address] [place] [address];  Service: ENT;;    PELVIC LAPAROSCOPY      Endometriosis ([place])    TONSILLECTOMY         Social History:   Tobacco Use    Smoking status: Some Days     Packs/day: 0.25     Years: 15.00     Pack years: 3.75     Types: Cigarettes     Start date: 2017     Last attempt to quit: 2021     Years since quittin.0    Smokeless tobacco:  Never    Tobacco comments:     occasional 3 cig month, started 15 quit 18-19, started age 22    Substance and Sexual Activity    Alcohol use: Not Currently    Drug use: Never    Sexual activity: Yes     Partners: Male     Birth control/protection: None       Family History:   Family History       Problem Relation (Age of Onset)    Cervical cancer Maternal Aunt    Colon cancer Maternal Aunt    Diabetes Maternal Grandmother, Mother, Maternal Aunt    Hypertension Mother            No current facility-administered medications on file prior to encounter.     Current Outpatient Medications on File Prior to Encounter   Medication Sig    (Magic mouthwash) 1:1:1 diphenhydramine(Benadryl) 12.5mg/5ml liq, aluminum & magnesium hydroxide-simethicone (Maalox), LIDOcaine viscous 2% Swish and spit 15 mLs every 4 (four) hours as needed (pain). for mouth sores    acetaminophen (TYLENOL) 650 MG TbSR Take 1 tablet (650 mg total) by mouth every 6 (six) hours.    albuterol (PROVENTIL/VENTOLIN HFA) 90 mcg/actuation inhaler Inhale 2 puffs into the lungs every 6 (six) hours as needed. Rescue    albuterol-ipratropium (DUO-NEB) 2.5 mg-0.5 mg/3 mL nebulizer solution Take 3 mLs by nebulization every 6 (six) hours as needed for Wheezing or Shortness of Breath. Rescue    ALPRAZolam (XANAX) 1 MG tablet Take 1 tablet (1 mg total) by mouth 3 (three) times daily as needed for Anxiety.    celecoxib (CELEBREX) 200 MG capsule Take 1 capsule (200 mg total) by mouth once daily.    doxepin (SINEQUAN) 25 MG capsule TAKE 2 CAPSULES (50 MG TOTAL) BY MOUTH EVERY EVENING.    gabapentin (NEURONTIN) 600 MG tablet TAKE 1 TABLET BY MOUTH THREE TIMES A DAY    methocarbamoL (ROBAXIN) 500 MG Tab Take 2 tablets (1,000 mg total) by mouth 3 (three) times daily. for 14 days    mupirocin (BACTROBAN) 2 % ointment Twice daily for five days twice per month for six months    ondansetron (ZOFRAN-ODT) 4 MG TbDL Dissolve 2 tablets (8 mg total) by mouth every 8 (eight) hours as  needed (nausea).    oxyCODONE (ROXICODONE) 5 MG immediate release tablet Take 1 tablet (5 mg total) by mouth every 4 (four) hours as needed for Pain.    venlafaxine (EFFEXOR-XR) 150 MG Cp24 TAKE 2 CAPSULES BY MOUTH EVERY DAY (Patient taking differently: Take by mouth every morning.)       Allergies:   Review of patient's allergies indicates:   Allergen Reactions    Coconut Hives and Itching    Sulfa (sulfonamide antibiotics) Hives     itching    Adhesive Rash    Latex, natural rubber Other (See Comments) and Rash       ROS:  Review of Systems   Constitutional:  Negative for appetite change, chills and fever.   HENT:  Negative for congestion and sore throat.    Eyes:  Negative for redness and visual disturbance.   Respiratory:  Negative for cough and shortness of breath.    Cardiovascular:  Negative for chest pain and palpitations.   Gastrointestinal:  Negative for abdominal pain, nausea and vomiting.   Genitourinary:  Negative for difficulty urinating and dysuria.   Musculoskeletal:  Positive for back pain and gait problem. Negative for arthralgias.   Skin:  Negative for pallor and rash.   Allergic/Immunologic: Negative for environmental allergies and food allergies.   Neurological:  Positive for weakness and numbness (left foot). Negative for dizziness and light-headedness.   Hematological:  Does not bruise/bleed easily.   Psychiatric/Behavioral:  Negative for sleep disturbance. The patient is not nervous/anxious.        Objective:  Temp:  [97.8 °F (36.6 °C)-98.6 °F (37 °C)]   Pulse:  [83-86]   Resp:  [16-18]   BP: (120-121)/(70-72)   SpO2:  [97 %-98 %]     Body mass index is 38.26 kg/m².      Physical Exam  Vitals and nursing note reviewed.   Constitutional:       General: She is not in acute distress.     Appearance: She is well-developed.   HENT:      Head: Normocephalic and atraumatic.      Right Ear: External ear normal.      Left Ear: External ear normal.      Nose: No nasal deformity or mucosal edema.       Mouth/Throat:      Mouth: Mucous membranes are moist.      Pharynx: Uvula midline. No oropharyngeal exudate.   Eyes:      General: No scleral icterus.     Conjunctiva/sclera: Conjunctivae normal.      Pupils: Pupils are equal, round, and reactive to light.   Neck:      Trachea: Phonation normal.   Cardiovascular:      Rate and Rhythm: Normal rate and regular rhythm.      Heart sounds: S1 normal and S2 normal. No murmur heard.  Pulmonary:      Effort: Pulmonary effort is normal. No respiratory distress.      Breath sounds: Normal breath sounds. No wheezing or rales.   Abdominal:      General: Bowel sounds are normal. There is no distension.      Palpations: Abdomen is soft.      Tenderness: There is no abdominal tenderness. There is no guarding or rebound.   Musculoskeletal:      Right hand: Deformity (first finger) present.      Cervical back: Neck supple. No muscular tenderness.      Lumbar back: Tenderness present.      Right lower leg: No edema.      Left lower leg: No edema.      Left ankle: Decreased range of motion (left foot drop).      Comments: Island dressing in place. TTP along incision.    Lymphadenopathy:      Cervical:      Right cervical: No superficial cervical adenopathy.     Left cervical: No superficial cervical adenopathy.      Upper Body:      Right upper body: No supraclavicular adenopathy.      Left upper body: No supraclavicular adenopathy.   Skin:     General: Skin is warm and dry.      Findings: Bruising present. No rash.   Neurological:      Mental Status: She is alert and oriented to person, place, and time.      Motor: Weakness present. No tremor or seizure activity.   Psychiatric:         Mood and Affect: Mood normal.         Behavior: Behavior normal. Behavior is cooperative.         Thought Content: Thought content normal.       Significant Labs:   CBC:  Recent Labs   Lab 11/21/22 0513   WBC 8.79   HGB 12.0   HCT 37.3      MCV 97     BMP:  Recent Labs   Lab 11/21/22 0513    GLU 91      K 4.3      CO2 23   BUN 18   CREATININE 0.8   CALCIUM 9.2   MG 2.1   PHOS 3.6       Significant Imaging: I have reviewed all pertinent imaging results/findings completed during prior hospitalization.      Assessment and Plan:  Active Diagnoses:    Diagnosis Date Noted POA    PRINCIPAL PROBLEM:  SHAZIA (cauda equina syndrome) [G83.4] 11/11/2022 Yes    Impaired mobility [Z74.09] 11/14/2022 Yes    Uncontrolled persistent asthma [J45.998] 12/03/2021 Yes    Generalized anxiety disorder [F41.1] 10/27/2021 Yes      Problems Resolved During this Admission:       Cauda Equina Syndrome  - S/P L4-S1 Laminectomy, foraminotomy, and disectomy on 11/11/22 with Dr. Harry.   - Continue AFO LLE when ambulating  - PODUS boot to left foot when in bed  - PT/OT  - WBAT with spine precautions  - DVT PPx: Heparin TID  - Continue multimodal pain management with celecoxib 200 mg daily, gabapentin 600 mg TID, methocarbamol 1000 mg TID, and oxycodone 5 to 10 mg q4 hrs PRN.   - Continue mupriocin ointment intranasal every 2 weeks for 6 months to reduce MRSA risk.     Generalized anxiety disorder  - Continue venlafaxine 300 mg daily, alprazolam 1 mg TID PRN, and doxepin 50 mg qHS.     Uncontrolled persistent asthma  Continue fluticasone/vilanterol daily and albuterol MDI PRN.      Debility   - Continue with PT/OT for gait training and strengthening and restoration of ADL's   - Encourage mobility, OOB in chair, and early ambulation as appropriate  - Fall precautions   - Monitor for bowel and bladder dysfunction  - Monitor for and prevent skin breakdown and pressure ulcers  - Continue DVT prophylaxis with heparin       Anticipated Disposition:  Home with home health      Future Appointments   Date Time Provider Department Center   12/12/2022 11:30 AM Antonia Clifton PA-C [facility]       I certify that SNF services are required to be given on an inpatient basis because Marianela Shrestha needs for skilled  nursing care and/or skilled rehabilitation are required on a daily basis and such services can only practically be provided in a skilled nursing facility setting and are for an ongoing condition for which she received inpatient care in the hospital.       Obdulio Parkinson MD  Department of [facility] Medicine   [facility] - Skilled Nursing

## 2022-11-23 NOTE — PT/OT/SLP PROGRESS
"Occupational Therapy   Treatment    Name: Marianela Shrestha  MRN: [id number]  Admitting Diagnosis:   Lumbar Stenosis    Recommendations:     Discharge Recommendations: home health OT  Level of Assistance Recommended at Discharge: Intermittent assistance for ADL's and homemaking tasks  Discharge Equipment Recommendations:   (TBD)  Barriers to discharge:   Decreased caregiver support    Assessment:     Marianela Shrestha is a 37 y.o. female with a medical diagnosis of Lumbar Stenosis.  She presents with . Performance deficits affecting function are  are weakness, impaired endurance, impaired self care skills, impaired functional mobility, impaired sensation, gait instability, impaired balance, decreased coordination, decreased upper extremity function, decreased lower extremity function, decreased safety awareness, pain, decreased ROM, orthopedic precautions.     Pt participated well during today's session. Pt with overall needing SBA for t/fs for today's session. Pt continues to demonstrate deficits with self care skills, balance, functional mobility, UB strength and endurance. Pt will benefit from continued OT services to progress towards goals. .     Rehab Prognosis:  Fair; patient would benefit from acute skilled OT services to address these deficits and reach maximum level of function.       Plan:     Patient to be seen 6 x/week to address the above listed problems via self-care/home management, therapeutic activities, therapeutic exercises  Plan of Care Expires: 12/19/22  Plan of Care Reviewed with: patient    Subjective     "I did a lot in PT this morning so I am feeling worn out."    Pain/Comfort:  Pain Rating 1: 7/10  Location - Side 1: Bilateral  Location - Orientation 1: generalized  Location 1: lumbar spine  Pain Addressed 1: Pre-medicate for activity, Reposition, Distraction  Pain Rating Post-Intervention 1: 7/10    Objective:     Communicated with: Lydia prior to session.  Patient found supine with   " upon OT entry to room.    General Precautions: Standard, fall   Orthopedic Precautions:spinal precautions   Braces:    Respiratory Status: Room air     Occupational Performance:     Bed Mobility:    Patient completed Rolling/Turning to Right with stand by assistance  Patient completed Scooting/Bridging with stand by assistance  Patient completed Supine to Sit with stand by assistance     Functional Mobility/Transfers:  Patient completed Sit <> Stand Transfer with stand by assistance  with  no assistive device   Patient completed Bed <> Chair Transfer using Squat Pivot technique with stand by assistance with no assistive device  Functional Mobility: Pt with self propelling to gym <> room aprox 40 ft    Activities of Daily Living:  Lower Body Dressing: stand by assistance with pt doffing/donning B shoes and AFO while seated EOB with figure 4 technique       [facility] 6 Click ADL: 20    OT exercise: UE Ergometer 10 min    Treatment & Education:  Pt with standing activity for today's session with task. Pt with SBA for balance aspect with task with no AD at raised counter. Pt with visual perception task to find and sort various shapes and sizes of puzzle pieces with standing balance x4:53 min/sec with min cues throughout task with weight shifting and use of weight bearing into BUE to increase proprioception and facilitation into BUE crossing midline and facilitation with posture in prep for home management.     Pt able to recall 3/3 spinal precautions     Pt with therex performed to increase ROM, endurance for self care task and fxl mobility for independence.     Pt edu on role of OT, POC, safety when performing self care tasks , benefit of performing OOB activity, and safety when performing functional transfers and mobility management for preparation with goals to progress towards next level of care      Patient left supine with all lines intact and call button in reach    GOALS:   Multidisciplinary Problems        Occupational Therapy Goals          Problem: Occupational Therapy    Goal Priority Disciplines Outcome Interventions   Occupational Therapy Goal     OT, PT/OT Ongoing, Progressing    Description: Goals to be met by: 3 weeks     Patient will increase functional independence with ADLs by performing:    UE Dressing with Modified [place].  LE Dressing with Stand-by Assistance.  Grooming while seated with Modified [place].  Toileting from toilet or BSC with SBA for hygiene and clothing management. With A/D to stand.  Bathing from sitting at sink with Stand-by Assistance.  Supine to sit with Modified [place].  Step transfer with Stand-by Assistance with A/D as needed.  Upper extremity exercise with assistance as needed.  Caregiver will be educated on level of assist required to safely perform self care tasks and functional transfers..                          Time Tracking:     OT Date of Treatment: 11/23/22  OT Start Time: 1100  OT Stop Time: 1134  OT Total Time (min): 34 min    Billable Minutes:Therapeutic Activity 34 min    OT/MIGUEL A: MIGUEL A GRADY Visit Number: 4    11/23/2022

## 2022-11-23 NOTE — PLAN OF CARE
Problem: Occupational Therapy  Goal: Occupational Therapy Goal  Description: Goals to be met by: 3 weeks     Patient will increase functional independence with ADLs by performing:    UE Dressing with Modified [place].  LE Dressing with Stand-by Assistance.  Grooming while seated with Modified [place].  Toileting from toilet or BSC with SBA for hygiene and clothing management. With A/D to stand.  Bathing from sitting at sink with Stand-by Assistance.  Supine to sit with Modified [place].  Step transfer with Stand-by Assistance with A/D as needed.  Upper extremity exercise with assistance as needed.  Caregiver will be educated on level of assist required to safely perform self care tasks and functional transfers..     Outcome: Ongoing, Progressing

## 2022-11-24 PROCEDURE — 25000003 PHARM REV CODE 250: Performed by: NURSE PRACTITIONER

## 2022-11-24 PROCEDURE — 63600175 PHARM REV CODE 636 W HCPCS: Performed by: NURSE PRACTITIONER

## 2022-11-24 PROCEDURE — 11000004 HC SNF PRIVATE

## 2022-11-24 PROCEDURE — 25000003 PHARM REV CODE 250: Performed by: HOSPITALIST

## 2022-11-24 RX ADMIN — OXYCODONE HYDROCHLORIDE 10 MG: 10 TABLET ORAL at 09:11

## 2022-11-24 RX ADMIN — METHOCARBAMOL 1000 MG: 500 TABLET ORAL at 09:11

## 2022-11-24 RX ADMIN — OXYCODONE HYDROCHLORIDE 10 MG: 10 TABLET ORAL at 03:11

## 2022-11-24 RX ADMIN — VENLAFAXINE HYDROCHLORIDE 300 MG: 75 CAPSULE, EXTENDED RELEASE ORAL at 08:11

## 2022-11-24 RX ADMIN — PANTOPRAZOLE SODIUM 40 MG: 40 TABLET, DELAYED RELEASE ORAL at 08:11

## 2022-11-24 RX ADMIN — DOXEPIN HYDROCHLORIDE 50 MG: 50 CAPSULE ORAL at 08:11

## 2022-11-24 RX ADMIN — FLUTICASONE FUROATE AND VILANTEROL TRIFENATATE 1 PUFF: 100; 25 POWDER RESPIRATORY (INHALATION) at 09:11

## 2022-11-24 RX ADMIN — GABAPENTIN 600 MG: 300 CAPSULE ORAL at 02:11

## 2022-11-24 RX ADMIN — CELECOXIB 200 MG: 200 CAPSULE ORAL at 08:11

## 2022-11-24 RX ADMIN — ALPRAZOLAM 1 MG: 0.5 TABLET ORAL at 10:11

## 2022-11-24 RX ADMIN — METHOCARBAMOL 1000 MG: 500 TABLET ORAL at 08:11

## 2022-11-24 RX ADMIN — GABAPENTIN 600 MG: 300 CAPSULE ORAL at 08:11

## 2022-11-24 RX ADMIN — HEPARIN SODIUM 5000 UNITS: 5000 INJECTION INTRAVENOUS; SUBCUTANEOUS at 05:11

## 2022-11-24 RX ADMIN — OXYCODONE HYDROCHLORIDE 10 MG: 10 TABLET ORAL at 11:11

## 2022-11-24 RX ADMIN — GABAPENTIN 600 MG: 300 CAPSULE ORAL at 09:11

## 2022-11-24 RX ADMIN — ALPRAZOLAM 1 MG: 0.5 TABLET ORAL at 09:11

## 2022-11-24 RX ADMIN — OXYCODONE HYDROCHLORIDE 10 MG: 10 TABLET ORAL at 05:11

## 2022-11-24 RX ADMIN — ALPRAZOLAM 1 MG: 0.5 TABLET ORAL at 05:11

## 2022-11-24 RX ADMIN — SENNOSIDES AND DOCUSATE SODIUM 1 TABLET: 50; 8.6 TABLET ORAL at 08:11

## 2022-11-24 RX ADMIN — SENNOSIDES AND DOCUSATE SODIUM 1 TABLET: 50; 8.6 TABLET ORAL at 09:11

## 2022-11-24 RX ADMIN — NORETHINDRONE ACETATE 5 MG: 5 TABLET ORAL at 08:11

## 2022-11-24 RX ADMIN — METHOCARBAMOL 1000 MG: 500 TABLET ORAL at 02:11

## 2022-11-24 RX ADMIN — HEPARIN SODIUM 5000 UNITS: 5000 INJECTION INTRAVENOUS; SUBCUTANEOUS at 02:11

## 2022-11-24 RX ADMIN — HEPARIN SODIUM 5000 UNITS: 5000 INJECTION INTRAVENOUS; SUBCUTANEOUS at 10:11

## 2022-11-24 NOTE — PLAN OF CARE
Problem: Adult Inpatient Plan of Care  Goal: Plan of Care Review  Outcome: Ongoing, Progressing  Flowsheets (Taken 11/24/2022 1601)  Plan of Care Reviewed With: patient     Problem: Fall Injury Risk  Goal: Absence of Fall and Fall-Related Injury  Outcome: Ongoing, Progressing

## 2022-11-24 NOTE — PLAN OF CARE
Problem: Adult Inpatient Plan of Care  Goal: Plan of Care Review  Outcome: Ongoing, Progressing     Problem: Adult Inpatient Plan of Care  Goal: Patient-Specific Goal (Individualized)  Outcome: Ongoing, Progressing     Problem: Adult Inpatient Plan of Care  Goal: Absence of Hospital-Acquired Illness or Injury  Outcome: Ongoing, Progressing     Problem: Adult Inpatient Plan of Care  Goal: Optimal Comfort and Wellbeing  Outcome: Ongoing, Progressing      Alert and oriented to person, place and time

## 2022-11-25 PROCEDURE — 25000003 PHARM REV CODE 250: Performed by: HOSPITALIST

## 2022-11-25 PROCEDURE — 97110 THERAPEUTIC EXERCISES: CPT | Mod: CO

## 2022-11-25 PROCEDURE — 97116 GAIT TRAINING THERAPY: CPT | Mod: CQ

## 2022-11-25 PROCEDURE — 97530 THERAPEUTIC ACTIVITIES: CPT | Mod: CQ

## 2022-11-25 PROCEDURE — 97530 THERAPEUTIC ACTIVITIES: CPT | Mod: CO

## 2022-11-25 PROCEDURE — 63600175 PHARM REV CODE 636 W HCPCS: Performed by: NURSE PRACTITIONER

## 2022-11-25 PROCEDURE — 25000003 PHARM REV CODE 250: Performed by: NURSE PRACTITIONER

## 2022-11-25 PROCEDURE — 97110 THERAPEUTIC EXERCISES: CPT | Mod: CQ

## 2022-11-25 PROCEDURE — 11000004 HC SNF PRIVATE

## 2022-11-25 RX ADMIN — FLUTICASONE FUROATE AND VILANTEROL TRIFENATATE 1 PUFF: 100; 25 POWDER RESPIRATORY (INHALATION) at 08:11

## 2022-11-25 RX ADMIN — HEPARIN SODIUM 5000 UNITS: 5000 INJECTION INTRAVENOUS; SUBCUTANEOUS at 01:11

## 2022-11-25 RX ADMIN — GABAPENTIN 600 MG: 300 CAPSULE ORAL at 08:11

## 2022-11-25 RX ADMIN — OXYCODONE HYDROCHLORIDE 10 MG: 10 TABLET ORAL at 05:11

## 2022-11-25 RX ADMIN — OXYCODONE HYDROCHLORIDE 10 MG: 10 TABLET ORAL at 03:11

## 2022-11-25 RX ADMIN — HEPARIN SODIUM 5000 UNITS: 5000 INJECTION INTRAVENOUS; SUBCUTANEOUS at 10:11

## 2022-11-25 RX ADMIN — PANTOPRAZOLE SODIUM 40 MG: 40 TABLET, DELAYED RELEASE ORAL at 08:11

## 2022-11-25 RX ADMIN — ALPRAZOLAM 1 MG: 0.5 TABLET ORAL at 05:11

## 2022-11-25 RX ADMIN — METHOCARBAMOL 1000 MG: 500 TABLET ORAL at 08:11

## 2022-11-25 RX ADMIN — ALPRAZOLAM 1 MG: 0.5 TABLET ORAL at 01:11

## 2022-11-25 RX ADMIN — ALPRAZOLAM 1 MG: 0.5 TABLET ORAL at 08:11

## 2022-11-25 RX ADMIN — CELECOXIB 200 MG: 200 CAPSULE ORAL at 08:11

## 2022-11-25 RX ADMIN — VENLAFAXINE HYDROCHLORIDE 300 MG: 75 CAPSULE, EXTENDED RELEASE ORAL at 08:11

## 2022-11-25 RX ADMIN — HEPARIN SODIUM 5000 UNITS: 5000 INJECTION INTRAVENOUS; SUBCUTANEOUS at 05:11

## 2022-11-25 RX ADMIN — NORETHINDRONE ACETATE 5 MG: 5 TABLET ORAL at 08:11

## 2022-11-25 RX ADMIN — OXYCODONE HYDROCHLORIDE 10 MG: 10 TABLET ORAL at 08:11

## 2022-11-25 RX ADMIN — DOXEPIN HYDROCHLORIDE 50 MG: 50 CAPSULE ORAL at 08:11

## 2022-11-25 RX ADMIN — GABAPENTIN 600 MG: 300 CAPSULE ORAL at 03:11

## 2022-11-25 RX ADMIN — METHOCARBAMOL 1000 MG: 500 TABLET ORAL at 03:11

## 2022-11-25 RX ADMIN — OXYCODONE HYDROCHLORIDE 10 MG: 10 TABLET ORAL at 09:11

## 2022-11-25 NOTE — PLAN OF CARE
Problem: Adult Inpatient Plan of Care  Goal: Plan of Care Review  Outcome: Ongoing, Progressing  Flowsheets (Taken 11/25/2022 1971)  Plan of Care Reviewed With: patient     Problem: Fall Injury Risk  Goal: Absence of Fall and Fall-Related Injury  Outcome: Ongoing, Progressing

## 2022-11-25 NOTE — PT/OT/SLP PROGRESS
"Occupational Therapy   Treatment    Name: Marianela Shrestha  MRN: [id number]  Admit Date: 11/18/2022  Admitting Diagnosis:  SHAZIA (cauda equina syndrome)    General Precautions: Standard, fall   Orthopedic Precautions:spinal precautions   Braces: AFO     Recommendations:     Discharge Recommendations: home health OT  Level of Assistance Recommended at Discharge: Intermittent assistance for ADL's and homemaking tasks  Discharge Equipment Recommendations:   (TBD)  Barriers to discharge:  Decreased caregiver support    Assessment:     Marianela Shrestha is a 37 y.o. female with a medical diagnosis of SHAZIA (cauda equina syndrome) .  She presents with performance deficits affecting function are weakness, impaired endurance, impaired self care skills, impaired functional mobility, impaired sensation, gait instability, impaired balance, decreased coordination, decreased upper extremity function, decreased lower extremity function, decreased safety awareness, pain, decreased ROM, orthopedic precautions.   Pt participated well during today's session. Pt continues to demonstrate deficits with self care skills, balance, functional mobility, UB strength and endurance. Pt will benefit from continued OT services to progress towards goals. .     Rehab Potential is good    Activity tolerance:  Good    Plan:     Patient to be seen 6 x/week to address the above listed problems via self-care/home management, therapeutic activities, therapeutic exercises    Plan of Care Expires: 12/19/22  Plan of Care Reviewed with: patient    Subjective     Communicated with: Lydia prior to session. "I'm in some pain today" .    Pain/Comfort:  Pain Rating 1: 6/10  Location - Side 1: Left  Location - Orientation 1: generalized  Location 1: lumbar spine  Pain Addressed 1: Pre-medicate for activity, Reposition, Distraction  Pain Rating Post-Intervention 1: 6/10    Patient's cultural, spiritual, Jehovah's witness conflicts given the current " situation:  no    Objective:     Patient found up in chair upon OT entry to room.    Bed Mobility:    Not performed.     Functional Mobility/Transfers:  Patient completed Sit <> Stand Transfer with stand by assistance  with  rolling walker   Functional Mobility: Functional w/c mobility performed. Pt  propel w/c approx 50 ft with supervision.     Activities of Daily Living:  Not performed, already completed.     [facility] 6 Click ADL: 20    OT Exercises: AROM x 2 sets of 20 with #3 dowel perform bicep curls and forward flexion to improve UB strengthening, endurance and ROM required for w/c propulsion, ADL's, functional transfers and homemaking tasks    Treatment & Education:  Pt with standing activity today with SBA and RW. Pt at raised counter to perform matching card activity with focus on standing tolerance, functional reaching, dynamic standing bal, crossing midline, fine motor, and to promote independence with homemaking and self care tasks. Pt able to maintain stance for ~ 6:22 min min before becoming fatigued.   Pt educated on role of OT, safety while performing functional transfers, safety while performing self care tasks, adaptive equipment, spinal precautions, importance to adhering to precautions and progress towards OT goals   Pt able to recall 3/3 spinal precautions    Patient left up in chair with call button in reach    GOALS:   Multidisciplinary Problems       Occupational Therapy Goals          Problem: Occupational Therapy    Goal Priority Disciplines Outcome Interventions   Occupational Therapy Goal     OT, PT/OT Ongoing, Progressing    Description: Goals to be met by: 3 weeks     Patient will increase functional independence with ADLs by performing:    UE Dressing with Modified [place].  LE Dressing with Stand-by Assistance.  Grooming while seated with Modified [place].  Toileting from toilet or BSC with SBA for hygiene and clothing management. With A/D to stand.  Bathing from sitting at  sink with Stand-by Assistance.  Supine to sit with Modified [place].  Step transfer with Stand-by Assistance with A/D as needed.  Upper extremity exercise with assistance as needed.  Caregiver will be educated on level of assist required to safely perform self care tasks and functional transfers..                          Time Tracking:     OT Date of Treatment: 11/25/22  OT Start Time: 1053    OT Stop Time: 1127  OT Total Time (min): 34 min    Billable Minutes:Therapeutic Activity 18  Therapeutic Exercise 16    11/25/2022

## 2022-11-25 NOTE — PT/OT/SLP PROGRESS
"Physical Therapy Treatment    Patient Name:  Marianela Shrestha   MRN:  [id number]  Admit Date: 11/18/2022  Admitting Diagnosis: SHAZIA (cauda equina syndrome)  Recent Surgeries:     General Precautions: Standard, fall   Orthopedic Precautions:spinal precautions   Braces: AFO     Recommendations:     Discharge Recommendations:  home health PT   Level of Assistance Recommended at Discharge: Intermittent assistance   Discharge Equipment Recommendations: bedside commode, walker, rolling, wheelchair, bath bench   Barriers to discharge: Decreased caregiver support    Assessment:     Marianela Shrestha is a 37 y.o. female admitted with a medical diagnosis of SHAZIA (cauda equina syndrome) . Pt tolerated well, pt would continue to benefit from skilled PT services to improve overall functional mobility, strength and endurance.  .      Performance deficits affecting function:  weakness, impaired endurance, impaired self care skills, impaired functional mobility, gait instability, impaired balance, decreased lower extremity function, impaired sensation, impaired skin, impaired cardiopulmonary response to activity, orthopedic precautions .    Rehab Potential good    Activity Tolerance: Fair    Plan:     Patient to be seen 6 x/week to address the above listed problems via gait training, therapeutic activities, therapeutic exercises, neuromuscular re-education    Plan of Care Expires: 12/19/22  Plan of Care Reviewed with: patient    Subjective     "Once my door is open I'm ready".     Pain/Comfort:  Pain Rating 1: 7/10  Location - Side 1: Left  Location - Orientation 1: generalized  Location 1: lumbar spine (L hip and foot)  Pain Addressed 1: Pre-medicate for activity, Reposition, Distraction, Cessation of Activity, Nurse notified  Pain Rating Post-Intervention 1: 7/10 (no further mentioned)    Patient's cultural, spiritual, Yarsani conflicts given the current situation:  no    Objective:       Patient found with  (in wc L AFO) " "upon PT entry to room.     Therapeutic Activities and Exercises: recumbent cross  x 12 min L-2 arms at 9 and seat at 8  Pt able to recall 3/3 spinal prec    Functional Mobility:  Transfers:     Sit to Stand:  stand by assistance with rolling walker  Gait: amb with RW CGA ~ 128 ft no LOB wc follow  Stairs:  asc/[place] 4 steps with BHR CGA  Curb asc/[place] 4" curb with RW CGA vcs for safety/tech/taking her time  Wheelchair Propulsion:  ~ 150 ft from room to gym I    AM-PAC 6 CLICK MOBILITY  18    Patient left up in chair with  with OT .    GOALS:   Multidisciplinary Problems       Physical Therapy Goals          Problem: Physical Therapy    Goal Priority Disciplines Outcome Goal Variances Interventions   Physical Therapy Goal     PT, PT/OT Ongoing, Progressing     Description: Goals to be met by:12/19/22    Patient will increase functional independence with mobility by performing:    . Sit to stand transfer with Supervision  . Bed to chair transfer with Supervision using Rolling Walker  . Gait  x 150 feet with Stand-by Assistance using Rolling Walker.   . Ascend/descend 4 stair with bilateral Handrails Stand-by Assistance using No Assistive Device.   . Ascend/Descend 4 inch curb step with Stand-by Assistance using Rolling Walker.                         Time Tracking:     PT Received On: 11/25/22  PT Start Time: 1013  PT Stop Time: 1051  PT Total Time (min): 38 min    Billable Minutes: Gait Training 13, Therapeutic Activity 13, and Therapeutic Exercise 12    Treatment Type: Treatment  PT/PTA: PTA     PTA Visit Number: 4     11/25/2022  "

## 2022-11-26 PROCEDURE — 63600175 PHARM REV CODE 636 W HCPCS: Performed by: NURSE PRACTITIONER

## 2022-11-26 PROCEDURE — 25000003 PHARM REV CODE 250: Performed by: NURSE PRACTITIONER

## 2022-11-26 PROCEDURE — 97110 THERAPEUTIC EXERCISES: CPT

## 2022-11-26 PROCEDURE — 97116 GAIT TRAINING THERAPY: CPT

## 2022-11-26 PROCEDURE — 97530 THERAPEUTIC ACTIVITIES: CPT

## 2022-11-26 PROCEDURE — 25000003 PHARM REV CODE 250: Performed by: HOSPITALIST

## 2022-11-26 PROCEDURE — 11000004 HC SNF PRIVATE

## 2022-11-26 RX ADMIN — OXYCODONE HYDROCHLORIDE 10 MG: 10 TABLET ORAL at 08:11

## 2022-11-26 RX ADMIN — VENLAFAXINE HYDROCHLORIDE 300 MG: 75 CAPSULE, EXTENDED RELEASE ORAL at 09:11

## 2022-11-26 RX ADMIN — SENNOSIDES AND DOCUSATE SODIUM 1 TABLET: 50; 8.6 TABLET ORAL at 08:11

## 2022-11-26 RX ADMIN — HEPARIN SODIUM 5000 UNITS: 5000 INJECTION INTRAVENOUS; SUBCUTANEOUS at 09:11

## 2022-11-26 RX ADMIN — GABAPENTIN 600 MG: 300 CAPSULE ORAL at 09:11

## 2022-11-26 RX ADMIN — OXYCODONE HYDROCHLORIDE 10 MG: 10 TABLET ORAL at 09:11

## 2022-11-26 RX ADMIN — PANTOPRAZOLE SODIUM 40 MG: 40 TABLET, DELAYED RELEASE ORAL at 09:11

## 2022-11-26 RX ADMIN — ALPRAZOLAM 1 MG: 0.5 TABLET ORAL at 02:11

## 2022-11-26 RX ADMIN — ALPRAZOLAM 1 MG: 0.5 TABLET ORAL at 06:11

## 2022-11-26 RX ADMIN — GABAPENTIN 600 MG: 300 CAPSULE ORAL at 02:11

## 2022-11-26 RX ADMIN — METHOCARBAMOL 1000 MG: 500 TABLET ORAL at 09:11

## 2022-11-26 RX ADMIN — OXYCODONE HYDROCHLORIDE 10 MG: 10 TABLET ORAL at 03:11

## 2022-11-26 RX ADMIN — OXYCODONE HYDROCHLORIDE 10 MG: 10 TABLET ORAL at 02:11

## 2022-11-26 RX ADMIN — FLUTICASONE FUROATE AND VILANTEROL TRIFENATATE 1 PUFF: 100; 25 POWDER RESPIRATORY (INHALATION) at 09:11

## 2022-11-26 RX ADMIN — METHOCARBAMOL 1000 MG: 500 TABLET ORAL at 08:11

## 2022-11-26 RX ADMIN — GABAPENTIN 600 MG: 300 CAPSULE ORAL at 08:11

## 2022-11-26 RX ADMIN — HEPARIN SODIUM 5000 UNITS: 5000 INJECTION INTRAVENOUS; SUBCUTANEOUS at 02:11

## 2022-11-26 RX ADMIN — HEPARIN SODIUM 5000 UNITS: 5000 INJECTION INTRAVENOUS; SUBCUTANEOUS at 05:11

## 2022-11-26 RX ADMIN — NORETHINDRONE ACETATE 5 MG: 5 TABLET ORAL at 09:11

## 2022-11-26 RX ADMIN — METHOCARBAMOL 1000 MG: 500 TABLET ORAL at 02:11

## 2022-11-26 RX ADMIN — DOXEPIN HYDROCHLORIDE 50 MG: 50 CAPSULE ORAL at 08:11

## 2022-11-26 RX ADMIN — CELECOXIB 200 MG: 200 CAPSULE ORAL at 09:11

## 2022-11-26 NOTE — PT/OT/SLP PROGRESS
"Occupational Therapy   Treatment    Name: Marianela Shrestha  MRN: [id number]  Admit Date: 11/18/2022  Admitting Diagnosis:  SHAZIA (cauda equina syndrome)    General Precautions: Standard, fall   Orthopedic Precautions:spinal precautions   Braces: AFO     Recommendations:     Discharge Recommendations: home health OT  Level of Assistance Recommended at Discharge: Intermittent assistance for ADL's and homemaking tasks  Discharge Equipment Recommendations:  other (see comments) (TBD)  Barriers to discharge:  Decreased caregiver support    Assessment:     Marianela Shrestha is a 37 y.o. female with a medical diagnosis of SHAZIA (cauda equina syndrome).   Pt tolerated session well and without incident, but she continues to require assistance to perform self-care tasks and mobility.  She would continue to benefit from skilled OT services at SNF to maximize her gains in functional independence.  She presents with the following.  Performance deficits affecting function are weakness, impaired endurance, gait instability, impaired sensation, impaired functional mobility, impaired self care skills, impaired balance, decreased lower extremity function, pain, orthopedic precautions, decreased ROM, decreased coordination.     Rehab Potential is good    Activity tolerance:  Good    Plan:     Patient to be seen 6 x/week to address the above listed problems via self-care/home management, therapeutic activities, therapeutic exercises    Plan of Care Expires: 12/19/22  Plan of Care Reviewed with: patient    Subjective   "I just can't really move my left foot."  Communicated with: nursing prior to session.    Pain/Comfort:  Pain Rating 1: 7/10  Location - Side 1: Left  Location - Orientation 1: generalized  Location 1: lumbar spine (radiates to L hip and L foot)  Pain Addressed 1: Pre-medicate for activity, Reposition, Distraction  Pain Rating Post-Intervention 1: 7/10    Patient's cultural, spiritual, Mu-ism conflicts given the " current situation:  no    Objective:     Patient found up in chair in the rehab gym having completed PT session with Other (comments) (L AFO) upon OT entry to room.    Bed Mobility:    Patient completed Sit to Supine from L side of bed with supervision    Functional Mobility/Transfers:  Patient completed Sit <> Stand Transfer from w/c x 2 trials with stand by assistance with no assistive device   Patient completed Chair <> Bed Transfer using Stand Pivot technique with stand by assistance with no assistive device  Functional Mobility: Pt took ~ 4 steps from the w/c toward the HOB while stepping EOB holding onto the bed for support.  She had also self-propelled the w/c ~35 ft in the rehab gym and ~178 ft back to her room with supervision.     Activities of Daily Living:  Feeding:  setup assistance of tray table to eat her breakfast while HOB elevated   Lower Body Dressing: SBA to doff her AFO and her tennis shoes while seated EOB    AMP[facility] 6 Click ADL: 20    OT Exercises: UE exercises performed to increase functional endurance and strength in order increase independence when performing self care tasks, functional ambulation, W/C propulsion, and functional standing activities.  With 3 lb dowel she performed 1 set of 20 reps and 1 set of 15 reps of rows, 1 set of 20 reps and 1 set of 15 reps of biceps curls, and 1 set of 15 reps and 1 set of 10 reps of shoulder flex/ext.  UE Ergometer for 10 min on moderate resistance.    Treatment & Education:  - Pt performed functional fine motor activity in standing at the rehab gym counter to address her standing endurance that's required for daily activities.  She stood for 6 min and 56 sec with SBA-CGA.     Pt edu on role of OT, POC, safety when performing self care tasks, her spinal precautions, benefit of performing OOB activity, and safety when performing functional transfers and mobility.    - Self care tasks completed-- as noted above      Patient left HOB elevated with call  button in reach    GOALS:   Multidisciplinary Problems       Occupational Therapy Goals          Problem: Occupational Therapy    Goal Priority Disciplines Outcome Interventions   Occupational Therapy Goal     OT, PT/OT Ongoing, Progressing    Description: Goals to be met by: 3 weeks     Patient will increase functional independence with ADLs by performing:    UE Dressing with Modified [place].  LE Dressing with Stand-by Assistance.  Grooming while seated with Modified [place].  Toileting from toilet or BSC with SBA for hygiene and clothing management. With A/D to stand.  Bathing from sitting at sink with Stand-by Assistance.  Supine to sit with Modified [place].  Step transfer with Stand-by Assistance with A/D as needed.  Upper extremity exercise with assistance as needed.  Caregiver will be educated on level of assist required to safely perform self care tasks and functional transfers..                          Time Tracking:     OT Date of Treatment: 11/26/22  OT Start Time: 0846    OT Stop Time: 0926  OT Total Time (min): 40 min    Billable Minutes:Therapeutic Activity 23 min  Therapeutic Exercise 17 min    11/26/2022

## 2022-11-26 NOTE — PT/OT/SLP PROGRESS
Physical Therapy Treatment    Patient Name:  Marianela Shrestha   MRN:  [id number]  Admit Date: 11/18/2022  Admitting Diagnosis: SHAZIA (cauda equina syndrome)  Recent Surgeries:  Procedure(s) (LRB): LAMINECTOMY, SPINE, LUMBAR (N/A)    General Precautions: Standard, fall   Orthopedic Precautions:spinal precautions   Braces: AFO     Recommendations:     Discharge Recommendations:  home health PT   Level of Assistance Recommended at Discharge: Intermittent assistance   Discharge Equipment Recommendations: bedside commode, walker, rolling, wheelchair, bath bench   Barriers to discharge: Decreased caregiver support (increased assistance needed)    Assessment:     Marianela Shrestha is a 37 y.o. female admitted with a medical diagnosis of SHAZIA (cauda equina syndrome). Today's treatment included strengthening, range of motion, balance training, gait training, and endurance interventions. She remains limited in lower extremity strength/sensation, transfers, STS, gait training without an AD, and pain. Progressed exercises by continuing to increase gait distance each session. During curb step, she required Jeevan on first attempt with standing LOB when lifting RW on step, needing ModA for safety. Able to complete 2 trials with Jeevan and RW with no other LOB noted. Patient requires skilled physical therapy services to address deficits and return patient to OF with no limitations.    Performance deficits affecting function:  impaired endurance, weakness, impaired sensation, impaired self care skills, impaired functional mobility, gait instability, impaired balance, decreased coordination, decreased lower extremity function, pain, abnormal tone, decreased ROM, impaired coordination, impaired fine motor, impaired skin, impaired cardiopulmonary response to activity, orthopedic precautions.     Rehab Potential is good    Activity Tolerance: Good    Plan:     Patient to be seen 6 x/week to address the above listed problems via gait  "training, therapeutic activities, therapeutic exercises, neuromuscular re-education, wheelchair management/training    Plan of Care Expires: 12/19/22  Plan of Care Reviewed with: patient    Subjective     Patient reports low back pain being 7/10 but is agreeable to participate in therapy today.     Pain/Comfort:  Pain Rating 1: 7/10  Location - Side 1: Bilateral  Location - Orientation 1: generalized  Location 1: back  Pain Addressed 1: Reposition, Distraction, Cessation of Activity  Pain Rating Post-Intervention 1: 7/10    Patient's cultural, spiritual, [employer]aritan conflicts given the current situation:  no    Objective:     Communicated with nursing staff prior to session. Patient found supine with no active lines upon PT entry to room.     Functional Mobility:  Bed Mobility:  Rolling Left: independence  Rolling Right: independence  Scooting: independence  Bridging: independence  Supine to Sit: independence    Transfers:  Sit to Stand x multiple trials: stand by assistance with rolling walker.  Bed to WC: stand by assistance with rolling walker using Stand Pivot.  Toilet Transfer: stand by assistance with rolling walker using Stand Pivot.    Gait: Patient ambulated 147 feet with RW and CGA. She demonstrated decreased xavier, decreased step length, decreased stride length, decreased toe-to-floor clearance, and reciprocal gait pattern . Performed 4 side steps from bed to wc with RW and SBA.    Balance:  Sitting: performed sitting EOB donning clothes with supervision and set up assistance, with no LOB noted.  Standing: performed with RW and SBA, with no LOB noted.    Stairs: Pt ascended/descended 4 stair(s) with bilateral handrails with Contact Guard Assistance.    Curb Step: Pt ascended/descended 2 trials x 4" curb step with Rolling Walker with Minimal Assistance. She required verbal cues for RW position on curb step. 1 LOB noted when lifting RW on step needing ModA for safety. No other LOB noted during 2 trials. "     Wheelchair Propulsion: Pt propelled Standard wheelchair x 147 feet on Level tile with Bilateral upper extremity with  supervision .    Therapeutic Activities and Exercises:   Nu step: x10 minutes with seat on 8, load on 3, and arms on 9.   Donned bra/shirt/pants/L AFO/both shoes with set up assistance.  Performed oral care and face washing with set up assistance in wc.   Performed toileting with assistance for transfer and SBA when standing for cleaning.  She required Jeevan for donning pants after toileting.     Patient Education:   Patient was educated to call nurse if she needs anything. She verbalized understanding.  Patient was educated on benefits of physical therapy on continued improvement in patient's quality of life.  Patient was provided with daily orientation and goals of this PT session.  All questions answered to satisfaction, within scope of PT practice.   Patient voiced no other concerns.  RN notified of PT therapy session.     AM-PAC 6 CLICK MOBILITY  20    Patient left up in chair with  FLORENCIO Dennis present in therapy gym for OT session.    GOALS:   Multidisciplinary Problems       Physical Therapy Goals          Problem: Physical Therapy    Goal Priority Disciplines Outcome Goal Variances Interventions   Physical Therapy Goal     PT, PT/OT Ongoing, Progressing     Description: Goals to be met by:12/19/22    Patient will increase functional independence with mobility by performing:    Sit to stand transfer with Supervision.  Bed to chair transfer with Supervision using Rolling Walker.  Gait  x 150 feet with Stand-by Assistance using Rolling Walker.   Ascend/descend 4 stair with bilateral Handrails Stand-by Assistance using No Assistive Device.   Ascend/Descend 4 inch curb step with Stand-by Assistance using Rolling Walker.                         Time Tracking:     PT Received On: 11/26/22  PT Start Time: 0759  PT Stop Time: 0841  PT Total Time (min): 42 min    Billable Minutes: Gait Training 17,  Therapeutic Activity 15, and Therapeutic Exercise 10    Treatment Type: Treatment  PT/PTA: PT     PTA Visit Number: 0     11/26/2022

## 2022-11-26 NOTE — PLAN OF CARE
Problem: Physical Therapy  Goal: Physical Therapy Goal  Description: Goals to be met by:12/19/22    Patient will increase functional independence with mobility by performing:    Sit to stand transfer with Supervision.  Bed to chair transfer with Supervision using Rolling Walker.  Gait  x 150 feet with Stand-by Assistance using Rolling Walker.   Ascend/descend 4 stair with bilateral Handrails Stand-by Assistance using No Assistive Device.   Ascend/Descend 4 inch curb step with Stand-by Assistance using Rolling Walker.    11/26/2022 0957 by Kristi Cobb PT  Outcome: Ongoing, Progressing

## 2022-11-27 PROCEDURE — 11000004 HC SNF PRIVATE

## 2022-11-27 PROCEDURE — 25000003 PHARM REV CODE 250: Performed by: HOSPITALIST

## 2022-11-27 PROCEDURE — 63600175 PHARM REV CODE 636 W HCPCS: Performed by: NURSE PRACTITIONER

## 2022-11-27 PROCEDURE — 25000003 PHARM REV CODE 250: Performed by: NURSE PRACTITIONER

## 2022-11-27 RX ADMIN — OXYCODONE HYDROCHLORIDE 10 MG: 10 TABLET ORAL at 10:11

## 2022-11-27 RX ADMIN — ALPRAZOLAM 1 MG: 0.5 TABLET ORAL at 12:11

## 2022-11-27 RX ADMIN — GABAPENTIN 600 MG: 300 CAPSULE ORAL at 08:11

## 2022-11-27 RX ADMIN — METHOCARBAMOL 1000 MG: 500 TABLET ORAL at 09:11

## 2022-11-27 RX ADMIN — GABAPENTIN 600 MG: 300 CAPSULE ORAL at 09:11

## 2022-11-27 RX ADMIN — OXYCODONE HYDROCHLORIDE 10 MG: 10 TABLET ORAL at 03:11

## 2022-11-27 RX ADMIN — HEPARIN SODIUM 5000 UNITS: 5000 INJECTION INTRAVENOUS; SUBCUTANEOUS at 02:11

## 2022-11-27 RX ADMIN — OXYCODONE HYDROCHLORIDE 10 MG: 10 TABLET ORAL at 09:11

## 2022-11-27 RX ADMIN — ALPRAZOLAM 1 MG: 0.5 TABLET ORAL at 08:11

## 2022-11-27 RX ADMIN — PANTOPRAZOLE SODIUM 40 MG: 40 TABLET, DELAYED RELEASE ORAL at 08:11

## 2022-11-27 RX ADMIN — DOXEPIN HYDROCHLORIDE 50 MG: 50 CAPSULE ORAL at 09:11

## 2022-11-27 RX ADMIN — VENLAFAXINE HYDROCHLORIDE 300 MG: 75 CAPSULE, EXTENDED RELEASE ORAL at 08:11

## 2022-11-27 RX ADMIN — FLUTICASONE FUROATE AND VILANTEROL TRIFENATATE 1 PUFF: 100; 25 POWDER RESPIRATORY (INHALATION) at 08:11

## 2022-11-27 RX ADMIN — ALPRAZOLAM 1 MG: 0.5 TABLET ORAL at 09:11

## 2022-11-27 RX ADMIN — GABAPENTIN 600 MG: 300 CAPSULE ORAL at 02:11

## 2022-11-27 RX ADMIN — OXYCODONE HYDROCHLORIDE 10 MG: 10 TABLET ORAL at 12:11

## 2022-11-27 RX ADMIN — HEPARIN SODIUM 5000 UNITS: 5000 INJECTION INTRAVENOUS; SUBCUTANEOUS at 10:11

## 2022-11-27 RX ADMIN — METHOCARBAMOL 1000 MG: 500 TABLET ORAL at 02:11

## 2022-11-27 RX ADMIN — SENNOSIDES AND DOCUSATE SODIUM 1 TABLET: 50; 8.6 TABLET ORAL at 09:11

## 2022-11-27 RX ADMIN — METHOCARBAMOL 1000 MG: 500 TABLET ORAL at 08:11

## 2022-11-27 RX ADMIN — CELECOXIB 200 MG: 200 CAPSULE ORAL at 08:11

## 2022-11-27 RX ADMIN — HEPARIN SODIUM 5000 UNITS: 5000 INJECTION INTRAVENOUS; SUBCUTANEOUS at 06:11

## 2022-11-27 RX ADMIN — NORETHINDRONE ACETATE 5 MG: 5 TABLET ORAL at 08:11

## 2022-11-27 RX ADMIN — OXYCODONE HYDROCHLORIDE 10 MG: 10 TABLET ORAL at 04:11

## 2022-11-27 NOTE — PLAN OF CARE
Problem: Adult Inpatient Plan of Care  Goal: Plan of Care Review  Outcome: Ongoing, Progressing  Flowsheets (Taken 11/27/2022 1416)  Plan of Care Reviewed With: patient     Problem: Fall Injury Risk  Goal: Absence of Fall and Fall-Related Injury  Outcome: Ongoing, Progressing

## 2022-11-28 PROCEDURE — 97110 THERAPEUTIC EXERCISES: CPT | Mod: CO

## 2022-11-28 PROCEDURE — 25000003 PHARM REV CODE 250: Performed by: NURSE PRACTITIONER

## 2022-11-28 PROCEDURE — 63600175 PHARM REV CODE 636 W HCPCS: Performed by: NURSE PRACTITIONER

## 2022-11-28 PROCEDURE — 25000003 PHARM REV CODE 250: Performed by: HOSPITALIST

## 2022-11-28 PROCEDURE — 97535 SELF CARE MNGMENT TRAINING: CPT | Mod: CO

## 2022-11-28 PROCEDURE — 97530 THERAPEUTIC ACTIVITIES: CPT | Mod: CQ

## 2022-11-28 PROCEDURE — 11000004 HC SNF PRIVATE

## 2022-11-28 PROCEDURE — 97116 GAIT TRAINING THERAPY: CPT | Mod: CQ

## 2022-11-28 PROCEDURE — 97110 THERAPEUTIC EXERCISES: CPT | Mod: CQ

## 2022-11-28 PROCEDURE — 97530 THERAPEUTIC ACTIVITIES: CPT | Mod: CO

## 2022-11-28 RX ADMIN — OXYCODONE 5 MG: 5 TABLET ORAL at 03:11

## 2022-11-28 RX ADMIN — VENLAFAXINE HYDROCHLORIDE 300 MG: 75 CAPSULE, EXTENDED RELEASE ORAL at 09:11

## 2022-11-28 RX ADMIN — METHOCARBAMOL 1000 MG: 500 TABLET ORAL at 03:11

## 2022-11-28 RX ADMIN — OXYCODONE HYDROCHLORIDE 10 MG: 10 TABLET ORAL at 11:11

## 2022-11-28 RX ADMIN — METHOCARBAMOL 1000 MG: 500 TABLET ORAL at 08:11

## 2022-11-28 RX ADMIN — OXYCODONE HYDROCHLORIDE 10 MG: 10 TABLET ORAL at 05:11

## 2022-11-28 RX ADMIN — CELECOXIB 200 MG: 200 CAPSULE ORAL at 09:11

## 2022-11-28 RX ADMIN — PANTOPRAZOLE SODIUM 40 MG: 40 TABLET, DELAYED RELEASE ORAL at 09:11

## 2022-11-28 RX ADMIN — HEPARIN SODIUM 5000 UNITS: 5000 INJECTION INTRAVENOUS; SUBCUTANEOUS at 10:11

## 2022-11-28 RX ADMIN — NORETHINDRONE ACETATE 5 MG: 5 TABLET ORAL at 09:11

## 2022-11-28 RX ADMIN — DOXEPIN HYDROCHLORIDE 50 MG: 50 CAPSULE ORAL at 08:11

## 2022-11-28 RX ADMIN — GABAPENTIN 600 MG: 300 CAPSULE ORAL at 08:11

## 2022-11-28 RX ADMIN — SENNOSIDES AND DOCUSATE SODIUM 1 TABLET: 50; 8.6 TABLET ORAL at 09:11

## 2022-11-28 RX ADMIN — GABAPENTIN 600 MG: 300 CAPSULE ORAL at 09:11

## 2022-11-28 RX ADMIN — GABAPENTIN 600 MG: 300 CAPSULE ORAL at 03:11

## 2022-11-28 RX ADMIN — OXYCODONE 5 MG: 5 TABLET ORAL at 09:11

## 2022-11-28 RX ADMIN — METHOCARBAMOL 1000 MG: 500 TABLET ORAL at 09:11

## 2022-11-28 RX ADMIN — HEPARIN SODIUM 5000 UNITS: 5000 INJECTION INTRAVENOUS; SUBCUTANEOUS at 03:11

## 2022-11-28 RX ADMIN — FLUTICASONE FUROATE AND VILANTEROL TRIFENATATE 1 PUFF: 100; 25 POWDER RESPIRATORY (INHALATION) at 09:11

## 2022-11-28 RX ADMIN — HEPARIN SODIUM 5000 UNITS: 5000 INJECTION INTRAVENOUS; SUBCUTANEOUS at 05:11

## 2022-11-28 RX ADMIN — ALPRAZOLAM 1 MG: 0.5 TABLET ORAL at 08:11

## 2022-11-28 RX ADMIN — SENNOSIDES AND DOCUSATE SODIUM 1 TABLET: 50; 8.6 TABLET ORAL at 08:11

## 2022-11-28 RX ADMIN — OXYCODONE HYDROCHLORIDE 10 MG: 10 TABLET ORAL at 10:11

## 2022-11-28 NOTE — PLAN OF CARE
Problem: Adult Inpatient Plan of Care  Goal: Patient-Specific Goal (Individualized)  Outcome: Ongoing, Progressing  Goal: Optimal Comfort and Wellbeing  Outcome: Ongoing, Progressing     Problem: Fall Injury Risk  Goal: Absence of Fall and Fall-Related Injury  Outcome: Ongoing, Progressing     Problem: Skin Injury Risk Increased  Goal: Skin Health and Integrity  Outcome: Ongoing, Progressing

## 2022-11-28 NOTE — PROGRESS NOTES
"[facility] - Skilled Nursing  Adult Nutrition  Progress Note    SUMMARY   Recommendations  Continue regular diet, add nutrition supplement if weight loss verified.Patient feels that she could have lost this weight in ten days and is okay with it. She has been sharing her food with her spouse.  Goals: PO to meet 85% of EEN/EPN by next RD fu  Nutrition Goal Status: progressing towards goal    Assessment and Plan   No nutrition diagnosis at this time    Malnutrition Assessment 11/21/22     Skin (Micronutrient): wounds unhealed           Orbital Region (Subcutaneous Fat Loss): well nourished  Upper Arm Region (Subcutaneous Fat Loss): well nourished  Thoracic and Lumbar Region: well nourished   Clavicle Bone Region (Muscle Loss): well nourished  Clavicle and Acromion Bone Region (Muscle Loss): well nourished  Dorsal Hand (Muscle Loss): well nourished  Patellar Region (Muscle Loss): well nourished  Anterior Thigh Region (Muscle Loss): well nourished  Posterior Calf Region (Muscle Loss): well nourished                 Reason for Assessment    Reason For Assessment: RD follow-up  Diagnosis:  (Lumbar stenosis, s/p laminectomy)  Relevant Medical History: PTSD, Osteomyelitis, chronic pain, SHAZIA, obesity  Interdisciplinary Rounds: attended  General Information Comments: %, weight data shows weight loss from 229 pounds to 209 pounds in four days, question weights  Nutrition Discharge Planning: DC on regular diet    Nutrition/Diet History    Patient Reported Diet/Restrictions/Preferences: general  Spiritual, Cultural Beliefs, Christian Practices, Values that Affect Care: no  Food Allergies: other (see comments) (coconut)  Factors Affecting Nutritional Intake: None identified at this time    Anthropometrics    Temp: 98.6 °F (37 °C)  Height: 5' 5" (165.1 cm)  Height (inches): 65 in  Weight Method: Standard Scale  Weight: 94.9 kg (209 lb 3.5 oz)  Weight (lb): 209.22 lb  Ideal Body Weight (IBW), Female: 125 lb  % Ideal Body " Weight, Female (lb): 183.95 %  BMI (Calculated): 34.8  BMI Grade: 35 - 39.9 - obesity - grade II  Usual Body Weight (UBW), k kg  % Usual Body Weight: 96.78  % Weight Change From Usual Weight: -3.43 %       Lab/Procedures/Meds    Pertinent Labs Reviewed: reviewed  Pertinent Labs Comments: WNL  Pertinent Medications Reviewed: reviewed  Pertinent Medications Comments: pantoprazole    Estimated/Assessed Needs    Weight Used For Calorie Calculations: 104.3 kg (229 lb 15 oz)  Energy Calorie Requirements (kcal): 1723  Energy Need Method: [place]-St Jeor (x 1.0(PAL) 2/2 obesity)  Protein Requirements: 68g  Weight Used For Protein Calculations: 56.8 kg (125 lb 3.5 oz) (IBW kg 2/2 obesity, x 1.2g /kg)  Fluid Requirements (mL): 1732 or per MD  Estimated Fluid Requirement Method: RDA Method  RDA Method (mL): 1723  CHO Requirement: -      Nutrition Prescription Ordered    Current Diet Order: Regular  Oral Nutrition Supplement: -    Evaluation of Received Nutrient/Fluid Intake    I/O: no data  Energy Calories Required: meeting needs  Protein Required: meeting needs  Fluid Required: meeting needs  Comments: LBM   Tolerance: tolerating  % Intake of Estimated Energy Needs: 75 - 100 %  % Meal Intake: 75 - 100 %    Nutrition Risk    Level of Risk/Frequency of Follow-up: low (one time per week)     Monitor and Evaluation    Food and Nutrient Intake: food and beverage intake  Food and Nutrient Adminstration: diet order  Anthropometric Measurements: weight change  Biochemical Data, Medical Tests and Procedures: electrolyte and renal panel  Nutrition-Focused Physical Findings: skin     Nutrition Follow-Up    RD Follow-up?: Yes

## 2022-11-28 NOTE — PT/OT/SLP PROGRESS
"Occupational Therapy   Treatment    Name: Marianela Shrestha  MRN: [id number]  Admit Date: 11/18/2022  Admitting Diagnosis:  SHAZIA (cauda equina syndrome)    General Precautions: Standard, fall   Orthopedic Precautions:spinal precautions   Braces: AFO     Recommendations:     Discharge Recommendations: home health OT  Level of Assistance Recommended at Discharge: Intermittent assistance for ADL's and homemaking tasks  Discharge Equipment Recommendations:  other (see comments) (TBD)  Barriers to discharge:  Decreased caregiver support    Assessment:     Marianela Shrestha is a 37 y.o. female with a medical diagnosis of SHAZIA (cauda equina syndrome).  She presents with performance deficits affecting function are weakness, impaired endurance, impaired self care skills, impaired functional mobility, impaired sensation, gait instability, impaired balance, decreased coordination, decreased upper extremity function, decreased lower extremity function, decreased safety awareness, pain, decreased ROM, orthopedic precautions.   Pt participated well during today's session. Pt continues to demonstrate deficits with self care skills, balance, functional mobility, UB strength and endurance. Pt will benefit from continued OT services to progress towards goals.     Rehab Potential is good    Activity tolerance:  Good    Plan:     Patient to be seen 6 x/week to address the above listed problems via self-care/home management, therapeutic activities, therapeutic exercises    Plan of Care Expires: 12/19/22  Plan of Care Reviewed with: patient, significant other    Subjective     Communicated with: Lydia prior to session. "I'm ready to get up" .    Pain/Comfort:  Pain Rating 1: 7/10  Location - Side 1: Bilateral  Location - Orientation 1: generalized  Location 1: lumbar spine  Pain Addressed 1: Reposition, Pre-medicate for activity, Distraction  Pain Rating Post-Intervention 1: 7/10    Patient's cultural, spiritual, Spiritism conflicts " given the current situation:  no    Objective:     Patient found right sidelying with  (Left AFO) with spouse present upon OT entry to room.    Bed Mobility:    Patient completed Scooting/Bridging with supervision  Patient completed Supine to Sit with supervision  Patient completed Sit to Supine with supervision     Functional Mobility/Transfers:  Patient completed Sit <> Stand Transfer with stand by assistance  with  grab bars(s)   Patient completed Bed <> Chair Transfer using Stand Pivot technique with stand by assistance with no assistive device  Patient completed Toilet Transfer Stand Pivot technique with stand by assistance with  grab bars  Functional Mobility: Functional wheelchair mobility. Pt propel w/c to and from gym with supervision.     Activities of Daily Living:  Toileting: stand by assistance to perform david care seated on raised commode. Pt in stance to manage pants over hips with use of grab bars for support and safety.     [facility] 6 Click ADL: 20    OT Exercises: AROM x 2 sets of 20 with #3 dowel perform bicep curls and forward flexion to improve UB strengthening, endurance and ROM required for w/c propulsion, ADL's, functional transfers and homemaking tasks.    Treatment & Education:  Pt with standing activity today with SBA. Pt at raised counter perform vertical ring tree. Pt place washers onto dowel sticks with focus on standing tolerance, dynamic standing bal, functional reaching, weight shifting, crossing midline, and to promote independence with homemaking and self care tasks. Pt able to maintain standing position for ~ 7:30 before becoming fatigued.   Pt educated on role of OT, safety while performing functional transfers, safety while performing self care tasks, spinal precautions, importance to adhering to precautions and progress towards OT goals   Pt able to recall 3/3 spinal precautions..     Patient left with bed in chair position with call button in reach and spouse present.    GOALS:    Multidisciplinary Problems       Occupational Therapy Goals          Problem: Occupational Therapy    Goal Priority Disciplines Outcome Interventions   Occupational Therapy Goal     OT, PT/OT Ongoing, Progressing    Description: Goals to be met by: 3 weeks     Patient will increase functional independence with ADLs by performing:    UE Dressing with Modified [place].  LE Dressing with Stand-by Assistance.  Grooming while seated with Modified [place].  Toileting from toilet or BSC with SBA for hygiene and clothing management. With A/D to stand.  Bathing from sitting at sink with Stand-by Assistance.  Supine to sit with Modified [place].  Step transfer with Stand-by Assistance with A/D as needed.  Upper extremity exercise with assistance as needed.  Caregiver will be educated on level of assist required to safely perform self care tasks and functional transfers..                          Time Tracking:     OT Date of Treatment: 11/28/22  OT Start Time: 1021    OT Stop Time: 1106  OT Total Time (min): 45 min    Billable Minutes:Self Care/Home Management 15  Therapeutic Activity 15  Therapeutic Exercise 15    11/28/2022

## 2022-11-29 LAB
ANION GAP SERPL CALC-SCNC: 9 MMOL/L (ref 8–16)
BASOPHILS # BLD AUTO: 0.07 K/UL (ref 0–0.2)
BASOPHILS NFR BLD: 0.9 % (ref 0–1.9)
BUN SERPL-MCNC: 15 MG/DL (ref 6–20)
CALCIUM SERPL-MCNC: 9 MG/DL (ref 8.7–10.5)
CHLORIDE SERPL-SCNC: 103 MMOL/L (ref 95–110)
CO2 SERPL-SCNC: 25 MMOL/L (ref 23–29)
CREAT SERPL-MCNC: 0.9 MG/DL (ref 0.5–1.4)
DIFFERENTIAL METHOD: ABNORMAL
EOSINOPHIL # BLD AUTO: 0.4 K/UL (ref 0–0.5)
EOSINOPHIL NFR BLD: 5.8 % (ref 0–8)
ERYTHROCYTE [DISTWIDTH] IN BLOOD BY AUTOMATED COUNT: 14.3 % (ref 11.5–14.5)
EST. GFR  (NO RACE VARIABLE): >60 ML/MIN/1.73 M^2
GLUCOSE SERPL-MCNC: 90 MG/DL (ref 70–110)
HCT VFR BLD AUTO: 38.5 % (ref 37–48.5)
HGB BLD-MCNC: 12.1 G/DL (ref 12–16)
IMM GRANULOCYTES # BLD AUTO: 0.09 K/UL (ref 0–0.04)
IMM GRANULOCYTES NFR BLD AUTO: 1.2 % (ref 0–0.5)
LYMPHOCYTES # BLD AUTO: 3.2 K/UL (ref 1–4.8)
LYMPHOCYTES NFR BLD: 42 % (ref 18–48)
MAGNESIUM SERPL-MCNC: 2 MG/DL (ref 1.6–2.6)
MCH RBC QN AUTO: 30.6 PG (ref 27–31)
MCHC RBC AUTO-ENTMCNC: 31.4 G/DL (ref 32–36)
MCV RBC AUTO: 98 FL (ref 82–98)
MONOCYTES # BLD AUTO: 0.6 K/UL (ref 0.3–1)
MONOCYTES NFR BLD: 8.1 % (ref 4–15)
NEUTROPHILS # BLD AUTO: 3.2 K/UL (ref 1.8–7.7)
NEUTROPHILS NFR BLD: 42 % (ref 38–73)
NRBC BLD-RTO: 0 /100 WBC
PHOSPHATE SERPL-MCNC: 3.9 MG/DL (ref 2.7–4.5)
PLATELET # BLD AUTO: 448 K/UL (ref 150–450)
PMV BLD AUTO: 9.9 FL (ref 9.2–12.9)
POTASSIUM SERPL-SCNC: 3.8 MMOL/L (ref 3.5–5.1)
RBC # BLD AUTO: 3.95 M/UL (ref 4–5.4)
SODIUM SERPL-SCNC: 137 MMOL/L (ref 136–145)
WBC # BLD AUTO: 7.62 K/UL (ref 3.9–12.7)

## 2022-11-29 PROCEDURE — 85025 COMPLETE CBC W/AUTO DIFF WBC: CPT | Performed by: NURSE PRACTITIONER

## 2022-11-29 PROCEDURE — 97530 THERAPEUTIC ACTIVITIES: CPT | Mod: CQ

## 2022-11-29 PROCEDURE — 84100 ASSAY OF PHOSPHORUS: CPT | Performed by: NURSE PRACTITIONER

## 2022-11-29 PROCEDURE — 97530 THERAPEUTIC ACTIVITIES: CPT | Mod: CO

## 2022-11-29 PROCEDURE — 97110 THERAPEUTIC EXERCISES: CPT | Mod: CO

## 2022-11-29 PROCEDURE — 83735 ASSAY OF MAGNESIUM: CPT | Performed by: NURSE PRACTITIONER

## 2022-11-29 PROCEDURE — 97116 GAIT TRAINING THERAPY: CPT | Mod: CQ

## 2022-11-29 PROCEDURE — 63600175 PHARM REV CODE 636 W HCPCS: Performed by: NURSE PRACTITIONER

## 2022-11-29 PROCEDURE — 11000004 HC SNF PRIVATE

## 2022-11-29 PROCEDURE — 36415 COLL VENOUS BLD VENIPUNCTURE: CPT | Performed by: NURSE PRACTITIONER

## 2022-11-29 PROCEDURE — 80048 BASIC METABOLIC PNL TOTAL CA: CPT | Performed by: NURSE PRACTITIONER

## 2022-11-29 PROCEDURE — 25000003 PHARM REV CODE 250: Performed by: NURSE PRACTITIONER

## 2022-11-29 PROCEDURE — 25000003 PHARM REV CODE 250: Performed by: HOSPITALIST

## 2022-11-29 PROCEDURE — 97110 THERAPEUTIC EXERCISES: CPT | Mod: CQ

## 2022-11-29 RX ADMIN — OXYCODONE HYDROCHLORIDE 10 MG: 10 TABLET ORAL at 04:11

## 2022-11-29 RX ADMIN — CELECOXIB 200 MG: 200 CAPSULE ORAL at 08:11

## 2022-11-29 RX ADMIN — GABAPENTIN 600 MG: 300 CAPSULE ORAL at 08:11

## 2022-11-29 RX ADMIN — ALPRAZOLAM 1 MG: 0.5 TABLET ORAL at 01:11

## 2022-11-29 RX ADMIN — OXYCODONE HYDROCHLORIDE 10 MG: 10 TABLET ORAL at 08:11

## 2022-11-29 RX ADMIN — METHOCARBAMOL 1000 MG: 500 TABLET ORAL at 02:11

## 2022-11-29 RX ADMIN — SENNOSIDES AND DOCUSATE SODIUM 1 TABLET: 50; 8.6 TABLET ORAL at 08:11

## 2022-11-29 RX ADMIN — HEPARIN SODIUM 5000 UNITS: 5000 INJECTION INTRAVENOUS; SUBCUTANEOUS at 01:11

## 2022-11-29 RX ADMIN — GABAPENTIN 600 MG: 300 CAPSULE ORAL at 02:11

## 2022-11-29 RX ADMIN — HEPARIN SODIUM 5000 UNITS: 5000 INJECTION INTRAVENOUS; SUBCUTANEOUS at 10:11

## 2022-11-29 RX ADMIN — OXYCODONE HYDROCHLORIDE 10 MG: 10 TABLET ORAL at 12:11

## 2022-11-29 RX ADMIN — OXYCODONE HYDROCHLORIDE 10 MG: 10 TABLET ORAL at 03:11

## 2022-11-29 RX ADMIN — ALPRAZOLAM 1 MG: 0.5 TABLET ORAL at 10:11

## 2022-11-29 RX ADMIN — METHOCARBAMOL 1000 MG: 500 TABLET ORAL at 08:11

## 2022-11-29 RX ADMIN — NORETHINDRONE ACETATE 5 MG: 5 TABLET ORAL at 08:11

## 2022-11-29 RX ADMIN — DOXEPIN HYDROCHLORIDE 50 MG: 50 CAPSULE ORAL at 08:11

## 2022-11-29 RX ADMIN — PANTOPRAZOLE SODIUM 40 MG: 40 TABLET, DELAYED RELEASE ORAL at 08:11

## 2022-11-29 RX ADMIN — VENLAFAXINE HYDROCHLORIDE 300 MG: 75 CAPSULE, EXTENDED RELEASE ORAL at 08:11

## 2022-11-29 RX ADMIN — FLUTICASONE FUROATE AND VILANTEROL TRIFENATATE 1 PUFF: 100; 25 POWDER RESPIRATORY (INHALATION) at 08:11

## 2022-11-29 RX ADMIN — HEPARIN SODIUM 5000 UNITS: 5000 INJECTION INTRAVENOUS; SUBCUTANEOUS at 05:11

## 2022-11-29 RX ADMIN — ALPRAZOLAM 1 MG: 0.5 TABLET ORAL at 05:11

## 2022-11-29 NOTE — PT/OT/SLP PROGRESS
"Physical Therapy Treatment    Patient Name:  Marianela Shrestha   MRN:  [id number]  Admit Date: 11/18/2022  Admitting Diagnosis: SHAZIA (cauda equina syndrome)  Recent Surgeries:     General Precautions: Standard, fall   Orthopedic Precautions:spinal precautions   Braces:       Recommendations:     Discharge Recommendations:  home health PT   Level of Assistance Recommended at Discharge: Intermittent assistance   Discharge Equipment Recommendations: bedside commode, walker, rolling, wheelchair, bath bench   Barriers to discharge: Decreased caregiver support (increased assistance needed)    Assessment:     Marianela Shrestha is a 37 y.o. female admitted with a medical diagnosis of SHAZIA (cauda equina syndrome) . Pt tolerated well, pt would continue to benefit from skilled PT services to improve overall functional mobility, strength and endurance.  .      Performance deficits affecting function:  impaired endurance, weakness, impaired sensation, impaired self care skills, impaired functional mobility, gait instability, impaired balance, decreased coordination, decreased lower extremity function, pain, abnormal tone, decreased ROM, impaired coordination, impaired fine motor, impaired skin, impaired cardiopulmonary response to activity, orthopedic precautions .    Rehab Potential is good    Activity Tolerance: Fair    Plan:     Patient to be seen 6 x/week to address the above listed problems via gait training, therapeutic activities, therapeutic exercises, neuromuscular re-education, wheelchair management/training    Plan of Care Expires: 12/19/22  Plan of Care Reviewed with: patient    Subjective     "Im good".     Pain/Comfort:  Pain Rating 1:  (did not rate) (ebony horse after steps, L upper leg, subsided with rest  Location - Orientation 1: lower  Location 1: lumbar spine  Pain Addressed 1: Reposition, Distraction, Cessation of Activity (did not request meds)  Pain Rating Post-Intervention 1:  (inc with mobility/dec " "with rest)    Patient's cultural, spiritual, L[employer]-day conflicts given the current situation:  no    Objective:      Patient found with  (in wc L AFO) upon PT entry to room. A to snow B shoes/with L AFO    Therapeutic Activities and Exercises: recumbent cross  x 11 min L-2    Functional Mobility:  Transfers:     Sit to Stand:  stand by assistance and contact guard assistance with rolling walker  Gait: amb with RW CGA ~ 172 ft no LOB occ dec control LLE noted  Stairs: asc/[place] 4 steps with BHR CGA x 2 trials  (8 steps)  Asc/[place] 4"curb with RW CGA  Wheelchair Propulsion: ~150 ft with BUE S      AM-PAC 6 CLICK MOBILITY  20    Patient left up in chair with  with OT .    GOALS:   Multidisciplinary Problems       Physical Therapy Goals          Problem: Physical Therapy    Goal Priority Disciplines Outcome Goal Variances Interventions   Physical Therapy Goal     PT, PT/OT Ongoing, Progressing     Description: Goals to be met by:12/19/22    Patient will increase functional independence with mobility by performing:    Sit to stand transfer with Supervision.  Bed to chair transfer with Supervision using Rolling Walker.  Gait  x 150 feet with Stand-by Assistance using Rolling Walker.   Ascend/descend 4 stair with bilateral Handrails Stand-by Assistance using No Assistive Device.   Ascend/Descend 4 inch curb step with Stand-by Assistance using Rolling Walker.                         Time Tracking:     PT Received On: 11/29/22  PT Start Time: 0849  PT Stop Time: 0927  PT Total Time (min): 38 min    Billable Minutes: Gait Training 13, Therapeutic Activity 15, and Therapeutic Exercise 11    Treatment Type: Treatment  PT/PTA: PTA     PTA Visit Number: 2     11/29/2022  "

## 2022-11-29 NOTE — PT/OT/SLP PROGRESS
"Occupational Therapy   Treatment    Name: Marianela Shrestha  MRN: [id number]  Admit Date: 11/18/2022  Admitting Diagnosis:  SHAZIA (cauda equina syndrome)    General Precautions: Standard, fall   Orthopedic Precautions:spinal precautions   Braces: AFO     Recommendations:     Discharge Recommendations: home health OT  Level of Assistance Recommended at Discharge: Intermittent assistance for ADL's and homemaking tasks  Discharge Equipment Recommendations:  other (see comments) (TBD)  Barriers to discharge:  Decreased caregiver support    Assessment:     Marianela Shrestha is a 37 y.o. female with a medical diagnosis of SHAZIA (cauda equina syndrome) .  She presents with . performance deficits affecting function are weakness, impaired endurance, impaired self care skills, impaired functional mobility, impaired sensation, gait instability, impaired balance, decreased coordination, decreased upper extremity function, decreased lower extremity function, decreased safety awareness, pain, decreased ROM, orthopedic precautions.   Pt participated well during today's session. Pt displayed improved standing tolerance during today's session. Pt continues to demonstrate deficits with self care skills, balance, functional mobility, UB strength and endurance. Pt will benefit from continued OT services to progress towards goals.     Rehab Potential is good    Activity tolerance:  Good    Plan:     Patient to be seen 6 x/week to address the above listed problems via self-care/home management, therapeutic activities, therapeutic exercises    Plan of Care Expires: 12/19/22  Plan of Care Reviewed with: patient, significant other    Subjective     Communicated with: Lydia prior to session. "I'm in a little pain right now" .    Pain/Comfort:  Pain Rating 1: 7/10  Location - Side 1: Bilateral  Location - Orientation 1: generalized  Location 1: lumbar spine  Pain Addressed 1: Reposition, Distraction  Pain Rating Post-Intervention 1: " 7/10    Patient's cultural, spiritual, Pentecostalism conflicts given the current situation:  no    Objective:     Patient found up in chair with  (Left AFO) with PTA in rehab gym.     Bed Mobility:    Patient completed Rolling/Turning to Right with supervision  Patient completed Scooting/Bridging with supervision  Patient completed Supine to Sit with supervision  Patient completed Sit to Supine with supervision     Functional Mobility/Transfers:  Patient completed Sit <> Stand Transfer with stand by assistance  with  rolling walker   Patient completed Bed <> Chair Transfer using Step Transfer technique with contact guard assistance with rolling walker  Functional Mobility: Functional wheelchair mobility. Pt propel w/c to and from gym with supervision.     Activities of Daily Living:  Not performed, already completed.     [facility] 6 Click ADL: 20    OT Exercises: UE Ergometer 12 min with moderate resistance to improve overall endurance, ROM and UB strengthening required for wheelchair propulsion, ADL's and transfers.     Treatment & Education:  Pt with standing activity today with SBA. Pt at raised counter engaged in Jenga with focus on standing tolerance, dynamic standing bal, functional reaching, weight shifting, crossing midline, and to promote independence with homemaking and self care tasks. Pt able to maintain standing position for ~ 9:52 min before becoming fatigued.   Pt educated on role of OT, safety while performing functional transfers, safety while performing self care tasks, spinal precautions, importance to adhering to precautions and progress towards OT goals   Pt able to recall 3/3 spinal precautions..     Patient left up in chair with call button in reach and significant other present.     GOALS:   Multidisciplinary Problems       Occupational Therapy Goals          Problem: Occupational Therapy    Goal Priority Disciplines Outcome Interventions   Occupational Therapy Goal     OT, PT/OT Ongoing, Progressing     Description: Goals to be met by: 3 weeks     Patient will increase functional independence with ADLs by performing:    UE Dressing with Modified [place].  LE Dressing with Stand-by Assistance.  Grooming while seated with Modified [place].  Toileting from toilet or BSC with SBA for hygiene and clothing management. With A/D to stand.  Bathing from sitting at sink with Stand-by Assistance.  Supine to sit with Modified [place].  Step transfer with Stand-by Assistance with A/D as needed.  Upper extremity exercise with assistance as needed.  Caregiver will be educated on level of assist required to safely perform self care tasks and functional transfers..                          Time Tracking:     OT Date of Treatment: 11/29/22  OT Start Time: 0927    OT Stop Time: 1012  OT Total Time (min): 45 min    Billable Minutes:Therapeutic Activity 30  Therapeutic Exercise 15    11/29/2022

## 2022-11-29 NOTE — TREATMENT PLAN
"Rehab Services' DME recommendations    Marianela Shrestha  MRN: [id number]     [x] Walker Adult (5'4"-6"6")    Accessories N/A    Wheels Yes      [x] Wheelchair  Number of hours up in a wheelchair per day 6+       Style Light weight        Justification for light weight w/c: physical activity/exercise, chronic complications, and home barriers    Seat Width 18 (Standard adult)    Seat Depth 18    Back Height Standard    Leg Support Standard, Heel Loops, and Swing Away    Arm Height Full and Swing Away    Lap Belt Buckle    Accessories Front Brakes, Anti-tippers, and Safety belt    Cushion Basic    Justification for Cushion prevent pressure ulcers    Justification for wheelchair order: (Please select all that apply) Caregiver is capable and willing to operate wheelchair safely, Patient's upper body strength is sufficient for propulsion, The patient requires the use of a wheelchair for ADLs within the home, and Patient mobility limitations cannot be sufficiently resolved by the use of other ambulatory therapies      [x] 3 in 1 commode Standard and Drop arm    [x] Shower Chair With back    [x] Hip Kit Standard/Short    [x] Home health PT, OT, and Aide        Katelyn Bernal, MIGUEL A 11/29/2022      "

## 2022-11-29 NOTE — PROGRESS NOTES
[facility]                                  Skilled Nursing Facility                   Progress Note     Patient Name: Marianela Shrestha  YOB: 1985  MRN: [id number]  Room: [place] [address]/[place] [address] A     Admit Date: 11/18/2022   CARLITA: 12/9/2022     Principal Problem: SHAZIA (cauda equina syndrome)    HPI obtained from patient interview and chart review     Chief Complaint:    Re-evaluation of medical treatment and therapy status: Lab review        HPI:   Marianela Shrestha is a 37 y.o. female with PMH of drug abuse, low back pain,  LLE radiculopathy and right hand osteomyelitis treated with surgery presents for evaluation of 3 day history LLE weakness and changes in bowel and bladder acutely worse in last 24 hours prior to arrival to ER. The patient reports chronic back pain. However, she fell , twisting her ankle and low back.  S/P L4, L5, S1 laminectomy 11/11/22 by Dr. Harry. Post operatively she maintains decreased but able to feel bladder getting full and control her voiding. She maintains decreased rectal sensation but able to control her defecation.    Patient will be treated at [facility] with PT and OT to improve functional status and ability to perform ADLs.     Interval History  All of today's labs reviewed and are listed below. No critical values noted. Discussed results with pt. 24 hr vital sign ranges listed below.  Patient denies shortness of breath, abdominal discomfort, nausea, or vomiting.  Patient reports an adequate appetite.  Patient denies dysuria.  Patient reports having regular bowel movements.  Patient progessing with PT/OT. Continuing to follow and treat all acute and chronic conditions.    Past Medical History: Patient has a past medical history of Anxiety, Chronic osteomyelitis of right hand (2019), Chronic pain syndrome, Depression, Insomnia, Post traumatic stress disorder (PTSD), and Sciatica.    Past  Surgical History: Patient has a past surgical history that includes Tonsillectomy; Cosmetic surgery; Pelvic laparoscopy; Finger surgery; Ablation of medial branch nerve of lumbar spine facet joint; Functional endoscopic sinus surgery (FESS) using computer-assisted navigation (Bilateral, 7/1/2022); Maxillary antrostomy (7/1/2022); Ethmoidectomy (7/1/2022); and Lumbar laminectomy (N/A, 11/11/2022).    Social History: Patient reports that she has been smoking cigarettes. She started smoking about 5 years ago. She has a 3.75 pack-year smoking history. She has never used smokeless tobacco. She reports that she does not currently use alcohol. She reports that she does not use drugs.    Family History:  family history includes Cervical cancer in her maternal aunt; Colon cancer in her maternal aunt; Diabetes in her maternal aunt, maternal grandmother, and mother; Hypertension in her mother.    Allergies: Patient is allergic to coconut; sulfa (sulfonamide antibiotics); adhesive; and latex, natural rubber.        Review of Systems   Constitutional:  Positive for malaise/fatigue. Negative for chills and fever.   HENT:  Negative for congestion, hearing loss and sore throat.    Eyes:  Negative for blurred vision and double vision.   Respiratory:  Negative for cough, sputum production, shortness of breath and wheezing.    Cardiovascular:  Negative for chest pain, palpitations and leg swelling.   Gastrointestinal:  Negative for abdominal pain, constipation, diarrhea, heartburn, nausea and vomiting.   Genitourinary:  Negative for dysuria and urgency.   Musculoskeletal:  Negative for back pain.   Skin:  Negative for rash.        + wound   Neurological:  Positive for weakness. Negative for dizziness and headaches.   Endo/Heme/Allergies:  Negative for polydipsia. Does not bruise/bleed easily.   Psychiatric/Behavioral:  Negative for depression and hallucinations. The patient is not nervous/anxious.        24 hour Vital Sign Range    Temp:  [97.7 °F (36.5 °C)-98 °F (36.7 °C)]   Pulse:  [84-99]   Resp:  [16-18]   BP: (112-135)/(75-78)   SpO2:  [98 %-99 %]       Physical Exam  Vitals and nursing note reviewed.   Constitutional:       General: She is not in acute distress.     Appearance: Normal appearance. She is not ill-appearing.   HENT:      Head: Normocephalic and atraumatic.      Nose: Nose normal. No congestion.      Mouth/Throat:      Mouth: Mucous membranes are moist.      Pharynx: Oropharynx is clear.   Eyes:      Extraocular Movements: Extraocular movements intact.      Conjunctiva/sclera: Conjunctivae normal.      Pupils: Pupils are equal, round, and reactive to light.   Cardiovascular:      Rate and Rhythm: Normal rate and regular rhythm.      Pulses: Normal pulses.      Heart sounds: Normal heart sounds. No murmur heard.  Pulmonary:      Effort: Pulmonary effort is normal. No respiratory distress.      Breath sounds: Normal breath sounds. No wheezing or rhonchi.   Abdominal:      General: Bowel sounds are normal. There is no distension.      Palpations: Abdomen is soft. There is no mass.      Tenderness: There is no abdominal tenderness.   Musculoskeletal:         General: No swelling or tenderness.      Cervical back: Normal range of motion and neck supple. No tenderness.      Right lower leg: No edema.      Left lower leg: No edema.   Skin:     General: Skin is warm and dry.      Capillary Refill: Capillary refill takes less than 2 seconds.      Findings: No erythema or rash.   Neurological:      Mental Status: She is alert and oriented to person, place, and time. Mental status is at baseline.      Motor: Weakness present.   Psychiatric:         Mood and Affect: Mood normal.         Behavior: Behavior normal.         Thought Content: Thought content normal.         Judgment: Judgment normal.     Full skin assessment completed by this NP           Incision/Site 11/11/22 2342 Back (Active)   11/11/22 2342   Present Prior to Hospital  Arrival?:    Side:    Location: Back   Orientation:    Incision Type:    Closure Method:    Additional Comments:    Removal Indication and Assessment:    Wound Outcome:    Removal Indications:    Incision WDL ex 11/20/22 0800   Dressing Appearance Dry;Intact 11/20/22 2053   Drainage Amount None 11/20/22 2053   Drainage Characteristics/Odor No odor 11/20/22 2053   Appearance Sutures intact 11/20/22 2053   Periwound Area Dry 11/20/22 0800   Wound Edges Approximated 11/20/22 0800   Dressing Removed;Changed;Applied;Island/border 11/20/22 0800   Dressing Change Due 11/21/22 11/20/22 0800   Number of days: 10          Labs:  Recent Labs   Lab 11/29/22 0417   WBC 7.62   HGB 12.1   HCT 38.5          Recent Labs   Lab 11/29/22 0417      K 3.8      CO2 25   BUN 15   CREATININE 0.9   GLU 90   CALCIUM 9.0   MG 2.0   PHOS 3.9       No results for input(s): ALKPHOS, ALT, AST, ALBUMIN, PROT, BILITOT, INR in the last 168 hours.  No results for input(s): POCTGLUCOSE in the last 72 hours.    Meds Scheduled:   celecoxib  200 mg Oral Daily    doxepin  50 mg Oral QHS    fluticasone furoate-vilanteroL  1 puff Inhalation Daily    gabapentin  600 mg Oral TID    heparin (porcine)  5,000 Units Subcutaneous Q8H    methocarbamoL  1,000 mg Oral TID    mupirocin   Topical (Top) Q14 Days    norethindrone  5 mg Oral Daily    pantoprazole  40 mg Oral Daily    senna-docusate 8.6-50 mg  1 tablet Oral BID    venlafaxine  300 mg Oral Daily       PRN:   (Magic mouthwash) 1:1:1 diphenhydrAMINE(Benadryl) 12.5mg/5ml liq, aluminum & magnesium hydroxide-simethicone (Maalox), LIDOcaine viscous 2%, acetaminophen, albuterol, albuterol-ipratropium, ALPRAZolam, calcium carbonate, melatonin, ondansetron, oxyCODONE, oxyCODONE      Assessment and Plan:    SHAZIA (cauda equina syndrome)  SP L4-S1 Laminectomy 11.11.22  -PODAS boot for LLE foot drop, AFO LLE when ambulating  -PT/OT: WBAT spine precautions  -DVT PPx: Heparin TID      -Pain management,  Celebrex, Robaxin, Oxycodone, Gabapentin    Debility   - Continue with PT/OT for gait training and strengthening and restoration of ADL's   - Encourage mobility, OOB in chair, and early ambulation as appropriate  - Fall precautions   - Monitor for bowel and bladder dysfunction  - Monitor for and prevent skin breakdown and pressure ulcers  - Continue DVT prophylaxis with heparin     Anticipate disposition:    Home with home health      Follow-up needed during SNF admission:       Follow-up needed after discharge from SNF:   - PCP within 1-2 weeks  - See appt scheduled below     Future Appointments   Date Time Provider Department Center   12/12/2022 11:30 AM Antonia Clifton PA-C [facility]         I certify that SNF services are required to be given on an inpatient basis because Marianela Shrestha needs for skilled nursing care and/or skilled rehabilitation are required on a daily basis and such services can only practically be provided in a skilled nursing facility setting and are for an ongoing condition for which she received inpatient care in the hospital.        Wanda Jacobs NP  Department of Hospital Medicine   [facility]     DOS: 11/29/2022       Patient note was created using MModal Dictation.  Any errors in syntax or even information may not have been identified and edited on initial review prior to signing this note.

## 2022-11-29 NOTE — PLAN OF CARE
Problem: Adult Inpatient Plan of Care  Goal: Plan of Care Review  Outcome: Ongoing, Progressing  Goal: Patient-Specific Goal (Individualized)  Outcome: Ongoing, Progressing     Problem: Fall Injury Risk  Goal: Absence of Fall and Fall-Related Injury  Outcome: Ongoing, Progressing     Problem: Skin Injury Risk Increased  Goal: Skin Health and Integrity  Outcome: Ongoing, Progressing

## 2022-11-29 NOTE — PLAN OF CARE
Problem: Adult Inpatient Plan of Care  Goal: Plan of Care Review  Outcome: Ongoing, Progressing  Flowsheets (Taken 11/29/2022 1513)  Plan of Care Reviewed With: patient     Problem: Fall Injury Risk  Goal: Absence of Fall and Fall-Related Injury  Outcome: Ongoing, Progressing     Problem: Skin Injury Risk Increased  Goal: Skin Health and Integrity  Outcome: Ongoing, Progressing

## 2022-11-29 NOTE — PLAN OF CARE
Family Training     Patient Name:  Marianela Shrestha   MRN:  [id number]  Admit Date: 11/18/2022      Rehab technician called to schedule family training this date. Pt's family/caregiver agreeable to attend training Wednesday, 12/7/2022 at 10AM with fiance.     11/29/2022

## 2022-11-30 PROCEDURE — 25000003 PHARM REV CODE 250: Performed by: HOSPITALIST

## 2022-11-30 PROCEDURE — 97535 SELF CARE MNGMENT TRAINING: CPT | Mod: CO

## 2022-11-30 PROCEDURE — 25000003 PHARM REV CODE 250: Performed by: NURSE PRACTITIONER

## 2022-11-30 PROCEDURE — 11000004 HC SNF PRIVATE

## 2022-11-30 PROCEDURE — 63600175 PHARM REV CODE 636 W HCPCS: Performed by: NURSE PRACTITIONER

## 2022-11-30 RX ADMIN — ALPRAZOLAM 1 MG: 0.5 TABLET ORAL at 09:11

## 2022-11-30 RX ADMIN — NORETHINDRONE ACETATE 5 MG: 5 TABLET ORAL at 08:11

## 2022-11-30 RX ADMIN — ALPRAZOLAM 1 MG: 0.5 TABLET ORAL at 02:11

## 2022-11-30 RX ADMIN — METHOCARBAMOL 1000 MG: 500 TABLET ORAL at 08:11

## 2022-11-30 RX ADMIN — ALPRAZOLAM 1 MG: 0.5 TABLET ORAL at 06:11

## 2022-11-30 RX ADMIN — PANTOPRAZOLE SODIUM 40 MG: 40 TABLET, DELAYED RELEASE ORAL at 08:11

## 2022-11-30 RX ADMIN — HEPARIN SODIUM 5000 UNITS: 5000 INJECTION INTRAVENOUS; SUBCUTANEOUS at 06:11

## 2022-11-30 RX ADMIN — CELECOXIB 200 MG: 200 CAPSULE ORAL at 08:11

## 2022-11-30 RX ADMIN — DOXEPIN HYDROCHLORIDE 50 MG: 50 CAPSULE ORAL at 09:11

## 2022-11-30 RX ADMIN — OXYCODONE HYDROCHLORIDE 10 MG: 10 TABLET ORAL at 01:11

## 2022-11-30 RX ADMIN — OXYCODONE HYDROCHLORIDE 10 MG: 10 TABLET ORAL at 03:11

## 2022-11-30 RX ADMIN — OXYCODONE HYDROCHLORIDE 10 MG: 10 TABLET ORAL at 09:11

## 2022-11-30 RX ADMIN — METHOCARBAMOL 1000 MG: 500 TABLET ORAL at 09:11

## 2022-11-30 RX ADMIN — HEPARIN SODIUM 5000 UNITS: 5000 INJECTION INTRAVENOUS; SUBCUTANEOUS at 09:11

## 2022-11-30 RX ADMIN — GABAPENTIN 600 MG: 300 CAPSULE ORAL at 02:11

## 2022-11-30 RX ADMIN — HEPARIN SODIUM 5000 UNITS: 5000 INJECTION INTRAVENOUS; SUBCUTANEOUS at 01:11

## 2022-11-30 RX ADMIN — FLUTICASONE FUROATE AND VILANTEROL TRIFENATATE 1 PUFF: 100; 25 POWDER RESPIRATORY (INHALATION) at 08:11

## 2022-11-30 RX ADMIN — GABAPENTIN 600 MG: 300 CAPSULE ORAL at 09:11

## 2022-11-30 RX ADMIN — GABAPENTIN 600 MG: 300 CAPSULE ORAL at 08:11

## 2022-11-30 RX ADMIN — SENNOSIDES AND DOCUSATE SODIUM 1 TABLET: 50; 8.6 TABLET ORAL at 09:11

## 2022-11-30 RX ADMIN — VENLAFAXINE HYDROCHLORIDE 300 MG: 75 CAPSULE, EXTENDED RELEASE ORAL at 08:11

## 2022-11-30 RX ADMIN — METHOCARBAMOL 1000 MG: 500 TABLET ORAL at 02:11

## 2022-11-30 NOTE — NURSING
"Notified by pt of unwitnessed fall out of wheelchair onto floor in pt room. Pt lying in bed and  stated " I was in my wheelchair and I reached for a pad, then slipped out of the chair on my buttocks but I hit my left upper leg. Pt states "I  assist self off floor".Pt was assessed and no visible  injuries noted. Pt c/o pain of Left upper leg and right buttock. Pt rates pain 4 out of 10. Charge nurse notified and NP assessed pt.  "

## 2022-11-30 NOTE — PT/OT/SLP PROGRESS
Occupational Therapy   Treatment    Name: Marianela Shrestha  MRN: [id number]  Admit Date: 11/18/2022  Admitting Diagnosis:  SHAZIA (cauda equina syndrome)    General Precautions: Standard, fall   Orthopedic Precautions:spinal precautions   Braces: AFO     Recommendations:     Discharge Recommendations: home health OT  Level of Assistance Recommended at Discharge: Intermittent assistance for ADL's and homemaking tasks  Discharge Equipment Recommendations:  other (see comments) (TBD)  Barriers to discharge:  Decreased caregiver support    Assessment:     Marianela Shrestha is a 37 y.o. female with a medical diagnosis of SHAZIA (cauda equina syndrome).  She presents with  Performance deficits affecting function are are weakness, impaired endurance, impaired self care skills, impaired functional mobility, impaired sensation, gait instability, impaired balance, decreased coordination, decreased upper extremity function, decreased lower extremity function, decreased safety awareness, pain, decreased ROM, orthopedic precautions.     Pt. Was cooperative and participated well with session on this day. Pt. Was cleared by nursing this afternoon to see if she could try do therapy Pt. Agreeable to and motivated.  Pt. Explained her fall from this morning. While clinician was present Pt. Was attempting to snow LLE and AFO pt. With reporting her big toe was curling up and it never does that. Pt. Also express that it was a little big difficulty for her to side step along EOB to her w/c as she would normally do and that her left leg felt a little funny and not clinician note semi swelling in L lateral side above buttox. Once swelling was noted Pt. CN  and nurse both notified and session stopped and Pt. Was assisted back to bed. Pt  continues to demonstrate levels of physical deficits with  functional indep with daily management activities tasks, selfcare skills with balance,  functional mobility, UB strength and endurance. Pt. Will  continue to benefit from continued OT to progress towards goals      Rehab Potential is fair    Activity tolerance:  Fair    Plan:     Patient to be seen 6 x/week to address the above listed problems via self-care/home management, therapeutic activities, therapeutic exercises    Plan of Care Expires: 12/19/22  Plan of Care Reviewed with: patient    Subjective     Communicated with: nsg and Pt. prior to session. My toes are curling when I put then in my AFO    Pain/Comfort:  Pain Rating 1: 8/10  Location - Side 1: Left  Location - Orientation 1: generalized  Location 1: lumbar spine  Pain Addressed 1: Pre-medicate for activity, Reposition, Distraction  Pain Rating Post-Intervention 1: 8/10    Patient's cultural, spiritual, Church conflicts given the current situation:  no    Objective:     Patient found HOB elevated with  (nursing present) upon OT entry to room.    Bed Mobility:    Patient completed Scooting/Bridging with stand by assistance  Patient completed Supine to Sit with stand by assistance and with side rail  Patient completed Sit to Supine with stand by assistance and with side rail     Functional Mobility/Transfers:  Patient completed Sit <> Stand Transfer with contact guard assistance  with  hand-held assist   Patient completed Bed <> Chair Transfer using lateral side steps along EOB x 4 side steps technique with contact guard assistance and minimum assistance with hand-held assist  Patient completed Toilet Transfer Stand Pivot technique with contact guard assistance with  grab bars    Activities of Daily Living:  Grooming: stand by assistance at sink level to complete hand hygiene   Lower Body Dressing: minimum assistance to snow/doff BLE shoes and AFO on LLE. As pt. Was donning LLE AFO pt.stated her big toe was curling up and it never does that when putting her shoe on.  Toileting: minimum assistance with clothing management from 3n1 over toilet and use of g bras for safety. Pt. Able so self clean.  Pt. Also noted to appear semi swollen on L latera side of back and buttox area. Pt. Nurse and CN notified.    [facility] 6 Click ADL: 20      Treatment & Education:  Pt edu on role of OT, POC, safety when performing self care tasks , benefit of performing OOB activity, and safety when performing functional transfers and mobility management for preparation with goals to progress towards next level of care     Patient left HOB elevated with all lines intact, call button in reach, and nurse notified    GOALS:   Multidisciplinary Problems       Occupational Therapy Goals          Problem: Occupational Therapy    Goal Priority Disciplines Outcome Interventions   Occupational Therapy Goal     OT, PT/OT Ongoing, Progressing    Description: Goals to be met by: 3 weeks     Patient will increase functional independence with ADLs by performing:    UE Dressing with Modified [place].  LE Dressing with Stand-by Assistance.  Grooming while seated with Modified [place].  Toileting from toilet or BSC with SBA for hygiene and clothing management. With A/D to stand.  Bathing from sitting at sink with Stand-by Assistance.  Supine to sit with Modified [place].  Step transfer with Stand-by Assistance with A/D as needed.  Upper extremity exercise with assistance as needed.  Caregiver will be educated on level of assist required to safely perform self care tasks and functional transfers..                          Time Tracking:     OT Date of Treatment: 11/30/22  OT Start Time: 1358    OT Stop Time: 1421  OT Total Time (min): 23 min    Billable Minutes:Self Care/Home Management 23 11/30/2022

## 2022-11-30 NOTE — PT/OT/SLP PROGRESS
Physical Therapy      Patient Name:  Marianela Shrestha   MRN:  [id number]    Patient not seen today secondary to pt may be going out for x rays  . Will follow-up next PT session.

## 2022-11-30 NOTE — PROGRESS NOTES
[facility]                                  Skilled Nursing Facility                   Progress Note     Patient Name: Marianela Shrestha  YOB: 1985  MRN: [id number]  Room: [place] [address] [address] A     Admit Date: 11/18/2022   CARLITA: 12/9/2022     Principal Problem: SHAZIA (cauda equina syndrome)    HPI obtained from patient interview and chart review     Chief Complaint:    Re-evaluation of medical treatment and therapy status: evaluate fall        HPI:   Marianela Shrestha is a 37 y.o. female with PMH of drug abuse, low back pain,  LLE radiculopathy and right hand osteomyelitis treated with surgery presents for evaluation of 3 day history LLE weakness and changes in bowel and bladder acutely worse in last 24 hours prior to arrival to ER. The patient reports chronic back pain. However, she fell , twisting her ankle and low back.  S/P L4, L5, S1 laminectomy 11/11/22 by Dr. Harry. Post operatively she maintains decreased but able to feel bladder getting full and control her voiding. She maintains decreased rectal sensation but able to control her defecation.    Patient will be treated at [facility] with PT and OT to improve functional status and ability to perform ADLs.     Interval History  Pt evaluated s/p unwitnessed fall from wheelchair this am. Pt states she was sitting in wheelchair and failed to lock wheels. Pt states she reached for a pad and wheelchair rolled from under. Pt states she fell on buttock and rolled to left leg. Pt states she was able to get herself off floor and back in bed. No bruising or swelling noted. Tenderness to mid sacrum. No obvious deformity. 24 hr vital sign ranges listed below.  Patient denies shortness of breath, abdominal discomfort, nausea, or vomiting.  Patient reports an adequate appetite.  Patient denies dysuria.  Patient reports having regular bowel movements.  Patient progessing with PT/OT.  Continuing to follow and treat all acute and chronic conditions.    Past Medical History: Patient has a past medical history of Anxiety, Chronic osteomyelitis of right hand (2019), Chronic pain syndrome, Depression, Insomnia, Post traumatic stress disorder (PTSD), and Sciatica.    Past Surgical History: Patient has a past surgical history that includes Tonsillectomy; Cosmetic surgery; Pelvic laparoscopy; Finger surgery; Ablation of medial branch nerve of lumbar spine facet joint; Functional endoscopic sinus surgery (FESS) using computer-assisted navigation (Bilateral, 7/1/2022); Maxillary antrostomy (7/1/2022); Ethmoidectomy (7/1/2022); and Lumbar laminectomy (N/A, 11/11/2022).    Social History: Patient reports that she has been smoking cigarettes. She started smoking about 5 years ago. She has a 3.75 pack-year smoking history. She has never used smokeless tobacco. She reports that she does not currently use alcohol. She reports that she does not use drugs.    Family History:  family history includes Cervical cancer in her maternal aunt; Colon cancer in her maternal aunt; Diabetes in her maternal aunt, maternal grandmother, and mother; Hypertension in her mother.    Allergies: Patient is allergic to coconut; sulfa (sulfonamide antibiotics); adhesive; and latex, natural rubber.        Review of Systems   Constitutional:  Positive for malaise/fatigue. Negative for chills and fever.   HENT:  Negative for congestion, hearing loss and sore throat.    Eyes:  Negative for blurred vision and double vision.   Respiratory:  Negative for cough, sputum production, shortness of breath and wheezing.    Cardiovascular:  Negative for chest pain, palpitations and leg swelling.   Gastrointestinal:  Negative for abdominal pain, constipation, diarrhea, heartburn, nausea and vomiting.   Genitourinary:  Negative for dysuria and urgency.   Musculoskeletal:  Negative for back pain.   Skin:  Negative for rash.        + wound    Neurological:  Positive for weakness. Negative for dizziness and headaches.   Endo/Heme/Allergies:  Negative for polydipsia. Does not bruise/bleed easily.   Psychiatric/Behavioral:  Negative for depression and hallucinations. The patient is not nervous/anxious.        24 hour Vital Sign Range   Temp:  [98.2 °F (36.8 °C)]   Pulse:  [82-83]   Resp:  [16-18]   BP: (113)/(67)   SpO2:  [96 %-100 %]       Physical Exam  Vitals and nursing note reviewed.   Constitutional:       General: She is not in acute distress.     Appearance: Normal appearance. She is not ill-appearing.   HENT:      Head: Normocephalic and atraumatic.      Nose: Nose normal. No congestion.      Mouth/Throat:      Mouth: Mucous membranes are moist.      Pharynx: Oropharynx is clear.   Eyes:      Extraocular Movements: Extraocular movements intact.      Conjunctiva/sclera: Conjunctivae normal.      Pupils: Pupils are equal, round, and reactive to light.   Cardiovascular:      Rate and Rhythm: Normal rate and regular rhythm.      Pulses: Normal pulses.      Heart sounds: Normal heart sounds. No murmur heard.  Pulmonary:      Effort: Pulmonary effort is normal. No respiratory distress.      Breath sounds: Normal breath sounds. No wheezing or rhonchi.   Abdominal:      General: Bowel sounds are normal. There is no distension.      Palpations: Abdomen is soft. There is no mass.      Tenderness: There is no abdominal tenderness.   Musculoskeletal:         General: No swelling or tenderness.      Cervical back: Normal range of motion and neck supple. No tenderness.      Right lower leg: No edema.      Left lower leg: No edema.   Skin:     General: Skin is warm and dry.      Capillary Refill: Capillary refill takes less than 2 seconds.      Findings: No erythema or rash.   Neurological:      Mental Status: She is alert and oriented to person, place, and time. Mental status is at baseline.      Motor: Weakness present.   Psychiatric:         Mood and Affect:  Mood normal.         Behavior: Behavior normal.         Thought Content: Thought content normal.         Judgment: Judgment normal.     Full skin assessment completed by this NP           Incision/Site 11/11/22 2342 Back (Active)   11/11/22 2342   Present Prior to Hospital Arrival?:    Side:    Location: Back   Orientation:    Incision Type:    Closure Method:    Additional Comments:    Removal Indication and Assessment:    Wound Outcome:    Removal Indications:    Incision WDL ex 11/20/22 0800   Dressing Appearance Dry;Intact 11/20/22 2053   Drainage Amount None 11/20/22 2053   Drainage Characteristics/Odor No odor 11/20/22 2053   Appearance Sutures intact 11/20/22 2053   Periwound Area Dry 11/20/22 0800   Wound Edges Approximated 11/20/22 0800   Dressing Removed;Changed;Applied;Island/border 11/20/22 0800   Dressing Change Due 11/21/22 11/20/22 0800   Number of days: 10          Labs:  Recent Labs   Lab 11/29/22 0417   WBC 7.62   HGB 12.1   HCT 38.5          Recent Labs   Lab 11/29/22  0417      K 3.8      CO2 25   BUN 15   CREATININE 0.9   GLU 90   CALCIUM 9.0   MG 2.0   PHOS 3.9       No results for input(s): ALKPHOS, ALT, AST, ALBUMIN, PROT, BILITOT, INR in the last 168 hours.  No results for input(s): POCTGLUCOSE in the last 72 hours.    Meds Scheduled:   celecoxib  200 mg Oral Daily    doxepin  50 mg Oral QHS    fluticasone furoate-vilanteroL  1 puff Inhalation Daily    gabapentin  600 mg Oral TID    heparin (porcine)  5,000 Units Subcutaneous Q8H    methocarbamoL  1,000 mg Oral TID    mupirocin   Topical (Top) Q14 Days    norethindrone  5 mg Oral Daily    pantoprazole  40 mg Oral Daily    senna-docusate 8.6-50 mg  1 tablet Oral BID    venlafaxine  300 mg Oral Daily       PRN:   (Magic mouthwash) 1:1:1 diphenhydrAMINE(Benadryl) 12.5mg/5ml liq, aluminum & magnesium hydroxide-simethicone (Maalox), LIDOcaine viscous 2%, acetaminophen, albuterol, albuterol-ipratropium, ALPRAZolam, calcium  carbonate, melatonin, ondansetron, oxyCODONE, oxyCODONE      Assessment and Plan:    Fall   -Initiate order Xray left femur  -Initiate order Xray sacrum     SHAZIA (cauda equina syndrome)  SP L4-S1 Laminectomy 11.11.22  -PODAS boot for LLE foot drop, AFO LLE when ambulating  -PT/OT: WBAT spine precautions  -DVT PPx: Heparin TID      -Pain management, Celebrex, Robaxin, Oxycodone, Gabapentin    Debility   - Continue with PT/OT for gait training and strengthening and restoration of ADL's   - Encourage mobility, OOB in chair, and early ambulation as appropriate  - Fall precautions   - Monitor for bowel and bladder dysfunction  - Monitor for and prevent skin breakdown and pressure ulcers  - Continue DVT prophylaxis with heparin     Anticipate disposition:    Home with home health      Follow-up needed during SNF admission:       Follow-up needed after discharge from SNF:   - PCP within 1-2 weeks  - See appt scheduled below     Future Appointments   Date Time Provider Department Center   12/12/2022 11:30 AM Antonia Clifton PA-C [facility]         I certify that SNF services are required to be given on an inpatient basis because Marianela Shrestha needs for skilled nursing care and/or skilled rehabilitation are required on a daily basis and such services can only practically be provided in a skilled nursing facility setting and are for an ongoing condition for which she received inpatient care in the hospital.        Wanda Jacobs NP  Department of Hospital Medicine   [facility]     DOS: 11/30/2022       Patient note was created using MModal Dictation.  Any errors in syntax or even information may not have been identified and edited on initial review prior to signing this note.

## 2022-12-01 ENCOUNTER — HOSPITAL ENCOUNTER (EMERGENCY)
Facility: HOSPITAL | Age: 37
Discharge: HOME | End: 2022-12-02
Attending: EMERGENCY MEDICINE
Payer: MEDICARE
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
## 2022-12-01 DIAGNOSIS — M54.41 LOW BACK PAIN WITH RIGHT-SIDED SCIATICA, UNSPECIFIED BACK PAIN LATERALITY, UNSPECIFIED CHRONICITY: ICD-10-CM

## 2022-12-01 DIAGNOSIS — G89.18 POST-OP PAIN: ICD-10-CM

## 2022-12-01 PROCEDURE — 99284 EMERGENCY DEPT VISIT MOD MDM: CPT | Mod: ,,, | Performed by: EMERGENCY MEDICINE

## 2022-12-01 PROCEDURE — 99284 PR EMERGENCY DEPT VISIT,LEVEL IV: ICD-10-PCS | Mod: ,,, | Performed by: EMERGENCY MEDICINE

## 2022-12-01 PROCEDURE — 99284 EMERGENCY DEPT VISIT MOD MDM: CPT

## 2022-12-01 PROCEDURE — 63600175 PHARM REV CODE 636 W HCPCS: Performed by: NURSE PRACTITIONER

## 2022-12-01 PROCEDURE — 25000003 PHARM REV CODE 250: Performed by: NURSE PRACTITIONER

## 2022-12-01 PROCEDURE — 97110 THERAPEUTIC EXERCISES: CPT | Mod: CQ

## 2022-12-01 PROCEDURE — 25000003 PHARM REV CODE 250: Performed by: HOSPITALIST

## 2022-12-01 PROCEDURE — 18500000 HC LEAVE OF ABSENCE HOSPITAL SERVICES

## 2022-12-01 PROCEDURE — 97530 THERAPEUTIC ACTIVITIES: CPT | Mod: CO

## 2022-12-01 RX ORDER — OXYCODONE HYDROCHLORIDE 5 MG/1
5 TABLET ORAL
Status: COMPLETED | OUTPATIENT
Start: 2022-12-02 | End: 2022-12-02

## 2022-12-01 RX ORDER — HYDROXYZINE HYDROCHLORIDE 10 MG/1
10 TABLET, FILM COATED ORAL
Status: COMPLETED | OUTPATIENT
Start: 2022-12-02 | End: 2022-12-02

## 2022-12-01 RX ADMIN — HEPARIN SODIUM 5000 UNITS: 5000 INJECTION INTRAVENOUS; SUBCUTANEOUS at 03:12

## 2022-12-01 RX ADMIN — GABAPENTIN 600 MG: 300 CAPSULE ORAL at 08:12

## 2022-12-01 RX ADMIN — NORETHINDRONE ACETATE 5 MG: 5 TABLET ORAL at 08:12

## 2022-12-01 RX ADMIN — METHOCARBAMOL 1000 MG: 500 TABLET ORAL at 08:12

## 2022-12-01 RX ADMIN — OXYCODONE HYDROCHLORIDE 10 MG: 10 TABLET ORAL at 08:12

## 2022-12-01 RX ADMIN — HEPARIN SODIUM 5000 UNITS: 5000 INJECTION INTRAVENOUS; SUBCUTANEOUS at 06:12

## 2022-12-01 RX ADMIN — PANTOPRAZOLE SODIUM 40 MG: 40 TABLET, DELAYED RELEASE ORAL at 08:12

## 2022-12-01 RX ADMIN — ALPRAZOLAM 1 MG: 0.5 TABLET ORAL at 09:12

## 2022-12-01 RX ADMIN — CELECOXIB 200 MG: 200 CAPSULE ORAL at 08:12

## 2022-12-01 RX ADMIN — OXYCODONE HYDROCHLORIDE 10 MG: 10 TABLET ORAL at 01:12

## 2022-12-01 RX ADMIN — METHOCARBAMOL 1000 MG: 500 TABLET ORAL at 03:12

## 2022-12-01 RX ADMIN — FLUTICASONE FUROATE AND VILANTEROL TRIFENATATE 1 PUFF: 100; 25 POWDER RESPIRATORY (INHALATION) at 08:12

## 2022-12-01 RX ADMIN — OXYCODONE HYDROCHLORIDE 10 MG: 10 TABLET ORAL at 05:12

## 2022-12-01 RX ADMIN — GABAPENTIN 600 MG: 300 CAPSULE ORAL at 03:12

## 2022-12-01 RX ADMIN — VENLAFAXINE HYDROCHLORIDE 300 MG: 75 CAPSULE, EXTENDED RELEASE ORAL at 08:12

## 2022-12-01 NOTE — PT/OT/SLP PROGRESS
Occupational Therapy   Treatment    Name: Marianela Shrestha  MRN: [id number]  Admit Date: 11/18/2022  Admitting Diagnosis:  SHAZIA (cauda equina syndrome)    General Precautions: Standard, fall   Orthopedic Precautions:spinal precautions   Braces:       Recommendations:     Discharge Recommendations: home health OT  Level of Assistance Recommended at Discharge: Intermittent assistance for ADL's and homemaking tasks  Discharge Equipment Recommendations:  other (see comments) (TBD)  Barriers to discharge:  Decreased caregiver support    Assessment:     Marianela Shrestha is a 37 y.o. female with a medical diagnosis of SHAZIA (cauda equina syndrome).  She presents with  Performance deficits affecting function are are weakness, impaired endurance, impaired self care skills, impaired functional mobility, impaired sensation, gait instability, impaired balance, decreased coordination, decreased upper extremity function, decreased lower extremity function, decreased safety awareness, pain, decreased ROM, orthopedic precautions.     Pt. Was cooperative and participated well with session on this day. Pt  continues to demonstrate levels of physical deficits with  functional indep with daily management activities tasks, selfcare skills with balance,  functional mobility, UB strength and endurance. Pt. Will continue to benefit from continued OT to progress towards goals      Rehab Potential is fair    Activity tolerance:  Fair    Plan:     Patient to be seen 6 x/week to address the above listed problems via self-care/home management, therapeutic activities, therapeutic exercises    Plan of Care Expires: 12/19/22  Plan of Care Reviewed with: patient    Subjective     Communicated with: nsg and Pt. prior to session. Pt. Emotional on arrival in regard to going to ED for further testing  Pain/Comfort:  Pain Rating 1: 8/10  Location - Side 1: Left  Location - Orientation 1: generalized  Location 1:  (lower back and RLE feet and reports  of numbness)  Pain Addressed 1: Pre-medicate for activity, Reposition, Distraction  Pain Rating Post-Intervention 1: 8/10    Patient's cultural, spiritual, Mandaeism conflicts given the current situation:  no    Objective:     Patient found right sidelying  upon OT entry to room.    Bed Mobility:    Patient completed Scooting/Bridging with stand by assistance  Patient completed Supine to Sit with stand by assistance and with side rail     Functional Mobility/Transfers:  Patient completed Sit <> Stand Transfer with contact guard assistance  with  hand-held assist   Patient completed Bed <> Chair Transfer using lateral side steps along EOB x 4 side steps technique with contact guard assistance and minimum assistance with hand-held assist    Activities of Daily Living:  Lower Body Dressing: minimum assistance to snow pants  and to manage over hips instance with (A)      [facility] 6 Click ADL: 20    Treatment & Education:  Pt. With seated activity Pt with visual perception task with discrimination of various shapes and sizes with  use of BUE's incorporated and crossing mid line and facilitation with posture in prep for home management .     Pt. With 2# dowel activity with 2x20 reps with  shd flex, bicep curls horz adb/add and forward flex motion to increase BUE ROM and strength.     Pt. With therex performed to increase ROM, endurance selfcare task and fxl mobility for independence     Pt edu on role of OT, POC, safety when performing self care tasks , benefit of performing OOB activity, and safety when performing functional transfers and mobility management for preparation with goals to progress towards next level of care     Patient left up in chair with  PTA  present    GOALS:   Multidisciplinary Problems       Occupational Therapy Goals          Problem: Occupational Therapy    Goal Priority Disciplines Outcome Interventions   Occupational Therapy Goal     OT, PT/OT Ongoing, Progressing    Description: Goals to be met  by: 3 weeks     Patient will increase functional independence with ADLs by performing:    UE Dressing with Modified [place].  LE Dressing with Stand-by Assistance.  Grooming while seated with Modified [place].  Toileting from toilet or BSC with SBA for hygiene and clothing management. With A/D to stand.  Bathing from sitting at sink with Stand-by Assistance.  Supine to sit with Modified [place].  Step transfer with Stand-by Assistance with A/D as needed.  Upper extremity exercise with assistance as needed.  Caregiver will be educated on level of assist required to safely perform self care tasks and functional transfers..                          Time Tracking:     OT Date of Treatment: 12/01/22  OT Start Time: 1352    OT Stop Time: 1430  OT Total Time (min): 38 min    Billable Minutes:Therapeutic Activity 38    12/1/2022

## 2022-12-01 NOTE — PT/OT/SLP PROGRESS
"Physical Therapy Treatment    Patient Name:  Marianela Shrestha   MRN:  [id number]  Admit Date: 11/18/2022  Admitting Diagnosis: SHAZIA (cauda equina syndrome)  Recent Surgeries:     General Precautions: Standard, fall   Orthopedic Precautions:spinal precautions   Braces:       Recommendations:     Discharge Recommendations:  home health PT   Level of Assistance Recommended at Discharge: 24 hours light assistance and changed discussed with PT  Discharge Equipment Recommendations: bedside commode, walker, rolling, wheelchair, bath bench   Barriers to discharge: Decreased caregiver support (increased assistance needed)    Assessment:     Marianela Shrestha is a 37 y.o. female admitted with a medical diagnosis of SHAZIA (cauda equina syndrome) . Pt tolerated therex well, deferred gait today, pt considering to go to ER later once  gets here to be further assessed,  7/10 pain at rest and with exs, 8/10 with trfs, nsg notified of all the above, pt would continue to benefit from skilled PT services to improve overall functional mobility, strength and endurance.  .      Performance deficits affecting function:  impaired endurance, weakness, impaired sensation, impaired self care skills, impaired functional mobility, gait instability, impaired balance, decreased coordination, decreased lower extremity function, pain, abnormal tone, decreased ROM, impaired coordination, impaired fine motor, impaired skin, impaired cardiopulmonary response to activity, orthopedic precautions .    Rehab Potential is good    Activity Tolerance: Fair    Plan:     Patient to be seen 6 x/week to address the above listed problems via gait training, therapeutic activities, therapeutic exercises, neuromuscular re-education, wheelchair management/training    Plan of Care Expires: 12/19/22  Plan of Care Reviewed with: patient    Subjective     "I want to do a little" . "Think I'm gonna go to the ER later when my  gets here just be be checked " "out"     Pain/Comfort:  Pain Rating 1: 8/10 (7 at rest and exs, 8 with trfs to BScommode, nsg notified)  Location - Orientation 1: generalized  Location 1: back  Pain Addressed 1: Pre-medicate for activity, Nurse notified  Pain Rating Post-Intervention 1: 8/10    Patient's cultural, spiritual, Adventism conflicts given the current situation:  no    Objective:     Communicated with nsg prior to session. Clear for therapy  Patient found with  (in wc) upon PT entry to room.     Therapeutic Activities and Exercises: 2x10 reps ed on pain free/tolerable ROM toe raises/heel raises, LAQ,hip flex,abd/add    Functional Mobility:  Wheelchair Propulsion: ~ 150 ft with BUE S    AM-PAC 6 CLICK MOBILITY  20    Patient left  on BSC in bathroom, pt wanting to sit a while, ed on fall risk, promise not to get up,   with call button in reach, PCT/nsg notified, and nsg sitting outside door present.    GOALS:   Multidisciplinary Problems       Physical Therapy Goals          Problem: Physical Therapy    Goal Priority Disciplines Outcome Goal Variances Interventions   Physical Therapy Goal     PT, PT/OT Ongoing, Progressing     Description: Goals to be met by:12/19/22    Patient will increase functional independence with mobility by performing:    Sit to stand transfer with Supervision.  Bed to chair transfer with Supervision using Rolling Walker.  Gait  x 150 feet with Stand-by Assistance using Rolling Walker.   Ascend/descend 4 stair with bilateral Handrails Stand-by Assistance using No Assistive Device.   Ascend/Descend 4 inch curb step with Stand-by Assistance using Rolling Walker.                         Time Tracking:     PT Received On: 12/01/22  PT Start Time: 1435  PT Stop Time: 1456  PT Total Time (min): 21 min    Billable Minutes: Therapeutic Exercise 21    Treatment Type: Treatment  PT/PTA: PTA     PTA Visit Number: 3     12/01/2022  "

## 2022-12-01 NOTE — PLAN OF CARE
Problem: Adult Inpatient Plan of Care  Goal: Plan of Care Review  Outcome: Ongoing, Progressing  Goal: Patient-Specific Goal (Individualized)  Outcome: Ongoing, Progressing  Goal: Absence of Hospital-Acquired Illness or Injury  Outcome: Ongoing, Progressing  Goal: Optimal Comfort and Wellbeing  Outcome: Ongoing, Progressing  Goal: Readiness for Transition of Care  Outcome: Ongoing, Progressing     Problem: Fall Injury Risk  Goal: Absence of Fall and Fall-Related Injury  Outcome: Ongoing, Progressing     Problem: Skin Injury Risk Increased  Goal: Skin Health and Integrity  Outcome: Ongoing, Progressing     Problem: Anxiety  Goal: Anxiety Reduction or Resolution  Description:  Anxiety/PTSD  Outcome: Ongoing, Progressing

## 2022-12-01 NOTE — PROGRESS NOTES
[facility]                                  Skilled Nursing Facility                   Progress Note     Patient Name: Marianela Shrestha  YOB: 1985  MRN: [id number]  Room: [place] [address] [address] [address]     Admit Date: 11/18/2022   CARLITA: 12/9/2022     Principal Problem: SHAZIA (cauda equina syndrome)    HPI obtained from patient interview and chart review     Chief Complaint:    Re-evaluation of medical treatment and therapy status: Re- evaluate fall        HPI:   Marianela Shrestha is a 37 y.o. female with PMH of drug abuse, low back pain,  LLE radiculopathy and right hand osteomyelitis treated with surgery presents for evaluation of 3 day history LLE weakness and changes in bowel and bladder acutely worse in last 24 hours prior to arrival to ER. The patient reports chronic back pain. However, she fell , twisting her ankle and low back.  S/P L4, L5, S1 laminectomy 11/11/22 by Dr. Harry. Post operatively she maintains decreased but able to feel bladder getting full and control her voiding. She maintains decreased rectal sensation but able to control her defecation.    Patient will be treated at [facility] with PT and OT to improve functional status and ability to perform ADLs.     Interval History  Pt Re-evaluated s/p unwitnessed fall from wheelchair yesterday. Today pt has c/o right foot numbness. States she is able to transfer to wheelchair without difficulty. Denies worsening pain. No bruising or swelling noted. No obvious deformity. Dr. Harry's ortho team notified; recommends pt go to ER for further imaging if unable to bear weight. Pt refuses ER at present; states she's able to standing without difficulty. Will continue to monitor for worsening symptoms. 24 hr vital sign ranges listed below.  Patient denies shortness of breath, abdominal discomfort, nausea, or vomiting.  Patient reports an adequate appetite.  Patient denies dysuria.   Patient reports having regular bowel movements.  Patient progessing with PT/OT. Continuing to follow and treat all acute and chronic conditions.    Past Medical History: Patient has a past medical history of Anxiety, Chronic osteomyelitis of right hand (2019), Chronic pain syndrome, Depression, Insomnia, Post traumatic stress disorder (PTSD), and Sciatica.    Past Surgical History: Patient has a past surgical history that includes Tonsillectomy; Cosmetic surgery; Pelvic laparoscopy; Finger surgery; Ablation of medial branch nerve of lumbar spine facet joint; Functional endoscopic sinus surgery (FESS) using computer-assisted navigation (Bilateral, 7/1/2022); Maxillary antrostomy (7/1/2022); Ethmoidectomy (7/1/2022); and Lumbar laminectomy (N/A, 11/11/2022).    Social History: Patient reports that she has been smoking cigarettes. She started smoking about 5 years ago. She has a 3.75 pack-year smoking history. She has never used smokeless tobacco. She reports that she does not currently use alcohol. She reports that she does not use drugs.    Family History:  family history includes Cervical cancer in her maternal aunt; Colon cancer in her maternal aunt; Diabetes in her maternal aunt, maternal grandmother, and mother; Hypertension in her mother.    Allergies: Patient is allergic to coconut; sulfa (sulfonamide antibiotics); adhesive; and latex, natural rubber.        Review of Systems   Constitutional:  Positive for malaise/fatigue. Negative for chills and fever.   HENT:  Negative for congestion, hearing loss and sore throat.    Eyes:  Negative for blurred vision and double vision.   Respiratory:  Negative for cough, sputum production, shortness of breath and wheezing.    Cardiovascular:  Negative for chest pain, palpitations and leg swelling.   Gastrointestinal:  Negative for abdominal pain, constipation, diarrhea, heartburn, nausea and vomiting.   Genitourinary:  Negative for dysuria and urgency.   Musculoskeletal:   Negative for back pain.   Skin:  Negative for rash.        + wound   Neurological:  Positive for weakness. Negative for dizziness and headaches.   Endo/Heme/Allergies:  Negative for polydipsia. Does not bruise/bleed easily.   Psychiatric/Behavioral:  Negative for depression and hallucinations. The patient is not nervous/anxious.        24 hour Vital Sign Range   Temp:  [97.4 °F (36.3 °C)-98.1 °F (36.7 °C)]   Pulse:  [84-89]   Resp:  [16-18]   BP: (122-127)/(64-73)   SpO2:  [97 %-98 %]       Physical Exam  Vitals and nursing note reviewed.   Constitutional:       General: She is not in acute distress.     Appearance: Normal appearance. She is not ill-appearing.   HENT:      Head: Normocephalic and atraumatic.      Nose: Nose normal. No congestion.      Mouth/Throat:      Mouth: Mucous membranes are moist.      Pharynx: Oropharynx is clear.   Eyes:      Extraocular Movements: Extraocular movements intact.      Conjunctiva/sclera: Conjunctivae normal.      Pupils: Pupils are equal, round, and reactive to light.   Cardiovascular:      Rate and Rhythm: Normal rate and regular rhythm.      Pulses: Normal pulses.      Heart sounds: Normal heart sounds. No murmur heard.  Pulmonary:      Effort: Pulmonary effort is normal. No respiratory distress.      Breath sounds: Normal breath sounds. No wheezing or rhonchi.   Abdominal:      General: Bowel sounds are normal. There is no distension.      Palpations: Abdomen is soft. There is no mass.      Tenderness: There is no abdominal tenderness.   Musculoskeletal:         General: No swelling or tenderness.      Cervical back: Normal range of motion and neck supple. No tenderness.      Right lower leg: No edema.      Left lower leg: No edema.   Skin:     General: Skin is warm and dry.      Capillary Refill: Capillary refill takes less than 2 seconds.      Findings: No erythema or rash.   Neurological:      Mental Status: She is alert and oriented to person, place, and time. Mental  status is at baseline.      Motor: Weakness present.   Psychiatric:         Mood and Affect: Mood normal.         Behavior: Behavior normal.         Thought Content: Thought content normal.         Judgment: Judgment normal.     Full skin assessment completed by this NP           Incision/Site 11/11/22 2342 Back (Active)   11/11/22 2342   Present Prior to Hospital Arrival?:    Side:    Location: Back   Orientation:    Incision Type:    Closure Method:    Additional Comments:    Removal Indication and Assessment:    Wound Outcome:    Removal Indications:    Incision WDL ex 11/20/22 0800   Dressing Appearance Dry;Intact 11/20/22 2053   Drainage Amount None 11/20/22 2053   Drainage Characteristics/Odor No odor 11/20/22 2053   Appearance Sutures intact 11/20/22 2053   Periwound Area Dry 11/20/22 0800   Wound Edges Approximated 11/20/22 0800   Dressing Removed;Changed;Applied;Island/border 11/20/22 0800   Dressing Change Due 11/21/22 11/20/22 0800   Number of days: 10          Labs:  Recent Labs   Lab 11/29/22  0417   WBC 7.62   HGB 12.1   HCT 38.5          Recent Labs   Lab 11/29/22  0417      K 3.8      CO2 25   BUN 15   CREATININE 0.9   GLU 90   CALCIUM 9.0   MG 2.0   PHOS 3.9       No results for input(s): ALKPHOS, ALT, AST, ALBUMIN, PROT, BILITOT, INR in the last 168 hours.  No results for input(s): POCTGLUCOSE in the last 72 hours.    Meds Scheduled:   celecoxib  200 mg Oral Daily    doxepin  50 mg Oral QHS    fluticasone furoate-vilanteroL  1 puff Inhalation Daily    gabapentin  600 mg Oral TID    heparin (porcine)  5,000 Units Subcutaneous Q8H    methocarbamoL  1,000 mg Oral TID    mupirocin   Topical (Top) Q14 Days    norethindrone  5 mg Oral Daily    pantoprazole  40 mg Oral Daily    senna-docusate 8.6-50 mg  1 tablet Oral BID    venlafaxine  300 mg Oral Daily       PRN:   (Magic mouthwash) 1:1:1 diphenhydrAMINE(Benadryl) 12.5mg/5ml liq, aluminum & magnesium hydroxide-simethicone (Maalox),  LIDOcaine viscous 2%, acetaminophen, albuterol, albuterol-ipratropium, ALPRAZolam, calcium carbonate, melatonin, ondansetron, oxyCODONE, oxyCODONE      Assessment and Plan:    Fall  - Xray left femur- no acute fracture  - Xray sacrum-no acute fracture  -Monitor for neurological changes or worsening pain  -Ortho rec ER for further imaging if unable to bear weight    SHAZIA (cauda equina syndrome)  SP L4-S1 Laminectomy 11.11.22  -PODAS boot for LLE foot drop, AFO LLE when ambulating  -PT/OT: WBAT spine precautions  -DVT PPx: Heparin TID      -Pain management, Celebrex, Robaxin, Oxycodone, Gabapentin    Debility   - Continue with PT/OT for gait training and strengthening and restoration of ADL's   - Encourage mobility, OOB in chair, and early ambulation as appropriate  - Fall precautions   - Monitor for bowel and bladder dysfunction  - Monitor for and prevent skin breakdown and pressure ulcers  - Continue DVT prophylaxis with heparin     Anticipate disposition:    Home with home health      Follow-up needed during SNF admission:       Follow-up needed after discharge from SNF:   - PCP within 1-2 weeks  - See appt scheduled below     Future Appointments   Date Time Provider Department Center   12/12/2022 11:30 AM Antonia Clifton PA-C [facility]         I certify that SNF services are required to be given on an inpatient basis because Marianela Shrestha needs for skilled nursing care and/or skilled rehabilitation are required on a daily basis and such services can only practically be provided in a skilled nursing facility setting and are for an ongoing condition for which she received inpatient care in the hospital.        Wanda Jacobs NP  Department of Hospital Medicine   [facility]     DOS: 12/1/2022       Patient note was created using MModal Dictation.  Any errors in syntax or even information may not have been identified and edited on initial review prior to  signing this note.

## 2022-12-02 VITALS
RESPIRATION RATE: 18 BRPM | TEMPERATURE: 98 F | SYSTOLIC BLOOD PRESSURE: 136 MMHG | OXYGEN SATURATION: 96 % | HEART RATE: 76 BPM | DIASTOLIC BLOOD PRESSURE: 70 MMHG

## 2022-12-02 PROCEDURE — 25000003 PHARM REV CODE 250: Performed by: EMERGENCY MEDICINE

## 2022-12-02 PROCEDURE — 25000003 PHARM REV CODE 250: Performed by: HOSPITALIST

## 2022-12-02 PROCEDURE — 11000004 HC SNF PRIVATE

## 2022-12-02 PROCEDURE — 63600175 PHARM REV CODE 636 W HCPCS: Performed by: NURSE PRACTITIONER

## 2022-12-02 PROCEDURE — 25000003 PHARM REV CODE 250: Performed by: NURSE PRACTITIONER

## 2022-12-02 RX ORDER — OXYCODONE HYDROCHLORIDE 5 MG/1
5 TABLET ORAL
Status: COMPLETED | OUTPATIENT
Start: 2022-12-02 | End: 2022-12-02

## 2022-12-02 RX ORDER — ACETAMINOPHEN 500 MG
1000 TABLET ORAL
Status: DISCONTINUED | OUTPATIENT
Start: 2022-12-02 | End: 2022-12-02

## 2022-12-02 RX ADMIN — OXYCODONE 5 MG: 5 TABLET ORAL at 10:12

## 2022-12-02 RX ADMIN — HEPARIN SODIUM 5000 UNITS: 5000 INJECTION INTRAVENOUS; SUBCUTANEOUS at 01:12

## 2022-12-02 RX ADMIN — PANTOPRAZOLE SODIUM 40 MG: 40 TABLET, DELAYED RELEASE ORAL at 09:12

## 2022-12-02 RX ADMIN — ALPRAZOLAM 1 MG: 0.5 TABLET ORAL at 08:12

## 2022-12-02 RX ADMIN — DOXEPIN HYDROCHLORIDE 50 MG: 50 CAPSULE ORAL at 08:12

## 2022-12-02 RX ADMIN — CELECOXIB 200 MG: 200 CAPSULE ORAL at 01:12

## 2022-12-02 RX ADMIN — NORETHINDRONE ACETATE 5 MG: 5 TABLET ORAL at 01:12

## 2022-12-02 RX ADMIN — HYDROXYZINE HYDROCHLORIDE 10 MG: 10 TABLET ORAL at 12:12

## 2022-12-02 RX ADMIN — METHOCARBAMOL 1000 MG: 500 TABLET ORAL at 08:12

## 2022-12-02 RX ADMIN — OXYCODONE HYDROCHLORIDE 10 MG: 10 TABLET ORAL at 01:12

## 2022-12-02 RX ADMIN — VENLAFAXINE HYDROCHLORIDE 300 MG: 75 CAPSULE, EXTENDED RELEASE ORAL at 01:12

## 2022-12-02 RX ADMIN — HEPARIN SODIUM 5000 UNITS: 5000 INJECTION INTRAVENOUS; SUBCUTANEOUS at 09:12

## 2022-12-02 RX ADMIN — OXYCODONE HYDROCHLORIDE 10 MG: 10 TABLET ORAL at 08:12

## 2022-12-02 RX ADMIN — GABAPENTIN 600 MG: 300 CAPSULE ORAL at 02:12

## 2022-12-02 RX ADMIN — GABAPENTIN 600 MG: 300 CAPSULE ORAL at 08:12

## 2022-12-02 RX ADMIN — METHOCARBAMOL 1000 MG: 500 TABLET ORAL at 02:12

## 2022-12-02 RX ADMIN — OXYCODONE 5 MG: 5 TABLET ORAL at 12:12

## 2022-12-02 NOTE — NURSING
Patient requesting to be sent to ED for evaluation after previous fall. C/o back pain and other generalized pain. Notified Dr. LUANN Heath. Instructed to send patient to ED for evaluation as requested. Awaiting [facility] for transport.     Yocasta Rod notified of orders to transfer to ED by staff Nurse.

## 2022-12-02 NOTE — PROGRESS NOTES
[facility]                                  Skilled Nursing Facility                   Progress Note     Patient Name: Marianela Shrestha  YOB: 1985  MRN: [id number]  Room: [place] [address]/[place] [address] A     Admit Date: 11/18/2022   CARLITA: 12/9/2022     Principal Problem: SHAZIA (cauda equina syndrome)    HPI obtained from patient interview and chart review     Chief Complaint:    Re-evaluation of medical treatment and therapy status: Re- evaluate fall, post ER visit        HPI:   Marianela Shrestha is a 37 y.o. female with PMH of drug abuse, low back pain,  LLE radiculopathy and right hand osteomyelitis treated with surgery presents for evaluation of 3 day history LLE weakness and changes in bowel and bladder acutely worse in last 24 hours prior to arrival to ER. The patient reports chronic back pain. However, she fell , twisting her ankle and low back.  S/P L4, L5, S1 laminectomy 11/11/22 by Dr. Harry. Post operatively she maintains decreased but able to feel bladder getting full and control her voiding. She maintains decreased rectal sensation but able to control her defecation.    Patient will be treated at [facility] with PT and OT to improve functional status and ability to perform ADLs.     Interval History  Pt Re-evaluated s/p unwitnessed fall from wheelchair 2 days ago. Pt was sent to ER last night after she requested due to worsening low back pain. Denies bowel or bladder changes. CT of pelvis and Lspine completed in ER; no acute fractures. Pt states she continue to have pain radiating from low back to right leg. States pain tolerable with current regimen. Ortho team updated on pt's ER visit. Will continue to monitor for worsening symptoms. 24 hr vital sign ranges listed below.  Patient denies shortness of breath, abdominal discomfort, nausea, or vomiting.  Patient reports an adequate appetite.  Patient denies dysuria.  Patient  reports having regular bowel movements.  Patient progessing with PT/OT. Continuing to follow and treat all acute and chronic conditions.    Past Medical History: Patient has a past medical history of Anxiety, Chronic osteomyelitis of right hand (2019), Chronic pain syndrome, Depression, Insomnia, Post traumatic stress disorder (PTSD), and Sciatica.    Past Surgical History: Patient has a past surgical history that includes Tonsillectomy; Cosmetic surgery; Pelvic laparoscopy; Finger surgery; Ablation of medial branch nerve of lumbar spine facet joint; Functional endoscopic sinus surgery (FESS) using computer-assisted navigation (Bilateral, 7/1/2022); Maxillary antrostomy (7/1/2022); Ethmoidectomy (7/1/2022); and Lumbar laminectomy (N/A, 11/11/2022).    Social History: Patient reports that she has been smoking cigarettes. She started smoking about 5 years ago. She has a 3.75 pack-year smoking history. She has never used smokeless tobacco. She reports that she does not currently use alcohol. She reports that she does not use drugs.    Family History:  family history includes Cervical cancer in her maternal aunt; Colon cancer in her maternal aunt; Diabetes in her maternal aunt, maternal grandmother, and mother; Hypertension in her mother.    Allergies: Patient is allergic to coconut; sulfa (sulfonamide antibiotics); adhesive; and latex, natural rubber.        Review of Systems   Constitutional:  Positive for malaise/fatigue. Negative for chills and fever.   HENT:  Negative for congestion, hearing loss and sore throat.    Eyes:  Negative for blurred vision and double vision.   Respiratory:  Negative for cough, sputum production, shortness of breath and wheezing.    Cardiovascular:  Negative for chest pain, palpitations and leg swelling.   Gastrointestinal:  Negative for abdominal pain, constipation, diarrhea, heartburn, nausea and vomiting.   Genitourinary:  Negative for dysuria and urgency.   Musculoskeletal:  Negative  for back pain.   Skin:  Negative for rash.        + wound   Neurological:  Positive for weakness. Negative for dizziness and headaches.   Endo/Heme/Allergies:  Negative for polydipsia. Does not bruise/bleed easily.   Psychiatric/Behavioral:  Negative for depression and hallucinations. The patient is not nervous/anxious.        24 hour Vital Sign Range   Temp:  [98 °F (36.7 °C)-98.7 °F (37.1 °C)]   Pulse:  [67-94]   Resp:  [16-20]   BP: (114-151)/(65-88)   SpO2:  [95 %-100 %]       Physical Exam  Vitals and nursing note reviewed.   Constitutional:       General: She is not in acute distress.     Appearance: Normal appearance. She is not ill-appearing.   HENT:      Head: Normocephalic and atraumatic.      Nose: Nose normal. No congestion.      Mouth/Throat:      Mouth: Mucous membranes are moist.      Pharynx: Oropharynx is clear.   Eyes:      Extraocular Movements: Extraocular movements intact.      Conjunctiva/sclera: Conjunctivae normal.      Pupils: Pupils are equal, round, and reactive to light.   Cardiovascular:      Rate and Rhythm: Normal rate and regular rhythm.      Pulses: Normal pulses.      Heart sounds: Normal heart sounds. No murmur heard.  Pulmonary:      Effort: Pulmonary effort is normal. No respiratory distress.      Breath sounds: Normal breath sounds. No wheezing or rhonchi.   Abdominal:      General: Bowel sounds are normal. There is no distension.      Palpations: Abdomen is soft. There is no mass.      Tenderness: There is no abdominal tenderness.   Musculoskeletal:         General: No swelling or tenderness.      Cervical back: Normal range of motion and neck supple. No tenderness.      Right lower leg: No edema.      Left lower leg: No edema.   Skin:     General: Skin is warm and dry.      Capillary Refill: Capillary refill takes less than 2 seconds.      Findings: No erythema or rash.   Neurological:      Mental Status: She is alert and oriented to person, place, and time. Mental status is  at baseline.      Motor: Weakness present.   Psychiatric:         Mood and Affect: Mood normal.         Behavior: Behavior normal.         Thought Content: Thought content normal.         Judgment: Judgment normal.     Full skin assessment completed by this NP           Incision/Site 11/11/22 2342 Back (Active)   11/11/22 2342   Present Prior to Hospital Arrival?:    Side:    Location: Back   Orientation:    Incision Type:    Closure Method:    Additional Comments:    Removal Indication and Assessment:    Wound Outcome:    Removal Indications:    Incision WDL ex 11/20/22 0800   Dressing Appearance Dry;Intact 11/20/22 2053   Drainage Amount None 11/20/22 2053   Drainage Characteristics/Odor No odor 11/20/22 2053   Appearance Sutures intact 11/20/22 2053   Periwound Area Dry 11/20/22 0800   Wound Edges Approximated 11/20/22 0800   Dressing Removed;Changed;Applied;Island/border 11/20/22 0800   Dressing Change Due 11/21/22 11/20/22 0800   Number of days: 10          Labs:  Recent Labs   Lab 11/29/22  0417   WBC 7.62   HGB 12.1   HCT 38.5          Recent Labs   Lab 11/29/22  0417      K 3.8      CO2 25   BUN 15   CREATININE 0.9   GLU 90   CALCIUM 9.0   MG 2.0   PHOS 3.9       No results for input(s): ALKPHOS, ALT, AST, ALBUMIN, PROT, BILITOT, INR in the last 168 hours.  No results for input(s): POCTGLUCOSE in the last 72 hours.    Meds Scheduled:   celecoxib  200 mg Oral Daily    doxepin  50 mg Oral QHS    fluticasone furoate-vilanteroL  1 puff Inhalation Daily    gabapentin  600 mg Oral TID    heparin (porcine)  5,000 Units Subcutaneous Q8H    methocarbamoL  1,000 mg Oral TID    mupirocin   Topical (Top) Q14 Days    norethindrone  5 mg Oral Daily    pantoprazole  40 mg Oral Daily    senna-docusate 8.6-50 mg  1 tablet Oral BID    venlafaxine  300 mg Oral Daily       PRN:   (Magic mouthwash) 1:1:1 diphenhydrAMINE(Benadryl) 12.5mg/5ml liq, aluminum & magnesium hydroxide-simethicone (Maalox), LIDOcaine  viscous 2%, acetaminophen, albuterol, albuterol-ipratropium, ALPRAZolam, calcium carbonate, melatonin, ondansetron, oxyCODONE, oxyCODONE      Assessment and Plan:    Fall  - Xray left femur- no acute fracture  - Xray sacrum-no acute fracture  -Monitor for neurological changes or worsening pain  -Seen in ER and returned to SNF-CT Lspine and Pelvic, no acute fracture  -Ortho team notified-Awaiting recommendations    SHAZIA (cauda equina syndrome)  SP L4-S1 Laminectomy 11.11.22  -PODAS boot for LLE foot drop, AFO LLE when ambulating  -PT/OT: WBAT spine precautions  -DVT PPx: Heparin TID      -Pain management, Celebrex, Robaxin, Oxycodone, Gabapentin    Debility   - Continue with PT/OT for gait training and strengthening and restoration of ADL's   - Encourage mobility, OOB in chair, and early ambulation as appropriate  - Fall precautions   - Monitor for bowel and bladder dysfunction  - Monitor for and prevent skin breakdown and pressure ulcers  - Continue DVT prophylaxis with heparin     Anticipate disposition:    Home with home health      Follow-up needed during SNF admission:       Follow-up needed after discharge from SNF:   - PCP within 1-2 weeks  - See appt scheduled below     Future Appointments   Date Time Provider Department Center   12/12/2022 11:30 AM Antonia Clifton PA-C [facility]         I certify that SNF services are required to be given on an inpatient basis because Marianela Shrestha needs for skilled nursing care and/or skilled rehabilitation are required on a daily basis and such services can only practically be provided in a skilled nursing facility setting and are for an ongoing condition for which she received inpatient care in the hospital.        Wanda Jacobs NP  Department of Hospital Medicine   [facility]     DOS: 12/2/2022       Patient note was created using MModal Dictation.  Any errors in syntax or even information may not have been  identified and edited on initial review prior to signing this note.

## 2022-12-02 NOTE — ED NOTES
I assumed care of this patient at this time. Pt is resting in ED stretcher; pt tearful and frustrated w/ transportation times back to [facility]. Pt currently waiting to be picked up by [facility]; ETA 0634. Pt is AAOx4. RR is even, unlabored, and spontaneous. Skin is warm, dry and intact. Bed low and locked; side rails up x2.

## 2022-12-02 NOTE — NURSING
Pt arrived to skilled floor stable  from ER via w/c  with transportation services.  Vital signs stable. Will assume care.

## 2022-12-02 NOTE — ED TRIAGE NOTES
Pt presents to the ED for unwitnessed fall at SNF facility. Pt did not hit head. Pt reports severe pain above surgical site w/ pain and cramping in legs.

## 2022-12-02 NOTE — ED PROVIDER NOTES
Encounter Date: 12/1/2022       History     Chief Complaint   Patient presents with    Fall     From SNF for unwitnessed fall yesterday. Had L4-S1 spinal fusion on 11/11. Today with L leg weakness and back pain. -LOC. Pt aaox4. Last received 10mg Oxy at 1800     HPI  37-year-old female, with history of IV drug use, MRSA infection, left lower extremity radiculopathy, cauda equina status post laminectomy, presents to the ED for back pain after a fall.  Patient is recently discharged from the hospital 11/18 to a SNF s/p laminectomy.  Patient reports that she had a unwitnessed fall on her buttock yesterday, she attempted to get up from an unlocked wheelchair.  Reports worsening lower back pain, new lateral left leg pain and new right foot pain, despite oxycodone every 3 hours. Endorses residual bladder retention after the surgery but she is able to urinate now in the ED. No fever, chill, headache, neck pain, or shortness of breath, or dysuria.   Review of patient's allergies indicates:   Allergen Reactions    Coconut Hives and Itching    Sulfa (sulfonamide antibiotics) Hives     itching    Adhesive Rash    Latex, natural rubber Other (See Comments) and Rash     Past Medical History:   Diagnosis Date    Anxiety     Chronic osteomyelitis of right hand 2019    Chronic pain syndrome     Depression     Insomnia     Post traumatic stress disorder (PTSD)     Sciatica      Past Surgical History:   Procedure Laterality Date    ABLATION OF MEDIAL BRANCH NERVE OF LUMBAR SPINE FACET JOINT      L4-S1    COSMETIC SURGERY      breast augmentation    ETHMOIDECTOMY  7/1/2022    Procedure: ETHMOIDECTOMY;  Surgeon: Segundo Fu MD;  Location: [facility] OR [address] [place] [address];  Service: ENT;;    FINGER SURGERY      FUNCTIONAL ENDOSCOPIC SINUS SURGERY (FESS) USING COMPUTER-ASSISTED NAVIGATION Bilateral 7/1/2022    Procedure: FESS, USING COMPUTER-ASSISTED NAVIGATION;  Surgeon: Segundo Fu MD;  Location: [facility] OR [address] [place] [address];  Service: ENT;  Laterality:  Bilateral;    LUMBAR LAMINECTOMY N/A 2022    Procedure: LAMINECTOMY, SPINE, LUMBAR;  Surgeon: Sundar Harry MD;  Location: [facility] OR [address] [place] [address];  Service: Orthopedics;  Laterality: N/A;  L4-S1 laminectomy, SNS: SSEP/MOtors    MAXILLARY ANTROSTOMY  2022    Procedure: MAXILLARY ANTROSTOMY;  Surgeon: Segundo Fu MD;  [facility];  Service: ENT;;    PELVIC LAPAROSCOPY      Endometriosis ([place])    TONSILLECTOMY       Family History   Problem Relation Age of Onset    Diabetes Maternal Grandmother     Diabetes Mother     Hypertension Mother     Cervical cancer Maternal Aunt     Colon cancer Maternal Aunt     Diabetes Maternal Aunt     Breast cancer Neg Hx     Eclampsia Neg Hx     Miscarriages / Stillbirths Neg Hx     Ovarian cancer Neg Hx      labor Neg Hx     Stroke Neg Hx      Social History     Tobacco Use    Smoking status: Some Days     Packs/day: 0.25     Years: 15.00     Pack years: 3.75     Types: Cigarettes     Start date: 2017     Last attempt to quit: 2021     Years since quittin.0    Smokeless tobacco: Never    Tobacco comments:     occasional 3 cig month, started 15 quit 18-19, started age 22    Substance Use Topics    Alcohol use: Not Currently    Drug use: Never     Review of Systems   Constitutional:  Negative for chills and fever.   HENT:  Negative for congestion and sore throat.    Eyes:  Negative for pain and visual disturbance.   Respiratory:  Negative for cough and shortness of breath.    Cardiovascular:  Negative for chest pain and leg swelling.   Gastrointestinal:  Negative for abdominal pain, nausea and vomiting.   Genitourinary:  Negative for dysuria and flank pain.   Musculoskeletal:  Positive for back pain. Negative for neck pain.   Skin:  Negative for rash.   Neurological:  Negative for weakness, numbness and headaches.   Psychiatric/Behavioral:  Negative for behavioral problems and confusion.      Physical Exam     Initial Vitals [22  2043]   BP Pulse Resp Temp SpO2   (!) 149/88 94 20 98.1 °F (36.7 °C) 100 %      MAP       --         Physical Exam    Nursing note and vitals reviewed.  Constitutional: She appears well-developed. No distress.   HENT:   Head: Normocephalic and atraumatic.   Mouth/Throat: Oropharynx is clear and moist.   Eyes: Conjunctivae and EOM are normal.   Neck: No JVD present.   Normal range of motion.  Cardiovascular:  Normal rate, regular rhythm, normal heart sounds and intact distal pulses.           Pulmonary/Chest: Breath sounds normal. No respiratory distress.   Abdominal: Abdomen is soft. She exhibits no distension. There is no abdominal tenderness.   Musculoskeletal:         General: No tenderness or edema. Normal range of motion.      Cervical back: Normal range of motion.      Comments: Left foot droop (chronic), decreased sensation to her lateral left lower leg.   Right foot hypersensitive to the touch, no deformity, neurovascular intact.   Lower back tenderness to palpation, no underlying hematoma, insicision site d/c/i     Neurological: She is alert and oriented to person, place, and time. She has normal strength.   Skin: Skin is warm and dry. Capillary refill takes less than 2 seconds.       ED Course   Procedures  Labs Reviewed - No data to display       Imaging Results              CT Pelvis Without Contrast (Final result)  Result time 12/02/22 02:13:04      Final result by Get Dowling MD (12/02/22 02:13:04)                   Impression:      No acute lumbar spine or pelvic fracture.    Recent postoperative changes of L5 bilateral laminectomy.  There is diffuse soft tissue edema at the surgical bed limiting evaluation of the spinal canal.    Degenerative spine changes at L4-L5 and L5-S1 with moderate to severe left and mild-to-moderate right neural foraminal narrowing.    Electronically signed by resident: Oneyad Jose  Date:    12/02/2022  Time:    01:13    Electronically signed by: Get Dowling  MD  Date:    12/02/2022  Time:    02:13               Narrative:    EXAMINATION:  CT LUMBAR SPINE WITHOUT CONTRAST; CT PELVIS WITHOUT CONTRAST    CLINICAL HISTORY:  Low back pain, trauma;; Pelvic trauma;    TECHNIQUE:  Low dose axial images, sagittal and coronal reformations were performed though the lumbar spine and pelvis.  Contrast was not administered.    COMPARISON:  MRI 11/11/2022 11/11/2022.  CT 04/27/2022    FINDINGS:  LUMBAR SPINE:    Postsurgical changes of L5 laminectomies.  The lumbar spine demonstrates proper alignment.  The vertebral body heights appear well-maintained.  There is no evidence of fracture or osseous lytic or blastic process.  The intervertebral disk spaces appear well maintained.  Focal degenerative changes are described below.    T12-L1 through L3-L4: No significant spinal canal stenosis neural foraminal narrowing.    L4-L5: Operative level diffuse disc bulge superimposed with left subarticular disc protrusion and bilateral facet arthropathy.  Moderate left and mild right neural foraminal narrowing.  Limited evaluation of spinal canal.    L5-S1: Diffuse disc bulge superimposed with disc protrusion, bilateral facet arthropathy.  Moderate right and severe left neural foraminal narrowing.  Limited evaluation of spinal canal.    Surrounding soft tissues demonstrate edema and soft tissue thickening, likely postsurgical.    CT PELVIS:    Genitourinary: No bladder wall thickening.  Uterus is unremarkable.    Visualized bowel and mesentery: Moderate amount of retained stools could represent constipation.  No bowel obstruction or wall thickening.    Peritoneum: No intraperitoneal free air or fluid    Lymph nodes: No retroperitoneal lymphadenopathy.    Vessels: No significant calcific atherosclerosis.    Abdominal wall: Lower abdominal subcutaneous soft tissue opacity and air, could be related to prior injections.    Bones: No acute fracture. No suspicious osseous lesions.                                        CT Lumbar Spine Without Contrast (Final result)  Result time 12/02/22 02:13:04      Final result by Get Dowling MD (12/02/22 02:13:04)                   Impression:      No acute lumbar spine or pelvic fracture.    Recent postoperative changes of L5 bilateral laminectomy.  There is diffuse soft tissue edema at the surgical bed limiting evaluation of the spinal canal.    Degenerative spine changes at L4-L5 and L5-S1 with moderate to severe left and mild-to-moderate right neural foraminal narrowing.    Electronically signed by resident: Oneyda Jose  Date:    12/02/2022  Time:    01:13    Electronically signed by: Get Dowling MD  Date:    12/02/2022  Time:    02:13               Narrative:    EXAMINATION:  CT LUMBAR SPINE WITHOUT CONTRAST; CT PELVIS WITHOUT CONTRAST    CLINICAL HISTORY:  Low back pain, trauma;; Pelvic trauma;    TECHNIQUE:  Low dose axial images, sagittal and coronal reformations were performed though the lumbar spine and pelvis.  Contrast was not administered.    COMPARISON:  MRI 11/11/2022 11/11/2022.  CT 04/27/2022    FINDINGS:  LUMBAR SPINE:    Postsurgical changes of L5 laminectomies.  The lumbar spine demonstrates proper alignment.  The vertebral body heights appear well-maintained.  There is no evidence of fracture or osseous lytic or blastic process.  The intervertebral disk spaces appear well maintained.  Focal degenerative changes are described below.    T12-L1 through L3-L4: No significant spinal canal stenosis neural foraminal narrowing.    L4-L5: Operative level diffuse disc bulge superimposed with left subarticular disc protrusion and bilateral facet arthropathy.  Moderate left and mild right neural foraminal narrowing.  Limited evaluation of spinal canal.    L5-S1: Diffuse disc bulge superimposed with disc protrusion, bilateral facet arthropathy.  Moderate right and severe left neural foraminal narrowing.  Limited evaluation of spinal canal.    Surrounding  soft tissues demonstrate edema and soft tissue thickening, likely postsurgical.    CT PELVIS:    Genitourinary: No bladder wall thickening.  Uterus is unremarkable.    Visualized bowel and mesentery: Moderate amount of retained stools could represent constipation.  No bowel obstruction or wall thickening.    Peritoneum: No intraperitoneal free air or fluid    Lymph nodes: No retroperitoneal lymphadenopathy.    Vessels: No significant calcific atherosclerosis.    Abdominal wall: Lower abdominal subcutaneous soft tissue opacity and air, could be related to prior injections.    Bones: No acute fracture. No suspicious osseous lesions.                                       Medications   hydrOXYzine HCL tablet 10 mg (10 mg Oral Given 12/2/22 0011)   oxyCODONE immediate release tablet 5 mg (5 mg Oral Given 12/2/22 0011)     Medical Decision Making:   History:   Old Medical Records: I decided to obtain old medical records.  Initial Assessment:   37-year-old female, with history of IV drug use, MRSA infection, left lower extremity radiculopathy, cauda equina status post laminectomy, presents to the ED for back pain after a fall.  Patient is well-appearing, significant tenderness to palpation of her lower back, no hip tenderness to palpation, right lower leg with full range of motion, left lower leg with chronic left foot drop  Differential Diagnosis:   L-spine fracture, pelvic fracture, contusion, postop pain, peripheral neuropathy, abscess.   Clinical Tests:   Radiological Study: Ordered and Reviewed  ED Management:  CT scan with doubt acute traumatic process, her pain is controlled with oxycodone in the ED. Patient is stable to discharge show her skilled nursing facility for continued rehab and pain control provided discharge instruction and return to the ED precaution.  No other questions        [unfilled]   ED Course as of 12/02/22 0800   Fri Dec 02, 2022   0302 ATTENDING PHYSICIAN ATTESTATION    I have personally seen  and examined this patient and repeated the key portions of the resident's history and physical, reviewed and agree with the resident medical documentation, and supervised and managed the medical care of the patient.  I was present and supervising any critical portions of procedures performed    37-year-old female recent spinal surgery for epidural abscess presents the ER for evaluation of back pain after fall.  Neurologically intact CT scan no acute traumatic process will be discharged back to rehab facility. [SE]      ED Course User Index  [SE] Adair Odonnell MD                 Clinical Impression:   Final diagnoses:  [W19.XXXA] Fall, initial encounter (Primary)  [M54.41] Low back pain with right-sided sciatica, unspecified back pain laterality, unspecified chronicity  [G89.18] Post-op pain        ED Disposition Condition    Discharge Stable          ED Prescriptions    None       Follow-up Information       Follow up With Specialties Details Why Contact Info    Arnaldo Espino MD Internal Medicine In 1 week  [address]  [place] [address]  [phone]      Spine surgery  Schedule an appointment as soon as possible for a visit       [facility] - Emergency Dept Emergency Medicine  As needed [address] [facility] [id number] [phone]             King Campos MD  Resident  12/02/22 0800

## 2022-12-02 NOTE — DISCHARGE INSTRUCTIONS
No acute finding was found today, continue to monitor your symptoms, recommended to follow-up with your spine surgery Clinic for follow-up care.  Continue your pain regimen and physical therapy at the rehab center.  Return to the ED if you have worsening pain, weakness, or other concerns.

## 2022-12-03 PROCEDURE — 25000003 PHARM REV CODE 250: Performed by: NURSE PRACTITIONER

## 2022-12-03 PROCEDURE — 63600175 PHARM REV CODE 636 W HCPCS: Performed by: NURSE PRACTITIONER

## 2022-12-03 PROCEDURE — 25000003 PHARM REV CODE 250: Performed by: HOSPITALIST

## 2022-12-03 PROCEDURE — 25000242 PHARM REV CODE 250 ALT 637 W/ HCPCS: Performed by: NURSE PRACTITIONER

## 2022-12-03 PROCEDURE — 11000004 HC SNF PRIVATE

## 2022-12-03 RX ORDER — LOPERAMIDE HYDROCHLORIDE 2 MG/1
2 CAPSULE ORAL ONCE
Status: COMPLETED | OUTPATIENT
Start: 2022-12-03 | End: 2022-12-03

## 2022-12-03 RX ORDER — METHOCARBAMOL 500 MG/1
1000 TABLET, FILM COATED ORAL ONCE
Status: COMPLETED | OUTPATIENT
Start: 2022-12-03 | End: 2022-12-03

## 2022-12-03 RX ORDER — BENZONATATE 100 MG/1
100 CAPSULE ORAL 2 TIMES DAILY
Status: COMPLETED | OUTPATIENT
Start: 2022-12-03 | End: 2022-12-05

## 2022-12-03 RX ORDER — LOPERAMIDE HYDROCHLORIDE 2 MG/1
2 CAPSULE ORAL 4 TIMES DAILY PRN
Status: DISCONTINUED | OUTPATIENT
Start: 2022-12-03 | End: 2022-12-16 | Stop reason: HOSPADM

## 2022-12-03 RX ADMIN — OXYCODONE HYDROCHLORIDE 10 MG: 10 TABLET ORAL at 08:12

## 2022-12-03 RX ADMIN — GABAPENTIN 600 MG: 300 CAPSULE ORAL at 03:12

## 2022-12-03 RX ADMIN — OXYCODONE HYDROCHLORIDE 10 MG: 10 TABLET ORAL at 06:12

## 2022-12-03 RX ADMIN — HEPARIN SODIUM 5000 UNITS: 5000 INJECTION INTRAVENOUS; SUBCUTANEOUS at 10:12

## 2022-12-03 RX ADMIN — PANTOPRAZOLE SODIUM 40 MG: 40 TABLET, DELAYED RELEASE ORAL at 09:12

## 2022-12-03 RX ADMIN — ALPRAZOLAM 1 MG: 0.5 TABLET ORAL at 01:12

## 2022-12-03 RX ADMIN — HEPARIN SODIUM 5000 UNITS: 5000 INJECTION INTRAVENOUS; SUBCUTANEOUS at 01:12

## 2022-12-03 RX ADMIN — ALPRAZOLAM 1 MG: 0.5 TABLET ORAL at 06:12

## 2022-12-03 RX ADMIN — FLUTICASONE FUROATE AND VILANTEROL TRIFENATATE 1 PUFF: 100; 25 POWDER RESPIRATORY (INHALATION) at 09:12

## 2022-12-03 RX ADMIN — OXYCODONE HYDROCHLORIDE 10 MG: 10 TABLET ORAL at 12:12

## 2022-12-03 RX ADMIN — OXYCODONE HYDROCHLORIDE 10 MG: 10 TABLET ORAL at 10:12

## 2022-12-03 RX ADMIN — DOXEPIN HYDROCHLORIDE 50 MG: 50 CAPSULE ORAL at 08:12

## 2022-12-03 RX ADMIN — ALPRAZOLAM 1 MG: 0.5 TABLET ORAL at 10:12

## 2022-12-03 RX ADMIN — OXYCODONE HYDROCHLORIDE 10 MG: 10 TABLET ORAL at 03:12

## 2022-12-03 RX ADMIN — Medication 6 MG: at 08:12

## 2022-12-03 RX ADMIN — METHOCARBAMOL 1000 MG: 500 TABLET ORAL at 08:12

## 2022-12-03 RX ADMIN — METHOCARBAMOL 1000 MG: 500 TABLET ORAL at 09:12

## 2022-12-03 RX ADMIN — LOPERAMIDE HYDROCHLORIDE 2 MG: 2 CAPSULE ORAL at 08:12

## 2022-12-03 RX ADMIN — PSYLLIUM HUSK 1 PACKET: 3.4 POWDER ORAL at 10:12

## 2022-12-03 RX ADMIN — MUPIROCIN: 20 OINTMENT TOPICAL at 09:12

## 2022-12-03 RX ADMIN — NORETHINDRONE ACETATE 5 MG: 5 TABLET ORAL at 09:12

## 2022-12-03 RX ADMIN — GABAPENTIN 600 MG: 300 CAPSULE ORAL at 09:12

## 2022-12-03 RX ADMIN — GABAPENTIN 600 MG: 300 CAPSULE ORAL at 08:12

## 2022-12-03 RX ADMIN — BENZONATATE 100 MG: 100 CAPSULE ORAL at 10:12

## 2022-12-03 RX ADMIN — ACETAMINOPHEN 650 MG: 325 TABLET ORAL at 08:12

## 2022-12-03 RX ADMIN — VENLAFAXINE HYDROCHLORIDE 300 MG: 75 CAPSULE, EXTENDED RELEASE ORAL at 09:12

## 2022-12-03 RX ADMIN — CELECOXIB 200 MG: 200 CAPSULE ORAL at 09:12

## 2022-12-03 RX ADMIN — HEPARIN SODIUM 5000 UNITS: 5000 INJECTION INTRAVENOUS; SUBCUTANEOUS at 06:12

## 2022-12-03 RX ADMIN — LOPERAMIDE HYDROCHLORIDE 2 MG: 2 CAPSULE ORAL at 10:12

## 2022-12-03 NOTE — PLAN OF CARE
Problem: Adult Inpatient Plan of Care  Goal: Plan of Care Review  Outcome: Ongoing, Progressing  Goal: Patient-Specific Goal (Individualized)  Outcome: Ongoing, Progressing     Problem: Fall Injury Risk  Goal: Absence of Fall and Fall-Related Injury  Outcome: Ongoing, Progressing     Problem: Skin Injury Risk Increased  Goal: Skin Health and Integrity  Outcome: Ongoing, Progressing     Problem: Anxiety  Goal: Anxiety Reduction or Resolution  Description:  Anxiety/PTSD  Outcome: Ongoing, Progressing

## 2022-12-04 PROCEDURE — 63600175 PHARM REV CODE 636 W HCPCS: Performed by: NURSE PRACTITIONER

## 2022-12-04 PROCEDURE — 25000003 PHARM REV CODE 250: Performed by: NURSE PRACTITIONER

## 2022-12-04 PROCEDURE — 97110 THERAPEUTIC EXERCISES: CPT | Mod: CO

## 2022-12-04 PROCEDURE — 25000242 PHARM REV CODE 250 ALT 637 W/ HCPCS: Performed by: NURSE PRACTITIONER

## 2022-12-04 PROCEDURE — 97530 THERAPEUTIC ACTIVITIES: CPT | Mod: CQ

## 2022-12-04 PROCEDURE — 25000003 PHARM REV CODE 250: Performed by: HOSPITALIST

## 2022-12-04 PROCEDURE — 11000004 HC SNF PRIVATE

## 2022-12-04 PROCEDURE — 97110 THERAPEUTIC EXERCISES: CPT | Mod: CQ

## 2022-12-04 RX ORDER — METHOCARBAMOL 500 MG/1
1000 TABLET, FILM COATED ORAL 3 TIMES DAILY
Status: DISCONTINUED | OUTPATIENT
Start: 2022-12-04 | End: 2022-12-16 | Stop reason: HOSPADM

## 2022-12-04 RX ADMIN — OXYCODONE HYDROCHLORIDE 10 MG: 10 TABLET ORAL at 03:12

## 2022-12-04 RX ADMIN — BENZONATATE 100 MG: 100 CAPSULE ORAL at 08:12

## 2022-12-04 RX ADMIN — GABAPENTIN 600 MG: 300 CAPSULE ORAL at 08:12

## 2022-12-04 RX ADMIN — DOXEPIN HYDROCHLORIDE 50 MG: 50 CAPSULE ORAL at 08:12

## 2022-12-04 RX ADMIN — PANTOPRAZOLE SODIUM 40 MG: 40 TABLET, DELAYED RELEASE ORAL at 08:12

## 2022-12-04 RX ADMIN — HEPARIN SODIUM 5000 UNITS: 5000 INJECTION INTRAVENOUS; SUBCUTANEOUS at 02:12

## 2022-12-04 RX ADMIN — OXYCODONE HYDROCHLORIDE 10 MG: 10 TABLET ORAL at 08:12

## 2022-12-04 RX ADMIN — METHOCARBAMOL 1000 MG: 500 TABLET ORAL at 08:12

## 2022-12-04 RX ADMIN — CELECOXIB 200 MG: 200 CAPSULE ORAL at 08:12

## 2022-12-04 RX ADMIN — GABAPENTIN 600 MG: 300 CAPSULE ORAL at 02:12

## 2022-12-04 RX ADMIN — ALPRAZOLAM 1 MG: 0.5 TABLET ORAL at 09:12

## 2022-12-04 RX ADMIN — HEPARIN SODIUM 5000 UNITS: 5000 INJECTION INTRAVENOUS; SUBCUTANEOUS at 09:12

## 2022-12-04 RX ADMIN — OXYCODONE HYDROCHLORIDE 10 MG: 10 TABLET ORAL at 12:12

## 2022-12-04 RX ADMIN — FLUTICASONE FUROATE AND VILANTEROL TRIFENATATE 1 PUFF: 100; 25 POWDER RESPIRATORY (INHALATION) at 08:12

## 2022-12-04 RX ADMIN — HEPARIN SODIUM 5000 UNITS: 5000 INJECTION INTRAVENOUS; SUBCUTANEOUS at 06:12

## 2022-12-04 RX ADMIN — NORETHINDRONE ACETATE 5 MG: 5 TABLET ORAL at 08:12

## 2022-12-04 RX ADMIN — OXYCODONE HYDROCHLORIDE 10 MG: 10 TABLET ORAL at 11:12

## 2022-12-04 RX ADMIN — PSYLLIUM HUSK 1 PACKET: 3.4 POWDER ORAL at 08:12

## 2022-12-04 RX ADMIN — METHOCARBAMOL 1000 MG: 500 TABLET ORAL at 05:12

## 2022-12-04 RX ADMIN — ALPRAZOLAM 1 MG: 0.5 TABLET ORAL at 08:12

## 2022-12-04 RX ADMIN — OXYCODONE HYDROCHLORIDE 10 MG: 10 TABLET ORAL at 05:12

## 2022-12-04 RX ADMIN — VENLAFAXINE HYDROCHLORIDE 300 MG: 75 CAPSULE, EXTENDED RELEASE ORAL at 08:12

## 2022-12-04 NOTE — PT/OT/SLP PROGRESS
Occupational Therapy   Treatment    Name: Marianela Shrestha  MRN: [id number]  Admit Date: 11/18/2022  Admitting Diagnosis:  SHAZIA (cauda equina syndrome)    General Precautions: Standard, fall   Orthopedic Precautions:spinal precautions   Braces: AFO     Recommendations:     Discharge Recommendations: home health OT  Level of Assistance Recommended at Discharge: Intermittent assistance for ADL's and homemaking tasks  Discharge Equipment Recommendations:   (to be determined)  Barriers to discharge:  Decreased caregiver support    Assessment:     Marianela Shrestha is a 37 y.o. female with a medical diagnosis of SHAZIA (cauda equina syndrome) .  She presents with performance deficits affecting function are weakness, impaired self care skills, impaired balance, decreased coordination, decreased safety awareness, decreased upper extremity function, pain, impaired functional mobility, impaired endurance, gait instability, decreased ROM, decreased lower extremity function, impaired sensation, orthopedic precautions.  Pt limited due to significant BLE pain.  Pt with good effort and participation, but she continues to require assistance to perform self-care tasks and mobility.  She would continue to benefit from skilled OT services at SNF to maximize her gains in functional independence.      Rehab Potential is fair    Activity tolerance:  Fair    Plan:     Patient to be seen 6 x/week to address the above listed problems via self-care/home management, therapeutic activities, therapeutic exercises    Plan of Care Expires: 12/19/22  Plan of Care Reviewed with: patient    Subjective     Communicated with: esther prior to session. Pt agreeable to therapy session .    Pain/Comfort:  Pain Rating 1: 9/10  Location - Side 1: Bilateral  Location 1: leg  Pain Addressed 1: Cessation of Activity, Distraction  Pain Rating Post-Intervention 1: 8/10  Location - Side 2: Bilateral  Location 2: leg    Patient's cultural, spiritual, Christian  conflicts given the current situation:  no    Objective:     Patient found up in chair with  (no active lines) seated in therapy gym with physical therapy staff    [facility] 6 Click ADL: 20    OT Exercises:   -Pt performed UE Ergometer x12 mins  with Min resistance with SBA while seated in w/c to increase BUE strength and endurance for ADLs and functional mobility     -Pt performed BUE EX using 3# dowel 2x15 reps: wrist flex/ext, elbow flex/ext, shoulder protraction/retraction, shoulder rows and shoulder flex/ext with Supervision to increase BUE strength and endurance for ADLs and functional mobility      Treatment & Education:  Pt educated on BUE strengthening/endurance    Patient left up in chair with all lines intact and call button in reach    GOALS:   Multidisciplinary Problems       Occupational Therapy Goals          Problem: Occupational Therapy    Goal Priority Disciplines Outcome Interventions   Occupational Therapy Goal     OT, PT/OT Ongoing, Progressing    Description: Goals to be met by: 3 weeks     Patient will increase functional independence with ADLs by performing:    UE Dressing with Modified [place].  LE Dressing with Stand-by Assistance.  Grooming while seated with Modified [place].  Toileting from toilet or BSC with SBA for hygiene and clothing management. With A/D to stand.  Bathing from sitting at sink with Stand-by Assistance.  Supine to sit with Modified [place].  Step transfer with Stand-by Assistance with A/D as needed.  Upper extremity exercise with assistance as needed.  Caregiver will be educated on level of assist required to safely perform self care tasks and functional transfers..                          Time Tracking:     OT Date of Treatment: 12/04/22  OT Start Time: 1159    OT Stop Time: 1226  OT Total Time (min): 27 min    Billable Minutes:Therapeutic Exercise 27    12/4/2022

## 2022-12-04 NOTE — PROGRESS NOTES
[facility]                                  Skilled Nursing Facility                   Progress Note     Patient Name: Marianela Shrestha  YOB: 1985  MRN: [id number]  Room: [place] [facility] [address] A     Admit Date: 11/18/2022   CARLITA: 12/9/2022     Principal Problem: SHAZIA (cauda equina syndrome)    HPI obtained from patient interview and chart review     Chief Complaint:   Nurse reporting patient with severe diarrhea     HPI:   Marianela Shrestha is a 37 y.o. female with PMH of drug abuse, low back pain,  LLE radiculopathy and right hand osteomyelitis treated with surgery presents for evaluation of 3 day history LLE weakness and changes in bowel and bladder acutely worse in last 24 hours prior to arrival to ER. The patient reports chronic back pain. However, she fell , twisting her ankle and low back.  S/P L4, L5, S1 laminectomy 11/11/22 by Dr. Harry. Post operatively she maintains decreased but able to feel bladder getting full and control her voiding. She maintains decreased rectal sensation but able to control her defecation.    Patient will be treated at [facility] with PT and OT to improve functional status and ability to perform ADLs.     Interval History  Patient seen while lying in bed.   Patient with 5 loose stools overnight, Initiated Imodium 2 mg prn qid and Psyllium husk 3.4 g 1 packet daily and dced senna/doc  Patient reporting mild dry cough for 1 day, Initiated Benzonatate capsule 100 mg bid x 2 days  No labs to review. VSS.     Past Medical History: Patient has a past medical history of Anxiety, Chronic osteomyelitis of right hand (2019), Chronic pain syndrome, Depression, Insomnia, Post traumatic stress disorder (PTSD), and Sciatica.    Past Surgical History: Patient has a past surgical history that includes Tonsillectomy; Cosmetic surgery; Pelvic laparoscopy; Finger surgery; Ablation of medial branch nerve of lumbar  spine facet joint; Functional endoscopic sinus surgery (FESS) using computer-assisted navigation (Bilateral, 7/1/2022); Maxillary antrostomy (7/1/2022); Ethmoidectomy (7/1/2022); and Lumbar laminectomy (N/A, 11/11/2022).    Social History: Patient reports that she has been smoking cigarettes. She started smoking about 5 years ago. She has a 3.75 pack-year smoking history. She has never used smokeless tobacco. She reports that she does not currently use alcohol. She reports that she does not use drugs.    Family History:  family history includes Cervical cancer in her maternal aunt; Colon cancer in her maternal aunt; Diabetes in her maternal aunt, maternal grandmother, and mother; Hypertension in her mother.    Allergies: Patient is allergic to coconut; sulfa (sulfonamide antibiotics); adhesive; and latex, natural rubber.        Review of Systems   Constitutional:  Positive for malaise/fatigue. Negative for chills and fever.   HENT:  Negative for congestion, hearing loss and sore throat.    Eyes:  Negative for blurred vision and double vision.   Respiratory:  Positive for cough. Negative for sputum production, shortness of breath and wheezing.    Cardiovascular:  Negative for chest pain, palpitations and leg swelling.   Gastrointestinal:  Positive for diarrhea. Negative for abdominal pain, constipation, heartburn, nausea and vomiting.   Genitourinary:  Negative for dysuria and urgency.   Musculoskeletal:  Negative for back pain.   Skin:  Negative for rash.        + wound   Neurological:  Positive for weakness. Negative for dizziness and headaches.   Endo/Heme/Allergies:  Negative for polydipsia. Does not bruise/bleed easily.   Psychiatric/Behavioral:  Negative for depression and hallucinations. The patient is not nervous/anxious.        24 hour Vital Sign Range   Temp:  [97.9 °F (36.6 °C)]   Pulse:  [96]   Resp:  [18]   BP: (133)/(83)   SpO2:  [98 %]       Physical Exam  Vitals and nursing note reviewed.    Constitutional:       General: She is not in acute distress.     Appearance: Normal appearance. She is not ill-appearing.   HENT:      Head: Normocephalic and atraumatic.      Nose: Nose normal. No congestion.      Mouth/Throat:      Mouth: Mucous membranes are moist.      Pharynx: Oropharynx is clear.   Eyes:      Extraocular Movements: Extraocular movements intact.      Conjunctiva/sclera: Conjunctivae normal.      Pupils: Pupils are equal, round, and reactive to light.   Cardiovascular:      Rate and Rhythm: Normal rate and regular rhythm.      Pulses: Normal pulses.      Heart sounds: Normal heart sounds. No murmur heard.  Pulmonary:      Effort: Pulmonary effort is normal. No respiratory distress.      Breath sounds: Normal breath sounds. No wheezing or rhonchi.   Abdominal:      General: Bowel sounds are normal. There is no distension.      Palpations: Abdomen is soft. There is no mass.      Tenderness: There is no abdominal tenderness.   Musculoskeletal:         General: No swelling or tenderness.      Cervical back: Normal range of motion and neck supple. No tenderness.      Right lower leg: No edema.      Left lower leg: No edema.   Skin:     General: Skin is warm and dry.      Capillary Refill: Capillary refill takes less than 2 seconds.      Findings: No erythema or rash.   Neurological:      Mental Status: She is alert and oriented to person, place, and time. Mental status is at baseline.      Motor: Weakness present.   Psychiatric:         Mood and Affect: Mood normal.         Behavior: Behavior normal.         Thought Content: Thought content normal.         Judgment: Judgment normal.     Full skin assessment completed by this NP           Incision/Site 11/11/22 2342 Back (Active)   11/11/22 2342   Present Prior to Hospital Arrival?:    Side:    Location: Back   Orientation:    Incision Type:    Closure Method:    Additional Comments:    Removal Indication and Assessment:    Wound Outcome:    Removal  Indications:    Incision WDL ex 11/20/22 0800   Dressing Appearance Dry;Intact 11/20/22 2053   Drainage Amount None 11/20/22 2053   Drainage Characteristics/Odor No odor 11/20/22 2053   Appearance Sutures intact 11/20/22 2053   Periwound Area Dry 11/20/22 0800   Wound Edges Approximated 11/20/22 0800   Dressing Removed;Changed;Applied;Island/border 11/20/22 0800   Dressing Change Due 11/21/22 11/20/22 0800   Number of days: 10          Labs:  Recent Labs   Lab 11/29/22 0417   WBC 7.62   HGB 12.1   HCT 38.5          Recent Labs   Lab 11/29/22 0417      K 3.8      CO2 25   BUN 15   CREATININE 0.9   GLU 90   CALCIUM 9.0   MG 2.0   PHOS 3.9       No results for input(s): ALKPHOS, ALT, AST, ALBUMIN, PROT, BILITOT, INR in the last 168 hours.  No results for input(s): POCTGLUCOSE in the last 72 hours.    Meds Scheduled:   celecoxib  200 mg Oral Daily    doxepin  50 mg Oral QHS    fluticasone furoate-vilanteroL  1 puff Inhalation Daily    gabapentin  600 mg Oral TID    heparin (porcine)  5,000 Units Subcutaneous Q8H    mupirocin   Topical (Top) Q14 Days    norethindrone  5 mg Oral Daily    pantoprazole  40 mg Oral Daily    psyllium husk (aspartame)  1 packet Oral Daily    venlafaxine  300 mg Oral Daily       PRN:   (Magic mouthwash) 1:1:1 diphenhydrAMINE(Benadryl) 12.5mg/5ml liq, aluminum & magnesium hydroxide-simethicone (Maalox), LIDOcaine viscous 2%, acetaminophen, albuterol, albuterol-ipratropium, ALPRAZolam, calcium carbonate, loperamide, melatonin, ondansetron, oxyCODONE, oxyCODONE      Assessment and Plan:    Loose stools, new 12/3/22  12/3/2022   Initiated Imodium 2 mg prn qid and Psyllium husk 3.4 g 1 packet daily and dced senna/doc    Cough, new 12/3/22  12/3/2022   Initiated Benzonatate capsule 100 mg bid x 2 days     Ongoing, Stable Problems     Fall  - Xray left femur- no acute fracture  - Xray sacrum-no acute fracture  -Monitor for neurological changes or worsening pain  -Seen in ER and  returned to SNF-CT Lspine and Pelvic, no acute fracture  -Ortho team notified-Awaiting recommendations    SHAZIA (cauda equina syndrome)  SP L4-S1 Laminectomy 11.11.22  -PODAS boot for LLE foot drop, AFO LLE when ambulating  -PT/OT: WBAT spine precautions  -DVT PPx: Heparin TID      -Pain management, Celebrex, Robaxin, Oxycodone, Gabapentin    Debility   - Continue with PT/OT for gait training and strengthening and restoration of ADL's   - Encourage mobility, OOB in chair, and early ambulation as appropriate  - Fall precautions   - Monitor for bowel and bladder dysfunction  - Monitor for and prevent skin breakdown and pressure ulcers  - Continue DVT prophylaxis with heparin     Anticipate disposition:    Home with home health      Follow-up needed during SNF admission:       Follow-up needed after discharge from SNF:   - PCP within 1-2 weeks  - See appt scheduled below     Future Appointments   Date Time Provider Department Center   12/12/2022 11:30 AM Antonia Clifton PA-C [facility]         I certify that SNF services are required to be given on an inpatient basis because Marianela Shrestha needs for skilled nursing care and/or skilled rehabilitation are required on a daily basis and such services can only practically be provided in a skilled nursing facility setting and are for an ongoing condition for which she received inpatient care in the hospital.    I have spent > 35 minutes reviewing patient records, examining, and  of the patient/ patient's family with greater than 50% of the time spent with direct patient care and coordination.     Nicho Grace NP  Department of Hospital Medicine   [facility]     DOS: 12/3/2022       Patient note was created using MModal Dictation.  Any errors in syntax or even information may not have been identified and edited on initial review prior to signing this note.

## 2022-12-04 NOTE — PT/OT/SLP PROGRESS
"Physical Therapy Treatment    Patient Name:  Marianela Shrestha   MRN:  [id number]  Admit Date: 11/18/2022  Admitting Diagnosis: SHAZIA (cauda equina syndrome)    General Precautions: Standard, fall   Orthopedic Precautions:spinal precautions   Braces: AFO     Recommendations:     Discharge Recommendations:  home health PT   Level of Assistance Recommended at Discharge: 24 hours light assistance  Discharge Equipment Recommendations: bedside commode, walker, rolling, wheelchair, bath bench   Barriers to discharge: Decreased caregiver support (increased assistance needed)    Assessment:     Marianela Shrestha is a 37 y.o. female admitted with a medical diagnosis of SHAZIA (cauda equina syndrome) .     Pt was agreeable to therapy session, but limited by pain. Pt completed light therex and attempt stand, but reports having sharp pain in L foot when weightbearing through it. Pt demonstrates good effort and participation. Pt continues to benefit from therapy to improve functional endurance, strength, and mobility.     Performance deficits affecting function:  impaired endurance, weakness, impaired sensation, impaired self care skills, impaired functional mobility, gait instability, impaired balance, decreased coordination, decreased lower extremity function, pain, abnormal tone, decreased ROM, impaired coordination, impaired fine motor, impaired skin, impaired cardiopulmonary response to activity, orthopedic precautions .    Rehab Potential is good    Activity Tolerance: Good    Plan:     Patient to be seen 6 x/week to address the above listed problems via gait training, therapeutic activities, therapeutic exercises, neuromuscular re-education, wheelchair management/training    Plan of Care Expires: 12/19/22  Plan of Care Reviewed with: patient    Subjective     "It's like I'm back to square 1 after that fall".     Pain/Comfort:  Pain Rating 1: 9/10  Location - Side 1: Left  Location 1: leg  Pain Addressed 1: Reposition, " Distraction (recieved pain medicine at beginning of session)  Pain Rating Post-Intervention 1: 9/10    Patient's cultural, spiritual, Voodoo conflicts given the current situation:  no    Objective:     Communicated with RN prior to session.  Patient found HOB elevated with  (no active lines) upon PT entry to room.     Therapeutic Activities and Exercises:   Pt donned AFO and shoes sitting EOB with supervision  Exchanged pt's w/c left leg rest to elevating leg rest d/t pt having pain with normal leg rest.   Pt reports having increase muscle spasm today, pt reports talking to nursing about getting muscle relaxers again   Increase time comforting pt as pt was emotional during session d/t not being able to do much as before falling.  BLE Hamstring stretch 2x 20 seconds  Seated BLE therex x15 reps: LAQ and marching  Mini elliptical, attempt but stopped d/t increase pain   Patient educated on role of therapy, goals of session, and benefits of out of bed mobility.   Instructed on use of call button and importance of calling nursing staff for assistance with mobility   Questions/concerns addressed within PTA scope of practice  Pt verbalized understanding.    Functional Mobility:  Bed Mobility:   Supine to Sit: supervision; HOB elevated  Transfers:   Sit <> Stand Transfer: stand by assistance with no assistive device   1x from EOB and 1x from w/c  Bed <> Chair Transfer: stand by assistance with no assistive device using Stand Pivot technique   Gait: deferred d/t pain   Wheelchair Propulsion:    Pt propelled Standard wheelchair x ~120 feet on Level tile with  Bilateral upper extremity with Supervision or Set-up Assistance.     AM-PAC 6 CLICK MOBILITY  20    Patient left  up in wheelchair   with  MIGUEL A present for handoff.    GOALS:   Multidisciplinary Problems       Physical Therapy Goals          Problem: Physical Therapy    Goal Priority Disciplines Outcome Goal Variances Interventions   Physical Therapy Goal     PT, PT/OT  Ongoing, Progressing     Description: Goals to be met by:12/19/22    Patient will increase functional independence with mobility by performing:    Sit to stand transfer with Supervision.  Bed to chair transfer with Supervision using Rolling Walker.  Gait  x 150 feet with Stand-by Assistance using Rolling Walker.   Ascend/descend 4 stair with bilateral Handrails Stand-by Assistance using No Assistive Device.   Ascend/Descend 4 inch curb step with Stand-by Assistance using Rolling Walker.                         Time Tracking:     PT Received On: 12/04/22  PT Start Time: 1108  PT Stop Time: 1157  PT Total Time (min): 49 min    Billable Minutes: Therapeutic Activity 28 and Therapeutic Exercise 21    Treatment Type: Treatment  PT/PTA: PTA     PTA Visit Number: 4     12/04/2022

## 2022-12-05 PROCEDURE — 11000004 HC SNF PRIVATE

## 2022-12-05 PROCEDURE — 63600175 PHARM REV CODE 636 W HCPCS: Performed by: NURSE PRACTITIONER

## 2022-12-05 PROCEDURE — 25000242 PHARM REV CODE 250 ALT 637 W/ HCPCS: Performed by: NURSE PRACTITIONER

## 2022-12-05 PROCEDURE — 97535 SELF CARE MNGMENT TRAINING: CPT

## 2022-12-05 PROCEDURE — 25000003 PHARM REV CODE 250: Performed by: NURSE PRACTITIONER

## 2022-12-05 PROCEDURE — 97110 THERAPEUTIC EXERCISES: CPT

## 2022-12-05 PROCEDURE — 97116 GAIT TRAINING THERAPY: CPT

## 2022-12-05 PROCEDURE — 25000003 PHARM REV CODE 250: Performed by: HOSPITALIST

## 2022-12-05 PROCEDURE — 97530 THERAPEUTIC ACTIVITIES: CPT

## 2022-12-05 RX ADMIN — NORETHINDRONE ACETATE 5 MG: 5 TABLET ORAL at 09:12

## 2022-12-05 RX ADMIN — FLUTICASONE FUROATE AND VILANTEROL TRIFENATATE 1 PUFF: 100; 25 POWDER RESPIRATORY (INHALATION) at 09:12

## 2022-12-05 RX ADMIN — OXYCODONE HYDROCHLORIDE 10 MG: 10 TABLET ORAL at 08:12

## 2022-12-05 RX ADMIN — PSYLLIUM HUSK 1 PACKET: 3.4 POWDER ORAL at 09:12

## 2022-12-05 RX ADMIN — GABAPENTIN 600 MG: 300 CAPSULE ORAL at 09:12

## 2022-12-05 RX ADMIN — OXYCODONE HYDROCHLORIDE 10 MG: 10 TABLET ORAL at 10:12

## 2022-12-05 RX ADMIN — HEPARIN SODIUM 5000 UNITS: 5000 INJECTION INTRAVENOUS; SUBCUTANEOUS at 06:12

## 2022-12-05 RX ADMIN — ALPRAZOLAM 1 MG: 0.5 TABLET ORAL at 08:12

## 2022-12-05 RX ADMIN — METHOCARBAMOL 1000 MG: 500 TABLET ORAL at 09:12

## 2022-12-05 RX ADMIN — CELECOXIB 200 MG: 200 CAPSULE ORAL at 09:12

## 2022-12-05 RX ADMIN — GABAPENTIN 600 MG: 300 CAPSULE ORAL at 08:12

## 2022-12-05 RX ADMIN — GABAPENTIN 600 MG: 300 CAPSULE ORAL at 03:12

## 2022-12-05 RX ADMIN — PANTOPRAZOLE SODIUM 40 MG: 40 TABLET, DELAYED RELEASE ORAL at 09:12

## 2022-12-05 RX ADMIN — HEPARIN SODIUM 5000 UNITS: 5000 INJECTION INTRAVENOUS; SUBCUTANEOUS at 01:12

## 2022-12-05 RX ADMIN — VENLAFAXINE HYDROCHLORIDE 300 MG: 75 CAPSULE, EXTENDED RELEASE ORAL at 09:12

## 2022-12-05 RX ADMIN — BENZONATATE 100 MG: 100 CAPSULE ORAL at 09:12

## 2022-12-05 RX ADMIN — METHOCARBAMOL 1000 MG: 500 TABLET ORAL at 03:12

## 2022-12-05 RX ADMIN — OXYCODONE HYDROCHLORIDE 10 MG: 10 TABLET ORAL at 02:12

## 2022-12-05 RX ADMIN — HEPARIN SODIUM 5000 UNITS: 5000 INJECTION INTRAVENOUS; SUBCUTANEOUS at 09:12

## 2022-12-05 RX ADMIN — OXYCODONE HYDROCHLORIDE 10 MG: 10 TABLET ORAL at 01:12

## 2022-12-05 RX ADMIN — DOXEPIN HYDROCHLORIDE 50 MG: 50 CAPSULE ORAL at 08:12

## 2022-12-05 RX ADMIN — OXYCODONE HYDROCHLORIDE 10 MG: 10 TABLET ORAL at 06:12

## 2022-12-05 RX ADMIN — METHOCARBAMOL 1000 MG: 500 TABLET ORAL at 08:12

## 2022-12-05 NOTE — PT/OT/SLP PROGRESS
"Physical Therapy Treatment    Patient Name:  Marianela Shrestha   MRN:  [id number]  Admit Date: 11/18/2022  Admitting Diagnosis: SHAZIA (cauda equina syndrome)  Recent Surgeries: LAMINECTOMY, SPINE, LUMBAR (N/A)       General Precautions: Standard, fall  Orthopedic Precautions: spinal precautions  Braces: AFO    Recommendations:     Discharge Recommendations: home health PT  Level of Assistance Recommended at Discharge: Intermittent assistance   Discharge Equipment Recommendations: bedside commode, bath bench, walker, rolling, wheelchair  Barriers to discharge: Decreased caregiver support    Assessment:     Marianela Shrestha is a 37 y.o. female admitted with a medical diagnosis of SHAZIA (cauda equina syndrome). Patient agreeable to PT session this AM. Patient emotional during treatment in regards to upcoming D/C date and current level of functional mobility. Patient with increased muscle spasms in L calf initially limiting tolerance for weightbearing during ambulation training. After self stretching and rest, patient able to ambulate increased distance during second gait trial and tolerated recumbent cross  well with brief rest breaks throughout to adjust/rest LLE. Patient will benefit from continued SNF rehabilitation services to address deficits with progression toward increased functional independence.       Performance deficits affecting function: weakness, impaired endurance, impaired functional mobility, gait instability, impaired balance, decreased lower extremity function, decreased safety awareness, pain, decreased ROM, orthopedic precautions.    Rehab Potential is good    Activity Tolerance: Good    Plan:     Patient to be seen 6 x/week to address the above listed problems via gait training, therapeutic activities, therapeutic exercises, neuromuscular re-education, wheelchair management/training    Plan of Care Expires: 12/19/22  Plan of Care Reviewed with: patient    Subjective     "I know I can " "make more progress with time."     Pain/Comfort:  Pain Rating 1: 8/10  Location - Side 1: Left  Location - Orientation 1: generalized  Location 1: calf  Pain Addressed 1: Pre-medicate for activity, Reposition, Distraction, Cessation of Activity  Pain Rating Post-Intervention 1: 8/10    Patient's cultural, spiritual, Gnosticist conflicts given the current situation:  no    Objective:     Communicated with nursing staff prior to session.  Patient found supine with  (no active lines) upon PT entry to room.     Therapeutic Activities and Exercises:   Recumbent cross  x 10 minutes, level 1, seat 8; brief rest breaks throughout course of exercise secondary to LLE calf tightness    Increased encouragement throughout session as patient emotional in regards to current mobility level following recent fall and discharge date. Increased time spent allowing patient to express feelings/emotions as needed.     Functional Mobility:  Bed Mobility:     Supine to Sit: supervision and use of bed rails  Sit to Supine: supervision and use of bed rails  Transfers:     Sit to Stand:  supervision with rolling walker  Bed to chair: supervision with  no AD  using  Stand Pivot  Chair to/from NuStep: supervision with  rolling walker  using  Step Transfer  Gait: Patient ambulated 38 feet and 103 feet using RW with SBA. Prolonged rest break both seated/standing between trials secondary to LLE calf spasm/tightness.   Wheelchair Propulsion:  Pt propelled Standard wheelchair x 150 feet on Level tile with  Bilateral upper extremity with Modified Independent.     AM-PAC 6 CLICK MOBILITY  21    Patient left supine with call button in reach and nursing staff notified.    GOALS:   Multidisciplinary Problems       Physical Therapy Goals          Problem: Physical Therapy    Goal Priority Disciplines Outcome Goal Variances Interventions   Physical Therapy Goal     PT, PT/OT Ongoing, Progressing     Description: Goals to be met by:12/19/22    Patient " will increase functional independence with mobility by performing:    Sit to stand transfer with Supervision - Met 12/5  Updated goal: Sit to stand transfer with Mod I    Bed to chair transfer with Supervision using Rolling Walker - Met 12/5  Updated Goal: Bed to chair transfer with Mod I using RW    Gait  x 150 feet with Stand-by Assistance using Rolling Walker.   Ascend/descend 4 stair with bilateral Handrails Stand-by Assistance using No Assistive Device.   Ascend/Descend 4 inch curb step with Stand-by Assistance using Rolling Walker.                         Time Tracking:     PT Received On: 12/05/22  PT Start Time: 1055  PT Stop Time: 1145  PT Total Time (min): 50 min    Billable Minutes: Gait Training 15, Therapeutic Activity 20, and Therapeutic Exercise 15    Treatment Type: Treatment  PT/PTA: PT     PTA Visit Number: 0     12/05/2022

## 2022-12-05 NOTE — PLAN OF CARE
Problem: Adult Inpatient Plan of Care  Goal: Plan of Care Review  Outcome: Ongoing, Progressing  Goal: Patient-Specific Goal (Individualized)  Outcome: Ongoing, Progressing  Goal: Absence of Hospital-Acquired Illness or Injury  Outcome: Ongoing, Progressing  Goal: Optimal Comfort and Wellbeing  Outcome: Ongoing, Progressing  Goal: Readiness for Transition of Care  Outcome: Ongoing, Progressing     Problem: Adult Inpatient Plan of Care  Goal: Plan of Care Review  Outcome: Ongoing, Progressing  Goal: Patient-Specific Goal (Individualized)  Outcome: Ongoing, Progressing  Goal: Absence of Hospital-Acquired Illness or Injury  Outcome: Ongoing, Progressing  Goal: Optimal Comfort and Wellbeing  Outcome: Ongoing, Progressing  Goal: Readiness for Transition of Care  Outcome: Ongoing, Progressing     Problem: Adult Inpatient Plan of Care  Goal: Plan of Care Review  Outcome: Ongoing, Progressing  Goal: Patient-Specific Goal (Individualized)  Outcome: Ongoing, Progressing  Goal: Absence of Hospital-Acquired Illness or Injury  Outcome: Ongoing, Progressing  Goal: Optimal Comfort and Wellbeing  Outcome: Ongoing, Progressing  Goal: Readiness for Transition of Care  Outcome: Ongoing, Progressing     Problem: Adult Inpatient Plan of Care  Goal: Plan of Care Review  Outcome: Ongoing, Progressing  Goal: Patient-Specific Goal (Individualized)  Outcome: Ongoing, Progressing  Goal: Absence of Hospital-Acquired Illness or Injury  Outcome: Ongoing, Progressing  Goal: Optimal Comfort and Wellbeing  Outcome: Ongoing, Progressing  Goal: Readiness for Transition of Care  Outcome: Ongoing, Progressing     Problem: Adult Inpatient Plan of Care  Goal: Plan of Care Review  Outcome: Ongoing, Progressing  Goal: Patient-Specific Goal (Individualized)  Outcome: Ongoing, Progressing  Goal: Absence of Hospital-Acquired Illness or Injury  Outcome: Ongoing, Progressing  Goal: Optimal Comfort and Wellbeing  Outcome: Ongoing, Progressing  Goal: Readiness  for Transition of Care  Outcome: Ongoing, Progressing

## 2022-12-05 NOTE — PLAN OF CARE
Problem: Occupational Therapy  Goal: Occupational Therapy Goal  Description: Goals to be met by: 3 weeks     Patient will increase functional independence with ADLs by performing:    UE Dressing with Modified [place].  LE Dressing with Stand-by Assistance.  Grooming while seated with Modified [place].  Toileting from toilet or BSC with SBA for hygiene and clothing management. With A/D to stand.  Bathing from sitting at sink with Stand-by Assistance.  Supine to sit with Modified [place].  Step transfer with Stand-by Assistance with A/D as needed.  Upper extremity exercise with assistance as needed.  Caregiver will be educated on level of assist required to safely perform self care tasks and functional transfers..     Outcome: Ongoing, Progressing

## 2022-12-05 NOTE — PT/OT/SLP PROGRESS
Occupational Therapy   Treatment    Name: Marianela Shrestha  MRN: [id number]  Admit Date: 11/18/2022  Admitting Diagnosis:  SHAZIA (cauda equina syndrome)    General Precautions: Standard, fall   Orthopedic Precautions: spinal precautions   Braces: AFO    Recommendations:     Discharge Recommendations:  home health OT  Level of Assistance Recommended at Discharge: Intermittent assistance for ADL's and homemaking tasks  Discharge Equipment Recommendations:  (to be determined)  Barriers to discharge:  Decreased caregiver support    Assessment:     Marianela Shrestha is a 37 y.o. female with a medical diagnosis of SHAZIA (cauda equina syndrome) .  She remains limited in performance of self-care , functional mobility and ADLs and currently not performing tasks at OF . Currently presenting with performance deficits including  weakness, impaired endurance, impaired self care skills, impaired functional mobility, gait instability, impaired balance, decreased upper extremity function, decreased lower extremity function, decreased safety awareness, pain, decreased ROM, orthopedic precautions.   Pt tolerated Tx without incident and is making progress but continues to require assist to perform self care tasks, functional mobility and functional transfers .  She would continue to benefit from OT intervention to further her functional (I)ce and safety.     Rehab Potential is good    Activity tolerance:  Good    Plan:     Patient to be seen 6 x/week to address the above listed problems via self-care/home management, therapeutic activities, therapeutic exercises    Plan of Care Expires: 12/19/22  Plan of Care Reviewed with: patient    Subjective     Communicated with: nurse prior to session. .    Pain/Comfort:  Pain Rating 1: 7/10  Location - Side 1: Left  Location - Orientation 1: generalized  Location 1: calf  Pain Addressed 1: Pre-medicate for activity, Reposition, Distraction, Cessation of Activity  Pain Rating  Post-Intervention 1: 7/10    Patient's cultural, spiritual, Jewish conflicts given the current situation:  no    Objective:     Patient found supine with  (no active lines) upon OT entry to room.    Bed Mobility:    Patient completed Rolling/Turning to Right with modified independence  Patient completed Scooting/Bridging with modified independence  Patient completed Supine to Sit with supervision     Functional Mobility/Transfers:  Patient completed Sit <> Stand Transfer with stand by assistance  with  no assistive device   Patient completed Bed <> Chair Transfer using Squat Pivot technique with stand by assistance with no assistive device  Functional Mobility: Patient propelled W/C from his/her room to therapy gym using (B) UEs a distance of 183 ft and concluded session by propelling back to her room        180 ft with no assist provided.        Activities of Daily Living:  Lower Body Dressing: stand by assistance with Pt able to don pants, tennis and AFO seated EOB.    [facility] 6 Click ADL: 20    OT Exercises: Pt performed UBE exercise for 15 minutes with Min Resistance.  UE exercises performed to increase functional endurance and strength in order increase independence when performing self care tasks, functional ambulation, W/C propulsion, and functional standing activities .     Pt worked on functional standing activity consisting of standing with RW while reaching in all planes , crossing of midline and reaching to varying heights to facilitate (B) wt shifting and stability in standing in prep for performance of self care tasks and functional ADLS in standing.  Pt tolerated up to 12 min 31 ses and 5 min and 12 sec in standing with SBA   and RW to steady.     Treatment & Education:  Pt edu on POC, safety when performing self care tasks  safety when performing functional transfers and mobility.  - White board updated  - Self care tasks completed-- as noted above      Patient left up in chair with call button in  reach    GOALS:   Multidisciplinary Problems       Occupational Therapy Goals          Problem: Occupational Therapy    Goal Priority Disciplines Outcome Interventions   Occupational Therapy Goal     OT, PT/OT Ongoing, Progressing    Description: Goals to be met by: 3 weeks     Patient will increase functional independence with ADLs by performing:    UE Dressing with Modified [place].  LE Dressing with Stand-by Assistance.  Grooming while seated with Modified [place].  Toileting from toilet or BSC with SBA for hygiene and clothing management. With A/D to stand.  Bathing from sitting at sink with Stand-by Assistance.  Supine to sit with Modified [place].  Step transfer with Stand-by Assistance with A/D as needed.  Upper extremity exercise with assistance as needed.  Caregiver will be educated on level of assist required to safely perform self care tasks and functional transfers..                          Time Tracking:     OT Date of Treatment: 12/05/22  OT Start Time: 1355    OT Stop Time: 1435  OT Total Time (min): 40 min    Billable Minutes:Self Care/Home Management 10  Therapeutic Activity 15  Therapeutic Exercise 15    12/5/2022

## 2022-12-05 NOTE — PLAN OF CARE
Problem: Physical Therapy  Goal: Physical Therapy Goal  Description: Goals to be met by:12/19/22    Patient will increase functional independence with mobility by performing:    Sit to stand transfer with Supervision - Met 12/5  Updated goal: Sit to stand transfer with Mod I    Bed to chair transfer with Supervision using Rolling Walker - Met 12/5  Updated Goal: Bed to chair transfer with Mod I using RW    Gait  x 150 feet with Stand-by Assistance using Rolling Walker.   Ascend/descend 4 stair with bilateral Handrails Stand-by Assistance using No Assistive Device.   Ascend/Descend 4 inch curb step with Stand-by Assistance using Rolling Walker.    Outcome: Ongoing, Progressing

## 2022-12-05 NOTE — PROGRESS NOTES
"[facility]  Adult Nutrition  Progress Note    SUMMARY   Recommendations  Continue regular diet, DC boost plus,RD following  Goals: PO to meet 85% of EEN/EPN by next RD fu  Nutrition Goal Status: progressing towards goal    Assessment and Plan  No nutrition diagnosis at this time      Malnutrition Assessment 11/21     Skin (Micronutrient): wounds unhealed           Orbital Region (Subcutaneous Fat Loss): well nourished  Upper Arm Region (Subcutaneous Fat Loss): well nourished  Thoracic and Lumbar Region: well nourished   Clavicle Bone Region (Muscle Loss): well nourished  Clavicle and Acromion Bone Region (Muscle Loss): well nourished  Dorsal Hand (Muscle Loss): well nourished  Patellar Region (Muscle Loss): well nourished  Anterior Thigh Region (Muscle Loss): well nourished  Posterior Calf Region (Muscle Loss): well nourished                 Reason for Assessment    Reason For Assessment: RD follow-up  Diagnosis:  (Lumbar stenosis, s/p laminectomy)  Relevant Medical History: PTSD, Osteomyelitis, chronic pain, SHAZIA, obesity  Interdisciplinary Rounds: attended  General Information Comments: Patient complaining of diarrhea which she attributes to oral nutrition supplement, DC boost plus, %.discussed weight loss per record , patient feels that it is correct that her weight has gone down from 229# to 212# as a result of this surgery and she is happy about it. 8% in one month.She mentioned last visit that she was sharing her food with her spouse.  Nutrition Discharge Planning: DC on regular diet    Nutrition/Diet History    Patient Reported Diet/Restrictions/Preferences: general  Spiritual, Cultural Beliefs, Episcopalian Practices, Values that Affect Care: no  Food Allergies: other (see comments) (coconut)  Factors Affecting Nutritional Intake: None identified at this time    Anthropometrics  Temp: 98.6 °F (37 °C)  Height: 5' 5" (165.1 cm)  Height (inches): 65 in  Weight Method: Standard " Scale  Weight: 95.8 kg (211 lb 3.2 oz)  Weight (lb): 211.2 lb  Ideal Body Weight (IBW), Female: 125 lb  % Ideal Body Weight, Female (lb): 183.95 %  BMI (Calculated): 35.1  BMI Grade: 35 - 39.9 - obesity - grade II  Usual Body Weight (UBW), k kg  % Usual Body Weight: 96.78  % Weight Change From Usual Weight: -3.43 %       Lab/Procedures/Meds  Pertinent Labs Reviewed: reviewed  Pertinent Labs Comments: CRP 40  Pertinent Medications Reviewed: reviewed  Pertinent Medications Comments: psyllium    Estimated/Assessed Needs  Weight Used For Calorie Calculations: 104.3 kg (229 lb 15 oz)  Energy Calorie Requirements (kcal): 1723  Energy Need Method: [place]-St Jeor (x 1.0(PAL) 2/2 obesity)  Protein Requirements: 68g  Weight Used For Protein Calculations: 56.8 kg (125 lb 3.5 oz) (IBW kg 2/2 obesity, x 1.2g /kg)  Fluid Requirements (mL): 1732 or per MD  Estimated Fluid Requirement Method: RDA Method  RDA Method (mL): 1723  CHO Requirement: -      Nutrition Prescription Ordered    Current Diet Order: Regular  Oral Nutrition Supplement: cancelled    Evaluation of Received Nutrient/Fluid Intake    I/O: no data  Energy Calories Required: meeting needs  Protein Required: meeting needs  Fluid Required: meeting needs  Comments: LBM 12/3  Tolerance: tolerating  % Intake of Estimated Energy Needs: 75 - 100 %  % Meal Intake: 75 - 100 %    Nutrition Risk    Level of Risk/Frequency of Follow-up: low (one time per week)     Monitor and Evaluation    Food and Nutrient Intake: food and beverage intake  Food and Nutrient Adminstration: diet order  Anthropometric Measurements: weight change  Biochemical Data, Medical Tests and Procedures: electrolyte and renal panel  Nutrition-Focused Physical Findings: skin     Nutrition Follow-Up    RD Follow-up?: Yes

## 2022-12-06 LAB
ANION GAP SERPL CALC-SCNC: 8 MMOL/L (ref 8–16)
BASOPHILS # BLD AUTO: 0.05 K/UL (ref 0–0.2)
BASOPHILS NFR BLD: 0.7 % (ref 0–1.9)
BUN SERPL-MCNC: 17 MG/DL (ref 6–20)
CALCIUM SERPL-MCNC: 9 MG/DL (ref 8.7–10.5)
CHLORIDE SERPL-SCNC: 107 MMOL/L (ref 95–110)
CO2 SERPL-SCNC: 25 MMOL/L (ref 23–29)
CREAT SERPL-MCNC: 0.8 MG/DL (ref 0.5–1.4)
DIFFERENTIAL METHOD: ABNORMAL
EOSINOPHIL # BLD AUTO: 0.3 K/UL (ref 0–0.5)
EOSINOPHIL NFR BLD: 4.6 % (ref 0–8)
ERYTHROCYTE [DISTWIDTH] IN BLOOD BY AUTOMATED COUNT: 14.4 % (ref 11.5–14.5)
EST. GFR  (NO RACE VARIABLE): >60 ML/MIN/1.73 M^2
GLUCOSE SERPL-MCNC: 109 MG/DL (ref 70–110)
HCT VFR BLD AUTO: 37.8 % (ref 37–48.5)
HGB BLD-MCNC: 12 G/DL (ref 12–16)
IMM GRANULOCYTES # BLD AUTO: 0.04 K/UL (ref 0–0.04)
IMM GRANULOCYTES NFR BLD AUTO: 0.6 % (ref 0–0.5)
LYMPHOCYTES # BLD AUTO: 2.6 K/UL (ref 1–4.8)
LYMPHOCYTES NFR BLD: 35.5 % (ref 18–48)
MAGNESIUM SERPL-MCNC: 1.9 MG/DL (ref 1.6–2.6)
MCH RBC QN AUTO: 30.9 PG (ref 27–31)
MCHC RBC AUTO-ENTMCNC: 31.7 G/DL (ref 32–36)
MCV RBC AUTO: 97 FL (ref 82–98)
MONOCYTES # BLD AUTO: 0.7 K/UL (ref 0.3–1)
MONOCYTES NFR BLD: 9.2 % (ref 4–15)
NEUTROPHILS # BLD AUTO: 3.6 K/UL (ref 1.8–7.7)
NEUTROPHILS NFR BLD: 49.4 % (ref 38–73)
NRBC BLD-RTO: 0 /100 WBC
PHOSPHATE SERPL-MCNC: 3.2 MG/DL (ref 2.7–4.5)
PLATELET # BLD AUTO: 341 K/UL (ref 150–450)
PMV BLD AUTO: 9.8 FL (ref 9.2–12.9)
POTASSIUM SERPL-SCNC: 4.6 MMOL/L (ref 3.5–5.1)
RBC # BLD AUTO: 3.88 M/UL (ref 4–5.4)
SODIUM SERPL-SCNC: 140 MMOL/L (ref 136–145)
WBC # BLD AUTO: 7.21 K/UL (ref 3.9–12.7)

## 2022-12-06 PROCEDURE — 84100 ASSAY OF PHOSPHORUS: CPT | Performed by: NURSE PRACTITIONER

## 2022-12-06 PROCEDURE — 25000003 PHARM REV CODE 250: Performed by: NURSE PRACTITIONER

## 2022-12-06 PROCEDURE — 25000003 PHARM REV CODE 250: Performed by: HOSPITALIST

## 2022-12-06 PROCEDURE — 97530 THERAPEUTIC ACTIVITIES: CPT

## 2022-12-06 PROCEDURE — 97530 THERAPEUTIC ACTIVITIES: CPT | Mod: CO

## 2022-12-06 PROCEDURE — 97110 THERAPEUTIC EXERCISES: CPT

## 2022-12-06 PROCEDURE — 11000004 HC SNF PRIVATE

## 2022-12-06 PROCEDURE — 83735 ASSAY OF MAGNESIUM: CPT | Performed by: NURSE PRACTITIONER

## 2022-12-06 PROCEDURE — 85025 COMPLETE CBC W/AUTO DIFF WBC: CPT | Performed by: NURSE PRACTITIONER

## 2022-12-06 PROCEDURE — 97116 GAIT TRAINING THERAPY: CPT

## 2022-12-06 PROCEDURE — 25000242 PHARM REV CODE 250 ALT 637 W/ HCPCS: Performed by: NURSE PRACTITIONER

## 2022-12-06 PROCEDURE — 80048 BASIC METABOLIC PNL TOTAL CA: CPT | Performed by: NURSE PRACTITIONER

## 2022-12-06 PROCEDURE — 36415 COLL VENOUS BLD VENIPUNCTURE: CPT | Performed by: NURSE PRACTITIONER

## 2022-12-06 PROCEDURE — 63600175 PHARM REV CODE 636 W HCPCS: Performed by: NURSE PRACTITIONER

## 2022-12-06 RX ADMIN — FLUTICASONE FUROATE AND VILANTEROL TRIFENATATE 1 PUFF: 100; 25 POWDER RESPIRATORY (INHALATION) at 08:12

## 2022-12-06 RX ADMIN — METHOCARBAMOL 1000 MG: 500 TABLET ORAL at 03:12

## 2022-12-06 RX ADMIN — METHOCARBAMOL 1000 MG: 500 TABLET ORAL at 08:12

## 2022-12-06 RX ADMIN — NORETHINDRONE ACETATE 5 MG: 5 TABLET ORAL at 08:12

## 2022-12-06 RX ADMIN — OXYCODONE HYDROCHLORIDE 10 MG: 10 TABLET ORAL at 10:12

## 2022-12-06 RX ADMIN — GABAPENTIN 600 MG: 300 CAPSULE ORAL at 08:12

## 2022-12-06 RX ADMIN — HEPARIN SODIUM 5000 UNITS: 5000 INJECTION INTRAVENOUS; SUBCUTANEOUS at 05:12

## 2022-12-06 RX ADMIN — OXYCODONE HYDROCHLORIDE 10 MG: 10 TABLET ORAL at 12:12

## 2022-12-06 RX ADMIN — ALPRAZOLAM 1 MG: 0.5 TABLET ORAL at 08:12

## 2022-12-06 RX ADMIN — OXYCODONE HYDROCHLORIDE 10 MG: 10 TABLET ORAL at 02:12

## 2022-12-06 RX ADMIN — GABAPENTIN 600 MG: 300 CAPSULE ORAL at 03:12

## 2022-12-06 RX ADMIN — ALPRAZOLAM 1 MG: 0.5 TABLET ORAL at 10:12

## 2022-12-06 RX ADMIN — VENLAFAXINE HYDROCHLORIDE 300 MG: 75 CAPSULE, EXTENDED RELEASE ORAL at 08:12

## 2022-12-06 RX ADMIN — HEPARIN SODIUM 5000 UNITS: 5000 INJECTION INTRAVENOUS; SUBCUTANEOUS at 09:12

## 2022-12-06 RX ADMIN — PANTOPRAZOLE SODIUM 40 MG: 40 TABLET, DELAYED RELEASE ORAL at 08:12

## 2022-12-06 RX ADMIN — DOXEPIN HYDROCHLORIDE 50 MG: 50 CAPSULE ORAL at 08:12

## 2022-12-06 RX ADMIN — HEPARIN SODIUM 5000 UNITS: 5000 INJECTION INTRAVENOUS; SUBCUTANEOUS at 01:12

## 2022-12-06 RX ADMIN — OXYCODONE HYDROCHLORIDE 15 MG: 10 TABLET ORAL at 09:12

## 2022-12-06 RX ADMIN — OXYCODONE HYDROCHLORIDE 10 MG: 10 TABLET ORAL at 05:12

## 2022-12-06 RX ADMIN — ONDANSETRON 8 MG: 8 TABLET, ORALLY DISINTEGRATING ORAL at 09:12

## 2022-12-06 RX ADMIN — CELECOXIB 200 MG: 200 CAPSULE ORAL at 08:12

## 2022-12-06 NOTE — PT/OT/SLP PROGRESS
"Physical Therapy Treatment    Patient Name:  Marianela Shrestha   MRN:  [id number]  Admit Date: 11/18/2022  Admitting Diagnosis: SHAZIA (cauda equina syndrome)  Recent Surgeries: LAMINECTOMY, SPINE, LUMBAR (N/A)    General Precautions: Standard, fall  Orthopedic Precautions: spinal precautions  Braces: AFO    Recommendations:     Discharge Recommendations: home health PT  Level of Assistance Recommended at Discharge: Intermittent assistance   Discharge Equipment Recommendations: bedside commode, bath bench, walker, rolling, wheelchair  Barriers to discharge: Decreased caregiver support    Assessment:     Marianela Shrestha is a 37 y.o. female admitted with a medical diagnosis of SHAZIA (cauda equina syndrome). Patient agreeable to treatment this AM. Patient able to ambulate increased gait distance this session. Patient ascended/descended 4 inch curb step using RW with CGA x 2 trials without significant difficulty. Patient tolerated recumbent cross  x 10 minutes on minimal resistance with occasional use of BLE only to complete reciprocal motion. Patient with gradual increase in mobility daily following recent fall. Patient will benefit from continued SNF rehabilitation services to address deficits and progression patient toward maximal functional potential.       Performance deficits affecting function: weakness, impaired endurance, impaired functional mobility, gait instability, impaired balance, decreased lower extremity function, decreased safety awareness, pain, decreased ROM, orthopedic precautions.    Rehab Potential is good    Activity Tolerance: Good    Plan:     Patient to be seen 6 x/week to address the above listed problems via gait training, therapeutic activities, therapeutic exercises, neuromuscular re-education, wheelchair management/training    Plan of Care Expires: 12/19/22  Plan of Care Reviewed with: patient    Subjective     "I can do this. I have to do this."     Pain/Comfort:  Pain Rating 1: " "9/10 (Patient reports just receiving medication prior to start of treatment.)  Location - Side 1: Left  Location - Orientation 1: generalized  Location 1: calf  Pain Addressed 1: Pre-medicate for activity, Reposition, Distraction, Cessation of Activity  Pain Rating Post-Intervention 1: 8/10    Patient's cultural, spiritual, Moravian conflicts given the current situation:  no    Objective:     Communicated with patient's nurse prior to session.  Patient found supine with  (no active lines) upon PT entry to room.     Therapeutic Activities and Exercises:   Recumbent cross  x 10 minutes, seat 8, level 1; minimal rest breaks as needed throughout with occasional use of only BLE to propel machine.    Patient standing x 5 minutes using RW to bear weight through BLEs and stretch LLE prior to ambulation.     Functional Mobility:  Bed Mobility:     Supine to Sit: supervision  Sit to Supine: supervision  Transfers:     Sit to Stand:  supervision with rolling walker  Bed to Chair: supervision with  no AD  using  Stand Pivot  Chair to NuStep: supervision with  rolling walker  using  Step Transfer  Gait: Patient ambulated 80 feet x 2 trials using RW with SBA. Seated rest break between trials.   Stairs:  Pt ascended/descended 4" curb step x 2 trials with Rolling Walker with no handrails with Contact Guard Assistance.   Wheelchair Propulsion:  Pt propelled Standard wheelchair x 150 feet on Level tile with  Bilateral upper extremity with Modified Independent.     AM-PAC 6 CLICK MOBILITY  21    Patient left supine with call button in reach and nursing staff notified.    GOALS:   Multidisciplinary Problems       Physical Therapy Goals          Problem: Physical Therapy    Goal Priority Disciplines Outcome Goal Variances Interventions   Physical Therapy Goal     PT, PT/OT Ongoing, Progressing     Description: Goals to be met by:12/19/22    Patient will increase functional independence with mobility by performing:    Sit to " stand transfer with Supervision - Met 12/5  Updated goal: Sit to stand transfer with Mod I    Bed to chair transfer with Supervision using Rolling Walker - Met 12/5  Updated Goal: Bed to chair transfer with Mod I using RW    Gait  x 150 feet with Stand-by Assistance using Rolling Walker.   Ascend/descend 4 stair with bilateral Handrails Stand-by Assistance using No Assistive Device.   Ascend/Descend 4 inch curb step with Stand-by Assistance using Rolling Walker.                         Time Tracking:     PT Received On: 12/06/22  PT Start Time: 1059  PT Stop Time: 1142  PT Total Time (min): 43 min    Billable Minutes: Gait Training 20, Therapeutic Activity 10, and Therapeutic Exercise 13    Treatment Type: Treatment  PT/PTA: PT     PTA Visit Number: 0     12/06/2022

## 2022-12-06 NOTE — PT/OT/SLP PROGRESS
Occupational Therapy   Treatment    Name: Marianela Shrestha  MRN: [id number]  Admit Date: 11/18/2022  Admitting Diagnosis:  SHAZIA (cauda equina syndrome)    General Precautions: Standard, fall   Orthopedic Precautions: spinal precautions   Braces: AFO    Recommendations:     Discharge Recommendations:  home health OT  Level of Assistance Recommended at Discharge: Intermittent assistance for ADL's and homemaking tasks  Discharge Equipment Recommendations:  (to be determined)  Barriers to discharge:  Decreased caregiver support    Assessment:     Marianela Shrestha is a 37 y.o. female with a medical diagnosis of SHAZIA (cauda equina syndrome).  She presents with  Performance deficits affecting function are weakness, impaired endurance, impaired self care skills, impaired functional mobility, gait instability, impaired balance, decreased upper extremity function, decreased lower extremity function, decreased safety awareness, pain, decreased ROM, orthopedic precautions.     Pt. Was cooperative and participated well with session on this day. Pt  continues to demonstrate levels of physical deficits with  functional indep with daily management activities tasks, selfcare skills with balance,  functional mobility, UB strength and endurance. Pt. Will continue to benefit from continued OT to progress towards goals      Rehab Potential is fair    Activity tolerance:  Fair    Plan:     Patient to be seen 6 x/week to address the above listed problems via self-care/home management, therapeutic activities, therapeutic exercises    Plan of Care Expires: 12/19/22  Plan of Care Reviewed with: patient    Subjective     Communicated with: nsg and Pt prior to session.  I am doing well today    Pain/Comfort:  Pain Rating 1: 7/10  Location - Side 1: Left  Location - Orientation 1: generalized  Location 1: calf  Pain Addressed 1: Pre-medicate for activity, Reposition, Distraction  Pain Rating Post-Intervention 1: 7/10    Patient's cultural,  spiritual, Muslim conflicts given the current situation:  no    Objective:     Patient found supine with  (afo) upon OT entry to room.    Bed Mobility:    Patient completed Scooting/Bridging with modified independence  Patient completed Supine to Sit with modified independence  Patient completed Sit to Supine with modified independence     Functional Mobility/Transfers:  Patient completed Sit <> Stand Transfer with contact guard assistance  with  rolling walker   Patient completed Bed <> Chair Transfer using Stand Pivot technique with contact guard assistance with rolling walker  Functional Mobility: Pt. With lateral side stepping x 4 trials with SBA at counter level  Pt. Also with fxl w/c mobility to and from room to gym with SBA approx 80 ft each way        Activities of Daily Living:  Lower Body Dressing: stand by assistance to snow/doff AFO and BLE tennis shoes     AMPA 6 Click ADL: 20    OT Exercises: UE Ergometer 10 min    Treatment & Education:  Pt. With standing act on this day with task. Pt. With CGA/SBA for balance aspects with task with  AD at raised counter Pt with visual perception task with discrimination of various shapes and sizes x12 min with standing bal and min cues through out with weight shifting and use of BUE's incorporated and crossing mid line and facilitation with posture in prep for home management .     Pt. With 2# dowel activity with 2x20 reps with  shd flex, bicep curls horz adb/add and forward flex motion to increase BUE ROM and strength.    Pt. With therex performed to increase ROM, endurance selfcare task and fxl mobility for independence     Pt edu on role of OT, POC, safety when performing self care tasks , benefit of performing OOB activity, and safety when performing functional transfers and mobility management for preparation with goals to progress towards next level of care     Patient left HOB elevated with all lines intact and call button in reach    GOALS:    Multidisciplinary Problems       Occupational Therapy Goals          Problem: Occupational Therapy    Goal Priority Disciplines Outcome Interventions   Occupational Therapy Goal     OT, PT/OT Ongoing, Progressing    Description: Goals to be met by: 3 weeks     Patient will increase functional independence with ADLs by performing:    UE Dressing with Modified [place].  LE Dressing with Stand-by Assistance.  Grooming while seated with Modified [place].  Toileting from toilet or BSC with SBA for hygiene and clothing management. With A/D to stand.  Bathing from sitting at sink with Stand-by Assistance.  Supine to sit with Modified [place].  Step transfer with Stand-by Assistance with A/D as needed.  Upper extremity exercise with assistance as needed.  Caregiver will be educated on level of assist required to safely perform self care tasks and functional transfers..                          Time Tracking:     OT Date of Treatment: 12/06/22  OT Start Time: 1403    OT Stop Time: 1450  OT Total Time (min): 47 min    Billable Minutes:Therapeutic Activity 47    12/6/2022

## 2022-12-06 NOTE — PROGRESS NOTES
[facility]                                  Skilled Nursing Facility                   Progress Note     Patient Name: Marianela Shrestha  YOB: 1985  MRN: [id number]  Room: [place] [address] [address] A     Admit Date: 11/18/2022   CARLITA: 12/16/2022     Principal Problem: SHAZIA (cauda equina syndrome)    HPI obtained from patient interview and chart review     Chief Complaint:    Re-evaluation of medical treatment and therapy status: Lab review, pain management      HPI:   Marianela Shrestha is a 37 y.o. female with PMH of drug abuse, low back pain,  LLE radiculopathy and right hand osteomyelitis treated with surgery presents for evaluation of 3 day history LLE weakness and changes in bowel and bladder acutely worse in last 24 hours prior to arrival to ER. The patient reports chronic back pain. However, she fell , twisting her ankle and low back.  S/P L4, L5, S1 laminectomy 11/11/22 by Dr. Harry. Post operatively she maintains decreased but able to feel bladder getting full and control her voiding. She maintains decreased rectal sensation but able to control her defecation.    Patient will be treated at [facility] with PT and OT to improve functional status and ability to perform ADLs.     Interval History  All of today's labs reviewed and are listed below. No critical values noted. Discussed results with patient. 24 hr vital sign ranges listed below. Pt c/o of increased pain with PT. Unable to tolerate complete therapy session secondary to pain. Will review pain regimen and adjust. Patient denies shortness of breath, abdominal discomfort, nausea, or vomiting.  Patient reports an adequate appetite.  Patient denies dysuria.  Patient reports having regular bowel movements.  Patient progessing with PT/OT: Gait: Patient ambulated 38 feet and 103 feet using RW with SBA. Prolonged rest break both seated/standing between trials secondary to LLE  calf spasm/tightness. Continuing to follow and treat all acute and chronic conditions.    Past Medical History: Patient has a past medical history of Anxiety, Chronic osteomyelitis of right hand (2019), Chronic pain syndrome, Depression, Insomnia, Post traumatic stress disorder (PTSD), and Sciatica.    Past Surgical History: Patient has a past surgical history that includes Tonsillectomy; Cosmetic surgery; Pelvic laparoscopy; Finger surgery; Ablation of medial branch nerve of lumbar spine facet joint; Functional endoscopic sinus surgery (FESS) using computer-assisted navigation (Bilateral, 7/1/2022); Maxillary antrostomy (7/1/2022); Ethmoidectomy (7/1/2022); and Lumbar laminectomy (N/A, 11/11/2022).    Social History: Patient reports that she has been smoking cigarettes. She started smoking about 5 years ago. She has a 3.75 pack-year smoking history. She has never used smokeless tobacco. She reports that she does not currently use alcohol. She reports that she does not use drugs.    Family History:  family history includes Cervical cancer in her maternal aunt; Colon cancer in her maternal aunt; Diabetes in her maternal aunt, maternal grandmother, and mother; Hypertension in her mother.    Allergies: Patient is allergic to coconut; sulfa (sulfonamide antibiotics); adhesive; and latex, natural rubber.        Review of Systems   Constitutional:  Positive for malaise/fatigue. Negative for chills and fever.   HENT:  Negative for congestion, hearing loss and sore throat.    Eyes:  Negative for blurred vision and double vision.   Respiratory:  Negative for cough, sputum production, shortness of breath and wheezing.    Cardiovascular:  Negative for chest pain, palpitations and leg swelling.   Gastrointestinal:  Negative for abdominal pain, constipation, diarrhea, heartburn, nausea and vomiting.   Genitourinary:  Negative for dysuria and urgency.   Musculoskeletal:  Negative for back pain.   Skin:  Negative for rash.         + wound   Neurological:  Positive for weakness. Negative for dizziness and headaches.   Endo/Heme/Allergies:  Negative for polydipsia. Does not bruise/bleed easily.   Psychiatric/Behavioral:  Negative for depression and hallucinations. The patient is not nervous/anxious.        24 hour Vital Sign Range   Temp:  [98 °F (36.7 °C)]   Pulse:  [83-84]   Resp:  [16-18]   BP: (145)/(89)   SpO2:  [97 %-98 %]       Physical Exam  Vitals and nursing note reviewed.   Constitutional:       General: She is not in acute distress.     Appearance: Normal appearance. She is not ill-appearing.   HENT:      Head: Normocephalic and atraumatic.      Nose: Nose normal. No congestion.      Mouth/Throat:      Mouth: Mucous membranes are moist.      Pharynx: Oropharynx is clear.   Eyes:      Extraocular Movements: Extraocular movements intact.      Conjunctiva/sclera: Conjunctivae normal.      Pupils: Pupils are equal, round, and reactive to light.   Cardiovascular:      Rate and Rhythm: Normal rate and regular rhythm.      Pulses: Normal pulses.      Heart sounds: Normal heart sounds. No murmur heard.  Pulmonary:      Effort: Pulmonary effort is normal. No respiratory distress.      Breath sounds: Normal breath sounds. No wheezing or rhonchi.   Abdominal:      General: Bowel sounds are normal. There is no distension.      Palpations: Abdomen is soft. There is no mass.      Tenderness: There is no abdominal tenderness.   Musculoskeletal:         General: No swelling or tenderness.      Cervical back: Normal range of motion and neck supple. No tenderness.      Right lower leg: No edema.      Left lower leg: No edema.   Skin:     General: Skin is warm and dry.      Capillary Refill: Capillary refill takes less than 2 seconds.      Findings: No erythema or rash.   Neurological:      Mental Status: She is alert and oriented to person, place, and time. Mental status is at baseline.      Motor: Weakness present.   Psychiatric:         Mood and  Affect: Mood normal.         Behavior: Behavior normal.         Thought Content: Thought content normal.         Judgment: Judgment normal.     Full skin assessment completed by this NP           Incision/Site 11/11/22 2342 Back (Active)   11/11/22 2342   Present Prior to Hospital Arrival?:    Side:    Location: Back   Orientation:    Incision Type:    Closure Method:    Additional Comments:    Removal Indication and Assessment:    Wound Outcome:    Removal Indications:    Incision WDL ex 11/20/22 0800   Dressing Appearance Dry;Intact 11/20/22 2053   Drainage Amount None 11/20/22 2053   Drainage Characteristics/Odor No odor 11/20/22 2053   Appearance Sutures intact 11/20/22 2053   Periwound Area Dry 11/20/22 0800   Wound Edges Approximated 11/20/22 0800   Dressing Removed;Changed;Applied;Island/border 11/20/22 0800   Dressing Change Due 11/21/22 11/20/22 0800   Number of days: 10          Labs:  Recent Labs   Lab 12/06/22  0430   WBC 7.21   HGB 12.0   HCT 37.8          Recent Labs   Lab 12/06/22  0430      K 4.6      CO2 25   BUN 17   CREATININE 0.8      CALCIUM 9.0   MG 1.9   PHOS 3.2       No results for input(s): ALKPHOS, ALT, AST, ALBUMIN, PROT, BILITOT, INR in the last 168 hours.  No results for input(s): POCTGLUCOSE in the last 72 hours.    Meds Scheduled:   celecoxib  200 mg Oral Daily    doxepin  50 mg Oral QHS    fluticasone furoate-vilanteroL  1 puff Inhalation Daily    gabapentin  600 mg Oral TID    heparin (porcine)  5,000 Units Subcutaneous Q8H    methocarbamoL  1,000 mg Oral TID    mupirocin   Topical (Top) Q14 Days    norethindrone  5 mg Oral Daily    pantoprazole  40 mg Oral Daily    psyllium husk (aspartame)  1 packet Oral Daily    venlafaxine  300 mg Oral Daily       PRN:   (Magic mouthwash) 1:1:1 diphenhydrAMINE(Benadryl) 12.5mg/5ml liq, aluminum & magnesium hydroxide-simethicone (Maalox), LIDOcaine viscous 2%, acetaminophen, albuterol, albuterol-ipratropium, ALPRAZolam,  calcium carbonate, loperamide, melatonin, ondansetron, oxyCODONE, oxyCODONE      Assessment and Plan:    Fall  - Xray left femur- no acute fracture  - Xray sacrum-no acute fracture  -Monitor for neurological changes or worsening pain  -Seen in ER and returned to SNF-CT Lspine and Pelvic, no acute fracture  -Ortho team notified-No additional recommendations    SHAZIA (cauda equina syndrome)  SP L4-S1 Laminectomy 11.11.22  -PODAS boot for LLE foot drop, AFO LLE when ambulating  -PT/OT: WBAT spine precautions  -DVT PPx: Heparin TID      -Pain management, Celebrex, Robaxin, Oxycodone, Gabapentin      -Initiate order to increase Oxycodone to 15mg Q4hrs for severe pain 7-10/10    Debility   - Continue with PT/OT for gait training and strengthening and restoration of ADL's   - Encourage mobility, OOB in chair, and early ambulation as appropriate  - Fall precautions   - Monitor for bowel and bladder dysfunction  - Monitor for and prevent skin breakdown and pressure ulcers  - Continue DVT prophylaxis with heparin     Anticipate disposition:    Home with home health      Follow-up needed during SNF admission:       Follow-up needed after discharge from SNF:   - PCP within 1-2 weeks  - See appt scheduled below     Future Appointments   Date Time Provider Department Center   12/12/2022 11:30 AM Antonia Clifton PA-C [facility]         I certify that SNF services are required to be given on an inpatient basis because Marianela Shrestha needs for skilled nursing care and/or skilled rehabilitation are required on a daily basis and such services can only practically be provided in a skilled nursing facility setting and are for an ongoing condition for which she received inpatient care in the hospital.        Wanda Jacobs NP  Department of Hospital Medicine   [facility]     DOS: 12/6/2022       Patient note was created using MModal Dictation.  Any errors in syntax or even  information may not have been identified and edited on initial review prior to signing this note.

## 2022-12-07 PROCEDURE — 97530 THERAPEUTIC ACTIVITIES: CPT | Mod: CO

## 2022-12-07 PROCEDURE — 25000003 PHARM REV CODE 250: Performed by: HOSPITALIST

## 2022-12-07 PROCEDURE — 11000004 HC SNF PRIVATE

## 2022-12-07 PROCEDURE — 63600175 PHARM REV CODE 636 W HCPCS: Performed by: NURSE PRACTITIONER

## 2022-12-07 PROCEDURE — 97110 THERAPEUTIC EXERCISES: CPT

## 2022-12-07 PROCEDURE — 97116 GAIT TRAINING THERAPY: CPT

## 2022-12-07 PROCEDURE — 25000003 PHARM REV CODE 250: Performed by: NURSE PRACTITIONER

## 2022-12-07 RX ADMIN — NORETHINDRONE ACETATE 5 MG: 5 TABLET ORAL at 09:12

## 2022-12-07 RX ADMIN — CELECOXIB 200 MG: 200 CAPSULE ORAL at 09:12

## 2022-12-07 RX ADMIN — ALPRAZOLAM 1 MG: 0.5 TABLET ORAL at 08:12

## 2022-12-07 RX ADMIN — HEPARIN SODIUM 5000 UNITS: 5000 INJECTION INTRAVENOUS; SUBCUTANEOUS at 03:12

## 2022-12-07 RX ADMIN — GABAPENTIN 600 MG: 300 CAPSULE ORAL at 03:12

## 2022-12-07 RX ADMIN — GABAPENTIN 600 MG: 300 CAPSULE ORAL at 09:12

## 2022-12-07 RX ADMIN — VENLAFAXINE HYDROCHLORIDE 300 MG: 75 CAPSULE, EXTENDED RELEASE ORAL at 09:12

## 2022-12-07 RX ADMIN — METHOCARBAMOL 1000 MG: 500 TABLET ORAL at 08:12

## 2022-12-07 RX ADMIN — OXYCODONE HYDROCHLORIDE 15 MG: 10 TABLET ORAL at 08:12

## 2022-12-07 RX ADMIN — GABAPENTIN 600 MG: 300 CAPSULE ORAL at 08:12

## 2022-12-07 RX ADMIN — OXYCODONE HYDROCHLORIDE 15 MG: 10 TABLET ORAL at 10:12

## 2022-12-07 RX ADMIN — OXYCODONE HYDROCHLORIDE 15 MG: 10 TABLET ORAL at 06:12

## 2022-12-07 RX ADMIN — ALPRAZOLAM 1 MG: 0.5 TABLET ORAL at 09:12

## 2022-12-07 RX ADMIN — FLUTICASONE FUROATE AND VILANTEROL TRIFENATATE 1 PUFF: 100; 25 POWDER RESPIRATORY (INHALATION) at 09:12

## 2022-12-07 RX ADMIN — METHOCARBAMOL 1000 MG: 500 TABLET ORAL at 09:12

## 2022-12-07 RX ADMIN — OXYCODONE HYDROCHLORIDE 15 MG: 10 TABLET ORAL at 03:12

## 2022-12-07 RX ADMIN — DOXEPIN HYDROCHLORIDE 50 MG: 50 CAPSULE ORAL at 08:12

## 2022-12-07 RX ADMIN — PANTOPRAZOLE SODIUM 40 MG: 40 TABLET, DELAYED RELEASE ORAL at 09:12

## 2022-12-07 RX ADMIN — HEPARIN SODIUM 5000 UNITS: 5000 INJECTION INTRAVENOUS; SUBCUTANEOUS at 05:12

## 2022-12-07 RX ADMIN — OXYCODONE HYDROCHLORIDE 15 MG: 10 TABLET ORAL at 02:12

## 2022-12-07 RX ADMIN — METHOCARBAMOL 1000 MG: 500 TABLET ORAL at 03:12

## 2022-12-07 RX ADMIN — HEPARIN SODIUM 5000 UNITS: 5000 INJECTION INTRAVENOUS; SUBCUTANEOUS at 10:12

## 2022-12-07 NOTE — PT/OT/SLP PROGRESS
Physical Therapy Treatment    Patient Name:  Marianela Shrestha   MRN:  [id number]  Admit Date: 11/18/2022  Admitting Diagnosis: SHAZAI (cauda equina syndrome)  Recent Surgeries: LAMINECTOMY, SPINE, LUMBAR (N/A)    General Precautions: Standard, fall  Orthopedic Precautions: spinal precautions  Braces: AFO    Recommendations:     Discharge Recommendations: home health PT  Level of Assistance Recommended at Discharge: Intermittent assistance   Discharge Equipment Recommendations: wheelchair, walker, rolling, bedside commode, bath bench  Barriers to discharge: Decreased caregiver support    Assessment:     Marianela Shrestha is a 37 y.o. female admitted with a medical diagnosis of SHAZIA (cauda equina syndrome). Family training held with patient and significant other this AM. PT discussed with demonstration appropriate guarding techniques for safety during patient mobility including transfers, ambulation, curb step  and stair training. Questions and concerns addressed regarding safety within home environment. Patient and significant other  expressed and verbalized understanding of information presented. Patient able to ambulate increased gait distance this session with decreased assistance. Patient also able to engage in stair training using B handrails with CGA. Patient will benefit from continued SNF rehabilitation services to address deficits with progression toward functional independence.      Performance deficits affecting function: weakness, impaired endurance, impaired functional mobility, gait instability, impaired balance, decreased lower extremity function, decreased safety awareness, pain, decreased ROM, orthopedic precautions.    Rehab Potential is good    Activity Tolerance: Good    Plan:     Patient to be seen 6 x/week to address the above listed problems via gait training, therapeutic activities, therapeutic exercises, neuromuscular re-education, wheelchair management/training    Plan of Care Expires:  "12/19/22  Plan of Care Reviewed with: patient, significant other    Subjective     "I knew I could do this if given the opportunity."     Pain/Comfort:  Pain Rating 1: 9/10  Location - Side 1: Left  Location - Orientation 1: generalized  Location 1: calf  Pain Addressed 1: Pre-medicate for activity, Reposition, Distraction, Cessation of Activity  Pain Rating Post-Intervention 1: 8/10    Patient's cultural, spiritual, Moravian conflicts given the current situation:  no    Objective:     Communicated with patient's nurse prior to session.  Patient found up in chair with  (no active lines) BAEZA in therapy gym. Significant other present for family training.     Therapeutic Activities and Exercises:   Recumbent Cross Trainer x 10 min, level 2, seat 8; no rest break required.    Functional Mobility:  Transfers:     Sit to Stand:  supervision with rolling walker  Bed to NuStep: supervision with  no AD  using  Stand Pivot  Gait: Patient ambulated 120 feet x 2 trials using RW with SBA.   Stairs:  Pt ascended/descended 4 stair(s) with No Assistive Device with bilateral handrails with Contact Guard Assistance. Patient ascended/descended 4 inch curb step x 2 trials using RW with SBA.   Wheelchair Propulsion:  Pt propelled Standard wheelchair x 150 feet on Level tile with  Bilateral upper extremity with Modified Independent.     Education:  Patient and Significant other provided with daily orientation and goals of this PT session.  Patient educated to transfer to bedside chair/bedside commode/bathroom with RN/PCT present.   Patient and Spouse educated on assistive device use, Fall risk, gait training, home safety, plan of care, spinal surgery precautions, stair training, and transfer training by explanation and demonstration.   Patient and Significant other Verbalized Understanding.  Time provided for therapeutic counseling and discussion of current health disposition. All questions answered to satisfaction, within scope of PT " practice; voiced no other concerns. White board updated in patient's room, RN notified of session.      AM-PAC 6 CLICK MOBILITY  22    Patient left up in chair in room with call button in reach and nursing staff notified.    GOALS:   Multidisciplinary Problems       Physical Therapy Goals          Problem: Physical Therapy    Goal Priority Disciplines Outcome Goal Variances Interventions   Physical Therapy Goal     PT, PT/OT Ongoing, Progressing     Description: Goals to be met by:12/19/22    Patient will increase functional independence with mobility by performing:    Sit to stand transfer with Supervision - Met 12/5  Updated goal: Sit to stand transfer with Mod I    Bed to chair transfer with Supervision using Rolling Walker - Met 12/5  Updated Goal: Bed to chair transfer with Mod I using RW    Gait  x 150 feet with Stand-by Assistance using Rolling Walker.   Ascend/descend 4 stair with bilateral Handrails Stand-by Assistance using No Assistive Device.   Ascend/Descend 4 inch curb step with Stand-by Assistance using Rolling Walker.                         Time Tracking:     PT Received On: 12/07/22  PT Start Time: 1049  PT Stop Time: 1129  PT Total Time (min): 40 min    Billable Minutes: Gait Training 26 and Therapeutic Exercise 14    Treatment Type: Treatment, Family Training  PT/PTA: PT     PTA Visit Number: 0     12/07/2022

## 2022-12-07 NOTE — PLAN OF CARE
Problem: Adult Inpatient Plan of Care  Goal: Plan of Care Review  Outcome: Ongoing, Progressing     Problem: Adult Inpatient Plan of Care  Goal: Patient-Specific Goal (Individualized)  Outcome: Ongoing, Progressing     Problem: Adult Inpatient Plan of Care  Goal: Absence of Hospital-Acquired Illness or Injury  Outcome: Ongoing, Progressing     Problem: Adult Inpatient Plan of Care  Goal: Readiness for Transition of Care  Outcome: Ongoing, Progressing

## 2022-12-07 NOTE — PT/OT/SLP PROGRESS
Occupational Therapy   Treatment/Family Training     Name: Marianela Shrestha  MRN: [id number]  Admit Date: 11/18/2022  Admitting Diagnosis:  SHAZIA (cauda equina syndrome)    General Precautions: Standard, fall   Orthopedic Precautions: spinal precautions   Braces: AFO    Recommendations:     Discharge Recommendations:  home health OT  Level of Assistance Recommended at Discharge: Intermittent assistance for ADL's and homemaking tasks  Discharge Equipment Recommendations:  (to be determined)  Barriers to discharge:  Decreased caregiver support    Assessment:     Marianela Shrestha is a 37 y.o. female with a medical diagnosis of SHAZIA (cauda equina syndrome).  She presents with  Performance deficits affecting function are weakness, impaired endurance, impaired self care skills, impaired functional mobility, gait instability, impaired balance, decreased upper extremity function, decreased lower extremity function, decreased safety awareness, pain, decreased ROM, orthopedic precautions.     Pt. Was cooperative and participated well with session on this day.  Family training held on this day with SO Pt and SO educated on do and don't with spinal precautions. Pt  continues to demonstrate levels of physical deficits with  functional indep with daily management activities tasks, selfcare skills with balance,  functional mobility, UB strength and endurance. Pt. Will continue to benefit from continued OT to progress towards goals      Rehab Potential is fair    Activity tolerance:  Fair    Plan:     Patient to be seen 6 x/week to address the above listed problems via self-care/home management, therapeutic activities, therapeutic exercises    Plan of Care Expires: 12/19/22  Plan of Care Reviewed with: patient    Subjective     Communicated with: nsg and Pt prior to session.  I am doing well today    Pain/Comfort:  Pain Rating 1: 9/10  Location - Side 1: Left  Location - Orientation 1: generalized  Location 1: calf  Pain  Addressed 1: Pre-medicate for activity  Pain Rating Post-Intervention 1: 7/10    Patient's cultural, spiritual, Amish conflicts given the current situation:  no    Objective:     Patient found supine with  (afo) upon OT entry to room.    Bed Mobility:    Patient completed Scooting/Bridging with modified independence  Patient completed Supine to Sit with modified independence  Patient completed Sit to Supine with modified independence     Functional Mobility/Transfers:  Patient completed Sit <> Stand Transfer with contact guard assistance  with  rolling walker   Patient completed Bed <> Chair Transfer using Stand Pivot technique with contact guard assistance with rolling walker  Patient completed Toilet Transfer Stand Pivot technique with stand by assistance with  grab bars  Functional Mobility: Pt. Also with fxl w/c mobility to and from room to gym with SBA approx 80 ft     Activities of Daily Living:  Grooming: modified independence at sink level with grooming needs  Upper Body Dressing: modified independence to doff shirt and snow pull over bra and shirt  Lower Body Dressing: stand by assistance to snow pants seated and to mange over hips instance AFO and BLE tennis shoes   Toileting: supervision with cleaning and  clothing management from stand and toilet    [facility] 6 Click ADL: 20    Treatment & Education:  Pt. With family training held on this day with SO  reviewed t/f's,selfcare skills and DME and level of (A) for home upon d/c.   Pt edu on role of OT, POC, safety when performing self care tasks , benefit of performing OOB activity, and safety when performing functional transfers and mobility management for preparation with goals to progress towards next level of care     Patient left HOB elevated with all lines intact and call button in reach    GOALS:   Multidisciplinary Problems       Occupational Therapy Goals          Problem: Occupational Therapy    Goal Priority Disciplines Outcome Interventions    Occupational Therapy Goal     OT, PT/OT Ongoing, Progressing    Description: Goals to be met by: 3 weeks     Patient will increase functional independence with ADLs by performing:    UE Dressing with Modified [place].  LE Dressing with Stand-by Assistance.  Grooming while seated with Modified [place].  Toileting from toilet or BSC with SBA for hygiene and clothing management. With A/D to stand.  Bathing from sitting at sink with Stand-by Assistance.  Supine to sit with Modified [place].  Step transfer with Stand-by Assistance with A/D as needed.  Upper extremity exercise with assistance as needed.  Caregiver will be educated on level of assist required to safely perform self care tasks and functional transfers..                          Time Tracking:     OT Date of Treatment: 12/07/22  OT Start Time: 1006    OT Stop Time: 1044  OT Total Time (min): 38 min    Billable Minutes:Self Care/Home Management 38    12/7/2022

## 2022-12-08 PROCEDURE — 25000003 PHARM REV CODE 250: Performed by: HOSPITALIST

## 2022-12-08 PROCEDURE — 11000004 HC SNF PRIVATE

## 2022-12-08 PROCEDURE — 25000003 PHARM REV CODE 250: Performed by: NURSE PRACTITIONER

## 2022-12-08 PROCEDURE — 63600175 PHARM REV CODE 636 W HCPCS: Performed by: NURSE PRACTITIONER

## 2022-12-08 RX ADMIN — METHOCARBAMOL 1000 MG: 500 TABLET ORAL at 02:12

## 2022-12-08 RX ADMIN — DOXEPIN HYDROCHLORIDE 50 MG: 50 CAPSULE ORAL at 10:12

## 2022-12-08 RX ADMIN — OXYCODONE HYDROCHLORIDE 15 MG: 10 TABLET ORAL at 10:12

## 2022-12-08 RX ADMIN — ALPRAZOLAM 1 MG: 0.5 TABLET ORAL at 02:12

## 2022-12-08 RX ADMIN — HEPARIN SODIUM 5000 UNITS: 5000 INJECTION INTRAVENOUS; SUBCUTANEOUS at 10:12

## 2022-12-08 RX ADMIN — GABAPENTIN 600 MG: 300 CAPSULE ORAL at 10:12

## 2022-12-08 RX ADMIN — OXYCODONE HYDROCHLORIDE 15 MG: 10 TABLET ORAL at 01:12

## 2022-12-08 RX ADMIN — HEPARIN SODIUM 5000 UNITS: 5000 INJECTION INTRAVENOUS; SUBCUTANEOUS at 05:12

## 2022-12-08 RX ADMIN — GABAPENTIN 600 MG: 300 CAPSULE ORAL at 02:12

## 2022-12-08 RX ADMIN — ALPRAZOLAM 1 MG: 0.5 TABLET ORAL at 10:12

## 2022-12-08 RX ADMIN — ALPRAZOLAM 1 MG: 0.5 TABLET ORAL at 09:12

## 2022-12-08 RX ADMIN — OXYCODONE HYDROCHLORIDE 15 MG: 10 TABLET ORAL at 05:12

## 2022-12-08 RX ADMIN — METHOCARBAMOL 1000 MG: 500 TABLET ORAL at 09:12

## 2022-12-08 RX ADMIN — METHOCARBAMOL 1000 MG: 500 TABLET ORAL at 10:12

## 2022-12-08 RX ADMIN — OXYCODONE HYDROCHLORIDE 15 MG: 10 TABLET ORAL at 04:12

## 2022-12-08 RX ADMIN — PANTOPRAZOLE SODIUM 40 MG: 40 TABLET, DELAYED RELEASE ORAL at 09:12

## 2022-12-08 RX ADMIN — GABAPENTIN 600 MG: 300 CAPSULE ORAL at 09:12

## 2022-12-08 RX ADMIN — NORETHINDRONE ACETATE 5 MG: 5 TABLET ORAL at 09:12

## 2022-12-08 RX ADMIN — FLUTICASONE FUROATE AND VILANTEROL TRIFENATATE 1 PUFF: 100; 25 POWDER RESPIRATORY (INHALATION) at 09:12

## 2022-12-08 RX ADMIN — VENLAFAXINE HYDROCHLORIDE 300 MG: 75 CAPSULE, EXTENDED RELEASE ORAL at 09:12

## 2022-12-08 RX ADMIN — ONDANSETRON 8 MG: 8 TABLET, ORALLY DISINTEGRATING ORAL at 10:12

## 2022-12-08 RX ADMIN — HEPARIN SODIUM 5000 UNITS: 5000 INJECTION INTRAVENOUS; SUBCUTANEOUS at 02:12

## 2022-12-08 RX ADMIN — CELECOXIB 200 MG: 200 CAPSULE ORAL at 09:12

## 2022-12-08 NOTE — PROGRESS NOTES
[facility]                                  Skilled Nursing Facility                   Progress Note     Patient Name: Marianela Shrestha  YOB: 1985  MRN: [id number]  Room: [place] [address] [address] [place]     Admit Date: 11/18/2022   CARLITA: 12/16/2022     Principal Problem: SHAZIA (cauda equina syndrome)    HPI obtained from patient interview and chart review     Chief Complaint:    Re-evaluation of medical treatment and therapy status: c/o weakness      HPI:   Marianela Shrestha is a 37 y.o. female with PMH of drug abuse, low back pain,  LLE radiculopathy and right hand osteomyelitis treated with surgery presents for evaluation of 3 day history LLE weakness and changes in bowel and bladder acutely worse in last 24 hours prior to arrival to ER. The patient reports chronic back pain. However, she fell , twisting her ankle and low back.  S/P L4, L5, S1 laminectomy 11/11/22 by Dr. Harry. Post operatively she maintains decreased but able to feel bladder getting full and control her voiding. She maintains decreased rectal sensation but able to control her defecation.    Patient will be treated at [facility] with PT and OT to improve functional status and ability to perform ADLs.     Interval History  All of today's labs reviewed and are listed below. No critical values noted. Discussed results with patient. 24 hr vital sign ranges listed below. Pt c/o of increased pain with PT. Unable to tolerate complete therapy session secondary to pain. Will review pain regimen and adjust. Patient denies shortness of breath, abdominal discomfort, nausea, or vomiting.  Patient reports an adequate appetite.  Patient denies dysuria.  Patient reports having regular bowel movements.  Patient progessing with PT/OT: Gait: Patient ambulated 38 feet and 103 feet using RW with SBA. Prolonged rest break both seated/standing between trials secondary to LLE calf  spasm/tightness. Continuing to follow and treat all acute and chronic conditions.    Past Medical History: Patient has a past medical history of Anxiety, Chronic osteomyelitis of right hand (2019), Chronic pain syndrome, Depression, Insomnia, Post traumatic stress disorder (PTSD), and Sciatica.    Past Surgical History: Patient has a past surgical history that includes Tonsillectomy; Cosmetic surgery; Pelvic laparoscopy; Finger surgery; Ablation of medial branch nerve of lumbar spine facet joint; Functional endoscopic sinus surgery (FESS) using computer-assisted navigation (Bilateral, 7/1/2022); Maxillary antrostomy (7/1/2022); Ethmoidectomy (7/1/2022); and Lumbar laminectomy (N/A, 11/11/2022).    Social History: Patient reports that she has been smoking cigarettes. She started smoking about 5 years ago. She has a 3.75 pack-year smoking history. She has never used smokeless tobacco. She reports that she does not currently use alcohol. She reports that she does not use drugs.    Family History:  family history includes Cervical cancer in her maternal aunt; Colon cancer in her maternal aunt; Diabetes in her maternal aunt, maternal grandmother, and mother; Hypertension in her mother.    Allergies: Patient is allergic to coconut; sulfa (sulfonamide antibiotics); adhesive; and latex, natural rubber.        Review of Systems   Constitutional:  Positive for malaise/fatigue. Negative for chills and fever.   HENT:  Negative for congestion, hearing loss and sore throat.    Eyes:  Negative for blurred vision and double vision.   Respiratory:  Negative for cough, sputum production, shortness of breath and wheezing.    Cardiovascular:  Negative for chest pain, palpitations and leg swelling.   Gastrointestinal:  Negative for abdominal pain, constipation, diarrhea, heartburn, nausea and vomiting.   Genitourinary:  Negative for dysuria and urgency.   Musculoskeletal:  Negative for back pain.   Skin:  Negative for rash.        +  wound   Neurological:  Positive for weakness. Negative for dizziness and headaches.   Endo/Heme/Allergies:  Negative for polydipsia. Does not bruise/bleed easily.   Psychiatric/Behavioral:  Negative for depression and hallucinations. The patient is not nervous/anxious.        24 hour Vital Sign Range   Temp:  [97.8 °F (36.6 °C)-98.2 °F (36.8 °C)]   Pulse:  [91-92]   Resp:  [18]   BP: (124-137)/(79-89)   SpO2:  [98 %-100 %]       Physical Exam  Vitals and nursing note reviewed.   Constitutional:       General: She is not in acute distress.     Appearance: Normal appearance. She is not ill-appearing.   HENT:      Head: Normocephalic and atraumatic.      Nose: Nose normal. No congestion.      Mouth/Throat:      Mouth: Mucous membranes are moist.      Pharynx: Oropharynx is clear.   Eyes:      Extraocular Movements: Extraocular movements intact.      Conjunctiva/sclera: Conjunctivae normal.      Pupils: Pupils are equal, round, and reactive to light.   Cardiovascular:      Rate and Rhythm: Normal rate and regular rhythm.      Pulses: Normal pulses.      Heart sounds: Normal heart sounds. No murmur heard.  Pulmonary:      Effort: Pulmonary effort is normal. No respiratory distress.      Breath sounds: Normal breath sounds. No wheezing or rhonchi.   Abdominal:      General: Bowel sounds are normal. There is no distension.      Palpations: Abdomen is soft. There is no mass.      Tenderness: There is no abdominal tenderness.   Musculoskeletal:         General: No swelling or tenderness.      Cervical back: Normal range of motion and neck supple. No tenderness.      Right lower leg: No edema.      Left lower leg: No edema.   Skin:     General: Skin is warm and dry.      Capillary Refill: Capillary refill takes less than 2 seconds.      Findings: No erythema or rash.   Neurological:      Mental Status: She is alert and oriented to person, place, and time. Mental status is at baseline.      Motor: Weakness present.    Psychiatric:         Mood and Affect: Mood normal.         Behavior: Behavior normal.         Thought Content: Thought content normal.         Judgment: Judgment normal.     Full skin assessment completed by this NP           Incision/Site 11/11/22 2342 Back (Active)   11/11/22 2342   Present Prior to Hospital Arrival?:    Side:    Location: Back   Orientation:    Incision Type:    Closure Method:    Additional Comments:    Removal Indication and Assessment:    Wound Outcome:    Removal Indications:    Incision WDL ex 11/20/22 0800   Dressing Appearance Dry;Intact 11/20/22 2053   Drainage Amount None 11/20/22 2053   Drainage Characteristics/Odor No odor 11/20/22 2053   Appearance Sutures intact 11/20/22 2053   Periwound Area Dry 11/20/22 0800   Wound Edges Approximated 11/20/22 0800   Dressing Removed;Changed;Applied;Island/border 11/20/22 0800   Dressing Change Due 11/21/22 11/20/22 0800   Number of days: 10          Labs:  Recent Labs   Lab 12/06/22  0430   WBC 7.21   HGB 12.0   HCT 37.8          Recent Labs   Lab 12/06/22  0430      K 4.6      CO2 25   BUN 17   CREATININE 0.8      CALCIUM 9.0   MG 1.9   PHOS 3.2       No results for input(s): ALKPHOS, ALT, AST, ALBUMIN, PROT, BILITOT, INR in the last 168 hours.  No results for input(s): POCTGLUCOSE in the last 72 hours.    Meds Scheduled:   celecoxib  200 mg Oral Daily    doxepin  50 mg Oral QHS    fluticasone furoate-vilanteroL  1 puff Inhalation Daily    gabapentin  600 mg Oral TID    heparin (porcine)  5,000 Units Subcutaneous Q8H    methocarbamoL  1,000 mg Oral TID    mupirocin   Topical (Top) Q14 Days    norethindrone  5 mg Oral Daily    pantoprazole  40 mg Oral Daily    psyllium husk (aspartame)  1 packet Oral Daily    venlafaxine  300 mg Oral Daily       PRN:   (Magic mouthwash) 1:1:1 diphenhydrAMINE(Benadryl) 12.5mg/5ml liq, aluminum & magnesium hydroxide-simethicone (Maalox), LIDOcaine viscous 2%, acetaminophen, albuterol,  albuterol-ipratropium, ALPRAZolam, calcium carbonate, loperamide, melatonin, ondansetron, oxyCODONE, oxyCODONE      Assessment and Plan:    Fall  - Xray left femur- no acute fracture  - Xray sacrum-no acute fracture  -Monitor for neurological changes or worsening pain  -Seen in ER and returned to SNF-CT Lspine and Pelvic, no acute fracture  -Ortho team notified-No additional recommendations    SHAZIA (cauda equina syndrome)  SP L4-S1 Laminectomy 11.11.22  -PODAS boot for LLE foot drop, AFO LLE when ambulating  -PT/OT: WBAT spine precautions  -DVT PPx: Heparin TID      -Pain management, Celebrex, Robaxin, Oxycodone, Gabapentin      -Initiate order to increase Oxycodone to 15mg Q4hrs for severe pain 7-10/10    Debility   - Continue with PT/OT for gait training and strengthening and restoration of ADL's   - Encourage mobility, OOB in chair, and early ambulation as appropriate  - Fall precautions   - Monitor for bowel and bladder dysfunction  - Monitor for and prevent skin breakdown and pressure ulcers  - Continue DVT prophylaxis with heparin     Anticipate disposition:    Home with home health      Follow-up needed during SNF admission:       Follow-up needed after discharge from SNF:   - PCP within 1-2 weeks  - See appt scheduled below     Future Appointments   Date Time Provider Department Center   12/12/2022 11:30 AM Antonia Clifton PA-C [facility]         I certify that SNF services are required to be given on an inpatient basis because Marianela Shrestha needs for skilled nursing care and/or skilled rehabilitation are required on a daily basis and such services can only practically be provided in a skilled nursing facility setting and are for an ongoing condition for which she received inpatient care in the hospital.        Wanda Jacobs NP  Department of Hospital Medicine   [facility]     DOS: 12/8/2022       Patient note was created using MModal Dictation.   Any errors in syntax or even information may not have been identified and edited on initial review prior to signing this note.

## 2022-12-08 NOTE — PT/OT/SLP PROGRESS
Physical Therapy      Patient Name:  Marianela Shrestha   MRN:  [id number]    Patient not seen today secondary to Patient ill (Comment) (Patient reports chills and body aches. Nursing staff made aware. Unable to engage in therapy session at this time.). Will follow-up tomorrow on 12/9/22.    Jessica Tompkins, PT  12/8/2022

## 2022-12-09 ENCOUNTER — PATIENT MESSAGE (OUTPATIENT)
Dept: ADMINISTRATIVE | Facility: OTHER | Age: 37
End: 2022-12-09
Payer: MEDICARE

## 2022-12-09 LAB
ANION GAP SERPL CALC-SCNC: 7 MMOL/L (ref 8–16)
BASOPHILS # BLD AUTO: 0.04 K/UL (ref 0–0.2)
BASOPHILS NFR BLD: 0.7 % (ref 0–1.9)
BUN SERPL-MCNC: 15 MG/DL (ref 6–20)
CALCIUM SERPL-MCNC: 9.1 MG/DL (ref 8.7–10.5)
CHLORIDE SERPL-SCNC: 103 MMOL/L (ref 95–110)
CO2 SERPL-SCNC: 25 MMOL/L (ref 23–29)
CREAT SERPL-MCNC: 0.8 MG/DL (ref 0.5–1.4)
DIFFERENTIAL METHOD: ABNORMAL
EOSINOPHIL # BLD AUTO: 0.4 K/UL (ref 0–0.5)
EOSINOPHIL NFR BLD: 7.2 % (ref 0–8)
ERYTHROCYTE [DISTWIDTH] IN BLOOD BY AUTOMATED COUNT: 14.2 % (ref 11.5–14.5)
EST. GFR  (NO RACE VARIABLE): >60 ML/MIN/1.73 M^2
GLUCOSE SERPL-MCNC: 101 MG/DL (ref 70–110)
HCT VFR BLD AUTO: 35.1 % (ref 37–48.5)
HGB BLD-MCNC: 11.5 G/DL (ref 12–16)
IMM GRANULOCYTES # BLD AUTO: 0.03 K/UL (ref 0–0.04)
IMM GRANULOCYTES NFR BLD AUTO: 0.5 % (ref 0–0.5)
LYMPHOCYTES # BLD AUTO: 1.9 K/UL (ref 1–4.8)
LYMPHOCYTES NFR BLD: 33.4 % (ref 18–48)
MAGNESIUM SERPL-MCNC: 1.8 MG/DL (ref 1.6–2.6)
MCH RBC QN AUTO: 31.3 PG (ref 27–31)
MCHC RBC AUTO-ENTMCNC: 32.8 G/DL (ref 32–36)
MCV RBC AUTO: 95 FL (ref 82–98)
MONOCYTES # BLD AUTO: 0.7 K/UL (ref 0.3–1)
MONOCYTES NFR BLD: 11.2 % (ref 4–15)
NEUTROPHILS # BLD AUTO: 2.7 K/UL (ref 1.8–7.7)
NEUTROPHILS NFR BLD: 47 % (ref 38–73)
NRBC BLD-RTO: 0 /100 WBC
PHOSPHATE SERPL-MCNC: 3.9 MG/DL (ref 2.7–4.5)
PLATELET # BLD AUTO: 319 K/UL (ref 150–450)
PMV BLD AUTO: 10.2 FL (ref 9.2–12.9)
POTASSIUM SERPL-SCNC: 3.7 MMOL/L (ref 3.5–5.1)
RBC # BLD AUTO: 3.68 M/UL (ref 4–5.4)
SARS-COV-2 RNA RESP QL NAA+PROBE: NOT DETECTED
SODIUM SERPL-SCNC: 135 MMOL/L (ref 136–145)
WBC # BLD AUTO: 5.8 K/UL (ref 3.9–12.7)

## 2022-12-09 PROCEDURE — 97116 GAIT TRAINING THERAPY: CPT | Mod: CQ

## 2022-12-09 PROCEDURE — U0005 INFEC AGEN DETEC AMPLI PROBE: HCPCS | Performed by: HOSPITALIST

## 2022-12-09 PROCEDURE — 84100 ASSAY OF PHOSPHORUS: CPT | Performed by: NURSE PRACTITIONER

## 2022-12-09 PROCEDURE — 36415 COLL VENOUS BLD VENIPUNCTURE: CPT | Performed by: NURSE PRACTITIONER

## 2022-12-09 PROCEDURE — 97530 THERAPEUTIC ACTIVITIES: CPT | Mod: CO

## 2022-12-09 PROCEDURE — 63600175 PHARM REV CODE 636 W HCPCS: Performed by: NURSE PRACTITIONER

## 2022-12-09 PROCEDURE — 80048 BASIC METABOLIC PNL TOTAL CA: CPT | Performed by: NURSE PRACTITIONER

## 2022-12-09 PROCEDURE — 83735 ASSAY OF MAGNESIUM: CPT | Performed by: NURSE PRACTITIONER

## 2022-12-09 PROCEDURE — 25000003 PHARM REV CODE 250: Performed by: NURSE PRACTITIONER

## 2022-12-09 PROCEDURE — 85025 COMPLETE CBC W/AUTO DIFF WBC: CPT | Performed by: NURSE PRACTITIONER

## 2022-12-09 PROCEDURE — U0003 INFECTIOUS AGENT DETECTION BY NUCLEIC ACID (DNA OR RNA); SEVERE ACUTE RESPIRATORY SYNDROME CORONAVIRUS 2 (SARS-COV-2) (CORONAVIRUS DISEASE [COVID-19]), AMPLIFIED PROBE TECHNIQUE, MAKING USE OF HIGH THROUGHPUT TECHNOLOGIES AS DESCRIBED BY CMS-2020-01-R: HCPCS | Performed by: HOSPITALIST

## 2022-12-09 PROCEDURE — 25000003 PHARM REV CODE 250: Performed by: HOSPITALIST

## 2022-12-09 PROCEDURE — 11000004 HC SNF PRIVATE

## 2022-12-09 PROCEDURE — 97110 THERAPEUTIC EXERCISES: CPT | Mod: CQ

## 2022-12-09 PROCEDURE — 97530 THERAPEUTIC ACTIVITIES: CPT | Mod: CQ

## 2022-12-09 RX ADMIN — OXYCODONE HYDROCHLORIDE 15 MG: 10 TABLET ORAL at 10:12

## 2022-12-09 RX ADMIN — METHOCARBAMOL 1000 MG: 500 TABLET ORAL at 08:12

## 2022-12-09 RX ADMIN — HEPARIN SODIUM 5000 UNITS: 5000 INJECTION INTRAVENOUS; SUBCUTANEOUS at 02:12

## 2022-12-09 RX ADMIN — HEPARIN SODIUM 5000 UNITS: 5000 INJECTION INTRAVENOUS; SUBCUTANEOUS at 10:12

## 2022-12-09 RX ADMIN — CELECOXIB 200 MG: 200 CAPSULE ORAL at 09:12

## 2022-12-09 RX ADMIN — PANTOPRAZOLE SODIUM 40 MG: 40 TABLET, DELAYED RELEASE ORAL at 09:12

## 2022-12-09 RX ADMIN — GABAPENTIN 600 MG: 300 CAPSULE ORAL at 09:12

## 2022-12-09 RX ADMIN — ALPRAZOLAM 1 MG: 0.5 TABLET ORAL at 09:12

## 2022-12-09 RX ADMIN — NORETHINDRONE ACETATE 5 MG: 5 TABLET ORAL at 09:12

## 2022-12-09 RX ADMIN — ALPRAZOLAM 1 MG: 0.5 TABLET ORAL at 08:12

## 2022-12-09 RX ADMIN — METHOCARBAMOL 1000 MG: 500 TABLET ORAL at 02:12

## 2022-12-09 RX ADMIN — METHOCARBAMOL 1000 MG: 500 TABLET ORAL at 09:12

## 2022-12-09 RX ADMIN — OXYCODONE HYDROCHLORIDE 15 MG: 10 TABLET ORAL at 09:12

## 2022-12-09 RX ADMIN — GABAPENTIN 600 MG: 300 CAPSULE ORAL at 02:12

## 2022-12-09 RX ADMIN — VENLAFAXINE HYDROCHLORIDE 300 MG: 75 CAPSULE, EXTENDED RELEASE ORAL at 09:12

## 2022-12-09 RX ADMIN — DOXEPIN HYDROCHLORIDE 50 MG: 50 CAPSULE ORAL at 08:12

## 2022-12-09 RX ADMIN — FLUTICASONE FUROATE AND VILANTEROL TRIFENATATE 1 PUFF: 100; 25 POWDER RESPIRATORY (INHALATION) at 09:12

## 2022-12-09 RX ADMIN — OXYCODONE HYDROCHLORIDE 15 MG: 10 TABLET ORAL at 03:12

## 2022-12-09 RX ADMIN — GABAPENTIN 600 MG: 300 CAPSULE ORAL at 08:12

## 2022-12-09 RX ADMIN — HEPARIN SODIUM 5000 UNITS: 5000 INJECTION INTRAVENOUS; SUBCUTANEOUS at 05:12

## 2022-12-09 NOTE — PROGRESS NOTES
Date: 12/09/2022    Supervising Physician: Sundar Harry M.D.    Date of Surgery: 11/11/22    Procedure: L4-S1 laminectomy/discectomy for cauda equina syndrome    History: Marianela Shrestha is seen today for follow-up following the above listed procedure. The patient notes on 11/18/22 she was d/c from the hospital to SNF for inpatient rehab. On 12/1/22 she sustained an unwitnessed fall from her wheelchair at SNF and was brought to the ED for evaluation. She was d/c back to SNF after exam and imaging. She says since the second fall she continues to have low back and left leg/foot pain and new right foot pain. She has been working with PT/OT and can walk about 150 feet. Pt says her pain is unbearable, especially in her low back and left foot. She is worried how she will get around when she is d/c from SNF.   she denies fever, chills, and sweats since the time of the surgery.     Exam: Incision is healing well, clean, dry and intact.   There is no sign of infection. Neuro exam is stable. No signs of DVT.    Radiographs: n/a    Assessment/Plan: 4 weeks post op. Will order new MRI lumbar and MRI left ankle to further evaluate continued pain and call with results. Will schedule in pain management for consult.     I will plan to see the patient back for the next postop visit in 2 months.     Thank you for the opportunity to participate in this patient's care. Please give me a call if there are any concerns or questions.

## 2022-12-09 NOTE — PT/OT/SLP PROGRESS
Occupational Therapy   Treatment    Name: Marianela Shrestha  MRN: [id number]  Admit Date: 11/18/2022  Admitting Diagnosis:  SHAZIA (cauda equina syndrome)    General Precautions: Standard, fall   Orthopedic Precautions: spinal precautions   Braces: AFO    Recommendations:     Discharge Recommendations:  home health OT  Level of Assistance Recommended at Discharge: Intermittent assistance for ADL's and homemaking tasks  Discharge Equipment Recommendations:  (to be determined)  Barriers to discharge:  Decreased caregiver support    Assessment:     Marianela Shrestha is a 37 y.o. female with a medical diagnosis of SHAZIA (cauda equina syndrome).  She presents with  Performance deficits affecting function are weakness, impaired endurance, impaired self care skills, impaired functional mobility, gait instability, impaired balance, decreased upper extremity function, decreased lower extremity function, decreased safety awareness, pain, decreased ROM, orthopedic precautions.     Pt. Was cooperative and participated well with session on this day. Pt  continues to demonstrate levels of physical deficits with  functional indep with daily management activities tasks, selfcare skills with balance,  functional mobility, UB strength and endurance. Pt. Will continue to benefit from continued OT to progress towards goals      Rehab Potential is fair    Activity tolerance:  Fair    Plan:     Patient to be seen 6 x/week to address the above listed problems via self-care/home management, therapeutic activities, therapeutic exercises    Plan of Care Expires: 12/19/22  Plan of Care Reviewed with: patient    Subjective     Communicated with: nsg and Pt prior to session.  I am doing well today    Pain/Comfort:  Pain Rating 1: 7/10  Location - Side 1: Bilateral  Location - Orientation 1: generalized  Location 1: lumbar spine  Pain Addressed 1: Pre-medicate for activity, Reposition, Distraction    Patient's cultural, spiritual, [employer]  conflicts given the current situation:  no    Objective:     Patient found supine with  (afo) upon OT entry to room.    Bed Mobility:    Patient completed Scooting/Bridging with modified independence  Patient completed Supine to Sit with modified independence  Patient completed Sit to Supine with modified independence     Functional Mobility/Transfers:  Patient completed Sit <> Stand Transfer with contact guard assistance  with  rolling walker   Patient completed Bed <> Chair Transfer using Stand Pivot technique with contact guard assistance with rolling walker  Pt. Also with fxl w/c mobility to and from room to gym with SBA approx 80 ft each way        Activities of Daily Living:  Already performed     [facility] 6 Click ADL: 20    OT Exercises: UE Ergometer 10 min    Treatment & Education:  Pt. With standing act on this day with task. Pt. With CGA/SBA for balance aspects with task with  AD at raised counter Pt with visual perception task with discrimination of various shapes and sizes x 9:25 min/sec with standing bal and min cues through out with weight shifting and use of BUE's incorporated and crossing mid line and facilitation with posture in prep for home management .     Pt. With 2# dowel activity with 2x20 reps with  shd flex, bicep curls horz adb/add and forward flex motion to increase BUE ROM and strength.    Pt. With therex performed to increase ROM, endurance selfcare task and fxl mobility for independence     Pt edu on role of OT, POC, safety when performing self care tasks , benefit of performing OOB activity, and safety when performing functional transfers and mobility management for preparation with goals to progress towards next level of care     Patient left HOB elevated with all lines intact and call button in reach    GOALS:   Multidisciplinary Problems       Occupational Therapy Goals          Problem: Occupational Therapy    Goal Priority Disciplines Outcome Interventions   Occupational Therapy Goal      OT, PT/OT Ongoing, Progressing    Description: Goals to be met by: 3 weeks     Patient will increase functional independence with ADLs by performing:    UE Dressing with Modified [place].  LE Dressing with Stand-by Assistance.  Grooming while seated with Modified [place].  Toileting from toilet or BSC with SBA for hygiene and clothing management. With A/D to stand.  Bathing from sitting at sink with Stand-by Assistance.  Supine to sit with Modified [place].  Step transfer with Stand-by Assistance with A/D as needed.  Upper extremity exercise with assistance as needed.  Caregiver will be educated on level of assist required to safely perform self care tasks and functional transfers..                          Time Tracking:     OT Date of Treatment: 12/09/22  OT Start Time: 1400    OT Stop Time: 1440  OT Total Time (min): 40 min    Billable Minutes:Therapeutic Activity 40    12/9/2022

## 2022-12-09 NOTE — PT/OT/SLP PROGRESS
"Physical Therapy Treatment    Patient Name:  Marianela Shrestha   MRN:  [id number]  Admit Date: 11/18/2022  Admitting Diagnosis: SHAZIA (cauda equina syndrome)    General Precautions: Standard, fall  Orthopedic Precautions: spinal precautions  Braces: AFO    Recommendations:     Discharge Recommendations: home health PT  Level of Assistance Recommended at Discharge: Intermittent assistance   Discharge Equipment Recommendations: wheelchair, walker, rolling, bedside commode, bath bench  Barriers to discharge: Decreased caregiver support    Assessment:     Marianela Shrestha is a 37 y.o. female admitted with a medical diagnosis of SHAZIA (cauda equina syndrome). Pt tolerated session well with no adverse effects noted. She continues to require frequent cuing for adherence to spinal precautions and demonstrated reduced safety awareness this session which she reports is due to her increased confidence following her fall. Pt will continue to benefit from skilled therapy services to improve LE strength and endurance to continue to improve functional mobility and reach highest levels of independence.      Performance deficits affecting function: weakness, impaired endurance, impaired functional mobility, gait instability, impaired balance, decreased lower extremity function, decreased safety awareness, pain, decreased ROM, orthopedic precautions.    Rehab Potential is good    Activity Tolerance: Good    Plan:     Patient to be seen 6 x/week to address the above listed problems via gait training, therapeutic activities, therapeutic exercises, neuromuscular re-education, wheelchair management/training    Plan of Care Expires: 12/19/22  Plan of Care Reviewed with: patient    Subjective     "I'm ready to go but I still feel a little weak."     Pain/Comfort:  Pain Rating 1: other (see comments) (not rated)  Location - Side 1: Bilateral  Location - Orientation 1: generalized  Location 1: lumbar spine  Pain Addressed 1: Pre-medicate " "for activity, Reposition, Distraction  Pain Rating Post-Intervention 1: other (see comments) (not rated)    Patient's cultural, spiritual, Buddhism conflicts given the current situation:  no    Objective:     Communicated with nsg prior to session.  Patient found sitting edge of bed with  (no active lines) upon PT entry to room.     Therapeutic Activities and Exercises:   Questions answered within PTA scope of practice  Spinal precautions reviewed and cued throughout session    Recumbent cross  lvl 3 x 12 minutes    Standing therex in // bars including hip 3-way LLE only x 20 reps each then mini squats 2 x 5    Functional Mobility:  Transfers:     Sit to Stand: stand by assistance with rolling walker  Bed to Chair: stand by assistance with no AD using Stand Pivot  Gait: Pt ambulated 194' in RW with SBA directly following stairs and including 4" curb step  Pt continues to demonstrate narrow LIZET with L foot crossing over midline to reduce reported LLE pain with weight bearing  Stairs: Pt ascended/descended 4 stair(s) with bilateral handrails with Contact Guard Assistance.  Ascended/descended 4" curb step with Rolling Walker and contract guard assist    AM-PAC 6 CLICK MOBILITY  22    Patient left up in chair with all lines intact and call button in reach.    GOALS:   Multidisciplinary Problems       Physical Therapy Goals          Problem: Physical Therapy    Goal Priority Disciplines Outcome Goal Variances Interventions   Physical Therapy Goal     PT, PT/OT Ongoing, Progressing     Description: Goals to be met by:12/19/22    Patient will increase functional independence with mobility by performing:    Sit to stand transfer with Supervision - Met 12/5  Updated goal: Sit to stand transfer with Mod I    Bed to chair transfer with Supervision using Rolling Walker - Met 12/5  Updated Goal: Bed to chair transfer with Mod I using RW    Gait  x 150 feet with Stand-by Assistance using Rolling Walker.   Ascend/descend 4 " stair with bilateral Handrails Stand-by Assistance using No Assistive Device.   Ascend/Descend 4 inch curb step with Stand-by Assistance using Rolling Walker.                         Time Tracking:     PT Received On: 12/09/22  PT Start Time: 1259  PT Stop Time: 1340  PT Total Time (min): 41 min    Billable Minutes: Gait Training 15, Therapeutic Activity 13, and Therapeutic Exercise 13    Treatment Type: Treatment  PT/PTA: PTA     PTA Visit Number: 1     12/09/2022

## 2022-12-09 NOTE — PROGRESS NOTES
"                                                        [facility]                                  Skilled Nursing Facility                   Progress Note     Patient Name: Marianela Shrestha  YOB: 1985  MRN: [id number]  Room: [place] [address] [address] [address]     Admit Date: 11/18/2022   CARLITA: 12/16/2022     Principal Problem: SHAZIA (cauda equina syndrome)    HPI obtained from patient interview and chart review     Chief Complaint:   Re-evaluation of medical treatment and therapy status: lab review      HPI:   Marianela Shrestha is a 37 y.o. female with PMH of drug abuse, low back pain,  LLE radiculopathy and right hand osteomyelitis treated with surgery presents for evaluation of 3 day history LLE weakness and changes in bowel and bladder acutely worse in last 24 hours prior to arrival to ER. The patient reports chronic back pain. However, she fell , twisting her ankle and low back.  S/P L4, L5, S1 laminectomy 11/11/22 by Dr. Harry. Post operatively she maintains decreased but able to feel bladder getting full and control her voiding. She maintains decreased rectal sensation but able to control her defecation.    Patient will be treated at [facility] with PT and OT to improve functional status and ability to perform ADLs.     Interval History  All of today's labs reviewed and are listed below. No critical values noted. Discussed results with patient. 24 hr vital sign ranges listed below. . Patient denies shortness of breath, abdominal discomfort, nausea, or vomiting.  Patient reports an adequate appetite.  Patient denies dysuria.  Patient reports having regular bowel movements.  Patient progessing with PT/OT: Pt ambulated 194' in RW with SBA directly following stairs and including 4" curb stepContinuing to follow and treat all acute and chronic conditions.    Past Medical History: Patient has a past medical history of Anxiety, Chronic osteomyelitis of right hand (2019), Chronic pain syndrome, " Depression, Insomnia, Post traumatic stress disorder (PTSD), and Sciatica.    Past Surgical History: Patient has a past surgical history that includes Tonsillectomy; Cosmetic surgery; Pelvic laparoscopy; Finger surgery; Ablation of medial branch nerve of lumbar spine facet joint; Functional endoscopic sinus surgery (FESS) using computer-assisted navigation (Bilateral, 7/1/2022); Maxillary antrostomy (7/1/2022); Ethmoidectomy (7/1/2022); and Lumbar laminectomy (N/A, 11/11/2022).    Social History: Patient reports that she has been smoking cigarettes. She started smoking about 5 years ago. She has a 3.75 pack-year smoking history. She has never used smokeless tobacco. She reports that she does not currently use alcohol. She reports that she does not use drugs.    Family History:  family history includes Cervical cancer in her maternal aunt; Colon cancer in her maternal aunt; Diabetes in her maternal aunt, maternal grandmother, and mother; Hypertension in her mother.    Allergies: Patient is allergic to coconut; sulfa (sulfonamide antibiotics); adhesive; and latex, natural rubber.        Review of Systems   Constitutional:  Positive for malaise/fatigue. Negative for chills and fever.   HENT:  Negative for congestion, hearing loss and sore throat.    Eyes:  Negative for blurred vision and double vision.   Respiratory:  Negative for cough, sputum production, shortness of breath and wheezing.    Cardiovascular:  Negative for chest pain, palpitations and leg swelling.   Gastrointestinal:  Negative for abdominal pain, constipation, diarrhea, heartburn, nausea and vomiting.   Genitourinary:  Negative for dysuria and urgency.   Musculoskeletal:  Negative for back pain.   Skin:  Negative for rash.        + wound   Neurological:  Positive for weakness. Negative for dizziness and headaches.   Endo/Heme/Allergies:  Negative for polydipsia. Does not bruise/bleed easily.   Psychiatric/Behavioral:  Negative for depression and  hallucinations. The patient is not nervous/anxious.        24 hour Vital Sign Range   Temp:  [98 °F (36.7 °C)]   Pulse:  [2-97]   Resp:  [17-18]   BP: (140)/(79)   SpO2:  [97 %-99 %]       Physical Exam  Vitals and nursing note reviewed.   Constitutional:       General: She is not in acute distress.     Appearance: Normal appearance. She is not ill-appearing.   HENT:      Head: Normocephalic and atraumatic.      Nose: Nose normal. No congestion.      Mouth/Throat:      Mouth: Mucous membranes are moist.      Pharynx: Oropharynx is clear.   Eyes:      Extraocular Movements: Extraocular movements intact.      Conjunctiva/sclera: Conjunctivae normal.      Pupils: Pupils are equal, round, and reactive to light.   Cardiovascular:      Rate and Rhythm: Normal rate and regular rhythm.      Pulses: Normal pulses.      Heart sounds: Normal heart sounds. No murmur heard.  Pulmonary:      Effort: Pulmonary effort is normal. No respiratory distress.      Breath sounds: Normal breath sounds. No wheezing or rhonchi.   Abdominal:      General: Bowel sounds are normal. There is no distension.      Palpations: Abdomen is soft. There is no mass.      Tenderness: There is no abdominal tenderness.   Musculoskeletal:         General: No swelling or tenderness.      Cervical back: Normal range of motion and neck supple. No tenderness.      Right lower leg: No edema.      Left lower leg: No edema.   Skin:     General: Skin is warm and dry.      Capillary Refill: Capillary refill takes less than 2 seconds.      Findings: No erythema or rash.   Neurological:      Mental Status: She is alert and oriented to person, place, and time. Mental status is at baseline.      Motor: Weakness present.   Psychiatric:         Mood and Affect: Mood normal.         Behavior: Behavior normal.         Thought Content: Thought content normal.         Judgment: Judgment normal.     Full skin assessment completed by this NP           Incision/Site 11/11/22 6512  Back (Active)   11/11/22 1681   Present Prior to Hospital Arrival?:    Side:    Location: Back   Orientation:    Incision Type:    Closure Method:    Additional Comments:    Removal Indication and Assessment:    Wound Outcome:    Removal Indications:    Incision WDL ex 11/20/22 0800   Dressing Appearance Dry;Intact 11/20/22 2053   Drainage Amount None 11/20/22 2053   Drainage Characteristics/Odor No odor 11/20/22 2053   Appearance Sutures intact 11/20/22 2053   Periwound Area Dry 11/20/22 0800   Wound Edges Approximated 11/20/22 0800   Dressing Removed;Changed;Applied;Island/border 11/20/22 0800   Dressing Change Due 11/21/22 11/20/22 0800   Number of days: 10          Labs:  Recent Labs   Lab 12/06/22 0430 12/09/22 0618   WBC 7.21 5.80   HGB 12.0 11.5*   HCT 37.8 35.1*    319       Recent Labs   Lab 12/06/22 0430 12/09/22 0618    135*   K 4.6 3.7    103   CO2 25 25   BUN 17 15   CREATININE 0.8 0.8    101   CALCIUM 9.0 9.1   MG 1.9 1.8   PHOS 3.2 3.9       No results for input(s): ALKPHOS, ALT, AST, ALBUMIN, PROT, BILITOT, INR in the last 168 hours.  No results for input(s): POCTGLUCOSE in the last 72 hours.    Meds Scheduled:   celecoxib  200 mg Oral Daily    doxepin  50 mg Oral QHS    fluticasone furoate-vilanteroL  1 puff Inhalation Daily    gabapentin  600 mg Oral TID    heparin (porcine)  5,000 Units Subcutaneous Q8H    methocarbamoL  1,000 mg Oral TID    mupirocin   Topical (Top) Q14 Days    norethindrone  5 mg Oral Daily    pantoprazole  40 mg Oral Daily    psyllium husk (aspartame)  1 packet Oral Daily    venlafaxine  300 mg Oral Daily       PRN:   (Magic mouthwash) 1:1:1 diphenhydrAMINE(Benadryl) 12.5mg/5ml liq, aluminum & magnesium hydroxide-simethicone (Maalox), LIDOcaine viscous 2%, acetaminophen, albuterol, albuterol-ipratropium, ALPRAZolam, calcium carbonate, loperamide, melatonin, ondansetron, oxyCODONE, oxyCODONE      Assessment and Plan:    Fall  - Xray left femur- no  acute fracture  - Xray sacrum-no acute fracture  -Monitor for neurological changes or worsening pain  -Seen in ER and returned to SNF-CT Lspine and Pelvic, no acute fracture  -Ortho team notified-No additional recommendations    SHAZIA (cauda equina syndrome)  SP L4-S1 Laminectomy 11.11.22  -PODAS boot for LLE foot drop, AFO LLE when ambulating  -PT/OT: WBAT spine precautions  -DVT PPx: Heparin TID      -Pain management, Celebrex, Robaxin, Oxycodone, Gabapentin      -Initiate order to increase Oxycodone to 15mg Q4hrs for severe pain 7-10/10    Debility   - Continue with PT/OT for gait training and strengthening and restoration of ADL's   - Encourage mobility, OOB in chair, and early ambulation as appropriate  - Fall precautions   - Monitor for bowel and bladder dysfunction  - Monitor for and prevent skin breakdown and pressure ulcers  - Continue DVT prophylaxis with heparin     Anticipate disposition:    Home with home health      Follow-up needed during SNF admission:       Follow-up needed after discharge from SNF:   - PCP within 1-2 weeks  - See appt scheduled below     Future Appointments   Date Time Provider Department Center   12/12/2022 11:30 AM Antonia Clifton PA-C [facility]         I certify that SNF services are required to be given on an inpatient basis because Marianela Shrestha needs for skilled nursing care and/or skilled rehabilitation are required on a daily basis and such services can only practically be provided in a skilled nursing facility setting and are for an ongoing condition for which she received inpatient care in the hospital.        Wanda Jacobs NP  Department of Hospital Medicine   [facility]     DOS: 12/9/2022       Patient note was created using MModal Dictation.  Any errors in syntax or even information may not have been identified and edited on initial review prior to signing this note.

## 2022-12-10 PROCEDURE — 25000003 PHARM REV CODE 250: Performed by: HOSPITALIST

## 2022-12-10 PROCEDURE — 25000003 PHARM REV CODE 250: Performed by: NURSE PRACTITIONER

## 2022-12-10 PROCEDURE — 63600175 PHARM REV CODE 636 W HCPCS: Performed by: NURSE PRACTITIONER

## 2022-12-10 PROCEDURE — 11000004 HC SNF PRIVATE

## 2022-12-10 RX ORDER — BENZONATATE 100 MG/1
100 CAPSULE ORAL 3 TIMES DAILY PRN
Status: DISCONTINUED | OUTPATIENT
Start: 2022-12-10 | End: 2022-12-16 | Stop reason: HOSPADM

## 2022-12-10 RX ADMIN — ALPRAZOLAM 1 MG: 0.5 TABLET ORAL at 02:12

## 2022-12-10 RX ADMIN — GABAPENTIN 600 MG: 300 CAPSULE ORAL at 02:12

## 2022-12-10 RX ADMIN — VENLAFAXINE HYDROCHLORIDE 300 MG: 75 CAPSULE, EXTENDED RELEASE ORAL at 09:12

## 2022-12-10 RX ADMIN — METHOCARBAMOL 1000 MG: 500 TABLET ORAL at 09:12

## 2022-12-10 RX ADMIN — HEPARIN SODIUM 5000 UNITS: 5000 INJECTION INTRAVENOUS; SUBCUTANEOUS at 09:12

## 2022-12-10 RX ADMIN — PANTOPRAZOLE SODIUM 40 MG: 40 TABLET, DELAYED RELEASE ORAL at 09:12

## 2022-12-10 RX ADMIN — BENZONATATE 100 MG: 100 CAPSULE ORAL at 04:12

## 2022-12-10 RX ADMIN — OXYCODONE HYDROCHLORIDE 15 MG: 10 TABLET ORAL at 03:12

## 2022-12-10 RX ADMIN — METHOCARBAMOL 1000 MG: 500 TABLET ORAL at 08:12

## 2022-12-10 RX ADMIN — HEPARIN SODIUM 5000 UNITS: 5000 INJECTION INTRAVENOUS; SUBCUTANEOUS at 05:12

## 2022-12-10 RX ADMIN — ACETAMINOPHEN 650 MG: 325 TABLET ORAL at 02:12

## 2022-12-10 RX ADMIN — OXYCODONE HYDROCHLORIDE 15 MG: 10 TABLET ORAL at 02:12

## 2022-12-10 RX ADMIN — DOXEPIN HYDROCHLORIDE 50 MG: 50 CAPSULE ORAL at 08:12

## 2022-12-10 RX ADMIN — GABAPENTIN 600 MG: 300 CAPSULE ORAL at 08:12

## 2022-12-10 RX ADMIN — OXYCODONE HYDROCHLORIDE 15 MG: 10 TABLET ORAL at 09:12

## 2022-12-10 RX ADMIN — NORETHINDRONE ACETATE 5 MG: 5 TABLET ORAL at 09:12

## 2022-12-10 RX ADMIN — METHOCARBAMOL 1000 MG: 500 TABLET ORAL at 02:12

## 2022-12-10 RX ADMIN — HEPARIN SODIUM 5000 UNITS: 5000 INJECTION INTRAVENOUS; SUBCUTANEOUS at 02:12

## 2022-12-10 RX ADMIN — CELECOXIB 200 MG: 200 CAPSULE ORAL at 09:12

## 2022-12-10 RX ADMIN — FLUTICASONE FUROATE AND VILANTEROL TRIFENATATE 1 PUFF: 100; 25 POWDER RESPIRATORY (INHALATION) at 09:12

## 2022-12-10 RX ADMIN — GABAPENTIN 600 MG: 300 CAPSULE ORAL at 09:12

## 2022-12-10 RX ADMIN — ALPRAZOLAM 1 MG: 0.5 TABLET ORAL at 05:12

## 2022-12-10 NOTE — PLAN OF CARE
Problem: Adult Inpatient Plan of Care  Goal: Plan of Care Review  Outcome: Ongoing, Progressing  Flowsheets (Taken 12/10/2022 1531)  Plan of Care Reviewed With: patient     Problem: Fall Injury Risk  Goal: Absence of Fall and Fall-Related Injury  Outcome: Ongoing, Progressing     Problem: Skin Injury Risk Increased  Goal: Skin Health and Integrity  Outcome: Ongoing, Progressing     Problem: Anxiety  Goal: Anxiety Reduction or Resolution  Description:  Anxiety/PTSD  Outcome: Ongoing, Progressing

## 2022-12-11 PROCEDURE — 63600175 PHARM REV CODE 636 W HCPCS: Performed by: NURSE PRACTITIONER

## 2022-12-11 PROCEDURE — 11000004 HC SNF PRIVATE

## 2022-12-11 PROCEDURE — 25000003 PHARM REV CODE 250: Performed by: NURSE PRACTITIONER

## 2022-12-11 PROCEDURE — 25000003 PHARM REV CODE 250: Performed by: HOSPITALIST

## 2022-12-11 RX ORDER — GUAIFENESIN 100 MG/5ML
200 SOLUTION ORAL EVERY 4 HOURS PRN
Status: DISCONTINUED | OUTPATIENT
Start: 2022-12-11 | End: 2022-12-16 | Stop reason: HOSPADM

## 2022-12-11 RX ADMIN — OXYCODONE HYDROCHLORIDE 15 MG: 10 TABLET ORAL at 02:12

## 2022-12-11 RX ADMIN — ALPRAZOLAM 1 MG: 0.5 TABLET ORAL at 02:12

## 2022-12-11 RX ADMIN — VENLAFAXINE HYDROCHLORIDE 300 MG: 75 CAPSULE, EXTENDED RELEASE ORAL at 08:12

## 2022-12-11 RX ADMIN — ALPRAZOLAM 1 MG: 0.5 TABLET ORAL at 09:12

## 2022-12-11 RX ADMIN — METHOCARBAMOL 1000 MG: 500 TABLET ORAL at 09:12

## 2022-12-11 RX ADMIN — FLUTICASONE FUROATE AND VILANTEROL TRIFENATATE 1 PUFF: 100; 25 POWDER RESPIRATORY (INHALATION) at 08:12

## 2022-12-11 RX ADMIN — GABAPENTIN 600 MG: 300 CAPSULE ORAL at 08:12

## 2022-12-11 RX ADMIN — OXYCODONE HYDROCHLORIDE 15 MG: 10 TABLET ORAL at 09:12

## 2022-12-11 RX ADMIN — CELECOXIB 200 MG: 200 CAPSULE ORAL at 08:12

## 2022-12-11 RX ADMIN — HEPARIN SODIUM 5000 UNITS: 5000 INJECTION INTRAVENOUS; SUBCUTANEOUS at 06:12

## 2022-12-11 RX ADMIN — PANTOPRAZOLE SODIUM 40 MG: 40 TABLET, DELAYED RELEASE ORAL at 08:12

## 2022-12-11 RX ADMIN — DOXEPIN HYDROCHLORIDE 50 MG: 50 CAPSULE ORAL at 09:12

## 2022-12-11 RX ADMIN — OXYCODONE HYDROCHLORIDE 15 MG: 10 TABLET ORAL at 06:12

## 2022-12-11 RX ADMIN — HEPARIN SODIUM 5000 UNITS: 5000 INJECTION INTRAVENOUS; SUBCUTANEOUS at 02:12

## 2022-12-11 RX ADMIN — ALPRAZOLAM 1 MG: 0.5 TABLET ORAL at 12:12

## 2022-12-11 RX ADMIN — GABAPENTIN 600 MG: 300 CAPSULE ORAL at 03:12

## 2022-12-11 RX ADMIN — METHOCARBAMOL 1000 MG: 500 TABLET ORAL at 08:12

## 2022-12-11 RX ADMIN — GABAPENTIN 600 MG: 300 CAPSULE ORAL at 09:12

## 2022-12-11 RX ADMIN — NORETHINDRONE ACETATE 5 MG: 5 TABLET ORAL at 08:12

## 2022-12-11 RX ADMIN — HEPARIN SODIUM 5000 UNITS: 5000 INJECTION INTRAVENOUS; SUBCUTANEOUS at 10:12

## 2022-12-11 RX ADMIN — METHOCARBAMOL 1000 MG: 500 TABLET ORAL at 03:12

## 2022-12-11 RX ADMIN — OXYCODONE HYDROCHLORIDE 15 MG: 10 TABLET ORAL at 03:12

## 2022-12-11 RX ADMIN — BENZONATATE 100 MG: 100 CAPSULE ORAL at 08:12

## 2022-12-11 NOTE — PLAN OF CARE
Problem: Adult Inpatient Plan of Care  Goal: Plan of Care Review  Outcome: Ongoing, Progressing  Flowsheets (Taken 12/11/2022 7226)  Plan of Care Reviewed With: patient     Problem: Fall Injury Risk  Goal: Absence of Fall and Fall-Related Injury  Outcome: Ongoing, Progressing     Problem: Skin Injury Risk Increased  Goal: Skin Health and Integrity  Outcome: Ongoing, Progressing     Problem: Anxiety  Goal: Anxiety Reduction or Resolution  Description:  Anxiety/PTSD  Outcome: Ongoing, Progressing

## 2022-12-11 NOTE — PT/OT/SLP PROGRESS
Physical Therapy      Patient Name:  Marianela Shrestha   MRN:  [id number]    Patient not seen today secondary to Patient ill (Comment) (Patient reports chills and body aches. Nursing staff made aware. Unable to engage in therapy session at this time after 2 attempts. Will follow-up 12/12/22.    Kayleen Deutsch, PT  12/11/2022

## 2022-12-12 ENCOUNTER — OFFICE VISIT (OUTPATIENT)
Dept: ORTHOPEDICS | Facility: CLINIC | Age: 37
End: 2022-12-12
Payer: MEDICARE

## 2022-12-12 VITALS — HEIGHT: 65 IN | BODY MASS INDEX: 35.76 KG/M2 | WEIGHT: 214.63 LBS

## 2022-12-12 DIAGNOSIS — M25.572 PAIN OF JOINT OF LEFT ANKLE AND FOOT: ICD-10-CM

## 2022-12-12 DIAGNOSIS — M54.9 DORSALGIA, UNSPECIFIED: Primary | ICD-10-CM

## 2022-12-12 PROCEDURE — 99999 PR PBB SHADOW E&M-EST. PATIENT-LVL V: CPT | Mod: PBBFAC,,, | Performed by: ORTHOPAEDIC SURGERY

## 2022-12-12 PROCEDURE — 1159F MED LIST DOCD IN RCRD: CPT | Mod: CPTII,S$GLB,, | Performed by: ORTHOPAEDIC SURGERY

## 2022-12-12 PROCEDURE — 1160F RVW MEDS BY RX/DR IN RCRD: CPT | Mod: CPTII,S$GLB,, | Performed by: ORTHOPAEDIC SURGERY

## 2022-12-12 PROCEDURE — 99024 PR POST-OP FOLLOW-UP VISIT: ICD-10-PCS | Mod: S$GLB,,, | Performed by: ORTHOPAEDIC SURGERY

## 2022-12-12 PROCEDURE — 3008F PR BODY MASS INDEX (BMI) DOCUMENTED: ICD-10-PCS | Mod: CPTII,S$GLB,, | Performed by: ORTHOPAEDIC SURGERY

## 2022-12-12 PROCEDURE — 99999 PR PBB SHADOW E&M-EST. PATIENT-LVL V: ICD-10-PCS | Mod: PBBFAC,,, | Performed by: ORTHOPAEDIC SURGERY

## 2022-12-12 PROCEDURE — 25000003 PHARM REV CODE 250: Performed by: HOSPITALIST

## 2022-12-12 PROCEDURE — 3008F BODY MASS INDEX DOCD: CPT | Mod: CPTII,S$GLB,, | Performed by: ORTHOPAEDIC SURGERY

## 2022-12-12 PROCEDURE — 99024 POSTOP FOLLOW-UP VISIT: CPT | Mod: S$GLB,,, | Performed by: ORTHOPAEDIC SURGERY

## 2022-12-12 PROCEDURE — 25000003 PHARM REV CODE 250: Performed by: NURSE PRACTITIONER

## 2022-12-12 PROCEDURE — 1160F PR REVIEW ALL MEDS BY PRESCRIBER/CLIN PHARMACIST DOCUMENTED: ICD-10-PCS | Mod: CPTII,S$GLB,, | Performed by: ORTHOPAEDIC SURGERY

## 2022-12-12 PROCEDURE — 11000004 HC SNF PRIVATE

## 2022-12-12 PROCEDURE — 1159F PR MEDICATION LIST DOCUMENTED IN MEDICAL RECORD: ICD-10-PCS | Mod: CPTII,S$GLB,, | Performed by: ORTHOPAEDIC SURGERY

## 2022-12-12 PROCEDURE — 97530 THERAPEUTIC ACTIVITIES: CPT

## 2022-12-12 PROCEDURE — 63600175 PHARM REV CODE 636 W HCPCS: Performed by: NURSE PRACTITIONER

## 2022-12-12 PROCEDURE — 25000242 PHARM REV CODE 250 ALT 637 W/ HCPCS: Performed by: NURSE PRACTITIONER

## 2022-12-12 PROCEDURE — 97110 THERAPEUTIC EXERCISES: CPT

## 2022-12-12 RX ADMIN — OXYCODONE HYDROCHLORIDE 15 MG: 10 TABLET ORAL at 08:12

## 2022-12-12 RX ADMIN — METHOCARBAMOL 1000 MG: 500 TABLET ORAL at 08:12

## 2022-12-12 RX ADMIN — BENZONATATE 100 MG: 100 CAPSULE ORAL at 08:12

## 2022-12-12 RX ADMIN — METHOCARBAMOL 1000 MG: 500 TABLET ORAL at 03:12

## 2022-12-12 RX ADMIN — OXYCODONE HYDROCHLORIDE 15 MG: 10 TABLET ORAL at 06:12

## 2022-12-12 RX ADMIN — VENLAFAXINE HYDROCHLORIDE 300 MG: 75 CAPSULE, EXTENDED RELEASE ORAL at 01:12

## 2022-12-12 RX ADMIN — PANTOPRAZOLE SODIUM 40 MG: 40 TABLET, DELAYED RELEASE ORAL at 08:12

## 2022-12-12 RX ADMIN — NORETHINDRONE ACETATE 5 MG: 5 TABLET ORAL at 08:12

## 2022-12-12 RX ADMIN — ALPRAZOLAM 1 MG: 0.5 TABLET ORAL at 08:12

## 2022-12-12 RX ADMIN — OXYCODONE HYDROCHLORIDE 15 MG: 10 TABLET ORAL at 03:12

## 2022-12-12 RX ADMIN — DOXEPIN HYDROCHLORIDE 50 MG: 50 CAPSULE ORAL at 08:12

## 2022-12-12 RX ADMIN — GABAPENTIN 600 MG: 300 CAPSULE ORAL at 03:12

## 2022-12-12 RX ADMIN — GUAIFENESIN 200 MG: 100 SOLUTION ORAL at 08:12

## 2022-12-12 RX ADMIN — HEPARIN SODIUM 5000 UNITS: 5000 INJECTION INTRAVENOUS; SUBCUTANEOUS at 01:12

## 2022-12-12 RX ADMIN — GABAPENTIN 600 MG: 300 CAPSULE ORAL at 08:12

## 2022-12-12 RX ADMIN — PSYLLIUM HUSK 1 PACKET: 3.4 POWDER ORAL at 08:12

## 2022-12-12 RX ADMIN — FLUTICASONE FUROATE AND VILANTEROL TRIFENATATE 1 PUFF: 100; 25 POWDER RESPIRATORY (INHALATION) at 08:12

## 2022-12-12 RX ADMIN — HEPARIN SODIUM 5000 UNITS: 5000 INJECTION INTRAVENOUS; SUBCUTANEOUS at 10:12

## 2022-12-12 RX ADMIN — HEPARIN SODIUM 5000 UNITS: 5000 INJECTION INTRAVENOUS; SUBCUTANEOUS at 05:12

## 2022-12-12 RX ADMIN — CELECOXIB 200 MG: 200 CAPSULE ORAL at 08:12

## 2022-12-12 RX ADMIN — ALPRAZOLAM 1 MG: 0.5 TABLET ORAL at 09:12

## 2022-12-12 RX ADMIN — OXYCODONE HYDROCHLORIDE 15 MG: 10 TABLET ORAL at 10:12

## 2022-12-12 NOTE — PLAN OF CARE
Interdisciplinary team, Jeanne Arrington, RN Charge Nurse, Marianela James, RN MDS Coordinator, Cherrie Garcia PT, Rehab Supervisor, Tory Davies, and Yocasta Peraza, Dietician, spoke to patient for care plan conference, weekly status update, and therapy progress update. Tentative discharge date set for 12/16/22.

## 2022-12-12 NOTE — NURSING
RETURNED TO ROOM FROM ORTHOPEDIC CLINIC APPOINTMENT BY WHEELCHAIR ACCOMPANIED BY [facility].SITTING IN BED EATING LUNCH.

## 2022-12-12 NOTE — PT/OT/SLP PROGRESS
Physical Therapy Treatment    Patient Name:  Marianela Shrestha   MRN:  [id number]  Admit Date: 11/18/2022  Admitting Diagnosis: SHAZIA (cauda equina syndrome)  Recent Surgeries: LAMINECTOMY, SPINE, LUMBAR (N/A)    General Precautions: Standard, fall  Orthopedic Precautions: spinal precautions  Braces: AFO (Patient reported small red area on posterior heel and increased LLE swelling. Unable to fit AFO properly without increased pain; cushioning not remaining in place with attempted use for increased protection. Nursing made aware.)    Recommendations:     Discharge Recommendations: home health PT  Level of Assistance Recommended at Discharge: Intermittent assistance   Discharge Equipment Recommendations: bedside commode, bath bench, wheelchair, walker, rolling  Barriers to discharge: Decreased caregiver support    Assessment:     Marianela Shrestha is a 37 y.o. female admitted with a medical diagnosis of SHAZIA (cauda equina syndrome). Patient agreeable to PT treatment this PM. Patient reports irritation and pain on L posterior heel with small red area noted. Increased time spent during session to attempt padding L heel for protection from AFO rubbing foot for prevention of increased breakdown and irritation to skin. Unable to properly fit AFO with padding and noted swelling in LLE. Nursing made aware. Functional transfers, LE exercises and static standing activity completed without shoes donned and with  socks. Pain in LLE limiting tolerance for mobility overall. Patient will benefit from continued SNF rehabilitation services to address deficits with progression toward PLOF as tolerated.       Performance deficits affecting function: weakness, impaired endurance, impaired functional mobility, impaired balance, gait instability, decreased lower extremity function, decreased safety awareness, pain, abnormal tone, decreased ROM, orthopedic precautions.    Rehab Potential is good    Activity Tolerance: Good    Plan:  "    Patient to be seen 6 x/week to address the above listed problems via gait training, therapeutic activities, therapeutic exercises, neuromuscular re-education, wheelchair management/training    Plan of Care Expires: 12/19/22  Plan of Care Reviewed with: patient    Subjective     "It just hurts and it is swollen."     Pain/Comfort:  Pain Rating 1: 9/10  Location - Side 1: Left  Location - Orientation 1: generalized  Location 1: ankle  Pain Addressed 1: Reposition, Distraction, Cessation of Activity, Nurse notified  Pain Rating Post-Intervention 1: 9/10    Patient's cultural, spiritual, Mormonism conflicts given the current situation:  no    Objective:     Communicated with nursing staff prior to session.  Patient found supine with  (no active lines) upon PT entry to room.     Therapeutic Activities and Exercises:   Time spent for safe and proper fitting of AFO in shoe/on L foot; unable to fit AFO with cushion without increased reports of pain. Nursing notified of red area on L heel.     Recumbent cross  x 10 minutes on level 1; 2 short rest breaks during trial secondary to LLE pain.     Static standing at RW with SBA for increased weightbearing through BLE. Completed x 5 minutes.    Standing marches using RW x 20 reps each for LE ROM and strengthening    Functional Mobility:  Bed Mobility:     Supine to Sit: modified independence  Sit to Supine: modified independence  Transfers:     Sit to Stand:  supervision with rolling walker  Bed to Chair: supervision with  no AD  using  Squat Pivot  Chair to NuStep: supervision with  no AD  using  Squat Pivot  Wheelchair Propulsion:  Pt propelled Standard wheelchair x 150 feet on Level tile with  Bilateral upper extremity with Modified Independent.     AM-PAC 6 CLICK MOBILITY  22    Patient left supine with call button in reach and nursing staff notified.    GOALS:   Multidisciplinary Problems       Physical Therapy Goals          Problem: Physical Therapy    Goal " Priority Disciplines Outcome Goal Variances Interventions   Physical Therapy Goal     PT, PT/OT Ongoing, Progressing     Description: Goals to be met by:12/19/22    Patient will increase functional independence with mobility by performing:    Sit to stand transfer with Supervision - Met 12/5  Updated goal: Sit to stand transfer with Mod I    Bed to chair transfer with Supervision using Rolling Walker - Met 12/5  Updated Goal: Bed to chair transfer with Mod I using RW    Gait  x 150 feet with Stand-by Assistance using Rolling Walker.   Ascend/descend 4 stair with bilateral Handrails Stand-by Assistance using No Assistive Device.   Ascend/Descend 4 inch curb step with Stand-by Assistance using Rolling Walker.                         Time Tracking:     PT Received On: 12/12/22  PT Start Time: 1428  PT Stop Time: 1510  PT Total Time (min): 42 min    Billable Minutes: Therapeutic Activity 27 and Therapeutic Exercise 15    Treatment Type: Treatment  PT/PTA: PT     PTA Visit Number: 0     12/12/2022

## 2022-12-12 NOTE — PROGRESS NOTES
"[facility] [place] - Skilled Nursing  Adult Nutrition  Progress Note    SUMMARY   Recommendations  Continue regular diet, RD following  Goals: PO to meet 85% of EEN/EPN by next RD fu  Nutrition Goal Status: progressing towards goal    Assessment and Plan  No nutrition diagnosis at this time    Malnutrition Assessment      Skin (Micronutrient): wounds unhealed           Orbital Region (Subcutaneous Fat Loss): well nourished  Upper Arm Region (Subcutaneous Fat Loss): well nourished  Thoracic and Lumbar Region: well nourished   Clavicle Bone Region (Muscle Loss): well nourished  Clavicle and Acromion Bone Region (Muscle Loss): well nourished  Dorsal Hand (Muscle Loss): well nourished  Patellar Region (Muscle Loss): well nourished  Anterior Thigh Region (Muscle Loss): well nourished  Posterior Calf Region (Muscle Loss): well nourished                 Reason for Assessment    Reason For Assessment: RD follow-up  Diagnosis:  (Lumbar stenosis, s/p laminectomy)  Relevant Medical History: PTSD, Osteomyelitis, chronic pain, SHAZIA, obesity  Interdisciplinary Rounds: attended  General Information Comments: boost supplement cancelled, patient with wt loss, from 229 to 214, desirable,  Nutrition Discharge Planning: DC on regular diet  Nutrition/Diet History    Patient Reported Diet/Restrictions/Preferences: general  Spiritual, Cultural Beliefs, Buddhist Practices, Values that Affect Care: no  Food Allergies: other (see comments) (coconut)  Factors Affecting Nutritional Intake: None identified at this time    Anthropometrics    Temp: 98.8 °F (37.1 °C)  Height: 5' 5" (165.1 cm)  Height (inches): 65 in  Weight Method: Standard Scale  Weight: 95.8 kg (211 lb 3.2 oz)  Weight (lb): 211.2 lb  Ideal Body Weight (IBW), Female: 125 lb  % Ideal Body Weight, Female (lb): 183.95 %  BMI (Calculated): 35.1  BMI Grade: 35 - 39.9 - obesity - grade II  Usual Body Weight (UBW), k kg  % Usual Body Weight: 96.78  % Weight Change From Usual Weight: " -3.43 %       Lab/Procedures/Meds    Pertinent Labs Reviewed: reviewed  Pertinent Labs Comments: Hg 11.5, 53.1, Na 136,  Pertinent Medications Reviewed: reviewed  Pertinent Medications Comments: psyllium    Estimated/Assessed Needs    Weight Used For Calorie Calculations: 104.3 kg (229 lb 15 oz)  Energy Calorie Requirements (kcal): 1723  Energy Need Method: [place]-St Jeor (x 1.0(PAL) 2/2 obesity)  Protein Requirements: 68g  Weight Used For Protein Calculations: 56.8 kg (125 lb 3.5 oz) (IBW kg 2/2 obesity, x 1.2g /kg)  Fluid Requirements (mL): 1732 or per MD  Estimated Fluid Requirement Method: RDA Method  RDA Method (mL): 1723  CHO Requirement: -      Nutrition Prescription Ordered    Current Diet Order: Regular  Nutrition Order Comments: %  Oral Nutrition Supplement: cancelled    Evaluation of Received Nutrient/Fluid Intake    I/O: no data  Energy Calories Required: meeting needs  Protein Required: meeting needs  Fluid Required: meeting needs  Comments: LBM 12/9  Tolerance: tolerating  % Intake of Estimated Energy Needs: 75 - 100 %  % Meal Intake: 75 - 100 %    Nutrition Risk    Level of Risk/Frequency of Follow-up: low (one time per week)     Monitor and Evaluation    Food and Nutrient Intake: food and beverage intake  Food and Nutrient Adminstration: diet order  Anthropometric Measurements: weight change  Biochemical Data, Medical Tests and Procedures: electrolyte and renal panel  Nutrition-Focused Physical Findings: skin     Nutrition Follow-Up    RD Follow-up?: Yes

## 2022-12-13 LAB
ANION GAP SERPL CALC-SCNC: 11 MMOL/L (ref 8–16)
BASOPHILS # BLD AUTO: 0.05 K/UL (ref 0–0.2)
BASOPHILS NFR BLD: 0.6 % (ref 0–1.9)
BUN SERPL-MCNC: 14 MG/DL (ref 6–20)
CALCIUM SERPL-MCNC: 9.1 MG/DL (ref 8.7–10.5)
CHLORIDE SERPL-SCNC: 104 MMOL/L (ref 95–110)
CO2 SERPL-SCNC: 24 MMOL/L (ref 23–29)
CREAT SERPL-MCNC: 0.8 MG/DL (ref 0.5–1.4)
DIFFERENTIAL METHOD: ABNORMAL
EOSINOPHIL # BLD AUTO: 0.5 K/UL (ref 0–0.5)
EOSINOPHIL NFR BLD: 5.5 % (ref 0–8)
ERYTHROCYTE [DISTWIDTH] IN BLOOD BY AUTOMATED COUNT: 14.5 % (ref 11.5–14.5)
EST. GFR  (NO RACE VARIABLE): >60 ML/MIN/1.73 M^2
GLUCOSE SERPL-MCNC: 114 MG/DL (ref 70–110)
HCT VFR BLD AUTO: 37.9 % (ref 37–48.5)
HGB BLD-MCNC: 12 G/DL (ref 12–16)
IMM GRANULOCYTES # BLD AUTO: 0.05 K/UL (ref 0–0.04)
IMM GRANULOCYTES NFR BLD AUTO: 0.6 % (ref 0–0.5)
LYMPHOCYTES # BLD AUTO: 3.1 K/UL (ref 1–4.8)
LYMPHOCYTES NFR BLD: 34.9 % (ref 18–48)
MAGNESIUM SERPL-MCNC: 1.9 MG/DL (ref 1.6–2.6)
MCH RBC QN AUTO: 31.3 PG (ref 27–31)
MCHC RBC AUTO-ENTMCNC: 31.7 G/DL (ref 32–36)
MCV RBC AUTO: 99 FL (ref 82–98)
MONOCYTES # BLD AUTO: 0.7 K/UL (ref 0.3–1)
MONOCYTES NFR BLD: 7.8 % (ref 4–15)
NEUTROPHILS # BLD AUTO: 4.5 K/UL (ref 1.8–7.7)
NEUTROPHILS NFR BLD: 50.6 % (ref 38–73)
NRBC BLD-RTO: 0 /100 WBC
PHOSPHATE SERPL-MCNC: 3.9 MG/DL (ref 2.7–4.5)
PLATELET # BLD AUTO: 353 K/UL (ref 150–450)
PMV BLD AUTO: 10.3 FL (ref 9.2–12.9)
POTASSIUM SERPL-SCNC: 3.6 MMOL/L (ref 3.5–5.1)
RBC # BLD AUTO: 3.83 M/UL (ref 4–5.4)
SARS-COV-2 RNA RESP QL NAA+PROBE: NOT DETECTED
SODIUM SERPL-SCNC: 139 MMOL/L (ref 136–145)
WBC # BLD AUTO: 8.88 K/UL (ref 3.9–12.7)

## 2022-12-13 PROCEDURE — 84100 ASSAY OF PHOSPHORUS: CPT | Performed by: NURSE PRACTITIONER

## 2022-12-13 PROCEDURE — 97530 THERAPEUTIC ACTIVITIES: CPT

## 2022-12-13 PROCEDURE — 97116 GAIT TRAINING THERAPY: CPT

## 2022-12-13 PROCEDURE — 83735 ASSAY OF MAGNESIUM: CPT | Performed by: NURSE PRACTITIONER

## 2022-12-13 PROCEDURE — 80048 BASIC METABOLIC PNL TOTAL CA: CPT | Performed by: NURSE PRACTITIONER

## 2022-12-13 PROCEDURE — 25000003 PHARM REV CODE 250: Performed by: NURSE PRACTITIONER

## 2022-12-13 PROCEDURE — 25000003 PHARM REV CODE 250: Performed by: HOSPITALIST

## 2022-12-13 PROCEDURE — 97110 THERAPEUTIC EXERCISES: CPT

## 2022-12-13 PROCEDURE — 11000004 HC SNF PRIVATE

## 2022-12-13 PROCEDURE — U0005 INFEC AGEN DETEC AMPLI PROBE: HCPCS | Performed by: HOSPITALIST

## 2022-12-13 PROCEDURE — U0003 INFECTIOUS AGENT DETECTION BY NUCLEIC ACID (DNA OR RNA); SEVERE ACUTE RESPIRATORY SYNDROME CORONAVIRUS 2 (SARS-COV-2) (CORONAVIRUS DISEASE [COVID-19]), AMPLIFIED PROBE TECHNIQUE, MAKING USE OF HIGH THROUGHPUT TECHNOLOGIES AS DESCRIBED BY CMS-2020-01-R: HCPCS | Performed by: HOSPITALIST

## 2022-12-13 PROCEDURE — 25000242 PHARM REV CODE 250 ALT 637 W/ HCPCS: Performed by: NURSE PRACTITIONER

## 2022-12-13 PROCEDURE — 85025 COMPLETE CBC W/AUTO DIFF WBC: CPT | Performed by: NURSE PRACTITIONER

## 2022-12-13 PROCEDURE — 36415 COLL VENOUS BLD VENIPUNCTURE: CPT | Performed by: NURSE PRACTITIONER

## 2022-12-13 PROCEDURE — 63600175 PHARM REV CODE 636 W HCPCS: Performed by: NURSE PRACTITIONER

## 2022-12-13 RX ADMIN — FLUTICASONE FUROATE AND VILANTEROL TRIFENATATE 1 PUFF: 100; 25 POWDER RESPIRATORY (INHALATION) at 10:12

## 2022-12-13 RX ADMIN — METHOCARBAMOL 1000 MG: 500 TABLET ORAL at 09:12

## 2022-12-13 RX ADMIN — HEPARIN SODIUM 5000 UNITS: 5000 INJECTION INTRAVENOUS; SUBCUTANEOUS at 05:12

## 2022-12-13 RX ADMIN — ALPRAZOLAM 1 MG: 0.5 TABLET ORAL at 03:12

## 2022-12-13 RX ADMIN — BENZONATATE 100 MG: 100 CAPSULE ORAL at 12:12

## 2022-12-13 RX ADMIN — OXYCODONE HYDROCHLORIDE 15 MG: 10 TABLET ORAL at 07:12

## 2022-12-13 RX ADMIN — HEPARIN SODIUM 5000 UNITS: 5000 INJECTION INTRAVENOUS; SUBCUTANEOUS at 09:12

## 2022-12-13 RX ADMIN — CELECOXIB 200 MG: 200 CAPSULE ORAL at 08:12

## 2022-12-13 RX ADMIN — OXYCODONE HYDROCHLORIDE 15 MG: 10 TABLET ORAL at 11:12

## 2022-12-13 RX ADMIN — HEPARIN SODIUM 5000 UNITS: 5000 INJECTION INTRAVENOUS; SUBCUTANEOUS at 02:12

## 2022-12-13 RX ADMIN — NORETHINDRONE ACETATE 5 MG: 5 TABLET ORAL at 08:12

## 2022-12-13 RX ADMIN — OXYCODONE HYDROCHLORIDE 15 MG: 10 TABLET ORAL at 03:12

## 2022-12-13 RX ADMIN — PSYLLIUM HUSK 1 PACKET: 3.4 POWDER ORAL at 08:12

## 2022-12-13 RX ADMIN — DOXEPIN HYDROCHLORIDE 50 MG: 50 CAPSULE ORAL at 09:12

## 2022-12-13 RX ADMIN — GABAPENTIN 600 MG: 300 CAPSULE ORAL at 08:12

## 2022-12-13 RX ADMIN — ALPRAZOLAM 1 MG: 0.5 TABLET ORAL at 09:12

## 2022-12-13 RX ADMIN — ALPRAZOLAM 1 MG: 0.5 TABLET ORAL at 02:12

## 2022-12-13 RX ADMIN — GUAIFENESIN 200 MG: 100 SOLUTION ORAL at 05:12

## 2022-12-13 RX ADMIN — METHOCARBAMOL 1000 MG: 500 TABLET ORAL at 02:12

## 2022-12-13 RX ADMIN — GABAPENTIN 600 MG: 300 CAPSULE ORAL at 02:12

## 2022-12-13 RX ADMIN — GUAIFENESIN 200 MG: 100 SOLUTION ORAL at 12:12

## 2022-12-13 RX ADMIN — GABAPENTIN 600 MG: 300 CAPSULE ORAL at 09:12

## 2022-12-13 RX ADMIN — BENZONATATE 100 MG: 100 CAPSULE ORAL at 09:12

## 2022-12-13 RX ADMIN — VENLAFAXINE HYDROCHLORIDE 300 MG: 75 CAPSULE, EXTENDED RELEASE ORAL at 08:12

## 2022-12-13 RX ADMIN — OXYCODONE HYDROCHLORIDE 15 MG: 10 TABLET ORAL at 12:12

## 2022-12-13 RX ADMIN — METHOCARBAMOL 1000 MG: 500 TABLET ORAL at 08:12

## 2022-12-13 RX ADMIN — PANTOPRAZOLE SODIUM 40 MG: 40 TABLET, DELAYED RELEASE ORAL at 08:12

## 2022-12-13 RX ADMIN — GUAIFENESIN 200 MG: 100 SOLUTION ORAL at 09:12

## 2022-12-13 NOTE — PT/OT/SLP PROGRESS
Occupational Therapy   Treatment    Name: Marianela Shrestha  MRN: [id number]  Admit Date: 11/18/2022  Admitting Diagnosis:  SHAZIA (cauda equina syndrome)    General Precautions: Standard, fall   Orthopedic Precautions: spinal precautions   Braces: AFO    Recommendations:     Discharge Recommendations:  home health OT  Level of Assistance Recommended at Discharge: 24 hours light assistance for ADL's and homemaking tasks  Discharge Equipment Recommendations:  (to be determined)  Barriers to discharge:  Decreased caregiver support    Assessment:     Marianela Shrestha is a 37 y.o. female with a medical diagnosis of SHAZIA (cauda equina syndrome) .  She remains limited in performance of self-care , functional mobility and ADLs and currently not performing tasks at OF . Currently presenting with performance deficits including weakness, impaired endurance, impaired self care skills, impaired functional mobility, gait instability, impaired balance, decreased upper extremity function, decreased lower extremity function, decreased safety awareness, pain, decreased ROM, orthopedic precautions.   Pt tolerated Tx without incident and is making progress but continues to require assist to perform self care tasks, functional mobility and functional transfers .  She would continue to benefit from OT intervention to further her functional (I)ce and safety.     Rehab Potential is good    Activity tolerance:  Good    Plan:     Patient to be seen 6 x/week to address the above listed problems via self-care/home management, therapeutic activities, therapeutic exercises    Plan of Care Expires: 12/19/22  Plan of Care Reviewed with: patient    Subjective     Communicated with: nurse prior to session.     Pain/Comfort:  Pain Rating 1: 8/10  Location - Side 1: Left  Location - Orientation 1: generalized  Location 1: ankle (and lower back)  Pain Addressed 1: Pre-medicate for activity, Reposition, Distraction, Cessation of Activity  Pain  Rating Post-Intervention 1: 8/10    Patient's cultural, spiritual, Alevism conflicts given the current situation:  no    Objective:     Patient found up in chair with  (no lines) upon OT entry to room.    Bed Mobility:    Pt seated in W/C at onset of therapy session.     Functional Mobility/Transfers:  Patient completed Sit <> Stand Transfer with stand by assistance  with  rolling walker   Patient completed Bed <> Chair Transfer using Stand Pivot technique with stand by assistance with rolling walker  Functional Mobility: Patient propelled W/C from his/her room to therapy gym using (B) UEs a distance of 154 ft and performed therapy activities in gym and concluded  session by propelling W/C back to her room 150 ft with no assist provided throughout.       Pt worked on functional standing activity consisting of standing with RW while reaching in all planes , crossing of midline and reaching to varying heights to facilitate (B) wt shifting and stability in standing in prep for performance of self care tasks and functional ADLS in standing.  Pt tolerated up to 10 Min.18 sec  in standing with SBA and RW to steady.     [facility] 6 Click ADL: 20    OT Exercises: Pt performed UBE exercise for 13 minutes with Min resistance.  UE exercises performed to increase functional endurance and strength in order increase independence when performing self care tasks, functional ambulation, W/C propulsion, and functional standing activities .     Treatment & Education:  Pt edu on safety when performing self care tasks , and safety when performing functional transfers and mobility.  - White board updated    Patient left up in chair with call button in reach    GOALS:   Multidisciplinary Problems       Occupational Therapy Goals          Problem: Occupational Therapy    Goal Priority Disciplines Outcome Interventions   Occupational Therapy Goal     OT, PT/OT Ongoing, Progressing    Description: Goals to be met by: 3 weeks     Patient will  increase functional independence with ADLs by performing:    UE Dressing with Modified [place].  LE Dressing with Stand-by Assistance.  Grooming while seated with Modified [place].  Toileting from toilet or BSC with SBA for hygiene and clothing management. With A/D to stand.  Bathing from sitting at sink with Stand-by Assistance.  Supine to sit with Modified [place].  Step transfer with Stand-by Assistance with A/D as needed.  Upper extremity exercise with assistance as needed.  Caregiver will be educated on level of assist required to safely perform self care tasks and functional transfers..                          Time Tracking:     OT Date of Treatment: 12/13/22  OT Start Time: 1200    OT Stop Time: 1232  OT Total Time (min): 32 min    Billable Minutes:Therapeutic Activity 17  Therapeutic Exercise 15    12/13/2022

## 2022-12-13 NOTE — PT/OT/SLP PROGRESS
"Physical Therapy Treatment    Patient Name:  Marianela Shrestha   MRN:  [id number]  Admit Date: 11/18/2022  Admitting Diagnosis: SHAZIA (cauda equina syndrome)  Recent Surgeries:  LAMINECTOMY, SPINE, LUMBAR (N/A)    General Precautions: Standard, fall  Orthopedic Precautions: spinal precautions  Braces: AFO    Recommendations:     Discharge Recommendations: home health PT  Level of Assistance Recommended at Discharge: Intermittent assistance   Discharge Equipment Recommendations: bedside commode, bath bench, wheelchair, walker, rolling  Barriers to discharge: Decreased caregiver support    Assessment:     Marianela Shrestha is a 37 y.o. female admitted with a medical diagnosis of SHAZIA (cauda equina syndrome). Patient agreeable to PT session this PM. Patient able to ambulate increased gait distance in presence of L ankle pain. Increased time completed on recumbent cross  without rest. PT noted area of broken skin under AFO on L lateral lower leg; nursing to be notified. Patient emotional during session regarding upcoming discharge home. PT with attempts to calm patient and provide answers to any questions. Patient will benefit from continued SNF rehabilitation services to address deficits with progression toward PLOF.       Performance deficits affecting function: weakness, impaired endurance, impaired functional mobility, gait instability, impaired balance, decreased lower extremity function, decreased safety awareness, pain, decreased ROM, orthopedic precautions.    Rehab Potential is good    Activity Tolerance: Good    Plan:     Patient to be seen 6 x/week to address the above listed problems via gait training, therapeutic activities, therapeutic exercises, neuromuscular re-education, wheelchair management/training    Plan of Care Expires: 12/19/22  Plan of Care Reviewed with: patient    Subjective     "I am just so emotional."     Pain/Comfort:  Pain Rating 1:  (Pain level not quantified; patient verbalized " pain with facial grimacing noted)  Location - Side 1: Left  Location - Orientation 1: generalized  Location 1: ankle  Pain Addressed 1: Pre-medicate for activity, Reposition, Distraction, Cessation of Activity  Pain Rating Post-Intervention 1:  (Pain level not quantified.)    Patient's cultural, spiritual, Bahai conflicts given the current situation:  no    Objective:     Communicated with nursing staff prior to session.  Patient found up in chair with  (no active lines) upon PT entry to room.     Therapeutic Activities and Exercises:   Recumbent Cross Trainer x 15 min, level 1; no rest break required    Functional Mobility:  Transfers:     Sit to Stand:  supervision with rolling walker  Chair to NuStep: supervision with  no AD  using  Stand Pivot  Gait: Patient ambulated 175 feet and 135 feet using RW with SBA. Seated rest break between trials. Patient ambulated an additional 20 feet from one activity to another in therapy gym.  Stairs:  Pt ascended/descended 4 stair(s) with No Assistive Device with bilateral handrails with Stand-by Assistance.     AM-PAC 6 CLICK MOBILITY  22    Patient left up in chair with call button in reach and nursing staff notified.    GOALS:   Multidisciplinary Problems       Physical Therapy Goals          Problem: Physical Therapy    Goal Priority Disciplines Outcome Goal Variances Interventions   Physical Therapy Goal     PT, PT/OT Ongoing, Progressing     Description: Goals to be met by:12/19/22    Patient will increase functional independence with mobility by performing:    Sit to stand transfer with Supervision - Met 12/5  Updated goal: Sit to stand transfer with Mod I    Bed to chair transfer with Supervision using Rolling Walker - Met 12/5  Updated Goal: Bed to chair transfer with Mod I using RW    Gait  x 150 feet with Stand-by Assistance using Rolling Walker. - Met 12/13  Updated Goal: Gait x 150 feet with Supervision using RW    Ascend/descend 4 stair with bilateral Handrails  Stand-by Assistance using No Assistive Device. - Met 12/13  Updated Goal: Ascend/descend 4 steps with B HR and Supervision    Ascend/Descend 4 inch curb step with Stand-by Assistance using Rolling Walker.                         Time Tracking:     PT Received On: 12/13/22  PT Start Time: 1316  PT Stop Time: 1348  PT Total Time (min): 32 min    Billable Minutes: Gait Training 17 and Therapeutic Exercise 15    Treatment Type: Treatment  PT/PTA: PT     PTA Visit Number: 0     12/13/2022

## 2022-12-13 NOTE — PLAN OF CARE
Problem: Occupational Therapy  Goal: Occupational Therapy Goal  Description: Goals to be met by: 3 weeks     Patient will increase functional independence with ADLs by performing:    UE Dressing with Modified [place].  LE Dressing with Stand-by Assistance.  Grooming while seated with Modified [place].  Toileting from toilet or BSC with SBA for hygiene and clothing management. With A/D to stand.  Bathing from sitting at sink with Stand-by Assistance.  Supine to sit with Modified [place].  Step transfer with Stand-by Assistance with A/D as needed.  Upper extremity exercise with assistance as needed.  Caregiver will be educated on level of assist required to safely perform self care tasks and functional transfers..     Outcome: Ongoing, Progressing

## 2022-12-13 NOTE — PLAN OF CARE
Problem: Adult Inpatient Plan of Care  Goal: Plan of Care Review  Outcome: Ongoing, Progressing  Flowsheets (Taken 12/13/2022 1153)  Plan of Care Reviewed With: patient     Problem: Fall Injury Risk  Goal: Absence of Fall and Fall-Related Injury  Outcome: Ongoing, Progressing     Problem: Skin Injury Risk Increased  Goal: Skin Health and Integrity  Outcome: Ongoing, Progressing     Problem: Anxiety  Goal: Anxiety Reduction or Resolution  Description:  Anxiety/PTSD  Outcome: Ongoing, Progressing     Ms Shrestha remains AAO; pt is sitting up in w/c in room 307. She received 15 mg of oxycodone IR for c/o pain rated at 8 on a pain scale of 0-10. Safety measures maintained. She received verbal teaching on using the call button to request assistance from staff with ALL transfers in/out of bed/wheelchair. Ms Shrestha verbalized understanding of information. Call light is within reach. Will continue with plan of care.

## 2022-12-13 NOTE — PLAN OF CARE
Problem: Physical Therapy  Goal: Physical Therapy Goal  Description: Goals to be met by:12/19/22    Patient will increase functional independence with mobility by performing:    Sit to stand transfer with Supervision - Met 12/5  Updated goal: Sit to stand transfer with Mod I    Bed to chair transfer with Supervision using Rolling Walker - Met 12/5  Updated Goal: Bed to chair transfer with Mod I using RW    Gait  x 150 feet with Stand-by Assistance using Rolling Walker. - Met 12/13  Updated Goal: Gait x 150 feet with Supervision using RW    Ascend/descend 4 stair with bilateral Handrails Stand-by Assistance using No Assistive Device. - Met 12/13  Updated Goal: Ascend/descend 4 steps with B HR and Supervision    Ascend/Descend 4 inch curb step with Stand-by Assistance using Rolling Walker.    Outcome: Ongoing, Progressing

## 2022-12-13 NOTE — PROGRESS NOTES
[facility]                                  Skilled Nursing Facility                   Progress Note     Patient Name: Marianela Shrestha  YOB: 1985  MRN: [id number]  Room: [place] [address] [address] A     Admit Date: 11/18/2022   CARLITA: 12/16/2022     Principal Problem: SHAZIA (cauda equina syndrome)    HPI obtained from patient interview and chart review     Chief Complaint:   Re-evaluation of medical treatment and therapy status: lab review      HPI:   Marianela Shrestha is a 37 y.o. female with PMH of drug abuse, low back pain,  LLE radiculopathy and right hand osteomyelitis treated with surgery presents for evaluation of 3 day history LLE weakness and changes in bowel and bladder acutely worse in last 24 hours prior to arrival to ER. The patient reports chronic back pain. However, she fell , twisting her ankle and low back.  S/P L4, L5, S1 laminectomy 11/11/22 by Dr. Harry. Post operatively she maintains decreased but able to feel bladder getting full and control her voiding. She maintains decreased rectal sensation but able to control her defecation.    Patient will be treated at [facility] with PT and OT to improve functional status and ability to perform ADLs.     Interval History  All of today's labs reviewed and are listed below. No critical values noted. Discussed results with patient. 24 hr vital sign ranges listed below. Pt c/o of increased pain with PT. Unable to tolerate complete therapy session secondary to pain. Will review pain regimen and adjust. Patient denies shortness of breath, abdominal discomfort, nausea, or vomiting.  Patient reports an adequate appetite.  Patient denies dysuria.  Patient reports having regular bowel movements.  Patient progessing with PT/OT: Gait: Patient ambulated 38 feet and 103 feet using RW with SBA. Prolonged rest break both seated/standing between trials secondary to LLE calf  spasm/tightness. Continuing to follow and treat all acute and chronic conditions.    Past Medical History: Patient has a past medical history of Anxiety, Chronic osteomyelitis of right hand (2019), Chronic pain syndrome, Depression, Insomnia, Post traumatic stress disorder (PTSD), and Sciatica.    Past Surgical History: Patient has a past surgical history that includes Tonsillectomy; Cosmetic surgery; Pelvic laparoscopy; Finger surgery; Ablation of medial branch nerve of lumbar spine facet joint; Functional endoscopic sinus surgery (FESS) using computer-assisted navigation (Bilateral, 7/1/2022); Maxillary antrostomy (7/1/2022); Ethmoidectomy (7/1/2022); and Lumbar laminectomy (N/A, 11/11/2022).    Social History: Patient reports that she quit smoking about 12 months ago. Her smoking use included cigarettes. She started smoking about 5 years ago. She has a 3.75 pack-year smoking history. She has never used smokeless tobacco. She reports that she does not currently use alcohol. She reports that she does not use drugs.    Family History:  family history includes Cervical cancer in her maternal aunt; Colon cancer in her maternal aunt; Diabetes in her maternal aunt, maternal grandmother, and mother; Hypertension in her mother.    Allergies: Patient is allergic to coconut; sulfa (sulfonamide antibiotics); adhesive; and latex, natural rubber.        Review of Systems   Constitutional:  Positive for malaise/fatigue. Negative for chills and fever.   HENT:  Negative for congestion, hearing loss and sore throat.    Eyes:  Negative for blurred vision and double vision.   Respiratory:  Negative for cough, sputum production, shortness of breath and wheezing.    Cardiovascular:  Negative for chest pain, palpitations and leg swelling.   Gastrointestinal:  Negative for abdominal pain, constipation, diarrhea, heartburn, nausea and vomiting.   Genitourinary:  Negative for dysuria and urgency.   Musculoskeletal:  Negative for back  pain.   Skin:  Negative for rash.        + wound   Neurological:  Positive for weakness. Negative for dizziness and headaches.   Endo/Heme/Allergies:  Negative for polydipsia. Does not bruise/bleed easily.   Psychiatric/Behavioral:  Negative for depression and hallucinations. The patient is not nervous/anxious.        24 hour Vital Sign Range   Temp:  [98.2 °F (36.8 °C)-98.7 °F (37.1 °C)]   Pulse:  [81-83]   Resp:  [16-20]   BP: (107-161)/(68-95)   SpO2:  [96 %-98 %]       Physical Exam  Vitals and nursing note reviewed.   Constitutional:       General: She is not in acute distress.     Appearance: Normal appearance. She is not ill-appearing.   HENT:      Head: Normocephalic and atraumatic.      Nose: Nose normal. No congestion.      Mouth/Throat:      Mouth: Mucous membranes are moist.      Pharynx: Oropharynx is clear.   Eyes:      Extraocular Movements: Extraocular movements intact.      Conjunctiva/sclera: Conjunctivae normal.      Pupils: Pupils are equal, round, and reactive to light.   Cardiovascular:      Rate and Rhythm: Normal rate and regular rhythm.      Pulses: Normal pulses.      Heart sounds: Normal heart sounds. No murmur heard.  Pulmonary:      Effort: Pulmonary effort is normal. No respiratory distress.      Breath sounds: Normal breath sounds. No wheezing or rhonchi.   Abdominal:      General: Bowel sounds are normal. There is no distension.      Palpations: Abdomen is soft. There is no mass.      Tenderness: There is no abdominal tenderness.   Musculoskeletal:         General: No swelling or tenderness.      Cervical back: Normal range of motion and neck supple. No tenderness.      Right lower leg: No edema.      Left lower leg: No edema.   Skin:     General: Skin is warm and dry.      Capillary Refill: Capillary refill takes less than 2 seconds.      Findings: No erythema or rash.   Neurological:      Mental Status: She is alert and oriented to person, place, and time. Mental status is at  baseline.      Motor: Weakness present.   Psychiatric:         Mood and Affect: Mood normal.         Behavior: Behavior normal.         Thought Content: Thought content normal.         Judgment: Judgment normal.     Full skin assessment completed by this NP           Incision/Site 11/11/22 2342 Back (Active)   11/11/22 2342   Present Prior to Hospital Arrival?:    Side:    Location: Back   Orientation:    Incision Type:    Closure Method:    Additional Comments:    Removal Indication and Assessment:    Wound Outcome:    Removal Indications:    Incision WDL ex 11/20/22 0800   Dressing Appearance Dry;Intact 11/20/22 2053   Drainage Amount None 11/20/22 2053   Drainage Characteristics/Odor No odor 11/20/22 2053   Appearance Sutures intact 11/20/22 2053   Periwound Area Dry 11/20/22 0800   Wound Edges Approximated 11/20/22 0800   Dressing Removed;Changed;Applied;Island/border 11/20/22 0800   Dressing Change Due 11/21/22 11/20/22 0800   Number of days: 10          Labs:  Recent Labs   Lab 12/09/22 0618 12/13/22  0400   WBC 5.80 8.88   HGB 11.5* 12.0   HCT 35.1* 37.9    353       Recent Labs   Lab 12/09/22 0618 12/13/22  0400   * 139   K 3.7 3.6    104   CO2 25 24   BUN 15 14   CREATININE 0.8 0.8    114*   CALCIUM 9.1 9.1   MG 1.8 1.9   PHOS 3.9 3.9       No results for input(s): ALKPHOS, ALT, AST, ALBUMIN, PROT, BILITOT, INR in the last 168 hours.  No results for input(s): POCTGLUCOSE in the last 72 hours.    Meds Scheduled:   celecoxib  200 mg Oral Daily    doxepin  50 mg Oral QHS    fluticasone furoate-vilanteroL  1 puff Inhalation Daily    gabapentin  600 mg Oral TID    heparin (porcine)  5,000 Units Subcutaneous Q8H    methocarbamoL  1,000 mg Oral TID    mupirocin   Topical (Top) Q14 Days    norethindrone  5 mg Oral Daily    pantoprazole  40 mg Oral Daily    psyllium husk (aspartame)  1 packet Oral Daily    venlafaxine  300 mg Oral Daily       PRN:   (Magic mouthwash) 1:1:1  diphenhydrAMINE(Benadryl) 12.5mg/5ml liq, aluminum & magnesium hydroxide-simethicone (Maalox), LIDOcaine viscous 2%, acetaminophen, albuterol, albuterol-ipratropium, ALPRAZolam, benzonatate, calcium carbonate, guaiFENesin 100 mg/5 ml, loperamide, melatonin, ondansetron, oxyCODONE, oxyCODONE      Assessment and Plan:    Fall  - Xray left femur- no acute fracture  - Xray sacrum-no acute fracture  -Monitor for neurological changes or worsening pain  -Seen in ER and returned to SNF-CT Lspine and Pelvic, no acute fracture  -Ortho team notified-No additional recommendations    SHAZIA (cauda equina syndrome)  SP L4-S1 Laminectomy 11.11.22  -PODAS boot for LLE foot drop, AFO LLE when ambulating  -PT/OT: WBAT spine precautions  -DVT PPx: Heparin TID      -Pain management, Celebrex, Robaxin, Oxycodone, Gabapentin      -Initiate order to increase Oxycodone to 15mg Q4hrs for severe pain 7-10/10    Debility   - Continue with PT/OT for gait training and strengthening and restoration of ADL's   - Encourage mobility, OOB in chair, and early ambulation as appropriate  - Fall precautions   - Monitor for bowel and bladder dysfunction  - Monitor for and prevent skin breakdown and pressure ulcers  - Continue DVT prophylaxis with heparin     Anticipate disposition:    Home with home health      Follow-up needed during SNF admission:       Follow-up needed after discharge from SNF:   - PCP within 1-2 weeks  - See appt scheduled below     No future appointments.        I certify that SNF services are required to be given on an inpatient basis because Marianela Shrestha needs for skilled nursing care and/or skilled rehabilitation are required on a daily basis and such services can only practically be provided in a skilled nursing facility setting and are for an ongoing condition for which she received inpatient care in the hospital.        Wanda Jacobs, EVONNE  Department of Hospital Medicine   [facility]      DOS: 12/13/2022       Patient note was created using MModal Dictation.  Any errors in syntax or even information may not have been identified and edited on initial review prior to signing this note.

## 2022-12-14 LAB
BASOPHILS # BLD AUTO: 0.04 K/UL (ref 0–0.2)
BASOPHILS NFR BLD: 0.5 % (ref 0–1.9)
DIFFERENTIAL METHOD: ABNORMAL
EOSINOPHIL # BLD AUTO: 0.5 K/UL (ref 0–0.5)
EOSINOPHIL NFR BLD: 5.5 % (ref 0–8)
ERYTHROCYTE [DISTWIDTH] IN BLOOD BY AUTOMATED COUNT: 14.3 % (ref 11.5–14.5)
HCT VFR BLD AUTO: 32.5 % (ref 37–48.5)
HGB BLD-MCNC: 10.5 G/DL (ref 12–16)
IMM GRANULOCYTES # BLD AUTO: 0.03 K/UL (ref 0–0.04)
IMM GRANULOCYTES NFR BLD AUTO: 0.4 % (ref 0–0.5)
LYMPHOCYTES # BLD AUTO: 2.5 K/UL (ref 1–4.8)
LYMPHOCYTES NFR BLD: 29.5 % (ref 18–48)
MCH RBC QN AUTO: 30.8 PG (ref 27–31)
MCHC RBC AUTO-ENTMCNC: 32.3 G/DL (ref 32–36)
MCV RBC AUTO: 95 FL (ref 82–98)
MONOCYTES # BLD AUTO: 0.6 K/UL (ref 0.3–1)
MONOCYTES NFR BLD: 7.3 % (ref 4–15)
NEUTROPHILS # BLD AUTO: 4.7 K/UL (ref 1.8–7.7)
NEUTROPHILS NFR BLD: 56.8 % (ref 38–73)
NRBC BLD-RTO: 0 /100 WBC
PLATELET # BLD AUTO: 296 K/UL (ref 150–450)
PMV BLD AUTO: 10.1 FL (ref 9.2–12.9)
RBC # BLD AUTO: 3.41 M/UL (ref 4–5.4)
WBC # BLD AUTO: 8.34 K/UL (ref 3.9–12.7)

## 2022-12-14 PROCEDURE — 25000003 PHARM REV CODE 250: Performed by: HOSPITALIST

## 2022-12-14 PROCEDURE — 97530 THERAPEUTIC ACTIVITIES: CPT | Mod: CQ

## 2022-12-14 PROCEDURE — 85025 COMPLETE CBC W/AUTO DIFF WBC: CPT | Performed by: HOSPITALIST

## 2022-12-14 PROCEDURE — 25000242 PHARM REV CODE 250 ALT 637 W/ HCPCS: Performed by: NURSE PRACTITIONER

## 2022-12-14 PROCEDURE — 25000003 PHARM REV CODE 250: Performed by: NURSE PRACTITIONER

## 2022-12-14 PROCEDURE — 63600175 PHARM REV CODE 636 W HCPCS: Performed by: NURSE PRACTITIONER

## 2022-12-14 PROCEDURE — 97116 GAIT TRAINING THERAPY: CPT | Mod: CQ

## 2022-12-14 PROCEDURE — 97530 THERAPEUTIC ACTIVITIES: CPT | Mod: CO

## 2022-12-14 PROCEDURE — 11000004 HC SNF PRIVATE

## 2022-12-14 RX ORDER — POLYETHYLENE GLYCOL 3350 17 G/17G
17 POWDER, FOR SOLUTION ORAL 2 TIMES DAILY
Status: DISCONTINUED | OUTPATIENT
Start: 2022-12-14 | End: 2022-12-16 | Stop reason: HOSPADM

## 2022-12-14 RX ORDER — SENNOSIDES 8.6 MG/1
8.6 TABLET ORAL DAILY PRN
Status: DISCONTINUED | OUTPATIENT
Start: 2022-12-14 | End: 2022-12-16 | Stop reason: HOSPADM

## 2022-12-14 RX ORDER — LACTULOSE 10 G/15ML
30 SOLUTION ORAL EVERY 6 HOURS PRN
Status: DISCONTINUED | OUTPATIENT
Start: 2022-12-14 | End: 2022-12-16 | Stop reason: HOSPADM

## 2022-12-14 RX ADMIN — GABAPENTIN 600 MG: 300 CAPSULE ORAL at 08:12

## 2022-12-14 RX ADMIN — NORETHINDRONE ACETATE 5 MG: 5 TABLET ORAL at 09:12

## 2022-12-14 RX ADMIN — ONDANSETRON 8 MG: 8 TABLET, ORALLY DISINTEGRATING ORAL at 05:12

## 2022-12-14 RX ADMIN — VENLAFAXINE HYDROCHLORIDE 300 MG: 75 CAPSULE, EXTENDED RELEASE ORAL at 08:12

## 2022-12-14 RX ADMIN — OXYCODONE HYDROCHLORIDE 15 MG: 10 TABLET ORAL at 05:12

## 2022-12-14 RX ADMIN — BENZONATATE 100 MG: 100 CAPSULE ORAL at 05:12

## 2022-12-14 RX ADMIN — HEPARIN SODIUM 5000 UNITS: 5000 INJECTION INTRAVENOUS; SUBCUTANEOUS at 01:12

## 2022-12-14 RX ADMIN — HEPARIN SODIUM 5000 UNITS: 5000 INJECTION INTRAVENOUS; SUBCUTANEOUS at 10:12

## 2022-12-14 RX ADMIN — FLUTICASONE FUROATE AND VILANTEROL TRIFENATATE 1 PUFF: 100; 25 POWDER RESPIRATORY (INHALATION) at 09:12

## 2022-12-14 RX ADMIN — PANTOPRAZOLE SODIUM 40 MG: 40 TABLET, DELAYED RELEASE ORAL at 09:12

## 2022-12-14 RX ADMIN — METHOCARBAMOL 1000 MG: 500 TABLET ORAL at 08:12

## 2022-12-14 RX ADMIN — PSYLLIUM HUSK 1 PACKET: 3.4 POWDER ORAL at 09:12

## 2022-12-14 RX ADMIN — GABAPENTIN 600 MG: 300 CAPSULE ORAL at 03:12

## 2022-12-14 RX ADMIN — GUAIFENESIN 200 MG: 100 SOLUTION ORAL at 05:12

## 2022-12-14 RX ADMIN — METHOCARBAMOL 1000 MG: 500 TABLET ORAL at 03:12

## 2022-12-14 RX ADMIN — OXYCODONE HYDROCHLORIDE 15 MG: 10 TABLET ORAL at 01:12

## 2022-12-14 RX ADMIN — ALPRAZOLAM 1 MG: 0.5 TABLET ORAL at 08:12

## 2022-12-14 RX ADMIN — OXYCODONE HYDROCHLORIDE 15 MG: 10 TABLET ORAL at 09:12

## 2022-12-14 RX ADMIN — DOXEPIN HYDROCHLORIDE 50 MG: 50 CAPSULE ORAL at 08:12

## 2022-12-14 RX ADMIN — CELECOXIB 200 MG: 200 CAPSULE ORAL at 09:12

## 2022-12-14 RX ADMIN — GUAIFENESIN 200 MG: 100 SOLUTION ORAL at 01:12

## 2022-12-14 RX ADMIN — POLYETHYLENE GLYCOL 3350 17 G: 17 POWDER, FOR SOLUTION ORAL at 12:12

## 2022-12-14 RX ADMIN — HEPARIN SODIUM 5000 UNITS: 5000 INJECTION INTRAVENOUS; SUBCUTANEOUS at 05:12

## 2022-12-14 RX ADMIN — OXYCODONE HYDROCHLORIDE 15 MG: 10 TABLET ORAL at 08:12

## 2022-12-14 NOTE — PROGRESS NOTES
[facility]                                  Skilled Nursing Facility                   Progress Note     Patient Name: Marianela Shrestha  YOB: 1985  MRN: [id number]  Room: [place] [address] [address] A     Admit Date: 11/18/2022   CARLITA: 12/16/2022     Principal Problem: SHAZIA (cauda equina syndrome)    HPI obtained from patient interview and chart review     Chief Complaint:   Re-evaluation of medical treatment and therapy status: constipation      HPI:   Marianela Shrestha is a 37 y.o. female with PMH of drug abuse, low back pain,  LLE radiculopathy and right hand osteomyelitis treated with surgery presents for evaluation of 3 day history LLE weakness and changes in bowel and bladder acutely worse in last 24 hours prior to arrival to ER. The patient reports chronic back pain. However, she fell , twisting her ankle and low back.  S/P L4, L5, S1 laminectomy 11/11/22 by Dr. Harry. Post operatively she maintains decreased but able to feel bladder getting full and control her voiding. She maintains decreased rectal sensation but able to control her defecation.    Patient will be treated at [facility] with PT and OT to improve functional status and ability to perform ADLs.     Interval History  Pt c/o constipation last BM 2 days ago. Denies abd pain, n/v.     Past Medical History: Patient has a past medical history of Anxiety, Chronic osteomyelitis of right hand (2019), Chronic pain syndrome, Depression, Insomnia, Post traumatic stress disorder (PTSD), and Sciatica.    Past Surgical History: Patient has a past surgical history that includes Tonsillectomy; Cosmetic surgery; Pelvic laparoscopy; Finger surgery; Ablation of medial branch nerve of lumbar spine facet joint; Functional endoscopic sinus surgery (FESS) using computer-assisted navigation (Bilateral, 7/1/2022); Maxillary antrostomy (7/1/2022); Ethmoidectomy (7/1/2022); and Lumbar  laminectomy (N/A, 11/11/2022).    Social History: Patient reports that she quit smoking about 12 months ago. Her smoking use included cigarettes. She started smoking about 5 years ago. She has a 3.75 pack-year smoking history. She has never used smokeless tobacco. She reports that she does not currently use alcohol. She reports that she does not use drugs.    Family History:  family history includes Cervical cancer in her maternal aunt; Colon cancer in her maternal aunt; Diabetes in her maternal aunt, maternal grandmother, and mother; Hypertension in her mother.    Allergies: Patient is allergic to coconut; sulfa (sulfonamide antibiotics); adhesive; and latex, natural rubber.        Review of Systems   Constitutional:  Positive for malaise/fatigue. Negative for chills and fever.   HENT:  Negative for congestion, hearing loss and sore throat.    Eyes:  Negative for blurred vision and double vision.   Respiratory:  Negative for cough, sputum production, shortness of breath and wheezing.    Cardiovascular:  Negative for chest pain, palpitations and leg swelling.   Gastrointestinal:  Negative for abdominal pain, constipation, diarrhea, heartburn, nausea and vomiting.   Genitourinary:  Negative for dysuria and urgency.   Musculoskeletal:  Negative for back pain.   Skin:  Negative for rash.        + wound   Neurological:  Positive for weakness. Negative for dizziness and headaches.   Endo/Heme/Allergies:  Negative for polydipsia. Does not bruise/bleed easily.   Psychiatric/Behavioral:  Negative for depression and hallucinations. The patient is not nervous/anxious.        24 hour Vital Sign Range   Temp:  [97.8 °F (36.6 °C)-99.1 °F (37.3 °C)]   Pulse:  []   Resp:  [16-18]   BP: (106-167)/()   SpO2:  [95 %-96 %]       Physical Exam  Vitals and nursing note reviewed.   Constitutional:       General: She is not in acute distress.     Appearance: Normal appearance. She is not ill-appearing.   HENT:      Head:  Normocephalic and atraumatic.      Nose: Nose normal. No congestion.      Mouth/Throat:      Mouth: Mucous membranes are moist.      Pharynx: Oropharynx is clear.   Eyes:      Extraocular Movements: Extraocular movements intact.      Conjunctiva/sclera: Conjunctivae normal.      Pupils: Pupils are equal, round, and reactive to light.   Cardiovascular:      Rate and Rhythm: Normal rate and regular rhythm.      Pulses: Normal pulses.      Heart sounds: Normal heart sounds. No murmur heard.  Pulmonary:      Effort: Pulmonary effort is normal. No respiratory distress.      Breath sounds: Normal breath sounds. No wheezing or rhonchi.   Abdominal:      General: Bowel sounds are normal. There is no distension.      Palpations: Abdomen is soft. There is no mass.      Tenderness: There is no abdominal tenderness.   Musculoskeletal:         General: No swelling or tenderness.      Cervical back: Normal range of motion and neck supple. No tenderness.      Right lower leg: No edema.      Left lower leg: No edema.   Skin:     General: Skin is warm and dry.      Capillary Refill: Capillary refill takes less than 2 seconds.      Findings: No erythema or rash.   Neurological:      Mental Status: She is alert and oriented to person, place, and time. Mental status is at baseline.      Motor: Weakness present.   Psychiatric:         Mood and Affect: Mood normal.         Behavior: Behavior normal.         Thought Content: Thought content normal.         Judgment: Judgment normal.     Full skin assessment completed by this NP           Incision/Site 11/11/22 2342 Back (Active)   11/11/22 2342   Present Prior to Hospital Arrival?:    Side:    Location: Back   Orientation:    Incision Type:    Closure Method:    Additional Comments:    Removal Indication and Assessment:    Wound Outcome:    Removal Indications:    Incision WDL ex 11/20/22 0800   Dressing Appearance Dry;Intact 11/20/22 2053   Drainage Amount None 11/20/22 2053   Drainage  Characteristics/Odor No odor 11/20/22 2053   Appearance Sutures intact 11/20/22 2053   Periwound Area Dry 11/20/22 0800   Wound Edges Approximated 11/20/22 0800   Dressing Removed;Changed;Applied;Island/border 11/20/22 0800   Dressing Change Due 11/21/22 11/20/22 0800   Number of days: 10          Labs:  Recent Labs   Lab 12/09/22 0618 12/13/22  0400   WBC 5.80 8.88   HGB 11.5* 12.0   HCT 35.1* 37.9    353       Recent Labs   Lab 12/09/22 0618 12/13/22  0400   * 139   K 3.7 3.6    104   CO2 25 24   BUN 15 14   CREATININE 0.8 0.8    114*   CALCIUM 9.1 9.1   MG 1.8 1.9   PHOS 3.9 3.9       No results for input(s): ALKPHOS, ALT, AST, ALBUMIN, PROT, BILITOT, INR in the last 168 hours.  No results for input(s): POCTGLUCOSE in the last 72 hours.    Meds Scheduled:   celecoxib  200 mg Oral Daily    doxepin  50 mg Oral QHS    fluticasone furoate-vilanteroL  1 puff Inhalation Daily    gabapentin  600 mg Oral TID    heparin (porcine)  5,000 Units Subcutaneous Q8H    methocarbamoL  1,000 mg Oral TID    mupirocin   Topical (Top) Q14 Days    norethindrone  5 mg Oral Daily    pantoprazole  40 mg Oral Daily    polyethylene glycol  17 g Oral BID    sodium phosphates  1 enema Rectal Once    venlafaxine  300 mg Oral Daily       PRN:   (Magic mouthwash) 1:1:1 diphenhydrAMINE(Benadryl) 12.5mg/5ml liq, aluminum & magnesium hydroxide-simethicone (Maalox), LIDOcaine viscous 2%, acetaminophen, albuterol, albuterol-ipratropium, ALPRAZolam, benzonatate, calcium carbonate, guaiFENesin 100 mg/5 ml, lactulose, loperamide, melatonin, ondansetron, oxyCODONE, oxyCODONE, senna      Assessment and Plan:    Fall  - Xray left femur- no acute fracture  - Xray sacrum-no acute fracture  -Monitor for neurological changes or worsening pain  -Seen in ER and returned to SNF-CT Lspine and Pelvic, no acute fracture  -Ortho team notified-No additional recommendations    SHAZIA (cauda equina syndrome)  SP L4-S1 Laminectomy  11.11.22  -PODAS boot for LLE foot drop, AFO LLE when ambulating  -PT/OT: WBAT spine precautions  -DVT PPx: Heparin TID      -Pain management, Celebrex, Robaxin, Oxycodone, Gabapentin      -Initiate order to increase Oxycodone to 15mg Q4hrs for severe pain 7-10/10    Debility   - Continue with PT/OT for gait training and strengthening and restoration of ADL's   - Encourage mobility, OOB in chair, and early ambulation as appropriate  - Fall precautions   - Monitor for bowel and bladder dysfunction  - Monitor for and prevent skin breakdown and pressure ulcers  - Continue DVT prophylaxis with heparin     Constipation  -Initiate order for Fleets enema      Anticipate disposition:    Home with home health      Follow-up needed during SNF admission:       Follow-up needed after discharge from SNF:   - PCP within 1-2 weeks  - See appt scheduled below     No future appointments.        I certify that SNF services are required to be given on an inpatient basis because Marianela Shrestha needs for skilled nursing care and/or skilled rehabilitation are required on a daily basis and such services can only practically be provided in a skilled nursing facility setting and are for an ongoing condition for which she received inpatient care in the hospital.        Wanda Jacobs NP  Department of Hospital Medicine   [facility]     DOS: 12/14/2022       Patient note was created using MModal Dictation.  Any errors in syntax or even information may not have been identified and edited on initial review prior to signing this note.

## 2022-12-14 NOTE — PT/OT/SLP PROGRESS
Occupational Therapy   Treatment    Name: Marianela Shrestha  MRN: [id number]  Admit Date: 11/18/2022  Admitting Diagnosis:  SHAZIA (cauda equina syndrome)    General Precautions: Standard, fall   Orthopedic Precautions: spinal precautions   Braces: AFO    Recommendations:     Discharge Recommendations:  home health OT  Level of Assistance Recommended at Discharge: Intermittent assistance for ADL's and homemaking tasks  Discharge Equipment Recommendations:  (to be determined)  Barriers to discharge:  Decreased caregiver support    Assessment:     Marianela Shrestha is a 37 y.o. female with a medical diagnosis of SHAZIA (cauda equina syndrome).  She presents with  Performance deficits affecting function are weakness, impaired endurance, impaired self care skills, impaired functional mobility, gait instability, impaired balance, decreased upper extremity function, decreased lower extremity function, decreased safety awareness, pain, decreased ROM, orthopedic precautions.     Pt. Was cooperative and participated well with session on this day. Pt. Re educated on spinal precautions Pt  continues to demonstrate levels of physical deficits with  functional indep with daily management activities tasks, selfcare skills with balance,  functional mobility, UB strength and endurance. Pt. Will continue to benefit from continued OT to progress towards goals      Rehab Potential is fair    Activity tolerance:  Fair    Plan:     Patient to be seen 6 x/week to address the above listed problems via self-care/home management, therapeutic activities, therapeutic exercises    Plan of Care Expires: 12/19/22  Plan of Care Reviewed with: patient    Subjective     Communicated with: nsg and Pt prior to session.  I am doing well today    Pain/Comfort:  Pain Rating 1: 6/10  Location - Side 1: Left  Location - Orientation 1: generalized  Location 1: foot  Pain Addressed 1: Distraction, Reposition, Pre-medicate for activity  Pain Rating  Post-Intervention 1: 7/10    Patient's cultural, spiritual, Religion conflicts given the current situation:  no    Objective:     Patient found supine with  (afo) upon OT entry to room.    Bed Mobility:    Patient completed Scooting/Bridging with modified independence  Patient completed Supine to Sit with modified independence  Patient completed Sit to Supine with modified independence     Functional Mobility/Transfers:  Patient completed Sit <> Stand Transfer with contact guard assistance  with  rolling walker   Patient completed Bed <> Chair Transfer using Stand Pivot technique with contact guard assistance with rolling walker  Pt. Also with fxl w/c mobility from room to gym with SBA approx 80 ft   Pt. Also with cues for safety Pt. With locking w/c and proceeding to get up and not alerting clinician and spoke to pt in regard to safety  and getting up w/o notice and re education on spinal precautions     Activities of Daily Living:  Grooming: Modified [place] at sink level with grooming needs  Lower body dressing: Supervision to snow/doff socks and AFO. Pt. Noted with mild swelling in LLE      AMPAC 6 Click ADL: 20    OT Exercises: UE Ergometer 10 min    Treatment & Education:  Pt. With therex performed to increase ROM, endurance selfcare task and fxl mobility for independence     Pt edu on role of OT, POC, safety when performing self care tasks , benefit of performing OOB activity, and safety when performing functional transfers and mobility management for preparation with goals to progress towards next level of care     Patient left up in chair with  PTA  present    GOALS:   Multidisciplinary Problems       Occupational Therapy Goals          Problem: Occupational Therapy    Goal Priority Disciplines Outcome Interventions   Occupational Therapy Goal     OT, PT/OT Ongoing, Progressing    Description: Goals to be met by: 3 weeks     Patient will increase functional independence with ADLs by performing:    UE  Dressing with Modified [place].  LE Dressing with Stand-by Assistance.  Grooming while seated with Modified [place].  Toileting from toilet or BSC with SBA for hygiene and clothing management. With A/D to stand.  Bathing from sitting at sink with Stand-by Assistance.  Supine to sit with Modified [place].  Step transfer with Stand-by Assistance with A/D as needed.  Upper extremity exercise with assistance as needed.  Caregiver will be educated on level of assist required to safely perform self care tasks and functional transfers..                          Time Tracking:     OT Date of Treatment: 12/14/22  OT Start Time: 1116    OT Stop Time: 1154  OT Total Time (min): 38 min    Billable Minutes:Therapeutic Activity 38    12/14/2022

## 2022-12-15 ENCOUNTER — TELEPHONE (OUTPATIENT)
Dept: INTERNAL MEDICINE | Facility: CLINIC | Age: 37
End: 2022-12-15
Payer: MEDICARE

## 2022-12-15 LAB
BASOPHILS # BLD AUTO: 0.04 K/UL (ref 0–0.2)
BASOPHILS NFR BLD: 0.5 % (ref 0–1.9)
DIFFERENTIAL METHOD: ABNORMAL
EOSINOPHIL # BLD AUTO: 0.4 K/UL (ref 0–0.5)
EOSINOPHIL NFR BLD: 4.7 % (ref 0–8)
ERYTHROCYTE [DISTWIDTH] IN BLOOD BY AUTOMATED COUNT: 14.7 % (ref 11.5–14.5)
HCT VFR BLD AUTO: 35.1 % (ref 37–48.5)
HGB BLD-MCNC: 11.1 G/DL (ref 12–16)
IMM GRANULOCYTES # BLD AUTO: 0.04 K/UL (ref 0–0.04)
IMM GRANULOCYTES NFR BLD AUTO: 0.5 % (ref 0–0.5)
LYMPHOCYTES # BLD AUTO: 2 K/UL (ref 1–4.8)
LYMPHOCYTES NFR BLD: 23.8 % (ref 18–48)
MCH RBC QN AUTO: 30.7 PG (ref 27–31)
MCHC RBC AUTO-ENTMCNC: 31.6 G/DL (ref 32–36)
MCV RBC AUTO: 97 FL (ref 82–98)
MONOCYTES # BLD AUTO: 0.7 K/UL (ref 0.3–1)
MONOCYTES NFR BLD: 8.1 % (ref 4–15)
NEUTROPHILS # BLD AUTO: 5.2 K/UL (ref 1.8–7.7)
NEUTROPHILS NFR BLD: 62.4 % (ref 38–73)
NRBC BLD-RTO: 0 /100 WBC
PLATELET # BLD AUTO: 336 K/UL (ref 150–450)
PMV BLD AUTO: 10.1 FL (ref 9.2–12.9)
RBC # BLD AUTO: 3.62 M/UL (ref 4–5.4)
WBC # BLD AUTO: 8.25 K/UL (ref 3.9–12.7)

## 2022-12-15 PROCEDURE — 97530 THERAPEUTIC ACTIVITIES: CPT | Mod: CO

## 2022-12-15 PROCEDURE — 11000004 HC SNF PRIVATE

## 2022-12-15 PROCEDURE — 85025 COMPLETE CBC W/AUTO DIFF WBC: CPT | Performed by: NURSE PRACTITIONER

## 2022-12-15 PROCEDURE — 97116 GAIT TRAINING THERAPY: CPT | Mod: CQ

## 2022-12-15 PROCEDURE — 25000003 PHARM REV CODE 250: Performed by: HOSPITALIST

## 2022-12-15 PROCEDURE — 36415 COLL VENOUS BLD VENIPUNCTURE: CPT | Performed by: NURSE PRACTITIONER

## 2022-12-15 PROCEDURE — 97530 THERAPEUTIC ACTIVITIES: CPT | Mod: CQ

## 2022-12-15 PROCEDURE — 25000003 PHARM REV CODE 250: Performed by: NURSE PRACTITIONER

## 2022-12-15 PROCEDURE — 63600175 PHARM REV CODE 636 W HCPCS: Performed by: NURSE PRACTITIONER

## 2022-12-15 PROCEDURE — 97110 THERAPEUTIC EXERCISES: CPT | Mod: CQ

## 2022-12-15 RX ORDER — METHOCARBAMOL 500 MG/1
1000 TABLET, FILM COATED ORAL 3 TIMES DAILY
Qty: 84 TABLET | Refills: 0 | Status: SHIPPED | OUTPATIENT
Start: 2022-12-15 | End: 2022-12-29

## 2022-12-15 RX ORDER — ONDANSETRON 4 MG/1
8 TABLET, ORALLY DISINTEGRATING ORAL EVERY 8 HOURS PRN
Qty: 20 TABLET | Refills: 0 | Status: SHIPPED | OUTPATIENT
Start: 2022-12-15 | End: 2022-12-20

## 2022-12-15 RX ORDER — ALBUTEROL SULFATE 90 UG/1
2 AEROSOL, METERED RESPIRATORY (INHALATION) EVERY 6 HOURS PRN
Qty: 18 G | Refills: 0 | Status: ON HOLD | OUTPATIENT
Start: 2022-12-15 | End: 2023-05-15 | Stop reason: HOSPADM

## 2022-12-15 RX ORDER — POLYETHYLENE GLYCOL 3350 17 G/17G
17 POWDER, FOR SOLUTION ORAL 2 TIMES DAILY
Qty: 60 EACH | Refills: 0 | Status: SHIPPED | OUTPATIENT
Start: 2022-12-15 | End: 2023-01-14

## 2022-12-15 RX ORDER — GABAPENTIN 600 MG/1
600 TABLET ORAL 3 TIMES DAILY
Qty: 45 TABLET | Refills: 0 | Status: SHIPPED | OUTPATIENT
Start: 2022-12-15 | End: 2023-01-17 | Stop reason: SDUPTHER

## 2022-12-15 RX ORDER — CELECOXIB 200 MG/1
200 CAPSULE ORAL DAILY
Qty: 30 CAPSULE | Refills: 0 | Status: SHIPPED | OUTPATIENT
Start: 2022-12-15 | End: 2023-01-14

## 2022-12-15 RX ORDER — OXYCODONE HYDROCHLORIDE 5 MG/1
15 TABLET ORAL EVERY 6 HOURS PRN
Qty: 21 TABLET | Refills: 0 | Status: SHIPPED | OUTPATIENT
Start: 2022-12-15 | End: 2022-12-20 | Stop reason: SDUPTHER

## 2022-12-15 RX ADMIN — FLUTICASONE FUROATE AND VILANTEROL TRIFENATATE 1 PUFF: 100; 25 POWDER RESPIRATORY (INHALATION) at 08:12

## 2022-12-15 RX ADMIN — HEPARIN SODIUM 5000 UNITS: 5000 INJECTION INTRAVENOUS; SUBCUTANEOUS at 01:12

## 2022-12-15 RX ADMIN — NORETHINDRONE ACETATE 5 MG: 5 TABLET ORAL at 08:12

## 2022-12-15 RX ADMIN — GABAPENTIN 600 MG: 300 CAPSULE ORAL at 08:12

## 2022-12-15 RX ADMIN — PANTOPRAZOLE SODIUM 40 MG: 40 TABLET, DELAYED RELEASE ORAL at 08:12

## 2022-12-15 RX ADMIN — OXYCODONE HYDROCHLORIDE 15 MG: 10 TABLET ORAL at 12:12

## 2022-12-15 RX ADMIN — DOXEPIN HYDROCHLORIDE 50 MG: 50 CAPSULE ORAL at 09:12

## 2022-12-15 RX ADMIN — CELECOXIB 200 MG: 200 CAPSULE ORAL at 08:12

## 2022-12-15 RX ADMIN — OXYCODONE HYDROCHLORIDE 15 MG: 10 TABLET ORAL at 04:12

## 2022-12-15 RX ADMIN — ALPRAZOLAM 1 MG: 0.5 TABLET ORAL at 09:12

## 2022-12-15 RX ADMIN — GABAPENTIN 600 MG: 300 CAPSULE ORAL at 02:12

## 2022-12-15 RX ADMIN — GABAPENTIN 600 MG: 300 CAPSULE ORAL at 09:12

## 2022-12-15 RX ADMIN — VENLAFAXINE HYDROCHLORIDE 300 MG: 75 CAPSULE, EXTENDED RELEASE ORAL at 08:12

## 2022-12-15 RX ADMIN — METHOCARBAMOL 1000 MG: 500 TABLET ORAL at 09:12

## 2022-12-15 RX ADMIN — HEPARIN SODIUM 5000 UNITS: 5000 INJECTION INTRAVENOUS; SUBCUTANEOUS at 09:12

## 2022-12-15 RX ADMIN — METHOCARBAMOL 1000 MG: 500 TABLET ORAL at 08:12

## 2022-12-15 RX ADMIN — HEPARIN SODIUM 5000 UNITS: 5000 INJECTION INTRAVENOUS; SUBCUTANEOUS at 05:12

## 2022-12-15 RX ADMIN — METHOCARBAMOL 1000 MG: 500 TABLET ORAL at 02:12

## 2022-12-15 RX ADMIN — OXYCODONE HYDROCHLORIDE 15 MG: 10 TABLET ORAL at 08:12

## 2022-12-15 RX ADMIN — OXYCODONE HYDROCHLORIDE 15 MG: 10 TABLET ORAL at 09:12

## 2022-12-15 RX ADMIN — ALPRAZOLAM 1 MG: 0.5 TABLET ORAL at 08:12

## 2022-12-15 NOTE — PT/OT/SLP PROGRESS
Occupational Therapy   Treatment    Name: Marianela Shrestha  MRN: [id number]  Admit Date: 11/18/2022  Admitting Diagnosis:  SHAZIA (cauda equina syndrome)    General Precautions: Standard, fall   Orthopedic Precautions: spinal precautions   Braces: AFO    Recommendations:     Discharge Recommendations:  home health OT  Level of Assistance Recommended at Discharge: Intermittent assistance for ADL's and homemaking tasks  Discharge Equipment Recommendations:  (to be determined)  Barriers to discharge:  Decreased caregiver support    Assessment:     Marianela Shrestha is a 37 y.o. female with a medical diagnosis of SHAZIA (cauda equina syndrome).  She presents with  Performance deficits affecting function are weakness, impaired endurance, impaired self care skills, impaired functional mobility, gait instability, impaired balance, decreased upper extremity function, decreased lower extremity function, decreased safety awareness, pain, decreased ROM, orthopedic precautions.     Pt. Was cooperative and participated well with session on this day. Pt. Re educated on spinal precautions Pt  continues to demonstrate levels of physical deficits with  functional indep with daily management activities tasks, selfcare skills with balance,  functional mobility, UB strength and endurance. Pt. Will continue to benefit from continued OT to progress towards goals      Rehab Potential is fair    Activity tolerance:  Fair    Plan:     Patient to be seen 6 x/week to address the above listed problems via self-care/home management, therapeutic activities, therapeutic exercises    Plan of Care Expires: 12/19/22  Plan of Care Reviewed with: patient    Subjective     Communicated with: nsg and Pt prior to session.  I am doing well today    Pain/Comfort:  Pain Rating 1: 6/10  Location - Side 1: Left  Location 1: foot  Pain Addressed 1: Pre-medicate for activity, Reposition, Distraction  Pain Rating Post-Intervention 1: 6/10    Patient's cultural,  spiritual, Jainism conflicts given the current situation:  no    Objective:     Patient found supine with  (afo) upon OT entry to room.    Bed Mobility:    Patient completed Sit to Supine with modified independence     Functional Mobility/Transfers:  Patient completed Sit <> Stand Transfer with contact guard assistance  with  rolling walker   Patient completed Bed <> Chair Transfer using Stand Pivot technique with contact guard assistance with rolling walker  Pt. Also with fxl w/c mobility from gym to room with SBA approx 80 ft     Activities of Daily Living:  Lower body dressing: Supervision to doff socks and AFO. Pt. Noted with mild swelling in LLE      AMPAC 6 Click ADL: 20    OT Exercises: UE Ergometer 10 min    Treatment & Education:  Pt. With standing act on this day with task. Pt. With CGA/SBA for balance aspects with task with  AD at raised counter Pt with visual perception task with discrimination of various shapes and sizes 5 min and then 5:58 with standing bal and min cues through out with weight shifting and use of BUE's incorporated and crossing mid line and facilitation with posture in prep for home management .     Pt. With 2# dowel activity with 2x20 reps with  shd flex, bicep curls horz adb/add and forward flex motion to increase BUE ROM and strength.    Pt. With therex performed to increase ROM, endurance selfcare task and fxl mobility for independence     Pt edu on role of OT, POC, safety when performing self care tasks , benefit of performing OOB activity, and safety when performing functional transfers and mobility management for preparation with goals to progress towards next level of care     Patient left supine with all lines intact and call button in reach    GOALS:   Multidisciplinary Problems       Occupational Therapy Goals          Problem: Occupational Therapy    Goal Priority Disciplines Outcome Interventions   Occupational Therapy Goal     OT, PT/OT Ongoing, Progressing     Description: Goals to be met by: 3 weeks     Patient will increase functional independence with ADLs by performing:    UE Dressing with Modified [place].  LE Dressing with Stand-by Assistance.  Grooming while seated with Modified [place].  Toileting from toilet or BSC with SBA for hygiene and clothing management. With A/D to stand.  Bathing from sitting at sink with Stand-by Assistance.  Supine to sit with Modified [place].  Step transfer with Stand-by Assistance with A/D as needed.  Upper extremity exercise with assistance as needed.  Caregiver will be educated on level of assist required to safely perform self care tasks and functional transfers..                          Time Tracking:     OT Date of Treatment: 12/15/22  OT Start Time: 1138    OT Stop Time: 1213  OT Total Time (min): 35 min    Billable Minutes:Therapeutic Activity 35    12/15/2022

## 2022-12-15 NOTE — TELEPHONE ENCOUNTER
----- Message from Marilee Donovan sent at 12/15/2022  9:17 AM CST -----  Contact: [phone] @ Patient  Patient needs a Hosp follow up appt with their PCP only.       When is the next available appointment:  no appointment    Symptoms:  Hospital    Discharge date:12/23/2022    Needs to be seen by:Dr Espino    Would you prefer an answer via [employer]?: call      Comments:

## 2022-12-15 NOTE — PT/OT/SLP PROGRESS
"Physical Therapy Treatment    Patient Name:  Marianela Shrestha   MRN:  [id number]  Admit Date: 11/18/2022  Admitting Diagnosis: SHAZIA (cauda equina syndrome)    General Precautions: Standard, fall  Orthopedic Precautions: spinal precautions  Braces: AFO    Recommendations:     Discharge Recommendations: home health PT  Level of Assistance Recommended at Discharge: Intermittent assistance   Discharge Equipment Recommendations: bedside commode, bath bench, wheelchair, walker, rolling  Barriers to discharge: Decreased caregiver support    Assessment:     Marianela Shrestha is a 37 y.o. female admitted with a medical diagnosis of SHAZIA (cauda equina syndrome). Pt tolerated session well with no adverse effects noted. She has open blisters on LLE and R upper arm which she reports picking at occasionally especially when anxious. Pt will continue to benefit from skilled therapy services to improve LE strength and endurance to continue to improve functional mobility and reach highest levels of independence.    Performance deficits affecting function: weakness, impaired endurance, impaired functional mobility, gait instability, impaired balance, decreased lower extremity function, decreased safety awareness, pain, decreased ROM, orthopedic precautions.    Rehab Potential is good    Activity Tolerance: Fair    Plan:     Patient to be seen 6 x/week to address the above listed problems via gait training, therapeutic activities, therapeutic exercises, neuromuscular re-education, wheelchair management/training    Plan of Care Expires: 12/19/22  Plan of Care Reviewed with: patient    Subjective     "I am leaving tomorrow, so I am hopeful that I won't have any more problems."     Pain/Comfort:  Pain Rating 1: other (see comments) (not rated)  Location - Side 1: Left  Location 1: leg  Pain Addressed 1: Pre-medicate for activity, Reposition, Distraction  Pain Rating Post-Intervention 1: other (see comments) (not rated)  Pain Rating 2: " "other (see comments) (not rated)  Location - Orientation 2: lower  Location 2: back  Pain Addressed 2: Pre-medicate for activity, Reposition, Distraction  Pain Rating Post-Intervention 2: other (see comments) (not rated)    Patient's cultural, spiritual, Protestant conflicts given the current situation:  no    Objective:     Patient found HOB elevated with  (no active lines) upon PTA entry to room.     Therapeutic Activities and Exercises:   Time for pericare, dressing, and donning AFO prior to session with cuing and education for spinal precaution adherence throughout and suggestions for modification    Recumbent cross  lvl 1 x 10 minutes    Functional Mobility:  Bed Mobility:     Supine to Sit: supervision  Transfers:     Sit to Stand: stand by assistance with rolling walker  Bed to Chair: stand by assistance with no AD using Stand Pivot vc for precautions  Chair <> NuStep: stand by assistance with  no AD using Stand Pivot vc for precautions  Gait: Pt ambulated 190' + 90' using RW with CGA for safety  No LOB or overt instability noted  Stairs: Pt ascended/descended 4" curb step with Rolling Walker with Contact Guard Assistance   Wheelchair Propulsion: Pt propelled Standard wheelchair x ~200 feet on Level tile with Bilateral upper extremity with Stand-by Assistance    AM-PAC 6 CLICK MOBILITY  20    Patient left up in chair with  OT present and able to assume care of pt.    GOALS:   Multidisciplinary Problems       Physical Therapy Goals          Problem: Physical Therapy    Goal Priority Disciplines Outcome Goal Variances Interventions   Physical Therapy Goal     PT, PT/OT Ongoing, Progressing     Description: Goals to be met by:12/19/22    Patient will increase functional independence with mobility by performing:    Sit to stand transfer with Supervision - Met 12/5  Updated goal: Sit to stand transfer with Mod I    Bed to chair transfer with Supervision using Rolling Walker - Met 12/5  Updated Goal: Bed to " chair transfer with Mod I using RW    Gait  x 150 feet with Stand-by Assistance using Rolling Walker. - Met 12/13  Updated Goal: Gait x 150 feet with Supervision using RW    Ascend/descend 4 stair with bilateral Handrails Stand-by Assistance using No Assistive Device. - Met 12/13  Updated Goal: Ascend/descend 4 steps with B HR and Supervision    Ascend/Descend 4 inch curb step with Stand-by Assistance using Rolling Walker.                         Time Tracking:     PT Received On: 12/15/22  PT Start Time: 1049  PT Stop Time: 1131  PT Total Time (min): 42 min    Billable Minutes: Gait Training 15, Therapeutic Activity 17, and Therapeutic Exercise 10    Treatment Type: Treatment  PT/PTA: PTA     PTA Visit Number: 2     12/15/2022

## 2022-12-15 NOTE — PROGRESS NOTES
[facility]                                  Skilled Nursing Facility                   Progress Note     Patient Name: Marianela Shrestha  YOB: 1985  MRN: [id number]  Room: [place] [address] [address] A     Admit Date: 11/18/2022   CARLITA: 12/16/2022     Principal Problem: SHAZIA (cauda equina syndrome)    HPI obtained from patient interview and chart review     Chief Complaint:   Re-evaluation of medical treatment and therapy status: constipation, rectal bleeding, hemorrhoids      HPI:   Marianela Shrestha is a 37 y.o. female with PMH of drug abuse, low back pain,  LLE radiculopathy and right hand osteomyelitis treated with surgery presents for evaluation of 3 day history LLE weakness and changes in bowel and bladder acutely worse in last 24 hours prior to arrival to ER. The patient reports chronic back pain. However, she fell , twisting her ankle and low back.  S/P L4, L5, S1 laminectomy 11/11/22 by Dr. Harry. Post operatively she maintains decreased but able to feel bladder getting full and control her voiding. She maintains decreased rectal sensation but able to control her defecation.    Patient will be treated at [facility] with PT and OT to improve functional status and ability to perform ADLs.     Interval History  Continue to c/o constipation. Pt states she had a small hard BM. Noticed bright red blood in toilet. Denies hemorrhoids. Continuing to follow and treat all acute and chronic conditions.    Past Medical History: Patient has a past medical history of Anxiety, Chronic osteomyelitis of right hand (2019), Chronic pain syndrome, Depression, Insomnia, Post traumatic stress disorder (PTSD), and Sciatica.    Past Surgical History: Patient has a past surgical history that includes Tonsillectomy; Cosmetic surgery; Pelvic laparoscopy; Finger surgery; Ablation of medial branch nerve of lumbar spine facet joint; Functional endoscopic  sinus surgery (FESS) using computer-assisted navigation (Bilateral, 7/1/2022); Maxillary antrostomy (7/1/2022); Ethmoidectomy (7/1/2022); and Lumbar laminectomy (N/A, 11/11/2022).    Social History: Patient reports that she quit smoking about 12 months ago. Her smoking use included cigarettes. She started smoking about 5 years ago. She has a 3.75 pack-year smoking history. She has never used smokeless tobacco. She reports that she does not currently use alcohol. She reports that she does not use drugs.    Family History:  family history includes Cervical cancer in her maternal aunt; Colon cancer in her maternal aunt; Diabetes in her maternal aunt, maternal grandmother, and mother; Hypertension in her mother.    Allergies: Patient is allergic to coconut; sulfa (sulfonamide antibiotics); adhesive; and latex, natural rubber.        Review of Systems   Constitutional:  Positive for malaise/fatigue. Negative for chills and fever.   HENT:  Negative for congestion, hearing loss and sore throat.    Eyes:  Negative for blurred vision and double vision.   Respiratory:  Negative for cough, sputum production, shortness of breath and wheezing.    Cardiovascular:  Negative for chest pain, palpitations and leg swelling.   Gastrointestinal:  Negative for abdominal pain, constipation, diarrhea, heartburn, nausea and vomiting.   Genitourinary:  Negative for dysuria and urgency.   Musculoskeletal:  Negative for back pain.   Skin:  Negative for rash.        + wound   Neurological:  Positive for weakness. Negative for dizziness and headaches.   Endo/Heme/Allergies:  Negative for polydipsia. Does not bruise/bleed easily.   Psychiatric/Behavioral:  Negative for depression and hallucinations. The patient is not nervous/anxious.        24 hour Vital Sign Range   Temp:  [98.2 °F (36.8 °C)-98.5 °F (36.9 °C)]   Pulse:  [81-94]   Resp:  [16-18]   BP: (114-126)/(61-75)   SpO2:  [95 %-97 %]       Physical Exam  Vitals and nursing note reviewed.    Constitutional:       General: She is not in acute distress.     Appearance: Normal appearance. She is not ill-appearing.   HENT:      Head: Normocephalic and atraumatic.      Nose: Nose normal. No congestion.      Mouth/Throat:      Mouth: Mucous membranes are moist.      Pharynx: Oropharynx is clear.   Eyes:      Extraocular Movements: Extraocular movements intact.      Conjunctiva/sclera: Conjunctivae normal.      Pupils: Pupils are equal, round, and reactive to light.   Cardiovascular:      Rate and Rhythm: Normal rate and regular rhythm.      Pulses: Normal pulses.      Heart sounds: Normal heart sounds. No murmur heard.  Pulmonary:      Effort: Pulmonary effort is normal. No respiratory distress.      Breath sounds: Normal breath sounds. No wheezing or rhonchi.   Abdominal:      General: Bowel sounds are normal. There is no distension.      Palpations: Abdomen is soft. There is no mass.      Tenderness: There is no abdominal tenderness.   Musculoskeletal:         General: No swelling or tenderness.      Cervical back: Normal range of motion and neck supple. No tenderness.      Right lower leg: No edema.      Left lower leg: No edema.   Skin:     General: Skin is warm and dry.      Capillary Refill: Capillary refill takes less than 2 seconds.      Findings: No erythema or rash.   Neurological:      Mental Status: She is alert and oriented to person, place, and time. Mental status is at baseline.      Motor: Weakness present.   Psychiatric:         Mood and Affect: Mood normal.         Behavior: Behavior normal.         Thought Content: Thought content normal.         Judgment: Judgment normal.     Full skin assessment completed by this NP           Incision/Site 11/11/22 2342 Back (Active)   11/11/22 2342   Present Prior to Hospital Arrival?:    Side:    Location: Back   Orientation:    Incision Type:    Closure Method:    Additional Comments:    Removal Indication and Assessment:    Wound Outcome:    Removal  Indications:    Incision WDL ex 11/20/22 0800   Dressing Appearance Dry;Intact 11/20/22 2053   Drainage Amount None 11/20/22 2053   Drainage Characteristics/Odor No odor 11/20/22 2053   Appearance Sutures intact 11/20/22 2053   Periwound Area Dry 11/20/22 0800   Wound Edges Approximated 11/20/22 0800   Dressing Removed;Changed;Applied;Island/border 11/20/22 0800   Dressing Change Due 11/21/22 11/20/22 0800   Number of days: 10          Labs:  Recent Labs   Lab 12/09/22 0618 12/13/22  0400 12/14/22 2017   WBC 5.80 8.88 8.34   HGB 11.5* 12.0 10.5*   HCT 35.1* 37.9 32.5*    353 296       Recent Labs   Lab 12/09/22 0618 12/13/22  0400   * 139   K 3.7 3.6    104   CO2 25 24   BUN 15 14   CREATININE 0.8 0.8    114*   CALCIUM 9.1 9.1   MG 1.8 1.9   PHOS 3.9 3.9       No results for input(s): ALKPHOS, ALT, AST, ALBUMIN, PROT, BILITOT, INR in the last 168 hours.  No results for input(s): POCTGLUCOSE in the last 72 hours.    Meds Scheduled:   celecoxib  200 mg Oral Daily    doxepin  50 mg Oral QHS    fluticasone furoate-vilanteroL  1 puff Inhalation Daily    gabapentin  600 mg Oral TID    heparin (porcine)  5,000 Units Subcutaneous Q8H    methocarbamoL  1,000 mg Oral TID    mupirocin   Topical (Top) Q14 Days    norethindrone  5 mg Oral Daily    pantoprazole  40 mg Oral Daily    polyethylene glycol  17 g Oral BID    sodium phosphates  1 enema Rectal Once    venlafaxine  300 mg Oral Daily       PRN:   (Magic mouthwash) 1:1:1 diphenhydrAMINE(Benadryl) 12.5mg/5ml liq, aluminum & magnesium hydroxide-simethicone (Maalox), LIDOcaine viscous 2%, acetaminophen, albuterol, albuterol-ipratropium, ALPRAZolam, benzonatate, calcium carbonate, guaiFENesin 100 mg/5 ml, lactulose, loperamide, melatonin, ondansetron, oxyCODONE, oxyCODONE, phenyleph-min oil-petrolatum, senna      Assessment and Plan:    Fall  - Xray left femur- no acute fracture  - Xray sacrum-no acute fracture  -Monitor for neurological changes or  worsening pain  -Seen in ER and returned to SNF-CT Lspine and Pelvic, no acute fracture  -Ortho team notified-No additional recommendations    SHAZIA (cauda equina syndrome)  SP L4-S1 Laminectomy 11.11.22  -PODAS boot for LLE foot drop, AFO LLE when ambulating  -PT/OT: WBAT spine precautions  -DVT PPx: Heparin TID      -Pain management, Celebrex, Robaxin, Oxycodone, Gabapentin      -Initiate order to increase Oxycodone to 15mg Q4hrs for severe pain 7-10/10    Debility   - Continue with PT/OT for gait training and strengthening and restoration of ADL's   - Encourage mobility, OOB in chair, and early ambulation as appropriate  - Fall precautions   - Monitor for bowel and bladder dysfunction  - Monitor for and prevent skin breakdown and pressure ulcers  - Continue DVT prophylaxis with heparin     Constipation  -Fleets enema  -Initiate order for Lactulose 15g PRN    Rectal bleeding  -External hemorrhoid on exam with skin tear  -Initiate order for CBC       Anticipate disposition:    Home with home health      Follow-up needed during SNF admission:       Follow-up needed after discharge from SNF:   - PCP within 1-2 weeks  - See appt scheduled below     Future Appointments   Date Time Provider Department Center   1/3/2023  2:45 PM Robin Bellamy MD [facility] IM [facility]   2/16/2023  9:45 AM Sundar Harry MD [facility] SPINE [facility]           I certify that SNF services are required to be given on an inpatient basis because Marianela Shrestha needs for skilled nursing care and/or skilled rehabilitation are required on a daily basis and such services can only practically be provided in a skilled nursing facility setting and are for an ongoing condition for which she received inpatient care in the hospital.        Wanda Jacobs NP  Department of Hospital Medicine   [facility]     DOS: 12/15/2022       Patient note was created using MModal Dictation.  Any errors in syntax or even  information may not have been identified and edited on initial review prior to signing this note.

## 2022-12-15 NOTE — PLAN OF CARE
Patient is set to discharge on 12/16/22 at 1PM. Patient discharging to home with boyfriend via personal car. Patient set up with [facility]. DME provided: wheelchair and commode. Patient has been referred to [facility] and [facility].

## 2022-12-15 NOTE — PLAN OF CARE
[facility]      HOME HEALTH ORDERS  FACE TO FACE ENCOUNTER    Patient Name: Marianela Shrestha  YOB: 1985    PCP: Arnaldo Espino MD   PCP Address: [address] [facility] [place] [address]  PCP Phone Number: [phone]  PCP Fax: [phone]    Encounter Date: 11/18/22    Admit to Home Health    Diagnoses:  Active Hospital Problems    Diagnosis  POA    *SHAZIA (cauda equina syndrome) [G83.4]  Yes    Impaired mobility [Z74.09]  Yes    Uncontrolled persistent asthma [J45.998]  Yes     Normal spirometry, normal CT  Recommend maintenance tx with symbicort,  Nebs prn  Tx empirically for gerd given chronic nausea, vomiting      Generalized anxiety disorder [F41.1]  Yes      Resolved Hospital Problems   No resolved problems to display.       Follow Up Appointments:  Future Appointments   Date Time Provider Department Center   1/3/2023  2:45 PM Robin Bellamy MD [facility] IM [facility]   2/16/2023  9:45 AM Sundar Harry MD [facility] SPINE [facility]       Allergies:  Review of patient's allergies indicates:   Allergen Reactions    Coconut Hives and Itching    Sulfa (sulfonamide antibiotics) Hives     itching    Adhesive Rash    Latex, natural rubber Other (See Comments) and Rash       Medications: Review discharge medications with patient and family and provide education.    Current Facility-Administered Medications   Medication Dose Route Frequency Provider Last Rate Last Admin    (Magic mouthwash) 1:1:1 diphenhydrAMINE(Benadryl) 12.5mg/5ml liq, aluminum & magnesium hydroxide-simethicone (Maalox), LIDOcaine viscous 2%  15 mL Swish & Spit Q4H PRN Obdulio Parkinson MD        acetaminophen tablet 650 mg  650 mg Oral Q6H PRN Obdulio Parkinson MD   650 mg at 12/10/22 0207    albuterol inhaler 2 puff  2 puff Inhalation Q6H PRN Obdulio Parkinson MD        albuterol-ipratropium 2.5 mg-0.5 mg/3 mL nebulizer solution 3 mL  3 mL Nebulization Q6H PRN Obdulio Parkinson MD        ALPRAZolam  tablet 1 mg  1 mg Oral TID PRN Obdulio Parkinson MD   1 mg at 12/15/22 0834    benzonatate capsule 100 mg  100 mg Oral TID PRN SAMANTHA Blood   100 mg at 12/14/22 0544    calcium carbonate 200 mg calcium (500 mg) chewable tablet 500 mg  500 mg Oral BID PRN Obdulio Parkinson MD        celecoxib capsule 200 mg  200 mg Oral Daily Obduliogely Parkinson MD   200 mg at 12/15/22 0837    doxepin capsule 50 mg  50 mg Oral QHS Obduliobandar Parkinson MD   50 mg at 12/14/22 2055    fluticasone furoate-vilanteroL 100-25 mcg/dose diskus inhaler 1 puff  1 puff Inhalation Daily Wanda Jacobs NP   1 puff at 12/15/22 0839    gabapentin capsule 600 mg  600 mg Oral TID Obdulio Parkinson MD   600 mg at 12/15/22 0836    guaiFENesin 100 mg/5 ml syrup 200 mg  200 mg Oral Q4H PRN Joseph Heath MD   200 mg at 12/14/22 0545    heparin (porcine) injection 5,000 Units  5,000 Units Subcutaneous Q8H Wanda Jacobs NP   5,000 Units at 12/15/22 0552    lactulose 20 gram/30 mL solution Soln 30 g  30 g Oral Q6H PRN Wanda Jacobs NP        loperamide capsule 2 mg  2 mg Oral QID PRN Nicho Grace NP   2 mg at 12/03/22 2009    melatonin tablet 6 mg  6 mg Oral Nightly PRN Obdulio Parkinson MD   6 mg at 12/03/22 2009    methocarbamoL tablet 1,000 mg  1,000 mg Oral TID Obdulio Parkinson MD   1,000 mg at 12/15/22 0835    mupirocin 2 % ointment   Topical (Top) Q14 Days Obdulio Parkinson MD   Given at 12/03/22 0923    norethindrone tablet 5 mg  5 mg Oral Daily Wanda Jacobs NP   5 mg at 12/15/22 0836    ondansetron disintegrating tablet 8 mg  8 mg Oral Q8H PRN Obdulio Parkinson MD   8 mg at 12/14/22 0551    oxyCODONE immediate release tablet 15 mg  15 mg Oral Q4H PRN Wanda Jacobs NP   15 mg at 12/15/22 1230    oxyCODONE immediate release tablet 5 mg  5 mg Oral Q4H PRN Jenn Heath MD   5 mg at 11/28/22 1507    pantoprazole EC tablet 40 mg  40 mg Oral Daily Wanda Jacobs NP   40 mg at 12/15/22 9217     phenyleph-min oil-petrolatum 0.25-14-74.9 % ointment 1 applicator  1 applicator Rectal QID PRN Wanda Jacobs NP        polyethylene glycol packet 17 g  17 g Oral BID Wanda Jacobs NP   17 g at 12/14/22 1238    senna tablet 8.6 mg  8.6 mg Oral Daily PRN Wanda Jacobs NP        sodium phosphates 19-7 gram/118 mL enema 1 enema  1 enema Rectal Once Wanda Jacobs NP        venlafaxine 24 hr capsule 300 mg  300 mg Oral Daily Obdulio Parkinson MD   300 mg at 12/15/22 0835     Current Discharge Medication List        START taking these medications    Details   polyethylene glycol (GLYCOLAX) 17 gram PwPk Take 17 g by mouth 2 (two) times daily.  Qty: 60 each, Refills: 0           CONTINUE these medications which have CHANGED    Details   albuterol (PROVENTIL/VENTOLIN HFA) 90 mcg/actuation inhaler Inhale 2 puffs into the lungs every 6 (six) hours as needed. Rescue  Qty: 18 g, Refills: 0      celecoxib (CELEBREX) 200 MG capsule Take 1 capsule (200 mg total) by mouth once daily.  Qty: 30 capsule, Refills: 0      gabapentin (NEURONTIN) 600 MG tablet Take 1 tablet (600 mg total) by mouth 3 (three) times daily. for 15 days  Qty: 45 tablet, Refills: 0    Associated Diagnoses: Chronic pain syndrome; Bilateral sciatica      methocarbamoL (ROBAXIN) 500 MG Tab Take 2 tablets (1,000 mg total) by mouth 3 (three) times daily. for 14 days  Qty: 84 tablet, Refills: 0      ondansetron (ZOFRAN-ODT) 4 MG TbDL Take 2 tablets (8 mg total) by mouth every 8 (eight) hours as needed (nausea).  Qty: 20 tablet, Refills: 0      oxyCODONE (ROXICODONE) 5 MG immediate release tablet Take 3 tablets (15 mg total) by mouth every 6 (six) hours as needed for Pain.  Qty: 21 tablet, Refills: 0    Comments: Quantity prescribed more than 7 day supply? No           CONTINUE these medications which have NOT CHANGED    Details   acetaminophen (TYLENOL) 650 MG TbSR Take 1 tablet (650 mg total) by mouth every 6 (six) hours.  Qty: 56 tablet,  Refills: 0      albuterol-ipratropium (DUO-NEB) 2.5 mg-0.5 mg/3 mL nebulizer solution Take 3 mLs by nebulization every 6 (six) hours as needed for Wheezing or Shortness of Breath. Rescue  Qty: 270 mL, Refills: 3    Associated Diagnoses: Uncontrolled persistent asthma      ALPRAZolam (XANAX) 1 MG tablet Take 1 tablet (1 mg total) by mouth 3 (three) times daily as needed for Anxiety.  Qty: 90 tablet, Refills: 0    Comments: Not to exceed 5 additional fills before 03/29/2022  Associated Diagnoses: Generalized anxiety disorder      doxepin (SINEQUAN) 25 MG capsule TAKE 2 CAPSULES (50 MG TOTAL) BY MOUTH EVERY EVENING.  Qty: 180 capsule, Refills: 0    Associated Diagnoses: Insomnia, unspecified type      mupirocin (BACTROBAN) 2 % ointment Twice daily for five days twice per month for six months  Qty: 22 g, Refills: 3    Comments: Daily for five days twice per month for six months      venlafaxine (EFFEXOR-XR) 150 MG Cp24 TAKE 2 CAPSULES BY MOUTH EVERY DAY  Qty: 60 capsule, Refills: 1    Associated Diagnoses: Generalized anxiety disorder               I have seen and examined this patient within the last 30 days. My clinical findings that support the need for the home health skilled services and home bound status are the following:no   Weakness/numbness causing balance and gait disturbance due to Weakness/Debility and Surgery making it taxing to leave home.  Requiring assistive device to leave home due to unsteady gait caused by  Weakness/Debility and Surgery.     Diet:   regular diet    Labs:  SN to perform labs:  CBC: Weekly; 4 week(s) and BMP: Weekly; 4 week(s) and Report Lab results to PCP.    Referrals/ Consults  Physical Therapy to evaluate and treat. Evaluate for home safety and equipment needs; Establish/upgrade home exercise program. Perform / instruct on therapeutic exercises, gait training, transfer training, and Range of Motion.  Occupational Therapy to evaluate and treat. Evaluate home environment for safety  and equipment needs. Perform/Instruct on transfers, ADL training, ROM, and therapeutic exercises.  Aide to provide assistance with personal care, ADLs, and vital signs.    Activities:   activity as tolerated    Nursing:   Agency to admit patient within 24 hours of hospital discharge unless specified on physician order or at patient request    SN to complete comprehensive assessment including routine vital signs. Instruct on disease process and s/s of complications to report to MD. Review/verify medication list sent home with the patient at time of discharge  and instruct patient/caregiver as needed. Frequency may be adjusted depending on start of care date.     Skilled nurse to perform up to 3 visits PRN for symptoms related to diagnosis    Notify MD if SBP > 160 or < 90; DBP > 90 or < 50; HR > 120 or < 50; Temp > 101; O2 < 88%    Ok to schedule additional visits based on staff availability and patient request on consecutive days within the home health episode.    Wound Care Orders  yes:  Pressure Ulcer(s) Stage I :   Location: left leg  Apply Miconzazole:  2% cream and Barrier ointment                       Frequency:  Twice Daily                             If incontinent of stool or urine, apply thin layer Barrier cream                   twice daily and PRN to wound         Pressure relief measure: place barrier between skin and left leg boot    When multiple disciplines ordered:    Start of Care occurs on Sunday - Wednesday schedule remaining discipline evaluations as ordered on separate consecutive days following the start of care.    Thursday SOC -schedule subsequent evaluations Friday and Monday the following week.     Friday - Saturday SOC - schedule subsequent discipline evaluations on consecutive days starting Monday of the following week.    For all post-discharge communication and subsequent orders please contact patient's primary care physician.           I certify that this patient is confined to her  home and needs intermittent skilled nursing care, physical therapy, and occupational therapy.

## 2022-12-15 NOTE — NURSING
PATIENT IN BATHROOM ON TOLIET HAVING  A BOWEL MOVEMENT SMALL AMOUNT DARK RED BLOOD IN TOLIET WITH HEMORRHOIDAL BLEEDING NOTED.COMPLAINTS OF FEELING NAUSEATED AND EXPELLED SCANT AMOUNT OF FOOD PARTICLES.PANTERA MENDEZ NP WAS NOTIFIED AND LOGAN WHITT RN CHARGE NURSE..

## 2022-12-16 VITALS
DIASTOLIC BLOOD PRESSURE: 74 MMHG | TEMPERATURE: 99 F | WEIGHT: 211.19 LBS | SYSTOLIC BLOOD PRESSURE: 140 MMHG | BODY MASS INDEX: 35.18 KG/M2 | HEIGHT: 65 IN | OXYGEN SATURATION: 99 % | RESPIRATION RATE: 18 BRPM | HEART RATE: 93 BPM

## 2022-12-16 DIAGNOSIS — M48.061 LUMBAR STENOSIS: Primary | ICD-10-CM

## 2022-12-16 LAB — SARS-COV-2 RNA RESP QL NAA+PROBE: NOT DETECTED

## 2022-12-16 PROCEDURE — 63600175 PHARM REV CODE 636 W HCPCS: Performed by: NURSE PRACTITIONER

## 2022-12-16 PROCEDURE — 97530 THERAPEUTIC ACTIVITIES: CPT

## 2022-12-16 PROCEDURE — U0003 INFECTIOUS AGENT DETECTION BY NUCLEIC ACID (DNA OR RNA); SEVERE ACUTE RESPIRATORY SYNDROME CORONAVIRUS 2 (SARS-COV-2) (CORONAVIRUS DISEASE [COVID-19]), AMPLIFIED PROBE TECHNIQUE, MAKING USE OF HIGH THROUGHPUT TECHNOLOGIES AS DESCRIBED BY CMS-2020-01-R: HCPCS | Performed by: HOSPITALIST

## 2022-12-16 PROCEDURE — U0005 INFEC AGEN DETEC AMPLI PROBE: HCPCS | Performed by: HOSPITALIST

## 2022-12-16 PROCEDURE — 25000003 PHARM REV CODE 250: Performed by: NURSE PRACTITIONER

## 2022-12-16 PROCEDURE — 97110 THERAPEUTIC EXERCISES: CPT

## 2022-12-16 PROCEDURE — 25000003 PHARM REV CODE 250: Performed by: HOSPITALIST

## 2022-12-16 RX ADMIN — FLUTICASONE FUROATE AND VILANTEROL TRIFENATATE 1 PUFF: 100; 25 POWDER RESPIRATORY (INHALATION) at 08:12

## 2022-12-16 RX ADMIN — PANTOPRAZOLE SODIUM 40 MG: 40 TABLET, DELAYED RELEASE ORAL at 08:12

## 2022-12-16 RX ADMIN — METHOCARBAMOL 1000 MG: 500 TABLET ORAL at 08:12

## 2022-12-16 RX ADMIN — ALPRAZOLAM 1 MG: 0.5 TABLET ORAL at 11:12

## 2022-12-16 RX ADMIN — METHOCARBAMOL 1000 MG: 500 TABLET ORAL at 02:12

## 2022-12-16 RX ADMIN — OXYCODONE HYDROCHLORIDE 15 MG: 10 TABLET ORAL at 11:12

## 2022-12-16 RX ADMIN — VENLAFAXINE HYDROCHLORIDE 300 MG: 75 CAPSULE, EXTENDED RELEASE ORAL at 08:12

## 2022-12-16 RX ADMIN — CELECOXIB 200 MG: 200 CAPSULE ORAL at 08:12

## 2022-12-16 RX ADMIN — HEPARIN SODIUM 5000 UNITS: 5000 INJECTION INTRAVENOUS; SUBCUTANEOUS at 05:12

## 2022-12-16 RX ADMIN — NORETHINDRONE ACETATE 5 MG: 5 TABLET ORAL at 08:12

## 2022-12-16 RX ADMIN — GABAPENTIN 600 MG: 300 CAPSULE ORAL at 02:12

## 2022-12-16 RX ADMIN — HEPARIN SODIUM 5000 UNITS: 5000 INJECTION INTRAVENOUS; SUBCUTANEOUS at 02:12

## 2022-12-16 RX ADMIN — GABAPENTIN 600 MG: 300 CAPSULE ORAL at 08:12

## 2022-12-16 NOTE — DISCHARGE SUMMARY
[facility]   Skilled Nursing Facility   Discharge Summary  Patient Name: Marianela Shrestha  : 1985  MRN: [id number]  Attending Physician: Obdulio Parkinson MD  Nurse Practitioner: Wanda Jacobs NP    Admit Date: 2022   Date of Discharge:    Length of Stay:  LOS: 28 days     Date of Service: 2022    Principal Problem: SHAZIA (cauda equina syndrome)    HPI  Marianela Shrestha is a 37 y.o. female with PMH of drug abuse, low back pain,  LLE radiculopathy and right hand osteomyelitis treated with surgery presents for evaluation of 3 day history LLE weakness and changes in bowel and bladder acutely worse in last 24 hours prior to arrival to ER. The patient reports chronic back pain. However, she fell , twisting her ankle and low back.  S/P L4, L5, S1 laminectomy 22 by Dr. Harry. Post operatively she maintains decreased but able to feel bladder getting full and control her voiding. She maintains decreased rectal sensation but able to control her defecation.     Patient will be treated at [facility] with PT and OT to improve functional status and ability to perform ADLs.     Hospital Course  Patient progressed well with PT and OT- last PT note states that patient [id number] + 90' using RW with CGA for safety. Patient had no significant events during their stay at SNF. Home health was set up. DME was ordered if needed. Follow up appointment to be made by patient within one week. All prescriptions and discharge instructions were ordered to be given to the patient prior to discharge.     Physical Exam  Vitals and nursing note reviewed.   Constitutional:       General: She is not in acute distress.     Appearance: Normal appearance. She is not ill-appearing.   HENT:      Head: Normocephalic and atraumatic.      Nose: Nose normal. No congestion.      Mouth/Throat:      Mouth: Mucous membranes are moist.      Pharynx: Oropharynx is clear.   Eyes:      Extraocular Movements:  Extraocular movements intact.      Conjunctiva/sclera: Conjunctivae normal.      Pupils: Pupils are equal, round, and reactive to light.   Cardiovascular:      Rate and Rhythm: Normal rate and regular rhythm.      Pulses: Normal pulses.      Heart sounds: Normal heart sounds. No murmur heard.  Pulmonary:      Effort: Pulmonary effort is normal. No respiratory distress.      Breath sounds: Normal breath sounds. No wheezing or rhonchi.   Abdominal:      General: Bowel sounds are normal. There is no distension.      Palpations: Abdomen is soft. There is no mass.      Tenderness: There is no abdominal tenderness.   Musculoskeletal:         General: No swelling or tenderness.      Cervical back: Normal range of motion and neck supple. No tenderness.      Right lower leg: No edema.      Left lower leg: No edema.   Skin:     General: Skin is warm and dry.      Capillary Refill: Capillary refill takes less than 2 seconds.      Findings: No erythema or rash.   Neurological:      Mental Status: She is alert and oriented to person, place, and time. Mental status is at baseline.      Motor: Weakness present.   Psychiatric:         Mood and Affect: Mood normal.         Behavior: Behavior normal.         Thought Content: Thought content normal.         Judgment: Judgment normal.     Recent Labs   Lab 12/13/22  0400 12/14/22  2017 12/15/22  1303   WBC 8.88 8.34 8.25   HGB 12.0 10.5* 11.1*   HCT 37.9 32.5* 35.1*    296 336     Recent Labs   Lab 12/13/22  0400      K 3.6      CO2 24   BUN 14   CREATININE 0.8   *   CALCIUM 9.1   MG 1.9   PHOS 3.9     No results for input(s): ALKPHOS, ALT, AST, ALBUMIN, PROT, BILITOT, INR in the last 168 hours.   No results for input(s): CPK, CPKMB, MB, TROPONINI in the last 72 hours.  No results for input(s): LACTATE in the last 72 hours.    No results for input(s): POCTGLUCOSE in the last 168 hours.   No results found for: HGBA1C      Current Discharge Medication List         START taking these medications    Details   polyethylene glycol (GLYCOLAX) 17 gram PwPk Take 17 g by mouth 2 (two) times daily.  Qty: 60 each, Refills: 0           CONTINUE these medications which have CHANGED    Details   albuterol (PROVENTIL/VENTOLIN HFA) 90 mcg/actuation inhaler Inhale 2 puffs into the lungs every 6 (six) hours as needed. Rescue  Qty: 18 g, Refills: 0      celecoxib (CELEBREX) 200 MG capsule Take 1 capsule (200 mg total) by mouth once daily.  Qty: 30 capsule, Refills: 0      gabapentin (NEURONTIN) 600 MG tablet Take 1 tablet (600 mg total) by mouth 3 (three) times daily. for 15 days  Qty: 45 tablet, Refills: 0    Associated Diagnoses: Chronic pain syndrome; Bilateral sciatica      methocarbamoL (ROBAXIN) 500 MG Tab Take 2 tablets (1,000 mg total) by mouth 3 (three) times daily. for 14 days  Qty: 84 tablet, Refills: 0      ondansetron (ZOFRAN-ODT) 4 MG TbDL Take 2 tablets (8 mg total) by mouth every 8 (eight) hours as needed (nausea).  Qty: 20 tablet, Refills: 0      oxyCODONE (ROXICODONE) 5 MG immediate release tablet Take 3 tablets (15 mg total) by mouth every 6 (six) hours as needed for Pain.  Qty: 21 tablet, Refills: 0    Comments: Quantity prescribed more than 7 day supply? No           CONTINUE these medications which have NOT CHANGED    Details   acetaminophen (TYLENOL) 650 MG TbSR Take 1 tablet (650 mg total) by mouth every 6 (six) hours.  Qty: 56 tablet, Refills: 0      albuterol-ipratropium (DUO-NEB) 2.5 mg-0.5 mg/3 mL nebulizer solution Take 3 mLs by nebulization every 6 (six) hours as needed for Wheezing or Shortness of Breath. Rescue  Qty: 270 mL, Refills: 3    Associated Diagnoses: Uncontrolled persistent asthma      ALPRAZolam (XANAX) 1 MG tablet Take 1 tablet (1 mg total) by mouth 3 (three) times daily as needed for Anxiety.  Qty: 90 tablet, Refills: 0    Comments: Not to exceed 5 additional fills before 03/29/2022  Associated Diagnoses: Generalized anxiety disorder       doxepin (SINEQUAN) 25 MG capsule TAKE 2 CAPSULES (50 MG TOTAL) BY MOUTH EVERY EVENING.  Qty: 180 capsule, Refills: 0    Associated Diagnoses: Insomnia, unspecified type      mupirocin (BACTROBAN) 2 % ointment Twice daily for five days twice per month for six months  Qty: 22 g, Refills: 3    Comments: Daily for five days twice per month for six months      venlafaxine (EFFEXOR-XR) 150 MG Cp24 TAKE 2 CAPSULES BY MOUTH EVERY DAY  Qty: 60 capsule, Refills: 1    Associated Diagnoses: Generalized anxiety disorder         Assessment and Plan:     Fall  - Xray left femur- no acute fracture  - Xray sacrum-no acute fracture  -Monitor for neurological changes or worsening pain  -Seen in ER and returned to SNF-CT Lspine and Pelvic, no acute fracture  -Ortho team notified-No additional recommendations     SHAZIA (cauda equina syndrome)  SP L4-S1 Laminectomy 11.11.22  -PODAS boot for LLE foot drop, AFO LLE when ambulating  -PT/OT: WBAT spine precautions  -DVT PPx: Heparin TID      -Pain management, Celebrex, Robaxin, Oxycodone, Gabapentin      -Initiate order to increase Oxycodone to 15mg Q4hrs for severe pain 7-10/10     Debility   - Continue with PT/OT for gait training and strengthening and restoration of ADL's   - Encourage mobility, OOB in chair, and early ambulation as appropriate  - Fall precautions   - Monitor for bowel and bladder dysfunction  - Monitor for and prevent skin breakdown and pressure ulcers  - Continue DVT prophylaxis with heparin     Discharge Diet:regular diet with Normal Fluid intake of 1500 - 2000 mL per day    Activity: activity as tolerated and Activity as tolerated    Discharge Condition: Good     Disposition: Home-Health Care [facility] [place]    Future Appointments   Date Time Provider Department Center   12/27/2022  8:00 AM Anahy Stallworth NP [facility] [place]   1/3/2023  2:45 PM Robin Bellamy MD [facility]   1/6/2023  6:15 AM [facility]   1/6/2023  6:45 AM  [facility]3 [facility]   2/16/2023  9:45 AM Sundar Harry MD [facility] SPINE [address]       35 minutes total time spent on the discharge of the patient including review of hospital course with the patient, reviewing discharge medications, and arranging follow-up care.      Wanda Jacobs NP  [facility]   Skilled Nursing [facility]

## 2022-12-16 NOTE — PLAN OF CARE
Problem: Occupational Therapy  Goal: Occupational Therapy Goal  Description: Goals to be met by: 3 weeks     Patient will increase functional independence with ADLs by performing:    UE Dressing with Modified [place].  - MET  LE Dressing with Stand-by Assistance.  - MET  Grooming while seated with Modified [place].  -MET  Toileting from toilet or BSC with SBA for hygiene and clothing management. With A/D to stand. - MET  Bathing from sitting at sink with Stand-by Assistance.  -MET  Supine to sit with Modified [place].  -MET  Step transfer with Stand-by Assistance with A/D as needed.  - MET  Upper extremity exercise with assistance as needed.  Caregiver will be educated on level of assist required to safely perform self care tasks and functional transfers.. -MET    Outcome: Ongoing, Progressing

## 2022-12-16 NOTE — PLAN OF CARE
Problem: Adult Inpatient Plan of Care  Goal: Plan of Care Review  Outcome: Met  Goal: Patient-Specific Goal (Individualized)  Outcome: Met  Goal: Absence of Hospital-Acquired Illness or Injury  Outcome: Met  Goal: Optimal Comfort and Wellbeing  Outcome: Met  Goal: Readiness for Transition of Care  Outcome: Met     Problem: Fall Injury Risk  Goal: Absence of Fall and Fall-Related Injury  Outcome: Met     Problem: Occupational Therapy  Goal: Occupational Therapy Goal  Description: Goals to be met by: 3 weeks     Patient will increase functional independence with ADLs by performing:    UE Dressing with Modified [place].  - MET  LE Dressing with Stand-by Assistance.  - MET  Grooming while seated with Modified [place].  -MET  Toileting from toilet or BSC with SBA for hygiene and clothing management. With A/D to stand. - MET  Bathing from sitting at sink with Stand-by Assistance.  -MET  Supine to sit with Modified [place].  -MET  Step transfer with Stand-by Assistance with A/D as needed.  - MET  Upper extremity exercise with assistance as needed.  Caregiver will be educated on level of assist required to safely perform self care tasks and functional transfers.. -MET    Outcome: Met     Problem: Physical Therapy  Goal: Physical Therapy Goal  Description: Goals to be met by:12/19/22    Patient will increase functional independence with mobility by performing:    Sit to stand transfer with Supervision - Met 12/5  Updated goal: Sit to stand transfer with Mod I    Bed to chair transfer with Supervision using Rolling Walker - Met 12/5  Updated Goal: Bed to chair transfer with Mod I using RW    Gait  x 150 feet with Stand-by Assistance using Rolling Walker. - Met 12/13  Updated Goal: Gait x 150 feet with Supervision using RW    Ascend/descend 4 stair with bilateral Handrails Stand-by Assistance using No Assistive Device. - Met 12/13  Updated Goal: Ascend/descend 4 steps with B HR and  Supervision    Ascend/Descend 4 inch curb step with Stand-by Assistance using Rolling Walker.    Outcome: Met     Problem: Skin Injury Risk Increased  Goal: Skin Health and Integrity  Outcome: Met     Problem: Anxiety  Goal: Anxiety Reduction or Resolution  Description:  Anxiety/PTSD  Outcome: Met

## 2022-12-16 NOTE — PLAN OF CARE
Problem: Adult Inpatient Plan of Care  Goal: Plan of Care Review  Outcome: Ongoing, Progressing  Goal: Patient-Specific Goal (Individualized)  Outcome: Ongoing, Progressing  Goal: Absence of Hospital-Acquired Illness or Injury  Outcome: Ongoing, Progressing  Goal: Optimal Comfort and Wellbeing  Outcome: Ongoing, Progressing  Goal: Readiness for Transition of Care  Outcome: Ongoing, Progressing      History  Chief Complaint   Patient presents with   • Shortness of Breath     Pt c/o sob starting yesterday and now states today is has a productive cough and increased sob and congestion  Patient presents to the emergency department today for evaluation of shortness of breath  He states that shortness of breath began yesterday  He does feel as though he has been filling up with some fluid in his legs for about a week as well  He does have a productive cough however this is somewhat chronic for him  Oxygen saturation 90% on room air I did place him on 2 L nasal cannula  There is no significant chest pain  Does have some nasal congestion  Does have a history of CHF as well as COPD  Prior to Admission Medications   Prescriptions Last Dose Informant Patient Reported? Taking? ALPRAZolam (XANAX) 0 5 mg tablet   No Yes   Sig: Take 1 tablet (0 5 mg total) by mouth daily at bedtime as needed for anxiety   DULoxetine (CYMBALTA) 60 mg delayed release capsule 12/15/2022  No Yes   Sig: Take 1 capsule (60 mg total) by mouth daily   Fluticasone-Salmeterol (Advair) 500-50 mcg/dose inhaler   No Yes   Sig: INHALE ONE PUFF BY MOUTH AND INTO THE LUNGS TWICE A DAY   RINSE MOUTH AFTER USE   aclidinium (Tudorza Pressair) 400 MCG/ACT inhaler   No Yes   Sig: Inhale 1 puff (400 mcg total) 2 (two) times a day   albuterol (ProAir HFA) 90 mcg/act inhaler   No Yes   Sig: Inhale 2 puffs every 6 (six) hours as needed for wheezing   aspirin 325 mg tablet   Yes No   Sig: Take 1 tablet by mouth daily   Patient not taking: Reported on 12/5/2022   aspirin 81 mg chewable tablet 12/15/2022  Yes Yes   Sig: Chew 81 mg daily   atorvastatin (LIPITOR) 20 mg tablet 12/15/2022  No Yes   Sig: Take 1 tablet (20 mg total) by mouth daily   carvedilol (COREG) 12 5 mg tablet 12/15/2022  No Yes   Sig: Take 1 tablet (12 5 mg total) by mouth 2 (two) times a day with meals   Patient taking differently: Take 25 mg by mouth 2 (two) times a day with meals   fluticasone (FLONASE) 50 mcg/act nasal spray   No Yes   Si spray into each nostril 2 (two) times a day   furosemide (LASIX) 20 mg tablet 12/15/2022  No Yes   Sig: Take 1 tablet (20 mg total) by mouth daily   guaiFENesin (MUCINEX) 600 mg 12 hr tablet Not Taking  No No   Sig: Take 2 tablets (1,200 mg total) by mouth every 12 (twelve) hours   Patient not taking: Reported on 2022   ipratropium-albuterol (DUO-NEB) 0 5-2 5 mg/3 mL nebulizer solution   No Yes   Sig: Take 3 mL by nebulization every 6 (six) hours as needed for wheezing or shortness of breath   isosorbide mononitrate (IMDUR) 30 mg 24 hr tablet   No No   Sig: Take 1 tablet (30 mg total) by mouth daily   Patient not taking: Reported on 2022   lisinopril (ZESTRIL) 20 mg tablet   No No   Sig: take 1 tablet by mouth twice a day   Patient not taking: Reported on 10/31/2022   sacubitril-valsartan (Entresto) 24-26 MG TABS 12/15/2022  No Yes   Sig: Take 1 tablet by mouth 2 (two) times a day This will replace your lisinopril  You must wait at least 36 hours after your last lisinopril dose to take this medication  Patient taking differently: Take 1 tablet by mouth 2 (two) times a day 49-51mg      Facility-Administered Medications: None       Past Medical History:   Diagnosis Date   • Asthma    • CHF (congestive heart failure) ([facility])    • COPD (chronic obstructive pulmonary disease) ([facility]ly [facility])    • COVID-19    • Mumps    • Old MI (myocardial infarction)    • Pneumonia    • Pulmonary emphysema ([facility] [address] )    • Rectal bleeding 2019   • Sleep apnea        Past Surgical History:   Procedure Laterality Date   • CARDIAC CATHETERIZATION  2015    Left main- normal and maidly tortuous  Circumflex - normal and moderately tortuous  RCA- normal and mildy tortuous  Global LV function was severely depressed      • CARDIAC CATHETERIZATION Left 2022    Procedure: Cardiac Left Heart Cath;  Surgeon: Mary Jacobs DO;  Location: [facility]; Service: Cardiology   • CARDIAC CATHETERIZATION N/A 2022    Procedure: Cardiac Coronary Angiogram;  Surgeon: Lucia More DO;  [facility]: [facility]; Service: Cardiology   • CARDIAC CATHETERIZATION  2022    Procedure: Cardiac catheterization;  Surgeon: Lucia More DO;  [facility]: [facility]; Service: Cardiology   • INGUINAL HERNIA REPAIR Bilateral    • IN COLONOSCOPY FLX DX W/COLLJ SPEC WHEN PFRMD N/A 3/11/2019    Procedure: COLONOSCOPY with removal of anal papilla; Surgeon: Ani Ivan MD;  Location: [facility] MAIN [facility];  Service: Colorectal   • TONSILLECTOMY     • UMBILICAL HERNIA REPAIR  2006       Family History   Problem Relation Age of Onset   • Heart attack Father         MI   • Heart disease Father    • Diabetes Sister         DM   • Arthritis Family    • Coronary artery disease Family    • Hypertension Family    • Tuberculosis Son    • Alzheimer's disease Mother      I have reviewed and agree with the history as documented  E-Cigarette/Vaping   • E-Cigarette Use Never User      E-Cigarette/Vaping Substances   • Nicotine No    • THC No    • CBD No    • Flavoring No    • Other No    • Unknown No      Social History     Tobacco Use   • Smoking status: Former     Packs/day: 3 00     Years: 43 00     Pack years: 129 00     Types: Cigarettes     Start date: 64     Quit date: 2018     Years since quittin 9   • Smokeless tobacco: Never   Vaping Use   • Vaping Use: Never used   Substance Use Topics   • Alcohol use: Yes     Comment: rarely   • Drug use: Yes     Types: Marijuana     Comment: occasionally edible       Review of Systems   Constitutional: Negative for chills and fever  HENT: Negative for ear pain and sore throat  Eyes: Negative for pain and visual disturbance  Respiratory: Positive for cough and shortness of breath  Cardiovascular: Positive for leg swelling  Negative for chest pain and palpitations     Gastrointestinal: Negative for abdominal pain and vomiting  Genitourinary: Negative for dysuria and hematuria  Musculoskeletal: Negative for arthralgias and back pain  Skin: Negative for color change and rash  Neurological: Negative for seizures and syncope  All other systems reviewed and are negative  Physical Exam  Physical Exam  Vitals reviewed  Constitutional:       General: He is not in acute distress  Appearance: He is well-developed  He is obese  He is not ill-appearing or diaphoretic  HENT:      Right Ear: External ear normal  No swelling  Tympanic membrane is not bulging  Left Ear: External ear normal  No swelling  Tympanic membrane is not bulging  Nose: Nose normal       Mouth/Throat:      Pharynx: No oropharyngeal exudate  Eyes:      General: Lids are normal       Conjunctiva/sclera: Conjunctivae normal       Pupils: Pupils are equal, round, and reactive to light  Neck:      Thyroid: No thyromegaly  Vascular: No JVD  Trachea: No tracheal deviation  Cardiovascular:      Rate and Rhythm: Normal rate and regular rhythm  Pulses: Normal pulses  Heart sounds: Normal heart sounds  No murmur heard  No friction rub  No gallop  Pulmonary:      Effort: Pulmonary effort is normal  No respiratory distress  Breath sounds: No stridor  Decreased breath sounds and wheezing present  No rales  Chest:      Chest wall: No tenderness  Abdominal:      General: Bowel sounds are normal  There is no distension  Palpations: Abdomen is soft  There is no mass  Tenderness: There is no abdominal tenderness  There is no guarding or rebound  Negative signs include Hernandez's sign  Hernia: No hernia is present  Musculoskeletal:         General: No tenderness  Normal range of motion  Cervical back: Normal range of motion and neck supple  No edema  Normal range of motion  Right lower leg: Edema present  Left lower leg: Edema present     Lymphadenopathy:      Cervical: No cervical adenopathy  Skin:     General: Skin is warm and dry  Capillary Refill: Capillary refill takes less than 2 seconds  Coloration: Skin is not pale  Findings: No erythema or rash  Neurological:      Mental Status: He is alert and oriented to person, place, and time  GCS: GCS eye subscore is 4  GCS verbal subscore is 5  GCS motor subscore is 6  Cranial Nerves: No cranial nerve deficit  Sensory: No sensory deficit  Deep Tendon Reflexes: Reflexes are normal and symmetric  Psychiatric:         Speech: Speech normal          Behavior: Behavior normal          Vital Signs  ED Triage Vitals   Temperature Pulse Respirations Blood Pressure SpO2   12/16/22 1116 12/16/22 1116 12/16/22 1113 12/16/22 1116 12/16/22 1116   99 6 °F (37 6 °C) (!) 114 20 142/91 91 %      Temp Source Heart Rate Source Patient Position - Orthostatic VS BP Location FiO2 (%)   12/16/22 1116 12/16/22 1116 12/16/22 1116 12/16/22 1116 --   Temporal Monitor Sitting Right arm       Pain Score       --                  Vitals:    12/16/22 1116   BP: 142/91   Pulse: (!) 114   Patient Position - Orthostatic VS: Sitting         Visual Acuity      ED Medications  Medications   ipratropium-albuterol (DUO-NEB) 0 5-2 5 mg/3 mL inhalation solution 3 mL (3 mL Nebulization Given 12/16/22 1154)   furosemide (LASIX) injection 40 mg (40 mg Intravenous Given 12/16/22 1154)       Diagnostic Studies  Results Reviewed     Procedure Component Value Units Date/Time    FLU/RSV/COVID - if FLU/RSV clinically relevant [[id number]]  (Abnormal) Collected: 12/16/22 1148    Lab Status: Final result Specimen: Nares from Nose Updated: 12/16/22 1237     SARS-CoV-2 Positive     INFLUENZA A PCR Negative     INFLUENZA B PCR Negative     RSV PCR Negative    Narrative:      FOR PEDIATRIC PATIENTS - copy/paste COVID Guidelines URL to browser: [email or web address]  [email or web address]    SARS-CoV-2 assay is a Nucleic Acid Amplification assay intended for the  qualitative detection of nucleic acid from SARS-CoV-2 in nasopharyngeal  swabs  Results are for the presumptive identification of SARS-CoV-2 RNA  Positive results are indicative of infection with SARS-CoV-2, the virus  causing COVID-19, but do not rule out bacterial infection or co-infection  with other viruses  Laboratories within the [place] and its  territories are required to report all positive results to the appropriate  public health authorities  Negative results do not preclude SARS-CoV-2  infection and should not be used as the sole basis for treatment or other  patient management decisions  Negative results must be combined with  clinical observations, patient history, and epidemiological information  This test has not been FDA cleared or approved  This test has been authorized by FDA under an Emergency Use Authorization  (EUA)  This test is only authorized for the duration of time the  declaration that circumstances exist justifying the authorization of the  emergency use of an in vitro diagnostic tests for detection of SARS-CoV-2  virus and/or diagnosis of COVID-19 infection under section 564(b)(1) of  the Act, 21 U  S C  618DBN-6(H)(3), unless the authorization is terminated  or revoked sooner  The test has been validated but independent review by FDA  and CLIA is pending  Test performed using [id number] GeneXpert: This RT-PCR assay targets N2,  a region unique to SARS-CoV-2  A conserved region in the E-gene was chosen  for pan-Sarbecovirus detection which includes SARS-CoV-2  According to CMS-2020-01-R, this platform meets the definition of high-throughput technology      HS Troponin 0hr (reflex protocol) [[id number]]  (Normal) Collected: 12/16/22 1148    Lab Status: Final result Specimen: Blood from Arm, Left Updated: 12/16/22 1221     hs TnI 0hr 48 ng/L     NT-BNP PRO [[id number]]  (Abnormal) Collected: 12/16/22 1148    Lab Status: Final result Specimen: Blood from Arm, Left Updated: 12/16/22 1220     NT-proBNP 248 pg/mL     Magnesium [[id number]]  (Normal) Collected: 12/16/22 1148    Lab Status: Final result Specimen: Blood from Arm, Left Updated: 12/16/22 1220     Magnesium 1 8 mg/dL     Lactic acid [[id number]]  (Normal) Collected: 12/16/22 1148    Lab Status: Final result Specimen: Blood from Arm, Left Updated: 12/16/22 1216     LACTIC ACID 1 4 mmol/L     Narrative:      Result may be elevated if tourniquet was used during collection      Comprehensive metabolic panel [[id number]]  (Abnormal) Collected: 12/16/22 1148    Lab Status: Final result Specimen: Blood from Arm, Left Updated: 12/16/22 1213     Sodium 138 mmol/L      Potassium 4 1 mmol/L      Chloride 100 mmol/L      CO2 30 mmol/L      ANION GAP 8 mmol/L      BUN 26 mg/dL      Creatinine 1 42 mg/dL      Glucose 128 mg/dL      Calcium 8 9 mg/dL      AST 27 U/L      ALT 33 U/L      Alkaline Phosphatase 82 U/L      Total Protein 7 6 g/dL      Albumin 3 6 g/dL      Total Bilirubin 1 29 mg/dL      eGFR 51 ml/min/1 73sq m     Narrative:      [place] guidelines for Chronic Kidney Disease (CKD):   •  Stage 1 with normal or high GFR (GFR > 90 mL/min/1 73 square meters)  •  Stage 2 Mild CKD (GFR = 60-89 mL/min/1 73 square meters)  •  Stage 3A Moderate CKD (GFR = 45-59 mL/min/1 73 square meters)  •  Stage 3B Moderate CKD (GFR = 30-44 mL/min/1 73 square meters)  •  Stage 4 Severe CKD (GFR = 15-29 mL/min/1 73 square meters)  •  Stage 5 End Stage CKD (GFR <15 mL/min/1 73 square meters)  Note: GFR calculation is accurate only with a steady state creatinine    CBC and differential [[id number]]  (Abnormal) Collected: 12/16/22 1148    Lab Status: Final result Specimen: Blood from Arm, Left Updated: 12/16/22 1159     WBC 12 15 Thousand/uL      RBC 5 12 Million/uL      Hemoglobin 13 6 g/dL      Hematocrit 43 4 %      MCV 85 fL      MCH 26 6 pg      MCHC 31 3 g/dL      RDW 14 7 %      MPV 9 5 fL      Platelets 872 Thousands/uL      nRBC 0 /100 WBCs      Neutrophils Relative 81 %      Immat GRANS % 0 %      Lymphocytes Relative 6 %      Monocytes Relative 8 %      Eosinophils Relative 4 %      Basophils Relative 1 %      Neutrophils Absolute 9 87 Thousands/µL      Immature Grans Absolute 0 05 Thousand/uL      Lymphocytes Absolute 0 78 Thousands/µL      Monocytes Absolute 0 95 Thousand/µL      Eosinophils Absolute 0 44 Thousand/µL      Basophils Absolute 0 06 Thousands/µL     Blood culture #1 [[id number]] Collected: 12/16/22 1148    Lab Status: In process Specimen: Blood from Arm, Right Updated: 12/16/22 1156    Blood culture #2 [[id number]] Collected: 12/16/22 1148    Lab Status: In process Specimen: Blood from Arm, Left Updated: 12/16/22 1156                 XR chest 1 view portable   Final Result by Tomas Lieberman MD (12/16 1218)      No acute cardiopulmonary disease        Findings are stable            Workstation performed: [id number]                    Procedures  ECG 12 Lead Documentation Only    Date/Time: 12/16/2022 11:47 AM  Performed by: Thony Lutz PA-C  Authorized by: Thony Lutz PA-C     Indications / Diagnosis:  Sob  ECG reviewed by me, the ED Provider: yes    Patient location:  ED  Previous ECG:     Comparison to cardiac monitor: Yes    Interpretation:     Interpretation: non-specific    Rate:     ECG rate:  108    ECG rate assessment: tachycardic    Rhythm:     Rhythm: sinus tachycardia    Ectopy:     Ectopy: none    QRS:     QRS axis:  Normal    QRS intervals:  Normal  Conduction:     Conduction: normal    ST segments:     ST segments:  Normal  T waves:     T waves: normal               ED Course  ED Course as of 12/16/22 1429   Fri Dec 16, 2022   1301 SARS-COV-2(!): Positive   1301 INFLU A PCR: Negative   1301 NT-proBNP(!): 248   1301 LACTIC ACID: 1 4   1301 WBC(!): 12 15   1301 HCT: 43 4   1301 Platelet Count: 270   7093 IMPRESSION:     No acute cardiopulmonary disease      Findings are stable             HEART Risk Score    Flowsheet Row Most Recent Value   Heart Score Risk Calculator    History 0 Filed at: 12/16/2022 1148   ECG 0 Filed at: 12/16/2022 1148   Age 2 Filed at: 12/16/2022 1148   Risk Factors 1 Filed at: 12/16/2022 1148   Troponin 0 Filed at: 12/16/2022 1148   HEART Score 3 Filed at: 12/16/2022 1148                                      MDM    Disposition  Final diagnoses:   COVID   Hypoxia     Time reflects when diagnosis was documented in both MDM as applicable and the Disposition within this note     Time User Action Codes Description Comment    12/16/2022  1:31 PM Norbert STOKES Add [U07 1] COVID     12/16/2022  2:27 PM Saadia Rojas Add [R09 02] Hypoxia       ED Disposition     ED Disposition   Admit    Condition   Stable    Date/Time   Fri Dec 16, 2022  2:28 PM    Comment   Case was discussed with Dr Johana Salgado and the patient's admission status was agreed to be Admission Status: observation status to the service of Dr Marcellus Diaz  Follow-up Information    None         Patient's Medications   Discharge Prescriptions    No medications on file       No discharge procedures on file      PDMP Review       Value Time User    PDMP Reviewed  Yes 3/28/2022 11:38 AM Maribel Antoine DO          ED Provider  Electronically Signed by           Kelly Renee PA-C  12/16/22 8019

## 2022-12-16 NOTE — PT/OT/SLP DISCHARGE
Physical Therapy Discharge Summary    Name: Marianela Shrestha  MRN: [id number]   Principal Problem: SHAZIA (cauda equina syndrome)     Patient Discharged from acute Physical Therapy on 12/16/22.  Please refer to prior PT noted date on 12/15/22 for functional status.     Assessment:     Patient appropriate for care in another setting. Attempted to treat pt and complete her d/c today, however, pt c/o of increase wounds on R foot with edema and redness which she attributed to her L AFO. Pt therefore did not want to walk and requested PT reach out to the charge to address her wounds. PT spoke with charge nurse and charge nurse sent a message to pt's SNF provider to complete wound care orders either for outpatient or home health. PT relayed message to pt and pt was acceptant. Pt is Mod I with bed mobility and transfers.    Objective:     GOALS:   Multidisciplinary Problems       Physical Therapy Goals          Problem: Physical Therapy    Goal Priority Disciplines Outcome Goal Variances Interventions   Physical Therapy Goal     PT, PT/OT Ongoing, Progressing     Description: Goals to be met by:12/19/22    Patient will increase functional independence with mobility by performing:    Sit to stand transfer with Supervision - Met 12/5  Updated goal: Sit to stand transfer with Mod I    Bed to chair transfer with Supervision using Rolling Walker - Met 12/5  Updated Goal: Bed to chair transfer with Mod I using RW    Gait  x 150 feet with Stand-by Assistance using Rolling Walker. - Met 12/13  Updated Goal: Gait x 150 feet with Supervision using RW    Ascend/descend 4 stair with bilateral Handrails Stand-by Assistance using No Assistive Device. - Met 12/13  Updated Goal: Ascend/descend 4 steps with B HR and Supervision    Ascend/Descend 4 inch curb step with Stand-by Assistance using Rolling Walker.                         Reasons for Discontinuation of Therapy Services  Transfer to alternate level of care.      Plan:     Patient  Discharged to: Home with Home Health Service.      12/16/2022

## 2022-12-16 NOTE — NURSING
Pt refused to put dog in bathroom so lab worker can come in and draw blood work. I went in to ask pt if she could allow the dog to go in the bathroom so labs can be drawn, pt and  refused. Charge nurse notified.

## 2022-12-16 NOTE — NURSING
Discharge instructions reviewed with patient. Patient verbalize understanding. Answered all patients questions and concerns. Patient has open lesion on bilateral arm and legs. In home wound care consult ordered per charge nurse. Discharged at 1459 with family member via private car, belonging sent  and patient condition stable.

## 2022-12-16 NOTE — PT/OT/SLP PROGRESS
Occupational Therapy   Treatment / Discharge Summary    Name: Marianela Shrestha  MRN: [id number]  Admit Date: 11/18/2022  Admitting Diagnosis:  SHAZIA (cauda equina syndrome)    General Precautions: Standard, fall   Orthopedic Precautions: spinal precautions   Braces: AFO    Recommendations:     Discharge Recommendations:  home health OT  Level of Assistance Recommended at Discharge: Intermittent assistance for ADL's and homemaking tasks  Discharge Equipment Recommendations:  (to be determined)  Barriers to discharge:  Decreased caregiver support    Assessment:     Marianela Shrestha is a 37 y.o. female with a medical diagnosis of SHAZIA (cauda equina syndrome) .  She.remains limited in performance of self-care , functional mobility and ADLs and currently not performing tasks at PLOF . Currently presenting with performance deficits includingremains limited in performance of self-care , functional mobility and ADLs and currently not performing tasks at PLOF . Currently presenting with performance deficits including weakness, impaired endurance, impaired self care skills, impaired functional mobility, gait instability, impaired balance, decreased upper extremity function, decreased lower extremity function, decreased safety awareness, pain, decreased ROM, orthopedic precautions.   Pt tolerated Tx without incident and is only requiring SBA with standing tasks, self care tasks, functional mobility and functional transfers .  Pt is scheduled for D/C today and would benefit from continued OT on a home helth basis to address remaining deficits  and home safety.  She would continue to benefit from OT intervention to further her functional (I)ce and safety.     Rehab Potential is good    Activity tolerance:  Good    Plan:     Patient to be seen 6 x/week to address the above listed problems via self-care/home management, therapeutic activities, therapeutic exercises    Plan of Care Expires: 12/19/22  Plan of Care Reviewed with:  patient    Subjective     Communicated with: nurse prior to session.  .    Pain/Comfort:  Pain Rating 1:  (Pain not rated)  Location - Side 1: Left  Location - Orientation 1: generalized  Location 1: foot (and lower leg)  Pain Addressed 1: Pre-medicate for activity, Reposition, Distraction, Cessation of Activity  Pain Rating Post-Intervention 1:  (Pain not rated)    Patient's cultural, spiritual, Judaism conflicts given the current situation:  no    Objective:     Patient found supine with  (no lines.) upon OT entry to room.    Bed Mobility:    Patient completed Rolling/Turning to Right with modified independence  Patient completed Scooting/Bridging with modified independence  Patient completed Supine to Sit with modified independence     Functional Mobility/Transfers:  Patient completed Sit <> Stand Transfer with supervision  with  rolling walker   Patient completed Bed <> Chair Transfer using Stand Pivot technique with supervision with rolling walker  Patient completed Toilet Transfer Stand Pivot technique with supervision with  rolling walker  Functional Mobility: Patient propelled W/C from his/her room to therapy gym using (B) UEs a distance of 182 ft and concluded session by propelling back to her room         180 ft with  no assist provided throughout.     Activities of Daily Living:  Toileting: modified independence using grab bar to steady when standing.    [facility] 6 Click ADL: 20    OT Exercises: Pt performed UBE exercise for 15 minutes with Min resistance.  UE exercises performed to increase functional endurance and strength in order increase independence when performing self care tasks, functional ambulation, W/C propulsion, and functional standing activities .     Treatment & Education:  Pt edu on safety when performing self care tasks , benefit of performing and safety when performing functional transfers and mobility.  - White board updated  - Self care tasks completed-- as noted above      Patient  left up in chair with call button in reach    GOALS:   Multidisciplinary Problems       Occupational Therapy Goals          Problem: Occupational Therapy    Goal Priority Disciplines Outcome Interventions   Occupational Therapy Goal     OT, PT/OT Ongoing, Progressing    Description: Goals to be met by: 3 weeks     Patient will increase functional independence with ADLs by performing:    UE Dressing with Modified [place].  - MET  LE Dressing with Stand-by Assistance.  - MET  Grooming while seated with Modified [place].  -MET  Toileting from toilet or BSC with SBA for hygiene and clothing management. With A/D to stand. - MET  Bathing from sitting at sink with Stand-by Assistance.  -MET  Supine to sit with Modified How[place].  -MET  Step transfer with Stand-by Assistance with A/D as needed.  - MET  Upper extremity exercise with assistance as needed.  Caregiver will be educated on level of assist required to safely perform self care tasks and functional transfers.. -MET                         Time Tracking:     OT Date of Treatment: 12/16/22  OT Start Time: 1130    OT Stop Time: 1210  OT Total Time (min): 40 min    Billable Minutes:Therapeutic Activity 24  Therapeutic Exercise 16    12/16/2022

## 2022-12-19 ENCOUNTER — LAB VISIT (OUTPATIENT)
Dept: LAB | Facility: HOSPITAL | Age: 37
End: 2022-12-19
Attending: HOSPITALIST
Payer: MEDICARE

## 2022-12-19 ENCOUNTER — TELEPHONE (OUTPATIENT)
Dept: ORTHOPEDICS | Facility: CLINIC | Age: 37
End: 2022-12-19
Payer: MEDICARE

## 2022-12-19 DIAGNOSIS — G83.4 CAUDA EQUINA SYNDROME WITH NEUROGENIC BLADDER: Primary | ICD-10-CM

## 2022-12-19 LAB
ALBUMIN SERPL BCP-MCNC: 3.8 G/DL (ref 3.5–5.2)
ALP SERPL-CCNC: 102 U/L (ref 55–135)
ALT SERPL W/O P-5'-P-CCNC: 23 U/L (ref 10–44)
ANION GAP SERPL CALC-SCNC: 10 MMOL/L (ref 8–16)
AST SERPL-CCNC: 22 U/L (ref 10–40)
BASOPHILS # BLD AUTO: 0.03 K/UL (ref 0–0.2)
BASOPHILS NFR BLD: 0.4 % (ref 0–1.9)
BILIRUB SERPL-MCNC: 0.5 MG/DL (ref 0.1–1)
BUN SERPL-MCNC: 15 MG/DL (ref 6–20)
CALCIUM SERPL-MCNC: 9.1 MG/DL (ref 8.7–10.5)
CHLORIDE SERPL-SCNC: 107 MMOL/L (ref 95–110)
CO2 SERPL-SCNC: 26 MMOL/L (ref 23–29)
CREAT SERPL-MCNC: 0.9 MG/DL (ref 0.5–1.4)
DIFFERENTIAL METHOD: ABNORMAL
EOSINOPHIL # BLD AUTO: 0.1 K/UL (ref 0–0.5)
EOSINOPHIL NFR BLD: 2 % (ref 0–8)
ERYTHROCYTE [DISTWIDTH] IN BLOOD BY AUTOMATED COUNT: 14.6 % (ref 11.5–14.5)
EST. GFR  (NO RACE VARIABLE): >60 ML/MIN/1.73 M^2
GLUCOSE SERPL-MCNC: 91 MG/DL (ref 70–110)
HCT VFR BLD AUTO: 36 % (ref 37–48.5)
HGB BLD-MCNC: 11.9 G/DL (ref 12–16)
IMM GRANULOCYTES # BLD AUTO: 0.03 K/UL (ref 0–0.04)
IMM GRANULOCYTES NFR BLD AUTO: 0.4 % (ref 0–0.5)
LYMPHOCYTES # BLD AUTO: 2 K/UL (ref 1–4.8)
LYMPHOCYTES NFR BLD: 27.7 % (ref 18–48)
MCH RBC QN AUTO: 31 PG (ref 27–31)
MCHC RBC AUTO-ENTMCNC: 33.1 G/DL (ref 32–36)
MCV RBC AUTO: 94 FL (ref 82–98)
MONOCYTES # BLD AUTO: 0.6 K/UL (ref 0.3–1)
MONOCYTES NFR BLD: 8.8 % (ref 4–15)
NEUTROPHILS # BLD AUTO: 4.4 K/UL (ref 1.8–7.7)
NEUTROPHILS NFR BLD: 60.7 % (ref 38–73)
NRBC BLD-RTO: 0 /100 WBC
PLATELET # BLD AUTO: 411 K/UL (ref 150–450)
PMV BLD AUTO: 10.1 FL (ref 9.2–12.9)
POTASSIUM SERPL-SCNC: 4.1 MMOL/L (ref 3.5–5.1)
PROT SERPL-MCNC: 7.7 G/DL (ref 6–8.4)
RBC # BLD AUTO: 3.84 M/UL (ref 4–5.4)
SODIUM SERPL-SCNC: 143 MMOL/L (ref 136–145)
WBC # BLD AUTO: 7.16 K/UL (ref 3.9–12.7)

## 2022-12-19 PROCEDURE — G0180 PR HOME HEALTH MD CERTIFICATION: ICD-10-PCS | Mod: ,,, | Performed by: HOSPITALIST

## 2022-12-19 PROCEDURE — 80053 COMPREHEN METABOLIC PANEL: CPT | Performed by: HOSPITALIST

## 2022-12-19 PROCEDURE — G0180 MD CERTIFICATION HHA PATIENT: HCPCS | Mod: ,,, | Performed by: HOSPITALIST

## 2022-12-19 PROCEDURE — 85025 COMPLETE CBC W/AUTO DIFF WBC: CPT | Performed by: HOSPITALIST

## 2022-12-19 NOTE — TELEPHONE ENCOUNTER
----- Message from Sara Pina sent at 12/19/2022 12:34 PM CST -----  Contact: pt @ [phone]  Rx Refill/Request    Is this a Refill or New Rx:  refill    Rx Name and Strength:  oxyCODONE (ROXICODONE) 5 MG immediate release tablet    Preferred Pharmacy with phone number:  [facility]/pharmacy #[id number] [place], [place] - [address] [address]  Phone: [phone] Fax: [phone]      Communication Preference: Pt says she still having a lot of issues on her left side.Asking for call back   Additional Information

## 2022-12-19 NOTE — TELEPHONE ENCOUNTER
Spoke with patient and informed her that medication was already called in by oxyCODONE (ROXICODONE) 5 MG immediate release tablet ORDERED BY PANTERA MENDEZ. 12/15/22. PATIENT STATED AN VERBAL UNDERSTANDING.

## 2022-12-21 ENCOUNTER — TELEPHONE (OUTPATIENT)
Dept: ORTHOPEDICS | Facility: CLINIC | Age: 37
End: 2022-12-21
Payer: MEDICARE

## 2022-12-21 NOTE — TELEPHONE ENCOUNTER
Spoke with patient and informed her that medication was called in 12/20/22. She stated a verbal understanding.

## 2022-12-21 NOTE — TELEPHONE ENCOUNTER
----- Message from Sara Pina sent at 12/21/2022  2:18 PM CST -----  Contact: pt @ [phone]  Bret from Home Health calling regarding Patient Advice (message) for # pt is in 9 out 10 pain and is out of pain medicine

## 2022-12-27 ENCOUNTER — PATIENT MESSAGE (OUTPATIENT)
Dept: ORTHOPEDICS | Facility: CLINIC | Age: 37
End: 2022-12-27
Payer: MEDICARE

## 2022-12-27 ENCOUNTER — LAB VISIT (OUTPATIENT)
Dept: LAB | Facility: HOSPITAL | Age: 37
End: 2022-12-27
Attending: HOSPITALIST
Payer: MEDICARE

## 2022-12-27 DIAGNOSIS — G83.4 CAUDA EQUINA SYNDROME WITH NEUROGENIC BLADDER: Primary | ICD-10-CM

## 2022-12-27 LAB
ALBUMIN SERPL BCP-MCNC: 3.9 G/DL (ref 3.5–5.2)
ALP SERPL-CCNC: 114 U/L (ref 55–135)
ALT SERPL W/O P-5'-P-CCNC: 27 U/L (ref 10–44)
ANION GAP SERPL CALC-SCNC: 8 MMOL/L (ref 8–16)
AST SERPL-CCNC: 15 U/L (ref 10–40)
BILIRUB SERPL-MCNC: 0.4 MG/DL (ref 0.1–1)
BUN SERPL-MCNC: 15 MG/DL (ref 6–20)
CALCIUM SERPL-MCNC: 9.1 MG/DL (ref 8.7–10.5)
CHLORIDE SERPL-SCNC: 105 MMOL/L (ref 95–110)
CO2 SERPL-SCNC: 25 MMOL/L (ref 23–29)
CREAT SERPL-MCNC: 0.9 MG/DL (ref 0.5–1.4)
ERYTHROCYTE [DISTWIDTH] IN BLOOD BY AUTOMATED COUNT: 14.1 % (ref 11.5–14.5)
EST. GFR  (NO RACE VARIABLE): >60 ML/MIN/1.73 M^2
GLUCOSE SERPL-MCNC: 82 MG/DL (ref 70–110)
HCT VFR BLD AUTO: 42.9 % (ref 37–48.5)
HGB BLD-MCNC: 14.4 G/DL (ref 12–16)
MCH RBC QN AUTO: 31.3 PG (ref 27–31)
MCHC RBC AUTO-ENTMCNC: 33.6 G/DL (ref 32–36)
MCV RBC AUTO: 93 FL (ref 82–98)
PLATELET # BLD AUTO: 449 K/UL (ref 150–450)
PMV BLD AUTO: 10.1 FL (ref 9.2–12.9)
POTASSIUM SERPL-SCNC: 3.9 MMOL/L (ref 3.5–5.1)
PROT SERPL-MCNC: 8.7 G/DL (ref 6–8.4)
RBC # BLD AUTO: 4.6 M/UL (ref 4–5.4)
SODIUM SERPL-SCNC: 138 MMOL/L (ref 136–145)
WBC # BLD AUTO: 10.45 K/UL (ref 3.9–12.7)

## 2022-12-27 PROCEDURE — 80053 COMPREHEN METABOLIC PANEL: CPT | Performed by: HOSPITALIST

## 2022-12-27 PROCEDURE — 85027 COMPLETE CBC AUTOMATED: CPT | Performed by: HOSPITALIST

## 2022-12-27 RX ORDER — HYDROCODONE BITARTRATE AND ACETAMINOPHEN 5; 325 MG/1; MG/1
1 TABLET ORAL EVERY 6 HOURS PRN
Qty: 28 TABLET | Refills: 0 | Status: SHIPPED | OUTPATIENT
Start: 2022-12-27 | End: 2023-01-07 | Stop reason: SDUPTHER

## 2022-12-29 ENCOUNTER — TELEPHONE (OUTPATIENT)
Dept: INTERNAL MEDICINE | Facility: CLINIC | Age: 37
End: 2022-12-29
Payer: MEDICARE

## 2022-12-29 NOTE — TELEPHONE ENCOUNTER
----- Message from Nga Miller MA sent at 12/29/2022  1:32 PM CST -----  Contact: [phone]    ----- Message -----  From: Venecia Campos  Sent: 12/29/2022  10:23 AM CST  To: Srinivas Mcintosh Staff    Pt is calling she states she had a call from the office please advise and give return call please give her a return call ASAp    She is needing but she is needing this she had a fall and she can not even hardly walk she is needing to explain what happened to her     gabapentin (NEURONTIN) 600 MG tablet

## 2023-01-04 ENCOUNTER — LAB VISIT (OUTPATIENT)
Dept: LAB | Facility: HOSPITAL | Age: 38
End: 2023-01-04
Attending: HOSPITALIST
Payer: MEDICARE

## 2023-01-04 DIAGNOSIS — G83.4 CAUDA EQUINA SYNDROME WITH NEUROGENIC BLADDER: Primary | ICD-10-CM

## 2023-01-04 LAB
ALBUMIN SERPL BCP-MCNC: 3.7 G/DL (ref 3.5–5.2)
ALP SERPL-CCNC: 96 U/L (ref 55–135)
ALT SERPL W/O P-5'-P-CCNC: 24 U/L (ref 10–44)
ANION GAP SERPL CALC-SCNC: 10 MMOL/L (ref 8–16)
AST SERPL-CCNC: 25 U/L (ref 10–40)
BASOPHILS # BLD AUTO: 0.06 K/UL (ref 0–0.2)
BASOPHILS NFR BLD: 0.7 % (ref 0–1.9)
BILIRUB SERPL-MCNC: 0.7 MG/DL (ref 0.1–1)
BUN SERPL-MCNC: 17 MG/DL (ref 6–20)
CALCIUM SERPL-MCNC: 9 MG/DL (ref 8.7–10.5)
CHLORIDE SERPL-SCNC: 107 MMOL/L (ref 95–110)
CO2 SERPL-SCNC: 25 MMOL/L (ref 23–29)
CREAT SERPL-MCNC: 0.8 MG/DL (ref 0.5–1.4)
DIFFERENTIAL METHOD: ABNORMAL
EOSINOPHIL # BLD AUTO: 0.4 K/UL (ref 0–0.5)
EOSINOPHIL NFR BLD: 4.2 % (ref 0–8)
ERYTHROCYTE [DISTWIDTH] IN BLOOD BY AUTOMATED COUNT: 13.8 % (ref 11.5–14.5)
EST. GFR  (NO RACE VARIABLE): >60 ML/MIN/1.73 M^2
GLUCOSE SERPL-MCNC: 56 MG/DL (ref 70–110)
HCT VFR BLD AUTO: 37 % (ref 37–48.5)
HGB BLD-MCNC: 12.4 G/DL (ref 12–16)
IMM GRANULOCYTES # BLD AUTO: 0.02 K/UL (ref 0–0.04)
IMM GRANULOCYTES NFR BLD AUTO: 0.2 % (ref 0–0.5)
LYMPHOCYTES # BLD AUTO: 2.6 K/UL (ref 1–4.8)
LYMPHOCYTES NFR BLD: 28.4 % (ref 18–48)
MCH RBC QN AUTO: 31.1 PG (ref 27–31)
MCHC RBC AUTO-ENTMCNC: 33.5 G/DL (ref 32–36)
MCV RBC AUTO: 93 FL (ref 82–98)
MONOCYTES # BLD AUTO: 0.8 K/UL (ref 0.3–1)
MONOCYTES NFR BLD: 8.7 % (ref 4–15)
NEUTROPHILS # BLD AUTO: 5.2 K/UL (ref 1.8–7.7)
NEUTROPHILS NFR BLD: 57.8 % (ref 38–73)
NRBC BLD-RTO: 0 /100 WBC
PLATELET # BLD AUTO: 401 K/UL (ref 150–450)
PMV BLD AUTO: 10.1 FL (ref 9.2–12.9)
POTASSIUM SERPL-SCNC: 3.1 MMOL/L (ref 3.5–5.1)
PROT SERPL-MCNC: 7.7 G/DL (ref 6–8.4)
RBC # BLD AUTO: 3.99 M/UL (ref 4–5.4)
SODIUM SERPL-SCNC: 142 MMOL/L (ref 136–145)
WBC # BLD AUTO: 9.07 K/UL (ref 3.9–12.7)

## 2023-01-04 PROCEDURE — 85025 COMPLETE CBC W/AUTO DIFF WBC: CPT | Mod: HCNC | Performed by: HOSPITALIST

## 2023-01-04 PROCEDURE — 80053 COMPREHEN METABOLIC PANEL: CPT | Mod: HCNC | Performed by: HOSPITALIST

## 2023-01-10 ENCOUNTER — LAB VISIT (OUTPATIENT)
Dept: LAB | Facility: HOSPITAL | Age: 38
End: 2023-01-10
Attending: HOSPITALIST
Payer: MEDICARE

## 2023-01-10 DIAGNOSIS — G83.4 CAUDA EQUINA SYNDROME WITH NEUROGENIC BLADDER: Primary | ICD-10-CM

## 2023-01-10 LAB
ALBUMIN SERPL BCP-MCNC: 4 G/DL (ref 3.5–5.2)
ALP SERPL-CCNC: 107 U/L (ref 55–135)
ALT SERPL W/O P-5'-P-CCNC: 32 U/L (ref 10–44)
ANION GAP SERPL CALC-SCNC: 11 MMOL/L (ref 8–16)
AST SERPL-CCNC: 28 U/L (ref 10–40)
BILIRUB SERPL-MCNC: 1 MG/DL (ref 0.1–1)
BUN SERPL-MCNC: 15 MG/DL (ref 6–20)
CALCIUM SERPL-MCNC: 9.8 MG/DL (ref 8.7–10.5)
CHLORIDE SERPL-SCNC: 102 MMOL/L (ref 95–110)
CO2 SERPL-SCNC: 25 MMOL/L (ref 23–29)
CREAT SERPL-MCNC: 0.9 MG/DL (ref 0.5–1.4)
ERYTHROCYTE [DISTWIDTH] IN BLOOD BY AUTOMATED COUNT: 13.6 % (ref 11.5–14.5)
EST. GFR  (NO RACE VARIABLE): >60 ML/MIN/1.73 M^2
GLUCOSE SERPL-MCNC: 104 MG/DL (ref 70–110)
HCT VFR BLD AUTO: 37.2 % (ref 37–48.5)
HGB BLD-MCNC: 12.9 G/DL (ref 12–16)
MCH RBC QN AUTO: 31.2 PG (ref 27–31)
MCHC RBC AUTO-ENTMCNC: 34.7 G/DL (ref 32–36)
MCV RBC AUTO: 90 FL (ref 82–98)
PLATELET # BLD AUTO: 378 K/UL (ref 150–450)
PMV BLD AUTO: 10.7 FL (ref 9.2–12.9)
POTASSIUM SERPL-SCNC: 3.1 MMOL/L (ref 3.5–5.1)
PROT SERPL-MCNC: 8.2 G/DL (ref 6–8.4)
RBC # BLD AUTO: 4.13 M/UL (ref 4–5.4)
SODIUM SERPL-SCNC: 138 MMOL/L (ref 136–145)
WBC # BLD AUTO: 9.43 K/UL (ref 3.9–12.7)

## 2023-01-10 PROCEDURE — 85027 COMPLETE CBC AUTOMATED: CPT | Mod: HCNC | Performed by: HOSPITALIST

## 2023-01-10 PROCEDURE — 80053 COMPREHEN METABOLIC PANEL: CPT | Mod: HCNC | Performed by: HOSPITALIST

## 2023-01-16 DIAGNOSIS — M62.830 BACK SPASM: ICD-10-CM

## 2023-01-16 DIAGNOSIS — F41.1 GENERALIZED ANXIETY DISORDER: ICD-10-CM

## 2023-01-16 NOTE — TELEPHONE ENCOUNTER
No new care gaps identified.  [facility] Embedded Care Gaps. Reference number: [id number]. 1/16/2023   11:02:40 AM CST

## 2023-01-17 ENCOUNTER — PATIENT MESSAGE (OUTPATIENT)
Dept: INTERNAL MEDICINE | Facility: CLINIC | Age: 38
End: 2023-01-17
Payer: MEDICARE

## 2023-01-17 DIAGNOSIS — M54.31 BILATERAL SCIATICA: ICD-10-CM

## 2023-01-17 DIAGNOSIS — M54.32 BILATERAL SCIATICA: ICD-10-CM

## 2023-01-17 DIAGNOSIS — G89.4 CHRONIC PAIN SYNDROME: ICD-10-CM

## 2023-01-17 RX ORDER — VENLAFAXINE HYDROCHLORIDE 150 MG/1
300 CAPSULE, EXTENDED RELEASE ORAL DAILY
Qty: 60 CAPSULE | Refills: 1 | OUTPATIENT
Start: 2023-01-17

## 2023-01-17 RX ORDER — TIZANIDINE 2 MG/1
2 TABLET ORAL EVERY 8 HOURS PRN
Qty: 15 TABLET | Refills: 0 | OUTPATIENT
Start: 2023-01-17 | End: 2023-01-27

## 2023-01-18 RX ORDER — GABAPENTIN 600 MG/1
600 TABLET ORAL 3 TIMES DAILY
Qty: 90 TABLET | Refills: 1 | Status: SHIPPED | OUTPATIENT
Start: 2023-01-18 | End: 2023-03-20

## 2023-01-19 ENCOUNTER — EXTERNAL HOME HEALTH (OUTPATIENT)
Dept: HOME HEALTH SERVICES | Facility: HOSPITAL | Age: 38
End: 2023-01-19
Payer: MEDICARE

## 2023-01-26 ENCOUNTER — TELEPHONE (OUTPATIENT)
Dept: INTERNAL MEDICINE | Facility: CLINIC | Age: 38
End: 2023-01-26
Payer: MEDICARE

## 2023-01-26 ENCOUNTER — PATIENT MESSAGE (OUTPATIENT)
Dept: ADMINISTRATIVE | Facility: OTHER | Age: 38
End: 2023-01-26
Payer: MEDICARE

## 2023-01-26 ENCOUNTER — OUTPATIENT CASE MANAGEMENT (OUTPATIENT)
Dept: ADMINISTRATIVE | Facility: OTHER | Age: 38
End: 2023-01-26
Payer: MEDICARE

## 2023-01-26 NOTE — LETTER
"  [facility]:  Shannon Todd RN, [facility] Nurse   Tel:[phone] Mon-Fri, 8 am- 4:30 pm    Zenia العلي, , [facility] ,   TEL: [phone] Mon-Fri, 8 am- 4:30 pm    [facility] On Call, 24/7 Nurse Help Line (non emergent)- TEL:  [phone]    [facility] Technical Issue Support Team--TEL:  [phone], Mon-Fri 9 am- 5 pm.    My.[facility].org online patient portal    [facility] Financial Assistance TEL:  [phone]    Digital Medicine Program- TEL:  [phone]        [employer] Managed Care:    [employer] First 24 Hour Nurse Line--TEL:  [phone]    [employer] Managed Care,  Over-the-Counter Benefit-- TEL:  [phone]  On line:  Content [employer], select "Over-the-Counter (OTC) items from the "Shop OTC & Supplies drop down at the top of the page    [employer] Bondsy Care, [facility] Exercise- [facility]  On line [employer] Exercise and Health Education Resources        [email or web address]    [employer] Managed Care, "Mom's Meal", TEL: [phone]    [employer] Managed Care - [facility]-- TEL:  [phone]  Mon-Fri, 8 am-5 pm, 3 business days notice required.                   "

## 2023-01-26 NOTE — LETTER
January 26, 2023           Dear Marinaela,     Welcome to [facility]s Complex Care Management Program.  It was a pleasure talking with you today.  My name is Shannon Todd. I look forward to working with you as your .  My goal is to help you function at the healthiest and highest level possible.  You can contact me directly at [phone].    As an [facility] patient with [employer], some of the services we may be able to provide include:      Development of an individualized care plan with a Registered Nurse    Connection with a    Connection with available resources and services     Coordinate communication among your care team members    Provide coaching and education    Help you understand your doctors treatment plan   Help you obtain information about your insurance coverage.     All services provided by [facility]s Complex Care Managers and other care team members are coordinated with and communicated to your primary care team.    As part of your enrollment, you will be receiving education materials and more information about these services in your [facility] account, by phone or through the mail.  If you do not wish to participate or receive information, please contact our office at [phone].      Sincerely,        Shannon Todd, RN  [facility]   Out-patient RN Complex Care Manager

## 2023-01-26 NOTE — TELEPHONE ENCOUNTER
----- Message from Shannon Todd RN sent at 1/26/2023 10:20 AM CST -----  Regarding: Outpatient Care Management Enrollment --- Pt highly distraught, being taken off of Xanax by Psych  Dr. Espino/Staff:    Your patient [id number] Marianela Shrestha  was  referred to [facility] Complex [facility] Management Program by Provider Referral.     My name is Shannon Todd RN, Care Manager.    Zenia العلي LCSW with Outpatient Care Management is added to Care Team to assist in community resources.     In our enrollment encounter, the following needs were identified:  Medication Assistance  1)   Ms. Shrestha is highly distressed during today's initial encounter. She reports that her non [facility] Psychiatrist of 2 years is weaning her off of Xanax completely d/t to Hydrocodone-acetaminophen currently prescribed. C/o headaches, sweating and expresses fear of potential suicidal ideation with a h/o SI, as a result of not having anymore Xanax. She is practically out.   She has called the Psych without success in getting Xanax resumed at 1 mg TID prn. Next Psych appt 3/22/2023. Apparently, pt missed her last Psych appt while in hosp.   ---- Please advise on to best proceed regarding the resumption of Xanax rx if at all possible. She is interested in a referral to [facility].     All needs and services provided by [facility] Complex Care Managers and other care team members will be communicated to you via your Resource Pool.      Our documentation can be readily accessed in the EMR using the following tabs: Chart review -> Episodes: High Risk.     It is our goal to provide holistic care, if at any time you feel other areas of concern need to be addressed, please communicate with me by InScheduleThing messaging.      Thank you,  MARY LOU Lin, RN, CCM [facility]  [email or web address]  TEL:  [phone]

## 2023-01-26 NOTE — TELEPHONE ENCOUNTER
Rec'd a call from Naida MUNOZ- Cs.Manager explaining she reached out to pt as f/u to a referral-  she called to ask about pt status w/ Xanax.  I read her the note from PCP who referred her to Psych and explained pt must call to     schedule her own appointment. She confirmed pt is not saying she is suicidal but she does have a hx of suicide.  Pt has not stated she wants to harm anyone else either but  is concerned pt may need hospitalization.

## 2023-01-26 NOTE — PROGRESS NOTES
Outpatient Care Management  Initial Patient Assessment    Patient: Marianela Shrestha  MRN: [id number]  Date of Service: 01/26/2023  Completed by: Shannon Todd RN  Referral Date: 12/16/2022  Program: High Risk  Status: Ongoing  Effective Dates: 1/26/2023 - present  Responsible Staff: Shannon Todd RN        Reason for Visit   Patient presents with    OPCM Chart Review     1/26/2023    Initial Assessment     1/26/2023    Plan Of Care     1/26/2023       Brief Summary:  Marianela Shrestha was referred 12/16/2022. She is s/p laminectomy L4, L5, S1 on 11/11/2022 after fall causing LLE weakness. Post op pt had decreased sensation with bladder and bowels but managed to control functions. Transferred to [facility]. 12/16/2023-D/cd from SNF with [facility] to home where she lives with her mother & SO. H/o low back pain prior to fall. Active problem list, medical, surgical and social history reviewed. Active comorbidities include Cauda equina syndrome, impaired mobility, Anxiety,Seizure, Asthma. Areas of need identified by patient include being weaned off Xanax by her non [facility] Psychiatrist-- h/o 6-7 yrs of taking Xanax- is afraid of not having Xanax- is having h/as, diaphoresis. Concerns reflect left sided partial paralysis waist down, impaired mobility, changes in level of care needs. Refer to [facility] [place] SW to assess possible community resources.   Complex care plan created with patient/caregiver input. By next encounter, patient agrees to be contacted by OPCM RN and SW.  Next steps: F/u with Marianela tomorrow with PCP response to pt's request for Xanax rx.

## 2023-01-27 ENCOUNTER — OUTPATIENT CASE MANAGEMENT (OUTPATIENT)
Dept: ADMINISTRATIVE | Facility: OTHER | Age: 38
End: 2023-01-27
Payer: MEDICARE

## 2023-01-30 ENCOUNTER — PATIENT MESSAGE (OUTPATIENT)
Dept: ADMINISTRATIVE | Facility: OTHER | Age: 38
End: 2023-01-30
Payer: MEDICARE

## 2023-02-03 ENCOUNTER — TELEPHONE (OUTPATIENT)
Dept: INTERNAL MEDICINE | Facility: CLINIC | Age: 38
End: 2023-02-03
Payer: MEDICARE

## 2023-02-03 NOTE — TELEPHONE ENCOUNTER
----- Message from Shannon Todd RN sent at 2/3/2023  3:35 PM CST -----  Regarding: Unable to reach patient  Dr. Arnaldo Espino/Staff:    I have not been able to make contact with Mrs. Shrestha since my initial contact.   I hope she is receiving the care she needs ie IP Psych?     Thank you for any update.     MARY LOU Lin, RN, CCM [facility]  [email or web address]  TEL:  [phone]

## 2023-02-04 NOTE — TELEPHONE ENCOUNTER
Attempted to call pt back about making an appt to see PCP but no response at this time. Left VM to call office when available.

## 2023-02-06 ENCOUNTER — OUTPATIENT CASE MANAGEMENT (OUTPATIENT)
Dept: ADMINISTRATIVE | Facility: OTHER | Age: 38
End: 2023-02-06
Payer: MEDICARE

## 2023-02-07 DIAGNOSIS — Z00.00 ENCOUNTER FOR MEDICARE ANNUAL WELLNESS EXAM: ICD-10-CM

## 2023-02-09 DIAGNOSIS — Z00.00 ENCOUNTER FOR MEDICARE ANNUAL WELLNESS EXAM: ICD-10-CM

## 2023-02-11 ENCOUNTER — PATIENT MESSAGE (OUTPATIENT)
Dept: ADMINISTRATIVE | Facility: OTHER | Age: 38
End: 2023-02-11
Payer: MEDICARE

## 2023-02-11 DIAGNOSIS — F41.1 GENERALIZED ANXIETY DISORDER: ICD-10-CM

## 2023-02-11 NOTE — TELEPHONE ENCOUNTER
No new care gaps identified.  [facility] Embedded Care Gaps. Reference number: [id number]. 2/11/2023   9:34:14 AM CST

## 2023-02-13 ENCOUNTER — PATIENT MESSAGE (OUTPATIENT)
Dept: ORTHOPEDICS | Facility: CLINIC | Age: 38
End: 2023-02-13
Payer: MEDICARE

## 2023-02-13 RX ORDER — VENLAFAXINE HYDROCHLORIDE 150 MG/1
CAPSULE, EXTENDED RELEASE ORAL
Qty: 60 CAPSULE | Refills: 1 | Status: SHIPPED | OUTPATIENT
Start: 2023-02-13 | End: 2023-03-09

## 2023-02-14 DIAGNOSIS — M62.830 BACK SPASM: ICD-10-CM

## 2023-02-14 DIAGNOSIS — F41.1 GENERALIZED ANXIETY DISORDER: ICD-10-CM

## 2023-02-14 RX ORDER — ALPRAZOLAM 1 MG/1
1 TABLET ORAL 3 TIMES DAILY PRN
Qty: 21 TABLET | Refills: 0 | Status: SHIPPED | OUTPATIENT
Start: 2023-02-14 | End: 2023-03-30

## 2023-02-14 RX ORDER — TIZANIDINE 2 MG/1
2 TABLET ORAL EVERY 8 HOURS PRN
Qty: 30 TABLET | Refills: 0 | Status: SHIPPED | OUTPATIENT
Start: 2023-02-14 | End: 2023-02-23

## 2023-02-14 NOTE — TELEPHONE ENCOUNTER
No new care gaps identified.  [facility] Embedded Care Gaps. Reference number: [id number]. 2/14/2023   3:13:00 AM CST

## 2023-02-14 NOTE — PROGRESS NOTES
2nd Attempt to complete SW follow-up for Outpatient Care Management; left message requesting a return call and LCSW sent a message to the patient portal with contact information requesting a return call.  Collaborating with OPCM RN

## 2023-02-24 ENCOUNTER — OUTPATIENT CASE MANAGEMENT (OUTPATIENT)
Dept: ADMINISTRATIVE | Facility: OTHER | Age: 38
End: 2023-02-24
Payer: MEDICARE

## 2023-02-28 ENCOUNTER — PATIENT MESSAGE (OUTPATIENT)
Dept: ADMINISTRATIVE | Facility: OTHER | Age: 38
End: 2023-02-28
Payer: MEDICARE

## 2023-03-02 ENCOUNTER — OFFICE VISIT (OUTPATIENT)
Dept: ORTHOPEDICS | Facility: CLINIC | Age: 38
End: 2023-03-02
Payer: MEDICARE

## 2023-03-02 VITALS — BODY MASS INDEX: 35.74 KG/M2 | WEIGHT: 214.5 LBS | HEIGHT: 65 IN

## 2023-03-02 DIAGNOSIS — M51.36 DDD (DEGENERATIVE DISC DISEASE), LUMBAR: ICD-10-CM

## 2023-03-02 DIAGNOSIS — F32.A DEPRESSION, UNSPECIFIED DEPRESSION TYPE: ICD-10-CM

## 2023-03-02 DIAGNOSIS — G83.4: ICD-10-CM

## 2023-03-02 DIAGNOSIS — Z98.890 S/P LUMBAR DISCECTOMY: Primary | ICD-10-CM

## 2023-03-02 PROCEDURE — 3008F BODY MASS INDEX DOCD: CPT | Mod: CPTII,S$GLB,, | Performed by: ORTHOPAEDIC SURGERY

## 2023-03-02 PROCEDURE — 3008F PR BODY MASS INDEX (BMI) DOCUMENTED: ICD-10-PCS | Mod: CPTII,S$GLB,, | Performed by: ORTHOPAEDIC SURGERY

## 2023-03-02 PROCEDURE — 99999 PR PBB SHADOW E&M-EST. PATIENT-LVL IV: ICD-10-PCS | Mod: PBBFAC,,, | Performed by: ORTHOPAEDIC SURGERY

## 2023-03-02 PROCEDURE — 1160F PR REVIEW ALL MEDS BY PRESCRIBER/CLIN PHARMACIST DOCUMENTED: ICD-10-PCS | Mod: CPTII,S$GLB,, | Performed by: ORTHOPAEDIC SURGERY

## 2023-03-02 PROCEDURE — 99999 PR PBB SHADOW E&M-EST. PATIENT-LVL IV: CPT | Mod: PBBFAC,,, | Performed by: ORTHOPAEDIC SURGERY

## 2023-03-02 PROCEDURE — 1159F PR MEDICATION LIST DOCUMENTED IN MEDICAL RECORD: ICD-10-PCS | Mod: CPTII,S$GLB,, | Performed by: ORTHOPAEDIC SURGERY

## 2023-03-02 PROCEDURE — 99214 OFFICE O/P EST MOD 30 MIN: CPT | Mod: S$GLB,,, | Performed by: ORTHOPAEDIC SURGERY

## 2023-03-02 PROCEDURE — 1160F RVW MEDS BY RX/DR IN RCRD: CPT | Mod: CPTII,S$GLB,, | Performed by: ORTHOPAEDIC SURGERY

## 2023-03-02 PROCEDURE — 1159F MED LIST DOCD IN RCRD: CPT | Mod: CPTII,S$GLB,, | Performed by: ORTHOPAEDIC SURGERY

## 2023-03-02 PROCEDURE — 99214 PR OFFICE/OUTPT VISIT, EST, LEVL IV, 30-39 MIN: ICD-10-PCS | Mod: S$GLB,,, | Performed by: ORTHOPAEDIC SURGERY

## 2023-03-02 RX ORDER — HYDROCODONE BITARTRATE AND ACETAMINOPHEN 5; 325 MG/1; MG/1
1 TABLET ORAL EVERY 12 HOURS PRN
Qty: 14 TABLET | Refills: 0 | Status: SHIPPED | OUTPATIENT
Start: 2023-03-02 | End: 2023-03-09

## 2023-03-02 RX ORDER — HYDROXYZINE PAMOATE 25 MG/1
50 CAPSULE ORAL 3 TIMES DAILY PRN
COMMUNITY
Start: 2023-02-27 | End: 2023-03-30

## 2023-03-02 RX ORDER — CELECOXIB 200 MG/1
CAPSULE ORAL
COMMUNITY
Start: 2023-02-27 | End: 2023-04-10 | Stop reason: HOSPADM

## 2023-03-02 RX ORDER — CLINDAMYCIN HYDROCHLORIDE 300 MG/1
300 CAPSULE ORAL
COMMUNITY
Start: 2023-02-27 | End: 2023-03-09

## 2023-03-02 NOTE — PROGRESS NOTES
"DATE: 3/2/2023  PATIENT: Marianela Shrestha    Attending Physician: Sundar Harry M.D.    Date of Surgery: 11/11/22    Procedure: L4-S1 laminectomy/discectomy for cauda equina syndrome    HISTORY:  Marianela Shrestha is a 37 y.o. female who returns to me today for FU. Patient had a few falls after surgery. Patient complains of persistent n/t in LLE into the toes and in the R toes. She has been wearing her AFO for left foot drop; she did therapy for a while but stopped. She still has trouble with urination. A repeat lumbar MRI was ordered but she has not completed it yet. She injured her left foot from most recent fall. She went to urgent care and Xrs were negative for fractures, according to the patient. They put her on abx for foot cellulitis.    She has a history of depression and anxiety, but she wants to see a psychiatrist from [facility].    PMH/PSH/FamHx/SocHx:  Unchanged from prior visit    ROS:  Positive for BLE n/t    EXAM:  Ht 5' 5" (1.651 m)   Wt 97.3 kg (214 lb 8.1 oz)   BMI 35.70 kg/m²     My physical examination was notable for the following findings: 3/5 in left HF/KE, 0/5 in ankle DF and 3/5 in PF. L foot swollen.     IMAGING:  Today I independently reviewed the following images and my interpretations are as follows:    None today    ASSESSMENT/PLAN:  Patient is almost 4 months s/p L4-S1 decompression for SHAZIA. I educated the patient on the importance of core/back strengthening, correct posture, bending/lifting ergonomics, and low-impact aerobic exercises (walking, elliptical, and aquatherapy). I refilled her norco. I referred her to Pain Management and Psychiatry. I sent her to see F&A for L foot pain. Patient will complete lumbar MRI and we will let her know the results. RTC after MRI completion.    Sundar Harry MD  Orthopaedic Spine Surgeon  Department of Orthopaedic Surgery  [phone]  "

## 2023-03-11 ENCOUNTER — HOSPITAL ENCOUNTER (OUTPATIENT)
Dept: RADIOLOGY | Facility: HOSPITAL | Age: 38
Discharge: HOME | End: 2023-03-11
Attending: ORTHOPAEDIC SURGERY
Payer: MEDICARE

## 2023-03-11 ENCOUNTER — PATIENT MESSAGE (OUTPATIENT)
Dept: OBSTETRICS AND GYNECOLOGY | Facility: CLINIC | Age: 38
End: 2023-03-11
Payer: MEDICARE

## 2023-03-11 DIAGNOSIS — M25.572 PAIN OF JOINT OF LEFT ANKLE AND FOOT: ICD-10-CM

## 2023-03-11 DIAGNOSIS — Z30.41 ENCOUNTER FOR SURVEILLANCE OF CONTRACEPTIVE PILLS: Primary | ICD-10-CM

## 2023-03-11 DIAGNOSIS — M54.9 DORSALGIA, UNSPECIFIED: ICD-10-CM

## 2023-03-11 PROCEDURE — A9585 GADOBUTROL INJECTION: HCPCS | Performed by: ORTHOPAEDIC SURGERY

## 2023-03-11 PROCEDURE — 73721 MRI JNT OF LWR EXTRE W/O DYE: CPT | Mod: TC,LT

## 2023-03-11 PROCEDURE — 25500020 PHARM REV CODE 255: Performed by: ORTHOPAEDIC SURGERY

## 2023-03-11 PROCEDURE — 72158 MRI LUMBAR SPINE W WO CONTRAST: ICD-10-PCS | Mod: 26,,, | Performed by: RADIOLOGY

## 2023-03-11 PROCEDURE — 73721 MRI JNT OF LWR EXTRE W/O DYE: CPT | Mod: 26,LT,, | Performed by: RADIOLOGY

## 2023-03-11 PROCEDURE — 73721 MRI ANKLE WITHOUT CONTRAST LEFT: ICD-10-PCS | Mod: 26,LT,, | Performed by: RADIOLOGY

## 2023-03-11 PROCEDURE — 72158 MRI LUMBAR SPINE W/O & W/DYE: CPT | Mod: TC

## 2023-03-11 PROCEDURE — 72158 MRI LUMBAR SPINE W/O & W/DYE: CPT | Mod: 26,,, | Performed by: RADIOLOGY

## 2023-03-11 RX ORDER — GADOBUTROL 604.72 MG/ML
10 INJECTION INTRAVENOUS
Status: COMPLETED | OUTPATIENT
Start: 2023-03-11 | End: 2023-03-11

## 2023-03-11 RX ADMIN — GADOBUTROL 10 ML: 604.72 INJECTION INTRAVENOUS at 03:03

## 2023-03-13 DIAGNOSIS — G83.4: ICD-10-CM

## 2023-03-13 DIAGNOSIS — Z98.890 S/P LUMBAR DISCECTOMY: Primary | ICD-10-CM

## 2023-03-13 RX ORDER — NORETHINDRONE ACETATE AND ETHINYL ESTRADIOL .5; 2.5 MG/1; UG/1
1 TABLET ORAL DAILY
Qty: 90 TABLET | Refills: 4 | Status: ON HOLD | OUTPATIENT
Start: 2023-03-13 | End: 2023-05-15 | Stop reason: HOSPADM

## 2023-03-13 RX ORDER — NORETHINDRONE ACETATE AND ETHINYL ESTRADIOL .5; 2.5 MG/1; UG/1
1 TABLET ORAL DAILY
COMMUNITY
Start: 2022-12-19 | End: 2023-03-13 | Stop reason: SDUPTHER

## 2023-03-13 NOTE — PROGRESS NOTES
Date of Surgery - 4/11/23  Patient class - Inpatient  Provider - Sundar Harry  Procedure requested - Transforaminal Lumbar Interbody Fusion (TLIF)   Levels: L4-S1  Plan of Care: 2 night hospital stay with possible SNF  Instrumentation -  Globus Excelcius  Medical Necessity - not urgent  CPT codes: 09199, 65490, and 70429 and 11502 and 64969  post procedural disposition - med/surg  estimated length of stay - 2 midnights    Patient first seen:  11/11/22  Patient's most recent visit: 3/2/23  Patient imaging: xrays, MRI.  Results: new MRI lumbar demonstrates recurrent L4-L5 posterior disc protrusion and L5-S1 posterior disc extrusion contribute to severe spinal canal stenosis and severe left neural foraminal narrowing  Treatment failed: the patient has tried and failed conservative treatment including medications and physical therapy. She is sp L4-S1 laminectomy/discectomy for cauda equina syndrome on 11/11/22 and is having recurring left leg pain, weakness, and urinary symptoms that constitute repeat surgery  Medications tried: gabapentin, celebrex, and narcotics  Physical therapy (dates, duration, efficacy): SNF and home health post op  Home exercises: Yes.  I provided the patient with a home exercise program. It is the [employer] spine conditioning program. Exercises include head rolls, kneeling back extension, sitting rotation stretch, modified seated side straddle, knee to chest, bird dog, plank, modified seated plank, hip bridges, abdominal bracing, and abdominal crunch. Pt will complete each exercise 5 times daily for 6-8 weeks.  Epidural steroid injections: No.  Pt presents with weakness and urinary symptoms that constitute surgery   Functional impairment of ADLs: The patient's severe pain is affecting their quality of life and limited their daily life, including working and ability to provide self care.       Pertinent physical exam findings: progressive neurological deficits, unsteady gait, lower extremity weakness,  falls, and bowel or bladder dysfunction    *please upload patient clinicals including all notes from Afia Chandler PA-C, Antonia Clifton PA-C, M. Jaclyn Askew NP, Adolph Frausto MD, and/or Sundar Harry MD.  Please also include any and all physical therapy and pain management notes.

## 2023-03-14 ENCOUNTER — PATIENT MESSAGE (OUTPATIENT)
Dept: ADMINISTRATIVE | Facility: OTHER | Age: 38
End: 2023-03-14
Payer: MEDICARE

## 2023-03-15 ENCOUNTER — HOSPITAL ENCOUNTER (OUTPATIENT)
Dept: RADIOLOGY | Facility: HOSPITAL | Age: 38
Discharge: HOME | End: 2023-03-15
Attending: NURSE PRACTITIONER
Payer: MEDICARE

## 2023-03-15 ENCOUNTER — OFFICE VISIT (OUTPATIENT)
Dept: PAIN MEDICINE | Facility: CLINIC | Age: 38
End: 2023-03-15
Payer: MEDICARE

## 2023-03-15 VITALS — WEIGHT: 214 LBS | BODY MASS INDEX: 35.65 KG/M2 | RESPIRATION RATE: 18 BRPM | HEIGHT: 65 IN

## 2023-03-15 DIAGNOSIS — M48.062 SPINAL STENOSIS OF LUMBAR REGION WITH NEUROGENIC CLAUDICATION: Primary | ICD-10-CM

## 2023-03-15 DIAGNOSIS — M54.16 LUMBAR RADICULOPATHY: ICD-10-CM

## 2023-03-15 DIAGNOSIS — R52 PAIN: ICD-10-CM

## 2023-03-15 DIAGNOSIS — R52 PAIN: Primary | ICD-10-CM

## 2023-03-15 PROCEDURE — 99999 PR PBB SHADOW E&M-EST. PATIENT-LVL III: ICD-10-PCS | Mod: PBBFAC,,, | Performed by: STUDENT IN AN ORGANIZED HEALTH CARE EDUCATION/TRAINING PROGRAM

## 2023-03-15 PROCEDURE — 1160F PR REVIEW ALL MEDS BY PRESCRIBER/CLIN PHARMACIST DOCUMENTED: ICD-10-PCS | Mod: CPTII,S$GLB,, | Performed by: STUDENT IN AN ORGANIZED HEALTH CARE EDUCATION/TRAINING PROGRAM

## 2023-03-15 PROCEDURE — 1159F MED LIST DOCD IN RCRD: CPT | Mod: CPTII,S$GLB,, | Performed by: STUDENT IN AN ORGANIZED HEALTH CARE EDUCATION/TRAINING PROGRAM

## 2023-03-15 PROCEDURE — 3008F PR BODY MASS INDEX (BMI) DOCUMENTED: ICD-10-PCS | Mod: CPTII,S$GLB,, | Performed by: STUDENT IN AN ORGANIZED HEALTH CARE EDUCATION/TRAINING PROGRAM

## 2023-03-15 PROCEDURE — 1160F RVW MEDS BY RX/DR IN RCRD: CPT | Mod: CPTII,S$GLB,, | Performed by: STUDENT IN AN ORGANIZED HEALTH CARE EDUCATION/TRAINING PROGRAM

## 2023-03-15 PROCEDURE — 73610 X-RAY EXAM OF ANKLE: CPT | Mod: TC,LT

## 2023-03-15 PROCEDURE — 73630 XR FOOT COMPLETE 3 VIEW LEFT: ICD-10-PCS | Mod: 26,LT,, | Performed by: RADIOLOGY

## 2023-03-15 PROCEDURE — 73610 XR ANKLE COMPLETE 3 VIEW LEFT: ICD-10-PCS | Mod: 26,LT,, | Performed by: RADIOLOGY

## 2023-03-15 PROCEDURE — 99999 PR PBB SHADOW E&M-EST. PATIENT-LVL III: CPT | Mod: PBBFAC,,, | Performed by: STUDENT IN AN ORGANIZED HEALTH CARE EDUCATION/TRAINING PROGRAM

## 2023-03-15 PROCEDURE — 73610 X-RAY EXAM OF ANKLE: CPT | Mod: 26,LT,, | Performed by: RADIOLOGY

## 2023-03-15 PROCEDURE — 73630 X-RAY EXAM OF FOOT: CPT | Mod: 26,LT,, | Performed by: RADIOLOGY

## 2023-03-15 PROCEDURE — 1159F PR MEDICATION LIST DOCUMENTED IN MEDICAL RECORD: ICD-10-PCS | Mod: CPTII,S$GLB,, | Performed by: STUDENT IN AN ORGANIZED HEALTH CARE EDUCATION/TRAINING PROGRAM

## 2023-03-15 PROCEDURE — 73630 X-RAY EXAM OF FOOT: CPT | Mod: TC,LT

## 2023-03-15 PROCEDURE — 3008F BODY MASS INDEX DOCD: CPT | Mod: CPTII,S$GLB,, | Performed by: STUDENT IN AN ORGANIZED HEALTH CARE EDUCATION/TRAINING PROGRAM

## 2023-03-15 PROCEDURE — 99214 PR OFFICE/OUTPT VISIT, EST, LEVL IV, 30-39 MIN: ICD-10-PCS | Mod: S$GLB,,, | Performed by: STUDENT IN AN ORGANIZED HEALTH CARE EDUCATION/TRAINING PROGRAM

## 2023-03-15 PROCEDURE — 99214 OFFICE O/P EST MOD 30 MIN: CPT | Mod: S$GLB,,, | Performed by: STUDENT IN AN ORGANIZED HEALTH CARE EDUCATION/TRAINING PROGRAM

## 2023-03-15 RX ORDER — HYDROCODONE BITARTRATE AND ACETAMINOPHEN 5; 325 MG/1; MG/1
1 TABLET ORAL EVERY 12 HOURS PRN
Qty: 60 TABLET | Refills: 0 | Status: SHIPPED | OUTPATIENT
Start: 2023-03-15 | End: 2023-03-24
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
## 2023-03-16 DIAGNOSIS — M79.605 PAIN IN BOTH LOWER EXTREMITIES: ICD-10-CM

## 2023-03-16 DIAGNOSIS — Z01.818 PREOPERATIVE TESTING: Primary | ICD-10-CM

## 2023-03-16 DIAGNOSIS — M79.604 PAIN IN BOTH LOWER EXTREMITIES: ICD-10-CM

## 2023-03-16 RX ORDER — TIZANIDINE HYDROCHLORIDE 2 MG/1
CAPSULE, GELATIN COATED ORAL EVERY 8 HOURS PRN
COMMUNITY
End: 2023-05-08

## 2023-03-16 NOTE — PRE-PROCEDURE INSTRUCTIONS
Patient stated has not had any problem wtih anesthesia in the past. Will need medical clearance from your PCP, Dr. Arnaldo Espino.  She will make an appt. Will need labs and ua.  Our  will call to set up these appts.    Preop instructions given. Hold aspirin, aspirin containing products, nsaids(aleve, advil, motrin, ibuprofen, naprosyn, naproxen, voltaren, diclofenac, Celebrex, Celecoxib), vitamins and supplements one week prior to surgery.     May take Tylenol.  Medication instructions given:  acetaminophen (TYLENOL) 650 MG TbSR 56 tablet 0 11/12/2022    Sig: Take 1 tablet (650 mg total) by mouth every 6 (six) hours.   Route: Oral       Lynne Cole RN 3/16/2023  1:46 PM   TAKE IF NEEDED            albuterol (PROVENTIL/VENTOLIN HFA) 90 mcg/actuation inhaler 18 g 0 12/15/2022    Sig: Inhale 2 puffs into the lungs every 6 (six) hours as needed. Rescue   Route: Inhalation       Lynne Cole RN 3/16/2023  1:46 PM   TAKE IN AM OF SURGERY IF NEEDED AND BRING.             albuterol-ipratropium (DUO-NEB) 2.5 mg-0.5 mg/3 mL nebulizer solution 270 mL 3 12/3/2021 3/15/2023   Sig: Take 3 mLs by nebulization every 6 (six) hours as needed for Wheezing or Shortness of Breath. Rescue   Route: Nebulization       Lynne Cole RN 3/16/2023  1:46 PM   TAKE IF NEEDED     Shannon Todd RN 1/26/2023 10:09 AM   Currently using nebulizer prn            ALPRAZolam (XANAX) 1 MG tablet 21 tablet 0 2/14/2023    Sig: Take 1 tablet (1 mg total) by mouth 3 (three) times daily as needed for Anxiety.   Route: Oral       Lynne Cole RN 3/16/2023  1:46 PM   TAKE IF NEEDED             celecoxib (CELEBREX) 200 MG capsule   2/27/2023    Class: Historical Med       Lynne Cole RN 3/16/2023  1:37 PM   HOLD ONE WEEK PRIOR TO SURGERY.             doxepin (SINEQUAN) 25 MG capsule 180 capsule 0 10/4/2022 3/15/2023   Sig: TAKE 2 CAPSULES (50 MG TOTAL) BY MOUTH EVERY EVENING.   Route: Oral       Lynne Cole RN 3/16/2023  1:47 PM   TAKE THE NIGHT  BEFORE SURGERY.       Shannon Todd RN 1/26/2023 10:09 AM   Not taking            gabapentin (NEURONTIN) 600 MG tablet 90 tablet 1 1/18/2023 3/2/2023   Sig: Take 1 tablet (600 mg total) by mouth 3 (three) times daily.   Route: Oral       Lynne Cole RN 3/16/2023  1:47 PM   TAKE IN AM OF SURGERY.              HYDROcodone-acetaminophen (NORCO) 5-325 mg per tablet 60 tablet 0 3/15/2023 4/14/2023   Sig: Take 1 tablet by mouth every 12 (twelve) hours as needed for Pain.   Route: Oral       Lynne Cole RN 3/16/2023  1:47 PM   TAKE IF NEEDED             hydrOXYzine pamoate (VISTARIL) 25 MG Cap   2/27/2023 3/29/2023   Sig: Take 50 mg by mouth 3 (three) times daily as needed.   Class: Historical Med   Route: Vinay Cole RN 3/16/2023  1:47 PM   TAKE IF NEEDED            mupirocin (BACTROBAN) 2 % ointment 22 g 3 12/28/2022    Sig: Twice daily for five days twice per month for six months       Lynne Cole RN 3/16/2023  1:48 PM   HOLD AFTER SHOWER FOR SURGERY.             norethindrone ac-eth estradioL (FEMHRT LOW DOSE) 0.5-2.5 mg-mcg per tablet 90 tablet 4 3/13/2023    Sig: Take 1 tablet by mouth once daily.   Route: Oral   Prior authorization: Approved       Lynne Cole RN 3/16/2023  1:48 PM   HOLD IN AM OF SURGERY.             tiZANidine 2 mg Cap       Sig: Take by mouth every 8 (eight) hours as needed.   Class: Historical Med   Route: Vinay Cole RN 3/16/2023  1:48 PM   TAKE IF NEEDED             venlafaxine (EFFEXOR-XR) 150 MG Cp24 60 capsule 1 3/9/2023    Sig: TAKE 2 CAPSULES BY MOUTH EVERY DAY       Lynne Cole RN 3/16/2023  1:48 PM   TAKE IN AM OF SURGERY.              Your surgery has been scheduled for:_Tuesday 4/11/2023_________________________________________    You should report to:  ____[facility], located on the [place] side of the first floor of the           [facility] ([phone])  __x__The Second Floor Surgery Center, located on [facility]  [address] side of the            Second floor of the [facility] ([phone])  ____3rd Floor [facility] located on the [facility] side of the [facility] [phone]  Please Note   Tell your doctor if you take Aspirin, products containing Aspirin, herbal medications  or blood thinners, such as Coumadin, Ticlid, or Plavix.  (Consult your provider regarding holding or stopping before surgery).  Arrange for someone to drive you home following surgery.  You will not be allowed to leave the surgical facility alone or drive yourself home following sedation and anesthesia.  Before Surgery  Stop taking all vitamins/ herbal medications 14days prior to surgery  No Motrin/Advil (Ibuprofen) 7 days before surgery  No Aleve (Naproxen) 7 days before surgery  Stop Taking Asprin, products containing Asprin __7___days before surgery  Stop taking blood thinners_______days before surgery  No Goody's/BC  Powder 7 days before surgery  Refrain from drinking alcoholic beverages for 24hours before and after surgery  Stop or limit smoking _________days before surgery( former smoker)  You may take Tylenol for pain  Night before Surgery  Nothing to eat or drink after midnight.  Take a shower or bath (shower is recommended).  Bathe with Hibiclens soap or an antibacterial soap from the neck down.  If not supplied by your surgeon, hibiclens soap will need to be purchased over the counter in pharmacy.  Rinse soap off thoroughly.  Shampoo your hair with your regular shampoo  The Day of Surgery  NOTHING TO  DRINK 2 hours before arrival time. If you are told to take medication on the morning of surgery, it may be taken with a sip of water.   Take another bath or shower with hibiclens or any antibacterial soap, to reduce the chance of infection.  Take heart and blood pressure medications with a small sip of water, as advised by the perioperative team.  Do not take fluid pills  You may brush your teeth and rinse  your mouth, but do not swall any additional water.   Do not apply perfumes, powder, body lotions or deodorant on the day of surgery.  Nail polish should be removed.  Do not wear makeup or moisturizer  Wear comfortable clothes, such as a button front shirt and loose fitting pants.  Leave all jewelry, including body piercings, and valuables at home.    Bring any devices you will neeed after surgery such as crutches or canes.  If you have sleep apnea, please bring your CPAP machine  In the event that your physical condition changes including the onset of a cold or respiratory illness, or if you have to delay or cancel your surgery, please notify your surgeon.  Directions to the surgery center given. Verbalizes understanding. Sent preop and med instructions to [facility] portal.

## 2023-03-16 NOTE — ANESTHESIA PAT ROS NOTE
03/16/2023  Marianela Shrestha is a 37 y.o., female.      Pre-op Assessment          Review of Systems  Anesthesia Hx:  No problems with previous Anesthesia             Denies Family Hx of Anesthesia complications.    Denies Personal Hx of Anesthesia complications.                    Social:  Former Smoker, No Alcohol Use       Hematology/Oncology:  Hematology Normal   Oncology Normal                                   EENT/Dental:  EENT/Dental Normal           Cardiovascular:            Denies Angina.          Functional Capacity unable to determine, ABLE TO CLMB 2 FLIGHTS OF STAIRS SLOWLY  limited by disability                        Pulmonary:       Denies Shortness of breath.     Asthma:         Inhaler use is rescue inhaler PRN.               Renal/:  Renal/ Normal                 Hepatic/GI:  Hepatic/GI Normal                 Musculoskeletal:   Musculoskeletal General/Symptoms:  Functional capacity is ambulatory with walker. SOMETIMES USES WHEELCHAIR         Lumbar Spine Disorders S/P LUMBAR DISCECTOMY, CAUDA EQUINA SYNDROME  Neurological:   Neuro Symptoms of numbness AND TINGLING OF BLE, LEFT FOOT DROP   Pain Syndrome   Chronic Pain Syndrome    Seizure Disorder (H/O ONE SEIZURE ABOUT 2-3 YEARS AGO PER PATIENT)                          Endocrine:        Metabolic Disorders, Obesity / BMI > 30  Psych:     Sleep Disorder and Insomnia. Anxiety Disorder.   Depression.     Sleep Disorder and Insomnia.           Anesthesia Assessment: Preoperative EQUATION    Planned Procedure: Procedure(s) (LRB):  FUSION, SPINE, LUMBAR, TLIF, POSTERIOR APPROACH, USING PEDICLE SCREW L4-S1 GLOBUS ROBOT SNS: SSEP/EMG (N/A)  Requested Anesthesia Type:General  Surgeon: Sundar Harry MD  Service: Neurosurgery  Known or anticipated Date of Surgery:4/11/2023    Surgeon notes: reviewed    Electronic QUestionnaire Assessment  completed via nurse interview with patient.        Triage considerations:     The patient has no apparent active cardiac condition (No unstable coronary Syndrome such as severe unstable angina or recent [<1 month] myocardial infarction, decompensated CHF, severe valvular   disease or significant arrhythmia)    Previous anesthesia records:GETA and No problems  11/11/2022   LAMINECTOMY, SPINE, LUMBAR (Spine Lumbar)   Airway:  Mallampati: I / I  Mouth Opening: Normal  TM Distance: Normal  Tongue: Normal  Neck ROM: Normal ROM  Date Time Event   11/11/2022 2104 Anesthesia Prepared    2223 Anesthesia Start    2227 Start Data Collection    2237 Induction    2241 Intubation    2245 Anesthesia Ready    2253 Quick Note    2317 Procedure Start    2325 Ready for Procedure   11/12/2022 0024 Quick Note    0056 Emergence    0105 Extubation    0110 Stop Data Collection     Last PCP note: 6-12 months ago , within [facility]   Subspecialty notes: ENT, Infectious Disease, Ortho, Pain Management, OB/GYN    Other important co-morbidities: PER Epic: Obesity and CAUDA EQUINA SYNDROME, ASTHMA, H/O OSTEOMYELITIS       Tests already available:  Available tests,  within 1 month , 3-6 months ago , within [facility] .   3/11/2023 MRI LUMBAR SPINE W W/O CONTRAST, 1/10/2023 CMP, CBC, 12/2/2022 CT LUMBAR SPINE W/O CONTRAST, 11/11/2022 EKG, XRAY LUMBAR SPINE AP/LAT W F/E          Instructions given. (See in Nurse's note)    Optimization:  Anesthesia Preop Clinic Assessment -Not Indicated for this surgery ( recent surgery)    Medical Opinion Indicated         Plan:    Testing:  BMP, PT/INR, PTT, T&S, and UA   Pre-anesthesia  visit       Visit focus: concerns in complex and/or prolonged anesthesia, Comorbidities     Consultation:Patient's PCP for re-evaluation     Patient  has previously scheduled Medical Appointment:3/29 WELLNESS VISIT    Navigation: Tests Scheduled. TBD             Consults scheduled.TBD             Results will be tracked by Preop  Clinic.  3/31 Medical clearance given by Dr. Arnaldo Espino on 3/30: Patient is medically optimized for elective procedure.   4/3 Labs resulted and noted by Dr. Michelle Suarez. UA resulted and noted by Dr. Luca Francisco.Dr. Arnaldo Espino notified of UA, Micro UA & Urine C&S by inbasket.  (May need treatment.)  4/4 UTI treated by Dr. Espino with Macrobid.  Lynne Cole RN BSN

## 2023-03-19 ENCOUNTER — DOCUMENT SCAN (OUTPATIENT)
Dept: HOME HEALTH SERVICES | Facility: HOSPITAL | Age: 38
End: 2023-03-19
Payer: MEDICARE

## 2023-03-22 ENCOUNTER — TELEPHONE (OUTPATIENT)
Dept: ORTHOPEDICS | Facility: CLINIC | Age: 38
End: 2023-03-22
Payer: MEDICARE

## 2023-03-22 NOTE — TELEPHONE ENCOUNTER
----- Message from Sundar Harry MD sent at 3/21/2023  9:54 PM CDT -----  Regarding: preop appt  Can we schedule her a preop appt with me? thanks

## 2023-03-24 ENCOUNTER — PATIENT MESSAGE (OUTPATIENT)
Dept: PAIN MEDICINE | Facility: CLINIC | Age: 38
End: 2023-03-24
Payer: MEDICARE

## 2023-03-24 DIAGNOSIS — M48.062 SPINAL STENOSIS OF LUMBAR REGION WITH NEUROGENIC CLAUDICATION: Primary | ICD-10-CM

## 2023-03-24 DIAGNOSIS — M54.16 LUMBAR RADICULOPATHY: ICD-10-CM

## 2023-03-24 RX ORDER — HYDROCODONE BITARTRATE AND ACETAMINOPHEN 7.5; 325 MG/1; MG/1
TABLET ORAL
Qty: 40 TABLET | Refills: 0 | Status: SHIPPED | OUTPATIENT
Start: 2023-03-24 | End: 2023-04-10 | Stop reason: HOSPADM

## 2023-03-30 ENCOUNTER — HOSPITAL ENCOUNTER (OUTPATIENT)
Dept: RADIOLOGY | Facility: HOSPITAL | Age: 38
Discharge: HOME | End: 2023-03-30
Attending: INTERNAL MEDICINE
Payer: MEDICARE

## 2023-03-30 ENCOUNTER — OFFICE VISIT (OUTPATIENT)
Dept: INTERNAL MEDICINE | Facility: CLINIC | Age: 38
End: 2023-03-30
Payer: MEDICARE

## 2023-03-30 VITALS
HEIGHT: 65 IN | HEART RATE: 85 BPM | OXYGEN SATURATION: 99 % | SYSTOLIC BLOOD PRESSURE: 122 MMHG | DIASTOLIC BLOOD PRESSURE: 72 MMHG | WEIGHT: 214.06 LBS | BODY MASS INDEX: 35.67 KG/M2

## 2023-03-30 DIAGNOSIS — Z01.818 PREOPERATIVE EXAMINATION: ICD-10-CM

## 2023-03-30 DIAGNOSIS — Z87.39 HISTORY OF OSTEOMYELITIS: ICD-10-CM

## 2023-03-30 DIAGNOSIS — L98.9 SKIN LESIONS: ICD-10-CM

## 2023-03-30 DIAGNOSIS — F41.1 GENERALIZED ANXIETY DISORDER: Primary | ICD-10-CM

## 2023-03-30 PROCEDURE — 93005 ELECTROCARDIOGRAM TRACING: CPT | Mod: S$GLB,,, | Performed by: INTERNAL MEDICINE

## 2023-03-30 PROCEDURE — 3074F SYST BP LT 130 MM HG: CPT | Mod: CPTII,S$GLB,, | Performed by: INTERNAL MEDICINE

## 2023-03-30 PROCEDURE — 3078F PR MOST RECENT DIASTOLIC BLOOD PRESSURE < 80 MM HG: ICD-10-PCS | Mod: CPTII,S$GLB,, | Performed by: INTERNAL MEDICINE

## 2023-03-30 PROCEDURE — 3008F PR BODY MASS INDEX (BMI) DOCUMENTED: ICD-10-PCS | Mod: CPTII,S$GLB,, | Performed by: INTERNAL MEDICINE

## 2023-03-30 PROCEDURE — 93005 EKG 12-LEAD: ICD-10-PCS | Mod: S$GLB,,, | Performed by: INTERNAL MEDICINE

## 2023-03-30 PROCEDURE — 93010 ELECTROCARDIOGRAM REPORT: CPT | Mod: S$GLB,,, | Performed by: INTERNAL MEDICINE

## 2023-03-30 PROCEDURE — 99999 PR PBB SHADOW E&M-EST. PATIENT-LVL IV: ICD-10-PCS | Mod: PBBFAC,,, | Performed by: INTERNAL MEDICINE

## 2023-03-30 PROCEDURE — 3008F BODY MASS INDEX DOCD: CPT | Mod: CPTII,S$GLB,, | Performed by: INTERNAL MEDICINE

## 2023-03-30 PROCEDURE — 99214 PR OFFICE/OUTPT VISIT, EST, LEVL IV, 30-39 MIN: ICD-10-PCS | Mod: S$GLB,,, | Performed by: INTERNAL MEDICINE

## 2023-03-30 PROCEDURE — 99499 UNLISTED E&M SERVICE: CPT | Mod: S$GLB,,, | Performed by: INTERNAL MEDICINE

## 2023-03-30 PROCEDURE — 71046 XR CHEST PA AND LATERAL: ICD-10-PCS | Mod: 26,,, | Performed by: RADIOLOGY

## 2023-03-30 PROCEDURE — 99999 PR PBB SHADOW E&M-EST. PATIENT-LVL IV: CPT | Mod: PBBFAC,,, | Performed by: INTERNAL MEDICINE

## 2023-03-30 PROCEDURE — 1159F MED LIST DOCD IN RCRD: CPT | Mod: CPTII,S$GLB,, | Performed by: INTERNAL MEDICINE

## 2023-03-30 PROCEDURE — 99499 RISK ADDL DX/OHS AUDIT: ICD-10-PCS | Mod: S$GLB,,, | Performed by: INTERNAL MEDICINE

## 2023-03-30 PROCEDURE — 3074F PR MOST RECENT SYSTOLIC BLOOD PRESSURE < 130 MM HG: ICD-10-PCS | Mod: CPTII,S$GLB,, | Performed by: INTERNAL MEDICINE

## 2023-03-30 PROCEDURE — 1159F PR MEDICATION LIST DOCUMENTED IN MEDICAL RECORD: ICD-10-PCS | Mod: CPTII,S$GLB,, | Performed by: INTERNAL MEDICINE

## 2023-03-30 PROCEDURE — 71046 X-RAY EXAM CHEST 2 VIEWS: CPT | Mod: TC

## 2023-03-30 PROCEDURE — 71046 X-RAY EXAM CHEST 2 VIEWS: CPT | Mod: 26,,, | Performed by: RADIOLOGY

## 2023-03-30 PROCEDURE — 3078F DIAST BP <80 MM HG: CPT | Mod: CPTII,S$GLB,, | Performed by: INTERNAL MEDICINE

## 2023-03-30 PROCEDURE — 99214 OFFICE O/P EST MOD 30 MIN: CPT | Mod: S$GLB,,, | Performed by: INTERNAL MEDICINE

## 2023-03-30 PROCEDURE — 93010 EKG 12-LEAD: ICD-10-PCS | Mod: S$GLB,,, | Performed by: INTERNAL MEDICINE

## 2023-03-30 RX ORDER — CHLORHEXIDINE GLUCONATE 40 MG/ML
SOLUTION TOPICAL DAILY PRN
Qty: 473 ML | Refills: 1 | Status: SHIPPED | OUTPATIENT
Start: 2023-03-30 | End: 2023-05-08

## 2023-03-30 RX ORDER — ATOMOXETINE 25 MG/1
25 CAPSULE ORAL DAILY
Status: ON HOLD | COMMUNITY
End: 2023-05-15 | Stop reason: HOSPADM

## 2023-03-30 RX ORDER — CLONAZEPAM 0.5 MG/1
0.5 TABLET ORAL 3 TIMES DAILY
COMMUNITY

## 2023-03-30 NOTE — PROGRESS NOTES
Subjective:       Patient ID: Marianela Shrestha is a 38 y.o. female.    Chief Complaint: Pre-op Exam      HPI  Marianela Shrestha is a 38 y.o. year old female with SHAZIA, history of chronic osteo (MRSA), depression, anxiety, insomnia, PTSD, asthma presents for preoperative evaluation. Had laminectomy for SHAZIA but still with persistent symptoms. Planned for spinal fusion with Dr. Harry 4/11/2023.     Had cardiovascular stress testing completed last year prior to turbinate reduction / septoplasty. Last stress echo 5/2022. No events consistent with ACS since then. Has preoperative labwork scheduled for after this clinic visit today.    Review of Systems   Constitutional:  Negative for activity change and unexpected weight change.   HENT:  Negative for hearing loss, rhinorrhea and trouble swallowing.    Eyes:  Negative for discharge and visual disturbance.   Respiratory:  Negative for chest tightness and wheezing.    Cardiovascular:  Negative for chest pain and palpitations.   Gastrointestinal:  Positive for constipation. Negative for blood in stool, diarrhea and vomiting.   Endocrine: Negative for polydipsia and polyuria.   Genitourinary:  Negative for difficulty urinating, dysuria, hematuria and menstrual problem.   Musculoskeletal:  Positive for arthralgias. Negative for joint swelling and neck pain.   Neurological:  Positive for weakness. Negative for headaches.   Psychiatric/Behavioral:  Positive for dysphoric mood. Negative for confusion.        Past Medical History:   Diagnosis Date    Anxiety     Chronic osteomyelitis of right hand 2019    Chronic pain syndrome     Depression     Insomnia     Post traumatic stress disorder (PTSD)     Sciatica         Prior to Admission medications    Medication Sig Start Date End Date Taking? Authorizing Provider   acetaminophen (TYLENOL) 650 MG TbSR Take 1 tablet (650 mg total) by mouth every 6 (six) hours. 11/12/22   Paul Hernandez MD   albuterol (PROVENTIL/VENTOLIN  "HFA) 90 mcg/actuation inhaler Inhale 2 puffs into the lungs every 6 (six) hours as needed. Rescue 12/15/22   Wanda Jacobs NP   albuterol-ipratropium (DUO-NEB) 2.5 mg-0.5 mg/3 mL nebulizer solution Take 3 mLs by nebulization every 6 (six) hours as needed for Wheezing or Shortness of Breath. Rescue 12/3/21 3/15/23  Caitlyn Redding NP   ALPRAZolam (XANAX) 1 MG tablet Take 1 tablet (1 mg total) by mouth 3 (three) times daily as needed for Anxiety. 2/14/23   Antonia Clifton PA-C   celecoxib (CELEBREX) 200 MG capsule  2/27/23   Historical Provider   doxepin (SINEQUAN) 25 MG capsule TAKE 2 CAPSULES (50 MG TOTAL) BY MOUTH EVERY EVENING.  Patient not taking: Reported on 3/15/2023 10/4/22 3/15/23  Arnaldo Espino MD   gabapentin (NEURONTIN) 600 MG tablet TAKE 1 TABLET BY MOUTH THREE TIMES A DAY 3/20/23   Arnaldo Espino MD   HYDROcodone-acetaminophen (NORCO) 7.5-325 mg per tablet Take 0.5 to 1 tab twice a day for severe pain 3/24/23 4/13/23  Tyron Jeong DO   hydrOXYzine pamoate (VISTARIL) 25 MG Cap Take 50 mg by mouth 3 (three) times daily as needed. 2/27/23 3/29/23  Historical Provider   mupirocin (BACTROBAN) 2 % ointment Twice daily for five days twice per month for six months 12/28/22   ArisKalani Hansen PA-C   norethindrone ac-eth estradioL (FEMHRT LOW DOSE) 0.5-2.5 mg-mcg per tablet Take 1 tablet by mouth once daily. 3/13/23   Franck Storey MD   tiZANidine 2 mg Cap Take by mouth every 8 (eight) hours as needed.    Historical Provider   venlafaxine (EFFEXOR-XR) 150 MG Cp24 TAKE 2 CAPSULES BY MOUTH EVERY DAY 3/9/23   Arnaldo Espino MD        Past medical history, surgical history, and family medical history reviewed and updated as appropriate.    Medications and allergies reviewed.     Objective:          Vitals:    03/30/23 1133   BP: 122/72   BP Location: Right arm   Patient Position: Sitting   Pulse: 85   SpO2: 99%   Weight: 97.1 kg (214 lb 1.1 oz)   Height: 5' 5" (1.651 m)     Body mass index is 35.62 " kg/m².  Physical Exam  Constitutional:       Appearance: Normal appearance. She is obese. She is not ill-appearing.   HENT:      Head: Normocephalic and atraumatic.   Eyes:      Extraocular Movements: Extraocular movements intact.   Cardiovascular:      Rate and Rhythm: Normal rate and regular rhythm.      Pulses: Normal pulses.   Pulmonary:      Effort: Pulmonary effort is normal.   Musculoskeletal:      Comments: Deformity of 5th digit  Gait instability 2'/2 SHAZIA  Able to slightly flex toes which is an improvement to patient   Skin:     General: Skin is warm and dry.      Findings: Lesion present.      Comments: Scattered skin lesions across b/l UE and LE, small ulcers with no surrounding cellulitis, poorly healing wounds. Chronic.    Neurological:      Mental Status: She is alert and oriented to person, place, and time. Mental status is at baseline.      Motor: Weakness present.      Gait: Gait abnormal.   Psychiatric:         Mood and Affect: Mood normal.       Lab Results   Component Value Date    WBC 9.43 01/10/2023    HGB 12.9 01/10/2023    HCT 37.2 01/10/2023     01/10/2023    CHOL 246 (H) 10/11/2021    TRIG 239 (H) 10/11/2021    HDL 55 10/11/2021    ALT 32 01/10/2023    AST 28 01/10/2023     01/10/2023    K 3.1 (L) 01/10/2023     01/10/2023    CREATININE 0.9 01/10/2023    BUN 15 01/10/2023    CO2 25 01/10/2023    TSH 3.845 04/30/2022    INR 1.0 11/11/2022       Assessment:       1. Generalized anxiety disorder    2. Preoperative examination    3. History of osteomyelitis    4. Skin lesions          Plan:     Marianela was seen today for pre-op exam.    Diagnoses and all orders for this visit:    Generalized anxiety disorder  -     HEPATIC FUNCTION PANEL; Future  -     TSH; Future  -     EKG 12-lead; Future    Preoperative examination  -     HEPATIC FUNCTION PANEL; Future  -     CBC W/ AUTO DIFFERENTIAL; Future  -     X-Ray Chest PA And Lateral; Future  -     IN OFFICE EKG 12-LEAD (to  [facility])    History of osteomyelitis  -     CULTURE, BLOOD; Future    Skin lesions  -     chlorhexidine (HIBICLENS) 4 % external liquid; Apply topically daily as needed (skin lesions). Apply topically once daily at 6am. Daily for five days twice per month for six months for 5 days        The patient was evaluated for preoperative evaluation.   1. This is not considered an emergent procedure.   2. There is no evidence of acute coronary syndrome or other unstable cardiovascular process that necessitates expedited evaluation and treatment.   3. The estimated david-operative risk of a major cardiovascular adverse event based on the combined surgical/patient risk is <1% (low risk). Per the available literature, no further testing is required pre-operatively and the patient should proceed to the OR as planned.     Had cardiac stress testing completed 5/12/2022.     Will obtain CXR, EKG. Has labwork ordered, will add on TSH, hepatic function panel.   Patient does have poorly healing wounds on skin without evidence of infection. Has a history of MRSA.   Recommendations to stop smoking completely.   Recommendations for daily bathing with dial antibacterial soap.    MRSA decolonization - ( 4% rinse-off chlorhexidine for daily bathing or showering, 0.12% chlorhexidine mouthwash twice daily, and 2% nasal mupirocin twice daily    Health maintenance reviewed with patient.    Follow up in about 3 months (around 6/30/2023).    Arnaldo Espino MD  Internal Medicine / Primary Care  [facility]  3/30/2023      Addendum  Patient is medically optimized for elective procedure.

## 2023-03-30 NOTE — PATIENT INSTRUCTIONS
EKG today  Chest X-ray today  Labwork and urine today as scheduled.     Use antibacterial dial soap for bathing  Chlorhexidine 4% rinses daily for 5 days twice per month.     Return to clinic in 3 months or sooner if needed.

## 2023-04-03 ENCOUNTER — TELEPHONE (OUTPATIENT)
Dept: INTERNAL MEDICINE | Facility: CLINIC | Age: 38
End: 2023-04-03
Payer: MEDICARE

## 2023-04-03 DIAGNOSIS — R82.71 BACTERIURIA: Primary | ICD-10-CM

## 2023-04-03 RX ORDER — NITROFURANTOIN 25; 75 MG/1; MG/1
100 CAPSULE ORAL 2 TIMES DAILY
Qty: 10 CAPSULE | Refills: 0 | Status: SHIPPED | OUTPATIENT
Start: 2023-04-03 | End: 2023-05-08

## 2023-04-03 NOTE — TELEPHONE ENCOUNTER
----- Message from Lynne Cole RN sent at 4/3/2023  8:11 AM CDT -----  Please review ua, micro ua and urine C&S. May need treatment.   Surgery for 4/11.  Thanks!

## 2023-04-03 NOTE — TELEPHONE ENCOUNTER
Spoke with patient to relay message from PCP related  to antibiotics   Patient verbalized an understanding

## 2023-04-04 ENCOUNTER — TELEPHONE (OUTPATIENT)
Dept: ORTHOPEDICS | Facility: CLINIC | Age: 38
End: 2023-04-04
Payer: MEDICARE

## 2023-04-04 NOTE — TELEPHONE ENCOUNTER
----- Message from Sundar Harry MD sent at 4/4/2023 11:51 AM CDT -----  Regarding: preop  Could we get her in for preop appt?

## 2023-04-06 ENCOUNTER — OFFICE VISIT (OUTPATIENT)
Dept: ORTHOPEDICS | Facility: CLINIC | Age: 38
End: 2023-04-06
Payer: MEDICARE

## 2023-04-06 VITALS — WEIGHT: 194.44 LBS | BODY MASS INDEX: 32.4 KG/M2 | HEIGHT: 65 IN

## 2023-04-06 DIAGNOSIS — Z98.890 S/P LUMBAR DISCECTOMY: ICD-10-CM

## 2023-04-06 DIAGNOSIS — G83.4: ICD-10-CM

## 2023-04-06 DIAGNOSIS — M54.16 LUMBAR RADICULOPATHY: Primary | ICD-10-CM

## 2023-04-06 DIAGNOSIS — M51.36 DDD (DEGENERATIVE DISC DISEASE), LUMBAR: ICD-10-CM

## 2023-04-06 PROCEDURE — 99999 PR PBB SHADOW E&M-EST. PATIENT-LVL III: CPT | Mod: PBBFAC,,, | Performed by: ORTHOPAEDIC SURGERY

## 2023-04-06 PROCEDURE — 1160F RVW MEDS BY RX/DR IN RCRD: CPT | Mod: CPTII,S$GLB,, | Performed by: ORTHOPAEDIC SURGERY

## 2023-04-06 PROCEDURE — 99214 OFFICE O/P EST MOD 30 MIN: CPT | Mod: S$GLB,,, | Performed by: ORTHOPAEDIC SURGERY

## 2023-04-06 PROCEDURE — 1159F MED LIST DOCD IN RCRD: CPT | Mod: CPTII,S$GLB,, | Performed by: ORTHOPAEDIC SURGERY

## 2023-04-06 PROCEDURE — 3008F PR BODY MASS INDEX (BMI) DOCUMENTED: ICD-10-PCS | Mod: CPTII,S$GLB,, | Performed by: ORTHOPAEDIC SURGERY

## 2023-04-06 PROCEDURE — 99999 PR PBB SHADOW E&M-EST. PATIENT-LVL III: ICD-10-PCS | Mod: PBBFAC,,, | Performed by: ORTHOPAEDIC SURGERY

## 2023-04-06 PROCEDURE — 1159F PR MEDICATION LIST DOCUMENTED IN MEDICAL RECORD: ICD-10-PCS | Mod: CPTII,S$GLB,, | Performed by: ORTHOPAEDIC SURGERY

## 2023-04-06 PROCEDURE — 99214 PR OFFICE/OUTPT VISIT, EST, LEVL IV, 30-39 MIN: ICD-10-PCS | Mod: S$GLB,,, | Performed by: ORTHOPAEDIC SURGERY

## 2023-04-06 PROCEDURE — 3008F BODY MASS INDEX DOCD: CPT | Mod: CPTII,S$GLB,, | Performed by: ORTHOPAEDIC SURGERY

## 2023-04-06 PROCEDURE — 1160F PR REVIEW ALL MEDS BY PRESCRIBER/CLIN PHARMACIST DOCUMENTED: ICD-10-PCS | Mod: CPTII,S$GLB,, | Performed by: ORTHOPAEDIC SURGERY

## 2023-04-06 NOTE — PROGRESS NOTES
"DATE: 4/6/2023  PATIENT: Marianela Shrestha    Attending Physician: Sundar Harry M.D.    Date of Surgery: 11/11/22    Procedure: L4-S1 laminectomy/discectomy for cauda equina syndrome    HISTORY:  Marianela Shrestha is a 38 y.o. female who returns to me today for MRI review. Patient complains of persistent n/t in LLE into the left footShe has been wearing her AFO for left foot drop; she did therapy for a while but stopped. She still has trouble with urination.  She is miserable and wants surgical intervention.    She has a history of depression and anxiety, but she wants to see a psychiatrist from [facility].    Patient used to smoke 1 cig every 4 days but she quit 3 weeks ago. She does not have DM or endorse IVDU. She does not take chronic narcotics or blood thinners.    PMH/PSH/FamHx/SocHx:  Unchanged from prior visit    ROS:  Positive for BLE n/t    EXAM:  Ht 5' 5" (1.651 m)   Wt 88.2 kg (194 lb 7.1 oz)   BMI 32.36 kg/m²     My physical examination was notable for the following findings: 3/5 in left HF/KE, 0/5 in ankle DF and 3/5 in PF. L foot swollen.     IMAGING:  Today I independently reviewed the following images and my interpretations are as follows:    MRI lumbar showed L L4-5 and L L5-S1 HNP with mod-severe central and foraminal stenosis.    CT lumbar showed L5 laminectomy.    ASSESSMENT/PLAN:  Patient has recurrent L4-S1 L HNP with LLE radiculopathy, s/p L4-S1 decompression for SHAZIA. I educated the patient on the importance of core/back strengthening, correct posture, bending/lifting ergonomics, and low-impact aerobic exercises (walking, elliptical, and aquatherapy). I educated her on the importance of smoking cessation on spinal fusion success and she will remain off. She has failed conservative management and is a candidate for L4-S1 PLDF/TLIF (L).    I had a sit down discussion with the patient regarding the above surgery. We specifically discussed the risks, benefits, and alternatives to " surgery. We discussed the surgical procedure including the skin incision, nerve decompression, bone fusion, allograft, iliac crest bone graft, and surgical implants including pedicle screws and interbody devices as indicated: they understand the risks include but are not limited to death, paralysis, blindness, bleeding, infection, damage to arteries, veins and nerves, spinal fluid leak, continued or worsening pain, no improvement in symptoms, non-union, and the possible need for more surgery in the future, the possible need for perioperative blood transfusion, as well as the possibility other unforseen and unknown complications. We talked about expected hospital stay and recovery period. All questions were answered; they understand and wish to proceed.      Sundar Harry MD  Orthopaedic Spine Surgeon  Department of Orthopaedic Surgery  [phone]

## 2023-04-06 NOTE — H&P (VIEW-ONLY)
"DATE: 4/6/2023  PATIENT: Marianela Shrestha    Attending Physician: Sundar Harry M.D.    Date of Surgery: 11/11/22    Procedure: L4-S1 laminectomy/discectomy for cauda equina syndrome    HISTORY:  Marianela Shrestha is a 38 y.o. female who returns to me today for MRI review. Patient complains of persistent n/t in LLE into the left footShe has been wearing her AFO for left foot drop; she did therapy for a while but stopped. She still has trouble with urination.  She is miserable and wants surgical intervention.    She has a history of depression and anxiety, but she wants to see a psychiatrist from [facility].    Patient used to smoke 1 cig every 4 days but she quit 3 weeks ago. She does not have DM or endorse IVDU. She does not take chronic narcotics or blood thinners.    PMH/PSH/FamHx/SocHx:  Unchanged from prior visit    ROS:  Positive for BLE n/t    EXAM:  Ht 5' 5" (1.651 m)   Wt 88.2 kg (194 lb 7.1 oz)   BMI 32.36 kg/m²     My physical examination was notable for the following findings: 3/5 in left HF/KE, 0/5 in ankle DF and 3/5 in PF. L foot swollen.     IMAGING:  Today I independently reviewed the following images and my interpretations are as follows:    MRI lumbar showed L L4-5 and L L5-S1 HNP with mod-severe central and foraminal stenosis.    CT lumbar showed L5 laminectomy.    ASSESSMENT/PLAN:  Patient has recurrent L4-S1 L HNP with LLE radiculopathy, s/p L4-S1 decompression for SHAZIA. I educated the patient on the importance of core/back strengthening, correct posture, bending/lifting ergonomics, and low-impact aerobic exercises (walking, elliptical, and aquatherapy). I educated her on the importance of smoking cessation on spinal fusion success and she will remain off. She has failed conservative management and is a candidate for L4-S1 PLDF/TLIF (L).    I had a sit down discussion with the patient regarding the above surgery. We specifically discussed the risks, benefits, and alternatives to " surgery. We discussed the surgical procedure including the skin incision, nerve decompression, bone fusion, allograft, iliac crest bone graft, and surgical implants including pedicle screws and interbody devices as indicated: they understand the risks include but are not limited to death, paralysis, blindness, bleeding, infection, damage to arteries, veins and nerves, spinal fluid leak, continued or worsening pain, no improvement in symptoms, non-union, and the possible need for more surgery in the future, the possible need for perioperative blood transfusion, as well as the possibility other unforseen and unknown complications. We talked about expected hospital stay and recovery period. All questions were answered; they understand and wish to proceed.      Sundar Harry MD  Orthopaedic Spine Surgeon  Department of Orthopaedic Surgery  [phone]

## 2023-04-10 ENCOUNTER — ANESTHESIA EVENT (OUTPATIENT)
Dept: SURGERY | Facility: HOSPITAL | Age: 38
DRG: 454 | End: 2023-04-10
Payer: MEDICARE

## 2023-04-10 ENCOUNTER — TELEPHONE (OUTPATIENT)
Dept: ORTHOPEDICS | Facility: CLINIC | Age: 38
End: 2023-04-10
Payer: MEDICARE

## 2023-04-10 RX ORDER — OXYCODONE HYDROCHLORIDE 5 MG/1
5 TABLET ORAL EVERY 6 HOURS PRN
Qty: 24 TABLET | Refills: 0 | Status: SHIPPED | OUTPATIENT
Start: 2023-04-10 | End: 2023-04-24 | Stop reason: SDUPTHER

## 2023-04-10 RX ORDER — CELECOXIB 100 MG/1
100 CAPSULE ORAL 2 TIMES DAILY
Qty: 28 CAPSULE | Refills: 0 | Status: ON HOLD | OUTPATIENT
Start: 2023-04-10 | End: 2023-05-15 | Stop reason: HOSPADM

## 2023-04-10 RX ORDER — ACETAMINOPHEN 500 MG
1000 TABLET ORAL EVERY 8 HOURS PRN
Qty: 90 TABLET | Refills: 0 | Status: SHIPPED | OUTPATIENT
Start: 2023-04-10 | End: 2023-05-01 | Stop reason: SDUPTHER

## 2023-04-10 RX ORDER — METHOCARBAMOL 500 MG/1
1000 TABLET, FILM COATED ORAL 3 TIMES DAILY
Qty: 90 TABLET | Refills: 0 | Status: SHIPPED | OUTPATIENT
Start: 2023-04-10 | End: 2023-04-29

## 2023-04-10 RX ORDER — GABAPENTIN 100 MG/1
100 CAPSULE ORAL 3 TIMES DAILY
Qty: 45 CAPSULE | Refills: 0 | Status: SHIPPED | OUTPATIENT
Start: 2023-04-10 | End: 2023-05-01

## 2023-04-10 NOTE — H&P
DATE: 4/10/2023  PATIENT: Marianela Shrestha    Attending Physician: Sundar Harry M.D.    Date of Surgery: 11/11/22    Procedure: L4-S1 laminectomy/discectomy for cauda equina syndrome    HISTORY:  Marianela Shrestha is a 38 y.o. female who returns to me today for MRI review. Patient complains of persistent n/t in LLE into the left foot. She has been wearing her AFO for left foot drop; she did therapy for a while but stopped. She still has trouble with urination.  She is miserable and wants surgical intervention.    She has a history of depression and anxiety, but she wants to see a psychiatrist from [facility].    Patient used to smoke 1 cig every 4 days but she quit 3 weeks ago. She does not have DM or endorse IVDU. She does not take chronic narcotics or blood thinners.    PMH/PSH/FamHx/SocHx:  Unchanged from prior visit    ROS:  Positive for BLE n/t    EXAM:  There were no vitals taken for this visit.    My physical examination was notable for the following findings: 3/5 in left HF/KE, 0/5 in ankle DF and 3/5 in PF. L foot swollen.     IMAGING:  Today I independently reviewed the following images and my interpretations are as follows:    MRI lumbar showed L L4-5 and L L5-S1 HNP with mod-severe central and foraminal stenosis.    CT lumbar showed L5 laminectomy.    ASSESSMENT/PLAN:    Will proceed with L4-S1 PLDF/TLIF (L).

## 2023-04-10 NOTE — TELEPHONE ENCOUNTER
Left message for patient and informed her of surgery date and time. Informed patient of area to park and where to go that morning to sign in (Day of Surgery). No eating or drinking after 12 am. And wash with Lizz-Hex 4. Also wash hair night before surgery. 5 am arrival.

## 2023-04-10 NOTE — TELEPHONE ENCOUNTER
Spoke with patient and informed her of surgery date and time and she agreed verbally. Informed patient of area to park and where to go that morning to sign in (Day of Surgery). No eating or drinking after 12 am. And wash with Lizz-Hex 4. Also wash hair night before surgery. Patient agreed verbally.  5 am.
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
## 2023-04-11 ENCOUNTER — HOSPITAL ENCOUNTER (INPATIENT)
Facility: HOSPITAL | Age: 38
LOS: 3 days | Discharge: HOME HEALTH | DRG: 454 | End: 2023-04-14
Attending: ORTHOPAEDIC SURGERY | Admitting: ORTHOPAEDIC SURGERY
Payer: MEDICARE

## 2023-04-11 ENCOUNTER — ANESTHESIA (OUTPATIENT)
Dept: SURGERY | Facility: HOSPITAL | Age: 38
DRG: 454 | End: 2023-04-11
Payer: MEDICARE

## 2023-04-11 ENCOUNTER — PATIENT MESSAGE (OUTPATIENT)
Dept: OBSTETRICS AND GYNECOLOGY | Facility: CLINIC | Age: 38
End: 2023-04-11
Payer: MEDICARE

## 2023-04-11 DIAGNOSIS — G83.4: ICD-10-CM

## 2023-04-11 DIAGNOSIS — M51.26 LUMBAR HERNIATED DISC: ICD-10-CM

## 2023-04-11 DIAGNOSIS — M51.26 RECURRENT HERNIATION OF LUMBAR DISC: Primary | ICD-10-CM

## 2023-04-11 DIAGNOSIS — Z30.41 ENCOUNTER FOR SURVEILLANCE OF CONTRACEPTIVE PILLS: ICD-10-CM

## 2023-04-11 LAB
ABO + RH BLD: NORMAL
B-HCG UR QL: NEGATIVE
BLD GP AB SCN CELLS X3 SERPL QL: NORMAL
CTP QC/QA: YES
SPECIMEN OUTDATE: NORMAL

## 2023-04-11 PROCEDURE — 25000003 PHARM REV CODE 250

## 2023-04-11 PROCEDURE — 71000016 HC POSTOP RECOV ADDL HR: Performed by: ORTHOPAEDIC SURGERY

## 2023-04-11 PROCEDURE — 22633 ARTHRD CMBN 1NTRSPC LUMBAR: CPT | Mod: 62,,, | Performed by: ORTHOPAEDIC SURGERY

## 2023-04-11 PROCEDURE — 63600175 PHARM REV CODE 636 W HCPCS: Performed by: ORTHOPAEDIC SURGERY

## 2023-04-11 PROCEDURE — 22633 ARTHRD CMBN 1NTRSPC LUMBAR: CPT | Mod: 62,,, | Performed by: STUDENT IN AN ORGANIZED HEALTH CARE EDUCATION/TRAINING PROGRAM

## 2023-04-11 PROCEDURE — 22634 ARTHRD CMBN 1NTRSPC EA ADDL: CPT | Mod: 62,,, | Performed by: STUDENT IN AN ORGANIZED HEALTH CARE EDUCATION/TRAINING PROGRAM

## 2023-04-11 PROCEDURE — 99900035 HC TECH TIME PER 15 MIN (STAT)

## 2023-04-11 PROCEDURE — 22634 PR ARTHRODESIS, COMBINED TECHN, SNGL INTERSPACE, EA ADDTL: ICD-10-PCS | Mod: 62,,, | Performed by: ORTHOPAEDIC SURGERY

## 2023-04-11 PROCEDURE — 63600175 PHARM REV CODE 636 W HCPCS

## 2023-04-11 PROCEDURE — 63053 PR LAMINECT/FACETECT/FORAMINOT, ARTHRODESIS, EA ADDTL VERTEBR SEGM: ICD-10-PCS | Mod: 62,,, | Performed by: ORTHOPAEDIC SURGERY

## 2023-04-11 PROCEDURE — 94761 N-INVAS EAR/PLS OXIMETRY MLT: CPT

## 2023-04-11 PROCEDURE — 37000008 HC ANESTHESIA 1ST 15 MINUTES: Performed by: ORTHOPAEDIC SURGERY

## 2023-04-11 PROCEDURE — 63052 LAM FACETC/FRMT ARTHRD LUM 1: CPT | Mod: 62,,, | Performed by: ORTHOPAEDIC SURGERY

## 2023-04-11 PROCEDURE — C1713 ANCHOR/SCREW BN/BN,TIS/BN: HCPCS | Performed by: ORTHOPAEDIC SURGERY

## 2023-04-11 PROCEDURE — 20930 SP BONE ALGRFT MORSEL ADD-ON: CPT | Mod: ,,, | Performed by: ORTHOPAEDIC SURGERY

## 2023-04-11 PROCEDURE — 22633 PR ARTHRODESIS, COMBINED TECHN, SNGL INTERSPACE, LUMBAR: ICD-10-PCS | Mod: 62,,, | Performed by: STUDENT IN AN ORGANIZED HEALTH CARE EDUCATION/TRAINING PROGRAM

## 2023-04-11 PROCEDURE — 63053 PR LAMINECT/FACETECT/FORAMINOT, ARTHRODESIS, EA ADDTL VERTEBR SEGM: ICD-10-PCS | Mod: 62,,, | Performed by: STUDENT IN AN ORGANIZED HEALTH CARE EDUCATION/TRAINING PROGRAM

## 2023-04-11 PROCEDURE — 37000009 HC ANESTHESIA EA ADD 15 MINS: Performed by: ORTHOPAEDIC SURGERY

## 2023-04-11 PROCEDURE — 22853 PR INSERT BIOMECH DEV W/INTERBODY ARTHRODESIS, EA CONTIGUOUS DEFECT: ICD-10-PCS | Mod: ,,, | Performed by: ORTHOPAEDIC SURGERY

## 2023-04-11 PROCEDURE — 86900 BLOOD TYPING SEROLOGIC ABO: CPT | Performed by: ORTHOPAEDIC SURGERY

## 2023-04-11 PROCEDURE — 27000221 HC OXYGEN, UP TO 24 HOURS

## 2023-04-11 PROCEDURE — 36000710: Performed by: ORTHOPAEDIC SURGERY

## 2023-04-11 PROCEDURE — 71000033 HC RECOVERY, INTIAL HOUR: Performed by: ORTHOPAEDIC SURGERY

## 2023-04-11 PROCEDURE — C1729 CATH, DRAINAGE: HCPCS | Performed by: ORTHOPAEDIC SURGERY

## 2023-04-11 PROCEDURE — 22842 PR POSTERIOR SEGMENTAL INSTRUMENTATION 3-6 VRT SEG: ICD-10-PCS | Mod: 82,,, | Performed by: STUDENT IN AN ORGANIZED HEALTH CARE EDUCATION/TRAINING PROGRAM

## 2023-04-11 PROCEDURE — 63053 LAM FACTC/FRMT ARTHRD LUM EA: CPT | Mod: 62,,, | Performed by: STUDENT IN AN ORGANIZED HEALTH CARE EDUCATION/TRAINING PROGRAM

## 2023-04-11 PROCEDURE — 22633 PR ARTHRODESIS, COMBINED TECHN, SNGL INTERSPACE, LUMBAR: ICD-10-PCS | Mod: 62,,, | Performed by: ORTHOPAEDIC SURGERY

## 2023-04-11 PROCEDURE — 20930 PR ALLOGRAFT FOR SPINE SURGERY ONLY MORSELIZED: ICD-10-PCS | Mod: ,,, | Performed by: ORTHOPAEDIC SURGERY

## 2023-04-11 PROCEDURE — 71000039 HC RECOVERY, EACH ADD'L HOUR: Performed by: ORTHOPAEDIC SURGERY

## 2023-04-11 PROCEDURE — 22842 INSERT SPINE FIXATION DEVICE: CPT | Mod: 82,,, | Performed by: STUDENT IN AN ORGANIZED HEALTH CARE EDUCATION/TRAINING PROGRAM

## 2023-04-11 PROCEDURE — 25000003 PHARM REV CODE 250: Performed by: ORTHOPAEDIC SURGERY

## 2023-04-11 PROCEDURE — 11000001 HC ACUTE MED/SURG PRIVATE ROOM

## 2023-04-11 PROCEDURE — 27201423 OPTIME MED/SURG SUP & DEVICES STERILE SUPPLY: Performed by: ORTHOPAEDIC SURGERY

## 2023-04-11 PROCEDURE — 22842 PR POSTERIOR SEGMENTAL INSTRUMENTATION 3-6 VRT SEG: ICD-10-PCS | Mod: ,,, | Performed by: ORTHOPAEDIC SURGERY

## 2023-04-11 PROCEDURE — 22842 INSERT SPINE FIXATION DEVICE: CPT | Mod: ,,, | Performed by: ORTHOPAEDIC SURGERY

## 2023-04-11 PROCEDURE — D9220A PRA ANESTHESIA: ICD-10-PCS | Mod: ,,, | Performed by: ANESTHESIOLOGY

## 2023-04-11 PROCEDURE — 22634 PR ARTHRODESIS, COMBINED TECHN, SNGL INTERSPACE, EA ADDTL: ICD-10-PCS | Mod: 62,,, | Performed by: STUDENT IN AN ORGANIZED HEALTH CARE EDUCATION/TRAINING PROGRAM

## 2023-04-11 PROCEDURE — 22853 INSJ BIOMECHANICAL DEVICE: CPT | Mod: ,,, | Performed by: ORTHOPAEDIC SURGERY

## 2023-04-11 PROCEDURE — 22853 INSJ BIOMECHANICAL DEVICE: CPT | Mod: 82,,, | Performed by: STUDENT IN AN ORGANIZED HEALTH CARE EDUCATION/TRAINING PROGRAM

## 2023-04-11 PROCEDURE — 22853 PR INSERT BIOMECH DEV W/INTERBODY ARTHRODESIS, EA CONTIGUOUS DEFECT: ICD-10-PCS | Mod: 82,,, | Performed by: STUDENT IN AN ORGANIZED HEALTH CARE EDUCATION/TRAINING PROGRAM

## 2023-04-11 PROCEDURE — 81025 URINE PREGNANCY TEST: CPT | Performed by: ORTHOPAEDIC SURGERY

## 2023-04-11 PROCEDURE — 63052 PR LAMINECT/FACETECT/FORAMINOT, ARTHRODESIS, 1 VERTEBR SEGM: ICD-10-PCS | Mod: 62,,, | Performed by: ORTHOPAEDIC SURGERY

## 2023-04-11 PROCEDURE — 63052 PR LAMINECT/FACETECT/FORAMINOT, ARTHRODESIS, 1 VERTEBR SEGM: ICD-10-PCS | Mod: 62,,, | Performed by: STUDENT IN AN ORGANIZED HEALTH CARE EDUCATION/TRAINING PROGRAM

## 2023-04-11 PROCEDURE — 22634 ARTHRD CMBN 1NTRSPC EA ADDL: CPT | Mod: 62,,, | Performed by: ORTHOPAEDIC SURGERY

## 2023-04-11 PROCEDURE — 63053 LAM FACTC/FRMT ARTHRD LUM EA: CPT | Mod: 62,,, | Performed by: ORTHOPAEDIC SURGERY

## 2023-04-11 PROCEDURE — 27800903 OPTIME MED/SURG SUP & DEVICES OTHER IMPLANTS: Performed by: ORTHOPAEDIC SURGERY

## 2023-04-11 PROCEDURE — 63052 LAM FACETC/FRMT ARTHRD LUM 1: CPT | Mod: 62,,, | Performed by: STUDENT IN AN ORGANIZED HEALTH CARE EDUCATION/TRAINING PROGRAM

## 2023-04-11 PROCEDURE — 36000711: Performed by: ORTHOPAEDIC SURGERY

## 2023-04-11 PROCEDURE — 71000015 HC POSTOP RECOV 1ST HR: Performed by: ORTHOPAEDIC SURGERY

## 2023-04-11 PROCEDURE — D9220A PRA ANESTHESIA: Mod: ,,, | Performed by: ANESTHESIOLOGY

## 2023-04-11 DEVICE — SCREW BONE SPINAL 6.5X40MM: Type: IMPLANTABLE DEVICE | Site: BACK | Status: FUNCTIONAL

## 2023-04-11 DEVICE — IMPLANTABLE DEVICE: Type: IMPLANTABLE DEVICE | Site: BACK | Status: FUNCTIONAL

## 2023-04-11 DEVICE — CAP SPINAL LOCK THRD CREO 5.5: Type: IMPLANTABLE DEVICE | Site: BACK | Status: FUNCTIONAL

## 2023-04-11 DEVICE — SCREW CREO TREADED 6.5X35MM: Type: IMPLANTABLE DEVICE | Site: BACK | Status: FUNCTIONAL

## 2023-04-11 DEVICE — GRAFT ALTAPORE BIOACTIVE 5ML: Type: IMPLANTABLE DEVICE | Site: BACK | Status: FUNCTIONAL

## 2023-04-11 DEVICE — SCREW BONE SPINAL CREO 6.5X45: Type: IMPLANTABLE DEVICE | Site: BACK | Status: FUNCTIONAL

## 2023-04-11 RX ORDER — OXYCODONE HYDROCHLORIDE 5 MG/1
5 TABLET ORAL EVERY 6 HOURS PRN
Status: DISCONTINUED | OUTPATIENT
Start: 2023-04-11 | End: 2023-04-14 | Stop reason: HOSPADM

## 2023-04-11 RX ORDER — DEXAMETHASONE SODIUM PHOSPHATE 4 MG/ML
INJECTION, SOLUTION INTRA-ARTICULAR; INTRALESIONAL; INTRAMUSCULAR; INTRAVENOUS; SOFT TISSUE
Status: DISCONTINUED | OUTPATIENT
Start: 2023-04-11 | End: 2023-04-11

## 2023-04-11 RX ORDER — CEFAZOLIN SODIUM 1 G/3ML
2 INJECTION, POWDER, FOR SOLUTION INTRAMUSCULAR; INTRAVENOUS
Status: COMPLETED | OUTPATIENT
Start: 2023-04-11 | End: 2023-04-11

## 2023-04-11 RX ORDER — SODIUM CHLORIDE 0.9 % (FLUSH) 0.9 %
10 SYRINGE (ML) INJECTION
Status: DISCONTINUED | OUTPATIENT
Start: 2023-04-11 | End: 2023-04-14 | Stop reason: HOSPADM

## 2023-04-11 RX ORDER — MIDAZOLAM HYDROCHLORIDE 1 MG/ML
INJECTION INTRAMUSCULAR; INTRAVENOUS
Status: DISCONTINUED | OUTPATIENT
Start: 2023-04-11 | End: 2023-04-11

## 2023-04-11 RX ORDER — MUPIROCIN 20 MG/G
OINTMENT TOPICAL 2 TIMES DAILY
Status: DISCONTINUED | OUTPATIENT
Start: 2023-04-11 | End: 2023-04-14 | Stop reason: HOSPADM

## 2023-04-11 RX ORDER — ROCURONIUM BROMIDE 10 MG/ML
INJECTION, SOLUTION INTRAVENOUS
Status: DISCONTINUED | OUTPATIENT
Start: 2023-04-11 | End: 2023-04-11

## 2023-04-11 RX ORDER — REMIFENTANIL HYDROCHLORIDE 1 MG/ML
INJECTION, POWDER, LYOPHILIZED, FOR SOLUTION INTRAVENOUS CONTINUOUS PRN
Status: DISCONTINUED | OUTPATIENT
Start: 2023-04-11 | End: 2023-04-11

## 2023-04-11 RX ORDER — VASOPRESSIN 20 [USP'U]/ML
INJECTION, SOLUTION INTRAMUSCULAR; SUBCUTANEOUS
Status: DISCONTINUED | OUTPATIENT
Start: 2023-04-11 | End: 2023-04-11

## 2023-04-11 RX ORDER — HEPARIN SODIUM 5000 [USP'U]/ML
5000 INJECTION, SOLUTION INTRAVENOUS; SUBCUTANEOUS EVERY 8 HOURS
Status: DISCONTINUED | OUTPATIENT
Start: 2023-04-11 | End: 2023-04-14 | Stop reason: HOSPADM

## 2023-04-11 RX ORDER — DOXEPIN HYDROCHLORIDE 50 MG/1
50 CAPSULE ORAL NIGHTLY
Status: DISCONTINUED | OUTPATIENT
Start: 2023-04-11 | End: 2023-04-11

## 2023-04-11 RX ORDER — HYDROMORPHONE HYDROCHLORIDE 1 MG/ML
0.2 INJECTION, SOLUTION INTRAMUSCULAR; INTRAVENOUS; SUBCUTANEOUS EVERY 5 MIN PRN
Status: DISCONTINUED | OUTPATIENT
Start: 2023-04-11 | End: 2023-04-11 | Stop reason: HOSPADM

## 2023-04-11 RX ORDER — FAMOTIDINE 20 MG/1
20 TABLET, FILM COATED ORAL 2 TIMES DAILY
Status: DISCONTINUED | OUTPATIENT
Start: 2023-04-11 | End: 2023-04-14 | Stop reason: HOSPADM

## 2023-04-11 RX ORDER — ONDANSETRON 2 MG/ML
INJECTION INTRAMUSCULAR; INTRAVENOUS
Status: DISCONTINUED | OUTPATIENT
Start: 2023-04-11 | End: 2023-04-11

## 2023-04-11 RX ORDER — IPRATROPIUM BROMIDE AND ALBUTEROL SULFATE 2.5; .5 MG/3ML; MG/3ML
3 SOLUTION RESPIRATORY (INHALATION) EVERY 6 HOURS PRN
Status: DISCONTINUED | OUTPATIENT
Start: 2023-04-11 | End: 2023-04-14 | Stop reason: HOSPADM

## 2023-04-11 RX ORDER — FENTANYL CITRATE 50 UG/ML
INJECTION, SOLUTION INTRAMUSCULAR; INTRAVENOUS
Status: DISCONTINUED | OUTPATIENT
Start: 2023-04-11 | End: 2023-04-11

## 2023-04-11 RX ORDER — ONDANSETRON 8 MG/1
8 TABLET, ORALLY DISINTEGRATING ORAL EVERY 8 HOURS PRN
Status: DISCONTINUED | OUTPATIENT
Start: 2023-04-11 | End: 2023-04-14 | Stop reason: HOSPADM

## 2023-04-11 RX ORDER — LIDOCAINE HYDROCHLORIDE 20 MG/ML
INJECTION, SOLUTION EPIDURAL; INFILTRATION; INTRACAUDAL; PERINEURAL
Status: DISCONTINUED | OUTPATIENT
Start: 2023-04-11 | End: 2023-04-11

## 2023-04-11 RX ORDER — PROPOFOL 10 MG/ML
VIAL (ML) INTRAVENOUS
Status: DISCONTINUED | OUTPATIENT
Start: 2023-04-11 | End: 2023-04-11

## 2023-04-11 RX ORDER — HALOPERIDOL 5 MG/ML
0.5 INJECTION INTRAMUSCULAR EVERY 10 MIN PRN
Status: DISCONTINUED | OUTPATIENT
Start: 2023-04-11 | End: 2023-04-11 | Stop reason: HOSPADM

## 2023-04-11 RX ORDER — LORAZEPAM 2 MG/ML
1 INJECTION INTRAMUSCULAR ONCE
Status: DISCONTINUED | OUTPATIENT
Start: 2023-04-11 | End: 2023-04-11

## 2023-04-11 RX ORDER — VANCOMYCIN HYDROCHLORIDE 1 G/20ML
INJECTION, POWDER, LYOPHILIZED, FOR SOLUTION INTRAVENOUS
Status: DISCONTINUED | OUTPATIENT
Start: 2023-04-11 | End: 2023-04-11 | Stop reason: HOSPADM

## 2023-04-11 RX ORDER — HYDROMORPHONE HYDROCHLORIDE 2 MG/ML
INJECTION, SOLUTION INTRAMUSCULAR; INTRAVENOUS; SUBCUTANEOUS
Status: DISCONTINUED | OUTPATIENT
Start: 2023-04-11 | End: 2023-04-11

## 2023-04-11 RX ORDER — ACETAMINOPHEN 500 MG
1000 TABLET ORAL
Status: COMPLETED | OUTPATIENT
Start: 2023-04-11 | End: 2023-04-11

## 2023-04-11 RX ORDER — LIDOCAINE HYDROCHLORIDE AND EPINEPHRINE 10; 10 MG/ML; UG/ML
INJECTION, SOLUTION INFILTRATION; PERINEURAL
Status: DISCONTINUED | OUTPATIENT
Start: 2023-04-11 | End: 2023-04-11 | Stop reason: HOSPADM

## 2023-04-11 RX ORDER — SUCCINYLCHOLINE CHLORIDE 20 MG/ML
INJECTION INTRAMUSCULAR; INTRAVENOUS
Status: DISCONTINUED | OUTPATIENT
Start: 2023-04-11 | End: 2023-04-11

## 2023-04-11 RX ORDER — FENTANYL CITRATE 50 UG/ML
25 INJECTION, SOLUTION INTRAMUSCULAR; INTRAVENOUS EVERY 5 MIN PRN
Status: COMPLETED | OUTPATIENT
Start: 2023-04-11 | End: 2023-04-11

## 2023-04-11 RX ORDER — GABAPENTIN 100 MG/1
100 CAPSULE ORAL 3 TIMES DAILY
Status: DISCONTINUED | OUTPATIENT
Start: 2023-04-11 | End: 2023-04-14 | Stop reason: HOSPADM

## 2023-04-11 RX ORDER — ACETAMINOPHEN 500 MG
1000 TABLET ORAL 3 TIMES DAILY
Status: DISCONTINUED | OUTPATIENT
Start: 2023-04-11 | End: 2023-04-14 | Stop reason: HOSPADM

## 2023-04-11 RX ORDER — DEXMEDETOMIDINE HYDROCHLORIDE 100 UG/ML
INJECTION, SOLUTION INTRAVENOUS
Status: DISCONTINUED | OUTPATIENT
Start: 2023-04-11 | End: 2023-04-11

## 2023-04-11 RX ORDER — VENLAFAXINE HYDROCHLORIDE 75 MG/1
300 CAPSULE, EXTENDED RELEASE ORAL DAILY
Status: DISCONTINUED | OUTPATIENT
Start: 2023-04-12 | End: 2023-04-14 | Stop reason: HOSPADM

## 2023-04-11 RX ORDER — TIZANIDINE 2 MG/1
2 TABLET ORAL EVERY 8 HOURS PRN
Status: DISCONTINUED | OUTPATIENT
Start: 2023-04-11 | End: 2023-04-14 | Stop reason: HOSPADM

## 2023-04-11 RX ORDER — CLONAZEPAM 0.5 MG/1
0.5 TABLET ORAL 3 TIMES DAILY
Status: DISCONTINUED | OUTPATIENT
Start: 2023-04-11 | End: 2023-04-14 | Stop reason: HOSPADM

## 2023-04-11 RX ORDER — CELECOXIB 100 MG/1
100 CAPSULE ORAL 2 TIMES DAILY
Status: DISCONTINUED | OUTPATIENT
Start: 2023-04-11 | End: 2023-04-14 | Stop reason: HOSPADM

## 2023-04-11 RX ORDER — SODIUM CHLORIDE 0.9 % (FLUSH) 0.9 %
10 SYRINGE (ML) INJECTION
Status: DISCONTINUED | OUTPATIENT
Start: 2023-04-11 | End: 2023-04-11 | Stop reason: HOSPADM

## 2023-04-11 RX ORDER — PHENYLEPHRINE HYDROCHLORIDE 10 MG/ML
INJECTION INTRAVENOUS CONTINUOUS PRN
Status: DISCONTINUED | OUTPATIENT
Start: 2023-04-11 | End: 2023-04-11

## 2023-04-11 RX ORDER — METHOCARBAMOL 500 MG/1
1000 TABLET, FILM COATED ORAL 3 TIMES DAILY
Status: DISCONTINUED | OUTPATIENT
Start: 2023-04-11 | End: 2023-04-14 | Stop reason: HOSPADM

## 2023-04-11 RX ORDER — PHENYLEPHRINE HCL IN 0.9% NACL 1 MG/10 ML
SYRINGE (ML) INTRAVENOUS
Status: DISCONTINUED | OUTPATIENT
Start: 2023-04-11 | End: 2023-04-11

## 2023-04-11 RX ADMIN — DEXAMETHASONE SODIUM PHOSPHATE 4 MG: 4 INJECTION INTRA-ARTICULAR; INTRALESIONAL; INTRAMUSCULAR; INTRAVENOUS; SOFT TISSUE at 07:04

## 2023-04-11 RX ADMIN — CEFAZOLIN 2 G: 2 INJECTION, POWDER, FOR SOLUTION INTRAMUSCULAR; INTRAVENOUS at 05:04

## 2023-04-11 RX ADMIN — CELECOXIB 100 MG: 100 CAPSULE ORAL at 11:04

## 2023-04-11 RX ADMIN — HEPARIN SODIUM 5000 UNITS: 5000 INJECTION INTRAVENOUS; SUBCUTANEOUS at 08:04

## 2023-04-11 RX ADMIN — OXYCODONE HYDROCHLORIDE 5 MG: 5 TABLET ORAL at 11:04

## 2023-04-11 RX ADMIN — CELECOXIB 100 MG: 100 CAPSULE ORAL at 08:04

## 2023-04-11 RX ADMIN — HYDROMORPHONE HYDROCHLORIDE 0.2 MG: 1 INJECTION, SOLUTION INTRAMUSCULAR; INTRAVENOUS; SUBCUTANEOUS at 12:04

## 2023-04-11 RX ADMIN — FENTANYL CITRATE 25 MCG: 50 INJECTION, SOLUTION INTRAMUSCULAR; INTRAVENOUS at 11:04

## 2023-04-11 RX ADMIN — Medication 80 MCG: at 10:04

## 2023-04-11 RX ADMIN — FAMOTIDINE 20 MG: 20 TABLET ORAL at 08:04

## 2023-04-11 RX ADMIN — VASOPRESSIN 1 UNITS: 20 INJECTION INTRAVENOUS at 10:04

## 2023-04-11 RX ADMIN — CLONAZEPAM 0.5 MG: 0.5 TABLET ORAL at 02:04

## 2023-04-11 RX ADMIN — MUPIROCIN: 20 OINTMENT TOPICAL at 08:04

## 2023-04-11 RX ADMIN — FENTANYL CITRATE 100 MCG: 50 INJECTION, SOLUTION INTRAMUSCULAR; INTRAVENOUS at 07:04

## 2023-04-11 RX ADMIN — PROPOFOL 170 MG: 10 INJECTION, EMULSION INTRAVENOUS at 07:04

## 2023-04-11 RX ADMIN — Medication 100 MCG: at 08:04

## 2023-04-11 RX ADMIN — CLONAZEPAM 0.5 MG: 0.5 TABLET ORAL at 08:04

## 2023-04-11 RX ADMIN — GABAPENTIN 100 MG: 100 CAPSULE ORAL at 02:04

## 2023-04-11 RX ADMIN — Medication 100 MCG: at 10:04

## 2023-04-11 RX ADMIN — TIZANIDINE 2 MG: 2 TABLET ORAL at 08:04

## 2023-04-11 RX ADMIN — SODIUM CHLORIDE, SODIUM GLUCONATE, SODIUM ACETATE, POTASSIUM CHLORIDE, MAGNESIUM CHLORIDE, SODIUM PHOSPHATE, DIBASIC, AND POTASSIUM PHOSPHATE: .53; .5; .37; .037; .03; .012; .00082 INJECTION, SOLUTION INTRAVENOUS at 07:04

## 2023-04-11 RX ADMIN — SUGAMMADEX 200 MG: 100 INJECTION, SOLUTION INTRAVENOUS at 09:04

## 2023-04-11 RX ADMIN — ACETAMINOPHEN 1000 MG: 500 TABLET ORAL at 06:04

## 2023-04-11 RX ADMIN — ROCURONIUM BROMIDE 10 MG: 10 INJECTION, SOLUTION INTRAVENOUS at 08:04

## 2023-04-11 RX ADMIN — SODIUM CHLORIDE: 0.9 INJECTION, SOLUTION INTRAVENOUS at 07:04

## 2023-04-11 RX ADMIN — LIDOCAINE HYDROCHLORIDE 80 MG: 20 INJECTION, SOLUTION EPIDURAL; INFILTRATION; INTRACAUDAL; PERINEURAL at 07:04

## 2023-04-11 RX ADMIN — GLYCOPYRROLATE 0.2 MG: 0.2 INJECTION INTRAMUSCULAR; INTRAVENOUS at 09:04

## 2023-04-11 RX ADMIN — VASOPRESSIN 0.5 UNITS: 20 INJECTION INTRAVENOUS at 10:04

## 2023-04-11 RX ADMIN — HEPARIN SODIUM 5000 UNITS: 5000 INJECTION INTRAVENOUS; SUBCUTANEOUS at 02:04

## 2023-04-11 RX ADMIN — MIDAZOLAM 2 MG: 1 INJECTION INTRAMUSCULAR; INTRAVENOUS at 07:04

## 2023-04-11 RX ADMIN — GABAPENTIN 100 MG: 100 CAPSULE ORAL at 08:04

## 2023-04-11 RX ADMIN — DEXMEDETOMIDINE HYDROCHLORIDE 16 MCG: 100 INJECTION, SOLUTION INTRAVENOUS at 11:04

## 2023-04-11 RX ADMIN — Medication 100 MCG: at 09:04

## 2023-04-11 RX ADMIN — ROCURONIUM BROMIDE 30 MG: 10 INJECTION, SOLUTION INTRAVENOUS at 07:04

## 2023-04-11 RX ADMIN — HYDROMORPHONE HYDROCHLORIDE 0.2 MG: 2 INJECTION INTRAMUSCULAR; INTRAVENOUS; SUBCUTANEOUS at 09:04

## 2023-04-11 RX ADMIN — VANCOMYCIN HYDROCHLORIDE 1500 MG: 1.5 INJECTION, POWDER, LYOPHILIZED, FOR SOLUTION INTRAVENOUS at 07:04

## 2023-04-11 RX ADMIN — Medication 100 MCG: at 07:04

## 2023-04-11 RX ADMIN — METHOCARBAMOL 1000 MG: 500 TABLET ORAL at 08:04

## 2023-04-11 RX ADMIN — VASOPRESSIN 1 UNITS: 20 INJECTION INTRAVENOUS at 09:04

## 2023-04-11 RX ADMIN — VASOPRESSIN 0.04 UNITS/MIN: 20 INJECTION INTRAVENOUS at 09:04

## 2023-04-11 RX ADMIN — CEFAZOLIN 2 G: 330 INJECTION, POWDER, FOR SOLUTION INTRAMUSCULAR; INTRAVENOUS at 07:04

## 2023-04-11 RX ADMIN — GLYCOPYRROLATE 0.2 MG: 0.2 INJECTION INTRAMUSCULAR; INTRAVENOUS at 07:04

## 2023-04-11 RX ADMIN — SUCCINYLCHOLINE CHLORIDE 100 MG: 20 INJECTION, SOLUTION INTRAMUSCULAR; INTRAVENOUS; PARENTERAL at 07:04

## 2023-04-11 RX ADMIN — HYDROMORPHONE HYDROCHLORIDE 0.2 MG: 1 INJECTION, SOLUTION INTRAMUSCULAR; INTRAVENOUS; SUBCUTANEOUS at 11:04

## 2023-04-11 RX ADMIN — HYDROMORPHONE HYDROCHLORIDE 0.2 MG: 2 INJECTION INTRAMUSCULAR; INTRAVENOUS; SUBCUTANEOUS at 10:04

## 2023-04-11 RX ADMIN — REMIFENTANIL HYDROCHLORIDE 0.2 MCG/KG/MIN: 1 INJECTION, POWDER, LYOPHILIZED, FOR SOLUTION INTRAVENOUS at 07:04

## 2023-04-11 RX ADMIN — METHOCARBAMOL 1000 MG: 500 TABLET ORAL at 02:04

## 2023-04-11 RX ADMIN — FAMOTIDINE 20 MG: 20 TABLET ORAL at 11:04

## 2023-04-11 RX ADMIN — SUGAMMADEX 200 MG: 100 INJECTION, SOLUTION INTRAVENOUS at 08:04

## 2023-04-11 RX ADMIN — MUPIROCIN: 20 OINTMENT TOPICAL at 12:04

## 2023-04-11 RX ADMIN — ROCURONIUM BROMIDE 10 MG: 10 INJECTION, SOLUTION INTRAVENOUS at 07:04

## 2023-04-11 RX ADMIN — ONDANSETRON 4 MG: 2 INJECTION INTRAMUSCULAR; INTRAVENOUS at 10:04

## 2023-04-11 RX ADMIN — OXYCODONE HYDROCHLORIDE 5 MG: 5 TABLET ORAL at 06:04

## 2023-04-11 RX ADMIN — HALOPERIDOL LACTATE 0.5 MG: 5 INJECTION, SOLUTION INTRAMUSCULAR at 11:04

## 2023-04-11 RX ADMIN — PHENYLEPHRINE HYDROCHLORIDE 0.25 MCG/KG/MIN: 10 INJECTION INTRAVENOUS at 08:04

## 2023-04-11 RX ADMIN — ACETAMINOPHEN 1000 MG: 500 TABLET ORAL at 02:04

## 2023-04-11 RX ADMIN — ACETAMINOPHEN 1000 MG: 500 TABLET ORAL at 08:04

## 2023-04-11 RX ADMIN — HYDROMORPHONE HYDROCHLORIDE 0.6 MG: 2 INJECTION INTRAMUSCULAR; INTRAVENOUS; SUBCUTANEOUS at 11:04

## 2023-04-11 NOTE — OP NOTE
DATE OF PROCEDURE: 4/11/2023.     PRIMARY SURGEON: Sundar Harry M.D.     CO-SURGEON: DO JACQUES Allison    FIRST ASSISTANT: Gary Jones MD, PGY-1 Orthopedics     PREOPERATIVE DIAGNOSIS:   1.   Recurrent disc herniation L4-S1  2.   History of L5 laminectomy  3.   Chronic cauda equina syndrome  4.   Symptomatic lumbar spinal stenosis lumbar radiculopathy     POSTOPERATIVE DIAGNOSIS:   Same     PROCEDURES PERFORMED:  Posterior lateral and posterior lumbar interbody fusion L4-S1  2.   Posterior nonsegmental instrumentation L4-S1  3.   L4 and revision L5 laminectomy, and bilateral foraminotomy for decompression of central and bilateral foraminal stenosis.  4.   Application of titanium intervertebral spacer device to L4-5 and L5-S1 disc defect  5.   Robotic assistance for placement of the pedicle screws (requiring greater than 30 minutes of preoperative planning time)  6.   Auto and allografting     ANESTHESIA: General endotracheal anesthesia.     ESTIMATED BLOOD LOSS: 150 mL.     IMPLANTS: Globus screws  L4-5: expandable 7-13 mm height (15deg lordosis, 22mm length) titanium cage.  L5-S1: expandable 7-13 mm height (15deg lordosis, 22mm length) titanium cage.  Altapore putty      SPECIMENS: None.     FINDINGS: large HNP L4-S1 L.     DRAINS: One deep and one superficial HV drains     COMPLICATIONS: None.     SPONGE AND NEEDLE COUNT: Correct x2.     NEUROLOGICAL MONITORING: Somatosensory potentials, free run EMG, and EMG stimulation lumbar pedicle screws. There were no changes to SSEP baselines. There no significant EMG activity. All screws stimulated at or greater than than 20 milliamps.      REASON FOR OPERATION AND BRIEF HISTORY AND PHYSICAL: Marianela Shrestha is a 38 y.o. female s/p L4-S1 microdiscectomy for chronic cauda equina syndrome presented with recurrent L4-S1 HNP, and refractory lumbar spinal stenosis with radiculopathy.  She has failed extensive conservative management and is here for surgery  today.     DESCRIPTION OF INFORMED CONSENT: Please see my last clinic note for a full description of the informed consent I had with the patient.     DESCRIPTION OF PROCEDURE: The patient was met in the preoperative area where all final questions were answered. Their lumbar spine was marked as the operative site. The patient was then brought to the operating room where anesthesia was induced. The patient was then flipped prone onto the [place] spine table. All bony prominences were padded, paying special attention to the eyes, nose and mouth, axillae, ulnar nerve and hands, genitalia, knees and feet, this was confirmed to be adequate with the anesthesia team. Sequential compression devices were run throughout the case on the bilateral calves. The surgical field was prepped and draped in normal sterile manner after using fluoroscopy to localize our incisoin. A full time-out was then called, verifying patient's identity, surgery, site, and that they had been administered a weight appropriate dose of Ancef, no specific nursing, anesthetic or surgical concerns were identified and it was decided that it was safe to proceed with surgery. I informed all members of the operative team that they were empowered to speak up regarding concerns at any time during the procedure.     We extended the previous incision and carried dissection down through the skin and subcutaneous tissues using combination of sharp dissection and electrocautery where necessary. The dorsal fascia of the lumbar spine was identified and incised in the midline and we performed a preliminary subperiosteal exposure of the bilateral L3-4, L4-5 and L5-S1 facet joints, and what we took to be the L4 and L5 transverse processes and sacral alae bilaterally. At the conclusion of the preliminary exposure, we placed a marker on what we took to be the left L5 pedicle, took a lateral radiograph, and this confirmed my levels.  Next we finalized the exposure.  Subsequently we performed bilateral L4-5 and L5-S1 facetectomies with an osteotome.     We placed a pin in the right posterior superior iliac spine for the robotic array. AP and lateral fluoroscopy images were taken to register to the preoperative CT scan and plan that we had performed prior to the incision.  Once that was done, the robotic arm was used to cannulate pedicle screw tracts bilaterally at L4-S1. Xrays confirmed adequate positioning of the screws on AP and lateral images.     We then began the neurological decompression. High-speed drill was used to thin the L4 lamina at the level of the pars. This was then removed on block, and we released the ligamentum flavum centrally with a forward angle curette and resected it with a Kerrison punch. We subsequently performed a central as well as bilateral foraminal decompression with a Kerrison punch. Working on the left of midline we used a osteotome to remove the superior articular process of L4. We then performed a subtotal L4-5 diskectomy in a standard fashion. The disc space was then thoroughly irrigated and a expandable 7-13 titanium spacer was impacted, and expanded, and confirmed to be at the correct level in adequate position radiographically. The disc space then packed with morselized bone graft.    High-speed drill was used to thin the L5 lamina at the level of the pars. This was then removed on block, and we released the ligamentum flavum centrally with a forward angle curette and resected it with a Kerrison punch. We subsequently performed a central as well as bilateral foraminal decompression with a Kerrison punch. Working on the left of midline we used a osteotome to remove the superior articular process of L5. We then performed a subtotal L5-S1 diskectomy in a standard fashion. The disc space was then thoroughly irrigated and a expandable 7-13 titanium spacer was impacted, and expanded, and confirmed to be at the correct level in adequate position  radiographically. The disc space then packed with morselized bone graft.     The wound was then copiously irrigated. Rods were measured, cut, contoured, and locked into place with torque wrench. Morselized bone graft, mixed with 1 g vancomycin powder, was laid dorsally along the decorticated transverse processes from L4-5-sacral alae.     500 mg of vancomycin powder was placed in the deep space, and a deep drain was then placed. The deep fascia was then closed with #1 Vicryl sutures in an interrupted, figure-of-eight fashion. A superficial drain was then placed. The superficial layer was then irrigated and closed over an additional 500 mg of vancomycin powder with 2-0 Vicryl, 3-0 Monocryl, Dermabond and Steri-Strips. A soft dressing was then placed. The drains were secured in place with silk sutures. The patient was then flipped supine, extubated and transferred to the Recovery Room in stable condition.    JUSTIFICATION OF CO-SURGEON AND MODIFIER -22: This was a more complex case that usual given the patient's revision nature and scarring. Surgical risks were inherently elevated. This made all aspects of the surgery more difficult, from exposure to discectomy and instrumentation. Two attending surgeons were indicated to reduce operative times, blood loss, and improve patient outcomes.    Sundar Harry MD  Orthopaedic Spine Surgeon  Department of Orthopaedic Surgery  [phone]

## 2023-04-11 NOTE — TRANSFER OF CARE
"Anesthesia Transfer of Care Note    Patient: Marianela Shrestha    Procedure(s) Performed: Procedure(s) (LRB):  FUSION, SPINE, LUMBAR, TLIF, POSTERIOR APPROACH, USING PEDICLE SCREW L4-S1 (N/A)    Patient location: PACU    Anesthesia Type: general    Transport from OR: Transported from OR on 6-10 L/min O2 by face mask with adequate spontaneous ventilation    Post pain: pain needs to be addressed    Post assessment: no apparent anesthetic complications    Post vital signs: stable    Level of consciousness: awake    Nausea/Vomiting: no nausea/vomiting    Complications: none    Transfer of care protocol was followed      Last vitals:   Visit Vitals  /78 (BP Location: Left arm)   Pulse 74   Temp 37.2 °C (99 °F) (Oral)   Resp 18   Ht 5' 5" (1.651 m)   Wt 88 kg (194 lb)   SpO2 100%   Breastfeeding No   BMI 32.28 kg/m²     "

## 2023-04-11 NOTE — NURSING TRANSFER
Nursing Transfer Note      4/11/2023     Reason patient is being transferred: post op    Transfer To: 504, report called to bedside RN at 1315    Transfer via bed    Transported by PCT x2    Medicines sent: yes    Any special needs or follow-up needed: routine    Chart send with patient: Yes    Notified: spouse    Patient reassessed at: 1245 on 4/11

## 2023-04-11 NOTE — PLAN OF CARE
[facility] - Surgery  Initial Discharge Assessment       Primary Care Provider: Arnaldo Espino MD    Admission Diagnosis: S/P lumbar discectomy [Z98.890]  SHAZIA (cauda equina syndrome) [G83.4]  Lumbar herniated disc [M51.26]    Admission Date: 4/11/2023  Expected Discharge Date: 4/13/2023    Discharge Barriers Identified: None    Payor: HU[employer] MANAGED MEDICARE / Plan: HU[employer] SNP HMO PPO SPECIAL NEEDS / Product Type: Medicare Advantage /     Extended Emergency Contact Information  Primary Emergency Contact: Paul Rod  Address: [address] [place] [address]  Mobile Phone: [phone]  Relation: Significant other  Secondary Emergency Contact: Lidia Thomas  Address: [address] [place] [address]  Home Phone: [phone]  Mobile Phone: [phone]  Relation: Mother    Discharge Plan A: Skilled Nursing Facility  Discharge Plan B: Home with family, Home Health      [facility]/pharmacy #[id number] [place] [address] - [address] [place] [address]  [address] [facility] [address]  Phone: [phone] Fax: [phone]    [facility] [address] [address]  Phone: [phone] Fax: [phone]      Initial Assessment (most recent)       Adult Discharge Assessment - 04/11/23 1628          Discharge Assessment    Assessment Type Discharge Planning Assessment     Confirmed/corrected address, phone number and insurance Yes     Source of Information patient     Does patient/caregiver understand observation status Yes     People in Home significant other;parent(s);other relative(s)     Do you expect to return to your current living situation? No     Do you have help at home or someone to help you manage your care at home? Yes     Who are your caregiver(s) and their phone number(s)? Paul Rod (Significant other) [phone]     Prior to hospitilization cognitive status: Alert/Oriented     Current cognitive status:  Alert/Oriented     Walking or Climbing Stairs ambulation difficulty, requires equipment     Mobility Management Wheelchair     Home Layout Able to live on 1st floor     Equipment Currently Used at Home wheelchair     Readmission within 30 days? No     Patient currently being followed by outpatient case management? No     Do you currently have service(s) that help you manage your care at home? No     Do you take prescription medications? Yes     Do you have prescription coverage? Yes     Do you have any problems affording any of your prescribed medications? No     Is the patient taking medications as prescribed? yes     Who is going to help you get home at discharge? Placement anticipated     How do you get to doctors appointments? family or friend will provide     Are you on dialysis? No     Do you take coumadin? No     Discharge Plan A Skilled Nursing Facility     Discharge Plan B Home with family;Home Health     DME Needed Upon Discharge  none     Discharge Barriers Identified None        Physical Activity    On average, how many days per week do you engage in moderate to strenuous exercise (like a brisk walk)? 0 days     On average, how many minutes do you engage in exercise at this level? 0 min        Financial Resource Strain    How hard is it for you to pay for the very basics like food, housing, medical care, and heating? Not hard at all        Housing Stability    In the last 12 months, was there a time when you were not able to pay the mortgage or rent on time? No     In the last 12 months, how many places have you lived? 1     In the last 12 months, was there a time when you did not have a steady place to sleep or slept in a shelter (including now)? No        Transportation Needs    In the past 12 months, has lack of transportation kept you from medical appointments or from getting medications? No     In the past 12 months, has lack of transportation kept you from meetings, work, or from getting things  needed for daily living? No        Food Insecurity    Within the past 12 months, you worried that your food would run out before you got the money to buy more. Never true     Within the past 12 months, the food you bought just didn't last and you didn't have money to get more. Never true        Stress    Do you feel stress - tense, restless, nervous, or anxious, or unable to sleep at night because your mind is troubled all the time - these days? Not at all        Social Connections    In a typical week, how many times do you talk on the phone with family, friends, or neighbors? Twice a week     How often do you get together with friends or relatives? Twice a week     How often do you attend Methodist or Yazidi services? Never     Do you belong to any clubs or organizations such as Methodist groups, unions, fraternal or athletic groups, or school groups? No     How often do you attend meetings of the clubs or organizations you belong to? Never     Are you , , , , never , or living with a partner? Never         Alcohol Use    Q1: How often do you have a drink containing alcohol? Monthly or less     Q2: How many drinks containing alcohol do you have on a typical day when you are drinking? 1 or 2     Q3: How often do you have six or more drinks on one occasion? Never        OTHER    Name(s) of People in Home Paul Rod                      Spoke with patient and significant other at bedside to complete d/c planning assessment. Patient lives with her mother, Brother, and fiance in a single story home with no steps to enter. She has a wheelchair at home from prior surgeries. Patient uses a service dog (Nova) who is currently at bedside. PT/OT to j[facility] in am for post-acute needs. Verified PCP, Pharmacy and health insurance. Will continue to follow for needs.    Marlena Solano RNCM  Case Management  [facility]  [phone]

## 2023-04-11 NOTE — PATIENT INSTRUCTIONS
DR. PAOLO ELLIS'S POSTOPERATIVE INSTRUCTIONS -   LUMBAR FUSION       Antibiotics: You do not need additional antibiotics at home.    NSAIDs: Please refrain from taking ibuprofen (Advil), naproxen (Aleve), and other non steroidal anti-inflammatory medications other than the Celebrex that will be prescribed to you after surgery.    Wound Care: You may remove your dressing and shower 7 days after surgery. Until then please keep your wound clean and dry. Sponge baths are acceptable. Do not go in a pool or hot tub until seen in clinic. Please leave the small steri-strips covering your wound in place until they fall off naturally (2 weeks). You may notice clear suture ends hanging from the sides of your incision after the steri-strips are removed, it is ok to clip these with scissors.    Brace: You may be prescribed a brace, please wear this when up and walking, it is not necessary to wear at night when sleeping.    Pain: We will use a multimodal approach for pain management after your surgery.  You will be given a prescription for pain medicine when you are discharged from the hospital.  You will also be given prescriptions for Robaxin (a muscle relaxer), Gabapentin, Celebrex and Tylenol.  Please note: you will only be given ONE prescription for narcotics when you are discharged from the hospital.  This medication is for breakthrough pain only. This medication will not be refilled.  The other medications given to you may be refilled if needed.      Infection: Signs of infection include increasing wound drainage and redness around the wound, as well as a temperature over 101.5 degrees. It is unnecessary to take your temperature on a routine basis. Please call the below number if you are concerned about an infection.    Driving and Work: It is ok to return to driving and work as long as you are not taking narcotic pain medications and can walk greater than 100 feet. Please do not lift over 10 pounds or participate in  exercise or sports until cleared by Dr. Harry.    Deep Venous Thrombosis (Blood Clots): Symptoms include swelling in the legs and shortness of breath. Please call the office or proceed to the nearest emergency room if you have any of these symptoms.    Physical Therapy: The best physical therapy immediately after surgery is walking. Please try to walk as much as possible.    Follow-up: You will be scheduled for a follow-up appointment in 4 weeks with either Dr. Harry or his physician assistant, Antonia Clifton PA-C.    Questions: During business hours please call [phone] for routine questions. For after hours questions please call [phone] and ask to speak with the Orthopaedic resident on call.    Disability: If you submitted short term disability paperwork for us to complete and would like to check the status, please call the Disability Department at [phone].  You may fax any necessary paperwork to [phone].

## 2023-04-11 NOTE — DISCHARGE INSTRUCTIONS
DR. PAOLO ELLIS'S POSTOPERATIVE INSTRUCTIONS -   LUMBAR FUSION       Antibiotics: You do not need additional antibiotics at home.    NSAIDs: Please refrain from taking ibuprofen (Advil), naproxen (Aleve), and other non steroidal anti-inflammatory medications other than the Celebrex that will be prescribed to you after surgery.    Wound Care: You may remove your dressing and shower 7 days after surgery. Until then please keep your wound clean and dry. Sponge baths are acceptable. Do not go in a pool or hot tub until seen in clinic. Please leave the small steri-strips covering your wound in place until they fall off naturally (2 weeks). You may notice clear suture ends hanging from the sides of your incision after the steri-strips are removed, it is ok to clip these with scissors.    Brace: You may be prescribed a brace, please wear this when up and walking, it is not necessary to wear at night when sleeping.    Pain: We will use a multimodal approach for pain management after your surgery.  You will be given a prescription for pain medicine when you are discharged from the hospital.  You will also be given prescriptions for Robaxin (a muscle relaxer), Gabapentin, Celebrex and Tylenol.  Please note: you will only be given ONE prescription for narcotics when you are discharged from the hospital.  This medication is for breakthrough pain only. This medication will not be refilled.  The other medications given to you may be refilled if needed.      Infection: Signs of infection include increasing wound drainage and redness around the wound, as well as a temperature over 101.5 degrees. It is unnecessary to take your temperature on a routine basis. Please call the below number if you are concerned about an infection.    Driving and Work: It is ok to return to driving and work as long as you are not taking narcotic pain medications and can walk greater than 100 feet. Please do not lift over 10 pounds or participate in  exercise or sports until cleared by Dr. Harry.    Deep Venous Thrombosis (Blood Clots): Symptoms include swelling in the legs and shortness of breath. Please call the office or proceed to the nearest emergency room if you have any of these symptoms.    Physical Therapy: The best physical therapy immediately after surgery is walking. Please try to walk as much as possible.    Follow-up: You will be scheduled for a follow-up appointment in 4 weeks with either Dr. Harry or his physician assistant, Antonia Clifton PA-C.    Questions: During business hours please call [phone] for routine questions. For after hours questions please call [phone] and ask to speak with the Orthopaedic resident on call.    Disability: If you submitted short term disability paperwork for us to complete and would like to check the status, please call the Disability Department at [phone].  You may fax any necessary paperwork to [phone].

## 2023-04-12 ENCOUNTER — TELEPHONE (OUTPATIENT)
Dept: ORTHOPEDICS | Facility: CLINIC | Age: 38
End: 2023-04-12
Payer: MEDICARE

## 2023-04-12 DIAGNOSIS — Z98.890 S/P SPINAL SURGERY: Primary | ICD-10-CM

## 2023-04-12 LAB
ALBUMIN SERPL BCP-MCNC: 2.8 G/DL (ref 3.5–5.2)
ALP SERPL-CCNC: 83 U/L (ref 55–135)
ALT SERPL W/O P-5'-P-CCNC: 12 U/L (ref 10–44)
ANION GAP SERPL CALC-SCNC: 6 MMOL/L (ref 8–16)
AST SERPL-CCNC: 21 U/L (ref 10–40)
BASOPHILS # BLD AUTO: 0.05 K/UL (ref 0–0.2)
BASOPHILS NFR BLD: 0.5 % (ref 0–1.9)
BILIRUB SERPL-MCNC: 0.1 MG/DL (ref 0.1–1)
BUN SERPL-MCNC: 14 MG/DL (ref 6–20)
CALCIUM SERPL-MCNC: 7.9 MG/DL (ref 8.7–10.5)
CHLORIDE SERPL-SCNC: 107 MMOL/L (ref 95–110)
CO2 SERPL-SCNC: 25 MMOL/L (ref 23–29)
CREAT SERPL-MCNC: 0.8 MG/DL (ref 0.5–1.4)
DIFFERENTIAL METHOD: ABNORMAL
EOSINOPHIL # BLD AUTO: 0.1 K/UL (ref 0–0.5)
EOSINOPHIL NFR BLD: 0.7 % (ref 0–8)
ERYTHROCYTE [DISTWIDTH] IN BLOOD BY AUTOMATED COUNT: 13.7 % (ref 11.5–14.5)
EST. GFR  (NO RACE VARIABLE): >60 ML/MIN/1.73 M^2
GLUCOSE SERPL-MCNC: 98 MG/DL (ref 70–110)
HCT VFR BLD AUTO: 33.5 % (ref 37–48.5)
HGB BLD-MCNC: 10.5 G/DL (ref 12–16)
IMM GRANULOCYTES # BLD AUTO: 0.04 K/UL (ref 0–0.04)
IMM GRANULOCYTES NFR BLD AUTO: 0.4 % (ref 0–0.5)
LYMPHOCYTES # BLD AUTO: 3.4 K/UL (ref 1–4.8)
LYMPHOCYTES NFR BLD: 32.7 % (ref 18–48)
MCH RBC QN AUTO: 30.7 PG (ref 27–31)
MCHC RBC AUTO-ENTMCNC: 31.3 G/DL (ref 32–36)
MCV RBC AUTO: 98 FL (ref 82–98)
MONOCYTES # BLD AUTO: 0.9 K/UL (ref 0.3–1)
MONOCYTES NFR BLD: 9.1 % (ref 4–15)
NEUTROPHILS # BLD AUTO: 5.8 K/UL (ref 1.8–7.7)
NEUTROPHILS NFR BLD: 56.6 % (ref 38–73)
NRBC BLD-RTO: 0 /100 WBC
PLATELET # BLD AUTO: 340 K/UL (ref 150–450)
PMV BLD AUTO: 10.2 FL (ref 9.2–12.9)
POTASSIUM SERPL-SCNC: 4 MMOL/L (ref 3.5–5.1)
PROT SERPL-MCNC: 6.1 G/DL (ref 6–8.4)
RBC # BLD AUTO: 3.42 M/UL (ref 4–5.4)
SODIUM SERPL-SCNC: 138 MMOL/L (ref 136–145)
WBC # BLD AUTO: 10.29 K/UL (ref 3.9–12.7)

## 2023-04-12 PROCEDURE — 97161 PT EVAL LOW COMPLEX 20 MIN: CPT

## 2023-04-12 PROCEDURE — 11000001 HC ACUTE MED/SURG PRIVATE ROOM

## 2023-04-12 PROCEDURE — 97530 THERAPEUTIC ACTIVITIES: CPT

## 2023-04-12 PROCEDURE — 80053 COMPREHEN METABOLIC PANEL: CPT

## 2023-04-12 PROCEDURE — 25000003 PHARM REV CODE 250

## 2023-04-12 PROCEDURE — 94761 N-INVAS EAR/PLS OXIMETRY MLT: CPT

## 2023-04-12 PROCEDURE — 63600175 PHARM REV CODE 636 W HCPCS

## 2023-04-12 PROCEDURE — 36415 COLL VENOUS BLD VENIPUNCTURE: CPT

## 2023-04-12 PROCEDURE — 85025 COMPLETE CBC W/AUTO DIFF WBC: CPT

## 2023-04-12 PROCEDURE — 97165 OT EVAL LOW COMPLEX 30 MIN: CPT

## 2023-04-12 PROCEDURE — 97116 GAIT TRAINING THERAPY: CPT

## 2023-04-12 RX ORDER — HYDROMORPHONE HYDROCHLORIDE 1 MG/ML
0.2 INJECTION, SOLUTION INTRAMUSCULAR; INTRAVENOUS; SUBCUTANEOUS
Status: DISCONTINUED | OUTPATIENT
Start: 2023-04-12 | End: 2023-04-14 | Stop reason: HOSPADM

## 2023-04-12 RX ORDER — OXYCODONE HYDROCHLORIDE 10 MG/1
10 TABLET ORAL EVERY 4 HOURS PRN
Status: DISCONTINUED | OUTPATIENT
Start: 2023-04-12 | End: 2023-04-14 | Stop reason: HOSPADM

## 2023-04-12 RX ADMIN — GABAPENTIN 100 MG: 100 CAPSULE ORAL at 08:04

## 2023-04-12 RX ADMIN — CEFAZOLIN 2 G: 2 INJECTION, POWDER, FOR SOLUTION INTRAMUSCULAR; INTRAVENOUS at 02:04

## 2023-04-12 RX ADMIN — CLONAZEPAM 0.5 MG: 0.5 TABLET ORAL at 08:04

## 2023-04-12 RX ADMIN — OXYCODONE HYDROCHLORIDE 5 MG: 5 TABLET ORAL at 08:04

## 2023-04-12 RX ADMIN — CELECOXIB 100 MG: 100 CAPSULE ORAL at 08:04

## 2023-04-12 RX ADMIN — CLONAZEPAM 0.5 MG: 0.5 TABLET ORAL at 03:04

## 2023-04-12 RX ADMIN — GABAPENTIN 100 MG: 100 CAPSULE ORAL at 03:04

## 2023-04-12 RX ADMIN — OXYCODONE HYDROCHLORIDE 10 MG: 10 TABLET ORAL at 12:04

## 2023-04-12 RX ADMIN — CEFAZOLIN 2 G: 2 INJECTION, POWDER, FOR SOLUTION INTRAMUSCULAR; INTRAVENOUS at 08:04

## 2023-04-12 RX ADMIN — FAMOTIDINE 20 MG: 20 TABLET ORAL at 08:04

## 2023-04-12 RX ADMIN — OXYCODONE HYDROCHLORIDE 10 MG: 10 TABLET ORAL at 07:04

## 2023-04-12 RX ADMIN — VENLAFAXINE HYDROCHLORIDE 300 MG: 75 CAPSULE, EXTENDED RELEASE ORAL at 08:04

## 2023-04-12 RX ADMIN — ACETAMINOPHEN 1000 MG: 500 TABLET ORAL at 08:04

## 2023-04-12 RX ADMIN — HEPARIN SODIUM 5000 UNITS: 5000 INJECTION INTRAVENOUS; SUBCUTANEOUS at 09:04

## 2023-04-12 RX ADMIN — MUPIROCIN: 20 OINTMENT TOPICAL at 11:04

## 2023-04-12 RX ADMIN — METHOCARBAMOL 1000 MG: 500 TABLET ORAL at 08:04

## 2023-04-12 RX ADMIN — MUPIROCIN: 20 OINTMENT TOPICAL at 08:04

## 2023-04-12 RX ADMIN — HYDROMORPHONE HYDROCHLORIDE 0.2 MG: 1 INJECTION, SOLUTION INTRAMUSCULAR; INTRAVENOUS; SUBCUTANEOUS at 09:04

## 2023-04-12 RX ADMIN — HEPARIN SODIUM 5000 UNITS: 5000 INJECTION INTRAVENOUS; SUBCUTANEOUS at 06:04

## 2023-04-12 RX ADMIN — HEPARIN SODIUM 5000 UNITS: 5000 INJECTION INTRAVENOUS; SUBCUTANEOUS at 01:04

## 2023-04-12 RX ADMIN — ACETAMINOPHEN 1000 MG: 500 TABLET ORAL at 03:04

## 2023-04-12 RX ADMIN — TIZANIDINE 2 MG: 2 TABLET ORAL at 06:04

## 2023-04-12 RX ADMIN — HYDROMORPHONE HYDROCHLORIDE 0.2 MG: 1 INJECTION, SOLUTION INTRAMUSCULAR; INTRAVENOUS; SUBCUTANEOUS at 11:04

## 2023-04-12 RX ADMIN — METHOCARBAMOL 1000 MG: 500 TABLET ORAL at 03:04

## 2023-04-12 RX ADMIN — HYDROMORPHONE HYDROCHLORIDE 0.2 MG: 1 INJECTION, SOLUTION INTRAMUSCULAR; INTRAVENOUS; SUBCUTANEOUS at 01:04

## 2023-04-12 RX ADMIN — OXYCODONE HYDROCHLORIDE 5 MG: 5 TABLET ORAL at 02:04

## 2023-04-12 NOTE — PT/OT/SLP EVAL
Physical Therapy  Co-Evaluation and Treatment    Marianela Shrestha   [id number]    Time Tracking:     PT Received On: 04/12/23   PT Start Time: 1002   PT Stop Time: 1032   PT Total Time (min): 30 min    Billable Minutes: Evaluation 15 and Gait Training 15 minutes       Recommendations:     Discharge recommendations:  Other (would benefit from post-acute upon D/C)     Equipment recommendations: None    Barriers to Discharge: Decreased caregiver support (mom is disabled, spouse works 12 hour nights)    Patient Information:     Recent Surgery: Procedure(s) (LRB):  FUSION, SPINE, LUMBAR, TLIF, POSTERIOR APPROACH, USING PEDICLE SCREW L4-S1 (N/A) 1 Day Post-Op    Diagnosis: SHAZIA (cauda equina syndrome)    Length of Stay: 1 days    General Precautions: Standard, fall, WBAT  Orthopedic Precautions: Spinal precautions (avoid bending, lifting > 5 lbs and twisting x 6 weeks)  Brace: None    Assessment:     Marianela Shrestha is a 38 y.o. female admitted to [facility] on 4/11/2023 for SHAZIA (cauda equina syndrome), underwent L4-S1 PSF on 4/11. Marianela Shrestha tolerated evaluation well today. She had poor pain control overnight so PT/OT made sure that patient was pre-medicated for pain prior to evaluation this morning. She has a good understanding of her spinal precautions (avoid bending, lifting, twisting x 6 weeks) from prior back surgery. She demonstrates improved L ankle DF and 1st toe ext post-operatively compared to pre-operatively per patient (wears L AFO at baseline). Ambulates 22 ft with rolling walker and stand-by assistance today; gait is slow but steady with no losses of balance, didn't have her L AFO at bedside today so did not wear it. Limited by some mild fatigue as well as pain with activity. I do feel she will be appropriate for D/C home in next few days, will likely require 2 more days of PT/OT prior to being ready (has limited family support available at home upon D/C). Discussed PT role, POC, and goals with  "patient; verbalized understanding. Marianela Shrestha would benefit from acute PT services to promote mobility during this admission and improve return to PLOF.    Problem List: weakness, decreased endurance, impaired self-care skills, impaired mobility, decreased sitting or standing balance, gait instability, spinal precautions, impaired cardiopulmonary response to activity, and pain    Rehab Prognosis: Good; patient would benefit from acute skilled PT services to address these deficits and reach maximum level of function.    Plan:     Patient to be seen 5 x/week to address the above listed problems via gait training, therapeutic activities, therapeutic exercises, neuromuscular re-education    Plan of Care Expires: 05/12/23  Plan of Care reviewed with: patient    Subjective:     Communicated with RN prior to evaluation, pre-medicated for pain, appropriate to see for evaluation.    Pt found supine in bed (HOB elevated) upon PT entry to room, agreeable to evaluation.    Patient commenting: "They didn't give me any pain medicine last night, all I got was the normal medications that I always take."    Does this patient have any cultural, spiritual, Orthodoxy conflicts given the current situation? Patient has no barriers to learning. Patient verbalizes understanding of his/her program and goals and demonstrates them correctly. No cultural, spiritual, or educational needs identified.    Past Medical History:   Diagnosis Date    Anxiety     Chronic osteomyelitis of right hand 2019    Chronic pain syndrome     Depression     Insomnia     Post traumatic stress disorder (PTSD)     Sciatica       Past Surgical History:   Procedure Laterality Date    ABLATION OF MEDIAL BRANCH NERVE OF LUMBAR SPINE FACET JOINT      L4-S1    COSMETIC SURGERY      breast augmentation    ETHMOIDECTOMY  7/1/2022    Procedure: ETHMOIDECTOMY;  Surgeon: Segundo Fu MD;  Location: [facility] OR [address] [place] [address];  Service: ENT;;    FINGER SURGERY      " "FUNCTIONAL ENDOSCOPIC SINUS SURGERY (FESS) USING COMPUTER-ASSISTED NAVIGATION Bilateral 7/1/2022    Procedure: FESS, USING COMPUTER-ASSISTED NAVIGATION;  Surgeon: Segundo Fu MD;  [facility];  Service: ENT;  Laterality: Bilateral;    FUSION, SPINE, LUMBAR, TLIF, POSTERIOR APPROACH, USING PEDICLE SCREW N/A 4/11/2023    Procedure: FUSION, SPINE, LUMBAR, TLIF, POSTERIOR APPROACH, USING PEDICLE SCREW L4-S1;  Surgeon: Sundar Harry MD;  [facility];  Service: Neurosurgery;  Laterality: N/A;  changed from L5-1S to L4-S1 per edgar in basket [id number]    LUMBAR LAMINECTOMY N/A 11/11/2022    Procedure: LAMINECTOMY, SPINE, LUMBAR;  Surgeon: Sundar Harry MD;  Location: [facility] OR [facility];  Service: Orthopedics;  Laterality: N/A;  L4-S1 laminectomy, SNS: SSEP/MOtors    MAXILLARY ANTROSTOMY  7/1/2022    Procedure: MAXILLARY ANTROSTOMY;  Surgeon: Segundo Fu MD;  [facility];  Service: ENT;;    PELVIC LAPAROSCOPY      Endometriosis ([place])    TONSILLECTOMY         Living Environment:  Pt lives with disabled mother, spouse, brother in a 1  with 1 CODI; uses a tub/shower with grab bars and bath bench available.    PLOF:  Prior to admission, patient was ambulatory with a rolling walker outside the home; she tends to "furniture-walk" inside the home. Wears a L AFO for foot drop at baseline. Requires (A) for lower body dressing and bathing but otherwise mod (I) with use of bath bench for bathing.    DME:  Patient owns or has access to the following DME: Rolling Walker, Bedside Commode, Transfer Tub Bench, Wheelchair, Grab Bars, L AFO    Upon discharge, patient will have assistance from spouse during the days, otherwise has limited (A) available.    Objective:     Patient found with: ardon catheter, hemovac, SCD    Pain:  Pain Rating 1: 7/10 at generalized back; had been pre-medicated 20 minutes prior to session  Pain Rating Post-Intervention 1: 8/10 at generalized back " post-ambulation    Cognitive Exam:  Patient is oriented to Person, Place, Time, and Situation.  Patient follows 100% of single-step commands.    Lower Extremity Range of Motion:  Right Lower Extremity: WFL actively  Left Lower Extremity: WFL actively except limited at ankle DF and toe ext    Lower Extremity Strength:  Right Lower Extremity: grossly 4/5 via MMT  Left Lower Extremity: grossly 4/5 via MMT at hip and knee; ankle DF 2/5, PF 3/5, 1st toe ext 2+/5    Functional Mobility:    Bed Mobility:  Supine to Sitting: contact-guard assistance for trunk elevation (hand-held support), with HOB elevated  Sitting to Supine: stand-by assistance with HOB elevated    Scooting towards EOB in sitting: stand-by assistance    Transfers:  Sit to Stand: stand-by assistance from EOB with RW x 1 trial(s)    Gait:  22 feet with rolling walker and stand-by assistance today; gait is slow but steady with no losses of balance, didn't have her L AFO at bedside today so did not wear it. Limited by some mild fatigue as well as pain with activity    Assist level: Stand-By Assist  Device: Rolling walker    Balance:  Static Sit: Supervision at EOB    Static Stand: Stand-By Assist with Rolling walker    Additional Therapeutic Activity/Exercises:     1. She had poor pain control overnight so PT/OT made sure that patient was pre-medicated for pain prior to evaluation this morning. She has a good understanding of her spinal precautions (avoid bending, lifting, twisting x 6 weeks) from prior back surgery.    2. She demonstrates improved L ankle DF and 1st toe ext post-operatively compared to pre-operatively per patient (wears L AFO at baseline).    3. Ambulates 22 ft with rolling walker and stand-by assistance today; gait is slow but steady with no losses of balance, didn't have her L AFO at bedside today so did not wear it. Limited by some mild fatigue as well as pain with activity.    4. I do feel she will be appropriate for D/C home in next few  days, will likely require 2 more days of PT/OT prior to being ready (has limited family support available at home upon D/C). Discussed PT role, POC, and goals with patient; verbalized understanding.    AM-PAC 6 CLICK MOBILITY  Turning over in bed (including adjusting bedclothes, sheets and blankets)?: 3  Sitting down on and standing up from a chair with arms (e.g., wheelchair, bedside commode, etc.): 4  Moving from lying on back to sitting on the side of the bed?: 4  Moving to and from a bed to a chair (including a wheelchair)?: 3  Need to walk in hospital room?: 3  Climbing 3-5 steps with a railing?: 2  Basic Mobility Total Score: 19    Patient was left supine in bed (HOB elevated) with all lines intact, call button in reach, and spouse present.    Clinical Decision Making for Evaluation Complexity:  1. Body System(s) Examination: 1-2  2. Clinical Presentation: Evolving  3. Evaluation Complexity: Low    GOALS:   Multidisciplinary Problems       Physical Therapy Goals          Problem: Physical Therapy    Goal Priority Disciplines Outcome Goal Variances Interventions   Physical Therapy Goal     PT, PT/OT      Description: Goals to be met by: 23     Patient will increase functional independence with mobility by performin. Supine to sit with Modified [place] - Not met  2. Sit to stand transfer with Supervision with RW - Not met  3. Bed to chair transfer with Stand-by Assistance using Rolling Walker - Not met  4. Gait  x 100 feet with Stand-by Assistance using Rolling Walker - Not met  5. Ascend/Descend 6 inch curb step with Contact Guard Assistance using Rolling Walker - Not met                     Carlos Pfeiffer, PT  2023

## 2023-04-12 NOTE — TELEPHONE ENCOUNTER
----- Message from Antonia Clifton PA-C sent at 4/11/2023  1:16 PM CDT -----  Plz schedule 4 week post op with xrays

## 2023-04-12 NOTE — ASSESSMENT & PLAN NOTE
Marianela Shrestha is a 38 y.o. female with pmhx SHAZIA and left sided foot drop who is s/p L4-S1 TLIF 4/11/23.      Pain control: multimodal  PT/OT: WBAT   DVT PPx: subq heparin, SCDs at all times when not ambulating  Abx: postop Ancef 24hrs   Labs: awaiting AM labs  Drain: right sided deep drain output 50cc  Ardon: in place with normal urine output, pull ardon today    Dispo: work with PT/OT today, up out of bed.

## 2023-04-12 NOTE — PLAN OF CARE
OT evaluation completed, POC established as appropriate. OT recommending other post acute skilled therapy services once medically stable for discharge.    Problem: Occupational Therapy  Goal: Occupational Therapy Goal  Description: Goals set on 4/12, with expiration date 5/12:  Patient will increase functional independence with ADLs by performing:    Bed mobility with Mod-[place]  Grooming while standing at sink with independence  UB Dressing with independence.  LB Dressing with Supervision using AD as needed.  Toileting from toilet with independence for hygiene and clothing management.   Functional mobility of household and community distance with Mod-independence and AD as needed  Pt will demonstrate understanding of education provided regarding energy conservation and task modification through teach-back method.  Pt will demonstrate [place] in HEP for BUE strengthening GM/FM exercises to improve functional performance.   Pt will demonstrate understanding and learning of spinal precautions via recall and demonstration 3/3.    Outcome: Ongoing, Progressing

## 2023-04-12 NOTE — PROGRESS NOTES
[facility] - [facility]  Orthopedics  Progress Note    Patient Name: Marianela Shrestha  MRN: [id number]  Admission Date: 4/11/2023  Hospital Length of Stay: 1 days  Attending Provider: Sundar Harry MD  Primary Care Provider: Arnaldo Espino MD  Follow-up For: Procedure(s) (LRB):  FUSION, SPINE, LUMBAR, TLIF, POSTERIOR APPROACH, USING PEDICLE SCREW L4-S1 (N/A)    Post-Operative Day: 1 Day Post-Op  Subjective:     Principal Problem:SHAZIA (cauda equina syndrome)    Principal Orthopedic Problem: s/p TLIF L4-S1 4/11/23    Interval History:   Overnight events: NAEON    Vitals: VSS, AF    PT/OT update: Needs to work with PT today, did not get out of bed yesterday.    Need to know:   - Patient sleeping comfortably this morning, but complains of 9/10 pain overnight.   - Dorsiflexion and plantarflexion intact, continued inability to move toes and decreased sensation on left side due to known SHAZIA        Review of patient's allergies indicates:   Allergen Reactions    Coconut Hives and Itching    Sulfa (sulfonamide antibiotics) Hives     itching    Adhesive Rash    Latex, natural rubber Other (See Comments) and Rash       Current Facility-Administered Medications   Medication    acetaminophen tablet 1,000 mg    albuterol-ipratropium 2.5 mg-0.5 mg/3 mL nebulizer solution 3 mL    ceFAZolin 2 g in dextrose 5 % in water (D5W) 5 % 50 mL IVPB (MB+)    celecoxib capsule 100 mg    clonazePAM tablet 0.5 mg    famotidine tablet 20 mg    gabapentin capsule 100 mg    heparin (porcine) injection 5,000 Units    methocarbamoL tablet 1,000 mg    mupirocin 2 % ointment    ondansetron disintegrating tablet 8 mg    oxyCODONE immediate release tablet 5 mg    sodium chloride 0.9% flush 10 mL    tiZANidine tablet 2 mg    venlafaxine 24 hr capsule 300 mg     Objective:     Vital Signs (Most Recent):  Temp: 98.1 °F (36.7 °C) (04/12/23 0522)  Pulse: 85 (04/12/23 0522)  Resp: 16 (04/12/23 0522)  BP: 105/69 (04/12/23 0522)  SpO2: 99 % (04/12/23 0522) Vital Signs  "(24h Range):  Temp:  [97.2 °F (36.2 °C)-99 °F (37.2 °C)] 98.1 °F (36.7 °C)  Pulse:  [62-91] 85  Resp:  [11-20] 16  SpO2:  [94 %-100 %] 99 %  BP: ()/(49-78) 105/69     Weight: 88 kg (194 lb)  Height: 5' 5" (165.1 cm)  Body mass index is 32.28 kg/m².      Intake/Output Summary (Last 24 hours) at 4/12/2023 0608  Last data filed at 4/12/2023 0600  Gross per 24 hour   Intake 2750 ml   Output 2000 ml   Net 750 ml     Constitutional: negative for fevers  Eyes: negative visual changes  ENT: negative for hearing loss  Respiratory: negative for dyspnea  Cardiovascular: negative for chest pain  Gastrointestinal: negative for abdominal pain  Genitourinary: negative for dysuria  Neurological: negative for headaches  Behavioral/Psych: negative for hallucinations  Endocrine: negative for temperature intolerance      Ortho/SPM Exam    General appearance - no acute distress, conversant  Musculoskeletal -  Dressing C/D/I  Fires quad/TA/EHL/GSC  SILT SP/DP/TN  WWP distally  Calves soft, nontender bilateral      Significant Labs: All pertinent labs within the past 24 hours have been reviewed. Awaiting AM lab draws    Significant Imaging: I have reviewed and interpreted all pertinent imaging results/findings.    Assessment/Plan:     * SHAZIA (cauda equina syndrome)  Marianela Shrestha is a 38 y.o. female with pmhx SHAZIA and left sided foot drop who is s/p L4-S1 TLIF 4/11/23.      Pain control: multimodal  PT/OT: WBAT   DVT PPx: subq heparin, SCDs at all times when not ambulating  Abx: postop Ancef 24hrs   Labs: awaiting AM labs  Drain: right sided deep drain output 50cc, superficial left sided drain produced no output  Ardon: in place with normal urine output, pull ardon today    Dispo: work with PT/OT today, up out of bed.           JOY SHAW MD  Orthopedics  [facility] - Surgery  "

## 2023-04-12 NOTE — PLAN OF CARE
Marianela Shrestha is a 38 y.o. female admitted to [facility] on 2023 for SHAZIA (cauda equina syndrome), underwent L4-S1 PSF on . Marianela Shrestha tolerated evaluation well today. She had poor pain control overnight so PT/OT made sure that patient was pre-medicated for pain prior to evaluation this morning. She has a good understanding of her spinal precautions (avoid bending, lifting, twisting x 6 weeks) from prior back surgery. She demonstrates improved L ankle DF and 1st toe ext post-operatively compared to pre-operatively per patient (wears L AFO at baseline). Ambulates 22 ft with rolling walker and stand-by assistance today; gait is slow but steady with no losses of balance, didn't have her L AFO at bedside today so did not wear it. Limited by some mild fatigue as well as pain with activity. I do feel she will be appropriate for D/C home in next few days, will likely require 2 more days of PT/OT prior to being ready (has limited family support available at home upon D/C). Discussed PT role, POC, and goals with patient; verbalized understanding. Marianela Shrestha would benefit from acute PT services to promote mobility during this admission and improve return to PLOF.    Problem: Physical Therapy  Goal: Physical Therapy Goal  Description: Goals to be met by: 23     Patient will increase functional independence with mobility by performin. Supine to sit with Modified [place] - Not met  2. Sit to stand transfer with Supervision with RW - Not met  3. Bed to chair transfer with Stand-by Assistance using Rolling Walker - Not met  4. Gait  x 100 feet with Stand-by Assistance using Rolling Walker - Not met  5. Ascend/Descend 6 inch curb step with Contact Guard Assistance using Rolling Walker - Not met  Outcome: Ongoing, Progressing    Carlos Pfeiffer, PT  2023

## 2023-04-12 NOTE — PLAN OF CARE
04/12/23 1053   Post-Acute Status   Post-Acute Authorization Home Health   Home Health Status Referrals Sent     SW faxed referral to [facility] via [employer] for review. SW will follow up as needed.    [facility] Accepted, pending HH order.    Emilee Ballesteros LMSW  Case Management   [facility]   Ext. [phone]

## 2023-04-12 NOTE — NURSING
Pt lying in bed, n.a.d. Pt asleep, easily aroused. Plan of care discussed. RR even and regular. Bed lowest ps, srx3, call light in reach. Will cont to monitor.

## 2023-04-12 NOTE — PT/OT/SLP EVAL
Occupational Therapy   Evaluation and Treatment    Name: Marianela Shrestha  MRN: [id number]  Admitting Diagnosis:  SHAZIA (cauda equina syndrome) 1 Day Post-Op  Length of Stay: 1 days    Recommendations:     Discharge Recommendations: other (see comments)  Discharge Equipment Recommendations:  none  Barriers to discharge:  None    Plan:     Patient to be seen 5 x/week to address the above listed problems via self-care/home management, therapeutic activities, therapeutic exercises  Plan of Care Expires: 05/12/23  Plan of Care Reviewed with: patient    Assessment:     Marianela Shrestha is a 38 y.o. female with a medical diagnosis of SHAZIA (cauda equina syndrome).  She presents with the following performance deficits affecting function: weakness, impaired endurance, impaired self care skills, impaired functional mobility, gait instability, decreased lower extremity function, decreased upper extremity function, decreased coordination, decreased ROM, impaired coordination, orthopedic precautions, pain, decreased safety awareness, impaired balance, edema.      Pt presenting with increased pain, impaired endurance, increased fatigue, decreased activity tolerance, impaired balance and decreased safety awareness upon evaluation this date, requiring increased time to complete functional tasks. Patient reports improvement noted to LLE function, able to dorsiflex and wiggle toes this date which brought patient some scout in seeing. Patient completed bed mobility with CGA using therapist hand held assist. Patient completed sit>Stand transfer and functional mobility of household distance with SBA using RW and increased time. Extended discussion of present functional performance level and therapy recommendation, patient nervous due to family either being at work or out of home for mother's radiation appointments, will continue to build confidence in mobility for safe independence. Pt would benefit from continued acute skilled OT  "services at this time to increase functional performance, and improve quality of life. OT to recommend other post acute skilled therapy services at discharge once medically appropriate for increased functional independence and to improve patient safety before returning home.    Rehab Prognosis: Good; patient would benefit from acute skilled OT services to address these deficits and reach maximum level of function.       Subjective   Patient states: " I wiggled my toes! "   "I won't say no I'll do whatever I need to."    Communicated with: Nurse prior to session.  Patient found HOB elevated with hemovac, SCD, ardon catheter upon OT entry to room.    Chief Complaint: No chief complaint on file.    Patient/Family Comments/goals: to return home at [facility]    Pain/Comfort:  Pain Rating 1: 7/10  Location - Orientation 1: generalized  Location 1: back  Pain Addressed 1: Pre-medicate for activity, Reposition, Distraction  Pain Rating Post-Intervention 1: 8/10    Patients cultural, spiritual, Baptism conflicts given the current situation: no    Occupational Profile:  Living Environment: lives with mother (undergoing cancer treatment), brother and spouse in [facility] ; tub/shower combo with grab bar and bath bench  Prior Level of Function: Patient reports being mod-Independent using RW for community distances and wall walking at home with mobility & with some assist for LB and extended reaching tasks for ADLs. Has LLE AFO, not present this date.   Patient uses DME as follows: wheelchair, walker, rolling, bath bench, bedside commode, grab bar, other (see comments) (Service dog).   DME owned (not currently used): none.  Roles/Repsonsibilities:   Hand Dominance: right   Managing Medicines/Managing Home: yes.   Equipment Used at Home:  wheelchair, walker, rolling, bath bench, bedside commode, grab bar, other (see comments) (Service dog)    Patient reports they will have assistance from limited assist from family upon " "discharge.      Objective:     Patient found with: hemovac, SCD, ardon catheter   General Precautions: Standard, Cardiac fall   Orthopedic Precautions:spinal precautions   Braces: N/A   Oxygen Device: Room Air  Vitals: /62 (BP Location: Left arm, Patient Position: Lying)   Pulse 84   Temp 97.8 °F (36.6 °C) (Oral)   Resp 18   Ht 5' 5" (1.651 m)   Wt 88 kg (194 lb)   SpO2 100%   Breastfeeding No   BMI 32.28 kg/m²     Cognitive and Psychosocial Function:   AxOx4 -- Person, Place, Time, and Situation   Follows Commands/attention:follows two-step commands  Communication:  clear/fluent  Memory: No Deficits noted  Safety awareness/insight to disability: impaired   Mood/Affect/Coping skills/emotional control: Appropriate to situation    Hearing: Intact    Vision:  Intact visual fields    Physical Exam:  Postural examination/scapula alignment:    -       No postural abnormalities identified  Skin integrity: Visible skin intact     Left UE Right UE   UE Edema absent absent   UE ROM AROM WFL AROM WFL   UE Strength 3+/5 3+/5    Strength 3+/5 3+/5   Sensation LUE INTACT:WFL RUE INTACT: WFL   Fine Motor Coordination:  LUE INTACT: WFL RUE INTACT: WFL   Gross Motor Coordination: LUE INTACT: WFL RUE INTACT:WFL     Occupational Performance:  Bed Mobility:    Patient completed Rolling/Turning to Left with  contact guard assistance and hand assist  Patient completed Supine to Sit with contact guard assistance on L side of bed  Scooting anteriorly to EOB to have both feet planted on floor: contact guard assistance  Patient completed Sit to Supine with stand by assistance and with side rail on L side of bed    Functional Mobility/Transfers:  Static Sitting EOB: SBA  Patient completed Sit <> Stand Transfer from EOB with stand by assistance  with  rolling walker   Static Standing Balance: SBA  Pt completed functional mobility of household distance ~22ft with SBA using RW. Required increased time and cueing for " technique      Activities of Daily Living:  Feeding:  independence to eat snack seated HOB elevated  Lower Body Dressing: maximal assistance to don socks      AMPAC 6 Click ADL:  AMPAC Total Score: 18    Treatment & Education:  -Pt education on OT role and POC.  -Importance of E/OOB activity with staff assistance, emphasis on daily participation  -Safety during functional transfer and mobility ensured  -Patient provided with education on importance of Bilateral UB/LB integration during functional tasks for improvement in functional performance.   -Education provided/reviewed, questions answered within OT scope of practice.   -Patient  demonstrates understanding and learning this date.         Patient left HOB elevated with all lines intact, call button in reach, and spouse present    GOALS:   Multidisciplinary Problems       Occupational Therapy Goals          Problem: Occupational Therapy    Goal Priority Disciplines Outcome Interventions   Occupational Therapy Goal     OT, PT/OT Ongoing, Progressing    Description: Goals set on 4/12, with expiration date 5/12:  Patient will increase functional independence with ADLs by performing:    Bed mobility with Mod-[place]  Grooming while standing at sink with independence  UB Dressing with independence.  LB Dressing with Supervision using AD as needed.  Toileting from toilet with independence for hygiene and clothing management.   Functional mobility of household and community distance with Mod-independence and AD as needed  Pt will demonstrate understanding of education provided regarding energy conservation and task modification through teach-back method.  Pt will demonstrate [place] in HEP for BUE strengthening GM/FM exercises to improve functional performance.   Pt will demonstrate understanding and learning of spinal precautions via recall and demonstration 3/3.                         History:     Past Medical History:   Diagnosis Date    Anxiety      Chronic osteomyelitis of right hand 2019    Chronic pain syndrome     Depression     Insomnia     Post traumatic stress disorder (PTSD)     Sciatica          Past Surgical History:   Procedure Laterality Date    ABLATION OF MEDIAL BRANCH NERVE OF LUMBAR SPINE FACET JOINT      L4-S1    COSMETIC SURGERY      breast augmentation    ETHMOIDECTOMY  7/1/2022    Procedure: ETHMOIDECTOMY;  Surgeon: Segundo Fu MD;  Location: [facility] OR [address] [place] [address];  Service: ENT;;    FINGER SURGERY      FUNCTIONAL ENDOSCOPIC SINUS SURGERY (FESS) USING COMPUTER-ASSISTED NAVIGATION Bilateral 7/1/2022    Procedure: FESS, USING COMPUTER-ASSISTED NAVIGATION;  Surgeon: Segundo Fu MD;  Location: [facility] OR [address] [place] [address];  Service: ENT;  Laterality: Bilateral;    FUSION, SPINE, LUMBAR, TLIF, POSTERIOR APPROACH, USING PEDICLE SCREW N/A 4/11/2023    Procedure: FUSION, SPINE, LUMBAR, TLIF, POSTERIOR APPROACH, USING PEDICLE SCREW L4-S1;  Surgeon: Sundar Harry MD;  Location: [facility] OR [address] [place] [address];  Service: Neurosurgery;  Laterality: N/A;  changed from L5-1S to L4-S1 per edgar in basket [id number]    LUMBAR LAMINECTOMY N/A 11/11/2022    Procedure: LAMINECTOMY, SPINE, LUMBAR;  Surgeon: Sundar Harry MD;  Location: [facility] OR [address] [place] [address];  Service: Orthopedics;  Laterality: N/A;  L4-S1 laminectomy, SNS: SSEP/MOtors    MAXILLARY ANTROSTOMY  7/1/2022    Procedure: MAXILLARY ANTROSTOMY;  Surgeon: Segundo Fu MD;  Location: [facility] OR [address] [place] [address];  Service: ENT;;    PELVIC LAPAROSCOPY      Endometriosis ([place])    TONSILLECTOMY         Time Tracking:       OT Date of Treatment: 04/12/23  OT Start Time: 1002  OT Stop Time: 1033  OT Total Time (min): 31 min    Billable Minutes:Evaluation 10  Therapeutic Activity 21      4/12/2023

## 2023-04-12 NOTE — OP NOTE
DATE OF PROCEDURE: 4/11/2023.     PRIMARY SURGEON: Sundar Harry M.D.     CO-SURGEON: DO JACQUES Allison     FIRST ASSISTANT: Gary Jones MD, PGY-1 Orthopedics     PREOPERATIVE DIAGNOSIS:   1.   Recurrent disc herniation L4-S1  2.   History of L5 laminectomy  3.   Chronic cauda equina syndrome  4.   Symptomatic lumbar spinal stenosis lumbar radiculopathy     POSTOPERATIVE DIAGNOSIS:   Same     PROCEDURES PERFORMED:  Posterior lateral and posterior lumbar interbody fusion L4-S1  2.   Posterior nonsegmental instrumentation L4-S1  3.   L4 and revision L5 laminectomy, and bilateral foraminotomy for decompression of central and bilateral foraminal stenosis.  4.   Application of titanium intervertebral spacer device to L4-5 and L5-S1 disc defect  5.   Robotic assistance for placement of the pedicle screws (requiring greater than 30 minutes of preoperative planning time)  6.   Auto and allografting     ANESTHESIA: General endotracheal anesthesia.     ESTIMATED BLOOD LOSS: 150 mL.     IMPLANTS: Globus screws  L4-5: expandable 7-13 mm height (15deg lordosis, 22mm length) titanium cage.  L5-S1: expandable 7-13 mm height (15deg lordosis, 22mm length) titanium cage.  Altapore putty      SPECIMENS: None.     FINDINGS: large HNP L4-S1 L.     DRAINS: One deep and one superficial HV drains     COMPLICATIONS: None.     SPONGE AND NEEDLE COUNT: Correct x2.     NEUROLOGICAL MONITORING: Somatosensory potentials, free run EMG, and EMG stimulation lumbar pedicle screws. There were no changes to SSEP baselines. There no significant EMG activity. All screws stimulated at or greater than than 20 milliamps.      REASON FOR OPERATION AND BRIEF HISTORY AND PHYSICAL: Marianela Shrestha is a 38 y.o. female s/p L4-S1 microdiscectomy for chronic cauda equina syndrome presented with recurrent L4-S1 HNP, and refractory lumbar spinal stenosis with radiculopathy.  She has failed extensive conservative management and is here for surgery  today.     DESCRIPTION OF INFORMED CONSENT: Please see my last clinic note for a full description of the informed consent I had with the patient.     DESCRIPTION OF PROCEDURE: The patient was met in the preoperative area where all final questions were answered. Their lumbar spine was marked as the operative site. The patient was then brought to the operating room where anesthesia was induced. The patient was then flipped prone onto the [place] spine table. All bony prominences were padded, paying special attention to the eyes, nose and mouth, axillae, ulnar nerve and hands, genitalia, knees and feet, this was confirmed to be adequate with the anesthesia team. Sequential compression devices were run throughout the case on the bilateral calves. The surgical field was prepped and draped in normal sterile manner after using fluoroscopy to localize our incisoin. A full time-out was then called, verifying patient's identity, surgery, site, and that they had been administered a weight appropriate dose of Ancef, no specific nursing, anesthetic or surgical concerns were identified and it was decided that it was safe to proceed with surgery. I informed all members of the operative team that they were empowered to speak up regarding concerns at any time during the procedure.     We extended the previous incision and carried dissection down through the skin and subcutaneous tissues using combination of sharp dissection and electrocautery where necessary. The dorsal fascia of the lumbar spine was identified and incised in the midline and we performed a preliminary subperiosteal exposure of the bilateral L3-4, L4-5 and L5-S1 facet joints, and what we took to be the L4 and L5 transverse processes and sacral alae bilaterally. At the conclusion of the preliminary exposure, we placed a marker on what we took to be the left L5 pedicle, took a lateral radiograph, and this confirmed my levels.  Next we finalized the exposure.  Subsequently we performed bilateral L4-5 and L5-S1 facetectomies with an osteotome.      We placed a pin in the right posterior superior iliac spine for the robotic array. AP and lateral fluoroscopy images were taken to register to the preoperative CT scan and plan that we had performed prior to the incision.  Once that was done, the robotic arm was used to cannulate pedicle screw tracts bilaterally at L4-S1. Xrays confirmed adequate positioning of the screws on AP and lateral images.     We then began the neurological decompression. High-speed drill was used to thin the L4 lamina at the level of the pars. This was then removed on block, and we released the ligamentum flavum centrally with a forward angle curette and resected it with a Kerrison punch. We subsequently performed a central as well as bilateral foraminal decompression with a Kerrison punch. Working on the left of midline we used a osteotome to remove the superior articular process of L4. We then performed a subtotal L4-5 diskectomy in a standard fashion. The disc space was then thoroughly irrigated and a expandable 7-13 titanium spacer was impacted, and expanded, and confirmed to be at the correct level in adequate position radiographically. The disc space then packed with morselized bone graft.     High-speed drill was used to thin the L5 lamina at the level of the pars. This was then removed on block, and we released the ligamentum flavum centrally with a forward angle curette and resected it with a Kerrison punch. We subsequently performed a central as well as bilateral foraminal decompression with a Kerrison punch. Working on the left of midline we used a osteotome to remove the superior articular process of L5. We then performed a subtotal L5-S1 diskectomy in a standard fashion. The disc space was then thoroughly irrigated and a expandable 7-13 titanium spacer was impacted, and expanded, and confirmed to be at the correct level in adequate position  radiographically. The disc space then packed with morselized bone graft.     The wound was then copiously irrigated. Rods were measured, cut, contoured, and locked into place with torque wrench. Morselized bone graft, mixed with 1 g vancomycin powder, was laid dorsally along the decorticated transverse processes from L4-5-sacral alae.     500 mg of vancomycin powder was placed in the deep space, and a deep drain was then placed. The deep fascia was then closed with #1 Vicryl sutures in an interrupted, figure-of-eight fashion. A superficial drain was then placed. The superficial layer was then irrigated and closed over an additional 500 mg of vancomycin powder with 2-0 Vicryl, 3-0 Monocryl, Dermabond and Steri-Strips. A soft dressing was then placed. The drains were secured in place with silk sutures. The patient was then flipped supine, extubated and transferred to the Recovery Room in stable condition.     JUSTIFICATION OF CO-SURGEON AND MODIFIER -22: This was a more complex case that usual given the patient's revision nature and scarring. Surgical risks were inherently elevated. This made all aspects of the surgery more difficult, from exposure to discectomy and instrumentation. Two attending surgeons were indicated to reduce operative times, blood loss, and improve patient outcomes.

## 2023-04-12 NOTE — SUBJECTIVE & OBJECTIVE
"Principal Problem:SHAZIA (cauda equina syndrome)    Principal Orthopedic Problem: s/p TLIF L4-S1 4/11/23    Interval History:   Overnight events: NAEON    Vitals: VSS    PT/OT update: Needs to work with PT today, did not get out of bed yesterday.    Need to know:   - Patient sleeping comfortably this morning, but complains of 9/10 pain overnight.   - Dorsiflexion and plantarflexion intact, continued inability to move toes and decreased sensation on left side due to known SHAZIA        Review of patient's allergies indicates:   Allergen Reactions    Coconut Hives and Itching    Sulfa (sulfonamide antibiotics) Hives     itching    Adhesive Rash    Latex, natural rubber Other (See Comments) and Rash       Current Facility-Administered Medications   Medication    acetaminophen tablet 1,000 mg    albuterol-ipratropium 2.5 mg-0.5 mg/3 mL nebulizer solution 3 mL    ceFAZolin 2 g in dextrose 5 % in water (D5W) 5 % 50 mL IVPB (MB+)    celecoxib capsule 100 mg    clonazePAM tablet 0.5 mg    famotidine tablet 20 mg    gabapentin capsule 100 mg    heparin (porcine) injection 5,000 Units    methocarbamoL tablet 1,000 mg    mupirocin 2 % ointment    ondansetron disintegrating tablet 8 mg    oxyCODONE immediate release tablet 5 mg    sodium chloride 0.9% flush 10 mL    tiZANidine tablet 2 mg    venlafaxine 24 hr capsule 300 mg     Objective:     Vital Signs (Most Recent):  Temp: 98.1 °F (36.7 °C) (04/12/23 0522)  Pulse: 85 (04/12/23 0522)  Resp: 16 (04/12/23 0522)  BP: 105/69 (04/12/23 0522)  SpO2: 99 % (04/12/23 0522) Vital Signs (24h Range):  Temp:  [97.2 °F (36.2 °C)-99 °F (37.2 °C)] 98.1 °F (36.7 °C)  Pulse:  [62-91] 85  Resp:  [11-20] 16  SpO2:  [94 %-100 %] 99 %  BP: ()/(49-78) 105/69     Weight: 88 kg (194 lb)  Height: 5' 5" (165.1 cm)  Body mass index is 32.28 kg/m².      Intake/Output Summary (Last 24 hours) at 4/12/2023 0608  Last data filed at 4/12/2023 0600  Gross per 24 hour   Intake 2750 ml   Output 2000 ml   Net 750 " ml     Constitutional: negative for fevers  Eyes: negative visual changes  ENT: negative for hearing loss  Respiratory: negative for dyspnea  Cardiovascular: negative for chest pain  Gastrointestinal: negative for abdominal pain  Genitourinary: negative for dysuria  Neurological: negative for headaches  Behavioral/Psych: negative for hallucinations  Endocrine: negative for temperature intolerance      Ortho/SPM Exam    General appearance - no acute distress, conversant  Musculoskeletal -  Dressing C/D/I  Fires quad/TA/EHL/GSC  SILT SP/DP/TN  WWP distally  Calves soft, nontender bilateral      Significant Labs: All pertinent labs within the past 24 hours have been reviewed. Awaiting AM lab draws    Significant Imaging: I have reviewed and interpreted all pertinent imaging results/findings.

## 2023-04-13 PROCEDURE — 25000003 PHARM REV CODE 250

## 2023-04-13 PROCEDURE — 97535 SELF CARE MNGMENT TRAINING: CPT | Mod: CO

## 2023-04-13 PROCEDURE — 97530 THERAPEUTIC ACTIVITIES: CPT | Mod: CQ

## 2023-04-13 PROCEDURE — 97116 GAIT TRAINING THERAPY: CPT | Mod: CQ

## 2023-04-13 PROCEDURE — 94761 N-INVAS EAR/PLS OXIMETRY MLT: CPT

## 2023-04-13 PROCEDURE — 97530 THERAPEUTIC ACTIVITIES: CPT | Mod: CO

## 2023-04-13 PROCEDURE — 63600175 PHARM REV CODE 636 W HCPCS

## 2023-04-13 PROCEDURE — 11000001 HC ACUTE MED/SURG PRIVATE ROOM

## 2023-04-13 RX ADMIN — HEPARIN SODIUM 5000 UNITS: 5000 INJECTION INTRAVENOUS; SUBCUTANEOUS at 09:04

## 2023-04-13 RX ADMIN — GABAPENTIN 100 MG: 100 CAPSULE ORAL at 09:04

## 2023-04-13 RX ADMIN — MUPIROCIN: 20 OINTMENT TOPICAL at 09:04

## 2023-04-13 RX ADMIN — METHOCARBAMOL 1000 MG: 500 TABLET ORAL at 09:04

## 2023-04-13 RX ADMIN — ACETAMINOPHEN 1000 MG: 500 TABLET ORAL at 09:04

## 2023-04-13 RX ADMIN — CLONAZEPAM 0.5 MG: 0.5 TABLET ORAL at 09:04

## 2023-04-13 RX ADMIN — OXYCODONE HYDROCHLORIDE 10 MG: 10 TABLET ORAL at 03:04

## 2023-04-13 RX ADMIN — FAMOTIDINE 20 MG: 20 TABLET ORAL at 09:04

## 2023-04-13 RX ADMIN — OXYCODONE HYDROCHLORIDE 10 MG: 10 TABLET ORAL at 09:04

## 2023-04-13 RX ADMIN — OXYCODONE HYDROCHLORIDE 10 MG: 10 TABLET ORAL at 07:04

## 2023-04-13 RX ADMIN — OXYCODONE HYDROCHLORIDE 10 MG: 10 TABLET ORAL at 04:04

## 2023-04-13 RX ADMIN — HYDROMORPHONE HYDROCHLORIDE 0.2 MG: 1 INJECTION, SOLUTION INTRAMUSCULAR; INTRAVENOUS; SUBCUTANEOUS at 07:04

## 2023-04-13 RX ADMIN — GABAPENTIN 100 MG: 100 CAPSULE ORAL at 03:04

## 2023-04-13 RX ADMIN — VENLAFAXINE HYDROCHLORIDE 300 MG: 75 CAPSULE, EXTENDED RELEASE ORAL at 09:04

## 2023-04-13 RX ADMIN — METHOCARBAMOL 1000 MG: 500 TABLET ORAL at 03:04

## 2023-04-13 RX ADMIN — HEPARIN SODIUM 5000 UNITS: 5000 INJECTION INTRAVENOUS; SUBCUTANEOUS at 04:04

## 2023-04-13 RX ADMIN — CELECOXIB 100 MG: 100 CAPSULE ORAL at 09:04

## 2023-04-13 RX ADMIN — HEPARIN SODIUM 5000 UNITS: 5000 INJECTION INTRAVENOUS; SUBCUTANEOUS at 03:04

## 2023-04-13 RX ADMIN — ACETAMINOPHEN 1000 MG: 500 TABLET ORAL at 03:04

## 2023-04-13 RX ADMIN — CLONAZEPAM 0.5 MG: 0.5 TABLET ORAL at 03:04

## 2023-04-13 NOTE — ANESTHESIA POSTPROCEDURE EVALUATION
Anesthesia Post Evaluation    Patient: Marianela Shrestha    Procedure(s) Performed: Procedure(s) (LRB):  FUSION, SPINE, LUMBAR, TLIF, POSTERIOR APPROACH, USING PEDICLE SCREW L4-S1 (N/A)    Final Anesthesia Type: general      Patient location during evaluation: PACU  Patient participation: Yes- Able to Participate  Level of consciousness: awake and alert and oriented  Post-procedure vital signs: reviewed and stable  Pain management: adequate  Airway patency: patent    PONV status at discharge: No PONV  Anesthetic complications: no      Cardiovascular status: hemodynamically stable  Respiratory status: unassisted, spontaneous ventilation and room air  Hydration status: euvolemic  Follow-up not needed.    VSS    Event Time   Out of Recovery 12:30:00         Pain/Melanie Score: Pain Rating Prior to Med Admin: 7 (4/13/2023  3:31 PM)  Melanie Score: 10 (4/12/2023  7:26 PM)

## 2023-04-13 NOTE — PLAN OF CARE
"Spoke with patient's PT/OT and nurse following her sessions. Therapy and nursing reporting patient is crying, very anxious regarding discharging home instead of to a rehab or SNF facility. Patient told nursing and therapists that her nikhil works at night and she will be home alone with no way to feed her dog, provide meals to herself, or have assistance as needed. During last admission and current admission assessment Patient reported she and kandi' share a home with her mother (Lidia) and brother Susan. Spoke with patient's mother Lidia who reports she and her adult son do live in the home. Lidia does not work but is gone during the day 3 days a week for chemotherapy that she drives herself to. Patient's brother does contract work and has been home every day but once this week due to lack of jobs currently. He and mother will be available every night to assist patient with any needs. Mother reports patient just has a lot of anxiety and really only likes for Paul (Nikhil) to help her at home. Mother reports she and son do all the cooking, cleaning etc and she checks on her daughter each night before bed to ensure she is ok as they have been assisting in her care since her last surgery. Spoke with patient to discuss care concerns. Patient again reported she would be alone with no care while kandi was at work. CM mentioned her mother and brother being available to which she replied "My mother has cancer and can barely move or do anything. Discussed CM's conversation with mother reporting that she is more than willing and able to assist patient when needed and reports susan (Brother) is also eager to help. Patient reports she just has a lot of anxiety and is concerned because team said they would only give her enough pain medication for a week at d/c and she is worried that will not be enough. When she was at SNF last time her pain was better controlled because she received pain medication for over two " weeks. Discussed that Insurance would not cover a placement as she does not meet criteria for Inpatient rehab or Skilled care. Encouraged patient to discuss pain management concerns with the spine team. Will continue to follow for d/c needs.      Marlena TORRES  Case Management  [facility]  [phone]

## 2023-04-13 NOTE — PROGRESS NOTES
[facility] - Surgery  Orthopedics  Progress Note    Patient Name: Marianela Shrestha  MRN: [id number]  Admission Date: 4/11/2023  Hospital Length of Stay: 2 days  Attending Provider: Sundar Harry MD  Primary Care Provider: Arnaldo Espino MD  Follow-up For: Procedure(s) (LRB):  FUSION, SPINE, LUMBAR, TLIF, POSTERIOR APPROACH, USING PEDICLE SCREW L4-S1 (N/A)    Post-Operative Day: 2 Days Post-Op  Subjective:     Principal Problem:SHAZIA (cauda equina syndrome)    Principal Orthopedic Problem: s/p TLIF L4-S1 4/11/23    Interval History:   Overnight events: NAEON    Vitals: VSS    PT/OT update: 22 feet with rolling walker and stand-by assistance today; gait is slow but steady with no losses of balance. Will look for further recommendations today    Need to know:   - Patient states her pain is well controlled now.  - Her eventual discharge planning is discussed at length, and she accepts home health care as insurance will likely not cover rehab.   - Dorsiflexion and plantarflexion intact, continued inability to move toes and decreased sensation on left side due to known SHAZIA        Review of patient's allergies indicates:   Allergen Reactions    Coconut Hives and Itching    Sulfa (sulfonamide antibiotics) Hives     itching    Adhesive Rash    Latex, natural rubber Other (See Comments) and Rash       Current Facility-Administered Medications   Medication    acetaminophen tablet 1,000 mg    albuterol-ipratropium 2.5 mg-0.5 mg/3 mL nebulizer solution 3 mL    celecoxib capsule 100 mg    clonazePAM tablet 0.5 mg    famotidine tablet 20 mg    gabapentin capsule 100 mg    heparin (porcine) injection 5,000 Units    HYDROmorphone injection 0.2 mg    methocarbamoL tablet 1,000 mg    mupirocin 2 % ointment    ondansetron disintegrating tablet 8 mg    oxyCODONE immediate release tablet 5 mg    oxyCODONE immediate release tablet Tab 10 mg    sodium chloride 0.9% flush 10 mL    tiZANidine tablet 2 mg    venlafaxine 24 hr  "capsule 300 mg     Objective:     Vital Signs (Most Recent):  Temp: 98.1 °F (36.7 °C) (04/13/23 0817)  Pulse: 83 (04/13/23 0817)  Resp: 16 (04/13/23 0817)  BP: (!) 144/72 (04/13/23 0817)  SpO2: 97 % (04/13/23 0817) Vital Signs (24h Range):  Temp:  [96.6 °F (35.9 °C)-98.1 °F (36.7 °C)] 98.1 °F (36.7 °C)  Pulse:  [73-84] 83  Resp:  [16-20] 16  SpO2:  [96 %-100 %] 97 %  BP: (116-144)/(60-74) 144/72     Weight: 88 kg (194 lb)  Height: 5' 5" (165.1 cm)  Body mass index is 32.28 kg/m².      Intake/Output Summary (Last 24 hours) at 4/13/2023 0845  Last data filed at 4/13/2023 0411  Gross per 24 hour   Intake --   Output 2925 ml   Net -2925 ml       Constitutional: negative for fevers  Eyes: negative visual changes  ENT: negative for hearing loss  Respiratory: negative for dyspnea  Cardiovascular: negative for chest pain  Gastrointestinal: negative for abdominal pain  Genitourinary: negative for dysuria  Neurological: negative for headaches  Behavioral/Psych: negative for hallucinations  Endocrine: negative for temperature intolerance      Ortho/SPM Exam    General appearance - no acute distress, conversant  Musculoskeletal -  Dressing C/D/I  Fires quad/TA/EHL/GSC  SILT SP/DP/TN  WWP distally  Calves soft, nontender bilateral      Significant Labs: All pertinent labs within the past 24 hours have been reviewed. Awaiting AM lab draws    Significant Imaging: I have reviewed and interpreted all pertinent imaging results/findings.    Assessment/Plan:     * SHAZIA (cauda equina syndrome)  Marianela Shrestha is a 38 y.o. female with pmhx SHAZIA and left sided foot drop who is s/p L4-S1 TLIF 4/11/23.      Pain control: multimodal  PT/OT: WBAT   DVT PPx: subq heparin, SCDs at all times when not ambulating  Abx: postop Ancef 24hrs   Labs: awaiting AM labs  Drain: Right sided deep drain output 110cc from 9447-5882. No output from left side.   Addison: Cyn removed 4/12/    Dispo: progressing well from orthopedic perspective, continue to " work with PT/OT. Will see daily.           JOY SHAW MD  Orthopedics  [facility] - Surgery

## 2023-04-13 NOTE — PLAN OF CARE
Problem: Adult Inpatient Plan of Care  Goal: Plan of Care Review  Outcome: Ongoing, Progressing  Goal: Absence of Hospital-Acquired Illness or Injury  Outcome: Ongoing, Progressing  Goal: Optimal Comfort and Wellbeing  Outcome: Ongoing, Progressing  Goal: Readiness for Transition of Care  Outcome: Ongoing, Progressing     Problem: Infection  Goal: Absence of Infection Signs and Symptoms  Outcome: Ongoing, Progressing     Problem: Skin Injury Risk Increased  Goal: Skin Health and Integrity  Outcome: Ongoing, Progressing     Problem: Fall Injury Risk  Goal: Absence of Fall and Fall-Related Injury  Outcome: Ongoing, Progressing

## 2023-04-13 NOTE — ASSESSMENT & PLAN NOTE
Marianela Shrestha is a 38 y.o. female with pmhx SHAZIA and left sided foot drop who is s/p L4-S1 TLIF 4/11/23.      Pain control: multimodal  PT/OT: WBAT   DVT PPx: subq heparin, SCDs at all times when not ambulating  Abx: postop Ancef 24hrs   Labs: awaiting AM labs  Drain: Right sided deep drain output 110cc from [id number]. No output from left side.   Cyn: Cyn removed 4/12/    Dispo: progressing well from orthopedic perspective, continue to work with PT/OT. Will see daily.    Complex Repair And M Plasty Text: The defect edges were debeveled with a #15 scalpel blade.  The primary defect was closed partially with a complex linear closure.  Given the location of the remaining defect, shape of the defect and the proximity to free margins an M plasty was deemed most appropriate for complete closure of the defect.  Using a sterile surgical marker, an appropriate advancement flap was drawn incorporating the defect and placing the expected incisions within the relaxed skin tension lines where possible.    The area thus outlined was incised deep to adipose tissue with a #15 scalpel blade.  The skin margins were undermined to an appropriate distance in all directions utilizing iris scissors.

## 2023-04-13 NOTE — PT/OT/SLP PROGRESS
"Physical Therapy Treatment    Patient Name:  Marianela Shrestha   MRN:  [id number]    Recommendations:     Discharge Recommendations: other (see comments)  Discharge Equipment Recommendations: none  Barriers to discharge: Decreased caregiver support    Assessment:     Marianela Shrestha is a 38 y.o. female admitted with a medical diagnosis of SHAZIA (cauda equina syndrome).  She presents with the following impairments/functional limitations: weakness, impaired endurance, impaired sensation, impaired self care skills, impaired functional mobility, gait instability, impaired balance, pain, decreased lower extremity function, decreased ROM, impaired cardiopulmonary response to activity, orthopedic precautions, edema. Upon PT entry to room pt was very emotional regarding pain and d/c plans. During ambulation pt had c/o LLE numbness stating she had to watch her L foot touch the ground to know it was on the ground. Pt continues to make improvements with skilled PT services. Pt would benefit from PT intervention to address previously stated limitations. Goals remain appropriate.     Rehab Prognosis: Good; patient would benefit from acute skilled PT services to address these deficits and reach maximum level of function.    Recent Surgery: Procedure(s) (LRB):  FUSION, SPINE, LUMBAR, TLIF, POSTERIOR APPROACH, USING PEDICLE SCREW L4-S1 (N/A) 2 Days Post-Op    Plan:     During this hospitalization, patient to be seen 5 x/week to address the identified rehab impairments via gait training, therapeutic activities, therapeutic exercises, neuromuscular re-education and progress toward the following goals:    Plan of Care Expires:  05/12/23    Subjective     Chief Complaint: LLE numbness and pain at incision site  Patient/Family Comments/goals: Pt stated she has a fear of being d/c to home, stating " I don't have anyone at home with me, I am home alone"  Pain/Comfort:  Pain Rating 1:  (pt stated "extreme pain")  Location - " Orientation 1: generalized  Location 1: back  Pain Addressed 1: Reposition, Distraction  Pain Rating Post-Intervention 1:  (Not rated)      Objective:     Communicated with nurse prior to session.  Patient found  standing at bathroom sink with OT  with queenie Lopez upon PT entry to room.     General Precautions: Standard, fall  Orthopedic Precautions: spinal precautions  Braces: N/A  Respiratory Status: Room air     Functional Mobility:  Bed Mobility:     Scooting: stand by assistance  Sit to Supine: stand by assistance  Transfers:     Stand to Sit: SBA with RW and gait belt for safety  Gait: Pt ambulated ~30 ft with CGA with RW and gait belt for safety. Noted compensation techniques with increased knee flexion during ambulation due to pt not having L AFO.   Balance: Pt performed dynamic standing at sink with SBA with RW and no noted LOB.       AM-PAC 6 CLICK MOBILITY  Turning over in bed (including adjusting bedclothes, sheets and blankets)?: 3  Sitting down on and standing up from a chair with arms (e.g., wheelchair, bedside commode, etc.): 4  Moving from lying on back to sitting on the side of the bed?: 4  Moving to and from a bed to a chair (including a wheelchair)?: 3  Need to walk in hospital room?: 3  Climbing 3-5 steps with a railing?: 2  Basic Mobility Total Score: 19       Treatment & Education:  Pt performed functional mobility during tx session.   All questions answered within PTA scope of practice.   Bed side table set up next to patient for safety and convenience.   SCD's donned at the end of tx session.     Patient left HOB elevated with all lines intact, call button in reach, and spouse present..    GOALS:   Multidisciplinary Problems       Physical Therapy Goals          Problem: Physical Therapy    Goal Priority Disciplines Outcome Goal Variances Interventions   Physical Therapy Goal     PT, PT/OT Ongoing, Progressing     Description: Goals to be met by: 4/26/23     Patient will increase  functional independence with mobility by performin. Supine to sit with Modified O'Brien - Not met  2. Sit to stand transfer with Supervision with RW - Not met  3. Bed to chair transfer with Stand-by Assistance using Rolling Walker - Not met  4. Gait  x 100 feet with Stand-by Assistance using Rolling Walker - Not met  5. Ascend/Descend 6 inch curb step with Contact Guard Assistance using Rolling Walker - Not met                       Time Tracking:     PT Received On: 23  PT Start Time: 1045     PT Stop Time: 1113  PT Total Time (min): 28 min     Billable Minutes: Gait Training 20 and Therapeutic Activity 8    Treatment Type: Treatment  PT/PTA: PTA     Number of PTA visits since last PT visit: 1     2023  I certify that I was present in the room directing the student in service delivery and guiding them using my skilled judgment. As the co-signing therapist I have reviewed the students documentation and am responsible for the treatment, assessment, and plan.

## 2023-04-13 NOTE — PT/OT/SLP PROGRESS
"Occupational Therapy   Treatment    Name: Marianela Shrestha  MRN: [id number]  Admitting Diagnosis:  SHAZIA (cauda equina syndrome)  2 Days Post-Op  Procedure(s):  FUSION, SPINE, LUMBAR, TLIF, POSTERIOR APPROACH, USING PEDICLE SCREW L4-S1   Recommendations:     Discharge Recommendations: other (see comments)  Discharge Equipment Recommendations:  none  Barriers to discharge:  None    Assessment:     Marianela Shrestha is a 38 y.o. female with a medical diagnosis of SHAZIA (cauda equina syndrome).  She presents with the following performance deficits affecting function are weakness, impaired endurance, impaired self care skills, impaired functional mobility, impaired balance, gait instability, decreased lower extremity function, decreased ROM, pain, decreased safety awareness, orthopedic precautions, impaired skin, decreased coordination. Patient agreeable to OT session despite being tearful and c/o pain throughout therapy. Patient is progressing well with OT intervention. Patient would benefit from continued OT services to address deficits and progress towards goals. Continue OT POC.    Rehab Prognosis:  Good; patient would benefit from acute skilled OT services to address these deficits and reach maximum level of function.       Plan:     Patient to be seen 5 x/week to address the above listed problems via self-care/home management, therapeutic activities, therapeutic exercises  Plan of Care Expires: 05/12/23  Plan of Care Reviewed with: patient    Subjective     Chief Complaint: "Everything I do breaks my spinal precautions! It just hurts so bad when I move." (Patient very tearful with reported fear and anxiousness about not having (A) at home.)    Pain/Comfort:  Pain Rating 1:  (unrated but patient reported being premedicated 30 min prior)  Location - Orientation 1: lower  Location 1: back  Pain Addressed 1: Pre-medicate for activity, Nurse notified, Reposition, Distraction  Pain Rating Post-Intervention 1:  " (unrated)    Objective:     Communicated with: nurse and OTR prior to session.  Patient found HOB elevated with PureWick, hemovac upon OT entry to room.  A client care conference was completed by the OTR and the BAEZA prior to treatment by the BAEZA to discuss the patient's POC and current status.    General Precautions: Standard, fall    Orthopedic Precautions:spinal precautions  Braces: N/A  Respiratory Status: Room air     Occupational Performance:     Bed Mobility:    Patient completed Rolling/Turning to Left with  moderate assistance, with side rail, and using log roll technique  Patient completed Supine to Sit with moderate assistance and instruction provided on log roll technique to minimize/limit pain during bed mobility while maintaining spinal precautions.       Functional Mobility/Transfers:  Patient completed Sit <> Stand Transfer with contact guard assistance  with  rolling walker   Patient completed Toilet Transfer Step Transfer technique with stand by assistance with  rolling walker and grab bars  Functional Mobility: to bathroom (~12ft) using RW with SBA    Activities of Daily Living:  Grooming: stand by assistance for decreased endurance and pain to complete hand and facial hygiene standing sink side  Lower Body Dressing: contact guard assistance for technique to don B socks seated EOB using fig 4 technique and kevin/one handed technique; decreased ROM to thread B LE into maternity brief  Toileting: stand by assistance to complete brief management and perirectal hygiene seated        [facility] 6 Click ADL: 19    Treatment & Education:  Education on OT POC, goals, and current progress  Re-educated on spinal precautions and pain management strategies   ADL re-training as indicated above with increased time for c/o pain with mobility  Functional transfer/mobility training as indicated above  Addressed all patient questions/concerns within BAEZA scope of practice.    Patient left  handoff to PTA  with all lines  intact, nurse notified, and PTA present    GOALS:   Multidisciplinary Problems       Occupational Therapy Goals          Problem: Occupational Therapy    Goal Priority Disciplines Outcome Interventions   Occupational Therapy Goal     OT, PT/OT Ongoing, Progressing    Description: Goals set on 4/12, with expiration date 5/12:  Patient will increase functional independence with ADLs by performing:    Bed mobility with Mod-Price  Grooming while standing at sink with independence  UB Dressing with independence.  LB Dressing with Supervision using AD as needed.  Toileting from toilet with independence for hygiene and clothing management.   Functional mobility of household and community distance with Mod-independence and AD as needed  Pt will demonstrate understanding of education provided regarding energy conservation and task modification through teach-back method.  Pt will demonstrate Price in HEP for BUE strengthening GM/FM exercises to improve functional performance.   Pt will demonstrate understanding and learning of spinal precautions via recall and demonstration 3/3.                         Time Tracking:     OT Date of Treatment: 04/13/23  OT Start Time: 1016  OT Stop Time: 1054  OT Total Time (min): 38 min    Billable Minutes:Self Care/Home Management 23  Therapeutic Activity 15    OT/MIGUEL A: MIGUEL A     Number of MIGUEL A visits since last OT visit: 1    4/13/2023

## 2023-04-13 NOTE — SUBJECTIVE & OBJECTIVE
Principal Problem:SHAZIA (cauda equina syndrome)    Principal Orthopedic Problem: s/p TLIF L4-S1 4/11/23    Interval History:   Overnight events: OZ    Vitals: VSS    PT/OT update: 22 feet with rolling walker and stand-by assistance today; gait is slow but steady with no losses of balance. Will look for further recommendations today    Need to know:   - Patient states her pain is well controlled now.  - Her eventual discharge planning is discussed at length, and she accepts home health care as insurance will likely not cover rehab.   - Dorsiflexion and plantarflexion intact, continued inability to move toes and decreased sensation on left side due to known SHAZIA        Review of patient's allergies indicates:   Allergen Reactions    Coconut Hives and Itching    Sulfa (sulfonamide antibiotics) Hives     itching    Adhesive Rash    Latex, natural rubber Other (See Comments) and Rash       Current Facility-Administered Medications   Medication    acetaminophen tablet 1,000 mg    albuterol-ipratropium 2.5 mg-0.5 mg/3 mL nebulizer solution 3 mL    celecoxib capsule 100 mg    clonazePAM tablet 0.5 mg    famotidine tablet 20 mg    gabapentin capsule 100 mg    heparin (porcine) injection 5,000 Units    HYDROmorphone injection 0.2 mg    methocarbamoL tablet 1,000 mg    mupirocin 2 % ointment    ondansetron disintegrating tablet 8 mg    oxyCODONE immediate release tablet 5 mg    oxyCODONE immediate release tablet Tab 10 mg    sodium chloride 0.9% flush 10 mL    tiZANidine tablet 2 mg    venlafaxine 24 hr capsule 300 mg     Objective:     Vital Signs (Most Recent):  Temp: 98.1 °F (36.7 °C) (04/13/23 0817)  Pulse: 83 (04/13/23 0817)  Resp: 16 (04/13/23 0817)  BP: (!) 144/72 (04/13/23 0817)  SpO2: 97 % (04/13/23 0817) Vital Signs (24h Range):  Temp:  [96.6 °F (35.9 °C)-98.1 °F (36.7 °C)] 98.1 °F (36.7 °C)  Pulse:  [73-84] 83  Resp:  [16-20] 16  SpO2:  [96 %-100 %] 97 %  BP: (116-144)/(60-74) 144/72     Weight: 88 kg (194  "lb)  Height: 5' 5" (165.1 cm)  Body mass index is 32.28 kg/m².      Intake/Output Summary (Last 24 hours) at 4/13/2023 0845  Last data filed at 4/13/2023 0411  Gross per 24 hour   Intake --   Output 2925 ml   Net -2925 ml       Constitutional: negative for fevers  Eyes: negative visual changes  ENT: negative for hearing loss  Respiratory: negative for dyspnea  Cardiovascular: negative for chest pain  Gastrointestinal: negative for abdominal pain  Genitourinary: negative for dysuria  Neurological: negative for headaches  Behavioral/Psych: negative for hallucinations  Endocrine: negative for temperature intolerance      Ortho/SPM Exam    General appearance - no acute distress, conversant  Musculoskeletal -  Dressing C/D/I  Fires quad/TA/EHL/GSC  SILT SP/DP/TN  WWP distally  Calves soft, nontender bilateral      Significant Labs: All pertinent labs within the past 24 hours have been reviewed. Awaiting AM lab draws    Significant Imaging: I have reviewed and interpreted all pertinent imaging results/findings.  "

## 2023-04-13 NOTE — NURSING
Pt lying in bed, n.a.d. AAOx4. Sign other and service animal at bedside. Plan of care discussed. Will cont to monitor.

## 2023-04-14 VITALS
RESPIRATION RATE: 16 BRPM | HEART RATE: 78 BPM | BODY MASS INDEX: 32.32 KG/M2 | HEIGHT: 65 IN | TEMPERATURE: 98 F | DIASTOLIC BLOOD PRESSURE: 71 MMHG | OXYGEN SATURATION: 99 % | SYSTOLIC BLOOD PRESSURE: 140 MMHG | WEIGHT: 194 LBS

## 2023-04-14 PROCEDURE — 63600175 PHARM REV CODE 636 W HCPCS

## 2023-04-14 PROCEDURE — 25000003 PHARM REV CODE 250

## 2023-04-14 PROCEDURE — 97530 THERAPEUTIC ACTIVITIES: CPT | Mod: CQ

## 2023-04-14 PROCEDURE — 97535 SELF CARE MNGMENT TRAINING: CPT | Mod: CO

## 2023-04-14 PROCEDURE — 99900035 HC TECH TIME PER 15 MIN (STAT)

## 2023-04-14 PROCEDURE — 97116 GAIT TRAINING THERAPY: CPT | Mod: CQ

## 2023-04-14 RX ADMIN — VENLAFAXINE HYDROCHLORIDE 300 MG: 75 CAPSULE, EXTENDED RELEASE ORAL at 08:04

## 2023-04-14 RX ADMIN — CLONAZEPAM 0.5 MG: 0.5 TABLET ORAL at 08:04

## 2023-04-14 RX ADMIN — OXYCODONE HYDROCHLORIDE 10 MG: 10 TABLET ORAL at 04:04

## 2023-04-14 RX ADMIN — FAMOTIDINE 20 MG: 20 TABLET ORAL at 08:04

## 2023-04-14 RX ADMIN — GABAPENTIN 100 MG: 100 CAPSULE ORAL at 08:04

## 2023-04-14 RX ADMIN — CELECOXIB 100 MG: 100 CAPSULE ORAL at 08:04

## 2023-04-14 RX ADMIN — OXYCODONE HYDROCHLORIDE 10 MG: 10 TABLET ORAL at 12:04

## 2023-04-14 RX ADMIN — METHOCARBAMOL 1000 MG: 500 TABLET ORAL at 08:04

## 2023-04-14 RX ADMIN — ACETAMINOPHEN 1000 MG: 500 TABLET ORAL at 08:04

## 2023-04-14 RX ADMIN — HEPARIN SODIUM 5000 UNITS: 5000 INJECTION INTRAVENOUS; SUBCUTANEOUS at 06:04

## 2023-04-14 RX ADMIN — MUPIROCIN: 20 OINTMENT TOPICAL at 08:04

## 2023-04-14 RX ADMIN — OXYCODONE HYDROCHLORIDE 10 MG: 10 TABLET ORAL at 08:04

## 2023-04-14 NOTE — NURSING
Pt and significant other provided AVS and education completed on f//u care and f/u appointments. Extra dressing supplies provided for dressing over old drain sites. PIV x2 removed. Medications delivered to room per bedside pharmacy. Pt discharged from unit via wheelchair with all belongings.

## 2023-04-14 NOTE — PROGRESS NOTES
[facility]  Orthopedics  Progress Note    Patient Name: Marianela Shrestha  MRN: [id number]  Admission Date: 4/11/2023  Hospital Length of Stay: 3 days  Attending Provider: Sundar Harry MD  Primary Care Provider: Arnaldo Espino MD  Follow-up For: Procedure(s) (LRB):  FUSION, SPINE, LUMBAR, TLIF, POSTERIOR APPROACH, USING PEDICLE SCREW L4-S1 (N/A)    Post-Operative Day: 3 Days Post-Op  Subjective:     Principal Problem:SHAZIA (cauda equina syndrome)    Principal Orthopedic Problem: s/p TLIF L4-S1 4/11/23    Interval History:   Overnight events: NAEON    Vitals: VSS, AF     PT/OT update: Gait: Pt ambulated ~30 ft with CGA with RW and gait belt for safety. Noted compensation techniques with increased knee flexion during ambulation due to pt not having L AFO.    Need to know:   - Drain output on right side 25cc over 10 hour period, no output on left side.   - Patient still anxious about discharge with HH and spoke with CM yesterday.   - Dorsiflexion and plantarflexion intact, continued inability to move toes and decreased sensation on left side due to known SHAZIA        Review of patient's allergies indicates:   Allergen Reactions    Coconut Hives and Itching    Sulfa (sulfonamide antibiotics) Hives     itching    Adhesive Rash    Latex, natural rubber Other (See Comments) and Rash       Current Facility-Administered Medications   Medication    acetaminophen tablet 1,000 mg    albuterol-ipratropium 2.5 mg-0.5 mg/3 mL nebulizer solution 3 mL    celecoxib capsule 100 mg    clonazePAM tablet 0.5 mg    famotidine tablet 20 mg    gabapentin capsule 100 mg    heparin (porcine) injection 5,000 Units    HYDROmorphone injection 0.2 mg    methocarbamoL tablet 1,000 mg    mupirocin 2 % ointment    ondansetron disintegrating tablet 8 mg    oxyCODONE immediate release tablet 5 mg    oxyCODONE immediate release tablet Tab 10 mg    sodium chloride 0.9% flush 10 mL    tiZANidine tablet 2 mg    venlafaxine 24 hr  "capsule 300 mg     Objective:     Vital Signs (Most Recent):  Temp: 97.8 °F (36.6 °C) (04/14/23 0534)  Pulse: 82 (04/14/23 0534)  Resp: 16 (04/14/23 0534)  BP: 115/73 (04/14/23 0534)  SpO2: 95 % (04/14/23 0534) Vital Signs (24h Range):  Temp:  [97.8 °F (36.6 °C)-98.6 °F (37 °C)] 97.8 °F (36.6 °C)  Pulse:  [73-91] 82  Resp:  [15-18] 16  SpO2:  [95 %-99 %] 95 %  BP: (110-144)/(59-90) 115/73     Weight: 88 kg (194 lb)  Height: 5' 5" (165.1 cm)  Body mass index is 32.28 kg/m².      Intake/Output Summary (Last 24 hours) at 4/14/2023 0548  Last data filed at 4/14/2023 0545  Gross per 24 hour   Intake 200 ml   Output 1200 ml   Net -1000 ml       Constitutional: negative for fevers  Eyes: negative visual changes  ENT: negative for hearing loss  Respiratory: negative for dyspnea  Cardiovascular: negative for chest pain  Gastrointestinal: negative for abdominal pain  Genitourinary: negative for dysuria  Neurological: negative for headaches  Behavioral/Psych: negative for hallucinations  Endocrine: negative for temperature intolerance      Ortho/SPM Exam    General appearance - no acute distress, conversant  Musculoskeletal -  Dressing C/D/I  Fires quad/TA/EHL/GSC  SILT SP/DP/TN  WWP distally  Calves soft, nontender bilateral      Significant Labs: All pertinent labs within the past 24 hours have been reviewed. Awaiting AM lab draws    Significant Imaging: I have reviewed and interpreted all pertinent imaging results/findings.    Assessment/Plan:     * SHAZIA (cauda equina syndrome)  Marianela Shrestha is a 38 y.o. female with pmhx SHAZIA and left sided foot drop who is s/p L4-S1 TLIF 4/11/23.      Pain control: multimodal  PT/OT: WBAT   DVT PPx: subq heparin, SCDs at all times when not ambulating  Abx: postop Ancef 24hrs   Labs: no pertinent labs at this time.   Drain: Right sided deep drain output 25cc from 2257-7429. No output from left side.   Addison: Addison removed 4/12    Dispo: Drain removal today, lumbar XR to rule out " retained foreign material and hardware stability. Plan for discharge with home health today.           JOY SHAW MD  Orthopedics  [facility] - Surgery

## 2023-04-14 NOTE — DISCHARGE SUMMARY
[facility]  Orthopedics  Discharge Summary      Patient Name: Marianela Shrestha  MRN: [id number]  Admission Date: 4/11/2023  Hospital Length of Stay: 3 days  Discharge Date and Time: 4/14/2023  1:26 PM  Attending Physician: No att. providers found   Discharging Provider: JOY SHAW MD  Primary Care Provider: Arnaldo Espino MD    HPI: Marianela Shrestha is a 38 y.o. female who returns to me today for MRI review. Patient complains of persistent n/t in LLE into the left foot. She has been wearing her AFO for left foot drop; she did therapy for a while but stopped. She still has trouble with urination.  She is miserable and wants surgical intervention.     She has a history of depression and anxiety, but she wants to see a psychiatrist from [facility].     Patient used to smoke 1 cig every 4 days but she quit 3 weeks ago. She does not have DM or endorse IVDU. She does not take chronic narcotics or blood thinners.    Procedure(s) (LRB):  FUSION, SPINE, LUMBAR, TLIF, POSTERIOR APPROACH, USING PEDICLE SCREW L4-S1 (N/A)      Hospital Course:   Patient presented for above procedure.  Tolerated it well and was discharged home POD 3 after voiding, tolerating diet, ambulating, and pain controlled. Discharge instructions, follow-up appointment, and med rec are below.          Significant Diagnostic Studies: No pertinent studies.    Pending Diagnostic Studies:       None          Final Active Diagnoses:    Diagnosis Date Noted POA    PRINCIPAL PROBLEM:  SHAZIA (cauda equina syndrome) [G83.4] 11/11/2022 Yes      Problems Resolved During this Admission:      Discharged Condition: good    Disposition: Home-Health Care [facility]    Follow Up: Scheduled for 5/15/23 in spine clinic, and 4/26/23 hospital follow up for SHAZIA.     Patient Instructions:      Diet general     Call MD for:  temperature >100.4     Call MD for:  persistent nausea and vomiting     Call MD for:  severe uncontrolled pain     Call MD for:  difficulty breathing,  headache or visual disturbances     Call MD for:  redness, tenderness, or signs of infection (pain, swelling, redness, odor or green/yellow discharge around incision site)     Call MD for:  hives     Call MD for:  persistent dizziness or light-headedness     Call MD for:  extreme fatigue     Leave dressing on - Keep it clean, dry, and intact until clinic visit     Medications:  Reconciled Home Medications:      Medication List        START taking these medications      acetaminophen 500 MG tablet  Commonly known as: TYLENOL  Take 2 tablets (1,000 mg total) by mouth every 8 (eight) hours as needed for Pain.     celecoxib 100 MG capsule  Commonly known as: CeleBREX  Take 1 capsule (100 mg total) by mouth 2 (two) times daily.     gabapentin 100 MG capsule  Commonly known as: NEURONTIN  Take 1 capsule (100 mg total) by mouth 3 (three) times daily.     methocarbamoL 500 MG Tab  Commonly known as: ROBAXIN  Take 2 tablets (1,000 mg total) by mouth 3 (three) times daily. for 15 days     oxyCODONE 5 MG immediate release tablet  Commonly known as: ROXICODONE  Take 1 tablet (5 mg total) by mouth every 6 (six) hours as needed for Pain (For breakthrough pain).            CONTINUE taking these medications      albuterol 90 mcg/actuation inhaler  Commonly known as: PROVENTIL/VENTOLIN HFA  Inhale 2 puffs into the lungs every 6 (six) hours as needed. Rescue     albuterol-ipratropium 2.5 mg-0.5 mg/3 mL nebulizer solution  Commonly known as: DUO-NEB  Take 3 mLs by nebulization every 6 (six) hours as needed for Wheezing or Shortness of Breath. Rescue     atomoxetine 25 MG capsule  Commonly known as: STRATTERA  Take 25 mg by mouth once daily.     chlorhexidine 4 % external liquid  Commonly known as: HIBICLENS  Apply topically daily as needed (skin lesions). Apply topically once daily at 6am. Daily for five days twice per month for six months for 5 days     clonazePAM 0.5 MG tablet  Commonly known as: KlonoPIN  Take 0.5 mg by mouth 3  (three) times daily.     doxepin 25 MG capsule  Commonly known as: SINEQUAN  TAKE 2 CAPSULES (50 MG TOTAL) BY MOUTH EVERY EVENING.     mupirocin 2 % ointment  Commonly known as: BACTROBAN  Twice daily for five days twice per month for six months     nitrofurantoin (macrocrystal-monohydrate) 100 MG capsule  Commonly known as: MACROBID  Take 1 capsule (100 mg total) by mouth 2 (two) times daily.     norethindrone ac-eth estradioL 0.5-2.5 mg-mcg per tablet  Commonly known as: FEMHRT LOW DOSE  Take 1 tablet by mouth once daily.     tiZANidine 2 mg Cap  Take by mouth every 8 (eight) hours as needed.     venlafaxine 150 MG Cp24  Commonly known as: EFFEXOR-XR  TAKE 2 CAPSULES BY MOUTH EVERY DAY              JOY SHAW MD  Orthopedics  [facility] - Surgery

## 2023-04-14 NOTE — SUBJECTIVE & OBJECTIVE
"Principal Problem:SHAZIA (cauda equina syndrome)    Principal Orthopedic Problem: s/p TLIF L4-S1 4/11/23    Interval History:   Overnight events: NAEON    Vitals: VSS, AF     PT/OT update: Gait: Pt ambulated ~30 ft with CGA with RW and gait belt for safety. Noted compensation techniques with increased knee flexion during ambulation due to pt not having L AFO.    Need to know:   - Drain output on right side 25cc over 10 hour period, no output on left side.   - Patient still anxious about discharge with HH and spoke with CM yesterday.   - Dorsiflexion and plantarflexion intact, continued inability to move toes and decreased sensation on left side due to known SHAZIA        Review of patient's allergies indicates:   Allergen Reactions    Coconut Hives and Itching    Sulfa (sulfonamide antibiotics) Hives     itching    Adhesive Rash    Latex, natural rubber Other (See Comments) and Rash       Current Facility-Administered Medications   Medication    acetaminophen tablet 1,000 mg    albuterol-ipratropium 2.5 mg-0.5 mg/3 mL nebulizer solution 3 mL    celecoxib capsule 100 mg    clonazePAM tablet 0.5 mg    famotidine tablet 20 mg    gabapentin capsule 100 mg    heparin (porcine) injection 5,000 Units    HYDROmorphone injection 0.2 mg    methocarbamoL tablet 1,000 mg    mupirocin 2 % ointment    ondansetron disintegrating tablet 8 mg    oxyCODONE immediate release tablet 5 mg    oxyCODONE immediate release tablet Tab 10 mg    sodium chloride 0.9% flush 10 mL    tiZANidine tablet 2 mg    venlafaxine 24 hr capsule 300 mg     Objective:     Vital Signs (Most Recent):  Temp: 97.8 °F (36.6 °C) (04/14/23 0534)  Pulse: 82 (04/14/23 0534)  Resp: 16 (04/14/23 0534)  BP: 115/73 (04/14/23 0534)  SpO2: 95 % (04/14/23 0534) Vital Signs (24h Range):  Temp:  [97.8 °F (36.6 °C)-98.6 °F (37 °C)] 97.8 °F (36.6 °C)  Pulse:  [73-91] 82  Resp:  [15-18] 16  SpO2:  [95 %-99 %] 95 %  BP: (110-144)/(59-90) 115/73     Weight: 88 kg (194 lb)  Height: 5' 5" " (165.1 cm)  Body mass index is 32.28 kg/m².      Intake/Output Summary (Last 24 hours) at 4/14/2023 0548  Last data filed at 4/14/2023 0580  Gross per 24 hour   Intake 200 ml   Output 1200 ml   Net -1000 ml       Constitutional: negative for fevers  Eyes: negative visual changes  ENT: negative for hearing loss  Respiratory: negative for dyspnea  Cardiovascular: negative for chest pain  Gastrointestinal: negative for abdominal pain  Genitourinary: negative for dysuria  Neurological: negative for headaches  Behavioral/Psych: negative for hallucinations  Endocrine: negative for temperature intolerance      Ortho/SPM Exam    General appearance - no acute distress, conversant  Musculoskeletal -  Dressing C/D/I  Fires quad/TA/EHL/GSC  SILT SP/DP/TN  WWP distally  Calves soft, nontender bilateral      Significant Labs: All pertinent labs within the past 24 hours have been reviewed. Awaiting AM lab draws    Significant Imaging: I have reviewed and interpreted all pertinent imaging results/findings.

## 2023-04-14 NOTE — PLAN OF CARE
[facility]    HOME HEALTH ORDERS  FACE TO FACE ENCOUNTER    Patient Name: Marianela Shrestha  YOB: 1985    PCP: Arnaldo Espino MD   PCP Address: [address] / [place] [address]  PCP Phone Number: [phone]  PCP Fax: [phone]    Encounter Date: 04/14/2023    Admit to Home Health    Diagnoses:  Active Hospital Problems    Diagnosis  POA    *SHAZIA (cauda equina syndrome) [G83.4]  Yes      Resolved Hospital Problems   No resolved problems to display.       Future Appointments   Date Time Provider Department Center   5/15/2023  1:00 PM [facility] OIC-XRAY [facility] X[facility]   5/15/2023  2:00 PM Antonia Clifton PA-C [facility] SPINE [facility]           I have seen and examined this patient face to face today. My clinical findings that support the need for the home health skilled services and home bound status are the following:  Weakness/numbness causing balance and gait disturbance due to Surgery and cauda equina syndrome making it taxing to leave home.  Requiring assistive device to leave home due to unsteady gait caused by Surgery and cauda equina syndrome.    Allergies:  Review of patient's allergies indicates:   Allergen Reactions    Coconut Hives and Itching    Sulfa (sulfonamide antibiotics) Hives     itching    Adhesive Rash    Latex, natural rubber Other (See Comments) and Rash       Diet: regular diet    Activities: activity as tolerated and no bending over or lifting more than 5 lbs.     Home Health Admitting Clinician:   SN/PT to complete comprehensive assessment including routine vital signs. Instruct on disease process and s/s of complications to report to MD. Follow specific home health spine protocol. Review/verify medication list sent home with the patient at time of discharge  and instruct patient/caregiver as needed. If coumadin ordered, coumadin clinic to manage INR with INR draws 2x per week with a goal to maintain INR between 1.8 and 2.2. Frequency may be  adjusted depending on start of care date.    Notify MD if SBP > 160 or < 90; DBP > 90 or < 50; HR > 120 or < 50; Temp > 101    Home Medical Equipment:  Walker, 3-1 bedside commode, transfer tub bench    CONSULTS:    Physical Therapy may admit if patient not on coumadin, PT to perform comprehensive assessment if performing admit visit and generate therapy plan of care. Evaluate for home safety and equipment needs; Establish/upgrade home exercise program. Perform/instruct on therapeutic exercises, gait training, transfer training, and Range of Motion.      OTHER: (only select if patient needs other therapy disciplines)  Occupational Therapy to evaluate and treat. Evaluate home environment for safety and equipment needs. Perform/Instruct on transfers, ADL training, ROM, and therapeutic exercises.  Aide to provide assistance with personal care, ADLs, and vital signs.    MISCELLANEOUS CARE:  Routine Skin for Bedridden Patients: Instruct patient/caregiver to apply moisture barrier cream to all skin folds and wet areas in perineal area daily and after baths and all bowel movements.    WOUND CARE ORDERS:  Assess Surgical Incision/DSRG each TX  Aquacel AG drsg applied post-op leave on 14 days post op. Call MD if any drainage reaches border to border of drsg horizontally, s/s of infection, temp >101, induration, swelling or redness.  If dressing is removed per MD order, then apply island dressing, change/teach caregiver to perform daily dressing change if island dressing present.    Medications: Review discharge medications with patient and family and provide education.      Current Discharge Medication List        START taking these medications    Details   acetaminophen (TYLENOL) 500 MG tablet Take 2 tablets (1,000 mg total) by mouth every 8 (eight) hours as needed for Pain.  Qty: 90 tablet, Refills: 0    [facility]      celecoxib (CELEBREX) 100 MG capsule Take 1 capsule (100 mg total)  by mouth 2 (two) times daily.  Qty: 28 capsule, Refills: 0    Comments: Bedside delivery [facility] 4/11/23      gabapentin (NEURONTIN) 100 MG capsule Take 1 capsule (100 mg total) by mouth 3 (three) times daily.  Qty: 45 capsule, Refills: 0    Comments: Bedside delivery [facility] 4/11/23      methocarbamoL (ROBAXIN) 500 MG Tab Take 2 tablets (1,000 mg total) by mouth 3 (three) times daily. for 15 days  Qty: 90 tablet, Refills: 0    Comments: Bedside delivery [facility] 4/11/23      oxyCODONE (ROXICODONE) 5 MG immediate release tablet Take 1 tablet (5 mg total) by mouth every 6 (six) hours as needed for Pain (For breakthrough pain).  Qty: 24 tablet, Refills: 0    Comments: Quantity prescribed more than 7 day supply? No Bedside delivery [facility] 4/11/23           CONTINUE these medications which have NOT CHANGED    Details   chlorhexidine (HIBICLENS) 4 % external liquid Apply topically daily as needed (skin lesions). Apply topically once daily at 6am. Daily for five days twice per month for six months for 5 days  Qty: 473 mL, Refills: 1    Associated Diagnoses: Skin lesions      clonazePAM (KLONOPIN) 0.5 MG tablet Take 0.5 mg by mouth 3 (three) times daily.      mupirocin (BACTROBAN) 2 % ointment Twice daily for five days twice per month for six months  Qty: 22 g, Refills: 3    Comments: Daily for five days twice per month for six months      nitrofurantoin, macrocrystal-monohydrate, (MACROBID) 100 MG capsule Take 1 capsule (100 mg total) by mouth 2 (two) times daily.  Qty: 10 capsule, Refills: 0    Associated Diagnoses: Bacteriuria      tiZANidine 2 mg Cap Take by mouth every 8 (eight) hours as needed.      venlafaxine (EFFEXOR-XR) 150 MG Cp24 TAKE 2 CAPSULES BY MOUTH EVERY DAY  Qty: 60 capsule, Refills: 1    Associated Diagnoses: Generalized anxiety disorder      albuterol (PROVENTIL/VENTOLIN HFA) 90 mcg/actuation inhaler Inhale 2 puffs into the lungs every 6 (six) hours as needed. Rescue  Qty: 18 g,  Refills: 0      albuterol-ipratropium (DUO-NEB) 2.5 mg-0.5 mg/3 mL nebulizer solution Take 3 mLs by nebulization every 6 (six) hours as needed for Wheezing or Shortness of Breath. Rescue  Qty: 270 mL, Refills: 3    Associated Diagnoses: Uncontrolled persistent asthma      atomoxetine (STRATTERA) 25 MG capsule Take 25 mg by mouth once daily.      doxepin (SINEQUAN) 25 MG capsule TAKE 2 CAPSULES (50 MG TOTAL) BY MOUTH EVERY EVENING.  Qty: 180 capsule, Refills: 0    Associated Diagnoses: Insomnia, unspecified type      norethindrone ac-eth estradioL (FEMHRT LOW DOSE) 0.5-2.5 mg-mcg per tablet Take 1 tablet by mouth once daily.  Qty: 90 tablet, Refills: 4    Associated Diagnoses: Encounter for surveillance of contraceptive pills             I certify that this patient is confined to her home and needs intermittent skilled nursing care and physical therapy.

## 2023-04-14 NOTE — PT/OT/SLP PROGRESS
Physical Therapy Treatment    Patient Name:  Marianela Shrestha   MRN:  [id number]    Recommendations:     Discharge Recommendations: other (see comments)  Discharge Equipment Recommendations: none  Barriers to discharge: Decreased caregiver support    Assessment:     Marianela Shrestha is a 38 y.o. female admitted with a medical diagnosis of SHAZIA (cauda equina syndrome).  She presents with the following impairments/functional limitations: weakness, impaired endurance, impaired sensation, impaired self care skills, impaired functional mobility, gait instability, impaired balance, pain, decreased lower extremity function, decreased ROM, impaired cardiopulmonary response to activity, orthopedic precautions, edema . Pt was motivated, cooperative  and in improved spirits compared to yesterday with treatment session. Pt Progressing with PT Intervention. Pt Progressing with improving gait distance. Pt would continue to benefit from skilled PT to address overall functional mobility, goals , and to return to functional baseline.  Goals remain appropriate.      Rehab Prognosis: Good; patient would benefit from acute skilled PT services to address these deficits and reach maximum level of function.    Recent Surgery: Procedure(s) (LRB):  FUSION, SPINE, LUMBAR, TLIF, POSTERIOR APPROACH, USING PEDICLE SCREW L4-S1 (N/A) 3 Days Post-Op    Plan:     During this hospitalization, patient to be seen 5 x/week to address the identified rehab impairments via gait training, therapeutic activities, therapeutic exercises, neuromuscular re-education and progress toward the following goals:    Plan of Care Expires:  05/12/23    Subjective     Patient/Family Comments/goals: I am doing better today  Pain/Comfort:  Pain Rating 1: 6/10  Location - Orientation 1: lower  Location 1: back  Pain Addressed 1: Pre-medicate for activity, Reposition, Distraction, Cessation of Activity      Objective:     Communicated with RN prior to session.  Patient  "found  seated in w/c with transport present  with  (no active lines) upon PT entry to room.     General Precautions: Standard, fall  Orthopedic Precautions: spinal precautions  Braces: N/A  Respiratory Status: Room air     Functional Mobility:  Bed Mobility:     Scooting: stand by assistance  Sit to Supine: stand by assistance  Transfers:     Sit to tand SBA with RW and gait belt for safety  Gait: Pt ambulated 80 ft with CGA with RW  and gait belt for safety with L AFO on .   Stairs:  Pt ascended/descended 6" curb step with Rolling Walker with no handrails with Contact Guard Assistance.       AM-PAC 6 CLICK MOBILITY  Turning over in bed (including adjusting bedclothes, sheets and blankets)?: 3  Sitting down on and standing up from a chair with arms (e.g., wheelchair, bedside commode, etc.): 3  Moving from lying on back to sitting on the side of the bed?: 4  Moving to and from a bed to a chair (including a wheelchair)?: 3  Need to walk in hospital room?: 3  Climbing 3-5 steps with a railing?: 3  Basic Mobility Total Score: 19       Treatment & Education:  Therapist provided instruction and educated of  patient on progress, safety,d/c,PT POC,   proper body mechanics, energy conservation, and fall prevention strategies during tasks listed above, on the effects of prolonged immobility and the importance of performing OOB activity and exercises to promote healing and reduce recovery time    Pt issued and instructed to perform  supine and seated HEP 2-3 times daily, with verabal understanding  Updated white board with appropriate PT mobility information for medical team notification  Education on spinal precautions (no BLT: bending, lifting >?5 lbs, and twisting.) Education on log roll for bed mobility in order to maintain spinal precautions     Donned shoes and AFO    Call nursing/pct to transfer to chair/use bathroom. Pt stated understanding      Bedside table in front of patient and area set up for function, " convenience, and safety. RN aware of patient's mobility needs and status. Questions/concerns addressed within PTA scope of practice; patient  with no further questions. Time was provided for active listening, discussion of health disposition, and discussion of safe discharge. Pt?verbalized?agreement .    Patient left HOB elevated with all lines intact, call button in reach, and nsg notified and spouse present..    GOALS:   Multidisciplinary Problems       Physical Therapy Goals          Problem: Physical Therapy    Goal Priority Disciplines Outcome Goal Variances Interventions   Physical Therapy Goal     PT, PT/OT Ongoing, Progressing     Description: Goals to be met by: 23     Patient will increase functional independence with mobility by performin. Supine to sit with Modified [place] - Not met  2. Sit to stand transfer with Supervision with RW - Not met  3. Bed to chair transfer with Stand-by Assistance using Rolling Walker - Not met  4. Gait  x 100 feet with Stand-by Assistance using Rolling Walker - Not met  5. Ascend/Descend 6 inch curb step with Contact Guard Assistance using Rolling Walker - Not met                       Time Tracking:     PT Received On: 23  PT Start Time: 925     PT Stop Time: 950  PT Total Time (min): 25 min     Billable Minutes: Gait Training 15 and Therapeutic Activity 10    Treatment Type: Treatment  PT/PTA: PTA     Number of PTA visits since last PT visit: 2     2023

## 2023-04-14 NOTE — ASSESSMENT & PLAN NOTE
Marianela Shrestha is a 38 y.o. female with pmhx SHAZIA and left sided foot drop who is s/p L4-S1 TLIF 4/11/23.      Pain control: multimodal  PT/OT: WBAT   DVT PPx: subq heparin, SCDs at all times when not ambulating  Abx: postop Ancef 24hrs   Labs: no pertinent labs at this time.   Drain: Right sided deep drain output 25cc from 1465-7109. No output from left side.   Addison: Addison removed 4/12    Dispo: Drain removal today, lumbar XR to rule out retained foreign material and hardware stability. Plan for discharge with home health today.

## 2023-04-14 NOTE — PT/OT/SLP PROGRESS
Occupational Therapy   Treatment    Name: Marianela Shrestha  MRN: [id number]  Admitting Diagnosis:  SHAZIA (cauda equina syndrome)  3 Days Post-Op  Procedure(s):  FUSION, SPINE, LUMBAR, TLIF, POSTERIOR APPROACH, USING PEDICLE SCREW L4-S1   Recommendations:     Discharge Recommendations: other (see comments)  Discharge Equipment Recommendations:  none  Barriers to discharge:  None    Assessment:     Marianela Shrestha is a 38 y.o. female with a medical diagnosis of SHAZIA (cauda equina syndrome).  She presents with the following performance deficits affecting function are weakness, impaired endurance, impaired self care skills, impaired functional mobility, orthopedic precautions, pain, decreased safety awareness, decreased lower extremity function, decreased ROM, gait instability. Patient agreeable to OT session with improved tolerance.     Rehab Prognosis:  Good; patient would benefit from acute skilled OT services to address these deficits and reach maximum level of function.       Plan:     Patient to be seen 5 x/week to address the above listed problems via self-care/home management, therapeutic activities, therapeutic exercises  Plan of Care Expires: 05/12/23  Plan of Care Reviewed with: patient    Subjective     Chief Complaint: c/o pain    Pain/Comfort:  Pain Rating 1: 6/10  Location - Orientation 1: lower  Location 1: back  Pain Addressed 1: Reposition, Pre-medicate for activity, Nurse notified  Pain Rating Post-Intervention 1: 6/10    Objective:     Communicated with: nurse prior to session.  Patient found ambulatory in room/anderson with  (no active lines) upon OT entry to room.    General Precautions: Standard, fall    Orthopedic Precautions:spinal precautions  Braces: N/A  Respiratory Status: Room air     Occupational Performance:     Bed Mobility:    Patient completed Sit to Supine with stand by assistance and increased time for mobility 2/2 pain using log roll technique      Functional  Mobility/Transfers:  Patient completed Sit <> Stand Transfer with supervision  with  no assistive device   Functional Mobility: from bathroom using RW with (S)    Activities of Daily Living:  Upper Body Dressing: modified independence donning bra and t-shirt  Lower Body Dressing: stand by assistance to don underwear and pants       AMPAC 6 Click ADL: 21    Treatment & Education:  Education on OT POC, goals, and current progress  ADL re-training as indicated above  Addressed all patient questions/concerns within BAE[facility] scope of practice    Patient left right sidelying with call button in reach and significant other present    GOALS:   Multidisciplinary Problems       Occupational Therapy Goals          Problem: Occupational Therapy    Goal Priority Disciplines Outcome Interventions   Occupational Therapy Goal     OT, PT/OT Ongoing, Progressing    Description: Goals set on 4/12, with expiration date 5/12:  Patient will increase functional independence with ADLs by performing:    Bed mobility with Mod-[place]  Grooming while standing at sink with independence  UB Dressing with independence.  LB Dressing with Supervision using AD as needed.  Toileting from toilet with independence for hygiene and clothing management.   Functional mobility of household and community distance with Mod-independence and AD as needed  Pt will demonstrate understanding of education provided regarding energy conservation and task modification through teach-back method.  Pt will demonstrate [place] in HEP for BUE strengthening GM/FM exercises to improve functional performance.   Pt will demonstrate understanding and learning of spinal precautions via recall and demonstration 3/3.                         Time Tracking:     OT Date of Treatment: 04/14/23  OT Start Time: 0956  OT Stop Time: 1012  OT Total Time (min): 16 min    Billable Minutes:Self Care/Home Management 16    OT/MIGUEL A: MIGUEL A     Number of MIGUEL A visits since last OT visit:  2    4/14/2023
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
## 2023-04-14 NOTE — PLAN OF CARE
CHW met with patient/family at bedside. Patient experience rounding completed and reviewed the following.     Do you know your discharge plan? Yes     If yes, what is the plan? Home    If you are discharging home, do you have help at home? Yes     Do you think you will need help at home at discharge?  No     Have you discussed your needs and preferences with your SW/CM? Yes     Assigned SW/CM notified of any patient/family needs or concerns.

## 2023-04-14 NOTE — PLAN OF CARE
[facility]  Discharge Final Note    Primary Care Provider: Arnaldo Espino MD    Expected Discharge Date: 4/14/2023    Final Discharge Note (most recent)       Final Note - 04/14/23 1319          Final Note    Assessment Type Final Discharge Note     Anticipated Discharge Disposition [place] [facility]     What phone number can be called within the next 1-3 days to see how you are doing after discharge? --   [phone]    Hospital Resources/Appts/Education Provided Appointments scheduled and added to AVS        Post-Acute Status    Post-Acute Authorization Home Health     Home Health Status Set-up Complete/Auth obtained                     Important Message from Medicare  Important Message from Medicare regarding Discharge Appeal Rights: Explained to patient/caregiver, Given to patient/caregiver, Signed/date by patient/caregiver     Date IMM was signed: 04/14/23  Time IMM was signed: 1110      Future Appointments   Date Time Provider Department Center   4/26/2023 10:00 AM ANETA James II, MD [facility]   5/15/2023  1:00 PM [facility]   5/15/2023  2:00 PM Antonia Clifton PA-C [facility]     Patient discharged home to care of family on 4/14/23.    Marlena Solano RNCM  Case Management  [facility]  [phone]

## 2023-04-15 PROCEDURE — G0180 PR HOME HEALTH MD CERTIFICATION: ICD-10-PCS | Mod: ,,, | Performed by: ORTHOPAEDIC SURGERY

## 2023-04-15 PROCEDURE — G0180 MD CERTIFICATION HHA PATIENT: HCPCS | Mod: ,,, | Performed by: ORTHOPAEDIC SURGERY

## 2023-04-17 RX ORDER — NORETHINDRONE 5 MG/1
5 TABLET ORAL DAILY
Qty: 30 TABLET | Refills: 11 | Status: ON HOLD | OUTPATIENT
Start: 2023-04-17 | End: 2023-05-15 | Stop reason: HOSPADM

## 2023-04-21 ENCOUNTER — TELEPHONE (OUTPATIENT)
Dept: ORTHOPEDICS | Facility: CLINIC | Age: 38
End: 2023-04-21
Payer: MEDICARE

## 2023-04-21 NOTE — TELEPHONE ENCOUNTER
----- Message from Kaleb Chappell sent at 4/21/2023  9:15 AM CDT -----  Contact: [phone]  Marianela Shrestha physical therapy assistant calling regarding Patient Advice (message) for to report a fall on 4/20 around 9 or 10:00 am.

## 2023-04-24 RX ORDER — OXYCODONE HYDROCHLORIDE 5 MG/1
5 TABLET ORAL EVERY 6 HOURS PRN
Qty: 24 TABLET | Refills: 0 | Status: SHIPPED | OUTPATIENT
Start: 2023-04-24 | End: 2023-04-30 | Stop reason: SDUPTHER

## 2023-04-26 ENCOUNTER — HOSPITAL ENCOUNTER (OUTPATIENT)
Dept: RADIOLOGY | Facility: HOSPITAL | Age: 38
Discharge: HOME | End: 2023-04-26
Attending: INTERNAL MEDICINE
Payer: MEDICARE

## 2023-04-26 ENCOUNTER — OFFICE VISIT (OUTPATIENT)
Dept: INTERNAL MEDICINE | Facility: CLINIC | Age: 38
End: 2023-04-26
Payer: MEDICARE

## 2023-04-26 VITALS
BODY MASS INDEX: 34.49 KG/M2 | DIASTOLIC BLOOD PRESSURE: 80 MMHG | HEART RATE: 80 BPM | SYSTOLIC BLOOD PRESSURE: 126 MMHG | WEIGHT: 207 LBS | OXYGEN SATURATION: 98 % | HEIGHT: 65 IN
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
## 2023-04-26 DIAGNOSIS — M54.50 RIGHT LUMBAR PAIN: ICD-10-CM

## 2023-04-26 DIAGNOSIS — M43.27 FUSION OF LUMBOSACRAL SPINE: ICD-10-CM
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
## 2023-04-26 PROCEDURE — 1111F PR DISCHARGE MEDS RECONCILED W/ CURRENT OUTPATIENT MED LIST: ICD-10-PCS | Mod: CPTII,S$GLB,, | Performed by: INTERNAL MEDICINE

## 2023-04-26 PROCEDURE — 99214 PR OFFICE/OUTPT VISIT, EST, LEVL IV, 30-39 MIN: ICD-10-PCS | Mod: S$GLB,,, | Performed by: INTERNAL MEDICINE

## 2023-04-26 PROCEDURE — 99999 PR PBB SHADOW E&M-EST. PATIENT-LVL V: CPT | Mod: PBBFAC,,, | Performed by: INTERNAL MEDICINE

## 2023-04-26 PROCEDURE — 1159F MED LIST DOCD IN RCRD: CPT | Mod: CPTII,S$GLB,, | Performed by: INTERNAL MEDICINE

## 2023-04-26 PROCEDURE — 3008F PR BODY MASS INDEX (BMI) DOCUMENTED: ICD-10-PCS | Mod: CPTII,S$GLB,, | Performed by: INTERNAL MEDICINE

## 2023-04-26 PROCEDURE — 99214 OFFICE O/P EST MOD 30 MIN: CPT | Mod: S$GLB,,, | Performed by: INTERNAL MEDICINE

## 2023-04-26 PROCEDURE — 72100 X-RAY EXAM L-S SPINE 2/3 VWS: CPT | Mod: TC

## 2023-04-26 PROCEDURE — 3008F BODY MASS INDEX DOCD: CPT | Mod: CPTII,S$GLB,, | Performed by: INTERNAL MEDICINE

## 2023-04-26 PROCEDURE — 3079F PR MOST RECENT DIASTOLIC BLOOD PRESSURE 80-89 MM HG: ICD-10-PCS | Mod: CPTII,S$GLB,, | Performed by: INTERNAL MEDICINE

## 2023-04-26 PROCEDURE — 99999 PR PBB SHADOW E&M-EST. PATIENT-LVL V: ICD-10-PCS | Mod: PBBFAC,,, | Performed by: INTERNAL MEDICINE

## 2023-04-26 PROCEDURE — 1160F PR REVIEW ALL MEDS BY PRESCRIBER/CLIN PHARMACIST DOCUMENTED: ICD-10-PCS | Mod: CPTII,S$GLB,, | Performed by: INTERNAL MEDICINE

## 2023-04-26 PROCEDURE — 72100 XR LUMBAR SPINE AP AND LATERAL: ICD-10-PCS | Mod: 26,,, | Performed by: RADIOLOGY

## 2023-04-26 PROCEDURE — 3074F PR MOST RECENT SYSTOLIC BLOOD PRESSURE < 130 MM HG: ICD-10-PCS | Mod: CPTII,S$GLB,, | Performed by: INTERNAL MEDICINE

## 2023-04-26 PROCEDURE — 1159F PR MEDICATION LIST DOCUMENTED IN MEDICAL RECORD: ICD-10-PCS | Mod: CPTII,S$GLB,, | Performed by: INTERNAL MEDICINE

## 2023-04-26 PROCEDURE — 3079F DIAST BP 80-89 MM HG: CPT | Mod: CPTII,S$GLB,, | Performed by: INTERNAL MEDICINE

## 2023-04-26 PROCEDURE — 1160F RVW MEDS BY RX/DR IN RCRD: CPT | Mod: CPTII,S$GLB,, | Performed by: INTERNAL MEDICINE

## 2023-04-26 PROCEDURE — 3074F SYST BP LT 130 MM HG: CPT | Mod: CPTII,S$GLB,, | Performed by: INTERNAL MEDICINE

## 2023-04-26 PROCEDURE — 72100 X-RAY EXAM L-S SPINE 2/3 VWS: CPT | Mod: 26,,, | Performed by: RADIOLOGY

## 2023-04-26 PROCEDURE — 1111F DSCHRG MED/CURRENT MED MERGE: CPT | Mod: CPTII,S$GLB,, | Performed by: INTERNAL MEDICINE

## 2023-04-26 NOTE — PROGRESS NOTES
Marianela Shrestha presents today to urgent care for:  Follow-up      S/p lumbosacral fusion recently and patient had a fall the other day and has some increased pain on the right lower spine area since that fall.  No new symptoms and some of her paralysis in her left foot has improved since the surgery and she can actually move her big toe now.     Low-back Pain  This is a new problem. The current episode started yesterday. The problem occurs constantly. Associated symptoms include numbness (chronic and no worse than usual since the fall). Pertinent negatives include no abdominal pain, change in bowel habit, chills, fever, joint swelling, myalgias or urinary symptoms.     Past medical, social, family and surgical history was reviewed and updated today as needed. See encounter for details.     Review of Systems   Constitutional:  Negative for chills and fever.   Gastrointestinal:  Negative for abdominal pain, change in bowel habit and constipation.   Genitourinary:  Negative for dysuria, frequency, hematuria and urgency.   Musculoskeletal:  Negative for joint swelling and myalgias.   Neurological:  Positive for numbness (chronic and no worse than usual since the fall).     Vitals:    04/26/23 1006   BP: 126/80   Pulse: 80   Body mass index is 34.45 kg/m².   Physical Exam  Constitutional:       General: She is not in acute distress.     Appearance: She is not ill-appearing, toxic-appearing or diaphoretic.   Cardiovascular:      Rate and Rhythm: Normal rate.   Pulmonary:      Effort: Pulmonary effort is normal.   Musculoskeletal:        Back:       Comments: Fusion scar looks well healed with no signs of infection. No abscess or fluctuance noted. Mildly tender at right base of scar and some bruising noted in that area.         Assessment/plan:   1. Right lumbar pain  After fall.   Will xray today to assure stability of recent fusion.     2. Fall, initial encounter  - X-Ray Lumbar Spine AP And Lateral; Future    3.  Fusion of lumbosacral spine  - X-Ray Lumbar Spine AP And Lateral; Future    I spent a total of 30 minutes on the day of the visit.This includes face to face time and non-face to face time preparing to see the patient (eg, review of tests), obtaining and/or reviewing separately obtained history, documenting clinical information in the electronic or other health record, independently interpreting results and communicating results to the patient/family/caregiver, and coordinating care.       Xrays reviewed and no evidence of additional injury. Pt. Should f/u with spine clinic as planned.

## 2023-04-30 ENCOUNTER — PATIENT MESSAGE (OUTPATIENT)
Dept: ORTHOPEDICS | Facility: CLINIC | Age: 38
End: 2023-04-30
Payer: MEDICARE

## 2023-04-30 ENCOUNTER — PATIENT MESSAGE (OUTPATIENT)
Dept: INTERNAL MEDICINE | Facility: CLINIC | Age: 38
End: 2023-04-30
Payer: MEDICARE

## 2023-04-30 DIAGNOSIS — M79.2 NEUROPATHIC PAIN: Primary | ICD-10-CM

## 2023-05-01 RX ORDER — GABAPENTIN 600 MG/1
600 TABLET ORAL 3 TIMES DAILY
Qty: 90 TABLET | Refills: 2 | Status: SHIPPED | OUTPATIENT
Start: 2023-05-01 | End: 2023-07-27

## 2023-05-01 RX ORDER — OXYCODONE HYDROCHLORIDE 5 MG/1
5 TABLET ORAL EVERY 6 HOURS PRN
Qty: 24 TABLET | Refills: 0 | Status: SHIPPED | OUTPATIENT
Start: 2023-05-01 | End: 2023-05-08 | Stop reason: SDUPTHER

## 2023-05-01 RX ORDER — ACETAMINOPHEN 500 MG
1000 TABLET ORAL EVERY 8 HOURS PRN
Qty: 90 TABLET | Refills: 0 | Status: SHIPPED | OUTPATIENT
Start: 2023-05-01

## 2023-05-01 RX ORDER — GABAPENTIN 100 MG/1
100 CAPSULE ORAL 3 TIMES DAILY
Qty: 45 CAPSULE | Refills: 0 | Status: CANCELLED | OUTPATIENT
Start: 2023-05-01 | End: 2024-04-30

## 2023-05-01 RX ORDER — METHOCARBAMOL 500 MG/1
1000 TABLET, FILM COATED ORAL 4 TIMES DAILY
Qty: 160 TABLET | Refills: 0 | Status: ON HOLD | OUTPATIENT
Start: 2023-05-01 | End: 2023-05-15 | Stop reason: HOSPADM

## 2023-05-01 NOTE — TELEPHONE ENCOUNTER
No care due was identified.  [facility] Embedded Care Due Messages. Reference number: [id number].   5/01/2023 9:43:47 AM CDT

## 2023-05-06 DIAGNOSIS — L98.9 SKIN LESIONS: ICD-10-CM

## 2023-05-07 ENCOUNTER — PATIENT MESSAGE (OUTPATIENT)
Dept: ORTHOPEDICS | Facility: CLINIC | Age: 38
End: 2023-05-07
Payer: MEDICARE

## 2023-05-08 ENCOUNTER — OFFICE VISIT (OUTPATIENT)
Dept: ORTHOPEDICS | Facility: CLINIC | Age: 38
End: 2023-05-08
Payer: MEDICARE

## 2023-05-08 ENCOUNTER — LAB VISIT (OUTPATIENT)
Dept: LAB | Facility: HOSPITAL | Age: 38
End: 2023-05-08
Payer: MEDICARE

## 2023-05-08 VITALS — BODY MASS INDEX: 34.38 KG/M2 | HEIGHT: 65 IN | WEIGHT: 206.38 LBS

## 2023-05-08 DIAGNOSIS — D64.9 ANEMIA, UNSPECIFIED TYPE: ICD-10-CM

## 2023-05-08 DIAGNOSIS — Z98.890 S/P SPINAL SURGERY: Primary | ICD-10-CM

## 2023-05-08 LAB
BASOPHILS # BLD AUTO: 0.05 K/UL (ref 0–0.2)
BASOPHILS NFR BLD: 0.6 % (ref 0–1.9)
DIFFERENTIAL METHOD: ABNORMAL
EOSINOPHIL # BLD AUTO: 0.2 K/UL (ref 0–0.5)
EOSINOPHIL NFR BLD: 2.8 % (ref 0–8)
ERYTHROCYTE [DISTWIDTH] IN BLOOD BY AUTOMATED COUNT: 13.1 % (ref 11.5–14.5)
HCT VFR BLD AUTO: 35 % (ref 37–48.5)
HGB BLD-MCNC: 11 G/DL (ref 12–16)
IMM GRANULOCYTES # BLD AUTO: 0.03 K/UL (ref 0–0.04)
IMM GRANULOCYTES NFR BLD AUTO: 0.4 % (ref 0–0.5)
LYMPHOCYTES # BLD AUTO: 2.2 K/UL (ref 1–4.8)
LYMPHOCYTES NFR BLD: 26.9 % (ref 18–48)
MCH RBC QN AUTO: 30.9 PG (ref 27–31)
MCHC RBC AUTO-ENTMCNC: 31.4 G/DL (ref 32–36)
MCV RBC AUTO: 98 FL (ref 82–98)
MONOCYTES # BLD AUTO: 0.6 K/UL (ref 0.3–1)
MONOCYTES NFR BLD: 7.3 % (ref 4–15)
NEUTROPHILS # BLD AUTO: 5.2 K/UL (ref 1.8–7.7)
NEUTROPHILS NFR BLD: 62 % (ref 38–73)
NRBC BLD-RTO: 0 /100 WBC
PLATELET # BLD AUTO: 390 K/UL (ref 150–450)
PMV BLD AUTO: 10.9 FL (ref 9.2–12.9)
RBC # BLD AUTO: 3.56 M/UL (ref 4–5.4)
WBC # BLD AUTO: 8.34 K/UL (ref 3.9–12.7)

## 2023-05-08 PROCEDURE — 3008F PR BODY MASS INDEX (BMI) DOCUMENTED: ICD-10-PCS | Mod: CPTII,S$GLB,, | Performed by: ORTHOPAEDIC SURGERY

## 2023-05-08 PROCEDURE — 85025 COMPLETE CBC W/AUTO DIFF WBC: CPT | Performed by: ORTHOPAEDIC SURGERY

## 2023-05-08 PROCEDURE — 99999 PR PBB SHADOW E&M-EST. PATIENT-LVL III: CPT | Mod: PBBFAC,,, | Performed by: ORTHOPAEDIC SURGERY

## 2023-05-08 PROCEDURE — 99024 POSTOP FOLLOW-UP VISIT: CPT | Mod: S$GLB,,, | Performed by: ORTHOPAEDIC SURGERY

## 2023-05-08 PROCEDURE — 99999 PR PBB SHADOW E&M-EST. PATIENT-LVL III: ICD-10-PCS | Mod: PBBFAC,,, | Performed by: ORTHOPAEDIC SURGERY

## 2023-05-08 PROCEDURE — 36415 COLL VENOUS BLD VENIPUNCTURE: CPT | Performed by: ORTHOPAEDIC SURGERY

## 2023-05-08 PROCEDURE — 1159F MED LIST DOCD IN RCRD: CPT | Mod: CPTII,S$GLB,, | Performed by: ORTHOPAEDIC SURGERY

## 2023-05-08 PROCEDURE — 99024 PR POST-OP FOLLOW-UP VISIT: ICD-10-PCS | Mod: S$GLB,,, | Performed by: ORTHOPAEDIC SURGERY

## 2023-05-08 PROCEDURE — 1159F PR MEDICATION LIST DOCUMENTED IN MEDICAL RECORD: ICD-10-PCS | Mod: CPTII,S$GLB,, | Performed by: ORTHOPAEDIC SURGERY

## 2023-05-08 PROCEDURE — 3008F BODY MASS INDEX DOCD: CPT | Mod: CPTII,S$GLB,, | Performed by: ORTHOPAEDIC SURGERY

## 2023-05-08 RX ORDER — MAGNESIUM HYDROXIDE 400 MG/5ML
SUSPENSION, ORAL (FINAL DOSE FORM) ORAL
Qty: 237 ML | Refills: 1 | Status: ON HOLD | OUTPATIENT
Start: 2023-05-08 | End: 2023-05-15 | Stop reason: HOSPADM

## 2023-05-08 RX ORDER — OXYCODONE HYDROCHLORIDE 5 MG/1
5 TABLET ORAL EVERY 6 HOURS PRN
Qty: 24 TABLET | Refills: 0 | Status: SHIPPED | OUTPATIENT
Start: 2023-05-08 | End: 2023-05-21 | Stop reason: SDUPTHER

## 2023-05-08 RX ORDER — CIPROFLOXACIN 500 MG/1
500 TABLET ORAL EVERY 12 HOURS
Qty: 14 TABLET | Refills: 0 | Status: ON HOLD | OUTPATIENT
Start: 2023-05-08 | End: 2023-05-15 | Stop reason: HOSPADM

## 2023-05-08 NOTE — PROGRESS NOTES
Date: 05/08/2023    Supervising Physician: Sundar Harry M.D.    Date of Surgery: 4/11/23    Procedure: L4-S1 TLIF    History: Marianela Shrestha is seen today for follow-up following the above listed procedure. Overall the patient is doing well but today notes for the past week or so she has noticed her wound looks open. She continues to have left leg pain and left foot drop. She says home health only came a few times.  she endorses a few days of fever, chills, and sweats since the time of the surgery.       Exam: Post op dressing taken down.  Top and bottom of incision show dehiscence with purulent drainage. Drain site to the right of incision shows dehiscence with no drainage.  Neuro exam is stable. No signs of DVT.    Radiographs: n/a    Assessment/Plan: 4 weeks post op.    Will do betadine paint BID and cipro BID (pt allergic to sulfa drugs). She will fu in 3 days for wound check    Thank you for the opportunity to participate in this patient's care. Please give me a call if there are any concerns or questions.

## 2023-05-11 ENCOUNTER — OFFICE VISIT (OUTPATIENT)
Dept: ORTHOPEDICS | Facility: CLINIC | Age: 38
End: 2023-05-11
Payer: MEDICARE

## 2023-05-11 VITALS — BODY MASS INDEX: 34.38 KG/M2 | WEIGHT: 206.38 LBS | HEIGHT: 65 IN

## 2023-05-11 DIAGNOSIS — Z98.890 S/P SPINAL SURGERY: Primary | ICD-10-CM

## 2023-05-11 PROCEDURE — 99024 PR POST-OP FOLLOW-UP VISIT: ICD-10-PCS | Mod: S$GLB,,, | Performed by: ORTHOPAEDIC SURGERY

## 2023-05-11 PROCEDURE — 99999 PR PBB SHADOW E&M-EST. PATIENT-LVL III: ICD-10-PCS | Mod: PBBFAC,,, | Performed by: ORTHOPAEDIC SURGERY

## 2023-05-11 PROCEDURE — 99024 POSTOP FOLLOW-UP VISIT: CPT | Mod: S$GLB,,, | Performed by: ORTHOPAEDIC SURGERY

## 2023-05-11 PROCEDURE — 1160F RVW MEDS BY RX/DR IN RCRD: CPT | Mod: CPTII,S$GLB,, | Performed by: ORTHOPAEDIC SURGERY

## 2023-05-11 PROCEDURE — 99999 PR PBB SHADOW E&M-EST. PATIENT-LVL III: CPT | Mod: PBBFAC,,, | Performed by: ORTHOPAEDIC SURGERY

## 2023-05-11 PROCEDURE — 1159F PR MEDICATION LIST DOCUMENTED IN MEDICAL RECORD: ICD-10-PCS | Mod: CPTII,S$GLB,, | Performed by: ORTHOPAEDIC SURGERY

## 2023-05-11 PROCEDURE — 1159F MED LIST DOCD IN RCRD: CPT | Mod: CPTII,S$GLB,, | Performed by: ORTHOPAEDIC SURGERY

## 2023-05-11 PROCEDURE — 3008F BODY MASS INDEX DOCD: CPT | Mod: CPTII,S$GLB,, | Performed by: ORTHOPAEDIC SURGERY

## 2023-05-11 PROCEDURE — 3008F PR BODY MASS INDEX (BMI) DOCUMENTED: ICD-10-PCS | Mod: CPTII,S$GLB,, | Performed by: ORTHOPAEDIC SURGERY

## 2023-05-11 PROCEDURE — 1160F PR REVIEW ALL MEDS BY PRESCRIBER/CLIN PHARMACIST DOCUMENTED: ICD-10-PCS | Mod: CPTII,S$GLB,, | Performed by: ORTHOPAEDIC SURGERY

## 2023-05-11 RX ORDER — CEPHALEXIN 500 MG/1
500 CAPSULE ORAL EVERY 8 HOURS
Qty: 21 CAPSULE | Refills: 0 | Status: ON HOLD | OUTPATIENT
Start: 2023-05-11 | End: 2023-05-15 | Stop reason: HOSPADM

## 2023-05-11 NOTE — PROGRESS NOTES
4/11/23: L4-S1 PLDF/TLIF (L)    11/11/22: L4-S1 decompression for cauda equina syndrome    History: Marianela Shrestha is seen today for follow-up following the above listed procedure. Overall the patient has been dealing with wound healing issues. She has LBP that LLE pain, somewhat improved from surgery. She has been taking cipro and doing betadine paint BID.    Exam: superior and inferior parts of incisions have superficial dehiscence and stitch abscesses. Right PSIS pin site has superficial dehisicence. Neuro exam is stable. No signs of DVT.    Radiographs: none today    Assessment/Plan: Doing well postoperatively. Continue betadine paint BID. I prescribed keflex for 7 days. I will plan to see the patient back for the next postop visit in 1 weeks for wound check. Thank you for the opportunity to participate in this patient's care. Please give me a call if there are any concerns or questions.    Sundar Harry MD  Orthopaedic Spine Surgeon  Department of Orthopaedic Surgery  [phone]

## 2023-05-12 ENCOUNTER — HOSPITAL ENCOUNTER (OUTPATIENT)
Facility: HOSPITAL | Age: 38
Discharge: ANOTHER HOSPITAL | End: 2023-05-15
Attending: STUDENT IN AN ORGANIZED HEALTH CARE EDUCATION/TRAINING PROGRAM | Admitting: INTERNAL MEDICINE
Payer: MEDICARE

## 2023-05-12 DIAGNOSIS — K04.7 TOOTH ABSCESS: ICD-10-CM

## 2023-05-12 DIAGNOSIS — L03.211 FACIAL CELLULITIS: Primary | ICD-10-CM

## 2023-05-12 DIAGNOSIS — R22.0 FACIAL SWELLING: ICD-10-CM

## 2023-05-12 DIAGNOSIS — I38 ENDOCARDITIS: ICD-10-CM

## 2023-05-12 LAB
ALBUMIN SERPL BCP-MCNC: 3.1 G/DL (ref 3.5–5.2)
ALP SERPL-CCNC: 154 U/L (ref 55–135)
ALT SERPL W/O P-5'-P-CCNC: 13 U/L (ref 10–44)
ANION GAP SERPL CALC-SCNC: 8 MMOL/L (ref 8–16)
AST SERPL-CCNC: 18 U/L (ref 10–40)
BACTERIA #/AREA URNS AUTO: ABNORMAL /HPF
BASOPHILS # BLD AUTO: 0.02 K/UL (ref 0–0.2)
BASOPHILS NFR BLD: 0.2 % (ref 0–1.9)
BILIRUB SERPL-MCNC: 0.2 MG/DL (ref 0.1–1)
BILIRUB UR QL STRIP: NEGATIVE
BUN SERPL-MCNC: 10 MG/DL (ref 6–20)
CALCIUM SERPL-MCNC: 9.4 MG/DL (ref 8.7–10.5)
CAOX CRY UR QL COMP ASSIST: ABNORMAL
CHLORIDE SERPL-SCNC: 105 MMOL/L (ref 95–110)
CLARITY UR REFRACT.AUTO: ABNORMAL
CO2 SERPL-SCNC: 25 MMOL/L (ref 23–29)
COLOR UR AUTO: YELLOW
CREAT SERPL-MCNC: 0.8 MG/DL (ref 0.5–1.4)
CRP SERPL-MCNC: 177.8 MG/L (ref 0–8.2)
DIFFERENTIAL METHOD: ABNORMAL
EOSINOPHIL # BLD AUTO: 0.4 K/UL (ref 0–0.5)
EOSINOPHIL NFR BLD: 4.5 % (ref 0–8)
ERYTHROCYTE [DISTWIDTH] IN BLOOD BY AUTOMATED COUNT: 13.6 % (ref 11.5–14.5)
ERYTHROCYTE [SEDIMENTATION RATE] IN BLOOD BY PHOTOMETRIC METHOD: 84 MM/HR (ref 0–36)
EST. GFR  (NO RACE VARIABLE): >60 ML/MIN/1.73 M^2
GLUCOSE SERPL-MCNC: 102 MG/DL (ref 70–110)
GLUCOSE UR QL STRIP: NEGATIVE
HCT VFR BLD AUTO: 36.2 % (ref 37–48.5)
HCV AB SERPL QL IA: NORMAL
HGB BLD-MCNC: 11.2 G/DL (ref 12–16)
HGB UR QL STRIP: ABNORMAL
HIV 1+2 AB+HIV1 P24 AG SERPL QL IA: NORMAL
HYALINE CASTS UR QL AUTO: 0 /LPF
IMM GRANULOCYTES # BLD AUTO: 0.02 K/UL (ref 0–0.04)
IMM GRANULOCYTES NFR BLD AUTO: 0.2 % (ref 0–0.5)
KETONES UR QL STRIP: NEGATIVE
LACTATE SERPL-SCNC: 0.7 MMOL/L (ref 0.5–2.2)
LEUKOCYTE ESTERASE UR QL STRIP: NEGATIVE
LYMPHOCYTES # BLD AUTO: 2.2 K/UL (ref 1–4.8)
LYMPHOCYTES NFR BLD: 26.1 % (ref 18–48)
MCH RBC QN AUTO: 30.7 PG (ref 27–31)
MCHC RBC AUTO-ENTMCNC: 30.9 G/DL (ref 32–36)
MCV RBC AUTO: 99 FL (ref 82–98)
MICROSCOPIC COMMENT: ABNORMAL
MONOCYTES # BLD AUTO: 0.5 K/UL (ref 0.3–1)
MONOCYTES NFR BLD: 6 % (ref 4–15)
NEUTROPHILS # BLD AUTO: 5.3 K/UL (ref 1.8–7.7)
NEUTROPHILS NFR BLD: 63 % (ref 38–73)
NITRITE UR QL STRIP: NEGATIVE
NRBC BLD-RTO: 0 /100 WBC
PH UR STRIP: 6 [PH] (ref 5–8)
PLATELET # BLD AUTO: 405 K/UL (ref 150–450)
PMV BLD AUTO: 10.4 FL (ref 9.2–12.9)
POTASSIUM SERPL-SCNC: 4.1 MMOL/L (ref 3.5–5.1)
PROT SERPL-MCNC: 7.8 G/DL (ref 6–8.4)
PROT UR QL STRIP: ABNORMAL
RBC # BLD AUTO: 3.65 M/UL (ref 4–5.4)
RBC #/AREA URNS AUTO: 4 /HPF (ref 0–4)
SODIUM SERPL-SCNC: 138 MMOL/L (ref 136–145)
SP GR UR STRIP: >1.03 (ref 1–1.03)
SQUAMOUS #/AREA URNS AUTO: 31 /HPF
URN SPEC COLLECT METH UR: ABNORMAL
WBC # BLD AUTO: 8.44 K/UL (ref 3.9–12.7)
WBC #/AREA URNS AUTO: 14 /HPF (ref 0–5)

## 2023-05-12 PROCEDURE — 96366 THER/PROPH/DIAG IV INF ADDON: CPT

## 2023-05-12 PROCEDURE — 85025 COMPLETE CBC W/AUTO DIFF WBC: CPT | Performed by: PHYSICIAN ASSISTANT

## 2023-05-12 PROCEDURE — 85652 RBC SED RATE AUTOMATED: CPT | Performed by: PHYSICIAN ASSISTANT

## 2023-05-12 PROCEDURE — 96375 TX/PRO/DX INJ NEW DRUG ADDON: CPT

## 2023-05-12 PROCEDURE — 80053 COMPREHEN METABOLIC PANEL: CPT | Performed by: PHYSICIAN ASSISTANT

## 2023-05-12 PROCEDURE — 87389 HIV-1 AG W/HIV-1&-2 AB AG IA: CPT | Performed by: EMERGENCY MEDICINE

## 2023-05-12 PROCEDURE — 83605 ASSAY OF LACTIC ACID: CPT | Performed by: PHYSICIAN ASSISTANT

## 2023-05-12 PROCEDURE — 99285 EMERGENCY DEPT VISIT HI MDM: CPT | Mod: FS,,, | Performed by: STUDENT IN AN ORGANIZED HEALTH CARE EDUCATION/TRAINING PROGRAM

## 2023-05-12 PROCEDURE — 86140 C-REACTIVE PROTEIN: CPT | Performed by: PHYSICIAN ASSISTANT

## 2023-05-12 PROCEDURE — 25000003 PHARM REV CODE 250: Performed by: PHYSICIAN ASSISTANT

## 2023-05-12 PROCEDURE — 81001 URINALYSIS AUTO W/SCOPE: CPT | Performed by: PHYSICIAN ASSISTANT

## 2023-05-12 PROCEDURE — 63600175 PHARM REV CODE 636 W HCPCS: Performed by: PHYSICIAN ASSISTANT

## 2023-05-12 PROCEDURE — 87150 DNA/RNA AMPLIFIED PROBE: CPT | Mod: 59 | Performed by: PHYSICIAN ASSISTANT

## 2023-05-12 PROCEDURE — 99285 EMERGENCY DEPT VISIT HI MDM: CPT

## 2023-05-12 PROCEDURE — 96365 THER/PROPH/DIAG IV INF INIT: CPT

## 2023-05-12 PROCEDURE — 87086 URINE CULTURE/COLONY COUNT: CPT | Performed by: PHYSICIAN ASSISTANT

## 2023-05-12 PROCEDURE — 99285 PR EMERGENCY DEPT VISIT,LEVEL V: ICD-10-PCS | Mod: FS,,, | Performed by: STUDENT IN AN ORGANIZED HEALTH CARE EDUCATION/TRAINING PROGRAM

## 2023-05-12 PROCEDURE — 87040 BLOOD CULTURE FOR BACTERIA: CPT | Performed by: PHYSICIAN ASSISTANT

## 2023-05-12 PROCEDURE — 86803 HEPATITIS C AB TEST: CPT | Performed by: EMERGENCY MEDICINE

## 2023-05-12 RX ORDER — MORPHINE SULFATE 2 MG/ML
6 INJECTION, SOLUTION INTRAMUSCULAR; INTRAVENOUS
Status: COMPLETED | OUTPATIENT
Start: 2023-05-12 | End: 2023-05-12

## 2023-05-12 RX ORDER — ONDANSETRON 2 MG/ML
4 INJECTION INTRAMUSCULAR; INTRAVENOUS
Status: COMPLETED | OUTPATIENT
Start: 2023-05-12 | End: 2023-05-12

## 2023-05-12 RX ADMIN — MORPHINE SULFATE 6 MG: 2 INJECTION, SOLUTION INTRAMUSCULAR; INTRAVENOUS at 09:05

## 2023-05-12 RX ADMIN — ONDANSETRON 4 MG: 2 INJECTION INTRAMUSCULAR; INTRAVENOUS at 09:05

## 2023-05-12 RX ADMIN — SODIUM CHLORIDE 1000 ML: 9 INJECTION, SOLUTION INTRAVENOUS at 09:05

## 2023-05-12 RX ADMIN — VANCOMYCIN HYDROCHLORIDE 1500 MG: 1.5 INJECTION, POWDER, LYOPHILIZED, FOR SOLUTION INTRAVENOUS at 09:05

## 2023-05-13 PROBLEM — L02.01 FACIAL ABSCESS: Status: ACTIVE | Noted: 2023-05-13

## 2023-05-13 LAB
MRSA ID BY PCR: NEGATIVE
STAPH AUREUS ID BY PCR: NEGATIVE

## 2023-05-13 PROCEDURE — 96366 THER/PROPH/DIAG IV INF ADDON: CPT

## 2023-05-13 PROCEDURE — G0378 HOSPITAL OBSERVATION PER HR: HCPCS

## 2023-05-13 PROCEDURE — 25000003 PHARM REV CODE 250: Performed by: INTERNAL MEDICINE

## 2023-05-13 PROCEDURE — 63600175 PHARM REV CODE 636 W HCPCS: Performed by: INTERNAL MEDICINE

## 2023-05-13 PROCEDURE — 63600175 PHARM REV CODE 636 W HCPCS: Performed by: EMERGENCY MEDICINE

## 2023-05-13 PROCEDURE — 25500020 PHARM REV CODE 255: Performed by: STUDENT IN AN ORGANIZED HEALTH CARE EDUCATION/TRAINING PROGRAM

## 2023-05-13 PROCEDURE — 96367 TX/PROPH/DG ADDL SEQ IV INF: CPT

## 2023-05-13 PROCEDURE — 96361 HYDRATE IV INFUSION ADD-ON: CPT

## 2023-05-13 PROCEDURE — 96376 TX/PRO/DX INJ SAME DRUG ADON: CPT

## 2023-05-13 PROCEDURE — 99222 PR INITIAL HOSPITAL CARE,LEVL II: ICD-10-PCS | Mod: AI,,, | Performed by: INTERNAL MEDICINE

## 2023-05-13 PROCEDURE — 99222 1ST HOSP IP/OBS MODERATE 55: CPT | Mod: AI,,, | Performed by: INTERNAL MEDICINE

## 2023-05-13 PROCEDURE — 96376 TX/PRO/DX INJ SAME DRUG ADON: CPT | Mod: 59

## 2023-05-13 RX ORDER — NALOXONE HCL 0.4 MG/ML
0.4 VIAL (ML) INJECTION
Status: DISCONTINUED | OUTPATIENT
Start: 2023-05-13 | End: 2023-05-15 | Stop reason: HOSPADM

## 2023-05-13 RX ORDER — IPRATROPIUM BROMIDE AND ALBUTEROL SULFATE 2.5; .5 MG/3ML; MG/3ML
3 SOLUTION RESPIRATORY (INHALATION) EVERY 6 HOURS PRN
Status: DISCONTINUED | OUTPATIENT
Start: 2023-05-13 | End: 2023-05-15 | Stop reason: HOSPADM

## 2023-05-13 RX ORDER — MORPHINE SULFATE 2 MG/ML
2 INJECTION, SOLUTION INTRAMUSCULAR; INTRAVENOUS EVERY 8 HOURS PRN
Status: DISCONTINUED | OUTPATIENT
Start: 2023-05-13 | End: 2023-05-13

## 2023-05-13 RX ORDER — ONDANSETRON 2 MG/ML
4 INJECTION INTRAMUSCULAR; INTRAVENOUS EVERY 8 HOURS PRN
Status: DISCONTINUED | OUTPATIENT
Start: 2023-05-13 | End: 2023-05-15 | Stop reason: HOSPADM

## 2023-05-13 RX ORDER — IBUPROFEN 200 MG
400 TABLET ORAL EVERY 6 HOURS PRN
Status: DISCONTINUED | OUTPATIENT
Start: 2023-05-13 | End: 2023-05-15 | Stop reason: HOSPADM

## 2023-05-13 RX ORDER — SODIUM CHLORIDE 9 MG/ML
INJECTION, SOLUTION INTRAVENOUS CONTINUOUS
Status: DISCONTINUED | OUTPATIENT
Start: 2023-05-13 | End: 2023-05-13

## 2023-05-13 RX ORDER — MORPHINE SULFATE 2 MG/ML
6 INJECTION, SOLUTION INTRAMUSCULAR; INTRAVENOUS
Status: COMPLETED | OUTPATIENT
Start: 2023-05-13 | End: 2023-05-13

## 2023-05-13 RX ORDER — SODIUM CHLORIDE 0.9 % (FLUSH) 0.9 %
10 SYRINGE (ML) INJECTION EVERY 12 HOURS PRN
Status: DISCONTINUED | OUTPATIENT
Start: 2023-05-13 | End: 2023-05-15 | Stop reason: HOSPADM

## 2023-05-13 RX ORDER — ACETAMINOPHEN 325 MG/1
650 TABLET ORAL EVERY 6 HOURS PRN
Status: DISCONTINUED | OUTPATIENT
Start: 2023-05-13 | End: 2023-05-15 | Stop reason: HOSPADM

## 2023-05-13 RX ORDER — MORPHINE SULFATE 2 MG/ML
2 INJECTION, SOLUTION INTRAMUSCULAR; INTRAVENOUS EVERY 4 HOURS PRN
Status: DISCONTINUED | OUTPATIENT
Start: 2023-05-13 | End: 2023-05-15 | Stop reason: HOSPADM

## 2023-05-13 RX ORDER — ENOXAPARIN SODIUM 100 MG/ML
40 INJECTION SUBCUTANEOUS EVERY 24 HOURS
Status: DISCONTINUED | OUTPATIENT
Start: 2023-05-14 | End: 2023-05-15 | Stop reason: HOSPADM

## 2023-05-13 RX ORDER — GLUCAGON 1 MG
1 KIT INJECTION
Status: DISCONTINUED | OUTPATIENT
Start: 2023-05-13 | End: 2023-05-15 | Stop reason: HOSPADM

## 2023-05-13 RX ORDER — IBUPROFEN 200 MG
24 TABLET ORAL
Status: DISCONTINUED | OUTPATIENT
Start: 2023-05-13 | End: 2023-05-14

## 2023-05-13 RX ORDER — IBUPROFEN 200 MG
16 TABLET ORAL
Status: DISCONTINUED | OUTPATIENT
Start: 2023-05-13 | End: 2023-05-14

## 2023-05-13 RX ADMIN — VANCOMYCIN HYDROCHLORIDE 1500 MG: 1.5 INJECTION, POWDER, LYOPHILIZED, FOR SOLUTION INTRAVENOUS at 12:05

## 2023-05-13 RX ADMIN — IOHEXOL 75 ML: 350 INJECTION, SOLUTION INTRAVENOUS at 01:05

## 2023-05-13 RX ADMIN — MORPHINE SULFATE 2 MG: 2 INJECTION, SOLUTION INTRAMUSCULAR; INTRAVENOUS at 05:05

## 2023-05-13 RX ADMIN — MORPHINE SULFATE 2 MG: 2 INJECTION, SOLUTION INTRAMUSCULAR; INTRAVENOUS at 08:05

## 2023-05-13 RX ADMIN — VANCOMYCIN HYDROCHLORIDE 1500 MG: 1.5 INJECTION, POWDER, LYOPHILIZED, FOR SOLUTION INTRAVENOUS at 09:05

## 2023-05-13 RX ADMIN — MORPHINE SULFATE 6 MG: 2 INJECTION, SOLUTION INTRAMUSCULAR; INTRAVENOUS at 03:05

## 2023-05-13 RX ADMIN — PIPERACILLIN SODIUM AND TAZOBACTAM SODIUM 4.5 G: 4; .5 INJECTION, POWDER, FOR SOLUTION INTRAVENOUS at 05:05

## 2023-05-13 RX ADMIN — PIPERACILLIN SODIUM AND TAZOBACTAM SODIUM 4.5 G: 4; .5 INJECTION, POWDER, FOR SOLUTION INTRAVENOUS at 08:05

## 2023-05-13 RX ADMIN — SODIUM CHLORIDE: 9 INJECTION, SOLUTION INTRAVENOUS at 08:05

## 2023-05-13 RX ADMIN — MORPHINE SULFATE 2 MG: 2 INJECTION, SOLUTION INTRAMUSCULAR; INTRAVENOUS at 01:05

## 2023-05-13 RX ADMIN — MORPHINE SULFATE 2 MG: 2 INJECTION, SOLUTION INTRAMUSCULAR; INTRAVENOUS at 09:05

## 2023-05-13 RX ADMIN — ONDANSETRON 4 MG: 2 INJECTION INTRAMUSCULAR; INTRAVENOUS at 08:05

## 2023-05-13 NOTE — PLAN OF CARE
05/13/23 1108   Post-Acute Status   Post-Acute Authorization Other   Other Status See Comments   Discharge Delays None known at this time   Discharge Plan   Discharge Plan A Home     Pt stated she is current with [facility].  [facility] to send update via careport to [facility]       [facility] provided housing resources to pt     Gabby Jackson CD, MSW, LMSW, RSW   Case Management  [facility]  Email: [email or web address]
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
## 2023-05-13 NOTE — HPI
39 yo F with a h/o multiple MRSA infections who presents with 4-5 days of facial swelling and pain. She has not taken any antibiotics. She does note that she has had multiple bad teeth for some time but has never had a complication like this. She had a recent revision lumbar fusion for cauda equina syndrome on 4/11. She was first noted to be swollen 3 days ago at her follow-up. She is antibiotic naive. Getting vanc/zosyn in the ED. VSS. Now admitted to hospital medicine.
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
## 2023-05-13 NOTE — PROVIDER PROGRESS NOTES - EMERGENCY DEPT.
Signout Note    I received signout from the previous provider.     Chief complaint:  Post-op Problem (Back surg one month, incision open) and Facial Swelling (L side )      Pertinent history &exam:  Marianela Shrestha is a 38 y.o. female with pertinent PMH of PTSD, obesity, cauda equina syndrome, seizure, asthma, generalized anxiety disorder, tobacco abuse who presented to emergency department with complaint of facial swelling.  Found to have facial cellulitis without evidence of airway compromise.  Labs reviewed.  She received IV antibiotics.  Signed out pending CT maxillofacial for final disposition.    Vitals:    05/13/23 0100   BP: 113/71   Pulse: 79   Resp: 18   Temp:        Imaging Studies:    CT Maxillofacial With Contrast   Final Result   Abnormal      There is abnormality involving the left maxillary 1st molar including periapical lucency and associated dehiscence along the lateral aspect, with an overlying area of diminished attenuation thought to represent a small fluid collection measuring approximately 3.8 by 11.2 mm on axial imaging, as discussed above, with overlying prominent soft tissue edema and inflammatory change.      Additional dental findings are noted, correlation for acute versus chronic dental disease is needed.      Additional findings as above.      This report was flagged in Epic as abnormal.         Electronically signed by: Emre Cox   Date:    05/13/2023   Time:    01:57          Medications Given:  Medications   morphine injection 6 mg (has no administration in time range)   sodium chloride 0.9% bolus 1,000 mL 1,000 mL (1,000 mLs Intravenous New Bag 5/12/23 2133)   ondansetron injection 4 mg (4 mg Intravenous Given 5/12/23 2130)   morphine injection 6 mg (6 mg Intravenous Given 5/12/23 2131)   vancomycin 1,500 mg in dextrose 5 % (D5W) 250 mL IVPB (Vial-Mate) (1,500 mg Intravenous New Bag 5/12/23 2147)   iohexoL (OMNIPAQUE 350) injection 75 mL (75 mLs Intravenous Given 5/13/23  0128)       Pending Items/ MDM:  38 y.o. female with above complaint.  Found to have tooth abscess.  No buccal abscess.  OMFS at [facility] is not available at this time is [facility] is on saturation and not accepting transfers. Will plan for nonemergent ENT consult in the morning for further evaluation.  No indication for emergent consult at this time.  Will continue IV antibiotics and place in observation to Internal Medicine    Diagnostic Impression:    1. Facial cellulitis    2. Facial swelling    3. Tooth abscess         ED Disposition Condition    Observation Stable               Patient understands the plan and is in agreement, verbalized understanding, questions answered    Heidi Ortiz MD  Emergency Medicine

## 2023-05-13 NOTE — HPI
"38-year-old female past medical history of anxiety/depression and history of MRSA infection in the past, she recently had low back surgery and went for a follow up visit 3 days ago and at that time noted to have redness to left face with minimal swelling.  Patient states that she then went home and woke up the following morning and had significant swelling, given her underlying history of MRSA infections she decided to come to hospital.  She reports subjective fever.  No issues with swallowing or breathing.  Denies any cough or chest pain.  Denies any ringing of the ears or hearing loss.  Labs significant for elevated sed rate and CRP.  CT maxillofacial reports "There is abnormality involving the left maxillary 1st molar including periapical lucency and associated dehiscence along the lateral aspect, with an overlying area of diminished attenuation thought to represent a small fluid collection measuring approximately 3.8 by 11.2 mm on axial imaging, as discussed above, with overlying prominent soft tissue edema and inflammatory change."Patient received IV vancomycin.  ER tried to transfer patient to [facility] for orofacial maxillary surgery, however [facility] was full to capacity and ENT decided to see patient if she was unable to be transferred.    "
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
## 2023-05-13 NOTE — FIRST PROVIDER EVALUATION
Emergency Department TeleTriage Encounter Note      CHIEF COMPLAINT    Chief Complaint   Patient presents with    Post-op Problem     Back surg one month, incision open    Facial Swelling     L side        VITAL SIGNS   Initial Vitals [05/12/23 1716]   BP Pulse Resp Temp SpO2   (!) 146/90 99 18 99.5 °F (37.5 °C) 99 %      MAP       --            ALLERGIES    Review of patient's allergies indicates:   Allergen Reactions    Coconut Hives and Itching    Sulfa (sulfonamide antibiotics) Hives     itching    Adhesive Rash    Latex, natural rubber Other (See Comments) and Rash       PROVIDER TRIAGE NOTE  This is a teletriage evaluation of a 38 y.o. female presenting to the ED complaining of facial swelling. Patient had lumbar fusion one month ago. She reports waking up in the middle of the night with left sided facial swelling. She reports wound check of her incision that is not healing yesterday. She is currently on antibiotics.    Patient is alert and oriented. She speaks in complete sentences. She is sitting upright in the chair in no distress. The left side of her face is swelling.    Initial orders will be placed and care will be transferred to an alternate provider when patient is roomed for a full evaluation. Any additional orders and the final disposition will be determined by that provider.         ORDERS  Labs Reviewed   HIV 1 / 2 ANTIBODY   HEPATITIS C ANTIBODY   CBC W/ AUTO DIFFERENTIAL   COMPREHENSIVE METABOLIC PANEL   URINALYSIS, REFLEX TO URINE CULTURE       ED Orders (720h ago, onward)      Start Ordered     Status Ordering Provider    05/12/23 1956 05/12/23 1956  CBC auto differential  STAT         Ordered KATHIE PINEDA    05/12/23 1956 05/12/23 1956  Comprehensive metabolic panel  STAT         Ordered KATHIE PINEDA    05/12/23 1956 05/12/23 1956  Insert Saline lock IV  Once         Ordered KATHIE PINEDA    05/12/23 1956 05/12/23 1956  Urinalysis, Reflex to Urine Culture Urine, Clean Catch  STAT          Ordered KATHIE PINEDA JUANIS.    05/12/23 1717 05/12/23 1717  HIV 1/2 Ag/Ab (4th Gen)  STAT         Ordered VANNESSA PAEZ    05/12/23 1717 05/12/23 1717  Hepatitis C Antibody  STAT         Ordered VANNESSA PAEZ              Virtual Visit Note: The provider triage portion of this emergency department evaluation and documentation was performed via [employer], a HIPAA-compliant telemedicine application, in concert with a tele-presenter in the room. A face to face patient evaluation with one of my colleagues will occur once the patient is placed in an emergency department room.      DISCLAIMER: This note was prepared with getupp voice recognition transcription software. Garbled syntax, mangled pronouns, and other bizarre constructions may be attributed to that software system.

## 2023-05-13 NOTE — PLAN OF CARE
[facility] - Emergency Dept  Initial Discharge Assessment       Primary Care Provider: Arnaldo Espino MD    Admission Diagnosis: Facial cellulitis [L03.211]    Admission Date: 5/12/2023  Expected Discharge Date:     SW visited with pt and significant other at bedside.  Pt stated that she is being evicted from her mother's home in 30 days and will have nowhere to live.      Pt stated she is current with [facility] but they have only had 3 visits from home health since last d/c from [facility].      Pt stated she relies on a walker with 2 wheels and a wheelchair to assist with ambulation.  Pt stated her significant other helps with ADL's.      Pt to d/c home.  SW [facility]     SW provided pt with homelessness resources and community agencies that assist with housing    Transition of Care Barriers: (P) Other (see comments) (family barriers.  Pt stated she is being evicted in 30 days from mothers residence)    Payor: [employer] MANAGED MEDICARE / Plan: [employer] [facility] HMO PPO SPECIAL NEEDS / Product Type: Medicare Advantage /     Extended Emergency Contact Information  Primary Emergency Contact: Paul Rod  Address: [address] [place] [address]  Mobile Phone: [phone]  Relation: Significant other  Secondary Emergency Contact: Lidia Thomas  Address: [address] [place] [address]  Home Phone: [phone]  Mobile Phone: [phone]  Relation: Mother    Discharge Plan A: (P) Home  Discharge Plan B: (P) Home      [id number] - [place], [place] - [address]  [address] [address]  Phone: [phone] Fax: [phone]    [facility] [address] [address]  Phone: [phone] Fax: [phone]      Initial Assessment (most recent)       Adult Discharge Assessment - 05/13/23 1102          Discharge Assessment    Assessment Type  Discharge Planning Assessment (P)      Confirmed/corrected address, phone number and insurance Yes (P)      Confirmed Demographics Correct on Facesheet (P)      Source of Information patient (P)      Reason For Admission Facial abscess (P)      People in Home significant other;sibling(s);parent(s) (P)      Facility Arrived From: home (P)      Do you expect to return to your current living situation? No (P)      Do you have help at home or someone to help you manage your care at home? No (P)      Prior to hospitilization cognitive status: Alert/Oriented;No Deficits (P)      Current cognitive status: Alert/Oriented;No Deficits (P)      Walking or Climbing Stairs ambulation difficulty, requires equipment (P)      Mobility Management walker with 2 wheels, wheelchair (P)      Home Accessibility stairs to enter home (P)      Number of Stairs, Main Entrance one (P)      Home Layout Able to live on 1st floor (P)      Equipment Currently Used at Home walker, standard;wheelchair;bath bench (P)      Readmission within 30 days? Yes (P)      Patient currently being followed by outpatient case management? No (P)      Do you currently have service(s) that help you manage your care at home? Yes (P)      Name and Contact number of agency [facility] (P)      Is the pt/caregiver preference to resume services with current agency Yes (P)      Who is going to help you get home at discharge? family (P)      How do you get to doctors appointments? family or friend will provide (P)      Are you on dialysis? No (P)      Do you take coumadin? No (P)      Discharge Plan A Home (P)      Discharge Plan B Home (P)      DME Needed Upon Discharge  none (P)      Discharge Plan discussed with: Patient (P)      Transition of Care Barriers Other (see comments) (P)    family barriers.  Pt stated she is being evicted in 30 days from mothers residence       OTHER    Name(s) of People in Home mother Lidia, Significant Other Paul, Brother Gustabo  (P)                       Gabby Jackson CD, MSW, LMSW, RSW   Case Management  [facility]  Email: [email or web address]

## 2023-05-13 NOTE — PROGRESS NOTES
Nurses Note -- 4 Eyes      5/13/2023   4:55 PM      Skin assessed during: Admit      [] No Pressure Injuries Present    []Prevention Measures Documented      [x] Yes- Altered Skin Integrity Present or Discovered   [] LDA Added if Not in Epic (Describe Wound)   [x] New Altered Skin Integrity was Present on Admit and Documented in LDA   [] Wound Image Taken    Wound Care Consulted? Yes    Attending Nurse:  Vannesa Kyle RN     Second RN/Staff Member:  Vandana Yee
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
## 2023-05-13 NOTE — ASSESSMENT & PLAN NOTE
Confirmed with CT.  Patient unable to transferred to [facility] for orofacial maxillary surgery.  ENT at [facility] will see patient.  We will keep patient NPO, IV fluids.  Follow up on blood cultures.  We will continue antibiotics IV vancomycin and Zosyn.  Given history of MRSA we will obtain echocardiogram.
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
## 2023-05-13 NOTE — ASSESSMENT & PLAN NOTE
37 yo F with odontogenic facial cellulitis without a drainable collection    - Agree with admission for IV abx to monitor response  - Defer to primary team regarding choice; considering her history needs adequate MRSA and anaerobic coverage  - No other ENT intervention planned  - Will need a dental referral upon discharge as she has multiple periapical tooth abscesses  - Please chat with any concerns

## 2023-05-13 NOTE — ED NOTES
Assumed care of this pt at this time.   Patient identifiers verified and correct for Marianela Shrestha   LOC: The patient is awake, alert and aware of environment with an appropriate affect, the patient is oriented x 3 and speaking appropriately.   APPEARANCE: Patient appears comfortable and in no acute distress, patient is clean and well groomed. Pt c/o left sided facial swelling.   SKIN: The skin is warm and dry, color consistent with ethnicity, patient has normal skin turgor and moist mucus membranes, skin intact, no breakdown or bruising noted.   MUSCULOSKELETAL: Patient moving all extremities spontaneously, no swelling noted.   RESPIRATORY: Airway is open and patent, respirations are spontaneous, patient has a normal effort and rate, no accessory muscle use noted, pt placed on continuous pulse ox with O2 sats noted at 99% on room air.  CARDIAC: Pt placed on cardiac monitor. Patient has a normal rate, no edema noted, capillary refill < 3 seconds.   GASTRO: Soft and non tender to palpation, no distention noted, normoactive bowel sounds present in all four quadrants. Pt states bowel movements have been regular.  : Pt denies any pain or frequency with urination.  NEURO: Pt opens eyes spontaneously, behavior appropriate to situation, follows commands, facial expression symmetrical, bilateral hand grasp equal and even, purposeful motor response noted, normal sensation in all extremities when touched with a finger.

## 2023-05-13 NOTE — PLAN OF CARE
Problem: Adult Inpatient Plan of Care  Goal: Plan of Care Review  Outcome: Ongoing, Progressing  Flowsheets (Taken 5/13/2023 1838)  Plan of Care Reviewed With: patient  Goal: Patient-Specific Goal (Individualized)  Outcome: Ongoing, Progressing  Goal: Absence of Hospital-Acquired Illness or Injury  Outcome: Ongoing, Progressing  Intervention: Identify and Manage Fall Risk  Flowsheets (Taken 5/13/2023 1838)  Safety Promotion/Fall Prevention:   assistive device/personal item within reach   side rails raised x 2   instructed to call staff for mobility  Intervention: Prevent Skin Injury  Flowsheets (Taken 5/13/2023 1838)  Skin Protection: incontinence pads utilized  Intervention: Prevent and Manage VTE (Venous Thromboembolism) Risk  Flowsheets (Taken 5/13/2023 1838)  Activity Management: Ambulated to bathroom - L4  VTE Prevention/Management: bleeding precations maintained  Intervention: Prevent Infection  Flowsheets (Taken 5/13/2023 1838)  Infection Prevention: single patient room provided     Patient alert and oriented x4; care plan discussed with patient; free from falls/injuries during the shift; call light and personal belongings within reach; wound care consult entered; cleaned wound on back with saline, applied betadine and dressing, patient tolerated well.

## 2023-05-13 NOTE — PROGRESS NOTES
Pharmacokinetic Initial Assessment: IV Vancomycin    Assessment/Plan:  Patient received vancomycin 1500 mg in the ED  Initiate vancomycin 1500 mg IV every 12 hours  Desired empiric serum trough concentration is 10 to 20 mcg/mL  Draw vancomycin trough level 60 min prior to fourth dose on 5/14 at approximately 0830  Pharmacy will continue to follow and monitor vancomycin.      Please contact pharmacy at extension [phone] with any questions regarding this assessment.     Thank you for the consult,   Lor Vargas       Patient brief summary:  Marianela Shrestha is a 38 y.o. female initiated on antimicrobial therapy with IV Vancomycin for treatment of suspected skin & soft tissue infection    Drug Allergies:   Review of patient's allergies indicates:   Allergen Reactions    Coconut Hives and Itching    Sulfa (sulfonamide antibiotics) Hives     itching    Adhesive Rash    Latex, natural rubber Other (See Comments) and Rash       Actual Body Weight:   93 kg    Renal Function:   Estimated Creatinine Clearance: 106.4 mL/min (based on SCr of 0.8 mg/dL).,     Dialysis Method (if applicable):  N/A    CBC (last 72 hours):  Recent Labs   Lab Result Units 05/12/23 2005   WBC K/uL 8.44   Hemoglobin g/dL 11.2*   Hematocrit % 36.2*   Platelets K/uL 405   Gran % % 63.0   Lymph % % 26.1   Mono % % 6.0   Eosinophil % % 4.5   Basophil % % 0.2   Differential Method  Automated       Metabolic Panel (last 72 hours):  Recent Labs   Lab Result Units 05/12/23 2005 05/12/23  2148   Sodium mmol/L 138  --    Potassium mmol/L 4.1  --    Chloride mmol/L 105  --    CO2 mmol/L 25  --    Glucose mg/dL 102  --    Glucose, UA   --  Negative   BUN mg/dL 10  --    Creatinine mg/dL 0.8  --    Albumin g/dL 3.1*  --    Total Bilirubin mg/dL 0.2  --    Alkaline Phosphatase U/L 154*  --    AST U/L 18  --    ALT U/L 13  --        Drug levels (last 3 results):  No results for input(s): VANCOMYCINRA, VANCORANDOM, VANCOMYCINPE, VANCOPEAK, VANCOMYCINTR, VANCOTROUGH in  the last 72 hours.    Microbiologic Results:  Microbiology Results (last 7 days)       Procedure Component Value Units Date/Time    Blood Culture #2 **CANNOT BE ORDERED STAT** [[id number]] Collected: 05/12/23 2137    Order Status: Completed Specimen: Blood from Peripheral, Hand, Right Updated: 05/13/23 0515     Blood Culture, Routine No Growth to date    Blood Culture #1 **CANNOT BE ORDERED STAT** [[id number]] Collected: 05/12/23 2136    Order Status: Completed Specimen: Blood from Peripheral, Forearm, Right Updated: 05/13/23 0515     Blood Culture, Routine No Growth to date    Urine culture [[id number]] Collected: 05/12/23 2148    Order Status: No result Specimen: Urine Updated: 05/12/23 2248

## 2023-05-14 ENCOUNTER — PATIENT MESSAGE (OUTPATIENT)
Dept: ADMINISTRATIVE | Facility: OTHER | Age: 38
End: 2023-05-14
Payer: MEDICARE

## 2023-05-14 LAB
ANION GAP SERPL CALC-SCNC: 10 MMOL/L (ref 8–16)
ASCENDING AORTA: 3.01 CM
AV INDEX (PROSTH): 0.79
AV MEAN GRADIENT: 6 MMHG
AV PEAK GRADIENT: 8 MMHG
AV VALVE AREA: 2.23 CM2
AV VELOCITY RATIO: 0.85
BACTERIA UR CULT: NORMAL
BASOPHILS # BLD AUTO: 0.02 K/UL (ref 0–0.2)
BASOPHILS NFR BLD: 0.2 % (ref 0–1.9)
BSA FOR ECHO PROCEDURE: 2.04 M2
BUN SERPL-MCNC: 5 MG/DL (ref 6–20)
CALCIUM SERPL-MCNC: 9 MG/DL (ref 8.7–10.5)
CHLORIDE SERPL-SCNC: 106 MMOL/L (ref 95–110)
CO2 SERPL-SCNC: 22 MMOL/L (ref 23–29)
CREAT SERPL-MCNC: 0.7 MG/DL (ref 0.5–1.4)
CV ECHO LV RWT: 0.27 CM
DIFFERENTIAL METHOD: ABNORMAL
DOP CALC AO PEAK VEL: 1.43 M/S
DOP CALC AO VTI: 28.47 CM
DOP CALC LVOT AREA: 2.8 CM2
DOP CALC LVOT DIAMETER: 1.9 CM
DOP CALC LVOT PEAK VEL: 1.21 M/S
DOP CALC LVOT STROKE VOLUME: 63.42 CM3
DOP CALCLVOT PEAK VEL VTI: 22.38 CM
E WAVE DECELERATION TIME: 147.92 MSEC
E/A RATIO: 1.61
E/E' RATIO: 8.31 M/S
ECHO LV POSTERIOR WALL: 0.65 CM (ref 0.6–1.1)
EJECTION FRACTION: 60 %
EOSINOPHIL # BLD AUTO: 0.2 K/UL (ref 0–0.5)
EOSINOPHIL NFR BLD: 3 % (ref 0–8)
ERYTHROCYTE [DISTWIDTH] IN BLOOD BY AUTOMATED COUNT: 13.2 % (ref 11.5–14.5)
EST. GFR  (NO RACE VARIABLE): >60 ML/MIN/1.73 M^2
FRACTIONAL SHORTENING: 36 % (ref 28–44)
GLUCOSE SERPL-MCNC: 88 MG/DL (ref 70–110)
HCT VFR BLD AUTO: 33.5 % (ref 37–48.5)
HGB BLD-MCNC: 10.3 G/DL (ref 12–16)
IMM GRANULOCYTES # BLD AUTO: 0.03 K/UL (ref 0–0.04)
IMM GRANULOCYTES NFR BLD AUTO: 0.4 % (ref 0–0.5)
INTERVENTRICULAR SEPTUM: 0.72 CM (ref 0.6–1.1)
IVRT: 74.22 MSEC
LA MAJOR: 4.86 CM
LA MINOR: 4.98 CM
LA WIDTH: 3.88 CM
LEFT ATRIUM SIZE: 3.27 CM
LEFT ATRIUM VOLUME INDEX MOD: 23.4 ML/M2
LEFT ATRIUM VOLUME INDEX: 26.8 ML/M2
LEFT ATRIUM VOLUME MOD: 46.39 CM3
LEFT ATRIUM VOLUME: 53.05 CM3
LEFT INTERNAL DIMENSION IN SYSTOLE: 3.1 CM (ref 2.1–4)
LEFT VENTRICLE DIASTOLIC VOLUME INDEX: 56.21 ML/M2
LEFT VENTRICLE DIASTOLIC VOLUME: 111.3 ML
LEFT VENTRICLE MASS INDEX: 54 G/M2
LEFT VENTRICLE SYSTOLIC VOLUME INDEX: 19.1 ML/M2
LEFT VENTRICLE SYSTOLIC VOLUME: 37.77 ML
LEFT VENTRICULAR INTERNAL DIMENSION IN DIASTOLE: 4.87 CM (ref 3.5–6)
LEFT VENTRICULAR MASS: 106.65 G
LV LATERAL E/E' RATIO: 6.75 M/S
LV SEPTAL E/E' RATIO: 10.8 M/S
LYMPHOCYTES # BLD AUTO: 1.4 K/UL (ref 1–4.8)
LYMPHOCYTES NFR BLD: 17.1 % (ref 18–48)
MCH RBC QN AUTO: 30.5 PG (ref 27–31)
MCHC RBC AUTO-ENTMCNC: 30.7 G/DL (ref 32–36)
MCV RBC AUTO: 99 FL (ref 82–98)
MONOCYTES # BLD AUTO: 0.5 K/UL (ref 0.3–1)
MONOCYTES NFR BLD: 6 % (ref 4–15)
MV PEAK A VEL: 0.67 M/S
MV PEAK E VEL: 1.08 M/S
MV STENOSIS PRESSURE HALF TIME: 42.9 MS
MV VALVE AREA P 1/2 METHOD: 5.13 CM2
NEUTROPHILS # BLD AUTO: 5.9 K/UL (ref 1.8–7.7)
NEUTROPHILS NFR BLD: 73.3 % (ref 38–73)
NRBC BLD-RTO: 0 /100 WBC
PISA TR MAX VEL: 2.54 M/S
PLATELET # BLD AUTO: 377 K/UL (ref 150–450)
PMV BLD AUTO: 10.1 FL (ref 9.2–12.9)
POTASSIUM SERPL-SCNC: 4.2 MMOL/L (ref 3.5–5.1)
QEF: 56 %
RA MAJOR: 4.24 CM
RA PRESSURE: 3 MMHG
RA WIDTH: 3.28 CM
RBC # BLD AUTO: 3.38 M/UL (ref 4–5.4)
RIGHT VENTRICULAR END-DIASTOLIC DIMENSION: 2.95 CM
SINUS: 2.84 CM
SODIUM SERPL-SCNC: 138 MMOL/L (ref 136–145)
STJ: 2.49 CM
TDI LATERAL: 0.16 M/S
TDI SEPTAL: 0.1 M/S
TDI: 0.13 M/S
TR MAX PG: 26 MMHG
TRICUSPID ANNULAR PLANE SYSTOLIC EXCURSION: 3.15 CM
TV REST PULMONARY ARTERY PRESSURE: 29 MMHG
VANCOMYCIN TROUGH SERPL-MCNC: 13.8 UG/ML (ref 10–22)
WBC # BLD AUTO: 8.03 K/UL (ref 3.9–12.7)

## 2023-05-14 PROCEDURE — 63600175 PHARM REV CODE 636 W HCPCS: Performed by: INTERNAL MEDICINE

## 2023-05-14 PROCEDURE — 96365 THER/PROPH/DIAG IV INF INIT: CPT | Mod: 59

## 2023-05-14 PROCEDURE — 96361 HYDRATE IV INFUSION ADD-ON: CPT

## 2023-05-14 PROCEDURE — G0378 HOSPITAL OBSERVATION PER HR: HCPCS

## 2023-05-14 PROCEDURE — 99232 PR SUBSEQUENT HOSPITAL CARE,LEVL II: ICD-10-PCS | Mod: ,,, | Performed by: INTERNAL MEDICINE

## 2023-05-14 PROCEDURE — 96372 THER/PROPH/DIAG INJ SC/IM: CPT | Performed by: INTERNAL MEDICINE

## 2023-05-14 PROCEDURE — 80202 ASSAY OF VANCOMYCIN: CPT | Performed by: INTERNAL MEDICINE

## 2023-05-14 PROCEDURE — 25000003 PHARM REV CODE 250: Performed by: INTERNAL MEDICINE

## 2023-05-14 PROCEDURE — 97165 OT EVAL LOW COMPLEX 30 MIN: CPT

## 2023-05-14 PROCEDURE — 36415 COLL VENOUS BLD VENIPUNCTURE: CPT | Performed by: INTERNAL MEDICINE

## 2023-05-14 PROCEDURE — 99232 SBSQ HOSP IP/OBS MODERATE 35: CPT | Mod: ,,, | Performed by: INTERNAL MEDICINE

## 2023-05-14 PROCEDURE — 96376 TX/PRO/DX INJ SAME DRUG ADON: CPT | Mod: 59

## 2023-05-14 PROCEDURE — 97161 PT EVAL LOW COMPLEX 20 MIN: CPT

## 2023-05-14 PROCEDURE — 85025 COMPLETE CBC W/AUTO DIFF WBC: CPT | Performed by: INTERNAL MEDICINE

## 2023-05-14 PROCEDURE — 87040 BLOOD CULTURE FOR BACTERIA: CPT | Performed by: HOSPITALIST

## 2023-05-14 PROCEDURE — 96366 THER/PROPH/DIAG IV INF ADDON: CPT

## 2023-05-14 PROCEDURE — 97116 GAIT TRAINING THERAPY: CPT

## 2023-05-14 PROCEDURE — 97535 SELF CARE MNGMENT TRAINING: CPT

## 2023-05-14 PROCEDURE — 80048 BASIC METABOLIC PNL TOTAL CA: CPT | Performed by: INTERNAL MEDICINE

## 2023-05-14 RX ORDER — DEXTROSE 40 %
15 GEL (GRAM) ORAL
Status: DISCONTINUED | OUTPATIENT
Start: 2023-05-14 | End: 2023-05-15 | Stop reason: HOSPADM

## 2023-05-14 RX ORDER — GABAPENTIN 300 MG/1
300 CAPSULE ORAL 3 TIMES DAILY
Status: DISCONTINUED | OUTPATIENT
Start: 2023-05-14 | End: 2023-05-15 | Stop reason: HOSPADM

## 2023-05-14 RX ORDER — VENLAFAXINE HYDROCHLORIDE 150 MG/1
300 CAPSULE, EXTENDED RELEASE ORAL DAILY
Status: DISCONTINUED | OUTPATIENT
Start: 2023-05-15 | End: 2023-05-15 | Stop reason: HOSPADM

## 2023-05-14 RX ORDER — CLONAZEPAM 0.5 MG/1
0.5 TABLET ORAL 3 TIMES DAILY
Status: DISCONTINUED | OUTPATIENT
Start: 2023-05-14 | End: 2023-05-15 | Stop reason: HOSPADM

## 2023-05-14 RX ADMIN — PIPERACILLIN SODIUM AND TAZOBACTAM SODIUM 4.5 G: 4; .5 INJECTION, POWDER, FOR SOLUTION INTRAVENOUS at 01:05

## 2023-05-14 RX ADMIN — ACETAMINOPHEN 650 MG: 325 TABLET ORAL at 11:05

## 2023-05-14 RX ADMIN — MORPHINE SULFATE 2 MG: 2 INJECTION, SOLUTION INTRAMUSCULAR; INTRAVENOUS at 08:05

## 2023-05-14 RX ADMIN — PIPERACILLIN SODIUM AND TAZOBACTAM SODIUM 4.5 G: 4; .5 INJECTION, POWDER, FOR SOLUTION INTRAVENOUS at 09:05

## 2023-05-14 RX ADMIN — MORPHINE SULFATE 2 MG: 2 INJECTION, SOLUTION INTRAMUSCULAR; INTRAVENOUS at 09:05

## 2023-05-14 RX ADMIN — CLONAZEPAM 0.5 MG: 0.5 TABLET ORAL at 02:05

## 2023-05-14 RX ADMIN — MORPHINE SULFATE 2 MG: 2 INJECTION, SOLUTION INTRAMUSCULAR; INTRAVENOUS at 01:05

## 2023-05-14 RX ADMIN — ENOXAPARIN SODIUM 40 MG: 40 INJECTION SUBCUTANEOUS at 04:05

## 2023-05-14 RX ADMIN — MORPHINE SULFATE 2 MG: 2 INJECTION, SOLUTION INTRAMUSCULAR; INTRAVENOUS at 04:05

## 2023-05-14 RX ADMIN — PIPERACILLIN SODIUM AND TAZOBACTAM SODIUM 4.5 G: 4; .5 INJECTION, POWDER, FOR SOLUTION INTRAVENOUS at 05:05

## 2023-05-14 RX ADMIN — VANCOMYCIN HYDROCHLORIDE 1500 MG: 1.5 INJECTION, POWDER, LYOPHILIZED, FOR SOLUTION INTRAVENOUS at 01:05

## 2023-05-14 RX ADMIN — GABAPENTIN 300 MG: 300 CAPSULE ORAL at 08:05

## 2023-05-14 RX ADMIN — IBUPROFEN 400 MG: 200 TABLET, FILM COATED ORAL at 10:05

## 2023-05-14 RX ADMIN — CLONAZEPAM 0.5 MG: 0.5 TABLET ORAL at 08:05

## 2023-05-14 RX ADMIN — GABAPENTIN 300 MG: 300 CAPSULE ORAL at 02:05

## 2023-05-14 NOTE — PROGRESS NOTES
[facility] - Intensive Care ([place] [address])  Otorhinolaryngology-Head & Neck Surgery  Progress Note    Subjective:     Post-Op Info:  * No surgery found *      Hospital Day: 3     Interval History: Appears to be improving on antibiotics. AFVSS.     Medications:  Continuous Infusions:  Scheduled Meds:   enoxaparin  40 mg Subcutaneous Daily    piperacillin-tazobactam (ZOSYN) IVPB  4.5 g Intravenous Q8H    vancomycin (VANCOCIN) IVPB  15 mg/kg Intravenous Q12H     PRN Meds:acetaminophen, albuterol-ipratropium, dextrose 10%, dextrose 10%, dextrose, glucagon (human recombinant), ibuprofen, morphine, naloxone, ondansetron, sodium chloride 0.9%     Review of patient's allergies indicates:   Allergen Reactions    Coconut Hives and Itching    Sulfa (sulfonamide antibiotics) Hives     itching    Adhesive Rash    Latex, natural rubber Other (See Comments) and Rash     Objective:     Vital Signs (24h Range):  Temp:  [98.3 °F (36.8 °C)-99.1 °F (37.3 °C)] 98.6 °F (37 °C)  Pulse:  [72-85] 74  Resp:  [16-20] 18  SpO2:  [95 %-100 %] 99 %  BP: (119-130)/(56-84) 130/75       Lines/Drains/Airways       Peripheral Intravenous Line  Duration                  Peripheral IV - Single Lumen 05/13/23 2249 20 G Right Antecubital <1 day                     Physical Exam    Mild distress, conversant, multiple excoriations on face and limbs  EOMI, PERRL  HB I/VI bilaterally  CN II-XII intact  Cellulitic changes of the left  and submandibular space, edematous, indurated, improving  Gingiva TTP over maxillar molars on the left, no fluctuance  Neck with reactive lymph adenopathy  Aerating well on RA    Significant Labs:  Recent Lab Results         05/14/23  0847   05/14/23  0404        Anion Gap   10       Baso #   0.02       Basophil %   0.2       BUN   5       Calcium   9.0       Chloride   106       CO2   22       Creatinine   0.7       Differential Method   Automated       eGFR   >60.0       Eos #   0.2       Eosinophil %   3.0        Glucose   88       Gran # (ANC)   5.9       Gran %   73.3       Hematocrit   33.5       Hemoglobin   10.3       Immature Grans (Abs)   0.03  Comment: Mild elevation in immature granulocytes is non specific and   can be seen in a variety of conditions including stress response,   acute inflammation, trauma and pregnancy. Correlation with other   laboratory and clinical findings is essential.         Immature Granulocytes   0.4       Lymph #   1.4       Lymph %   17.1       MCH   30.5       MCHC   30.7       MCV   99       Mono #   0.5       Mono %   6.0       MPV   10.1       nRBC   0       Platelets   377       Potassium   4.2       RBC   3.38       RDW   13.2       Sodium   138       Vancomycin-Trough 13.8         WBC   8.03               Significant Diagnostics:  I have reviewed and interpreted all pertinent imaging results/findings within the past 24 hours.    Assessment/Plan:     * Facial abscess  39 yo F with odontogenic facial cellulitis without a drainable collection    - Appears stable for dc from ENT standpoint once transitioned to oral abx  - Recommend abx with coverage of anaerobics and MRSA  - Needs follow-up with dentistry  - ENT will sign off        Branden Beth MD  Otorhinolaryngology-Head & Neck Surgery  [facility] - Intensive Care ([facility]

## 2023-05-14 NOTE — PROGRESS NOTES
Pharmacokinetic Assessment Follow Up: IV Vancomycin    Vancomycin serum concentration assessment(s):    Vanc trough = 13.8 mcg/mL. Drawn appropriately and at goal    Vancomycin Regimen Plan:  Continue vancomycin 1500 mg IV q12h  Repeat trough 5/17 @ 0900  Goal trough = 10-15 mcg/mL    Thank you for the consult, will continue to follow  Jimbo Morrison Pharm.D., BCPS  [id number]       Patient brief summary:  Marianela Shrestha is a 38 y.o. female initiated on antimicrobial therapy with IV Vancomycin for treatment of SSTI    Drug levels (last 3 results):  Recent Labs   Lab Result Units 05/14/23  0847   Vancomycin-Trough ug/mL 13.8       Drug Allergies:   Review of patient's allergies indicates:   Allergen Reactions    Coconut Hives and Itching    Sulfa (sulfonamide antibiotics) Hives     itching    Adhesive Rash    Latex, natural rubber Other (See Comments) and Rash       Actual Body Weight:   91.2 kg    Renal Function:   Estimated Creatinine Clearance: 120.4 mL/min (based on SCr of 0.7 mg/dL).,     CBC (last 72 hours):  Recent Labs   Lab Result Units 05/12/23 2005 05/14/23  0404   WBC K/uL 8.44 8.03   Hemoglobin g/dL 11.2* 10.3*   Hematocrit % 36.2* 33.5*   Platelets K/uL 405 377   Gran % % 63.0 73.3*   Lymph % % 26.1 17.1*   Mono % % 6.0 6.0   Eosinophil % % 4.5 3.0   Basophil % % 0.2 0.2   Differential Method  Automated Automated       Metabolic Panel (last 72 hours):  Recent Labs   Lab Result Units 05/12/23 2005 05/12/23  2148 05/14/23  0404   Sodium mmol/L 138  --  138   Potassium mmol/L 4.1  --  4.2   Chloride mmol/L 105  --  106   CO2 mmol/L 25  --  22*   Glucose mg/dL 102  --  88   Glucose, UA   --  Negative  --    BUN mg/dL 10  --  5*   Creatinine mg/dL 0.8  --  0.7   Albumin g/dL 3.1*  --   --    Total Bilirubin mg/dL 0.2  --   --    Alkaline Phosphatase U/L 154*  --   --    AST U/L 18  --   --    ALT U/L 13  --   --        Vancomycin Administrations:  vancomycin given in the last 96 hours                      vancomycin 1,500 mg in dextrose 5 % (D5W) 250 mL IVPB (Vial-Mate) (mg) 1,500 mg New Bag 05/13/23 2153     1,500 mg New Bag  1247    vancomycin 1,500 mg in dextrose 5 % (D5W) 250 mL IVPB (Vial-Mate) (mg) 1,500 mg New Bag 05/12/23 2147                    Microbiologic Results:  Microbiology Results (last 7 days)       Procedure Component Value Units Date/Time    Urine culture [[id number]] Collected: 05/12/23 2148    Order Status: Completed Specimen: Urine Updated: 05/14/23 0910     Urine Culture, Routine No significant growth    Narrative:      Specimen Source->Urine    Blood Culture #2 **CANNOT BE ORDERED STAT** [[id number]] Collected: 05/12/23 2137    Order Status: Completed Specimen: Blood from Peripheral, Hand, Right Updated: 05/14/23 0719     Blood Culture, Routine Gram stain aer bottle: Gram positive cocci in clusters resembling Staph      Results called to and read back by:TE Meléndez RN 05/13/2023  20:49      Gram stain yves bottle: Gram positive cocci in clusters resembling Staph    Blood culture [[id number]] Collected: 05/14/23 0404    Order Status: Sent Specimen: Blood from Peripheral, Lower Arm, Left Updated: 05/14/23 0422    Blood culture [[id number]] Collected: 05/14/23 0404    Order Status: Sent Specimen: Blood from Peripheral, Lower Arm, Right Updated: 05/14/23 0422    Blood Culture #1 **CANNOT BE ORDERED STAT** [[id number]] Collected: 05/12/23 2136    Order Status: Completed Specimen: Blood from Peripheral, Forearm, Right Updated: 05/13/23 2212     Blood Culture, Routine No Growth to date      No Growth to date    MRSA/SA Rapid ID by PCR from Blood culture [[id number]] Collected: 05/12/23 2137    Order Status: Completed Updated: 05/13/23 2211     Staph aureus ID by PCR Negative     Methicillin Resistant ID by PCR Negative

## 2023-05-14 NOTE — PLAN OF CARE
Problem: Occupational Therapy  Goal: Occupational Therapy Goal  Description:   Pt is currently functioning at their baseline in ADLs and functional mobility. Therefore, further skilled OT intervention is not warranted at this time. Pt is appropriate to discharge home. Please re-consult if pt's functional status changes.    5/14/2023 1258 by Becki Leon OT  Outcome: Met

## 2023-05-14 NOTE — ASSESSMENT & PLAN NOTE
37 yo F with odontogenic facial cellulitis without a drainable collection    - Appears stable for dc from ENT standpoint once transitioned to oral abx  - Recommend abx with coverage of anaerobics and MRSA  - Needs follow-up with dentistry  - ENT will sign off

## 2023-05-14 NOTE — SUBJECTIVE & OBJECTIVE
Interval History:  ENT signed off.  [facility] still booked to capacity.  Patient reports swelling has improved.  Patient remain afebrile and WBC within normal limits.  Initial blood culture growing likely contaminant, coagulase-negative staph.  Echocardiogram was done, report pending.    Review of Systems  Objective:     Vital Signs (Most Recent):  Temp: 98.6 °F (37 °C) (05/14/23 0854)  Pulse: 74 (05/14/23 0854)  Resp: 18 (05/14/23 0908)  BP: 130/75 (05/14/23 0854)  SpO2: 99 % (05/14/23 0854) Vital Signs (24h Range):  Temp:  [98.3 °F (36.8 °C)-99.1 °F (37.3 °C)] 98.6 °F (37 °C)  Pulse:  [72-85] 74  Resp:  [16-20] 18  SpO2:  [95 %-100 %] 99 %  BP: (119-130)/(56-84) 130/75     Weight: 91.2 kg (201 lb)  Body mass index is 33.45 kg/m².    Intake/Output Summary (Last 24 hours) at 5/14/2023 1058  Last data filed at 5/13/2023 1248  Gross per 24 hour   Intake 99.6 ml   Output --   Net 99.6 ml         Physical Exam  Constitutional:       Appearance: She is obese.   HENT:      Head: Normocephalic.      Comments: Swelling to left cheek     Right Ear: External ear normal.      Left Ear: External ear normal.      Nose: Nose normal.      Mouth/Throat:   Eyes:      General: No scleral icterus.  Cardiovascular:      Rate and Rhythm: Normal rate.      Heart sounds: Normal heart sounds.   Pulmonary:      Breath sounds: Normal breath sounds.   Abdominal:      Palpations: Abdomen is soft.      Tenderness: There is no abdominal tenderness.   Musculoskeletal:      Right lower leg: No edema.      Left lower leg: No edema.  Finger deformity distal right hand  Skin:     Findings: Bruising (chronic) and rash (chronic) present.   Neurological:      Mental Status: She is alert and oriented to person, place, and time.   Psychiatric:         Mood and Affect: Mood normal.            Significant Labs: All pertinent labs within the past 24 hours have been reviewed.

## 2023-05-14 NOTE — PLAN OF CARE
Problem: Occupational Therapy  Goal: Occupational Therapy Goal  Description: Goals to be met by: 6/14/2023     Patient will increase functional independence with ADLs by performing:    LE Dressing with Minimal Assistance and Assistive Devices as needed.  Grooming while standing at sink with Contact Guard Assistance.  Rolling to Bilateral with [place].   Supine to sit with [place].  Toilet transfer to toilet with Stand-by Assistance.    Outcome: Ongoing, Progressing

## 2023-05-14 NOTE — ASSESSMENT & PLAN NOTE
Confirmed with CT.  Patient awaiting transfer to [facility] for orofacial maxillary surgery.  ENT at [facility] saw patient and signed off.  Will advance diet.  Initial blood cultures contaminant.  Follow-up on repeat blood cultures.  Continue with vancomycin and Zosyn.Given history of MRSA  follow up with echocardiogram.

## 2023-05-14 NOTE — PROGRESS NOTES
"[facility] - Int[facility] ([address] Medicine  Progress Note    Patient Name: Marianela Shrestha  MRN: [id number]  Patient Class: OP- Observation   Admission Date: 5/12/2023  Length of Stay: 0 days  Attending Physician: Dwayne Grant MD  Primary Care Provider: Arnaldo Espino MD        Subjective:     Principal Problem:Facial abscess        HPI:  38-year-old female past medical history of anxiety/depression and history of MRSA infection in the past, she recently had low back surgery and went for a follow up visit 3 days ago and at that time noted to have redness to left face with minimal swelling.  Patient states that she then went home and woke up the following morning and had significant swelling, given her underlying history of MRSA infections she decided to come to hospital.  She reports subjective fever.  No issues with swallowing or breathing.  Denies any cough or chest pain.  Denies any ringing of the ears or hearing loss.  Labs significant for elevated sed rate and CRP.  CT maxillofacial reports "There is abnormality involving the left maxillary 1st molar including periapical lucency and associated dehiscence along the lateral aspect, with an overlying area of diminished attenuation thought to represent a small fluid collection measuring approximately 3.8 by 11.2 mm on axial imaging, as discussed above, with overlying prominent soft tissue edema and inflammatory change."Patient received IV vancomycin.  ER tried to transfer patient to [facility] for orofacial maxillary surgery, however [facility] was full to capacity and ENT decided to see patient if she was unable to be transferred.        Overview/Hospital Course:  No notes on file    Interval History:  ENT signed off.  [facility] still booked to capacity.  Patient reports swelling has improved.  Patient remain afebrile and WBC within normal limits.  Initial blood culture growing likely contaminant, coagulase-negative staph.  Echocardiogram was done, report " pending.    Review of Systems  Objective:     Vital Signs (Most Recent):  Temp: 98.6 °F (37 °C) (05/14/23 0854)  Pulse: 74 (05/14/23 0854)  Resp: 18 (05/14/23 0908)  BP: 130/75 (05/14/23 0854)  SpO2: 99 % (05/14/23 0854) Vital Signs (24h Range):  Temp:  [98.3 °F (36.8 °C)-99.1 °F (37.3 °C)] 98.6 °F (37 °C)  Pulse:  [72-85] 74  Resp:  [16-20] 18  SpO2:  [95 %-100 %] 99 %  BP: (119-130)/(56-84) 130/75     Weight: 91.2 kg (201 lb)  Body mass index is 33.45 kg/m².    Intake/Output Summary (Last 24 hours) at 5/14/2023 1058  Last data filed at 5/13/2023 1248  Gross per 24 hour   Intake 99.6 ml   Output --   Net 99.6 ml         Physical Exam  Constitutional:       Appearance: She is obese.   HENT:      Head: Normocephalic.      Comments: Swelling to left cheek     Right Ear: External ear normal.      Left Ear: External ear normal.      Nose: Nose normal.      Mouth/Throat:   Eyes:      General: No scleral icterus.  Cardiovascular:      Rate and Rhythm: Normal rate.      Heart sounds: Normal heart sounds.   Pulmonary:      Breath sounds: Normal breath sounds.   Abdominal:      Palpations: Abdomen is soft.      Tenderness: There is no abdominal tenderness.   Musculoskeletal:      Right lower leg: No edema.      Left lower leg: No edema.  Finger deformity distal right hand  Skin:     Findings: Bruising (chronic) and rash (chronic) present.   Neurological:      Mental Status: She is alert and oriented to person, place, and time.   Psychiatric:         Mood and Affect: Mood normal.            Significant Labs: All pertinent labs within the past 24 hours have been reviewed.        Assessment/Plan:      * Facial abscess  Confirmed with CT.  Patient awaiting transfer to [facility] for orofacial maxillary surgery.  ENT at [facility] saw patient and signed off.  Will advance diet.  Initial blood cultures contaminant.  Follow-up on repeat blood cultures.  Continue with vancomycin and Zosyn.Given history of MRSA  follow up with  echocardiogram.      Impaired mobility  Patient had recent low back surgery.  Wound care consult. PT OT      VTE Risk Mitigation (From admission, onward)         Ordered     enoxaparin injection 40 mg  Daily         05/13/23 0746     IP VTE HIGH RISK PATIENT  Once         05/13/23 0746     Place sequential compression device  Until discontinued         05/13/23 0746                Discharge Planning   CARLITA:      Code Status: Full Code   Is the patient medically ready for discharge?:     Reason for patient still in hospital (select all that apply): Patient trending condition  Discharge Plan A: Home   Discharge Delays: None known at this time              Dwayne Grant MD  Department of Hospital Medicine   [facility] - Intensive Care ([place]-[facility])
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
## 2023-05-14 NOTE — SUBJECTIVE & OBJECTIVE
Interval History: Appears to be improving on antibiotics. AFVSS.     Medications:  Continuous Infusions:  Scheduled Meds:   enoxaparin  40 mg Subcutaneous Daily    piperacillin-tazobactam (ZOSYN) IVPB  4.5 g Intravenous Q8H    vancomycin (VANCOCIN) IVPB  15 mg/kg Intravenous Q12H     PRN Meds:acetaminophen, albuterol-ipratropium, dextrose 10%, dextrose 10%, dextrose, glucagon (human recombinant), ibuprofen, morphine, naloxone, ondansetron, sodium chloride 0.9%     Review of patient's allergies indicates:   Allergen Reactions    Coconut Hives and Itching    Sulfa (sulfonamide antibiotics) Hives     itching    Adhesive Rash    Latex, natural rubber Other (See Comments) and Rash     Objective:     Vital Signs (24h Range):  Temp:  [98.3 °F (36.8 °C)-99.1 °F (37.3 °C)] 98.6 °F (37 °C)  Pulse:  [72-85] 74  Resp:  [16-20] 18  SpO2:  [95 %-100 %] 99 %  BP: (119-130)/(56-84) 130/75       Lines/Drains/Airways       Peripheral Intravenous Line  Duration                  Peripheral IV - Single Lumen 05/13/23 2249 20 G Right Antecubital <1 day                     Physical Exam    Mild distress, conversant, multiple excoriations on face and limbs  EOMI, PERRL  HB I/VI bilaterally  CN II-XII intact  Cellulitic changes of the left  and submandibular space, edematous, indurated, improving  Gingiva TTP over maxillar molars on the left, no fluctuance  Neck with reactive lymph adenopathy  Aerating well on RA    Significant Labs:  Recent Lab Results         05/14/23  0847   05/14/23  0404        Anion Gap   10       Baso #   0.02       Basophil %   0.2       BUN   5       Calcium   9.0       Chloride   106       CO2   22       Creatinine   0.7       Differential Method   Automated       eGFR   >60.0       Eos #   0.2       Eosinophil %   3.0       Glucose   88       Gran # (ANC)   5.9       Gran %   73.3       Hematocrit   33.5       Hemoglobin   10.3       Immature Grans (Abs)   0.03  Comment: Mild elevation in immature  granulocytes is non specific and   can be seen in a variety of conditions including stress response,   acute inflammation, trauma and pregnancy. Correlation with other   laboratory and clinical findings is essential.         Immature Granulocytes   0.4       Lymph #   1.4       Lymph %   17.1       MCH   30.5       MCHC   30.7       MCV   99       Mono #   0.5       Mono %   6.0       MPV   10.1       nRBC   0       Platelets   377       Potassium   4.2       RBC   3.38       RDW   13.2       Sodium   138       Vancomycin-Trough 13.8         WBC   8.03               Significant Diagnostics:  I have reviewed and interpreted all pertinent imaging results/findings within the past 24 hours.

## 2023-05-15 VITALS
HEART RATE: 91 BPM | TEMPERATURE: 98 F | RESPIRATION RATE: 18 BRPM | OXYGEN SATURATION: 98 % | WEIGHT: 201 LBS | DIASTOLIC BLOOD PRESSURE: 89 MMHG | HEIGHT: 65 IN | BODY MASS INDEX: 33.49 KG/M2 | SYSTOLIC BLOOD PRESSURE: 146 MMHG

## 2023-05-15 LAB
ANION GAP SERPL CALC-SCNC: 10 MMOL/L (ref 8–16)
BACTERIA BLD CULT: ABNORMAL
BASOPHILS # BLD AUTO: 0.01 K/UL (ref 0–0.2)
BASOPHILS NFR BLD: 0.2 % (ref 0–1.9)
BUN SERPL-MCNC: 9 MG/DL (ref 6–20)
CALCIUM SERPL-MCNC: 9 MG/DL (ref 8.7–10.5)
CHLORIDE SERPL-SCNC: 108 MMOL/L (ref 95–110)
CO2 SERPL-SCNC: 21 MMOL/L (ref 23–29)
CREAT SERPL-MCNC: 0.9 MG/DL (ref 0.5–1.4)
DIFFERENTIAL METHOD: ABNORMAL
EOSINOPHIL # BLD AUTO: 0.3 K/UL (ref 0–0.5)
EOSINOPHIL NFR BLD: 5.2 % (ref 0–8)
ERYTHROCYTE [DISTWIDTH] IN BLOOD BY AUTOMATED COUNT: 13.1 % (ref 11.5–14.5)
EST. GFR  (NO RACE VARIABLE): >60 ML/MIN/1.73 M^2
GLUCOSE SERPL-MCNC: 99 MG/DL (ref 70–110)
HCT VFR BLD AUTO: 33 % (ref 37–48.5)
HGB BLD-MCNC: 10.2 G/DL (ref 12–16)
IMM GRANULOCYTES # BLD AUTO: 0.01 K/UL (ref 0–0.04)
IMM GRANULOCYTES NFR BLD AUTO: 0.2 % (ref 0–0.5)
LYMPHOCYTES # BLD AUTO: 2.3 K/UL (ref 1–4.8)
LYMPHOCYTES NFR BLD: 39 % (ref 18–48)
MCH RBC QN AUTO: 30.5 PG (ref 27–31)
MCHC RBC AUTO-ENTMCNC: 30.9 G/DL (ref 32–36)
MCV RBC AUTO: 99 FL (ref 82–98)
MONOCYTES # BLD AUTO: 0.7 K/UL (ref 0.3–1)
MONOCYTES NFR BLD: 11.4 % (ref 4–15)
NEUTROPHILS # BLD AUTO: 2.6 K/UL (ref 1.8–7.7)
NEUTROPHILS NFR BLD: 44 % (ref 38–73)
NRBC BLD-RTO: 0 /100 WBC
PLATELET # BLD AUTO: 364 K/UL (ref 150–450)
PMV BLD AUTO: 10.1 FL (ref 9.2–12.9)
POTASSIUM SERPL-SCNC: 3.8 MMOL/L (ref 3.5–5.1)
RBC # BLD AUTO: 3.34 M/UL (ref 4–5.4)
SODIUM SERPL-SCNC: 139 MMOL/L (ref 136–145)
WBC # BLD AUTO: 5.8 K/UL (ref 3.9–12.7)

## 2023-05-15 PROCEDURE — 96376 TX/PRO/DX INJ SAME DRUG ADON: CPT

## 2023-05-15 PROCEDURE — 25000003 PHARM REV CODE 250: Performed by: INTERNAL MEDICINE

## 2023-05-15 PROCEDURE — 99239 PR HOSPITAL DISCHARGE DAY,>30 MIN: ICD-10-PCS | Mod: ,,, | Performed by: INTERNAL MEDICINE

## 2023-05-15 PROCEDURE — G0378 HOSPITAL OBSERVATION PER HR: HCPCS

## 2023-05-15 PROCEDURE — 80048 BASIC METABOLIC PNL TOTAL CA: CPT | Performed by: INTERNAL MEDICINE

## 2023-05-15 PROCEDURE — 96366 THER/PROPH/DIAG IV INF ADDON: CPT

## 2023-05-15 PROCEDURE — 99239 HOSP IP/OBS DSCHRG MGMT >30: CPT | Mod: ,,, | Performed by: INTERNAL MEDICINE

## 2023-05-15 PROCEDURE — 85025 COMPLETE CBC W/AUTO DIFF WBC: CPT | Performed by: INTERNAL MEDICINE

## 2023-05-15 PROCEDURE — 63600175 PHARM REV CODE 636 W HCPCS: Performed by: INTERNAL MEDICINE

## 2023-05-15 PROCEDURE — 36415 COLL VENOUS BLD VENIPUNCTURE: CPT | Performed by: INTERNAL MEDICINE

## 2023-05-15 RX ADMIN — GABAPENTIN 300 MG: 300 CAPSULE ORAL at 08:05

## 2023-05-15 RX ADMIN — ACETAMINOPHEN 650 MG: 325 TABLET ORAL at 03:05

## 2023-05-15 RX ADMIN — MORPHINE SULFATE 2 MG: 2 INJECTION, SOLUTION INTRAMUSCULAR; INTRAVENOUS at 06:05

## 2023-05-15 RX ADMIN — VANCOMYCIN HYDROCHLORIDE 1500 MG: 1.5 INJECTION, POWDER, LYOPHILIZED, FOR SOLUTION INTRAVENOUS at 12:05

## 2023-05-15 RX ADMIN — CLONAZEPAM 0.5 MG: 0.5 TABLET ORAL at 08:05

## 2023-05-15 RX ADMIN — MORPHINE SULFATE 2 MG: 2 INJECTION, SOLUTION INTRAMUSCULAR; INTRAVENOUS at 12:05

## 2023-05-15 RX ADMIN — PIPERACILLIN SODIUM AND TAZOBACTAM SODIUM 4.5 G: 4; .5 INJECTION, POWDER, FOR SOLUTION INTRAVENOUS at 03:05

## 2023-05-15 RX ADMIN — VENLAFAXINE HYDROCHLORIDE 300 MG: 150 CAPSULE, EXTENDED RELEASE ORAL at 08:05

## 2023-05-15 NOTE — PROGRESS NOTES
VANCOMYCIN DOSING BY PHARMACY DISCONTINUATION NOTE    Marianela Shrestha is a 38 y.o. female who had been consulted for vancomycin dosing.    The pharmacy consult for vancomycin dosing has been discontinued.     Vancomycin Dosing by Pharmacy Consult will sign-off. Please reconsult if necessary. Thank you for allowing us to participate in this patient's care.     Get Hansen, PharmD, BCPS  Ext. [phone]

## 2023-05-15 NOTE — PLAN OF CARE
[facility] - [facility] Care ([facility]-)  Discharge Final Note    Primary Care Provider: Arnaldo Espino MD    Expected Discharge Date: 05/15/23    Patient is discharging to [facility] for jaw surgery, room T 839 per Alisha at [facility].     CM to patient's bedside to update on discharge. Patient verbalized understanding and agrees with plan.    Patient is ready to discharge from case management standpoint.     Final Discharge Note (most recent)       Final Note - 05/15/23 1111          Final Note    Assessment Type Final Discharge Note     Anticipated Discharge Disposition Another Health Care Institution Not Defined   [facility] for jaw surgery    What phone number can be called within the next 1-3 days to see how you are doing after discharge? [phone]     Hospital Resources/Appts/Education Provided Provided patient/caregiver with written discharge plan information        Post-Acute Status    Other Status See Comments   Discharge to [facility] for jaw surgery    Discharge Delays None known at this time                   Important Message from Medicare         Mahsa Marcus RN  Weekend  - [facility]  Spectralink: [phone]

## 2023-05-15 NOTE — PROGRESS NOTES
Called [facility] [phone] gave report to Luna. Patient being transported via [facility]. Patient going to room T 839

## 2023-05-15 NOTE — DISCHARGE SUMMARY
"[facility] - [facility] ([address] Medicine  Discharge Summary      Patient Name: Marianela Shrestha  MRN: [id number]  Admission Date: 5/12/2023  Hospital Length of Stay: 0 days  Discharge Date and Time:  05/15/2023 11:25 AM  Attending Physician: Dwayne Grant MD   Discharging Provider: Dwayne Grant MD  Primary Care Provider: Arnaldo Espino MD            * No surgery found *      Hospital Course: 38-year-old female past medical history of anxiety/depression and history of MRSA infection in the past presented to hospital with worsening redness to left face and swelling.  Labs significant for elevated sed rate and CRP.  CT maxillofacial reports "There is abnormality involving the left maxillary 1st molar including periapical lucency and associated dehiscence along the lateral aspect, with an overlying area of diminished attenuation thought to represent a small fluid collection measuring approximately 3.8 by 11.2 mm on axial imaging, as discussed above, with overlying prominent soft tissue edema and inflammatory change."Initially ER tried to transfer patient to [facility] for orofacial maxillary surgery, however [facility] was full to capacity and ENT decided to see patient if she was unable to be transferred.  During patient's stay at [facility], ENT saw patient and signed off.  She was started on IV vancomycin and Zosyn.  Blood cultures are positive for contaminant, repeat blood cultures were obtained which are no growth thus far.  Swelling to left cheek slightly improved.  Patient reports that she is been having itching and a new rash to her face since starting IV antibiotics, vancomycin and Zosyn was discontinued as she was started on IV doxycycline.  Echocardiogram was obtained given her history of MRSA which was not suggestive of endocarditis.  Patient was eventually accepted to facility([facility]) with S service.  She was discharged.      On physical examination.  Obese female in no distress.  Swelling noted to left cheek.  " Patient also noted to have erythema/pink/red rash to face, not noted on body.  Abdomen is soft and nontender.  Bruising noted to skin.  Deformity noted to digits of right hand.  Normal respiratory effort.  Abdomen is soft and nontender.  Patient was alert and oriented to time place and person      Consults:   Consults (From admission, onward)          Status Ordering Provider     Inpatient consult to Registered Dietitian/Nutritionist  Once        Provider:  (Not yet assigned)    Acknowledged DWAYNE SHEPARD     Inpatient consult to ENT  Once        Provider:  (Not yet assigned)    Completed DWAYNE SHEPARD            Final Active Diagnoses:    Diagnosis Date Noted POA    PRINCIPAL PROBLEM:  Facial abscess [L02.01] 05/13/2023 Unknown    Impaired mobility [Z74.09] 11/14/2022 Yes    Morbid obesity [E66.01] 04/27/2022 Yes      Problems Resolved During this Admission:      Discharged Condition: fair    Disposition:  Transferred to other hospital    Follow Up:    Patient Instructions:      Diet Adult Regular     Activity as tolerated     Medications:  Transfer Medications (for Discharge Readmit only):   Current Facility-Administered Medications   Medication Dose Route Frequency Provider Last Rate Last Admin    acetaminophen tablet 650 mg  650 mg Oral Q6H PRN Dwayne Shepard MD   650 mg at 05/15/23 0305    albuterol-ipratropium 2.5 mg-0.5 mg/3 mL nebulizer solution 3 mL  3 mL Nebulization Q6H PRN Dwayne Shepard MD        clonazePAM tablet 0.5 mg  0.5 mg Oral TID Dwayne Shepard MD   0.5 mg at 05/15/23 0808    dextrose 10% bolus 125 mL 125 mL  12.5 g Intravenous PRN Dwayne Shepard MD        dextrose 10% bolus 250 mL 250 mL  25 g Intravenous PRN Dwayne Shepard MD        dextrose 40 % gel 15,000 mg  15 g Oral PRN Dwayne Shepard MD        doxycycline (VIBRAMYCIN) 100 mg in dextrose 5 % (D5W) 250 mL IVPB (Vial-Mate)  100 mg Intravenous Q12H Dwayne Shepard MD        enoxaparin injection 40 mg  40 mg Subcutaneous Daily Dwayne Shepard MD   40 mg at 05/14/23 4292     gabapentin capsule 300 mg  300 mg Oral TID Dwayne Grant MD   300 mg at 05/15/23 0809    glucagon (human recombinant) injection 1 mg  1 mg Intramuscular PRN Dwayne Grant MD        ibuprofen tablet 400 mg  400 mg Oral Q6H PRN Dwayne Grant MD   400 mg at 05/14/23 2234    morphine injection 2 mg  2 mg Intravenous Q4H PRN Dwayne Grant MD   2 mg at 05/15/23 0621    naloxone 0.4 mg/mL injection 0.4 mg  0.4 mg Intravenous PRN Dwayne Grant MD        ondansetron injection 4 mg  4 mg Intravenous Q8H PRN Dwayne Grant MD   4 mg at 05/13/23 0804    sodium chloride 0.9% flush 10 mL  10 mL Intravenous Q12H PRN Dwayne Grant MD        venlafaxine 24 hr capsule 300 mg  300 mg Oral Daily Dwayne Grant MD   300 mg at 05/15/23 0809           Pending Diagnostic Studies:       None          Indwelling Lines/Drains at time of discharge:   Lines/Drains/Airways       None                   Time spent on the discharge of patient: 35 minutes         Dwayne Grant MD  Department of Hospital Medicine  [facility] - Intensive Care ([place]-[facility])

## 2023-05-15 NOTE — PLAN OF CARE
Problem: Adult Inpatient Plan of Care  Goal: Plan of Care Review  Outcome: Ongoing, Progressing  Flowsheets (Taken 5/14/2023 1922)  Plan of Care Reviewed With: patient  Goal: Patient-Specific Goal (Individualized)  Outcome: Ongoing, Progressing  Goal: Absence of Hospital-Acquired Illness or Injury  Outcome: Ongoing, Progressing  Intervention: Identify and Manage Fall Risk  Flowsheets (Taken 5/14/2023 1922)  Safety Promotion/Fall Prevention:   assistive device/personal item within reach   side rails raised x 2  Intervention: Prevent Skin Injury  Flowsheets (Taken 5/14/2023 1922)  Skin Protection: incontinence pads utilized  Intervention: Prevent and Manage VTE (Venous Thromboembolism) Risk  Flowsheets (Taken 5/14/2023 1922)  Activity Management:   Ambulated in room - L4   Ambulated to bathroom - L4

## 2023-05-17 LAB — BACTERIA BLD CULT: NORMAL

## 2023-05-19 LAB
BACTERIA BLD CULT: NORMAL
BACTERIA BLD CULT: NORMAL

## 2023-05-30 ENCOUNTER — TELEPHONE (OUTPATIENT)
Dept: ORTHOPEDICS | Facility: CLINIC | Age: 38
End: 2023-05-30
Payer: MEDICARE

## 2023-05-30 NOTE — TELEPHONE ENCOUNTER
----- Message from Brad Campos sent at 5/30/2023  2:49 PM CDT -----  Who Called: [employer]          What is the reqeust in detail: Requesting call back to discuss documents to be returned with provider signature.          Can the clinic reply by [facility]? No         Best Call Back Number: [phone]   Fax: [phone]         Additional Information:

## 2023-05-31 ENCOUNTER — TELEPHONE (OUTPATIENT)
Dept: ORTHOPEDICS | Facility: CLINIC | Age: 38
End: 2023-05-31
Payer: MEDICARE

## 2023-05-31 NOTE — TELEPHONE ENCOUNTER
Gave verbal orders to cesar with HH      ----- Message from Brad Campos sent at 5/31/2023  2:45 PM CDT -----  Who Called:  [facility]         What is the reqeust in detail: Requesting call back to discuss extend physical therapy twice a week for 4 more weeks.Can nurse be added in for wound assessment          Can the clinic reply by [facility]? No         Best Call Back Number: [phone]         Additional Information:

## 2023-06-01 ENCOUNTER — PATIENT MESSAGE (OUTPATIENT)
Dept: ORTHOPEDICS | Facility: CLINIC | Age: 38
End: 2023-06-01
Payer: MEDICARE

## 2023-06-01 RX ORDER — METHOCARBAMOL 500 MG/1
1000 TABLET, FILM COATED ORAL 3 TIMES DAILY
Qty: 60 TABLET | Refills: 0 | Status: SHIPPED | OUTPATIENT
Start: 2023-06-01 | End: 2023-06-11

## 2023-06-01 RX ORDER — OXYCODONE HYDROCHLORIDE 5 MG/1
5 TABLET ORAL EVERY 8 HOURS PRN
Qty: 21 TABLET | Refills: 0 | Status: SHIPPED | OUTPATIENT
Start: 2023-06-01 | End: 2023-06-30 | Stop reason: SDUPTHER

## 2023-06-02 ENCOUNTER — TELEPHONE (OUTPATIENT)
Dept: ORTHOPEDICS | Facility: CLINIC | Age: 38
End: 2023-06-02
Payer: MEDICARE

## 2023-06-02 ENCOUNTER — EXTERNAL HOME HEALTH (OUTPATIENT)
Dept: HOME HEALTH SERVICES | Facility: HOSPITAL | Age: 38
End: 2023-06-02
Payer: MEDICARE

## 2023-06-02 NOTE — TELEPHONE ENCOUNTER
Per HH, incision looks healed and closed no drainage. She will instruct patient to stop betadine paint    ----- Message from Erasmo Fallon sent at 6/2/2023 11:44 AM CDT -----  Regarding: Orders for Incision  Contact: @ [phone]  Emerald with [facility] is calling in requesting orders be sent in for pts incision. Emerald stated that she will take verbal orders. Please contact Emerald @ [phone] to discuss further.

## 2023-06-02 NOTE — TELEPHONE ENCOUNTER
Informed  we did not get paperwork and they would have to refax paperwork to us for  to sign. Gave fax [phone] agreed verbally.

## 2023-06-09 ENCOUNTER — TELEPHONE (OUTPATIENT)
Dept: ORTHOPEDICS | Facility: CLINIC | Age: 38
End: 2023-06-09
Payer: MEDICARE

## 2023-06-09 DIAGNOSIS — Z91.49 OTHER PERSONAL HISTORY OF PSYCHOLOGICAL TRAUMA, NOT ELSEWHERE CLASSIFIED: ICD-10-CM

## 2023-06-09 DIAGNOSIS — Z98.890 S/P SPINAL SURGERY: Primary | ICD-10-CM

## 2023-06-09 NOTE — TELEPHONE ENCOUNTER
Spoke with Aimee MUNOZ for [facility] and she stated that she have not seen patient this week, she is missing multiple visits. Patient seen one time, PT not seen yet. AID not seen also. Went to house and not answering door. She talked to the patient 2 hours prior today and when she got there she did not answer the door. Last time a man answered stating she was not there and she did not feel safe with him. Also stated she think that  might want to get involved now.

## 2023-06-09 NOTE — TELEPHONE ENCOUNTER
----- Message from Yadira Molina sent at 6/9/2023 11:25 AM CDT -----  Regarding: MISSING SKILLED NURSING APPOINTMENT  Contact: TAMMY Yu w/ [facility]  TAMMY Yu w/ [facility] the pt is missing her skilled nursing appointment, ask for a call as soon as possible today please. Need to know what to do regarding the pt's wound.      Contact [facility]   [phone]

## 2023-06-21 ENCOUNTER — DOCUMENT SCAN (OUTPATIENT)
Dept: HOME HEALTH SERVICES | Facility: HOSPITAL | Age: 38
End: 2023-06-21
Payer: MEDICARE

## 2023-06-22 ENCOUNTER — TELEPHONE (OUTPATIENT)
Dept: ORTHOPEDICS | Facility: CLINIC | Age: 38
End: 2023-06-22
Payer: MEDICARE

## 2023-06-22 NOTE — TELEPHONE ENCOUNTER
Spoke with Demian from  and he stated that the patient is in retirement at the moment and would have to be discharged from PT until she gets released. She is currently at [facility]. Once patient is out she will call and get new PT orders put in. Demian stated an verbal understanding

## 2023-06-22 NOTE — TELEPHONE ENCOUNTER
----- Message from Yadira Molina sent at 6/22/2023 12:53 PM CDT -----  Regarding: DISCHARGE OF CARE  Contact: Demian rojas/ [facility]  Demian rojas/ [facility] ask for a call regarding the pt's treatment. Pt is in correction and need to discharge the pt's care for now, will need new orders once release      Contact info   [phone] Phone

## 2023-06-26 ENCOUNTER — DOCUMENT SCAN (OUTPATIENT)
Dept: HOME HEALTH SERVICES | Facility: HOSPITAL | Age: 38
End: 2023-06-26
Payer: MEDICARE

## 2023-06-30 ENCOUNTER — PATIENT MESSAGE (OUTPATIENT)
Dept: ORTHOPEDICS | Facility: CLINIC | Age: 38
End: 2023-06-30
Payer: MEDICARE

## 2023-06-30 RX ORDER — OXYCODONE HYDROCHLORIDE 5 MG/1
5 TABLET ORAL EVERY 8 HOURS PRN
Qty: 21 TABLET | Refills: 0 | Status: SHIPPED | OUTPATIENT
Start: 2023-06-30 | End: 2023-07-03 | Stop reason: SDUPTHER

## 2023-06-30 RX ORDER — METHOCARBAMOL 500 MG/1
1000 TABLET, FILM COATED ORAL 3 TIMES DAILY
Qty: 60 TABLET | Refills: 0 | OUTPATIENT
Start: 2023-06-30 | End: 2023-07-10

## 2023-07-03 RX ORDER — OXYCODONE HYDROCHLORIDE 5 MG/1
5 TABLET ORAL EVERY 8 HOURS PRN
Qty: 21 TABLET | Refills: 0 | Status: SHIPPED | OUTPATIENT
Start: 2023-07-03 | End: 2024-01-14

## 2023-07-10 RX ORDER — OXYCODONE HYDROCHLORIDE 5 MG/1
5 TABLET ORAL EVERY 8 HOURS PRN
Qty: 21 TABLET | Refills: 0 | OUTPATIENT
Start: 2023-07-10

## 2023-07-10 RX ORDER — OXYCODONE HYDROCHLORIDE 5 MG/1
5 TABLET ORAL EVERY 8 HOURS PRN
Qty: 21 TABLET | Refills: 0 | Status: CANCELLED | OUTPATIENT
Start: 2023-07-10

## 2023-07-27 DIAGNOSIS — M79.2 NEUROPATHIC PAIN: ICD-10-CM

## 2023-07-27 RX ORDER — OXYCODONE HYDROCHLORIDE 5 MG/1
5 TABLET ORAL EVERY 8 HOURS PRN
Qty: 21 TABLET | Refills: 0 | OUTPATIENT
Start: 2023-07-27

## 2023-07-27 RX ORDER — GABAPENTIN 600 MG/1
TABLET ORAL
Qty: 90 TABLET | Refills: 2 | Status: SHIPPED | OUTPATIENT
Start: 2023-07-27 | End: 2023-10-31

## 2023-07-27 NOTE — TELEPHONE ENCOUNTER
No care due was identified.  [facility] Embedded Care Due Messages. Reference number: [id number].   7/27/2023 3:26:30 PM CDT

## 2023-07-27 NOTE — TELEPHONE ENCOUNTER
Left message for patient to call back to inform her of [facility] message for medication refill, that she's too far out of surgery for opioid refill. We can schedule with pain management if she would like to.

## 2023-08-24 ENCOUNTER — PATIENT MESSAGE (OUTPATIENT)
Dept: OBSTETRICS AND GYNECOLOGY | Facility: CLINIC | Age: 38
End: 2023-08-24
Payer: MEDICARE

## 2023-09-05 NOTE — PLAN OF CARE
[facility] - [facility]  Facility Transfer Orders        Admit to: [facility]    Diagnoses:   Active Hospital Problems    Diagnosis  POA    *SHAZIA (cauda equina syndrome) [G83.4]  Yes    Impaired mobility [Z74.09]  Unknown      Resolved Hospital Problems   No resolved problems to display.     Allergies:   Review of patient's allergies indicates:   Allergen Reactions    Coconut Hives and Itching    Sulfa (sulfonamide antibiotics) Hives     itching    Adhesive Rash    Latex, natural rubber Other (See Comments) and Rash       Code Status: full code    Vitals: Routine       Diet: regular diet    Activity: WBAT. Use PODUS boot left foot/ankle while in bed due to foot drop. Use left foot/ankle AFO when ambulating due to foot drop    Nursing Precautions: Fall    Bed/Surface: normal    Consults: PT to evaluate and treat- 7 times a week, OT to evaluate and treat- 7 times a week, and ST to evaluate and treat- 7 times a week    Oxygen: room air    Dialysis: Patient is not on dialysis.     Labs: none  Pending Diagnostic Studies:       None          Imaging: none    Miscellaneous Care:   none    IV Access: Peripheral     Medications: Discontinue all previous medication orders, if any. See new list below.  Current Discharge Medication List        START taking these medications    Details   acetaminophen (TYLENOL) 650 MG TbSR Take 1 tablet (650 mg total) by mouth every 6 (six) hours.  Qty: 56 tablet, Refills: 0      celecoxib (CELEBREX) 200 MG capsule Take 1 capsule (200 mg total) by mouth once daily.  Qty: 12 capsule, Refills: 0      methocarbamoL (ROBAXIN) 500 MG Tab Take 2 tablets (1,000 mg total) by mouth 3 (three) times daily. for 14 days  Qty: 84 tablet, Refills: 0      oxyCODONE (ROXICODONE) 5 MG immediate release tablet Take 1 tablet (5 mg total) by mouth every 4 (four) hours as needed for Pain.  Qty: 20 tablet, Refills: 0           CONTINUE these medications which have NOT CHANGED    Details   albuterol (PROVENTIL/VENTOLIN HFA) 90  mcg/actuation inhaler Inhale 2 puffs into the lungs every 6 (six) hours as needed. Rescue      albuterol-ipratropium (DUO-NEB) 2.5 mg-0.5 mg/3 mL nebulizer solution Take 3 mLs by nebulization every 6 (six) hours as needed for Wheezing or Shortness of Breath. Rescue  Qty: 270 mL, Refills: 3    Associated Diagnoses: Uncontrolled persistent asthma      ALPRAZolam (XANAX) 1 MG tablet Take 1 tablet (1 mg total) by mouth 3 (three) times daily as needed for Anxiety.  Qty: 90 tablet, Refills: 0    Comments: Not to exceed 5 additional fills before 03/29/2022  Associated Diagnoses: Generalized anxiety disorder      doxepin (SINEQUAN) 25 MG capsule TAKE 2 CAPSULES (50 MG TOTAL) BY MOUTH EVERY EVENING.  Qty: 180 capsule, Refills: 0    Associated Diagnoses: Insomnia, unspecified type      gabapentin (NEURONTIN) 600 MG tablet TAKE 1 TABLET BY MOUTH THREE TIMES A DAY  Qty: 120 tablet, Refills: 1    Associated Diagnoses: Bilateral sciatica; Chronic pain syndrome      mupirocin (BACTROBAN) 2 % ointment Twice daily for five days twice per month for six months  Qty: 22 g, Refills: 3    Comments: Daily for five days twice per month for six months      venlafaxine (EFFEXOR-XR) 150 MG Cp24 TAKE 2 CAPSULES BY MOUTH EVERY DAY  Qty: 60 capsule, Refills: 1    Associated Diagnoses: Generalized anxiety disorder      (Magic mouthwash) 1:1:1 diphenhydramine(Benadryl) 12.5mg/5ml liq, aluminum & magnesium hydroxide-simethicone (Maalox), LIDOcaine viscous 2% Swish and spit 15 mLs every 4 (four) hours as needed (pain). for mouth sores  Qty: 450 mL, Refills: 0    Associated Diagnoses: Chronic sinusitis, unspecified location      budesonide-formoterol 160-4.5 mcg (SYMBICORT) 160-4.5 mcg/actuation HFAA INHALE 2 PUFFS INTO THE LUNGS EVERY 12 (TWELVE) HOURS. CONTROLLER  Qty: 10.2 g, Refills: 0    Associated Diagnoses: Uncontrolled persistent asthma      norethindrone (AYGESTIN) 5 mg Tab Take 1 tablet (5 mg total) by mouth once daily.  Qty: 30 tablet,  Refills: 11    Associated Diagnoses: Pelvic pain in female      omeprazole (PRILOSEC) 20 MG capsule TAKE 1 CAPSULE BY MOUTH EVERY DAY  Qty: 90 capsule, Refills: 0    Associated Diagnoses: Uncontrolled persistent asthma      ondansetron (ZOFRAN-ODT) 4 MG TbDL Dissolve 2 tablets (8 mg total) by mouth every 8 (eight) hours as needed (nausea).  Qty: 10 tablet, Refills: 0           STOP taking these medications       cyclobenzaprine (FLEXERIL) 10 MG tablet Comments:   Reason for Stopping:         HYDROcodone-acetaminophen (NORCO) 5-325 mg per tablet Comments:   Reason for Stopping:         ibuprofen 200 mg Cap Comments:   Reason for Stopping:         meloxicam (MOBIC) 15 MG tablet Comments:   Reason for Stopping:         traMADoL (ULTRAM) 50 mg tablet Comments:   Reason for Stopping:             Follow up:       Immunizations Administered as of 11/16/2022       No immunizations on file.            Unknown covid vaccination status    Some patients may experience side effects after vaccination.  These may include fever, headache, muscle or joint aches.  Most symptoms resolve with 24-48 hours and do not require urgent medical evaluation unless they persist for more than 72 hours or symptoms are concerning for an unrelated medical condition.          Radha Villanueva MD   intact

## 2023-10-27 DIAGNOSIS — M79.2 NEUROPATHIC PAIN: ICD-10-CM

## 2023-10-28 NOTE — TELEPHONE ENCOUNTER
No care due was identified.  [facility] Embedded Care Due Messages. Reference number: [id number].   10/27/2023 9:33:53 PM CDT

## 2023-10-30 NOTE — TELEPHONE ENCOUNTER
Refill Routing Note   Medication(s) are not appropriate for processing by [facility] for the following reason(s):      Medication outside of protocol  Patient seen in ED/Hospital since LOV with provider    [facility] action(s):  Route Care Due:  None identified            Appointments  past 12m or future 3m with PCP    Date Provider   Last Visit   3/30/2023 Arnaldo Espino MD   Next Visit   Visit date not found Arnaldo Espino MD   ED visits in past 90 days: 0        Note composed:8:57 AM 10/30/2023

## 2023-10-31 RX ORDER — GABAPENTIN 600 MG/1
TABLET ORAL
Qty: 90 TABLET | Refills: 2 | Status: SHIPPED | OUTPATIENT
Start: 2023-10-31 | End: 2024-02-08

## 2023-11-26 ENCOUNTER — HOSPITAL ENCOUNTER (EMERGENCY)
Facility: HOSPITAL | Age: 38
Discharge: HOME | End: 2023-11-27
Payer: MEDICARE

## 2023-11-26 DIAGNOSIS — M25.559 HIP PAIN: ICD-10-CM

## 2023-11-26 DIAGNOSIS — M25.552 LEFT HIP PAIN: Primary | ICD-10-CM

## 2023-11-26 PROCEDURE — 99284 EMERGENCY DEPT VISIT MOD MDM: CPT

## 2023-11-26 PROCEDURE — 96372 THER/PROPH/DIAG INJ SC/IM: CPT

## 2023-11-26 PROCEDURE — 63600175 PHARM REV CODE 636 W HCPCS

## 2023-11-26 RX ORDER — TIZANIDINE 4 MG/1
4 TABLET ORAL EVERY 6 HOURS PRN
Qty: 20 TABLET | Refills: 0 | Status: SHIPPED | OUTPATIENT
Start: 2023-11-26 | End: 2024-01-14

## 2023-11-26 RX ORDER — LORAZEPAM 2 MG/ML
1 INJECTION INTRAMUSCULAR
Status: COMPLETED | OUTPATIENT
Start: 2023-11-26 | End: 2023-11-26

## 2023-11-26 RX ORDER — HYDROMORPHONE HYDROCHLORIDE 2 MG/ML
2 INJECTION, SOLUTION INTRAMUSCULAR; INTRAVENOUS; SUBCUTANEOUS
Status: COMPLETED | OUTPATIENT
Start: 2023-11-26 | End: 2023-11-26

## 2023-11-26 RX ORDER — NAPROXEN 500 MG/1
500 TABLET ORAL 2 TIMES DAILY
Qty: 20 TABLET | Refills: 0 | Status: SHIPPED | OUTPATIENT
Start: 2023-11-26 | End: 2024-01-14

## 2023-11-26 RX ORDER — KETOROLAC TROMETHAMINE 30 MG/ML
30 INJECTION, SOLUTION INTRAMUSCULAR; INTRAVENOUS
Status: COMPLETED | OUTPATIENT
Start: 2023-11-26 | End: 2023-11-26

## 2023-11-26 RX ADMIN — LORAZEPAM 1 MG: 2 INJECTION INTRAMUSCULAR; INTRAVENOUS at 11:11

## 2023-11-26 RX ADMIN — KETOROLAC TROMETHAMINE 30 MG: 30 INJECTION, SOLUTION INTRAMUSCULAR at 11:11

## 2023-11-26 RX ADMIN — HYDROMORPHONE HYDROCHLORIDE 2 MG: 2 INJECTION INTRAMUSCULAR; INTRAVENOUS; SUBCUTANEOUS at 11:11

## 2023-11-27 VITALS
BODY MASS INDEX: 26.63 KG/M2 | OXYGEN SATURATION: 100 % | TEMPERATURE: 98 F | DIASTOLIC BLOOD PRESSURE: 89 MMHG | HEART RATE: 96 BPM | WEIGHT: 160 LBS | RESPIRATION RATE: 20 BRPM | SYSTOLIC BLOOD PRESSURE: 156 MMHG
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
## 2023-11-28 NOTE — PLAN OF CARE
Family training completed yesterday; no concerns reported per family member.    You can access the FollowMyHealth Patient Portal offered by [facility] by registering at the following website: [email or web address]. By joining [facility]’s FollowMyHealth portal, you will also be able to view your health information using other applications (apps) compatible with our system.

## 2024-01-14 ENCOUNTER — HOSPITAL ENCOUNTER (EMERGENCY)
Facility: HOSPITAL | Age: 39
Discharge: HOME | End: 2024-01-15
Attending: EMERGENCY MEDICINE
Payer: COMMERCIAL

## 2024-01-14 DIAGNOSIS — F41.9 PAIN AGGRAVATED BY ANXIETY: ICD-10-CM

## 2024-01-14 DIAGNOSIS — L03.115 CELLULITIS OF RIGHT FOOT: Primary | ICD-10-CM

## 2024-01-14 DIAGNOSIS — T14.8XXA OPEN WOUND: ICD-10-CM

## 2024-01-14 DIAGNOSIS — F45.41 PAIN AGGRAVATED BY ANXIETY: ICD-10-CM

## 2024-01-14 LAB
ANION GAP SERPL CALC-SCNC: 12 MEQ/L
BASOPHILS # BLD AUTO: 0.05 X10(3)/MCL
BASOPHILS NFR BLD AUTO: 0.6 %
BUN SERPL-MCNC: 15 MG/DL (ref 7–18.7)
CALCIUM SERPL-MCNC: 9.3 MG/DL (ref 8.4–10.2)
CHLORIDE SERPL-SCNC: 104 MMOL/L (ref 98–107)
CO2 SERPL-SCNC: 23 MMOL/L (ref 22–29)
CREAT SERPL-MCNC: 0.85 MG/DL (ref 0.55–1.02)
CREAT/UREA NIT SERPL: 18
EOSINOPHIL # BLD AUTO: 0.23 X10(3)/MCL (ref 0–0.9)
EOSINOPHIL NFR BLD AUTO: 2.7 %
ERYTHROCYTE [DISTWIDTH] IN BLOOD BY AUTOMATED COUNT: 14.9 % (ref 11.5–17)
GFR SERPLBLD CREATININE-BSD FMLA CKD-EPI: >60 MLS/MIN/1.73/M2
GLUCOSE SERPL-MCNC: 102 MG/DL (ref 74–100)
HCT VFR BLD AUTO: 37 % (ref 37–47)
HGB BLD-MCNC: 12 G/DL (ref 12–16)
IMM GRANULOCYTES # BLD AUTO: 0.04 X10(3)/MCL (ref 0–0.04)
IMM GRANULOCYTES NFR BLD AUTO: 0.5 %
LYMPHOCYTES # BLD AUTO: 1.92 X10(3)/MCL (ref 0.6–4.6)
LYMPHOCYTES NFR BLD AUTO: 22.5 %
MCH RBC QN AUTO: 31.4 PG (ref 27–31)
MCHC RBC AUTO-ENTMCNC: 32.4 G/DL (ref 33–36)
MCV RBC AUTO: 96.9 FL (ref 80–94)
MONOCYTES # BLD AUTO: 0.65 X10(3)/MCL (ref 0.1–1.3)
MONOCYTES NFR BLD AUTO: 7.6 %
NEUTROPHILS # BLD AUTO: 5.65 X10(3)/MCL (ref 2.1–9.2)
NEUTROPHILS NFR BLD AUTO: 66.1 %
PLATELET # BLD AUTO: 403 X10(3)/MCL (ref 130–400)
PMV BLD AUTO: 9.5 FL (ref 7.4–10.4)
POTASSIUM SERPL-SCNC: 4.2 MMOL/L (ref 3.5–5.1)
RBC # BLD AUTO: 3.82 X10(6)/MCL (ref 4.2–5.4)
SODIUM SERPL-SCNC: 139 MMOL/L (ref 136–145)
WBC # SPEC AUTO: 8.54 X10(3)/MCL (ref 4.5–11.5)

## 2024-01-14 PROCEDURE — 99284 EMERGENCY DEPT VISIT MOD MDM: CPT

## 2024-01-14 PROCEDURE — 85025 COMPLETE CBC W/AUTO DIFF WBC: CPT | Performed by: EMERGENCY MEDICINE

## 2024-01-14 PROCEDURE — 96372 THER/PROPH/DIAG INJ SC/IM: CPT | Performed by: EMERGENCY MEDICINE

## 2024-01-14 PROCEDURE — 63600175 PHARM REV CODE 636 W HCPCS: Performed by: EMERGENCY MEDICINE

## 2024-01-14 PROCEDURE — 80048 BASIC METABOLIC PNL TOTAL CA: CPT | Performed by: EMERGENCY MEDICINE

## 2024-01-14 RX ORDER — ATOMOXETINE 25 MG/1
50 CAPSULE ORAL EVERY MORNING
COMMUNITY
Start: 2023-10-30

## 2024-01-14 RX ORDER — LORAZEPAM 2 MG/ML
1 INJECTION INTRAMUSCULAR
Status: COMPLETED | OUTPATIENT
Start: 2024-01-14 | End: 2024-01-14

## 2024-01-14 RX ADMIN — LORAZEPAM 1 MG: 2 INJECTION, SOLUTION INTRAMUSCULAR; INTRAVENOUS at 11:01

## 2024-01-15 VITALS
WEIGHT: 180 LBS | HEIGHT: 67 IN | DIASTOLIC BLOOD PRESSURE: 90 MMHG | OXYGEN SATURATION: 100 % | BODY MASS INDEX: 28.25 KG/M2 | SYSTOLIC BLOOD PRESSURE: 148 MMHG | HEART RATE: 104 BPM | RESPIRATION RATE: 18 BRPM | TEMPERATURE: 99 F

## 2024-01-15 RX ORDER — MUPIROCIN 20 MG/G
OINTMENT TOPICAL 2 TIMES DAILY
Qty: 30 G | Refills: 1 | Status: SHIPPED | OUTPATIENT
Start: 2024-01-15 | End: 2024-01-15

## 2024-01-15 RX ORDER — MUPIROCIN 20 MG/G
OINTMENT TOPICAL 2 TIMES DAILY
Qty: 30 G | Refills: 1 | Status: SHIPPED | OUTPATIENT
Start: 2024-01-15

## 2024-01-15 RX ORDER — DOXYCYCLINE 100 MG/1
100 CAPSULE ORAL 2 TIMES DAILY
Qty: 20 CAPSULE | Refills: 0 | Status: SHIPPED | OUTPATIENT
Start: 2024-01-15 | End: 2024-01-15

## 2024-01-15 RX ORDER — DOXYCYCLINE 100 MG/1
100 CAPSULE ORAL 2 TIMES DAILY
Qty: 20 CAPSULE | Refills: 0 | Status: SHIPPED | OUTPATIENT
Start: 2024-01-15 | End: 2024-01-25
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
## 2024-02-07 DIAGNOSIS — M79.2 NEUROPATHIC PAIN: ICD-10-CM

## 2024-02-08 RX ORDER — GABAPENTIN 600 MG/1
TABLET ORAL
Qty: 90 TABLET | Refills: 1 | Status: SHIPPED | OUTPATIENT
Start: 2024-02-08 | End: 2024-04-23

## 2024-04-20 DIAGNOSIS — M79.2 NEUROPATHIC PAIN: ICD-10-CM

## 2024-04-20 NOTE — TELEPHONE ENCOUNTER
Care Due:                  Date            Visit Type   Department     Provider  --------------------------------------------------------------------------------                                [facility] INTERNAL  Last Visit: 04-      FOLLOW UP    MEDICINE       Mikhail James  Next Visit: None Scheduled  None         None Found                                                            Last  Test          Frequency    Reason                     Performed    Due Date  --------------------------------------------------------------------------------    Office Visit  15 months..  venlafaxine..............  04- 07-    [facility] Embedded Care Due Messages. Reference number: [id number].   4/20/2024 6:51:44 PM CDT

## 2024-04-23 RX ORDER — GABAPENTIN 600 MG/1
TABLET ORAL
Qty: 90 TABLET | Refills: 1 | Status: SHIPPED | OUTPATIENT
Start: 2024-04-23

## 2024-05-05 ENCOUNTER — HOSPITAL ENCOUNTER (EMERGENCY)
Facility: HOSPITAL | Age: 39
Discharge: HOME | End: 2024-05-05
Attending: GENERAL ACUTE CARE HOSPITAL
Payer: MEDICARE

## 2024-05-05 VITALS
OXYGEN SATURATION: 99 % | TEMPERATURE: 98 F | RESPIRATION RATE: 19 BRPM | HEIGHT: 67 IN | BODY MASS INDEX: 26.24 KG/M2 | DIASTOLIC BLOOD PRESSURE: 79 MMHG | HEART RATE: 101 BPM | WEIGHT: 167.19 LBS | SYSTOLIC BLOOD PRESSURE: 134 MMHG

## 2024-05-05 DIAGNOSIS — L97.419 CHRONIC HEEL ULCER, RIGHT, WITH UNSPECIFIED SEVERITY: Primary | ICD-10-CM

## 2024-05-05 DIAGNOSIS — L03.90 CELLULITIS, UNSPECIFIED CELLULITIS SITE: ICD-10-CM

## 2024-05-05 DIAGNOSIS — L73.2 RIGHT AXILLARY HIDRADENITIS: ICD-10-CM

## 2024-05-05 PROCEDURE — 25000003 PHARM REV CODE 250: Performed by: GENERAL ACUTE CARE HOSPITAL

## 2024-05-05 PROCEDURE — 99283 EMERGENCY DEPT VISIT LOW MDM: CPT

## 2024-05-05 RX ORDER — DOXYCYCLINE 100 MG/1
100 CAPSULE ORAL 2 TIMES DAILY
Qty: 20 CAPSULE | Refills: 0 | Status: SHIPPED | OUTPATIENT
Start: 2024-05-05 | End: 2024-05-05

## 2024-05-05 RX ORDER — DOXYCYCLINE 100 MG/1
100 CAPSULE ORAL 2 TIMES DAILY
Qty: 20 CAPSULE | Refills: 0 | Status: SHIPPED | OUTPATIENT
Start: 2024-05-05 | End: 2024-05-15

## 2024-05-05 RX ORDER — DOXYCYCLINE HYCLATE 100 MG
100 TABLET ORAL
Status: COMPLETED | OUTPATIENT
Start: 2024-05-05 | End: 2024-05-05

## 2024-05-05 RX ADMIN — DOXYCYCLINE HYCLATE 100 MG: 100 TABLET, COATED ORAL at 02:05
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
## 2024-05-13 ENCOUNTER — HOSPITAL ENCOUNTER (EMERGENCY)
Facility: HOSPITAL | Age: 39
Discharge: HOME | End: 2024-05-13
Attending: INTERNAL MEDICINE
Payer: MEDICARE

## 2024-05-13 VITALS
RESPIRATION RATE: 18 BRPM | SYSTOLIC BLOOD PRESSURE: 176 MMHG | TEMPERATURE: 99 F | WEIGHT: 160 LBS | DIASTOLIC BLOOD PRESSURE: 105 MMHG | BODY MASS INDEX: 25.06 KG/M2 | OXYGEN SATURATION: 98 % | HEART RATE: 105 BPM

## 2024-05-13 DIAGNOSIS — L97.511 CHRONIC ULCER OF RIGHT FOOT LIMITED TO BREAKDOWN OF SKIN: ICD-10-CM

## 2024-05-13 DIAGNOSIS — L73.2 HIDRADENITIS: ICD-10-CM

## 2024-05-13 DIAGNOSIS — H10.32 ACUTE BACTERIAL CONJUNCTIVITIS OF LEFT EYE: Primary | ICD-10-CM

## 2024-05-13 PROCEDURE — 99284 EMERGENCY DEPT VISIT MOD MDM: CPT

## 2024-05-13 RX ORDER — DOXYCYCLINE 100 MG/1
100 CAPSULE ORAL 2 TIMES DAILY
Qty: 20 CAPSULE | Refills: 0 | Status: SHIPPED | OUTPATIENT
Start: 2024-05-13 | End: 2024-05-23

## 2024-05-13 RX ORDER — ERYTHROMYCIN 5 MG/G
OINTMENT OPHTHALMIC EVERY 6 HOURS
Qty: 3.5 G | Refills: 0 | Status: SHIPPED | OUTPATIENT
Start: 2024-05-13 | End: 2024-05-20
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
## 2024-06-10 ENCOUNTER — PATIENT MESSAGE (OUTPATIENT)
Dept: INTERNAL MEDICINE | Facility: CLINIC | Age: 39
End: 2024-06-10
Payer: MEDICARE

## 2024-08-04 DIAGNOSIS — M79.2 NEUROPATHIC PAIN: ICD-10-CM

## 2024-08-05 RX ORDER — GABAPENTIN 600 MG/1
TABLET ORAL
Qty: 90 TABLET | Refills: 0 | Status: SHIPPED | OUTPATIENT
Start: 2024-08-05

## 2024-10-13 ENCOUNTER — HOSPITAL ENCOUNTER (EMERGENCY)
Facility: HOSPITAL | Age: 39
Discharge: HOME | End: 2024-10-13
Attending: FAMILY MEDICINE
Payer: MEDICARE

## 2024-10-13 VITALS
HEIGHT: 67 IN | OXYGEN SATURATION: 98 % | DIASTOLIC BLOOD PRESSURE: 73 MMHG | TEMPERATURE: 99 F | BODY MASS INDEX: 27.94 KG/M2 | RESPIRATION RATE: 20 BRPM | SYSTOLIC BLOOD PRESSURE: 124 MMHG | HEART RATE: 78 BPM | WEIGHT: 178 LBS

## 2024-10-13 DIAGNOSIS — R07.9 CHEST PAIN: ICD-10-CM

## 2024-10-13 DIAGNOSIS — R56.9 SEIZURE: Primary | ICD-10-CM

## 2024-10-13 DIAGNOSIS — M25.511 PAIN IN JOINT OF RIGHT SHOULDER: ICD-10-CM

## 2024-10-13 LAB
ALBUMIN SERPL-MCNC: 4.6 G/DL (ref 3.4–5)
ALBUMIN/GLOB SERPL: 1.4 RATIO
ALP SERPL-CCNC: 98 UNIT/L (ref 50–144)
ALT SERPL-CCNC: 32 UNIT/L (ref 1–45)
AMPHET UR QL SCN: ABNORMAL
ANION GAP SERPL CALC-SCNC: 7 MEQ/L (ref 2–13)
AST SERPL-CCNC: 33 UNIT/L (ref 14–36)
B-HCG UR QL: NEGATIVE
BACTERIA #/AREA URNS AUTO: ABNORMAL /HPF
BARBITURATE SCN PRESENT UR: NEGATIVE
BASOPHILS # BLD AUTO: 0.04 X10(3)/MCL (ref 0.01–0.08)
BASOPHILS NFR BLD AUTO: 0.5 % (ref 0.1–1.2)
BENZODIAZ UR QL SCN: NEGATIVE
BILIRUB SERPL-MCNC: 0.5 MG/DL (ref 0–1)
BILIRUB UR QL STRIP.AUTO: NEGATIVE
BUN SERPL-MCNC: 18 MG/DL (ref 7–20)
CALCIUM SERPL-MCNC: 9.1 MG/DL (ref 8.4–10.2)
CANNABINOIDS UR QL SCN: POSITIVE
CHLORIDE SERPL-SCNC: 108 MMOL/L (ref 98–110)
CLARITY UR: CLEAR
CO2 SERPL-SCNC: 24 MMOL/L (ref 21–32)
COCAINE UR QL SCN: NEGATIVE
COLOR UR AUTO: YELLOW
CREAT SERPL-MCNC: 0.65 MG/DL (ref 0.66–1.25)
CREAT/UREA NIT SERPL: 28 (ref 12–20)
D DIMER PPP IA.FEU-MCNC: 0.65 MG/L (ref 0.19–0.5)
EOSINOPHIL # BLD AUTO: 0.08 X10(3)/MCL (ref 0.04–0.36)
EOSINOPHIL NFR BLD AUTO: 1 % (ref 0.7–7)
ERYTHROCYTE [DISTWIDTH] IN BLOOD BY AUTOMATED COUNT: 12.7 % (ref 11–14.5)
GFR SERPLBLD CREATININE-BSD FMLA CKD-EPI: >90 ML/MIN/1.73/M2
GLOBULIN SER-MCNC: 3.3 GM/DL (ref 2–3.9)
GLUCOSE SERPL-MCNC: 117 MG/DL (ref 70–115)
GLUCOSE UR QL STRIP: NEGATIVE
HCT VFR BLD AUTO: 35.8 % (ref 36–48)
HGB BLD-MCNC: 12.2 G/DL (ref 11.8–16)
HGB UR QL STRIP: ABNORMAL
IMM GRANULOCYTES # BLD AUTO: 0.02 X10(3)/MCL (ref 0–0.03)
IMM GRANULOCYTES NFR BLD AUTO: 0.3 % (ref 0–0.5)
KETONES UR QL STRIP: 15
LEUKOCYTE ESTERASE UR QL STRIP: NEGATIVE
LYMPHOCYTES # BLD AUTO: 1.33 X10(3)/MCL (ref 1.16–3.74)
LYMPHOCYTES NFR BLD AUTO: 17.4 % (ref 20–55)
MCH RBC QN AUTO: 32.1 PG (ref 27–34)
MCHC RBC AUTO-ENTMCNC: 34.1 G/DL (ref 31–37)
MCV RBC AUTO: 94.2 FL (ref 79–99)
METHADONE UR QL SCN: NEGATIVE
MONOCYTES # BLD AUTO: 0.75 X10(3)/MCL (ref 0.24–0.36)
MONOCYTES NFR BLD AUTO: 9.8 % (ref 4.7–12.5)
NEUTROPHILS # BLD AUTO: 5.44 X10(3)/MCL (ref 1.56–6.13)
NEUTROPHILS NFR BLD AUTO: 71 % (ref 37–73)
NITRITE UR QL STRIP: NEGATIVE
NRBC BLD AUTO-RTO: 0 %
OPIATES UR QL SCN: NEGATIVE
PCP UR QL: NEGATIVE
PH UR STRIP: 6 [PH]
PH UR: 6 [PH] (ref 5–8)
PLATELET # BLD AUTO: 362 X10(3)/MCL (ref 140–371)
PMV BLD AUTO: 9.2 FL (ref 9.4–12.4)
POTASSIUM SERPL-SCNC: 3.8 MMOL/L (ref 3.5–5.1)
PROT SERPL-MCNC: 7.9 GM/DL (ref 6.3–8.2)
PROT UR QL STRIP: NEGATIVE
RBC # BLD AUTO: 3.8 X10(6)/MCL (ref 4–5.1)
RBC #/AREA URNS AUTO: ABNORMAL /HPF
SODIUM SERPL-SCNC: 139 MMOL/L (ref 136–145)
SP GR UR STRIP.AUTO: 1.02 (ref 1–1.03)
SQUAMOUS #/AREA URNS AUTO: ABNORMAL /HPF
TROPONIN I SERPL-MCNC: <0.012 NG/ML (ref 0–0.03)
UROBILINOGEN UR STRIP-ACNC: 0.2
WBC # BLD AUTO: 7.66 X10(3)/MCL (ref 4–11.5)
WBC #/AREA URNS AUTO: ABNORMAL /HPF

## 2024-10-13 PROCEDURE — 80053 COMPREHEN METABOLIC PANEL: CPT | Performed by: FAMILY MEDICINE

## 2024-10-13 PROCEDURE — 25500020 PHARM REV CODE 255: Performed by: FAMILY MEDICINE

## 2024-10-13 PROCEDURE — 84484 ASSAY OF TROPONIN QUANT: CPT | Performed by: FAMILY MEDICINE

## 2024-10-13 PROCEDURE — 81015 MICROSCOPIC EXAM OF URINE: CPT | Performed by: FAMILY MEDICINE

## 2024-10-13 PROCEDURE — 93010 ELECTROCARDIOGRAM REPORT: CPT | Mod: ,,, | Performed by: INTERNAL MEDICINE

## 2024-10-13 PROCEDURE — 85379 FIBRIN DEGRADATION QUANT: CPT | Performed by: FAMILY MEDICINE

## 2024-10-13 PROCEDURE — 99285 EMERGENCY DEPT VISIT HI MDM: CPT | Mod: 25

## 2024-10-13 PROCEDURE — 80307 DRUG TEST PRSMV CHEM ANLYZR: CPT | Performed by: FAMILY MEDICINE

## 2024-10-13 PROCEDURE — 85025 COMPLETE CBC W/AUTO DIFF WBC: CPT | Performed by: FAMILY MEDICINE

## 2024-10-13 PROCEDURE — 93005 ELECTROCARDIOGRAM TRACING: CPT

## 2024-10-13 PROCEDURE — 87086 URINE CULTURE/COLONY COUNT: CPT | Performed by: FAMILY MEDICINE

## 2024-10-13 PROCEDURE — 81025 URINE PREGNANCY TEST: CPT | Performed by: FAMILY MEDICINE

## 2024-10-13 PROCEDURE — 25000003 PHARM REV CODE 250: Performed by: FAMILY MEDICINE

## 2024-10-13 PROCEDURE — 81003 URINALYSIS AUTO W/O SCOPE: CPT | Performed by: FAMILY MEDICINE

## 2024-10-13 RX ORDER — ACETAMINOPHEN 500 MG
1000 TABLET ORAL
Status: COMPLETED | OUTPATIENT
Start: 2024-10-13 | End: 2024-10-13

## 2024-10-13 RX ORDER — DIAZEPAM 5 MG/1
5 TABLET ORAL EVERY 6 HOURS PRN
Qty: 10 TABLET | Refills: 0 | Status: SHIPPED | OUTPATIENT
Start: 2024-10-13 | End: 2024-10-23

## 2024-10-13 RX ADMIN — ACETAMINOPHEN 1000 MG: 500 TABLET ORAL at 04:10

## 2024-10-13 RX ADMIN — IOHEXOL 60 ML: 350 INJECTION, SOLUTION INTRAVENOUS at 02:10
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
## 2024-10-13 NOTE — ED NOTES
DISCHARGE INSTRUCTIONS PROVIDED TO PATIENT WITH PRESCRIPTION AT PRESENT. PT. VERBALIZED, UNDERSTANDING. PT. TO BE WHEELED OUT OF ED WHEN SIGNIFICANT OTHER RETURNS;

## 2024-10-14 LAB
OHS QRS DURATION: 100 MS
OHS QTC CALCULATION: 469 MS

## 2024-10-16 LAB — BACTERIA UR CULT: NORMAL

## 2025-01-30 DIAGNOSIS — Z00.00 ENCOUNTER FOR MEDICARE ANNUAL WELLNESS EXAM: ICD-10-CM

## 2025-04-10 DIAGNOSIS — R55 SYNCOPE AND COLLAPSE: Primary | ICD-10-CM

## 2025-04-10 DIAGNOSIS — R42 DIZZINESS AND GIDDINESS: ICD-10-CM

## 2025-04-10 DIAGNOSIS — R51.9 HEADACHE: ICD-10-CM

## 2025-04-15 ENCOUNTER — PROCEDURE VISIT (OUTPATIENT)
Dept: NEUROLOGY | Facility: HOSPITAL | Age: 40
End: 2025-04-15
Payer: MEDICARE

## 2025-04-15 DIAGNOSIS — R42 DIZZINESS AND GIDDINESS: ICD-10-CM

## 2025-04-15 DIAGNOSIS — R55 SYNCOPE AND COLLAPSE: ICD-10-CM

## 2025-04-15 DIAGNOSIS — R51.9 HEADACHE: ICD-10-CM

## 2025-04-15 PROCEDURE — 95816 EEG AWAKE AND DROWSY: CPT

## 2025-04-15 NOTE — PROCEDURES
Date of Service: 04/15/2025       Patient Name: Marianela Shrestha  MRN: [id number]   Location: Room/bed info not found                 Electroencephalogram Procedure Note      Indications: Diagnostic, seizures     Activation Procedures:  photic     EEG Description: Diffuse low amplitude activity, no clear PDR. No epileptiform discharges or asymmetric findings.      Impression: unremarkable EEG.        A non-epileptic routine EEG does not rule out a past seizure or the presence of epilepsy. Clinical correlation is advised.       Loy Todd MD  Neurology     -----------------------------------------------------------------     Technical Description   International 10-20 electrode placement was used. The record was obtained with the patient awake  The record is of good technical quality for purposes of interpretation.

## 2025-04-28 DIAGNOSIS — R55 SYNCOPE AND COLLAPSE: ICD-10-CM

## 2025-04-28 DIAGNOSIS — R42 DIZZINESS AND GIDDINESS: ICD-10-CM

## 2025-04-28 DIAGNOSIS — R51.9 HA (HEADACHE): Primary | ICD-10-CM

## 2025-04-29 DIAGNOSIS — G40.909 EPILEPSY: ICD-10-CM

## 2025-04-29 DIAGNOSIS — R55 SYNCOPE AND COLLAPSE: ICD-10-CM

## 2025-04-29 DIAGNOSIS — R56.9 SEIZURES: Primary | ICD-10-CM

## 2025-05-05 ENCOUNTER — HOSPITAL ENCOUNTER (EMERGENCY)
Facility: HOSPITAL | Age: 40
Discharge: HOME | End: 2025-05-05
Attending: EMERGENCY MEDICINE
Payer: MEDICARE

## 2025-05-05 VITALS
OXYGEN SATURATION: 98 % | BODY MASS INDEX: 30.83 KG/M2 | HEART RATE: 111 BPM | WEIGHT: 196.44 LBS | HEIGHT: 67 IN | DIASTOLIC BLOOD PRESSURE: 103 MMHG | SYSTOLIC BLOOD PRESSURE: 163 MMHG | RESPIRATION RATE: 18 BRPM

## 2025-05-05 DIAGNOSIS — B02.8 HERPES ZOSTER WITH OTHER COMPLICATION: ICD-10-CM

## 2025-05-05 DIAGNOSIS — L03.211 CELLULITIS OF FACE: Primary | ICD-10-CM

## 2025-05-05 PROCEDURE — 99284 EMERGENCY DEPT VISIT MOD MDM: CPT

## 2025-05-05 RX ORDER — DOXYCYCLINE 100 MG/1
100 CAPSULE ORAL 2 TIMES DAILY
Qty: 20 CAPSULE | Refills: 0 | Status: SHIPPED | OUTPATIENT
Start: 2025-05-05 | End: 2025-05-15

## 2025-05-05 RX ORDER — MUPIROCIN 20 MG/G
OINTMENT TOPICAL 3 TIMES DAILY
Qty: 30 G | Refills: 0 | Status: SHIPPED | OUTPATIENT
Start: 2025-05-05
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
## (undated) DEVICE — DRAPE C-ARM ELAS CLIP 42X120IN

## (undated) DEVICE — SUT 2-0 SILK 30IN BLK BRAID

## (undated) DEVICE — DRESSING AQUACEL FOAM 3 X 3

## (undated) DEVICE — SPONGE GAUZE 16PLY 4X4

## (undated) DEVICE — SUT ETHILON 4-0 PS2 18 BLK

## (undated) DEVICE — TRACKER PATIENT NON INVASIVE

## (undated) DEVICE — KIT SPINAL PATIENT CARE JACK

## (undated) DEVICE — SUT VICRYL PLUS 0 CT1 18IN

## (undated) DEVICE — DRILL BIT SURG GPS HI SPD 4MM

## (undated) DEVICE — DRESSING ABSRBNT ISLAND 3.6X8

## (undated) DEVICE — SUTURE STRATAFIX PGA PCL 3-0

## (undated) DEVICE — TUBE FRAZIER 5MM 2FT SOFT TIP

## (undated) DEVICE — DRAPE CORETEMP FLD WRM 56X62IN

## (undated) DEVICE — ELECTRODE REM POLYHESIVE II

## (undated) DEVICE — KIT SURGIFLO HEMOSTATIC MATRIX

## (undated) DEVICE — CONTAINER SPECIMEN STRL 4OZ

## (undated) DEVICE — DRAPE ABDOMINAL TIBURON 14X11

## (undated) DEVICE — DRESSING TRANS 4X4 TEGADERM

## (undated) DEVICE — KIT SUCTION WOUND CLSED 400ML

## (undated) DEVICE — DRAPE C-ARMOR EQUIPMENT COVER

## (undated) DEVICE — SHEET EENT SPLIT

## (undated) DEVICE — SYR IRRIGATION BULB STER 60ML

## (undated) DEVICE — GOWN POLY REINF BRTH SLV LG

## (undated) DEVICE — COVER BACK TBL HD 2-TIER 72IN

## (undated) DEVICE — SYR 10CC LUER LOCK

## (undated) DEVICE — DRESSING MEPILEX FLEX 3X3IN

## (undated) DEVICE — CORD BIPOLAR 12 FOOT

## (undated) DEVICE — DRAPE STERI-DRAPE 1000 17X11IN

## (undated) DEVICE — SPONGE SURGIFOAM 100 8.5X12X10

## (undated) DEVICE — DRESSING MEPILEX BORDER 4 X 4

## (undated) DEVICE — TRAY CATH FOL SIL URIMTR 16FR

## (undated) DEVICE — DRESSING AQUACEL FOAM 5 X 5

## (undated) DEVICE — PENS LAB MARKING BLACK

## (undated) DEVICE — GUIDE POST LP QUATTR SPIKE MED

## (undated) DEVICE — SUT STRATAFIX 3-0 30CM

## (undated) DEVICE — ELECTRODE REM PLYHSV RETURN 9

## (undated) DEVICE — DRESSING MEPORE ADH 3.5X12

## (undated) DEVICE — CATH IV INTROCAN 14G X 2.

## (undated) DEVICE — SPLINT REUTER BIVALVE 0.5MM

## (undated) DEVICE — APPLICATOR CHLORAPREP ORN 26ML

## (undated) DEVICE — DRAPE TOP 53X102IN

## (undated) DEVICE — SHEATH CLN ENDOSCRB 4MM

## (undated) DEVICE — TRAY ENT 4/CS

## (undated) DEVICE — BLADE 4IN EDGE INSULATED

## (undated) DEVICE — NDL SPINAL 18GX3.5 SPINOCAN

## (undated) DEVICE — TOWEL OR DISP STRL BLUE 4/PK

## (undated) DEVICE — SYS CLSR DERMABOND PRINEO 22CM

## (undated) DEVICE — SEE MEDLINE ITEM 156905

## (undated) DEVICE — NDL 20GX1-1/2IN IB

## (undated) DEVICE — SPONGE LAP 4X18 PREWASHED

## (undated) DEVICE — DIFFUSER

## (undated) DEVICE — CARTRIDGE OIL

## (undated) DEVICE — GAUZE SPONGE 4X4 12PLY

## (undated) DEVICE — SUT ETHILON 3/0 18IN PS-1

## (undated) DEVICE — TIP YANKAUERS BULB NO VENT

## (undated) DEVICE — SYR MEDALLION 10 ML

## (undated) DEVICE — DRESSING AQUACEL SACRAL 9 X 9

## (undated) DEVICE — BLADE MILL BONE MEDIUM

## (undated) DEVICE — SYR 50CC LL

## (undated) DEVICE — SHEATH 197230BVA  4MM 30D

## (undated) DEVICE — BUR BONE CUT MICRO TPS 3X3.8MM

## (undated) DEVICE — PACKING NASOPORE NASAL STD 8CM

## (undated) DEVICE — SUT VICRYL+ 1 CT1 18IN

## (undated) DEVICE — MARKER SKIN STND TIP BLUE BARR

## (undated) DEVICE — SPONGE PATTY SURGICAL .5X3IN

## (undated) DEVICE — TRACKER ENT INSTRUMENT

## (undated) DEVICE — STRIP MEDI WND CLSR 1X5IN

## (undated) DEVICE — SUT VICRYL PLUS 2-0 CT1 18

## (undated) DEVICE — NDL HYPO 27G X 1 1/2

## (undated) DEVICE — SEE MEDLINE ITEM 156913

## (undated) DEVICE — BIT DRILL REAM GPS 3.5MM

## (undated) DEVICE — CLOSURE SKIN 1X5 STERI-STRIP

## (undated) DEVICE — SEE MEDLINE ITEM 157144

## (undated) DEVICE — GOWN XX-LARGE

## (undated) DEVICE — SUT 4-0 CHROMIC GUT / SH

## (undated) DEVICE — GUIDE POST LP QUATTRO MEDIUM

## (undated) DEVICE — BLADE QUADCUT STRAIGHT 4.3MM

## (undated) DEVICE — KIT EVACUATOR 3-SPRING 1/8 DRN

## (undated) DEVICE — SUT SILK SH 2-0 30IN MP BLK